# Patient Record
Sex: MALE | Race: WHITE | NOT HISPANIC OR LATINO | Employment: OTHER | URBAN - METROPOLITAN AREA
[De-identification: names, ages, dates, MRNs, and addresses within clinical notes are randomized per-mention and may not be internally consistent; named-entity substitution may affect disease eponyms.]

---

## 2017-01-09 ENCOUNTER — NURSE TRIAGE (OUTPATIENT)
Dept: ADMINISTRATIVE | Facility: CLINIC | Age: 82
End: 2017-01-09

## 2017-01-09 NOTE — TELEPHONE ENCOUNTER
Reason for Disposition   Age > 50 years    Protocols used: ST [id number]    Son is calling to report Gene passed out this AM.  States eyes rolled back in head.  States is awake now.  Advised ER.  Son refused.  Wants to see Dr. Hutchsion or one of the NP's today.  Please contact son at [phone] to advise

## 2017-01-10 ENCOUNTER — DOCUMENTATION ONLY (OUTPATIENT)
Dept: FAMILY MEDICINE | Facility: CLINIC | Age: 82
End: 2017-01-10

## 2017-01-10 NOTE — PROGRESS NOTES
Pre-Visit Chart Review  For Appointment Scheduled on 1/11/17    Health Maintenance Due   Topic Date Due    TETANUS VACCINE  01/11/1952

## 2017-01-11 ENCOUNTER — TELEPHONE (OUTPATIENT)
Dept: NEUROLOGY | Facility: CLINIC | Age: 82
End: 2017-01-11

## 2017-01-11 ENCOUNTER — TELEPHONE (OUTPATIENT)
Dept: FAMILY MEDICINE | Facility: CLINIC | Age: 82
End: 2017-01-11

## 2017-01-11 ENCOUNTER — OFFICE VISIT (OUTPATIENT)
Dept: FAMILY MEDICINE | Facility: CLINIC | Age: 82
End: 2017-01-11
Payer: MEDICARE

## 2017-01-11 ENCOUNTER — LAB VISIT (OUTPATIENT)
Dept: LAB | Facility: HOSPITAL | Age: 82
End: 2017-01-11
Attending: NURSE PRACTITIONER
Payer: MEDICARE

## 2017-01-11 VITALS
DIASTOLIC BLOOD PRESSURE: 71 MMHG | WEIGHT: 161.81 LBS | HEART RATE: 73 BPM | BODY MASS INDEX: 31.77 KG/M2 | SYSTOLIC BLOOD PRESSURE: 152 MMHG | HEIGHT: 60 IN | TEMPERATURE: 98 F

## 2017-01-11 DIAGNOSIS — R55 SYNCOPE, UNSPECIFIED SYNCOPE TYPE: Primary | ICD-10-CM

## 2017-01-11 DIAGNOSIS — R55 SYNCOPE, UNSPECIFIED SYNCOPE TYPE: ICD-10-CM

## 2017-01-11 LAB
ALBUMIN SERPL BCP-MCNC: 3.7 G/DL
ALP SERPL-CCNC: 107 U/L
ALT SERPL W/O P-5'-P-CCNC: 21 U/L
ANION GAP SERPL CALC-SCNC: 9 MMOL/L
AST SERPL-CCNC: 19 U/L
BASOPHILS # BLD AUTO: 0.02 K/UL
BASOPHILS NFR BLD: 0.3 %
BILIRUB SERPL-MCNC: 0.4 MG/DL
BUN SERPL-MCNC: 22 MG/DL
CALCIUM SERPL-MCNC: 8.9 MG/DL
CHLORIDE SERPL-SCNC: 110 MMOL/L
CO2 SERPL-SCNC: 23 MMOL/L
CREAT SERPL-MCNC: 1.2 MG/DL
DIFFERENTIAL METHOD: ABNORMAL
EOSINOPHIL # BLD AUTO: 0.1 K/UL
EOSINOPHIL NFR BLD: 1.1 %
ERYTHROCYTE [DISTWIDTH] IN BLOOD BY AUTOMATED COUNT: 14.6 %
EST. GFR  (AFRICAN AMERICAN): >60 ML/MIN/1.73 M^2
EST. GFR  (NON AFRICAN AMERICAN): 55.6 ML/MIN/1.73 M^2
GLUCOSE SERPL-MCNC: 79 MG/DL
HCT VFR BLD AUTO: 40.6 %
HGB BLD-MCNC: 13.8 G/DL
LYMPHOCYTES # BLD AUTO: 2.4 K/UL
LYMPHOCYTES NFR BLD: 39.6 %
MCH RBC QN AUTO: 31.7 PG
MCHC RBC AUTO-ENTMCNC: 34 %
MCV RBC AUTO: 93 FL
MONOCYTES # BLD AUTO: 0.6 K/UL
MONOCYTES NFR BLD: 10.1 %
NEUTROPHILS # BLD AUTO: 3 K/UL
NEUTROPHILS NFR BLD: 48.7 %
PLATELET # BLD AUTO: 181 K/UL
PMV BLD AUTO: 11.5 FL
POTASSIUM SERPL-SCNC: 4.7 MMOL/L
PROT SERPL-MCNC: 7.1 G/DL
RBC # BLD AUTO: 4.35 M/UL
SODIUM SERPL-SCNC: 142 MMOL/L
WBC # BLD AUTO: 6.11 K/UL

## 2017-01-11 PROCEDURE — 3077F SYST BP >= 140 MM HG: CPT | Mod: S$GLB,,, | Performed by: NURSE PRACTITIONER

## 2017-01-11 PROCEDURE — 36415 COLL VENOUS BLD VENIPUNCTURE: CPT | Mod: PO

## 2017-01-11 PROCEDURE — 99999 PR PBB SHADOW E&M-EST. PATIENT-LVL V: CPT | Mod: PBBFAC,,, | Performed by: NURSE PRACTITIONER

## 2017-01-11 PROCEDURE — 1126F AMNT PAIN NOTED NONE PRSNT: CPT | Mod: S$GLB,,, | Performed by: NURSE PRACTITIONER

## 2017-01-11 PROCEDURE — 99213 OFFICE O/P EST LOW 20 MIN: CPT | Mod: S$GLB,,, | Performed by: NURSE PRACTITIONER

## 2017-01-11 PROCEDURE — 1159F MED LIST DOCD IN RCRD: CPT | Mod: S$GLB,,, | Performed by: NURSE PRACTITIONER

## 2017-01-11 PROCEDURE — 85025 COMPLETE CBC W/AUTO DIFF WBC: CPT

## 2017-01-11 PROCEDURE — 80053 COMPREHEN METABOLIC PANEL: CPT

## 2017-01-11 PROCEDURE — 1160F RVW MEDS BY RX/DR IN RCRD: CPT | Mod: S$GLB,,, | Performed by: NURSE PRACTITIONER

## 2017-01-11 PROCEDURE — 93010 ELECTROCARDIOGRAM REPORT: CPT | Mod: S$GLB,,, | Performed by: INTERNAL MEDICINE

## 2017-01-11 PROCEDURE — 93005 ELECTROCARDIOGRAM TRACING: CPT | Mod: S$GLB,,, | Performed by: NURSE PRACTITIONER

## 2017-01-11 PROCEDURE — 1157F ADVNC CARE PLAN IN RCRD: CPT | Mod: S$GLB,,, | Performed by: NURSE PRACTITIONER

## 2017-01-11 PROCEDURE — 3078F DIAST BP <80 MM HG: CPT | Mod: S$GLB,,, | Performed by: NURSE PRACTITIONER

## 2017-01-11 RX ORDER — MONTELUKAST SODIUM 10 MG/1
10 TABLET ORAL NIGHTLY
Qty: 90 TABLET | Refills: 3 | Status: SHIPPED | OUTPATIENT
Start: 2017-01-11 | End: 2017-09-06

## 2017-01-11 RX ORDER — MONTELUKAST SODIUM 10 MG/1
10 TABLET ORAL NIGHTLY
COMMUNITY
End: 2017-01-11 | Stop reason: SDUPTHER

## 2017-01-11 RX ORDER — PREDNISONE 50 MG/1
TABLET ORAL
Qty: 3 TABLET | Refills: 0 | Status: SHIPPED | OUTPATIENT
Start: 2017-01-11 | End: 2017-09-06

## 2017-01-11 NOTE — TELEPHONE ENCOUNTER
----- Message from Sabar Soto sent at 1/11/2017  2:26 PM CST -----  Contact: Patient  Gene, patient [phone], Returning nurse's call. Call to POD. Please advise. Thanks.

## 2017-01-11 NOTE — PATIENT INSTRUCTIONS
Controlling High Blood Pressure  High blood pressure (hypertension) is often called the silent killer. This is because many people who have it dont know it. High blood pressure is defined as 140/90 mm Hg or higher. Know your blood pressure and remember to check it regularly. Doing so can save your life. Here are some things you can do to help control your blood pressure.    Choose heart-healthy foods  · Select low-salt, low-fat foods. Limit sodium intake to 2,400 mg per day or the amount suggested by your healthcare provider.  · Limit canned, dried, cured, packaged, and fast foods. These can contain a lot of salt.  · Eat 8 to 10 servings of fruits and vegetables every day.  · Choose lean meats, fish, or chicken.  · Eat whole-grain pasta, brown rice, and beans.  · Eat 2 to 3 servings of low-fat or fat-free dairy products.  · Ask your doctor about the DASH eating plan. This plan helps reduce blood pressure.  · When you go to a restaurant, ask that your meal be prepared with no added salt.  Maintain a healthy weight  · Ask your healthcare provider how many calories to eat a day. Then stick to that number.  · Ask your healthcare provider what weight range is healthiest for you. If you are overweight, a weight loss of only 3% to 5% of your body weight can help lower blood pressure. Generally, a good weight loss goal is to lose 10% of your body weight in a year.  · Limit snacks and sweets.  · Get regular exercise.  Get up and get active  · Choose activities you enjoy. Find ones you can do with friends or family. This includes bicycling, dancing, walking, and jogging.  · Park farther away from building entrances.  · Use stairs instead of the elevator.  · When you can, walk or bike instead of driving.  · [place] leaves, garden, or do household repairs.  · Be active at a moderate to vigorous level of physical activity for at least 40 minutes for a minimum of 3 to 4 days a week.   Manage stress  · Make time to relax and enjoy  life. Find time to laugh.  · Communicate your concerns with your loved ones and your healthcare provider.  · Visit with family and friends, and keep up with hobbies.  Limit alcohol and quit smoking  · Men should have no more than 2 drinks per day.  · Women should have no more than 1 drink per day.  · Talk with your healthcare provider about quitting smoking. Smoking significantly increases your risk for heart disease and stroke. Ask your healthcare provider about community smoking cessation programs and other options.  Medicines  If lifestyle changes arent enough, your healthcare provider may prescribe high blood pressure medicine. Take all medicines as prescribed. If you have any questions about your medicines, ask your healthcare provider before stopping or changing them.   © 3154-8804 [employer]. [address] [place] [address], [place] [address]. All rights reserved. This information is not intended as a substitute for professional medical care. Always follow your healthcare professional's instructions.        Weight Management: Getting Started  Healthy bodies come in all shapes and sizes. Not all bodies are made to be thin. For some people, a healthy weight is higher than the average weight listed on weight charts. Your healthcare provider can help you decide on a healthy weight for you.    Reasons to lose weight  Losing weight can help with some health problems, such as high blood pressure, heart disease, diabetes, sleep apnea, and arthritis. You may also feel more energy.  Set your long-term goal  Your goal doesn't even have to be a specific weight. You may decide on a fitness goal (such as being able to walk 10 miles a week), or a health goal (such as lowering your blood pressure). Choose a goal that is measurable and reasonable, so you know when you've reached it. A goal of reaching a BMI of less than 25 is not always reasonable (or possible).   Make an action plan  Habits dont change overnight.  Setting your goals too high can leave you feeling discouraged if you cant reach them. Be realistic. Choose one or two small changes you can make now. Set an action plan for how you are going to make these changes. When you can stick to this plan, keep making a few more small changes. Taking small steps will help you stay on the path to success.  Track your progress  Write down your goals. Then, keep a daily record of your progress. Write down what you eat and how active you are. This record lets you look back on how much youve done. It may also help when youre feeling frustrated. Reward yourself for success. Even if you dont reach every goal, give yourself credit for what you do get done.  Get support  Encouragement from others can help make losing weight easier. Ask your family members and friends for support. They may even want to join you. Also look to your healthcare provider, registered dietitian, and  for help. Your local hospital can give you more information about nutrition, exercise, and weight loss.  © 6957-7020 [employer]. [address] [place] [address], [place] [address]. All rights reserved. This information is not intended as a substitute for professional medical care. Always follow your healthcare professional's instructions.        Walking for Fitness  Fitness walking has something for everyone, even people who are already fit. Walking is one of the safest ways to condition your body aerobically. It can boost energy, help you lose weight, and reduce stress.    Physical benefits  · Walking strengthens your heart and lungs, and tones your muscles.  · When walking, your feet land with less impact than in other sports. This reduces chances of muscle, bone, and joint injury.  · Regular walking improves your cholesterol levels and lowers your risk of heart disease. And it helps you control your blood sugar if you have diabetes.  · Walking is a weight-bearing activity, which  helps maintain bone density. This can help prevent osteoporosis.  Personal rewards  · Taking walks can help you relax and manage stress. And fitness walking may make you feel better about yourself.  · Walking can help you sleep better at night and make you less likely to be depressed.  · Regular walking may help maintain your memory as you get older.  · Walking is a great way to spend extra time with friends and family members. Be sure to invite your dog along!  Q&A about fitness walking  Q: Will walking keep me fit?  A: Yes. Regular walking at the right pace gives you all the benefits of other aerobic activities, such as jogging and swimming.  Q: Will walking help me lose weight and keep it off?  A: Yes. Per mile, walking can burn as many calories as jogging. Your health care provider can help work walking into your weight-loss plan.  Q: Is walking safe for my health?  A: Yes. Walking is safe if you have high blood pressure, diabetes, heart disease, or other conditions. Talk to your health care provider before you start.  © 7160-0455 [employer]. [address] [place] [address], [place] [address]. All rights reserved. This information is not intended as a substitute for professional medical care. Always follow your healthcare professional's instructions.

## 2017-01-11 NOTE — TELEPHONE ENCOUNTER
----- Message from Mariel Freeman sent at 1/11/2017 10:58 AM CST -----  Pt was seen today and needs an appt for Syncope, unspecified syncope type  Please call pt @ [phone].  Thanks !

## 2017-01-11 NOTE — TELEPHONE ENCOUNTER
----- Message from Sabra Soto sent at 1/11/2017  2:25 PM CST -----  Contact: Patient  Gene, patient [phone], Returning nurse's call. Call to POD. Please advise. Thanks.

## 2017-01-11 NOTE — TELEPHONE ENCOUNTER
Called patient, explained neurology was trying to reach him to schedule an appointment due to his syncope. He is calling their office to schedule.

## 2017-01-11 NOTE — MR AVS SNAPSHOT
[facility]  [address]  [place] [address]  Phone: [phone]  Fax: [phone]                  Gene Hartman   2017 9:40 AM   Office Visit    Description:  Male : 1934   Provider:  CARLOS Sams   Department:  [facility]           Reason for Visit     weakness           Diagnoses this Visit        Comments    Syncope, unspecified syncope type    -  Primary            To Do List           Future Appointments        Provider Department Dept Phone    2017 1:45 PM LAB, [facility] - Lab [phone]    2017 2:00 PM SPECIMEN, [facility] - Lab [phone]    2017 1:30 PM [facility] CT1 LIMIT 400 LBS [facility] [phone]    2017 11:00 AM [facility] [phone]    2017 9:00 AM Rey Hutchison MD [facility] [phone]      Goals (5 Years of Data)     None      Follow-Up and Disposition     Return for labs now.       These Medications        Disp Refills Start End    montelukast (SINGULAIR) 10 mg tablet 90 tablet 3 2017     Take 1 tablet (10 mg total) by mouth every evening. - Oral    Pharmacy: [facility] - [address] Ph #: [phone]       predniSONE (DELTASONE) 50 MG Tab 3 tablet 0 2017     Take one tablet 13 hours, 7 hours, and 1 hour prior to procedure.    Pharmacy: [id number] - [place] - [address]. Ph #: [phone]         PURCHASE these Medications (No prescription required)        Start End    diphenhydramine HCl 50 mg/30 mL Liqd 2017    Sig - Route: Take 50 mg by mouth once. Take one hour prior to CTA. - Oral    Class: OTC      [facility] On Call     [facility] On Call Nurse Care Line -  Assistance  Registered nurses in the [facility] On Call Center provide clinical advisement, health education, appointment booking, and other advisory  services.  Call for this free service at [phone].             Medications           Message regarding Medications     Verify the changes and/or additions to your medication regime listed below are the same as discussed with your clinician today.  If any of these changes or additions are incorrect, please notify your healthcare provider.        START taking these NEW medications        Refills    montelukast (SINGULAIR) 10 mg tablet 3    Sig: Take 1 tablet (10 mg total) by mouth every evening.    Class: Normal    Route: Oral    predniSONE (DELTASONE) 50 MG Tab 0    Sig: Take one tablet 13 hours, 7 hours, and 1 hour prior to procedure.    Class: Normal    diphenhydramine HCl 50 mg/30 mL Liqd     Sig: Take 50 mg by mouth once. Take one hour prior to CTA.    Class: OTC    Route: Oral           Verify that the below list of medications is an accurate representation of the medications you are currently taking.  If none reported, the list may be blank. If incorrect, please contact your healthcare provider. Carry this list with you in case of emergency.           Current Medications     acetaminophen (TYLENOL EX STR RAPID RELEASE) 500 MG tablet Take 500 mg by mouth every 6 (six) hours as needed for Pain.    aspirin (ECOTRIN) 81 MG EC tablet Take 81 mg by mouth once daily.    atorvastatin (LIPITOR) 40 MG tablet Take 1 tablet (40 mg total) by mouth once daily.    azelastine (ASTELIN) 137 mcg (0.1 %) nasal spray 2 sprays (274 mcg total) by Nasal route 2 (two) times daily.    clopidogrel (PLAVIX) 75 mg tablet Take 1 tablet (75 mg total) by mouth once daily.    desoximetasone (TOPICORT) 0.25 % cream Apply topically 2 (two) times daily.    doxazosin (CARDURA) 2 MG tablet Take 1 mg by mouth every evening.    fluticasone (FLONASE) 50 mcg/actuation nasal spray 1 spray by Each Nare route once daily.    hyoscyamine (ANASPAZ,LEVSIN) 0.125 mg Tab Take 1 tablet (125 mcg total) by mouth every 4 (four) hours as needed.     levocetirizine (XYZAL) 5 MG tablet Take 1 tablet (5 mg total) by mouth every evening.    levothyroxine (SYNTHROID) 25 MCG tablet Take 1 tablet (25 mcg total) by mouth before breakfast.    lorazepam (ATIVAN) 0.5 MG tablet Take 1 tablet (0.5 mg total) by mouth every 12 (twelve) hours as needed for Anxiety.    losartan (COZAAR) 50 MG tablet Take 1 tablet (50 mg total) by mouth 2 (two) times daily.    metoprolol succinate (TOPROL-XL) 25 MG 24 hr tablet Take 1 tablet (25 mg total) by mouth once daily.    montelukast (SINGULAIR) 10 mg tablet Take 1 tablet (10 mg total) by mouth every evening.    pantoprazole (PROTONIX) 40 MG tablet Take 1 tablet (40 mg total) by mouth once daily.    RESTASIS 0.05 % ophthalmic emulsion     diphenhydramine HCl 50 mg/30 mL Liqd Take 50 mg by mouth once. Take one hour prior to CTA.    predniSONE (DELTASONE) 50 MG Tab Take one tablet 13 hours, 7 hours, and 1 hour prior to procedure.           Clinical Reference Information           Vital Signs - Last Recorded  Most recent update: 1/11/2017 10:16 AM by Carmita Blandon MA    BP Pulse Temp Ht Wt BMI    (!) 152/71 (BP Location: Right arm, Patient Position: Standing, BP Method: Automatic) 73 97.5 °F (36.4 °C) (Oral) 5' (1.524 m) 73.4 kg (161 lb 13.1 oz) 31.6 kg/m2      Blood Pressure          Most Recent Value    BP  (!)  152/71      Allergies as of 1/11/2017     Iodinated Contrast Media - Iv Dye    Pontocaine [Tetracaine Hcl]      Immunizations Administered on Date of Encounter - 1/11/2017     None      Orders Placed During Today's Visit      Normal Orders This Visit    Ambulatory referral to Neurology     IN OFFICE EKG 12-LEAD (to [place])     Future Labs/Procedures Expected by Expires    CBC auto differential  1/11/2017 3/12/2018    Comprehensive metabolic panel  1/11/2017 3/12/2018    CTA Head and Neck (xpd)  1/11/2017 1/11/2018    Urinalysis  1/11/2017 3/12/2018      Instructions      Controlling High Blood Pressure  High blood pressure  (hypertension) is often called the silent killer. This is because many people who have it dont know it. High blood pressure is defined as 140/90 mm Hg or higher. Know your blood pressure and remember to check it regularly. Doing so can save your life. Here are some things you can do to help control your blood pressure.    Choose heart-healthy foods  · Select low-salt, low-fat foods. Limit sodium intake to 2,400 mg per day or the amount suggested by your healthcare provider.  · Limit canned, dried, cured, packaged, and fast foods. These can contain a lot of salt.  · Eat 8 to 10 servings of fruits and vegetables every day.  · Choose lean meats, fish, or chicken.  · Eat whole-grain pasta, brown rice, and beans.  · Eat 2 to 3 servings of low-fat or fat-free dairy products.  · Ask your doctor about the DASH eating plan. This plan helps reduce blood pressure.  · When you go to a restaurant, ask that your meal be prepared with no added salt.  Maintain a healthy weight  · Ask your healthcare provider how many calories to eat a day. Then stick to that number.  · Ask your healthcare provider what weight range is healthiest for you. If you are overweight, a weight loss of only 3% to 5% of your body weight can help lower blood pressure. Generally, a good weight loss goal is to lose 10% of your body weight in a year.  · Limit snacks and sweets.  · Get regular exercise.  Get up and get active  · Choose activities you enjoy. Find ones you can do with friends or family. This includes bicycling, dancing, walking, and jogging.  · Park farther away from building entrances.  · Use stairs instead of the elevator.  · When you can, walk or bike instead of driving.  · [place] leaves, garden, or do household repairs.  · Be active at a moderate to vigorous level of physical activity for at least 40 minutes for a minimum of 3 to 4 days a week.   Manage stress  · Make time to relax and enjoy life. Find time to laugh.  · Communicate your concerns  with your loved ones and your healthcare provider.  · Visit with family and friends, and keep up with hobbies.  Limit alcohol and quit smoking  · Men should have no more than 2 drinks per day.  · Women should have no more than 1 drink per day.  · Talk with your healthcare provider about quitting smoking. Smoking significantly increases your risk for heart disease and stroke. Ask your healthcare provider about community smoking cessation programs and other options.  Medicines  If lifestyle changes arent enough, your healthcare provider may prescribe high blood pressure medicine. Take all medicines as prescribed. If you have any questions about your medicines, ask your healthcare provider before stopping or changing them.   © 1454-7450 [employer]. [address] [place] [address], [place] [address]. All rights reserved. This information is not intended as a substitute for professional medical care. Always follow your healthcare professional's instructions.        Weight Management: Getting Started  Healthy bodies come in all shapes and sizes. Not all bodies are made to be thin. For some people, a healthy weight is higher than the average weight listed on weight charts. Your healthcare provider can help you decide on a healthy weight for you.    Reasons to lose weight  Losing weight can help with some health problems, such as high blood pressure, heart disease, diabetes, sleep apnea, and arthritis. You may also feel more energy.  Set your long-term goal  Your goal doesn't even have to be a specific weight. You may decide on a fitness goal (such as being able to walk 10 miles a week), or a health goal (such as lowering your blood pressure). Choose a goal that is measurable and reasonable, so you know when you've reached it. A goal of reaching a BMI of less than 25 is not always reasonable (or possible).   Make an action plan  Habits dont change overnight. Setting your goals too high can leave you feeling  discouraged if you cant reach them. Be realistic. Choose one or two small changes you can make now. Set an action plan for how you are going to make these changes. When you can stick to this plan, keep making a few more small changes. Taking small steps will help you stay on the path to success.  Track your progress  Write down your goals. Then, keep a daily record of your progress. Write down what you eat and how active you are. This record lets you look back on how much youve done. It may also help when youre feeling frustrated. Reward yourself for success. Even if you dont reach every goal, give yourself credit for what you do get done.  Get support  Encouragement from others can help make losing weight easier. Ask your family members and friends for support. They may even want to join you. Also look to your healthcare provider, registered dietitian, and  for help. Your local hospital can give you more information about nutrition, exercise, and weight loss.  © 4885-6646 [employer]. [address] [place] [address]. All rights reserved. This information is not intended as a substitute for professional medical care. Always follow your healthcare professional's instructions.        Walking for Fitness  Fitness walking has something for everyone, even people who are already fit. Walking is one of the safest ways to condition your body aerobically. It can boost energy, help you lose weight, and reduce stress.    Physical benefits  · Walking strengthens your heart and lungs, and tones your muscles.  · When walking, your feet land with less impact than in other sports. This reduces chances of muscle, bone, and joint injury.  · Regular walking improves your cholesterol levels and lowers your risk of heart disease. And it helps you control your blood sugar if you have diabetes.  · Walking is a weight-bearing activity, which helps maintain bone density. This can help prevent  osteoporosis.  Personal rewards  · Taking walks can help you relax and manage stress. And fitness walking may make you feel better about yourself.  · Walking can help you sleep better at night and make you less likely to be depressed.  · Regular walking may help maintain your memory as you get older.  · Walking is a great way to spend extra time with friends and family members. Be sure to invite your dog along!  Q&A about fitness walking  Q: Will walking keep me fit?  A: Yes. Regular walking at the right pace gives you all the benefits of other aerobic activities, such as jogging and swimming.  Q: Will walking help me lose weight and keep it off?  A: Yes. Per mile, walking can burn as many calories as jogging. Your health care provider can help work walking into your weight-loss plan.  Q: Is walking safe for my health?  A: Yes. Walking is safe if you have high blood pressure, diabetes, heart disease, or other conditions. Talk to your health care provider before you start.  © 5889-1353 [employer]. [address] [place] [address], [place] [address]. All rights reserved. This information is not intended as a substitute for professional medical care. Always follow your healthcare professional's instructions.

## 2017-01-12 NOTE — PROGRESS NOTES
Subjective:       Patient ID: Gene Hartman is a 83 y.o. male.    Chief Complaint: weakness    HPI Comments: Mr. Hartman presents to the clinic today for episode of syncope which occurred four days ago.  He states he got up to go to the bathroom to urinate when he felt lightheaded and lost consciousness.  He was caught by his son who lowered him to the floor.  He woke up about one minute later.  Blood pressure immediately after incident, according to patient's wife, was 140/?.  Patient is not a known diabetic.  There were no convulsions and no post ictal state.  No chest pain or shortness of breath.  This occurred around 9 at night, and he had not eaten since about 3:30 pm.  He has history of syncope and had pacemaker placed about two years ago.  He had had one syncopal episode since the pacemaker was placed.  He also has history of vertebral artery stenosis.  Last CTA 7/2015, he has seen Dr. Rivera in the past for this problem and declined to have corrective procedure done.  Orthostatics today are negative.    Review of Systems   Constitutional: Negative for chills, fatigue and fever.   HENT: Negative for congestion, ear pain and sinus pressure.    Eyes: Negative for visual disturbance.   Respiratory: Negative for cough, shortness of breath and wheezing.    Cardiovascular: Negative for chest pain, palpitations and leg swelling.   Gastrointestinal: Negative for abdominal pain, constipation, diarrhea, nausea and vomiting.   Neurological: Positive for syncope and light-headedness. Negative for dizziness, tremors, seizures, facial asymmetry, speech difficulty, weakness, numbness and headaches.   Hematological: Does not bruise/bleed easily.       Objective:      Physical Exam   Constitutional: He is oriented to person, place, and time. He appears well-developed and well-nourished. No distress.   HENT:   Head: Normocephalic and atraumatic.   Right Ear: External ear normal.   Left Ear: External ear normal.   Mouth/Throat:  Oropharynx is clear and moist. No oropharyngeal exudate.   Eyes: Pupils are equal, round, and reactive to light. Right eye exhibits no discharge. Left eye exhibits no discharge.   Neck: Neck supple. No thyromegaly present.   Cardiovascular: Normal rate and regular rhythm.  Exam reveals no gallop and no friction rub.    No murmur heard.  No lower extremity edema.  No carotid bruit heard.   Pulmonary/Chest: Effort normal and breath sounds normal. No respiratory distress. He has no wheezes. He has no rales.   Abdominal: Soft. He exhibits no distension. There is no tenderness.   Lymphadenopathy:     He has no cervical adenopathy.   Neurological: He is alert and oriented to person, place, and time. Coordination normal.   Skin: Skin is warm and dry.   Psychiatric: He has a normal mood and affect. His behavior is normal. Thought content normal.   Vitals reviewed.          Current Outpatient Prescriptions:     acetaminophen (TYLENOL EX STR RAPID RELEASE) 500 MG tablet, Take 500 mg by mouth every 6 (six) hours as needed for Pain., Disp: , Rfl:     aspirin (ECOTRIN) 81 MG EC tablet, Take 81 mg by mouth once daily., Disp: , Rfl:     atorvastatin (LIPITOR) 40 MG tablet, Take 1 tablet (40 mg total) by mouth once daily., Disp: 90 tablet, Rfl: 3    azelastine (ASTELIN) 137 mcg (0.1 %) nasal spray, 2 sprays (274 mcg total) by Nasal route 2 (two) times daily., Disp: 90 mL, Rfl: 11    clopidogrel (PLAVIX) 75 mg tablet, Take 1 tablet (75 mg total) by mouth once daily., Disp: 90 tablet, Rfl: 3    desoximetasone (TOPICORT) 0.25 % cream, Apply topically 2 (two) times daily., Disp: , Rfl:     doxazosin (CARDURA) 2 MG tablet, Take 1 mg by mouth every evening., Disp: , Rfl:     fluticasone (FLONASE) 50 mcg/actuation nasal spray, 1 spray by Each Nare route once daily., Disp: 1 Bottle, Rfl: 3    hyoscyamine (ANASPAZ,LEVSIN) 0.125 mg Tab, Take 1 tablet (125 mcg total) by mouth every 4 (four) hours as needed., Disp: 90 tablet, Rfl:  4    levocetirizine (XYZAL) 5 MG tablet, Take 1 tablet (5 mg total) by mouth every evening., Disp: 90 tablet, Rfl: 3    levothyroxine (SYNTHROID) 25 MCG tablet, Take 1 tablet (25 mcg total) by mouth before breakfast., Disp: 90 tablet, Rfl: 3    lorazepam (ATIVAN) 0.5 MG tablet, Take 1 tablet (0.5 mg total) by mouth every 12 (twelve) hours as needed for Anxiety., Disp: 180 tablet, Rfl: 1    losartan (COZAAR) 50 MG tablet, Take 1 tablet (50 mg total) by mouth 2 (two) times daily., Disp: 180 tablet, Rfl: 3    metoprolol succinate (TOPROL-XL) 25 MG 24 hr tablet, Take 1 tablet (25 mg total) by mouth once daily., Disp: 90 tablet, Rfl: 3    montelukast (SINGULAIR) 10 mg tablet, Take 1 tablet (10 mg total) by mouth every evening., Disp: 90 tablet, Rfl: 3    pantoprazole (PROTONIX) 40 MG tablet, Take 1 tablet (40 mg total) by mouth once daily., Disp: 90 tablet, Rfl: 3    RESTASIS 0.05 % ophthalmic emulsion, , Disp: , Rfl:     predniSONE (DELTASONE) 50 MG Tab, Take one tablet 13 hours, 7 hours, and 1 hour prior to procedure., Disp: 3 tablet, Rfl: 0  Assessment:       1. Syncope, unspecified syncope type        Plan:      Syncope, unspecified syncope type  Allergic to iodine.  Pre-medication with prednisone, benadryl explained thoroughly to patient and his family.  Patient's wife repeated back instructions for premedication.  -     IN OFFICE EKG 12-LEAD (to Muse)--NSR interpreted by me   -     Comprehensive metabolic panel; Future; Expected date: 1/11/17  -     CBC auto differential; Future; Expected date: 1/11/17  -     CTA Head and Neck (xpd); Future; Expected date: 1/11/17  -     Urinalysis; Future; Expected date: 1/11/17  -     diphenhydramine HCl 50 mg/30 mL Liqd; Take 50 mg by mouth once. Take one hour prior to CTA.  -     Ambulatory referral to Neurology    Other orders  -     montelukast (SINGULAIR) 10 mg tablet; Take 1 tablet (10 mg total) by mouth every evening.  Dispense: 90 tablet; Refill: 3  -      predniSONE (DELTASONE) 50 MG Tab; Take one tablet 13 hours, 7 hours, and 1 hour prior to procedure.  Dispense: 3 tablet; Refill: 0

## 2017-01-16 ENCOUNTER — HOSPITAL ENCOUNTER (OUTPATIENT)
Dept: RADIOLOGY | Facility: HOSPITAL | Age: 82
Discharge: HOME | End: 2017-01-16
Attending: NURSE PRACTITIONER
Payer: MEDICARE

## 2017-01-16 DIAGNOSIS — R55 SYNCOPE, UNSPECIFIED SYNCOPE TYPE: ICD-10-CM

## 2017-01-16 PROCEDURE — 70496 CT ANGIOGRAPHY HEAD: CPT | Mod: TC

## 2017-01-16 PROCEDURE — 25500020 PHARM REV CODE 255

## 2017-01-16 PROCEDURE — 70498 CT ANGIOGRAPHY NECK: CPT | Mod: 26,,, | Performed by: RADIOLOGY

## 2017-01-16 PROCEDURE — 70496 CT ANGIOGRAPHY HEAD: CPT | Mod: 26,,, | Performed by: RADIOLOGY

## 2017-01-16 RX ADMIN — IOHEXOL: 350 INJECTION, SOLUTION INTRAVENOUS at 02:01

## 2017-01-18 DIAGNOSIS — I65.23 CAROTID STENOSIS, BILATERAL: Primary | ICD-10-CM

## 2017-01-23 ENCOUNTER — TELEPHONE (OUTPATIENT)
Dept: FAMILY MEDICINE | Facility: CLINIC | Age: 82
End: 2017-01-23

## 2017-01-23 NOTE — TELEPHONE ENCOUNTER
Can you please schedule this patient according to referral for Carotid stenosis, bilateral? Let me know if there is any problem.

## 2017-01-23 NOTE — TELEPHONE ENCOUNTER
----- Message from Lyn Simons sent at 1/23/2017  1:49 PM CST -----  Amber (daughter)requesting copy of patient's CT Scan results/would like to  before Wednesday/please call back at [phone] when ready.

## 2017-01-23 NOTE — TELEPHONE ENCOUNTER
Informed patient's daughter in order to get records of CT scan, she will have to go to the hospital, she expressed understanding.

## 2017-01-27 ENCOUNTER — TELEPHONE (OUTPATIENT)
Dept: VASCULAR SURGERY | Facility: CLINIC | Age: 82
End: 2017-01-27

## 2017-01-27 NOTE — TELEPHONE ENCOUNTER
Called pt to schedule appt. Wife informed me that he is being followed by Dr. Betancourt (cardio) and will have carotid stenosis monitored by him.

## 2017-06-26 RX ORDER — LOSARTAN POTASSIUM 50 MG/1
50 TABLET ORAL 2 TIMES DAILY
Qty: 180 TABLET | Refills: 3 | Status: CANCELLED | OUTPATIENT
Start: 2017-06-26

## 2017-06-27 RX ORDER — DOXAZOSIN 2 MG/1
2 TABLET ORAL NIGHTLY
Qty: 30 TABLET | Refills: 2 | Status: SHIPPED | OUTPATIENT
Start: 2017-06-27 | End: 2017-09-06

## 2017-06-28 RX ORDER — LOSARTAN POTASSIUM 50 MG/1
50 TABLET ORAL 2 TIMES DAILY
Qty: 180 TABLET | Refills: 3 | Status: SHIPPED | OUTPATIENT
Start: 2017-06-28 | End: 2019-02-11 | Stop reason: SDUPTHER

## 2017-07-06 RX ORDER — FLUTICASONE PROPIONATE 50 MCG
SPRAY, SUSPENSION (ML) NASAL
Qty: 48 G | Refills: 0 | Status: SHIPPED | OUTPATIENT
Start: 2017-07-06 | End: 2017-09-06 | Stop reason: SDUPTHER

## 2017-08-25 LAB
ALBUMIN SERPL-MCNC: 4 G/DL (ref 3.1–4.7)
ALP SERPL-CCNC: 69 IU/L (ref 40–104)
ALT (SGPT): 18 IU/L (ref 3–33)
AST SERPL-CCNC: 18 IU/L (ref 10–40)
BASOPHILS NFR BLD: 0 K/UL (ref 0–0.2)
BASOPHILS NFR BLD: 0.4 %
BILIRUB SERPL-MCNC: 0.8 MG/DL (ref 0.3–1)
BUN SERPL-MCNC: 23 MG/DL (ref 8–20)
CALCIUM SERPL-MCNC: 8.7 MG/DL (ref 7.7–10.4)
CHLORIDE: 110 MMOL/L (ref 98–110)
CO2 SERPL-SCNC: 24.7 MMOL/L (ref 22.8–31.6)
CREATININE RANDOM URINE: 98 MG/DL
CREATININE: 1.24 MG/DL (ref 0.6–1.4)
EOSINOPHIL NFR BLD: 0.1 K/UL (ref 0–0.7)
EOSINOPHIL NFR BLD: 1.1 %
ERYTHROCYTE [DISTWIDTH] IN BLOOD BY AUTOMATED COUNT: 13.7 % (ref 11.7–14.9)
GLUCOSE: 101 MG/DL (ref 70–99)
GRAN #: 2.8 K/UL (ref 1.4–6.5)
GRAN%: 50.9 %
HCT VFR BLD AUTO: 40.7 % (ref 39–55)
HGB BLD-MCNC: 13.9 G/DL (ref 14–16)
IMMATURE GRANS (ABS): 0 K/UL (ref 0–1)
IMMATURE GRANULOCYTES: 0.2 %
LYMPH #: 2.1 K/UL (ref 1.2–3.4)
LYMPH%: 39 %
MCH RBC QN AUTO: 32 PG (ref 25–35)
MCHC RBC AUTO-ENTMCNC: 34.2 G/DL (ref 31–36)
MCV RBC AUTO: 93.8 FL (ref 80–100)
MICROALBUM.,U,RANDOM: 3.2 MCG/ML (ref 0–19.9)
MICROALBUMIN/CREATININE RATIO: 3 (ref 0–30)
MONO #: 0.5 K/UL (ref 0.1–0.6)
MONO%: 8.4 %
NUCLEATED RBCS: 0 %
PLATELET # BLD AUTO: 144 K/UL (ref 140–440)
PMV BLD AUTO: 11.7 FL (ref 8.8–12.7)
POTASSIUM SERPL-SCNC: 4.6 MMOL/L (ref 3.5–5)
PROT SERPL-MCNC: 6.7 G/DL (ref 6–8.2)
RBC # BLD AUTO: 4.34 M/UL (ref 4.3–5.9)
SODIUM: 141 MMOL/L (ref 134–144)
TSH SERPL DL<=0.005 MIU/L-ACNC: 3.89 ULU/ML (ref 0.3–5.6)
WBC # BLD AUTO: 5.5 K/UL (ref 5–10)

## 2017-09-06 ENCOUNTER — OFFICE VISIT (OUTPATIENT)
Dept: FAMILY MEDICINE | Facility: CLINIC | Age: 82
End: 2017-09-06
Payer: MEDICARE

## 2017-09-06 VITALS
RESPIRATION RATE: 18 BRPM | DIASTOLIC BLOOD PRESSURE: 68 MMHG | BODY MASS INDEX: 31.44 KG/M2 | SYSTOLIC BLOOD PRESSURE: 120 MMHG | WEIGHT: 160.13 LBS | HEART RATE: 63 BPM | HEIGHT: 60 IN | OXYGEN SATURATION: 97 %

## 2017-09-06 DIAGNOSIS — F41.9 ANXIETY: ICD-10-CM

## 2017-09-06 DIAGNOSIS — Z95.0 PACEMAKER: ICD-10-CM

## 2017-09-06 DIAGNOSIS — G44.209 TENSION HEADACHE: Primary | ICD-10-CM

## 2017-09-06 DIAGNOSIS — M48.9 CERVICAL SPINE DISEASE: ICD-10-CM

## 2017-09-06 DIAGNOSIS — Z85.46 HISTORY OF PROSTATE CANCER: ICD-10-CM

## 2017-09-06 DIAGNOSIS — R35.1 NOCTURIA: ICD-10-CM

## 2017-09-06 PROBLEM — N40.1 BPH ASSOCIATED WITH NOCTURIA: Status: ACTIVE | Noted: 2017-09-06

## 2017-09-06 PROCEDURE — 1159F MED LIST DOCD IN RCRD: CPT | Mod: ,,, | Performed by: INTERNAL MEDICINE

## 2017-09-06 PROCEDURE — 99215 OFFICE O/P EST HI 40 MIN: CPT | Mod: ,,, | Performed by: INTERNAL MEDICINE

## 2017-09-06 PROCEDURE — 3008F BODY MASS INDEX DOCD: CPT | Mod: ,,, | Performed by: INTERNAL MEDICINE

## 2017-09-06 PROCEDURE — 3074F SYST BP LT 130 MM HG: CPT | Mod: ,,, | Performed by: INTERNAL MEDICINE

## 2017-09-06 PROCEDURE — 3078F DIAST BP <80 MM HG: CPT | Mod: ,,, | Performed by: INTERNAL MEDICINE

## 2017-09-06 RX ORDER — OXYBUTYNIN CHLORIDE 5 MG/1
5 TABLET, EXTENDED RELEASE ORAL DAILY
Qty: 30 TABLET | Refills: 11 | Status: SHIPPED | OUTPATIENT
Start: 2017-09-06 | End: 2018-05-08 | Stop reason: SDUPTHER

## 2017-09-06 RX ORDER — LORAZEPAM 0.5 MG/1
0.5 TABLET ORAL EVERY 12 HOURS PRN
Qty: 180 TABLET | Refills: 1 | Status: SHIPPED | OUTPATIENT
Start: 2017-09-06 | End: 2018-05-20 | Stop reason: SDUPTHER

## 2017-09-06 RX ORDER — CYCLOSPORINE 0.5 MG/ML
1 EMULSION OPHTHALMIC 2 TIMES DAILY
COMMUNITY

## 2017-09-06 RX ORDER — FLUTICASONE PROPIONATE 50 MCG
SPRAY, SUSPENSION (ML) NASAL
Qty: 48 G | Refills: 2 | Status: SHIPPED | OUTPATIENT
Start: 2017-09-06 | End: 2018-05-08 | Stop reason: SDUPTHER

## 2017-09-06 RX ORDER — PANTOPRAZOLE SODIUM 40 MG/1
40 TABLET, DELAYED RELEASE ORAL DAILY
Qty: 90 TABLET | Refills: 1 | Status: SHIPPED | OUTPATIENT
Start: 2017-09-06 | End: 2017-12-05 | Stop reason: SDUPTHER

## 2017-09-06 RX ORDER — TAMSULOSIN HYDROCHLORIDE 0.4 MG/1
0.4 CAPSULE ORAL DAILY
Qty: 30 CAPSULE | Refills: 11 | Status: SHIPPED | OUTPATIENT
Start: 2017-09-06 | End: 2017-09-06

## 2017-09-06 RX ORDER — METOPROLOL SUCCINATE 25 MG/1
25 TABLET, EXTENDED RELEASE ORAL DAILY
Qty: 90 TABLET | Refills: 3 | Status: SHIPPED | OUTPATIENT
Start: 2017-09-06 | End: 2018-07-23 | Stop reason: SDUPTHER

## 2017-09-06 RX ORDER — PANTOPRAZOLE SODIUM 40 MG/1
40 TABLET, DELAYED RELEASE ORAL DAILY
COMMUNITY
End: 2017-09-06 | Stop reason: SDUPTHER

## 2017-09-06 RX ORDER — MONTELUKAST SODIUM 10 MG/1
10 TABLET ORAL NIGHTLY
COMMUNITY
End: 2017-10-25 | Stop reason: SDUPTHER

## 2017-09-06 RX ORDER — TIZANIDINE 2 MG/1
4 TABLET ORAL EVERY 6 HOURS PRN
Qty: 30 TABLET | Refills: 3 | Status: SHIPPED | OUTPATIENT
Start: 2017-09-06 | End: 2017-09-16

## 2017-09-06 RX ORDER — NAPROXEN SODIUM 220 MG/1
81 TABLET, FILM COATED ORAL DAILY
Status: ON HOLD | COMMUNITY
End: 2022-09-07 | Stop reason: HOSPADM

## 2017-09-06 NOTE — PATIENT INSTRUCTIONS
Neck Sprain or Strain  A sudden force that causes turning or bending of the neck can cause sprain or strain. An example would be the force from a car accident. This can stretch or tear muscles called a strain. It can also stretch or tear ligaments called a sprain. Either of these can cause neck pain. Sometimes neck pain occurs after a simple awkward movement. In either case, muscle spasm is commonly present and contributes to the pain.    Unless you had a forceful physical injury (for example, a car accident or fall), X-rays are usually not ordered for the initial evaluation of neck pain. If pain continues and dose not respond to medical treatment, X-rays and other tests may be performed at a later time.  Home care  · You may feel more soreness and spasm the first few days after the injury. Rest until symptoms begin to improve.  · When lying down, use a comfortable pillow or a rolled towel that supports the head and keeps the spine in a neutral position. The position of the head should not be tilted forward or backward.  · Apply an ice pack over the injured area for 15 to 20 minutes every 3 to 6 hours. You should do this for the first 24 to 48 hours. You can make an ice pack by filling a plastic bag that seals at the top with ice cubes and then wrapping it with a thin towel. After 48 hours, apply heat (warm shower or warm bath) for 15 to 20 minutes several times a day, or alternate ice and heat.  · You may use over-the-counter pain medicine to control pain, unless another pain medicine was prescribed. If you have chronic liver or kidney disease or ever had a stomach ulcer or GI bleeding, talk with your healthcare provider before using these medicines.  · If a soft cervical collar was prescribed, it should be worn only for periods of increased pain. It should not be worn for more than 3 hours a day, or for a period longer than 1 to 2 weeks.  Follow-up care  Follow up with your healthcare provider as directed.  Physical therapy may be needed.  Sometimes fractures dont show up on the first X-ray. Bruises and sprains can sometimes hurt as much as a fracture. These injuries can take time to heal completely. If your symptoms dont improve or they get worse, talk with your healthcare provider. You may need a repeat X-ray or other tests. If X-rays were taken, you will be told of any new findings that may affect your care.  Call 911  Call 911 if you have:  · Neck swelling, difficulty or painful swallowing  · Difficulty breathing  · Chest pain  When to seek medical advice  Call your healthcare provider right away if any of these occur:  · Pain becomes worse or spreads into your arms  · Weakness or numbness in one or both arms  Date Last Reviewed: 11/19/2015  © 7145-5913 [employer]. [address] [place] [address], [place] [address]. All rights reserved. This information is not intended as a substitute for professional medical care. Always follow your healthcare professional's instructions.

## 2017-09-06 NOTE — PROGRESS NOTES
Subjective:       Patient ID: Gene Hartman is a 83 y.o. male.    Chief Complaint: lab review    Patient is here for follow-up for laboratory review. His lab work came back and almost everything was within normal limits except his glucose was 2. above normal. His BUNs was slightly elevated at 23. And hemoglobin was 13.8 which is very minimally low.  The patient is complaining of morning headaches. He states that they start in his neck and radiates up into his forehead and temples. The headache does not awaken him from sleep the day goes on the headache gets worse. He has not tried anything for the headache. It can be exacerbated when he turns his neck suddenly to the left. His wife explained that he saw a neurologist many years ago and he said he had a tightening on the left side. They deny that it was a carotid artery.  The patient also urinates 6 times a night. He has not had a prostate as he's had a radical prostatectomy many years ago. His cardiologist Dr. Betancourt put him on Cardura 2 mg both for his blood pressure and for his nocturia. The patient has no signs or symptoms of a prostatitis or urinary tract infection.  His wife is also concerned about his memory. We did review his medications and he is on a long-acting anti-histamine called Xyzal. He also takes Singulair at night and uses Flonase in the morning. Despite this he wakes up with nasal congestion. It was in agreement that we would discontinue the Xyzal and give him an anti-histamine break. She would like to put him on something over-the-counter for energy and memory. I encouraged her to do so in 3 months we will follow-up and discuss further workup for short-term memory loss discontinuation of the Xyzal.      Review of Systems   Constitutional: Negative for activity change, diaphoresis, fatigue and fever.   HENT: Negative for congestion, ear pain, postnasal drip, sinus pressure, sore throat and trouble swallowing.    Eyes: Negative for pain.    Respiratory: Negative for cough, chest tightness, shortness of breath and wheezing.    Cardiovascular: Negative for chest pain, palpitations and leg swelling.   Gastrointestinal: Negative for abdominal distention, abdominal pain, blood in stool, constipation and diarrhea.   Endocrine: Negative for cold intolerance and heat intolerance.   Genitourinary: Negative for decreased urine volume, difficulty urinating, dysuria, flank pain, frequency and hematuria.   Musculoskeletal: Negative for arthralgias, back pain, joint swelling, myalgias and neck pain.   Skin: Negative for pallor, rash and wound.   Neurological: Negative for tremors, syncope, weakness, light-headedness, numbness and headaches.   Hematological: Negative for adenopathy.   Psychiatric/Behavioral: Negative for confusion, decreased concentration, hallucinations, self-injury, sleep disturbance and suicidal ideas. The patient is not nervous/anxious.        Past Medical History:   Diagnosis Date    Anxiety     Arthritis     CAD (coronary artery disease) age 68    Carotid artery occlusion     Cerebrovascular small vessel disease     Colon polyps age 78    GERD (gastroesophageal reflux disease)     HTN (hypertension), benign age 65    Hyperlipidemia LDL goal <70 age 65    Hypothyroidism     Multiple fractures of ribs of right side 11/27/2012    Myocardial infarction     Prostate cancer age 67    Syncopal episodes 3/2013       Past Surgical History:   Procedure Laterality Date    CARDIAC PACEMAKER PLACEMENT  10/2015    CARDIAC SURGERY      CATARACT EXTRACTION W/  INTRAOCULAR LENS IMPLANT Bilateral     COLONOSCOPY  ~2013    Dr. Edge    COLONOSCOPY N/A 6/8/2016    Procedure: COLONOSCOPY;  Surgeon: Shamar Barahona MD;  Location: [facility];  Service: Endoscopy;  Laterality: N/A;    CORONARY ANGIOPLASTY WITH STENT PLACEMENT  2003    x 2 RCA    PROSTATECTOMY  2002       Family History   Problem Relation Age of Onset    Migraines Mother      Heart failure Father     Heart disease Father 56     MI    Heart failure Brother     Heart disease Brother     Diabetes Son     Hypertension Son     Heart disease Brother     Mental illness Brother     No Known Problems Son     Colon cancer Neg Hx     Colon polyps Neg Hx     Crohn's disease Neg Hx     Ulcerative colitis Neg Hx        Social History     Social History    Marital status:      Spouse name: N/A    Number of children: 2    Years of education: N/A     Social History Main Topics    Smoking status: Never Smoker    Smokeless tobacco: Never Used    Alcohol use No    Drug use: No    Sexual activity: Not Currently     Other Topics Concern    None     Social History Narrative    The patient does exercise regularly (walking).    Rates diet as good.    He is satisfied with weight.                   Current Outpatient Prescriptions   Medication Sig Dispense Refill    acetaminophen (TYLENOL EX STR RAPID RELEASE) 500 MG tablet Take 500 mg by mouth every 6 (six) hours as needed for Pain.      aspirin (ECOTRIN) 81 MG EC tablet Take 81 mg by mouth Daily.      atorvastatin (LIPITOR) 40 MG tablet Take 1 tablet (40 mg total) by mouth once daily. 90 tablet 3    clopidogrel (PLAVIX) 75 mg tablet Take 1 tablet (75 mg total) by mouth once daily. 90 tablet 3    cycloSPORINE (RESTASIS) 0.05 % ophthalmic emulsion Apply 1 drop to eye 2 (two) times daily.      fluticasone (FLONASE) 50 mcg/actuation nasal spray USE 1 SPRAY IN EACH NOSTRIL TWICE DAILY 48 g 2    levothyroxine (SYNTHROID) 25 MCG tablet Take 1 tablet (25 mcg total) by mouth before breakfast. 90 tablet 3    lorazepam (ATIVAN) 0.5 MG tablet Take 1 tablet (0.5 mg total) by mouth every 12 (twelve) hours as needed for Anxiety. 180 tablet 1    losartan (COZAAR) 50 MG tablet Take 1 tablet (50 mg total) by mouth 2 (two) times daily. 180 tablet 3    metoprolol succinate (TOPROL-XL) 25 MG 24 hr tablet Take 1 tablet (25 mg total) by mouth once  daily. 90 tablet 3    montelukast (SINGULAIR) 10 mg tablet Take 10 mg by mouth every evening.      pantoprazole (PROTONIX) 40 MG tablet Take 1 tablet (40 mg total) by mouth once daily. 90 tablet 1    oxybutynin (DITROPAN-XL) 5 MG TR24 Take 1 tablet (5 mg total) by mouth once daily. 30 tablet 11    tizanidine (ZANAFLEX) 2 MG tablet Take 2 tablets (4 mg total) by mouth every 6 (six) hours as needed. 30 tablet 3     No current facility-administered medications for this visit.        Review of patient's allergies indicates:   Allergen Reactions    Iodinated contrast- oral and iv dye Rash    Pontocaine [tetracaine hcl] Anaphylaxis     hypotension       Objective:      Physical Exam   Constitutional: He is oriented to person, place, and time. He appears well-developed and well-nourished. No distress.   HENT:   Head: Normocephalic and atraumatic.   Right Ear: External ear normal.   Left Ear: External ear normal.   Nose: Nose normal.   Mouth/Throat: Oropharynx is clear and moist.   Eyes: Conjunctivae and EOM are normal. Right eye exhibits no discharge. Left eye exhibits no discharge. No scleral icterus.   Neck: Normal range of motion. Neck supple. No JVD present. No tracheal deviation present. No thyromegaly present.   Cardiovascular: Normal rate, regular rhythm, normal heart sounds and intact distal pulses.  Exam reveals no gallop.    No murmur heard.  Pulmonary/Chest: Effort normal and breath sounds normal. No respiratory distress. He has no wheezes. He has no rales.   Abdominal: Soft. Bowel sounds are normal. He exhibits no distension and no mass. There is no tenderness.   Musculoskeletal: Normal range of motion. He exhibits no edema or tenderness.   Lymphadenopathy:     He has no cervical adenopathy.   Neurological: He is alert and oriented to person, place, and time.   Skin: Skin is warm and dry. No rash noted. He is not diaphoretic. No erythema.   Psychiatric: He has a normal mood and affect. His behavior is  normal. Judgment and thought content normal.     Lab Results   Component Value Date    WBC 5.5 08/25/2017    HGB 13.9 (L) 08/25/2017    HCT 40.7 08/25/2017     08/25/2017    CHOL 147 02/04/2016    TRIG 107 02/04/2016    HDL 43 02/04/2016    ALT 21 01/11/2017    AST 18 08/25/2017     08/25/2017    K 4.6 08/25/2017     08/25/2017    CREATININE 1.24 08/25/2017    BUN 23 (H) 08/25/2017    CO2 24.7 08/25/2017    TSH 3.89 08/25/2017    PSA <0.01 08/03/2015    INR 0.9 07/13/2015    HGBA1C 5.9 01/15/2013       Assessment:       1. Tension headache    2. Nocturia    3. Cervical spine disease    4. Pacemaker    5. Anxiety    6. History of prostate cancer        Plan:       Tension headache likely related to Cervical spine disease-will defer cervical spine xrays at this time period - diclofenac gel is not covered  -     tizanidine (ZANAFLEX) 2 MG tablet; Take 2 tablets (4 mg total) by mouth every 6 (six) hours as needed.  Dispense: 30 tablet; Refill: 3    Nocturia  -     oxybutynin (DITROPAN-XL) 5 MG TR24; Take 1 tablet (5 mg total) by mouth once daily.  Dispense: 30 tablet; Refill: 11    Pacemaker  -     metoprolol succinate (TOPROL-XL) 25 MG 24 hr tablet; Take 1 tablet (25 mg total) by mouth once daily.  Dispense: 90 tablet; Refill: 3    Anxiety  -     lorazepam (ATIVAN) 0.5 MG tablet; Take 1 tablet (0.5 mg total) by mouth every 12 (twelve) hours as needed for Anxiety.  Dispense: 180 tablet; Refill: 1    History of prostate cancer- s/p total prostatectomy     GERD  -     pantoprazole (PROTONIX) 40 MG tablet; Take 1 tablet (40 mg total) by mouth once daily.  Dispense: 90 tablet; Refill: 1    Time spent with the patient was 40 min and 50 percent of that was in face to face contact

## 2017-09-22 ENCOUNTER — TELEPHONE (OUTPATIENT)
Dept: FAMILY MEDICINE | Facility: CLINIC | Age: 82
End: 2017-09-22

## 2017-09-22 NOTE — TELEPHONE ENCOUNTER
Pt's wife said flomax picked up at [email or web address] but pt does not have prostate (removed in 2004).    Flomax not on pt med list nor in your last visit notes.  Spoke with Pharmacy, states electronically sent by you on 9/6/17 but not filled till 9/17/17.    Wife holding med till she hears from our office.    Did you order for pt and do you want him to take in conjunction with oxybutinin for his nocturia?  Pls advise

## 2017-09-25 ENCOUNTER — TELEPHONE (OUTPATIENT)
Dept: FAMILY MEDICINE | Facility: CLINIC | Age: 82
End: 2017-09-25

## 2017-09-25 NOTE — TELEPHONE ENCOUNTER
Spoke with pt's wife. Informed flomax not ordered. Put aside for now.  Start oxybutinin and let Dr know how it is working.  May go up on dosage if not effective. Wife verbalized understanding.

## 2017-10-23 ENCOUNTER — TELEPHONE (OUTPATIENT)
Dept: GASTROENTEROLOGY | Facility: CLINIC | Age: 82
End: 2017-10-23

## 2017-10-23 NOTE — TELEPHONE ENCOUNTER
----- Message from Lyn Simons sent at 10/23/2017 10:28 AM CDT -----  Wife (Evie)stated patient received reminder letter to schedule appointment/patient is experiencing acid reflux and would like to be seen before November 7th (first available)please call back at [phone].

## 2017-10-25 RX ORDER — MONTELUKAST SODIUM 10 MG/1
TABLET ORAL
Qty: 90 TABLET | Refills: 0 | Status: SHIPPED | OUTPATIENT
Start: 2017-10-25 | End: 2018-04-05 | Stop reason: SDUPTHER

## 2017-10-26 RX ORDER — LEVOCETIRIZINE DIHYDROCHLORIDE 5 MG/1
TABLET, FILM COATED ORAL
Qty: 90 TABLET | Refills: 3 | Status: SHIPPED | OUTPATIENT
Start: 2017-10-26 | End: 2017-12-05

## 2017-10-31 ENCOUNTER — OFFICE VISIT (OUTPATIENT)
Dept: GASTROENTEROLOGY | Facility: CLINIC | Age: 82
End: 2017-10-31
Payer: MEDICARE

## 2017-10-31 VITALS
BODY MASS INDEX: 30.61 KG/M2 | SYSTOLIC BLOOD PRESSURE: 124 MMHG | HEART RATE: 69 BPM | HEIGHT: 60 IN | DIASTOLIC BLOOD PRESSURE: 70 MMHG | WEIGHT: 155.88 LBS

## 2017-10-31 DIAGNOSIS — K44.9 HIATAL HERNIA: ICD-10-CM

## 2017-10-31 DIAGNOSIS — Z51.81 ENCOUNTER FOR MONITORING LONG-TERM PROTON PUMP INHIBITOR THERAPY: ICD-10-CM

## 2017-10-31 DIAGNOSIS — R12 HEARTBURN: ICD-10-CM

## 2017-10-31 DIAGNOSIS — R13.10 PILL DYSPHAGIA: Primary | ICD-10-CM

## 2017-10-31 DIAGNOSIS — Z79.899 ENCOUNTER FOR MONITORING LONG-TERM PROTON PUMP INHIBITOR THERAPY: ICD-10-CM

## 2017-10-31 DIAGNOSIS — R10.13 EPIGASTRIC PAIN: ICD-10-CM

## 2017-10-31 DIAGNOSIS — R14.2 ERUCTATION: ICD-10-CM

## 2017-10-31 DIAGNOSIS — K22.2 SCHATZKI'S RING: ICD-10-CM

## 2017-10-31 DIAGNOSIS — Z79.01 ANTICOAGULANT LONG-TERM USE: ICD-10-CM

## 2017-10-31 DIAGNOSIS — Z86.010 HISTORY OF COLON POLYPS: ICD-10-CM

## 2017-10-31 DIAGNOSIS — Z86.19 HISTORY OF HELICOBACTER PYLORI INFECTION: ICD-10-CM

## 2017-10-31 PROCEDURE — 99499 UNLISTED E&M SERVICE: CPT | Mod: S$GLB,,, | Performed by: NURSE PRACTITIONER

## 2017-10-31 PROCEDURE — 99214 OFFICE O/P EST MOD 30 MIN: CPT | Mod: S$GLB,,, | Performed by: NURSE PRACTITIONER

## 2017-10-31 PROCEDURE — 99999 PR PBB SHADOW E&M-EST. PATIENT-LVL V: CPT | Mod: PBBFAC,,, | Performed by: NURSE PRACTITIONER

## 2017-10-31 RX ORDER — TAMSULOSIN HYDROCHLORIDE 0.4 MG/1
CAPSULE ORAL
COMMUNITY
Start: 2017-09-17 | End: 2018-05-08

## 2017-10-31 RX ORDER — TIZANIDINE 2 MG/1
TABLET ORAL
COMMUNITY
Start: 2017-10-26 | End: 2017-12-05

## 2017-10-31 NOTE — PROGRESS NOTES
Subjective:       Patient ID: Gene Hartman is a 83 y.o. male Body mass index is 30.44 kg/m².    Chief Complaint: Dysphagia ( acid reflux)  This patient is established with Dr. Dowell & myself    Patient is here with his wife and another family member, whom assisted with history.   Patient seen for colorectal cancer screening, high risk due to personal history of colon polyp, age appropriate, last colonoscopy was in 2016: see surgical history. Denies family history of colon cancer/polyps.    Previous visit info: previously reviewed medical records sent for scannin16 cbc with diff- h & h 13.0 &39.2, wbc 6.2, normal kidney and liver function, lipase 42 (H 0-38), amylase 134 (h ), magnesium 2.0      Gastroesophageal Reflux   He complains of abdominal pain (epigastric spasms after eating; relieved with belching; denies currently), belching, dysphagia (CHIEF COMPLAINT: recurred 3-4 months ago; pill dysphagia; denies problems with food or liquids; in the past had improved after EGD with dilation) and globus sensation (lump in throat). He reports no chest pain, no choking, no coughing, no early satiety, no heartburn, no hoarse voice, no nausea, no sore throat or no water brash. This is a chronic problem. Pertinent negatives include no melena or weight loss. Risk factors include obesity. He has tried a PPI (PROTONIX 40 MG ONCE DAILY) for the symptoms. The treatment provided significant relief. Past procedures include an EGD.     Review of Systems   Constitutional: Negative for appetite change, unexpected weight change and weight loss.   HENT: Positive for trouble swallowing (see HPI). Negative for hoarse voice, mouth sores and sore throat.    Respiratory: Negative for cough, choking, chest tightness and shortness of breath.    Cardiovascular: Negative for chest pain and palpitations.   Gastrointestinal: Positive for abdominal pain (epigastric spasms after eating; relieved with belching; denies currently)  and dysphagia (CHIEF COMPLAINT: recurred 3-4 months ago; pill dysphagia; denies problems with food or liquids; in the past had improved after EGD with dilation). Negative for anal bleeding, blood in stool, constipation, diarrhea, heartburn, melena, nausea, rectal pain and vomiting.   Genitourinary: Negative for difficulty urinating, dysuria, flank pain, frequency, hematuria and urgency.   Musculoskeletal: Negative for back pain.       Past Medical History:   Diagnosis Date    Anxiety     Arthritis     CAD (coronary artery disease) age 68    Carotid artery occlusion     Cerebrovascular small vessel disease     Colon polyps age 78    GERD (gastroesophageal reflux disease)     H. pylori infection     HTN (hypertension), benign age 65    Hyperlipidemia LDL goal <70 age 65    Hypothyroidism     Multiple fractures of ribs of right side 11/27/2012    Myocardial infarction     Prostate cancer age 67    Syncopal episodes 3/2013     Past Surgical History:   Procedure Laterality Date    CARDIAC PACEMAKER PLACEMENT  10/2015    CARDIAC SURGERY      CATARACT EXTRACTION W/  INTRAOCULAR LENS IMPLANT Bilateral     COLONOSCOPY  ~2013    Dr. Edge    COLONOSCOPY N/A 6/8/2016    Procedure: COLONOSCOPY;  Surgeon: Shamar Barahona MD;  Location: [facility];  Service: Endoscopy;  Laterality: N/A; REPEAT IN 1 YEAR    CORONARY ANGIOPLASTY WITH STENT PLACEMENT  2003    x 2 RCA    PROSTATECTOMY  2002    UPPER GASTROINTESTINAL ENDOSCOPY  11/15/2016    Dr. Barahona     Family History   Problem Relation Age of Onset    Migraines Mother     Heart failure Father     Heart disease Father 56     MI    Heart failure Brother     Heart disease Brother     Diabetes Son     Hypertension Son     Heart disease Brother     Mental illness Brother     No Known Problems Son     Colon cancer Neg Hx     Colon polyps Neg Hx     Crohn's disease Neg Hx     Ulcerative colitis Neg Hx     Stomach cancer Neg Hx     Esophageal  cancer Neg Hx      Wt Readings from Last 10 Encounters:   10/31/17 70.7 kg (155 lb 13.8 oz)   09/06/17 72.6 kg (160 lb 1.6 oz)   01/11/17 73.4 kg (161 lb 13.1 oz)   12/12/16 73.2 kg (161 lb 6 oz)   12/02/16 73.2 kg (161 lb 6 oz)   11/15/16 69.9 kg (154 lb)   10/07/16 71.8 kg (158 lb 4.6 oz)   08/10/16 70.3 kg (155 lb)   06/07/16 71.2 kg (156 lb 15.5 oz)   05/05/16 71.6 kg (157 lb 13.6 oz)     Lab Results   Component Value Date    WBC 5.5 08/25/2017    HGB 13.9 (L) 08/25/2017    HCT 40.7 08/25/2017    MCV 93.8 08/25/2017     08/25/2017     CMP  Sodium   Date Value Ref Range Status   08/25/2017 141 134 - 144 mmol/L      Potassium   Date Value Ref Range Status   08/25/2017 4.6 3.5 - 5.0 mmol/L      Chloride   Date Value Ref Range Status   08/25/2017 110 98 - 110 mmol/L      CO2   Date Value Ref Range Status   08/25/2017 24.7 22.8 - 31.6 mmol/L      Glucose   Date Value Ref Range Status   08/25/2017 101 (H) 70 - 99 mg/dL      BUN, Bld   Date Value Ref Range Status   08/25/2017 23 (H) 8 - 20 mg/dL      Creatinine   Date Value Ref Range Status   08/25/2017 1.24 0.60 - 1.40 mg/dL    11/28/2012 1.0 0.5 - 1.4 mg/dL Final     Calcium   Date Value Ref Range Status   08/25/2017 8.7 7.7 - 10.4 mg/dL    11/28/2012 9.2 8.7 - 10.5 mg/dL Final     Total Protein   Date Value Ref Range Status   08/25/2017 6.7 6.0 - 8.2 g/dL      Albumin   Date Value Ref Range Status   08/25/2017 4.0 3.1 - 4.7 g/dL      Total Bilirubin   Date Value Ref Range Status   08/25/2017 0.8 0.3 - 1.0 mg/dL      Alkaline Phosphatase   Date Value Ref Range Status   08/25/2017 69 40 - 104 IU/L      AST   Date Value Ref Range Status   08/25/2017 18 10 - 40 IU/L      ALT   Date Value Ref Range Status   01/11/2017 21 10 - 44 U/L Final     Anion Gap   Date Value Ref Range Status   01/11/2017 9 8 - 16 mmol/L Final   11/28/2012 12 5 - 15 meq/L Final     eGFR if    Date Value Ref Range Status   01/11/2017 >60.0 >60 mL/min/1.73 m^2 Final     eGFR if  "non    Date Value Ref Range Status   01/11/2017 55.6 (A) >60 mL/min/1.73 m^2 Final     Comment:     Calculation used to obtain the estimated glomerular filtration  rate (eGFR) is the CKD-EPI equation. Since race is unknown   in our information system, the eGFR values for   -American and Non--American patients are given   for each creatinine result.       11/15/16 EGD was reviewed and procedure report states:   " Impression:          - Normal upper third of esophagus and middle third                        of esophagus.                       - Mild Schatzki ring. Dilated.                       - Medium-sized hiatus hernia.                       - Gastritis. Biopsied.                       - Erythematous duodenopathy. Biopsied.                       - Normal 2nd part of the duodenum.  Recommendation:      - Patient has a contact number available for                        emergencies. The signs and symptoms of potential                        delayed complications were discussed with the                        patient. Return to normal activities tomorrow.                        Written discharge instructions were provided to the                        patient.                       - Resume previous diet.                       - Continue present medications.                       - No aspirin, ibuprofen, naproxen, or other                        non-steroidal anti-inflammatory drugs.                       - Await pathology results.                       - Discharge patient to home (ambulatory).                       - Return to my office after studies are complete. ".  Biopsy results:   "Supplemental Diagnosis  Specimen #2:POSITIVE for H. pylori species by immunostain with appropriately reactive controls.  [facility].  (Electronically Signed: 2016-11-18 14:39:56 )  Diagnosed by: Rama Mistry M.D.  FINAL PATHOLOGIC DIAGNOSIS  1. Duodenal bulb, biopsy:  Unremarkable small bowel mucosa with " "adequate villi.  Negative for active inflammation.  Negative for dysplasia.  2. Stomach, antrum and body, biopsy:  Severe chronic active gastritis.  Immunostain for H. pylori species is pending; supplemental report to follow.  Negative for intestinal metaplasia and dysplasia."    6/8/16 Colonoscopy was reviewed and procedure report states:   " Impression:          - Non-bleeding internal hemorrhoids.                       - Diverticulosis in the sigmoid colon.                       - Two 2 to 3 mm polyps in the sigmoid colon and in                        the ascending colon. Resected and retrieved.                       - Seven 4 to 7 mm polyps in the descending colon, in                        the ascending colon and in the cecum. Resected and                        retrieved.                       - One 8 mm polyp in the sigmoid colon. Resected and                        retrieved.                       - One 30 mm polyp in the sigmoid colon. Resected and                        retrieved. Tattooed.                       - The appendiceal orifice and ileocecal valve are                        normal.                       - The examined portion of the ileum was normal.  Recommendation:      - Patient has a contact number available for                        emergencies. The signs and symptoms of potential                        delayed complications were discussed with the                        patient. Return to normal activities tomorrow.                        Written discharge instructions were provided to the                        patient.                       - High fiber diet.                       - Continue present medications.                       - Resume aspirin in 2 days and Plavix (clopidogrel)                        in 2 days at prior doses.                       - Await pathology results.                       - Repeat colonoscopy (date not yet determined) for                        " "surveillance.                       - Discharge patient to home (ambulatory).                       - Return to my office in 6 weeks. ".  Biopsy results:   "FINAL PATHOLOGIC DIAGNOSIS  1. Polyps, ascending colon and cecum, polypectomy:  Tubular adenoma, multiple fragments. [place] large bowel mucosa without significant histopathologic change,  multiple fragments.  2. Polyps, descending colon, polypectomy:  Tubular adenoma, 3 fragments.  3. Sigmoid lesion, biopsy:  Tubular adenoma, up to 1.4 cm in maximal dimension."  Objective:      Physical Exam   Constitutional: He is oriented to person, place, and time. He appears well-developed and well-nourished. No distress.   HENT:   Head: Atraumatic.   Mouth/Throat: Oropharynx is clear and moist and mucous membranes are normal. No oral lesions. No oropharyngeal exudate.   Eyes: Conjunctivae are normal. Pupils are equal, round, and reactive to light. No scleral icterus.   Neck: Normal range of motion. Neck supple. No tracheal deviation present. No thyromegaly present.   Cardiovascular: Normal rate and regular rhythm.    Pulmonary/Chest: Effort normal and breath sounds normal. No respiratory distress. He has no wheezes. He has no rhonchi. He has no rales. He exhibits no tenderness.   Abdominal: Soft. Normal appearance and bowel sounds are normal. He exhibits no distension, no abdominal bruit, no ascites and no mass. There is no hepatosplenomegaly. There is no tenderness. There is no rigidity, no rebound, no guarding, no tenderness at McBurney's point and negative Wiseman's sign. No hernia.   Genitourinary: Rectal exam shows no external hemorrhoid, no internal hemorrhoid, no fissure, no mass, no tenderness, anal tone normal and guaiac negative stool.   Lymphadenopathy:     He has no cervical adenopathy.   Neurological: He is alert and oriented to person, place, and time.   Skin: Skin is warm and dry. No rash noted. He is not diaphoretic. No erythema. No pallor.   Non-jaundiced "   Psychiatric: He has a normal mood and affect. His behavior is normal.   Nursing note and vitals reviewed.      Assessment:       1. Pill dysphagia    2. Heartburn    3. Schatzki's ring    4. Hiatal hernia    5. History of Helicobacter pylori infection    6. History of colon polyps    7. Encounter for monitoring long-term proton pump inhibitor therapy    8. Anticoagulant long-term use    9. Epigastric pain    10. Eructation        Plan:       Pill dysphagia & Schatzki's ring  -     Case request GI: ESOPHAGOGASTRODUODENOSCOPY (EGD), - schedule EGD, discussed procedure with patient and possible esophageal dilation may be performed during procedure if indicated, patient verbalized understanding  - educated patient to eat smaller more frequent meals and to eat slowly and advised to eat a soft diet.  - possible UGI/esophagram/esophageal manometry if symptoms persist    Heartburn & epigastric pain  -     Case request GI: ESOPHAGOGASTRODUODENOSCOPY (EGD), - schedule EGD, discussed procedure with patient, patient verbalized understanding  -  CONTINUE   pantoprazole (PROTONIX) 40 MG tablet; Take 1 tablet (40 mg total) by mouth once daily. Before breakfast  Dispense: 30 tablet; Refill: 6,- take in the morning before breakfast, discussed about possible long term use of medication (prefer to use lowest effective dose or discontinuing if possible) and discussed the risks & benefits with taking a reflux medication long term, and to take OTC calcium and vitamin d supplements as directed (such as Citracal +D), pt verbalized understanding  -discussed about the different types of medications used to treat reflux and how to use them, antacids can be used PRN for breakthrough heartburn symptoms by reducing stomach acid that is already produced, H2 blockers (zantac) work by limiting the amount acid production, & PPI's work to block acid production and are taken daily, patient verbalized understanding.  -Educated patient on lifestyle  modifications to help control/reduce reflux/abdominal pain including: avoid large meals, avoid eating within 2-3 hours of bedtime (avoid late night eating & lying down soon after eating), elevate head of bed if nocturnal symptoms are present, smoking cessation (if current smoker), & weight loss (if overweight).   -Educated to avoid known foods which trigger reflux symptoms & to minimize/avoid high-fat foods, chocolate, caffeine, citrus, alcohol, & tomato products.  -Advised to avoid/limit use of NSAID's, since they can cause GI upset, bleeding, and/or ulcers. If needed, take with food    Eructation  - recommended OTC simethicone as directed, such as Phazyme or Gas-x  -discussed with patient about low gas diet: Reduce or eliminate these foods from your diet: Broccoli, Cauliflower, [place] sprouts, Cabbage, Cooked dried beans, Carbonated beverages (sparkling water, soda, beer, champagne)  Other Causes Of Excess Gas Include:   1) EATING TOO FAST or TALKING WHILE YOU CHEW may cause you to swallow air. This increases the amount of gas in the stomach and may worsen your symptoms.  --> Chew each mouthful completely before swallowing. Take your time.  2) OVEREATING may increase the feeling of being bloated and cause more gas.  --> When you are full, stop eating.  3) CONSTIPATION can increase the amount of normal intestinal gas.  --> Avoid constipation by increasing the amount of fiber in your diet by including whole cereal grains, fresh vegetables (except those in the above list) and fresh fruits. High-fiber foods absorb water and carry it out of the body. When increasing the amount of fiber in your diet, you also need to increase the amount of water that you drink. You should drink at least eight 8-ounce glasses of water (two quarts) per day.    Hiatal hernia  -     Case request GI: ESOPHAGOGASTRODUODENOSCOPY (EGD), - schedule EGD, discussed procedure with patient, patient verbalized understanding  -discussed diagnosis  with patient & that it is usually managed by controlling reflux symptoms, surgery is an option, but usually performed if reflux is uncontrolled by medication management, if symptoms persist despite medication management refer to general surgery to discuss about surgical options, patient verbalized understanding    History of Helicobacter pylori infection  -     Case request GI: ESOPHAGOGASTRODUODENOSCOPY (EGD), - schedule EGD, discussed procedure with patient, testing for H. Pylori typically performed during EGD (if not can do lab testing), patient verbalized understanding and agreed with management plan    History of colon polyps  -     Case request GI: COLONOSCOPY, - schedule Colonoscopy, discussed procedure with the patient, patient verbalized understanding    Encounter for monitoring long-term proton pump inhibitor therapy  -     Vitamin B12; Future; Expected date: 10/31/2017  -     Magnesium; Future; Expected date: 10/31/2017  -     Case request GI: ESOPHAGOGASTRODUODENOSCOPY (EGD), - schedule EGD, discussed procedure with patient, patient verbalized understanding  - discussed with patient about long term use of reflux medications and the risk of using these medications long term. Some research studies (not all) have shown decrease absorption of calcium when taking PPI's and H2 blockers, but those same research studies showed that adequate dietary (milk and cheese) and/or supplement of calcium and vitamin d supplement induces enough acid secretion for calcium absorption. Also, discussed about the risk vs benefit of untreated GERD such as ahumada's esophagus.  - recommend annual monitoring with blood work to include CMP, CBC, vitamin B12, and magnesium.    Anticoagulant long-term use   - patient is on blood-thinner(s)=aspirin and plavix, informed patient that they will likely need to be held for endoscopy, nurse will confirm with cardiologist/PCP.    Return in about 1 month (around 11/30/2017), or if symptoms  worsen or fail to improve.    If no improvement in symptoms or symptoms worsen, call/follow-up at clinic or go to ER.

## 2017-10-31 NOTE — PATIENT INSTRUCTIONS
Discharge Instructions: Eating a Soft Diet  You have been prescribed a soft diet (also called gastrointestinal soft diet or bland diet). This reduces the amount of work your digestive tract has to do. It also reduces the chance that your digestive tract will be irritated by the food you eat. A soft diet is prescribed for people with digestive problems. The diet consists of foods that are tender, mildly seasoned, and easy to digest. While on this diet, you should not eat fried or spicy foods, or raw fruits and vegetables. Also avoid alcoholic beverages.  General guidelines  · Eat in a calm, relaxed atmosphere. How you eat may be as important as what you eat. Dont rush while eating. Chew your food slowly and thoroughly, and swallow slowly.  · Eat small frequent meals throughout the day, but dont eat within 2 hours of bedtime.  · Avoid any foods that cause discomfort.  · Dont use NSAIDs (nonsteroidal anti-inflammatory drugs), such as aspirin, and ibuprofen. Also avoid medicine that contain aspirin. NSAIDs can cause ulcers and delay or prevent ulcer healing.  · Use antacids as needed, but keep in mind that magnesium-containing antacids may cause diarrhea.  Foods to eat  · Cream of wheat and cream of rice  · Cooked white rice  · Mashed potatoes, and boiled potatoes without skin  · Plain pasta and noodles  · Plain white crackers (such as no-salt soda crackers)  · White bread  · Applesauce  · Cooked fruits without skins or seeds  · Mild juices, such as apple and grape  · Bananas  · Cooked or mashed vegetables without stems and seeds  ¨ Carrots  ¨ Summer squash (zucchini, yellow squash)  ¨ Winter squash (acorn, butternut, spaghetti squash)  · Cottage cheese  · Mild hard or soft cheeses  · Custard  · Yogurt without seeds or nuts  · Milk (you may need lactose-free milk)  · Ice cream without seeds or nuts  · Smooth peanut butter  · Eggs  · Fish, turkey, chicken, or other meat that is not tough or stringy  · Tofu  Foods to  avoid  · Nuts and seeds  · Snack foods, such as the following:  ¨ Chocolate-containing snacks, candy, pastries, or cakes.  ¨ Potato chips (plain, barbecued, or other flavors)  ¨ Taco chips or nachos  ¨ Corn chips  ¨ Popcorn, popcorn cakes, or rice cakes  ¨ Crackers with nuts, seeds, or spicy seasonings  ¨ French fries  · Fried or greasy foods  · Whole-grain breads, rolls, and crackers  · Breads and rolls with nuts, seeds, or bran  · Bran and granola cereals  · Berries with seeds, such as strawberries, raspberries, and blackberries  · Acidic fruits, such as oranges, grapefruits, ghanshyam, limes, and pineapples  · Raw vegetables  · Mild or hot peppers  · Sauerkraut and pickled vegetables  · Tomatoes or tomato products, such as tomato paste, tomato sauce, and tomato juice  · Barbecue sauce  · Spicy or flavored cheeses, such as jalapeño and black pepper cheese  · Crunchy peanut butter  · Dried cooked beans, such as rasmussen, kidney, or navy beans  · The following meats:  ¨ Fried or greasy meats  ¨ Processed, spicy meats, such as sausage, pool, ham, and lunch meats  ¨ Ribs and other meats with barbecue sauce  ¨ Tough or stringy meats, such as corned beef or beef jerky  Fluids to avoid  · Alcoholic beverages  · Coffee and regular teas  · Sarahi and other drinks with caffeine  · Cranberry, orange, pineapple, and grapefruit juice  · Lemonade  · Vegetable juice  · Whole milk, if you are lactose intolerant  Follow-up  Make a follow-up appointment with a dietitian as directed by our staff.  Date Last Reviewed: 6/21/2015  © 4328-4269 [employer]. [address] [place] [address], [place] [address]. All rights reserved. This information is not intended as a substitute for professional medical care. Always follow your healthcare professional's instructions.        GERD (Adult)    The esophagus is a tube that carries food from the mouth to the stomach. A valve at the lower end of the esophagus prevents stomach acid from flowing  "upward. When this valve doesn't work properly, stomach contents may repeatedly flow back up (reflux) into the esophagus. This is called gastroesophageal reflux disease (GERD). GERD can irritate the esophagus. It can cause problems with swallowing or breathing. In severe cases, GERD can cause recurrent pneumonia or other serious problems.  Symptoms of reflux include burning, pressure or sharp pain in the upper abdomen or mid to lower chest. The pain can spread to the neck, back, or shoulder. There may be belching, an acid taste in the back of the throat, chronic cough, or sore throat or hoarseness. GERD symptoms often occur during the day after a big meal. They can also occur at night when lying down.   Home care  Lifestyle changes can help reduce symptoms. If needed, medicines may be prescribed. Symptoms often improve with treatment, but if treatment is stopped, the symptoms often return after a few months. So most persons with GERD will need to continue treatment.  Lifestyle changes  · Limit or avoid fatty, fried, and spicy foods, as well as coffee, chocolate, mint, and foods with high acid content such as tomatoes and citrus fruit and juices (orange, grapefruit, lemon).  · Dont eat large meals, especially at night. Frequent, smaller meals are best. Do not lie down right after eating. And dont eat anything 3 hours before going to bed.  · Avoid drinking alcohol and smoking. As much as possible, stay away from second hand smoke.  · If you are overweight, losing weight will reduce symptoms.   · Avoid wearing tight clothing around your stomach area.  · If your symptoms occur during sleep, use a foam wedge to elevate your upper body (not just your head.) Or, place 4" blocks under the head of your bed.  Medicines  If needed, medicines can help relieve the symptoms of GERD and prevent damage to the esophagus. Discuss a medicine plan with your healthcare provider. This may include one or more of the following " medicines:  · Antacids to help neutralize the normal acids in your stomach.  · Acid blockers (H2 blockers) to decrease acid production.  · Acid inhibitors (PPIs) to decrease acid production in a different way than the blockers. They may work better, but can take a little longer to take effect.  Take an antacid 30-60 minutes after eating and at bedtime, but not at the same time as an acid blocker.  Try not to take medicines such as ibuprofen and aspirin. If you are taking aspirin for your heart or other medical reasons, talk to your healthcare provider about stopping it.  Follow-up care  Follow up with your healthcare provider or as advised by our staff.  When to seek medical advice  Call your healthcare provider if any of the following occur:  · Stomach pain gets worse or moves to the lower right abdomen (appendix area)  · Chest pain appears or gets worse, or spreads to the back, neck, shoulder, or arm  · Frequent vomiting (cant keep down liquids)  · Blood in the stool or vomit (red or black in color)  · Feeling weak or dizzy  · Fever of 100.4ºF (38ºC) or higher, or as directed by your healthcare provider  Date Last Reviewed: 6/23/2015  © 6249-5633 [employer]. [address] [place] [address], [place] [address]. All rights reserved. This information is not intended as a substitute for professional medical care. Always follow your healthcare professional's instructions.

## 2017-11-02 RX ORDER — LEVOTHYROXINE SODIUM 25 UG/1
25 TABLET ORAL
Qty: 90 TABLET | Refills: 3 | Status: SHIPPED | OUTPATIENT
Start: 2017-11-02 | End: 2018-10-09

## 2017-11-03 ENCOUNTER — TELEPHONE (OUTPATIENT)
Dept: FAMILY MEDICINE | Facility: CLINIC | Age: 82
End: 2017-11-03

## 2017-11-06 ENCOUNTER — LAB VISIT (OUTPATIENT)
Dept: LAB | Facility: HOSPITAL | Age: 82
End: 2017-11-06
Attending: NURSE PRACTITIONER
Payer: MEDICARE

## 2017-11-06 DIAGNOSIS — Z79.899 ENCOUNTER FOR MONITORING LONG-TERM PROTON PUMP INHIBITOR THERAPY: ICD-10-CM

## 2017-11-06 DIAGNOSIS — Z51.81 ENCOUNTER FOR MONITORING LONG-TERM PROTON PUMP INHIBITOR THERAPY: ICD-10-CM

## 2017-11-06 LAB
MAGNESIUM SERPL-MCNC: 2 MG/DL
VIT B12 SERPL-MCNC: 381 PG/ML

## 2017-11-06 PROCEDURE — 82607 VITAMIN B-12: CPT

## 2017-11-06 PROCEDURE — 83735 ASSAY OF MAGNESIUM: CPT

## 2017-11-06 PROCEDURE — 36415 COLL VENOUS BLD VENIPUNCTURE: CPT

## 2017-11-08 ENCOUNTER — TELEPHONE (OUTPATIENT)
Dept: GASTROENTEROLOGY | Facility: CLINIC | Age: 82
End: 2017-11-08

## 2017-11-08 NOTE — TELEPHONE ENCOUNTER
Patient notified and understands ok per Dr. Betancourt to hold asa and plavix for 5 days prior to egd and colonosocpy. Scanned to media

## 2017-11-09 ENCOUNTER — OFFICE VISIT (OUTPATIENT)
Dept: FAMILY MEDICINE | Facility: CLINIC | Age: 82
End: 2017-11-09
Payer: MEDICARE

## 2017-11-09 VITALS
SYSTOLIC BLOOD PRESSURE: 132 MMHG | HEIGHT: 60 IN | WEIGHT: 156 LBS | DIASTOLIC BLOOD PRESSURE: 80 MMHG | BODY MASS INDEX: 30.63 KG/M2 | TEMPERATURE: 98 F | HEART RATE: 94 BPM | OXYGEN SATURATION: 96 %

## 2017-11-09 DIAGNOSIS — R05.9 COUGH: Primary | ICD-10-CM

## 2017-11-09 DIAGNOSIS — H65.03 BILATERAL ACUTE SEROUS OTITIS MEDIA, RECURRENCE NOT SPECIFIED: ICD-10-CM

## 2017-11-09 DIAGNOSIS — J01.10 ACUTE NON-RECURRENT FRONTAL SINUSITIS: ICD-10-CM

## 2017-11-09 DIAGNOSIS — J30.2 CHRONIC SEASONAL ALLERGIC RHINITIS DUE TO OTHER ALLERGEN: ICD-10-CM

## 2017-11-09 PROCEDURE — 99213 OFFICE O/P EST LOW 20 MIN: CPT | Mod: ,,, | Performed by: NURSE PRACTITIONER

## 2017-11-09 RX ORDER — AZELASTINE 1 MG/ML
1 SPRAY, METERED NASAL 2 TIMES DAILY
Qty: 30 ML | Refills: 0 | Status: SHIPPED | OUTPATIENT
Start: 2017-11-09 | End: 2018-05-08

## 2017-11-09 RX ORDER — AMOXICILLIN AND CLAVULANATE POTASSIUM 875; 125 MG/1; MG/1
1 TABLET, FILM COATED ORAL 2 TIMES DAILY
Qty: 20 TABLET | Refills: 0 | Status: SHIPPED | OUTPATIENT
Start: 2017-11-09 | End: 2017-12-05

## 2017-11-09 NOTE — PROGRESS NOTES
Subjective:       Patient ID: Gene Hartman is a 83 y.o. male.    Chief Complaint: Cough    Sinus Problem   This is a new problem. The current episode started 1 to 4 weeks ago (2 weeks). The problem has been gradually worsening since onset. There has been no fever. His pain is at a severity of 5/10. The pain is moderate. Associated symptoms include congestion, coughing, headaches, neck pain, sinus pressure, sneezing and swollen glands. Pertinent negatives include no chills, diaphoresis, ear pain, hoarse voice, shortness of breath or sore throat. Past treatments include sitting up and lying down. The treatment provided mild relief.     Review of Systems   Constitutional: Positive for activity change and fatigue. Negative for appetite change, chills, diaphoresis, fever and unexpected weight change.   HENT: Positive for congestion, postnasal drip, rhinorrhea, sinus pain, sinus pressure and sneezing. Negative for ear discharge, ear pain, hearing loss, hoarse voice, sore throat, trouble swallowing and voice change.    Eyes: Negative for photophobia, pain and visual disturbance.   Respiratory: Positive for cough. Negative for apnea, choking, chest tightness, shortness of breath and wheezing.    Cardiovascular: Negative for chest pain, palpitations and leg swelling.   Gastrointestinal: Negative for abdominal pain, constipation, diarrhea, nausea and vomiting.   Endocrine: Negative for cold intolerance and heat intolerance.   Genitourinary: Negative for difficulty urinating, dysuria and flank pain.   Musculoskeletal: Positive for neck pain. Negative for arthralgias, gait problem and myalgias.   Skin: Negative for rash.   Allergic/Immunologic: Negative for immunocompromised state.   Neurological: Positive for headaches. Negative for dizziness, syncope, weakness and light-headedness.   Hematological: Negative for adenopathy.   Psychiatric/Behavioral: Negative for agitation, confusion, self-injury and suicidal ideas.        Past Medical History:   Diagnosis Date    Anxiety     Arthritis     CAD (coronary artery disease) age 68    Carotid artery occlusion     Cerebrovascular small vessel disease     Colon polyps age 78    GERD (gastroesophageal reflux disease)     H. pylori infection     HTN (hypertension), benign age 65    Hyperlipidemia LDL goal <70 age 65    Hypothyroidism     Multiple fractures of ribs of right side 11/27/2012    Myocardial infarction     Prostate cancer age 67    Syncopal episodes 3/2013      Past Surgical History:   Procedure Laterality Date    CARDIAC PACEMAKER PLACEMENT  10/2015    CARDIAC SURGERY      CATARACT EXTRACTION W/  INTRAOCULAR LENS IMPLANT Bilateral     COLONOSCOPY  ~2013    Dr. Edge    COLONOSCOPY N/A 6/8/2016    Procedure: COLONOSCOPY;  Surgeon: Shamar Barahona MD;  Location: [facility] ENDO;  Service: Endoscopy;  Laterality: N/A; REPEAT IN 1 YEAR    CORONARY ANGIOPLASTY WITH STENT PLACEMENT  2003    x 2 RCA    PROSTATECTOMY  2002    UPPER GASTROINTESTINAL ENDOSCOPY  11/15/2016    Dr. Barahona       Family History   Problem Relation Age of Onset    Migraines Mother     Heart failure Father     Heart disease Father 56     MI    Heart failure Brother     Heart disease Brother     Diabetes Son     Hypertension Son     Heart disease Brother     Mental illness Brother     No Known Problems Son     Colon cancer Neg Hx     Colon polyps Neg Hx     Crohn's disease Neg Hx     Ulcerative colitis Neg Hx     Stomach cancer Neg Hx     Esophageal cancer Neg Hx        Social History     Social History    Marital status:      Spouse name: N/A    Number of children: 2    Years of education: N/A     Social History Main Topics    Smoking status: Never Smoker    Smokeless tobacco: Never Used    Alcohol use No    Drug use: No    Sexual activity: Not Currently     Other Topics Concern    None     Social History Narrative    The patient does exercise regularly  (walking).    Rates diet as good.    He is satisfied with weight.                   Current Outpatient Prescriptions   Medication Sig Dispense Refill    acetaminophen (TYLENOL EX STR RAPID RELEASE) 500 MG tablet Take 500 mg by mouth every 6 (six) hours as needed for Pain.      aspirin (ECOTRIN) 81 MG EC tablet Take 81 mg by mouth Daily.      atorvastatin (LIPITOR) 40 MG tablet Take 1 tablet (40 mg total) by mouth once daily. 90 tablet 3    clopidogrel (PLAVIX) 75 mg tablet Take 1 tablet (75 mg total) by mouth once daily. 90 tablet 3    cycloSPORINE (RESTASIS) 0.05 % ophthalmic emulsion Apply 1 drop to eye 2 (two) times daily.      fluticasone (FLONASE) 50 mcg/actuation nasal spray USE 1 SPRAY IN EACH NOSTRIL TWICE DAILY 48 g 2    levocetirizine (XYZAL) 5 MG tablet TAKE 1 TABLET EVERY EVENING 90 tablet 3    levothyroxine (SYNTHROID) 25 MCG tablet Take 1 tablet (25 mcg total) by mouth before breakfast. 90 tablet 3    lorazepam (ATIVAN) 0.5 MG tablet Take 1 tablet (0.5 mg total) by mouth every 12 (twelve) hours as needed for Anxiety. 180 tablet 1    losartan (COZAAR) 50 MG tablet Take 1 tablet (50 mg total) by mouth 2 (two) times daily. 180 tablet 3    metoprolol succinate (TOPROL-XL) 25 MG 24 hr tablet Take 1 tablet (25 mg total) by mouth once daily. 90 tablet 3    montelukast (SINGULAIR) 10 mg tablet TAKE 1 TABLET EVERY EVENING 90 tablet 0    oxybutynin (DITROPAN-XL) 5 MG TR24 Take 1 tablet (5 mg total) by mouth once daily. 30 tablet 11    pantoprazole (PROTONIX) 40 MG tablet Take 1 tablet (40 mg total) by mouth once daily. 90 tablet 1    tamsulosin (FLOMAX) 0.4 mg Cp24       tiZANidine (ZANAFLEX) 2 MG tablet       amoxicillin-clavulanate 875-125mg (AUGMENTIN) 875-125 mg per tablet Take 1 tablet by mouth 2 (two) times daily. 20 tablet 0    azelastine (ASTELIN) 137 mcg (0.1 %) nasal spray 1 spray (137 mcg total) by Nasal route 2 (two) times daily. 30 mL 0     No current facility-administered  medications for this visit.        Review of patient's allergies indicates:   Allergen Reactions    Iodinated contrast- oral and iv dye Rash    Pontocaine [tetracaine hcl] Anaphylaxis     hypotension     Objective:      Blood pressure 132/80, pulse 94, temperature 97.7 °F (36.5 °C), height 5' (1.524 m), weight 70.8 kg (156 lb), SpO2 96 %. Body mass index is 30.47 kg/m².   Physical Exam   Constitutional: He is oriented to person, place, and time. He appears well-developed and well-nourished.   HENT:   Head: Normocephalic and atraumatic.   Right Ear: Ear canal normal. Tympanic membrane is bulging. A middle ear effusion is present.   Left Ear: Ear canal normal. Tympanic membrane is bulging. A middle ear effusion is present.   Nose: Mucosal edema and rhinorrhea present. Right sinus exhibits maxillary sinus tenderness and frontal sinus tenderness. Left sinus exhibits maxillary sinus tenderness and frontal sinus tenderness.   Mouth/Throat: Uvula is midline and mucous membranes are normal. Oropharyngeal exudate present. No posterior oropharyngeal erythema.   Eyes: Conjunctivae, EOM and lids are normal. Pupils are equal, round, and reactive to light.   Neck: Normal range of motion. Neck supple.   Cardiovascular: Normal rate, regular rhythm, S1 normal, S2 normal, normal heart sounds, intact distal pulses and normal pulses.    No murmur heard.  Pulmonary/Chest: Effort normal and breath sounds normal. No respiratory distress. He has no decreased breath sounds. He has no wheezes.   Abdominal: Soft. Bowel sounds are normal. There is no tenderness.   Musculoskeletal: Normal range of motion.   Lymphadenopathy:     He has cervical adenopathy.        Right cervical: Superficial cervical adenopathy present.        Left cervical: Superficial cervical adenopathy present.   Neurological: He is alert and oriented to person, place, and time.   Skin: Skin is warm and dry. No rash noted.   Psychiatric: He has a normal mood and affect. His  speech is normal and behavior is normal. Judgment and thought content normal.   Nursing note and vitals reviewed.          Assessment:       1. Cough    2. Acute non-recurrent frontal sinusitis    3. Chronic seasonal allergic rhinitis due to other allergen    4. Bilateral acute serous otitis media, recurrence not specified        Plan:       Gene was seen today for cough.    Diagnoses and all orders for this visit:    Cough    Acute non-recurrent frontal sinusitis  -     amoxicillin-clavulanate 875-125mg (AUGMENTIN) 875-125 mg per tablet; Take 1 tablet by mouth 2 (two) times daily.  -     azelastine (ASTELIN) 137 mcg (0.1 %) nasal spray; 1 spray (137 mcg total) by Nasal route 2 (two) times daily.  >add this spray in addition to the flonase nose spray already taking.    Chronic seasonal allergic rhinitis due to other allergen  -Continue flonase and add azelastine spray.    Bilateral acute serous otitis media, recurrence not specified       -Follow up with Dr. Mayorga on memory evaluation.  Patient's wife asked about starting memoxcel medication for memory. Patient instructed not to start any medications or new supplements without evaluation from Dr. Mayorga.  Patient verbalized understanding.

## 2017-11-09 NOTE — PATIENT INSTRUCTIONS
When to Use Antibiotics   Antibiotics are medicines used to treat infections caused by bacteria. They dont work for illnesses caused by viruses or an allergic reaction. In fact, taking antibiotics for reasons other than a bacterial infection can cause problems. For example, you may have side effects from the medicine. And if you really need an antibiotic, it may not work well.                                                                                                                                              When antibiotics wont help  Your healthcare provider wont usually prescribe antibiotics for the following conditions. You can help by not asking for them if you have:   · A cold. This type of illness is caused by a virus. It can cause a runny nose, stuffed-up nose, sneezing, coughing, headache, mild body aches, and low fever. A cold gets better on its own in a few days to a week.  · The flu (influenza). This is a respiratory illness caused by a virus. The flu usually goes away on its own in a week or so. It can cause fever, body aches, sore throat, and fatigue.  · Bronchitis. This is an infection in the lungs most often caused by a virus. You may have coughing, phlegm, body aches, and a low fever. A common type of bronchitis is known as a chest cold (acute bronchitis). This often happens after you have a respiratory infection like a common cold. Bronchitis can take weeks to go away, but antibiotics usually dont help.  · Most sore throats. Sore throats are most often caused by viruses. Your throat may feel scratchy or achy, and it may hurt to swallow. You may also have a low fever and body aches. A sore throat usually gets better in a few days.  · Most ear infections. An ear infection may be caused by a virus or bacteria. It causes pain in the ear. Antibiotics usually dont help, and the infection goes away on its own.  · Most sinus infections (sinusitis). This kind of infection causes sinus pain and  swelling, and a runny nose. In most cases, sinusitis goes away on its own, and antibiotics dont make recovery quicker.  · Allergic rhinitis. This is a set of symptoms caused by an allergic reaction. You may have sneezing, a runny nose, itchy or watery eyes, or a sore throat. Allergies are not treated with antibiotics.  · Low fever. A mild fever thats less than 100.4°F (38°C) most likely doesnt need treatment with antibiotics.   When antibiotics can help   Antibiotics can be used to treat:                                                     · Strep throat. This is a throat infectioncaused by a certain type of bacteria. Symptoms of strep throat include a sore throat, white patches on the tonsils, red spots on the roof of the mouth, fever, body aches, and nausea and vomiting.  · Urinary tract infection (UTI). This is a bacterial infection of the bladder and the tube that takes urine out of the body. It can cause burning pain and urine thats cloudy or tinted with blood. UTIs are very common. Antibiotics usually help treat these infections.  · Some ear infections. In some cases, a healthcare provider may prescribe antibiotics for an ear infection. You may need a test to show whats causing the ear infection.  · Some sinus infections. In some cases, yourhealthcare provider may give you antibiotics. He or she may first need to make sure your symptoms arent caused by a virus, fungus, allergies, or air pollutants such as smoke.   Your doctor may also recommend antibiotics if you have a condition that can affect your immune system, such as diabetes or cancer.   Self-care at home   If your infection cant be treated with antibiotics, you can take other steps to feel better. Try the remedies below. In general:   · Rest and sleep as much as needed.  · Drink water and other clear fluids.  · Dont smoke, and avoid smoke from other people.  · Use over-the-counter medicine such as acetaminophen to ease pain or fever, as  directed by your healthcare provider.   To treat sinus pain or nasal congestion:   · Put a warm, moist washcloth on your face where you feel sinus pain or pressure.  · Use a nasal spray with medicine or saline, as directed by your healthcare provider.  · Breathe in steam from a hot shower.  · Use a humidifier or cool mist vaporizer.   To quiet a cough:   · Use a humidifier or cool mist vaporizer.  · Breathe in steam from a hot shower.  · Use cough lozenges.   To sooth a sore throat:   · Suck on ice chips, popsicles, or lozenges.  · Use a sore throat spray.  · Use a humidifier or cool mist vaporizer.  · Gargle with saltwater.  · Drink warm liquids.   To ease ear pain:   · Hold a warm, moist washcloth on the ear for 10 minutes at a time.  Date Last Reviewed: 9/1/2016  © 9888-9033 [employer]. [address] [place] [address]. All rights reserved. This information is not intended as a substitute for professional medical care. Always follow your healthcare professional's instructions.        Understanding Your Sinuses  Your sinuses are air-filled spaces between the bones in your head. They have small openings that connect to the nasal cavity. The sinuses make mucus that drains into the nose. This helps keep the nose moist and free of dust and germs.      Parts of the nasal cavity  · The septum is the wall of cartilage and bone in the center of the nasal cavity.  · The middle meatus is the intersection between the sinuses.  · Turbinates are ridges on the sides of the nasal cavity.  Cilia keep sinuses clear    Air circulates freely though healthy sinuses. Tiny, hairlike structures called cilia line the sinuses. Cilia move the thin, watery mucus through the sinuses and into the nose. Sinuses are healthy when they drain freely. Sinus drainage can be blocked if the sinus lining is swollen or if mucus is too thick. Cilia that are damaged or dont work correctly can also lead to problems with  drainage.  Date Last Reviewed: 10/1/2016  © 7496-7564 [employer]. [address] [place] [address], [place] [address]. All rights reserved. This information is not intended as a substitute for professional medical care. Always follow your healthcare professional's instructions.

## 2017-11-15 ENCOUNTER — SURGERY (OUTPATIENT)
Age: 82
End: 2017-11-15

## 2017-11-15 ENCOUNTER — ANESTHESIA EVENT (OUTPATIENT)
Dept: ENDOSCOPY | Facility: HOSPITAL | Age: 82
End: 2017-11-15
Payer: MEDICARE

## 2017-11-15 ENCOUNTER — ANESTHESIA (OUTPATIENT)
Dept: ENDOSCOPY | Facility: HOSPITAL | Age: 82
End: 2017-11-15
Payer: MEDICARE

## 2017-11-15 ENCOUNTER — HOSPITAL ENCOUNTER (OUTPATIENT)
Facility: HOSPITAL | Age: 82
Discharge: HOME | End: 2017-11-15
Attending: INTERNAL MEDICINE | Admitting: INTERNAL MEDICINE
Payer: MEDICARE

## 2017-11-15 VITALS — RESPIRATION RATE: 14 BRPM

## 2017-11-15 DIAGNOSIS — R12 HEARTBURN: ICD-10-CM

## 2017-11-15 DIAGNOSIS — K44.9 HIATAL HERNIA: ICD-10-CM

## 2017-11-15 DIAGNOSIS — K29.70 GASTRITIS, PRESENCE OF BLEEDING UNSPECIFIED, UNSPECIFIED CHRONICITY, UNSPECIFIED GASTRITIS TYPE: Primary | ICD-10-CM

## 2017-11-15 DIAGNOSIS — K22.2 SCHATZKI'S RING: ICD-10-CM

## 2017-11-15 PROCEDURE — 88305 TISSUE EXAM BY PATHOLOGIST: CPT | Performed by: PATHOLOGY

## 2017-11-15 PROCEDURE — 43239 EGD BIOPSY SINGLE/MULTIPLE: CPT | Mod: 59,,, | Performed by: INTERNAL MEDICINE

## 2017-11-15 PROCEDURE — 27201012 HC FORCEPS, HOT/COLD, DISP: Performed by: INTERNAL MEDICINE

## 2017-11-15 PROCEDURE — 43248 EGD GUIDE WIRE INSERTION: CPT | Mod: ,,, | Performed by: INTERNAL MEDICINE

## 2017-11-15 PROCEDURE — 37000009 HC ANESTHESIA EA ADD 15 MINS: Performed by: INTERNAL MEDICINE

## 2017-11-15 PROCEDURE — D9220A PRA ANESTHESIA: Mod: ANES,,, | Performed by: ANESTHESIOLOGY

## 2017-11-15 PROCEDURE — 43239 EGD BIOPSY SINGLE/MULTIPLE: CPT | Performed by: INTERNAL MEDICINE

## 2017-11-15 PROCEDURE — 63600175 PHARM REV CODE 636 W HCPCS: Performed by: NURSE ANESTHETIST, CERTIFIED REGISTERED

## 2017-11-15 PROCEDURE — 37000008 HC ANESTHESIA 1ST 15 MINUTES: Performed by: INTERNAL MEDICINE

## 2017-11-15 PROCEDURE — 88342 IMHCHEM/IMCYTCHM 1ST ANTB: CPT | Mod: 26,,, | Performed by: PATHOLOGY

## 2017-11-15 PROCEDURE — 25000003 PHARM REV CODE 250: Performed by: INTERNAL MEDICINE

## 2017-11-15 PROCEDURE — 43248 EGD GUIDE WIRE INSERTION: CPT | Performed by: INTERNAL MEDICINE

## 2017-11-15 PROCEDURE — D9220A PRA ANESTHESIA: Mod: CRNA,,, | Performed by: NURSE ANESTHETIST, CERTIFIED REGISTERED

## 2017-11-15 RX ORDER — LIDOCAINE HCL/PF 100 MG/5ML
SYRINGE (ML) INTRAVENOUS
Status: DISCONTINUED | OUTPATIENT
Start: 2017-11-15 | End: 2017-11-15

## 2017-11-15 RX ORDER — LIDOCAINE HYDROCHLORIDE 10 MG/ML
INJECTION, SOLUTION EPIDURAL; INFILTRATION; INTRACAUDAL; PERINEURAL
Status: DISCONTINUED
Start: 2017-11-15 | End: 2017-11-15 | Stop reason: HOSPADM

## 2017-11-15 RX ORDER — SODIUM CHLORIDE 9 MG/ML
INJECTION, SOLUTION INTRAVENOUS CONTINUOUS
Status: DISCONTINUED | OUTPATIENT
Start: 2017-11-15 | End: 2017-11-15 | Stop reason: HOSPADM

## 2017-11-15 RX ORDER — PROPOFOL 10 MG/ML
VIAL (ML) INTRAVENOUS
Status: DISCONTINUED | OUTPATIENT
Start: 2017-11-15 | End: 2017-11-15

## 2017-11-15 RX ADMIN — SODIUM CHLORIDE 1000 ML: 0.9 INJECTION, SOLUTION INTRAVENOUS at 10:11

## 2017-11-15 RX ADMIN — PROPOFOL 50 MG: 10 INJECTION, EMULSION INTRAVENOUS at 12:11

## 2017-11-15 RX ADMIN — LIDOCAINE HYDROCHLORIDE 75 MG: 20 INJECTION, SOLUTION INTRAVENOUS at 12:11

## 2017-11-15 NOTE — H&P (VIEW-ONLY)
Subjective:       Patient ID: Gene Hartman is a 83 y.o. male Body mass index is 30.44 kg/m².    Chief Complaint: Dysphagia ( acid reflux)  This patient is established with Dr. Dowell & myself    Patient is here with his wife and another family member, whom assisted with history.   Patient seen for colorectal cancer screening, high risk due to personal history of colon polyp, age appropriate, last colonoscopy was in 2016: see surgical history. Denies family history of colon cancer/polyps.    Previous visit info: previously reviewed medical records sent for scannin16 cbc with diff- h & h 13.0 &39.2, wbc 6.2, normal kidney and liver function, lipase 42 (H 0-38), amylase 134 (h ), magnesium 2.0      Gastroesophageal Reflux   He complains of abdominal pain (epigastric spasms after eating; relieved with belching; denies currently), belching, dysphagia (CHIEF COMPLAINT: recurred 3-4 months ago; pill dysphagia; denies problems with food or liquids; in the past had improved after EGD with dilation) and globus sensation (lump in throat). He reports no chest pain, no choking, no coughing, no early satiety, no heartburn, no hoarse voice, no nausea, no sore throat or no water brash. This is a chronic problem. Pertinent negatives include no melena or weight loss. Risk factors include obesity. He has tried a PPI (PROTONIX 40 MG ONCE DAILY) for the symptoms. The treatment provided significant relief. Past procedures include an EGD.     Review of Systems   Constitutional: Negative for appetite change, unexpected weight change and weight loss.   HENT: Positive for trouble swallowing (see HPI). Negative for hoarse voice, mouth sores and sore throat.    Respiratory: Negative for cough, choking, chest tightness and shortness of breath.    Cardiovascular: Negative for chest pain and palpitations.   Gastrointestinal: Positive for abdominal pain (epigastric spasms after eating; relieved with belching; denies currently)  and dysphagia (CHIEF COMPLAINT: recurred 3-4 months ago; pill dysphagia; denies problems with food or liquids; in the past had improved after EGD with dilation). Negative for anal bleeding, blood in stool, constipation, diarrhea, heartburn, melena, nausea, rectal pain and vomiting.   Genitourinary: Negative for difficulty urinating, dysuria, flank pain, frequency, hematuria and urgency.   Musculoskeletal: Negative for back pain.       Past Medical History:   Diagnosis Date    Anxiety     Arthritis     CAD (coronary artery disease) age 68    Carotid artery occlusion     Cerebrovascular small vessel disease     Colon polyps age 78    GERD (gastroesophageal reflux disease)     H. pylori infection     HTN (hypertension), benign age 65    Hyperlipidemia LDL goal <70 age 65    Hypothyroidism     Multiple fractures of ribs of right side 11/27/2012    Myocardial infarction     Prostate cancer age 67    Syncopal episodes 3/2013     Past Surgical History:   Procedure Laterality Date    CARDIAC PACEMAKER PLACEMENT  10/2015    CARDIAC SURGERY      CATARACT EXTRACTION W/  INTRAOCULAR LENS IMPLANT Bilateral     COLONOSCOPY  ~2013    Dr. Edge    COLONOSCOPY N/A 6/8/2016    Procedure: COLONOSCOPY;  Surgeon: Shamar Barahona MD;  Location: [facility];  Service: Endoscopy;  Laterality: N/A; REPEAT IN 1 YEAR    CORONARY ANGIOPLASTY WITH STENT PLACEMENT  2003    x 2 RCA    PROSTATECTOMY  2002    UPPER GASTROINTESTINAL ENDOSCOPY  11/15/2016    Dr. Barahona     Family History   Problem Relation Age of Onset    Migraines Mother     Heart failure Father     Heart disease Father 56     MI    Heart failure Brother     Heart disease Brother     Diabetes Son     Hypertension Son     Heart disease Brother     Mental illness Brother     No Known Problems Son     Colon cancer Neg Hx     Colon polyps Neg Hx     Crohn's disease Neg Hx     Ulcerative colitis Neg Hx     Stomach cancer Neg Hx     Esophageal  cancer Neg Hx      Wt Readings from Last 10 Encounters:   10/31/17 70.7 kg (155 lb 13.8 oz)   09/06/17 72.6 kg (160 lb 1.6 oz)   01/11/17 73.4 kg (161 lb 13.1 oz)   12/12/16 73.2 kg (161 lb 6 oz)   12/02/16 73.2 kg (161 lb 6 oz)   11/15/16 69.9 kg (154 lb)   10/07/16 71.8 kg (158 lb 4.6 oz)   08/10/16 70.3 kg (155 lb)   06/07/16 71.2 kg (156 lb 15.5 oz)   05/05/16 71.6 kg (157 lb 13.6 oz)     Lab Results   Component Value Date    WBC 5.5 08/25/2017    HGB 13.9 (L) 08/25/2017    HCT 40.7 08/25/2017    MCV 93.8 08/25/2017     08/25/2017     CMP  Sodium   Date Value Ref Range Status   08/25/2017 141 134 - 144 mmol/L      Potassium   Date Value Ref Range Status   08/25/2017 4.6 3.5 - 5.0 mmol/L      Chloride   Date Value Ref Range Status   08/25/2017 110 98 - 110 mmol/L      CO2   Date Value Ref Range Status   08/25/2017 24.7 22.8 - 31.6 mmol/L      Glucose   Date Value Ref Range Status   08/25/2017 101 (H) 70 - 99 mg/dL      BUN, Bld   Date Value Ref Range Status   08/25/2017 23 (H) 8 - 20 mg/dL      Creatinine   Date Value Ref Range Status   08/25/2017 1.24 0.60 - 1.40 mg/dL    11/28/2012 1.0 0.5 - 1.4 mg/dL Final     Calcium   Date Value Ref Range Status   08/25/2017 8.7 7.7 - 10.4 mg/dL    11/28/2012 9.2 8.7 - 10.5 mg/dL Final     Total Protein   Date Value Ref Range Status   08/25/2017 6.7 6.0 - 8.2 g/dL      Albumin   Date Value Ref Range Status   08/25/2017 4.0 3.1 - 4.7 g/dL      Total Bilirubin   Date Value Ref Range Status   08/25/2017 0.8 0.3 - 1.0 mg/dL      Alkaline Phosphatase   Date Value Ref Range Status   08/25/2017 69 40 - 104 IU/L      AST   Date Value Ref Range Status   08/25/2017 18 10 - 40 IU/L      ALT   Date Value Ref Range Status   01/11/2017 21 10 - 44 U/L Final     Anion Gap   Date Value Ref Range Status   01/11/2017 9 8 - 16 mmol/L Final   11/28/2012 12 5 - 15 meq/L Final     eGFR if    Date Value Ref Range Status   01/11/2017 >60.0 >60 mL/min/1.73 m^2 Final     eGFR if  "non    Date Value Ref Range Status   01/11/2017 55.6 (A) >60 mL/min/1.73 m^2 Final     Comment:     Calculation used to obtain the estimated glomerular filtration  rate (eGFR) is the CKD-EPI equation. Since race is unknown   in our information system, the eGFR values for   -American and Non--American patients are given   for each creatinine result.       11/15/16 EGD was reviewed and procedure report states:   " Impression:          - Normal upper third of esophagus and middle third                        of esophagus.                       - Mild Schatzki ring. Dilated.                       - Medium-sized hiatus hernia.                       - Gastritis. Biopsied.                       - Erythematous duodenopathy. Biopsied.                       - Normal 2nd part of the duodenum.  Recommendation:      - Patient has a contact number available for                        emergencies. The signs and symptoms of potential                        delayed complications were discussed with the                        patient. Return to normal activities tomorrow.                        Written discharge instructions were provided to the                        patient.                       - Resume previous diet.                       - Continue present medications.                       - No aspirin, ibuprofen, naproxen, or other                        non-steroidal anti-inflammatory drugs.                       - Await pathology results.                       - Discharge patient to home (ambulatory).                       - Return to my office after studies are complete. ".  Biopsy results:   "Supplemental Diagnosis  Specimen #2:POSITIVE for H. pylori species by immunostain with appropriately reactive controls.  [facility].  (Electronically Signed: 2016-11-18 14:39:56 )  Diagnosed by: Rama Mistry M.D.  FINAL PATHOLOGIC DIAGNOSIS  1. Duodenal bulb, biopsy:  Unremarkable small bowel mucosa with " "adequate villi.  Negative for active inflammation.  Negative for dysplasia.  2. Stomach, antrum and body, biopsy:  Severe chronic active gastritis.  Immunostain for H. pylori species is pending; supplemental report to follow.  Negative for intestinal metaplasia and dysplasia."    6/8/16 Colonoscopy was reviewed and procedure report states:   " Impression:          - Non-bleeding internal hemorrhoids.                       - Diverticulosis in the sigmoid colon.                       - Two 2 to 3 mm polyps in the sigmoid colon and in                        the ascending colon. Resected and retrieved.                       - Seven 4 to 7 mm polyps in the descending colon, in                        the ascending colon and in the cecum. Resected and                        retrieved.                       - One 8 mm polyp in the sigmoid colon. Resected and                        retrieved.                       - One 30 mm polyp in the sigmoid colon. Resected and                        retrieved. Tattooed.                       - The appendiceal orifice and ileocecal valve are                        normal.                       - The examined portion of the ileum was normal.  Recommendation:      - Patient has a contact number available for                        emergencies. The signs and symptoms of potential                        delayed complications were discussed with the                        patient. Return to normal activities tomorrow.                        Written discharge instructions were provided to the                        patient.                       - High fiber diet.                       - Continue present medications.                       - Resume aspirin in 2 days and Plavix (clopidogrel)                        in 2 days at prior doses.                       - Await pathology results.                       - Repeat colonoscopy (date not yet determined) for                        " "surveillance.                       - Discharge patient to home (ambulatory).                       - Return to my office in 6 weeks. ".  Biopsy results:   "FINAL PATHOLOGIC DIAGNOSIS  1. Polyps, ascending colon and cecum, polypectomy:  Tubular adenoma, multiple fragments. [employer] large bowel mucosa without significant histopathologic change,  multiple fragments.  2. Polyps, descending colon, polypectomy:  Tubular adenoma, 3 fragments.  3. Sigmoid lesion, biopsy:  Tubular adenoma, up to 1.4 cm in maximal dimension."  Objective:      Physical Exam   Constitutional: He is oriented to person, place, and time. He appears well-developed and well-nourished. No distress.   HENT:   Head: Atraumatic.   Mouth/Throat: Oropharynx is clear and moist and mucous membranes are normal. No oral lesions. No oropharyngeal exudate.   Eyes: Conjunctivae are normal. Pupils are equal, round, and reactive to light. No scleral icterus.   Neck: Normal range of motion. Neck supple. No tracheal deviation present. No thyromegaly present.   Cardiovascular: Normal rate and regular rhythm.    Pulmonary/Chest: Effort normal and breath sounds normal. No respiratory distress. He has no wheezes. He has no rhonchi. He has no rales. He exhibits no tenderness.   Abdominal: Soft. Normal appearance and bowel sounds are normal. He exhibits no distension, no abdominal bruit, no ascites and no mass. There is no hepatosplenomegaly. There is no tenderness. There is no rigidity, no rebound, no guarding, no tenderness at McBurney's point and negative Wiseman's sign. No hernia.   Genitourinary: Rectal exam shows no external hemorrhoid, no internal hemorrhoid, no fissure, no mass, no tenderness, anal tone normal and guaiac negative stool.   Lymphadenopathy:     He has no cervical adenopathy.   Neurological: He is alert and oriented to person, place, and time.   Skin: Skin is warm and dry. No rash noted. He is not diaphoretic. No erythema. No pallor.   Non-jaundiced "   Psychiatric: He has a normal mood and affect. His behavior is normal.   Nursing note and vitals reviewed.      Assessment:       1. Pill dysphagia    2. Heartburn    3. Schatzki's ring    4. Hiatal hernia    5. History of Helicobacter pylori infection    6. History of colon polyps    7. Encounter for monitoring long-term proton pump inhibitor therapy    8. Anticoagulant long-term use    9. Epigastric pain    10. Eructation        Plan:       Pill dysphagia & Schatzki's ring  -     Case request GI: ESOPHAGOGASTRODUODENOSCOPY (EGD), - schedule EGD, discussed procedure with patient and possible esophageal dilation may be performed during procedure if indicated, patient verbalized understanding  - educated patient to eat smaller more frequent meals and to eat slowly and advised to eat a soft diet.  - possible UGI/esophagram/esophageal manometry if symptoms persist    Heartburn & epigastric pain  -     Case request GI: ESOPHAGOGASTRODUODENOSCOPY (EGD), - schedule EGD, discussed procedure with patient, patient verbalized understanding  -  CONTINUE   pantoprazole (PROTONIX) 40 MG tablet; Take 1 tablet (40 mg total) by mouth once daily. Before breakfast  Dispense: 30 tablet; Refill: 6,- take in the morning before breakfast, discussed about possible long term use of medication (prefer to use lowest effective dose or discontinuing if possible) and discussed the risks & benefits with taking a reflux medication long term, and to take OTC calcium and vitamin d supplements as directed (such as Citracal +D), pt verbalized understanding  -discussed about the different types of medications used to treat reflux and how to use them, antacids can be used PRN for breakthrough heartburn symptoms by reducing stomach acid that is already produced, H2 blockers (zantac) work by limiting the amount acid production, & PPI's work to block acid production and are taken daily, patient verbalized understanding.  -Educated patient on lifestyle  modifications to help control/reduce reflux/abdominal pain including: avoid large meals, avoid eating within 2-3 hours of bedtime (avoid late night eating & lying down soon after eating), elevate head of bed if nocturnal symptoms are present, smoking cessation (if current smoker), & weight loss (if overweight).   -Educated to avoid known foods which trigger reflux symptoms & to minimize/avoid high-fat foods, chocolate, caffeine, citrus, alcohol, & tomato products.  -Advised to avoid/limit use of NSAID's, since they can cause GI upset, bleeding, and/or ulcers. If needed, take with food    Eructation  - recommended OTC simethicone as directed, such as Phazyme or Gas-x  -discussed with patient about low gas diet: Reduce or eliminate these foods from your diet: Broccoli, Cauliflower, [place] sprouts, Cabbage, Cooked dried beans, Carbonated beverages (sparkling water, soda, beer, champagne)  Other Causes Of Excess Gas Include:   1) EATING TOO FAST or TALKING WHILE YOU CHEW may cause you to swallow air. This increases the amount of gas in the stomach and may worsen your symptoms.  --> Chew each mouthful completely before swallowing. Take your time.  2) OVEREATING may increase the feeling of being bloated and cause more gas.  --> When you are full, stop eating.  3) CONSTIPATION can increase the amount of normal intestinal gas.  --> Avoid constipation by increasing the amount of fiber in your diet by including whole cereal grains, fresh vegetables (except those in the above list) and fresh fruits. High-fiber foods absorb water and carry it out of the body. When increasing the amount of fiber in your diet, you also need to increase the amount of water that you drink. You should drink at least eight 8-ounce glasses of water (two quarts) per day.    Hiatal hernia  -     Case request GI: ESOPHAGOGASTRODUODENOSCOPY (EGD), - schedule EGD, discussed procedure with patient, patient verbalized understanding  -discussed diagnosis  with patient & that it is usually managed by controlling reflux symptoms, surgery is an option, but usually performed if reflux is uncontrolled by medication management, if symptoms persist despite medication management refer to general surgery to discuss about surgical options, patient verbalized understanding    History of Helicobacter pylori infection  -     Case request GI: ESOPHAGOGASTRODUODENOSCOPY (EGD), - schedule EGD, discussed procedure with patient, testing for H. Pylori typically performed during EGD (if not can do lab testing), patient verbalized understanding and agreed with management plan    History of colon polyps  -     Case request GI: COLONOSCOPY, - schedule Colonoscopy, discussed procedure with the patient, patient verbalized understanding    Encounter for monitoring long-term proton pump inhibitor therapy  -     Vitamin B12; Future; Expected date: 10/31/2017  -     Magnesium; Future; Expected date: 10/31/2017  -     Case request GI: ESOPHAGOGASTRODUODENOSCOPY (EGD), - schedule EGD, discussed procedure with patient, patient verbalized understanding  - discussed with patient about long term use of reflux medications and the risk of using these medications long term. Some research studies (not all) have shown decrease absorption of calcium when taking PPI's and H2 blockers, but those same research studies showed that adequate dietary (milk and cheese) and/or supplement of calcium and vitamin d supplement induces enough acid secretion for calcium absorption. Also, discussed about the risk vs benefit of untreated GERD such as ahumada's esophagus.  - recommend annual monitoring with blood work to include CMP, CBC, vitamin B12, and magnesium.    Anticoagulant long-term use   - patient is on blood-thinner(s)=aspirin and plavix, informed patient that they will likely need to be held for endoscopy, nurse will confirm with cardiologist/PCP.    Return in about 1 month (around 11/30/2017), or if symptoms  worsen or fail to improve.    If no improvement in symptoms or symptoms worsen, call/follow-up at clinic or go to ER.

## 2017-11-15 NOTE — H&P
CC: Dysphagia    83 year old male with above. States that symptoms are stable, no alleviating/exacerbating factors. No family history of CA. No bleeding or weight loss.     ROS:  No headache, no fever/chills, no chest pain/SOB, no nausea/vomiting/diarrhea/constipation/GI bleeding/abdominal pain, no dysuria/hematuria.    VSSAF   Exam:   Alert and oriented x 3; no apparent distress   PERRLA, sclera anicteric  CV: Regular rate/rhythm, normal PMI   Lungs: Clear bilaterally with no wheeze/rales   Abdomen: Soft, NT/ND, normal bowel sounds   Ext: No cyanosis, clubbing     Impression:   As above    Plan:   Proceed with endoscopy. Further recs to follow.

## 2017-11-15 NOTE — DISCHARGE INSTRUCTIONS
Gastritis (Adult)    Gastritis is inflammation and irritation of the stomach lining. It can be present for a short time (acute) or be long lasting (chronic). Gastritis is often caused by infection with bacteria called H pylori. More than a third of people in the [place] have this bacteria in their bodies. In many cases, H pylori causes no problems or symptoms. In some people, though, the infection irritates the stomach lining and causes gastritis. Other causes of stomach irritation include drinking alcohol or taking pain-relieving medicines called NSAIDs (such as aspirin or ibuprofen).   Symptoms of gastritis can include:  · Abdominal pain or bloating  · Loss of appetite  · Nausea or vomiting  · Vomiting blood or having black stools  · Feeling more tired than usual  An inflamed and irritated stomach lining is more likely to develop a sore called an ulcer. To help prevent this, gastritis should be treated.  Home care  If needed, medicines may be prescribed. If you have H pylori infection, treating it will likely relieve your symptoms. Other changes can help reduce stomach irritation and help it heal.  · If you have been prescribed medicines for H pylori infection, take them as directed. Take all of the medicine until it is finished or your healthcare provider tells you to stop, even if you feel better.  · Your healthcare provider may recommend avoiding NSAIDs. If you take daily aspirin for your heart or other medical reasons, do not stop without talking to your healthcare provider first.  · Avoid drinking alcohol.  · Stop smoking. Smoking can irritate the stomach and delay healing. As much as possible, stay away from second hand smoke.  Follow-up care  Follow up with your healthcare provider, or as advised by our staff. Testing may be needed to check for inflammation or an ulcer.  When to seek medical advice  Call your healthcare provider for any of the following:  · Stomach pain that gets worse or moves to the lower  right abdomen (appendix area)  · Chest pain that appears or gets worse, or spreads to the back, neck, shoulder, or arm  · Frequent vomiting (cant keep down liquids)  · Blood in the stool or vomit (red or black in color)  · Feeling weak or dizzy  · Fever of 100.4ºF (38ºC) or higher, or as directed by your healthcare provider  Date Last Reviewed: 6/22/2015 © 2000-2017 [employer]. [address] [place] [address]. All rights reserved. This information is not intended as a substitute for professional medical care. Always follow your healthcare professional's instructions.        Esophageal Dilation     A balloon dilator may be used to widen a stricture in the esophagus.   An esophageal dilation is a procedure used to widen a narrowed section of your esophagus. This is the tube that leads from your throat to your stomach. Narrowing (stricture) of the esophagus can cause problems. These include trouble swallowing. This sheet explains what to expect with esophageal dilation.  Why esophageal dilation is needed  Several problems can be treated with esophageal dilation. They include:  · Peptic stricture. This is caused by reflux esophagitis. With this problem, the esophagus is irritated by acid reflux (heartburn). This occurs when acid from your stomach flows back up into the esophagus. Stomach acid damages the lining of the esophagus. This leads to a buildup of scar tissue. As a result, the esophagus is narrowed.  · Schatzkis ring. This is an abnormal ring of tissue. It forms where the esophagus meets the stomach. It can cause trouble swallowing. It can also cause food to get stuck in the esophagus. The cause of this condition is not known.  · Achalasia. This condition stops food and liquids from moving into your stomach from the esophagus. It affects the lower esophageal sphincter (LES). The LES is a muscular ring that opens (relaxes) when you swallow. With achalasia, the LES does not relax. This  condition can also cause problems with peristalsis. This is the normal muscular action of the esophagus that moves food into the stomach.  · Eosinophilic esophagitis. This is a redness and swelling (inflammation) in the esophagus. It is caused by an environmental trigger such as a food allergy. It can lead to pain, trouble swallowing, and strictures.  · Less common causes of stricture. Other causes of stricture include radiation treatment and cancer.  Before you have esophageal dilation  · Tell your provider about any medicines you take. This includes prescription medicines, over-the-counter medicines, herbs, vitamins, and other supplements. Be sure to mention aspirin or any blood thinners youre taking.  · Let your provider know if you need to take antibiotics before dental procedures. You may need to take them before esophageal dilation as well.  · Tell your provider about any health conditions you have, such as heart or lung disease. Also mention any allergies to medicines.  · Youll need to have an empty stomach for the procedure. Follow your providers instructions for not eating and drinking before the procedure.  · Arrange to have a family member or friend drive you home after the procedure.  During the procedure  · You may be given local anesthesia to numb your throat. Youll also likely be given medicine to relax you. The procedure takes about 15 minutes. It does not cause trouble breathing.  · A tube called an endoscope (scope) is used. This is a narrow tube with a tiny light and camera at the end. The scope is inserted through your mouth and into your esophagus. It lets your provider see inside your esophagus. To help guide your provider, an imaging method called fluoroscopy may also be used. This creates a moving X-ray image on a computer screen.   · Next, special tiny tools are carefully guided through your mouth and down into the esophagus. They widen the stricture and are then removed. Different types  of instruments are used. The type used depends on the size and cause of the stricture. Types include:  ¨ Balloon dilator. A tiny empty balloon is put into the stricture using an endoscope. The balloon is slowly filled with air. The air is removed from the balloon when the stricture is widened to the right size. Balloon dilators are used to treat many types of strictures.  ¨ Guided wire dilator. A thin wire is eased down the esophagus. A small tube thats wider on one end is guided down the wire. It is put into the stricture to stretch it. These dilators are used to treat all kinds of strictures.  ¨ Bougies. These are weighted, cone-shaped tubes. Starting with smaller cones, your provider uses increasingly larger cones until the stricture is stretched the right amount. Bougies are often used to treat strictures that are simple (short, straight, and not very narrow).  After the procedure  · Youll be watched closely until your provider says youre ready to go home. Youll need to have a friend or family member drive you home.  · You may have a sore throat for the rest of the day.  · You may have pain behind your breastbone for a short time afterwards.  · You can start drinking fluids again after the numbness in your throat goes away. You can resume eating the same day or the next day.  Risks and possible complications  Risks and possible complications for esophageal dilation include:  · Infection  · A tear or hole in the esophagus lining, causing bleeding and possibly needing surgery to fix  · Risks of anesthesia  Follow-up  You may need to have the procedure repeated one or more times. This depends on the cause and extent of the narrowing. Repeat procedures can allow the dilation to take place more slowly. This reduces the risks of the procedure.  If your stricture was caused by reflux esophagitis, youll likely need to take medicine to treat that condition. Your provider will tell you more.  When to call your  provider  Call your healthcare provider right away if you have any of the following after the procedure:  · Fever of 100.4°F (38.0°C)  · Chest pain  · Trouble swallowing  · Vomiting blood or material that looks like coffee grounds  · Bleeding  · Black, tarry, or bloody stools   Date Last Reviewed: 7/1/2016  © 4121-3534 [employer] [address] [place] [address]. All rights reserved. This information is not intended as a substitute for professional medical care. Always follow your healthcare professional's instructions.        Upper GI Endoscopy     During endoscopy, a long, flexible tube is used to view the inside of your upper GI tract.      Upper GI endoscopy allows your healthcare provider to look directly into the beginning of your gastrointestinal (GI) tract. The esophagus, stomach, and duodenum (the first part of the small intestine) make up the upper GI tract.   Before the exam  Follow these and any other instructions you are given before your endoscopy. If you dont follow the healthcare providers instructions carefully, the test may need to be canceled or done over:  · Don't eat or drink anything after midnight the night before your exam. If your exam is in the afternoon, drink only clear liquids in the morning. Don't eat or drink anything for 8 hours before the exam. In some cases, you may be able to take medicines with sips of water until 2 hours before the procedure. Speak with your healthcare provider about this.   · Bring your X-rays and any other test results you have.  · Because you will be sedated, arrange for an adult to drive you home after the exam.  · Tell your healthcare provider before the exam if you are taking any medicines or have any medical problems.  The procedure  Here is what to expect:  · You will lie on the endoscopy table. Usually patients lie on the left side.  · You will be monitored and given oxygen.  · Your throat may be numbed with a spray or gargle.  You are given medicine through an intravenous (IV) line that will help you relax and remain comfortable. You may be awake or asleep during the procedure.  · The healthcare provider will put the endoscope in your mouth and down your esophagus. It is thinner than most pieces of food that you swallow. It will not affect your breathing. The medicine helps keep you from gagging.  · Air is put into your GI tract to expand it. It can make you burp.  · During the procedure, the healthcare provider can take biopsies (tissue samples), remove abnormalities, such as polyps, or treat abnormalities through a variety of devices placed through the endoscope. You will not feel this.   · The endoscope carries images of your upper GI tract to a video screen. If you are awake, you may be able to look at the images.  · After the procedure is done, you will rest for a time. An adult must drive you home.  When to call your healthcare provider  Contact your healthcare provider if you have:  · Black or tarry stools, or blood in your stool  · Fever  · Pain in your belly that does not go away  · Nausea and vomiting, or vomiting blood   Date Last Reviewed: 7/1/2016  © 2292-7054 [employer]. [address] [place] [address]. All rights reserved. This information is not intended as a substitute for professional medical care. Always follow your healthcare professional's instructions.      Discharge Instructions: After Your Surgery/Procedure  Youve just had surgery. During surgery you were given medicine called anesthesia to keep you relaxed and free of pain. After surgery you may have some pain or nausea. This is common. Here are some tips for feeling better and getting well after surgery.     Stay on schedule with your medication.   Going home  Your doctor or nurse will show you how to take care of yourself when you go home. He or she will also answer your questions. Have an adult family member or friend drive you home.  "     For your safety we recommend these precaution for the first 24 hours after your procedure:  · Do not drive or use heavy equipment.  · Do not make important decisions or sign legal papers.  · Do not drink alcohol.  · Have someone stay with you, if needed. He or she can watch for problems and help keep you safe.  · Your concentration, balance, coordination, and judgement may be impaired for many hours after anesthesia.  Use caution when ambulating or standing up.     · You may feel weak and "washed out" after anesthesia and surgery.      Subtle residual effects of general anesthesia or sedation with regional / local anesthesia can last more than 24 hours.  Rest for the remainder of the day or longer if your Doctor/Surgeon has advised you to do so.  Although you may feel normal within the first 24 hours, your reflexes and mental ability may be impaired without you realizing it.  You may feel dizzy, lightheaded or sleepy for 24 hours or longer.      Be sure to go to all follow-up visits with your doctor. And rest after your surgery for as long as your doctor tells you to.  Coping with pain  If you have pain after surgery, pain medicine will help you feel better. Take it as told, before pain becomes severe. Also, ask your doctor or pharmacist about other ways to control pain. This might be with heat, ice, or relaxation. And follow any other instructions your surgeon or nurse gives you.  Tips for taking pain medicine  To get the best relief possible, remember these points:  · Pain medicines can upset your stomach. Taking them with a little food may help.  · Most pain relievers taken by mouth need at least 20 to 30 minutes to start to work.  · Taking medicine on a schedule can help you remember to take it. Try to time your medicine so that you can take it before starting an activity. This might be before you get dressed, go for a walk, or sit down for dinner.  · Constipation is a common side effect of pain medicines. " Call your doctor before taking any medicines such as laxatives or stool softeners to help ease constipation. Also ask if you should skip any foods. Drinking lots of fluids and eating foods such as fruits and vegetables that are high in fiber can also help. Remember, do not take laxatives unless your surgeon has prescribed them.  · Drinking alcohol and taking pain medicine can cause dizziness and slow your breathing. It can even be deadly. Do not drink alcohol while taking pain medicine.  · Pain medicine can make you react more slowly to things. Do not drive or run machinery while taking pain medicine.  Your health care provider may tell you to take acetaminophen to help ease your pain. Ask him or her how much you are supposed to take each day. Acetaminophen or other pain relievers may interact with your prescription medicines or other over-the-counter (OTC) drugs. Some prescription medicines have acetaminophen and other ingredients. Using both prescription and OTC acetaminophen for pain can cause you to overdose. Read the labels on your OTC medicines with care. This will help you to clearly know the list of ingredients, how much to take, and any warnings. It may also help you not take too much acetaminophen. If you have questions or do not understand the information, ask your pharmacist or health care provider to explain it to you before you take the OTC medicine.  Managing nausea  Some people have an upset stomach after surgery. This is often because of anesthesia, pain, or pain medicine, or the stress of surgery. These tips will help you handle nausea and eat healthy foods as you get better. If you were on a special food plan before surgery, ask your doctor if you should follow it while you get better. These tips may help:  · Do not push yourself to eat. Your body will tell you when to eat and how much.  · Start off with clear liquids and soup. They are easier to digest.  · Next try semi-solid foods, such as  mashed potatoes, applesauce, and gelatin, as you feel ready.  · Slowly move to solid foods. Dont eat fatty, rich, or spicy foods at first.  · Do not force yourself to have 3 large meals a day. Instead eat smaller amounts more often.  · Take pain medicines with a small amount of solid food, such as crackers or toast, to avoid nausea.     Call your surgeon if  · You still have pain an hour after taking medicine. The medicine may not be strong enough.  · You feel too sleepy, dizzy, or groggy. The medicine may be too strong.  · You have side effects like nausea, vomiting, or skin changes, such as rash, itching, or hives.       If you have obstructive sleep apnea  You were given anesthesia medicine during surgery to keep you comfortable and free of pain. After surgery, you may have more apnea spells because of this medicine and other medicines you were given. The spells may last longer than usual.   At home:  · Keep using the continuous positive airway pressure (CPAP) device when you sleep. Unless your health care provider tells you not to, use it when you sleep, day or night. CPAP is a common device used to treat obstructive sleep apnea.  · Talk with your provider before taking any pain medicine, muscle relaxants, or sedatives. Your provider will tell you about the possible dangers of taking these medicines.  © 9614-6033 [employer]. [address] [place] [address], [place] [address]. All rights reserved. This information is not intended as a substitute for professional medical care. Always follow your healthcare professional's instructions.

## 2017-11-15 NOTE — ANESTHESIA PREPROCEDURE EVALUATION
11/15/2017  Gene Hartman is a 83 y.o., male.    Pre-op Assessment    I have reviewed the Patient Summary Reports.     I have reviewed the Nursing Notes.   I have reviewed the Medications.     Review of Systems  Anesthesia Hx:  No problems with previous Anesthesia    Social:  Non-Smoker    Hematology/Oncology:  Hematology Normal       -- Cancer in past history:  Other (see Oncology comments)   EENT/Dental:  EENT/Dental Normal Edentulous    Cardiovascular:   Pacemaker Hypertension Past MI CAD  CABG/stent     Pulmonary:  Pulmonary Normal    Renal/:   History of prostate cancer    Hepatic/GI:   GERD, well controlled    Neurological:   Headaches    Endocrine:   Hypothyroidism    Psych:   anxiety          Physical Exam  General:  Well nourished    Airway/Jaw/Neck:  Airway Findings: Mouth Opening: Normal Tongue: Normal  General Airway Assessment: Adult  Mallampati: II        Eyes/Ears/Nose:  EYES/EARS/NOSE FINDINGS: Normal   Dental:  Dental Findings: Edentulous   Chest/Lungs:  Chest/Lungs Findings: Clear to auscultation, Normal Respiratory Rate     Heart/Vascular:  Heart Findings: Rate: Normal  Rhythm: Regular Rhythm  Sounds: Normal        Mental Status:  Mental Status Findings: Normal        Anesthesia Plan  Type of Anesthesia, risks & benefits discussed:  Anesthesia Type:  general  Patient's Preference:   Intra-op Monitoring Plan: standard ASA monitors  Intra-op Monitoring Plan Comments:   Post Op Pain Control Plan:   Post Op Pain Control Plan Comments:   Induction:    Beta Blocker:  Patient is on a Beta-Blocker and has received one dose within the past 24 hours (No further documentation required).       Informed Consent: Patient understands risks and agrees with Anesthesia plan.  Questions answered. Anesthesia consent signed with patient.  ASA Score: 3     Day of Surgery Review of History & Physical: I have  interviewed and examined the patient. I have reviewed the patient's H&P dated:  There are no significant changes.  H&P update referred to the provider.         Ready For Surgery From Anesthesia Perspective.

## 2017-11-15 NOTE — TRANSFER OF CARE
"Anesthesia Transfer of Care Note    Patient: Gene Hartman    Procedure(s) Performed: Procedure(s) (LRB):  ESOPHAGOGASTRODUODENOSCOPY (EGD) (N/A)    Patient location: PACU    Anesthesia Type: general    Transport from OR: Transported from OR on room air with adequate spontaneous ventilation    Post pain: adequate analgesia    Post assessment: no apparent anesthetic complications and tolerated procedure well    Post vital signs: stable    Level of consciousness: sedated    Nausea/Vomiting: no nausea/vomiting    Complications: none    Transfer of care protocol was followed      Last vitals:   Visit Vitals  BP (!) 173/77 (BP Location: Left arm, Patient Position: Lying)   Pulse 68   Temp 36.7 °C (98.1 °F) (Oral)   Resp 14   Ht 5' 1" (1.549 m)   Wt 69.9 kg (154 lb)   SpO2 95%   BMI 29.10 kg/m²     "

## 2017-11-15 NOTE — ANESTHESIA POSTPROCEDURE EVALUATION
"Anesthesia Post Evaluation    Patient: Gene Hartman    Procedure(s) Performed: Procedure(s) (LRB):  ESOPHAGOGASTRODUODENOSCOPY (EGD) (N/A)    Final Anesthesia Type: general  Patient location during evaluation: PACU  Patient participation: Yes- Able to Participate  Level of consciousness: awake and alert  Post-procedure vital signs: reviewed and stable  Pain management: adequate  Airway patency: patent  PONV status at discharge: No PONV  Anesthetic complications: no      Cardiovascular status: hemodynamically stable  Respiratory status: unassisted and room air  Hydration status: euvolemic  Follow-up not needed.        Visit Vitals  BP (!) 174/79 (BP Location: Left arm, Patient Position: Lying)   Pulse 84   Temp 36.3 °C (97.3 °F) (Oral)   Resp 16   Ht 5' 1" (1.549 m)   Wt 69.9 kg (154 lb)   SpO2 100%   BMI 29.10 kg/m²       Pain/Cathie Score: Pain Assessment Performed: Yes (11/15/2017 12:45 PM)  Presence of Pain: denies (11/15/2017 12:45 PM)  Pain Rating Prior to Med Admin: 1 (11/15/2017 12:45 PM)  Pain Rating Post Med Admin: 1 (11/15/2017 12:45 PM)  Cathie Score: 10 (11/15/2017 12:45 PM)      "

## 2017-11-16 VITALS
WEIGHT: 154 LBS | TEMPERATURE: 97 F | RESPIRATION RATE: 16 BRPM | OXYGEN SATURATION: 100 % | HEIGHT: 61 IN | BODY MASS INDEX: 29.07 KG/M2 | DIASTOLIC BLOOD PRESSURE: 79 MMHG | SYSTOLIC BLOOD PRESSURE: 174 MMHG | HEART RATE: 84 BPM

## 2017-11-28 ENCOUNTER — TELEPHONE (OUTPATIENT)
Dept: GASTROENTEROLOGY | Facility: CLINIC | Age: 82
End: 2017-11-28

## 2017-11-28 NOTE — TELEPHONE ENCOUNTER
----- Message from Cherelle Zimmerman sent at 11/28/2017  3:10 PM CST -----  Contact: Wife, Marielle Palomares want to speak with a nurse regarding patient upcoming procedure please call back at [phone]

## 2017-11-30 ENCOUNTER — TELEPHONE (OUTPATIENT)
Dept: FAMILY MEDICINE | Facility: CLINIC | Age: 82
End: 2017-11-30

## 2017-11-30 RX ORDER — GUAIFENESIN 1200 MG/1
1 TABLET, EXTENDED RELEASE ORAL 2 TIMES DAILY
COMMUNITY
Start: 2017-11-30 | End: 2017-12-10

## 2017-11-30 NOTE — TELEPHONE ENCOUNTER
Pt.'s wife called & asked if pt. could take Mucinex. He's got phlegm that he can't get up & she wants to make sure it won't interact w/his other meds.     Yes patient can take mucinex (not mucinex d or mucinex dm).  Mucinex 600-1200mg twice daily may be taken for mucus.

## 2017-12-05 ENCOUNTER — OFFICE VISIT (OUTPATIENT)
Dept: FAMILY MEDICINE | Facility: CLINIC | Age: 82
End: 2017-12-05
Payer: MEDICARE

## 2017-12-05 VITALS
HEIGHT: 61 IN | OXYGEN SATURATION: 96 % | SYSTOLIC BLOOD PRESSURE: 150 MMHG | BODY MASS INDEX: 29.23 KG/M2 | HEART RATE: 76 BPM | WEIGHT: 154.81 LBS | RESPIRATION RATE: 16 BRPM | DIASTOLIC BLOOD PRESSURE: 50 MMHG

## 2017-12-05 DIAGNOSIS — J30.89 CHRONIC NONSEASONAL ALLERGIC RHINITIS DUE TO OTHER ALLERGEN: ICD-10-CM

## 2017-12-05 DIAGNOSIS — B96.81 HELICOBACTER PYLORI GASTRITIS (CHRONIC GASTRITIS): ICD-10-CM

## 2017-12-05 DIAGNOSIS — K22.5 ZENKER'S DIVERTICULUM: ICD-10-CM

## 2017-12-05 DIAGNOSIS — J32.1 CHRONIC FRONTAL SINUSITIS: Primary | ICD-10-CM

## 2017-12-05 DIAGNOSIS — K21.9 GASTROESOPHAGEAL REFLUX DISEASE, ESOPHAGITIS PRESENCE NOT SPECIFIED: ICD-10-CM

## 2017-12-05 DIAGNOSIS — M54.2 NECK PAIN, BILATERAL: ICD-10-CM

## 2017-12-05 DIAGNOSIS — K29.50 HELICOBACTER PYLORI GASTRITIS (CHRONIC GASTRITIS): ICD-10-CM

## 2017-12-05 DIAGNOSIS — C61 PROSTATE CANCER: ICD-10-CM

## 2017-12-05 PROCEDURE — 96372 THER/PROPH/DIAG INJ SC/IM: CPT | Mod: ,,, | Performed by: INTERNAL MEDICINE

## 2017-12-05 PROCEDURE — 99215 OFFICE O/P EST HI 40 MIN: CPT | Mod: 25,,, | Performed by: INTERNAL MEDICINE

## 2017-12-05 RX ORDER — CETIRIZINE HYDROCHLORIDE 10 MG/1
10 TABLET ORAL DAILY
Qty: 90 TABLET | Refills: 3 | COMMUNITY
Start: 2017-12-05 | End: 2018-05-08

## 2017-12-05 RX ORDER — AMOXICILLIN 875 MG/1
875 TABLET, FILM COATED ORAL 2 TIMES DAILY
Qty: 28 TABLET | Refills: 0 | Status: SHIPPED | OUTPATIENT
Start: 2017-12-05 | End: 2018-01-04

## 2017-12-05 RX ORDER — HYDROCODONE POLISTIREX AND CHLORPHENIRAMINE POLISTIREX 10; 8 MG/5ML; MG/5ML
5 SUSPENSION, EXTENDED RELEASE ORAL NIGHTLY PRN
Qty: 115 ML | Refills: 0 | Status: SHIPPED | OUTPATIENT
Start: 2017-12-05 | End: 2017-12-19

## 2017-12-05 RX ORDER — FLUCONAZOLE 100 MG/1
100 TABLET ORAL DAILY
Qty: 7 TABLET | Refills: 0 | Status: SHIPPED | OUTPATIENT
Start: 2017-12-05 | End: 2018-01-04

## 2017-12-05 RX ORDER — DEXAMETHASONE SODIUM PHOSPHATE 4 MG/ML
8 INJECTION, SOLUTION INTRA-ARTICULAR; INTRALESIONAL; INTRAMUSCULAR; INTRAVENOUS; SOFT TISSUE ONCE
Status: COMPLETED | OUTPATIENT
Start: 2017-12-05 | End: 2017-12-05

## 2017-12-05 RX ORDER — PANTOPRAZOLE SODIUM 40 MG/1
40 TABLET, DELAYED RELEASE ORAL 2 TIMES DAILY
Qty: 180 TABLET | Refills: 1 | Status: SHIPPED | OUTPATIENT
Start: 2017-12-05 | End: 2018-02-12 | Stop reason: SDUPTHER

## 2017-12-05 RX ORDER — PREDNISONE 20 MG/1
20 TABLET ORAL DAILY
Qty: 7 TABLET | Refills: 0 | Status: SHIPPED | OUTPATIENT
Start: 2017-12-05 | End: 2017-12-12

## 2017-12-05 RX ORDER — CLARITHROMYCIN 500 MG/1
500 TABLET, FILM COATED ORAL 2 TIMES DAILY
Qty: 28 TABLET | Refills: 0 | Status: SHIPPED | OUTPATIENT
Start: 2017-12-05 | End: 2018-01-04

## 2017-12-05 RX ORDER — TIZANIDINE 4 MG/1
4 TABLET ORAL NIGHTLY PRN
Qty: 30 TABLET | Refills: 4 | Status: SHIPPED | OUTPATIENT
Start: 2017-12-05 | End: 2017-12-15

## 2017-12-05 RX ADMIN — DEXAMETHASONE SODIUM PHOSPHATE 8 MG: 4 INJECTION, SOLUTION INTRA-ARTICULAR; INTRALESIONAL; INTRAMUSCULAR; INTRAVENOUS; SOFT TISSUE at 09:12

## 2017-12-05 NOTE — PATIENT INSTRUCTIONS
Chronic Sinusitis  Chronic sinusitis is a long-term swelling or infection of the sinuses. If sinusitis lasts more than 12 weeks, it is called chronic.    What is chronic sinusitis?  Sinuses are air-filled spaces in the skull behind the face. They are kept moist and clean by a lining of mucosa. Things such as pollen, smoke, and chemical fumes can irritate the mucosa. Constant exposure to irritants can cause ongoing inflammation of this lining. It can also damage tiny hairlike cilia that cover the mucosa. Cilia help carry mucus toward the opening of the sinus. Damage to cilia keeps mucus from draining from the sinuses.  Causes of chronic sinusitis  Problems that irritate the mucosa or block drainage can lead to chronic sinusitis. These may include:  · Infections  · Chronic allergies  · Nasal polyps, deviated septum, or other blockages  · Constant exposure to irritants, such as cigarette smoke or fumes  · Asthma  · Acute sinusitis that keeps coming back  Common symptoms of chronic sinusitis  Symptoms may include:  · Facial pain and pressure  · Headache and sinus pain  · Nasal congestion  · Thick, colored drainage from the nose  · Thick mucus draining down the back of the throat (postnasal drainage)  · Loss of smell  · Fever  · Cough  · Sore throat  Diagnosing chronic sinusitis  Your doctor will ask about your symptoms and health history. The doctor will look at your nose and face. You may have imaging studies such as an X-ray or CT scan of the sinuses. Your doctor may also take a sample of the drainage to check for bacteria. You may also have an endoscopy. During this test, the doctor puts a lighted tube through your nose up into your sinuses to look at the sinuses.  Treating chronic sinusitis  Treatment involves reducing irritation and inflammation. Your plan may include:  · Taking medicines. Your doctor may prescribe medicines to reduce the amount of mucus and swelling. These help unblock the sinuses and allow them  to drain. You will need to take antibiotics if you have a bacterial infection.  · Flushing your sinuses. Your doctor may suggest sinus irrigation. This is flushing your sinuses with saltwater or saline solution. This helps to clear out mucus.  · Controlling allergies. If you have allergies, you should have a plan to help control them. This plan may include medicines or allergy shots.  · Having surgery. In some cases, you may need surgery on the nose, sinuses, or both. This can improve sinus drainage or remove nasal blockages.  Date Last Reviewed: 10/1/2016  © 4276-6004 [employer]. [address] [place] [address], [place] [address]. All rights reserved. This information is not intended as a substitute for professional medical care. Always follow your healthcare professional's instructions.

## 2017-12-05 NOTE — PROGRESS NOTES
Subjective:       Patient ID: Gene Hartman is a 83 y.o. male.    Chief Complaint: URI (chest congestion, post nasal drip, sinus pressure); Cough; and Nasal Congestion    Patient is a very pleasant 83-year-old gentleman with a past medical history significant for coronary artery disease, retention, dyslipidemia, remote history of prostate cancer,, chronic hypothyroidism, night terrors as well as a history of GERD for which he recently had an upper endoscopy and dilatation of a mild Zenker's diverticulum which has improved his dysphagia. He has been on Protonix 40 mg once a day and the gastroenterologist told him he can take it twice a day if needed. On reviewing the medical records he did have chronic gastritis as well but no esophageal inflammation. Bile acid did show positive for Helicobacter pylori.    Today he comes in complaining of sinus congestion and postnasal drip is been going on for over 6 weeks. He did see a nurse practitioner here at the clinic a month ago and was given an antibiotic and some Flonase. Patient is also on Astelin as needed as well as Singulair in the evenings. When I met the patient a few months prior he was here for regular checkup and refills on his medications as well as complaining of right-sided neck pain with inability to turn his neck to the right without significant discomfort. He had not had a cervical spine x-ray in a while and it was not radiating down to his arms. We did give him a mild muscle relaxer to take in the evenings and he has been doing this as needed but he and his wife are unsure whether is helping. In fact the headaches have gotten worse with radiation up the back of his head almost to his forehead. He describes them as tension-like but they can exacerbate a little bit into a pulsating headache but this is infrequent.  As far as the sinus congestion again is very irritating postnasal drip or he produces scant amount of mucus which she can bring up that clear to  white. He feels sinus pressure in his forehead and the back of the head is worse now than when he saw the nurse practitioner a month prior. The wife says the coughing is pretty harsh. He has not had any progression into his lungs and denies any shortness of breath or wheezing. He has been known to have allergic rhinitis his whole life but at present is not sneezing or having the need to itch his eyes, nose or ears.      Review of Systems   Constitutional: Negative for activity change, diaphoresis, fatigue and fever.   HENT: Positive for congestion, ear pain, hearing loss, postnasal drip, sinus pain and sore throat. Negative for facial swelling, sinus pressure and trouble swallowing.    Eyes: Negative for pain.   Respiratory: Positive for cough. Negative for chest tightness, shortness of breath and wheezing.    Cardiovascular: Negative for chest pain, palpitations and leg swelling.   Gastrointestinal: Negative for abdominal distention, abdominal pain, blood in stool, constipation and diarrhea.   Endocrine: Negative for cold intolerance and heat intolerance.   Genitourinary: Negative for decreased urine volume, difficulty urinating, dysuria, flank pain, frequency and hematuria.   Musculoskeletal: Negative for arthralgias, back pain, joint swelling, myalgias and neck pain.   Skin: Negative for pallor, rash and wound.   Neurological: Negative for tremors, syncope, weakness, light-headedness, numbness and headaches.   Hematological: Negative for adenopathy.   Psychiatric/Behavioral: Negative for confusion, decreased concentration, hallucinations, self-injury, sleep disturbance and suicidal ideas. The patient is not nervous/anxious.        Past Medical History:   Diagnosis Date    Anxiety     Arthritis     CAD (coronary artery disease) age 68    Carotid artery occlusion     Cerebrovascular small vessel disease     Colon polyps age 78    GERD (gastroesophageal reflux disease)     H. pylori infection     HTN  (hypertension), benign age 65    Hyperlipidemia LDL goal <70 age 65    Hypothyroidism     Multiple fractures of ribs of right side 11/27/2012    Myocardial infarction     Prostate cancer age 67    Syncopal episodes 3/2013       Past Surgical History:   Procedure Laterality Date    CARDIAC PACEMAKER PLACEMENT  10/2015    CARDIAC SURGERY      CATARACT EXTRACTION W/  INTRAOCULAR LENS IMPLANT Bilateral     COLONOSCOPY  ~2013    Dr. Edge    COLONOSCOPY N/A 6/8/2016    Procedure: COLONOSCOPY;  Surgeon: Shamar Barahona MD;  Location[facility];  Service: Endoscopy;  Laterality: N/A; REPEAT IN 1 YEAR    CORONARY ANGIOPLASTY WITH STENT PLACEMENT  2003    x 2 RCA    PROSTATECTOMY  2002    UPPER GASTROINTESTINAL ENDOSCOPY  11/15/2016    Dr. Barahona       Family History   Problem Relation Age of Onset    Migraines Mother     Heart failure Father     Heart disease Father 56     MI    Heart failure Brother     Heart disease Brother     Diabetes Son     Hypertension Son     Heart disease Brother     Mental illness Brother     No Known Problems Son     Colon cancer Neg Hx     Colon polyps Neg Hx     Crohn's disease Neg Hx     Ulcerative colitis Neg Hx     Stomach cancer Neg Hx     Esophageal cancer Neg Hx        Social History     Social History    Marital status:      Spouse name: N/A    Number of children: 2    Years of education: N/A     Social History Main Topics    Smoking status: Never Smoker    Smokeless tobacco: Never Used    Alcohol use No    Drug use: No    Sexual activity: Not Currently     Other Topics Concern    None     Social History Narrative    The patient does exercise regularly (walking).    Rates diet as good.    He is satisfied with weight.                   Current Outpatient Prescriptions   Medication Sig Dispense Refill    acetaminophen (TYLENOL EX STR RAPID RELEASE) 500 MG tablet Take 500 mg by mouth every 6 (six) hours as needed for Pain.      aspirin  (ECOTRIN) 81 MG EC tablet Take 81 mg by mouth Daily.      atorvastatin (LIPITOR) 40 MG tablet Take 1 tablet (40 mg total) by mouth once daily. 90 tablet 3    azelastine (ASTELIN) 137 mcg (0.1 %) nasal spray 1 spray (137 mcg total) by Nasal route 2 (two) times daily. 30 mL 0    clopidogrel (PLAVIX) 75 mg tablet Take 1 tablet (75 mg total) by mouth once daily. 90 tablet 3    cycloSPORINE (RESTASIS) 0.05 % ophthalmic emulsion Apply 1 drop to eye 2 (two) times daily.      fluticasone (FLONASE) 50 mcg/actuation nasal spray USE 1 SPRAY IN EACH NOSTRIL TWICE DAILY 48 g 2    guaiFENesin (MUCINEX) 1,200 mg Ta12 Take 1 tablet by mouth 2 (two) times daily.      levothyroxine (SYNTHROID) 25 MCG tablet Take 1 tablet (25 mcg total) by mouth before breakfast. 90 tablet 3    lorazepam (ATIVAN) 0.5 MG tablet Take 1 tablet (0.5 mg total) by mouth every 12 (twelve) hours as needed for Anxiety. 180 tablet 1    losartan (COZAAR) 50 MG tablet Take 1 tablet (50 mg total) by mouth 2 (two) times daily. 180 tablet 3    metoprolol succinate (TOPROL-XL) 25 MG 24 hr tablet Take 1 tablet (25 mg total) by mouth once daily. 90 tablet 3    montelukast (SINGULAIR) 10 mg tablet TAKE 1 TABLET EVERY EVENING 90 tablet 0    pantoprazole (PROTONIX) 40 MG tablet Take 1 tablet (40 mg total) by mouth 2 (two) times daily. 180 tablet 1    tamsulosin (FLOMAX) 0.4 mg Cp24       amoxicillin (AMOXIL) 875 MG tablet Take 1 tablet (875 mg total) by mouth 2 (two) times daily. 28 tablet 0    cetirizine (ZYRTEC) 10 MG tablet Take 1 tablet (10 mg total) by mouth once daily. 90 tablet 3    clarithromycin (BIAXIN) 500 MG tablet Take 1 tablet (500 mg total) by mouth 2 (two) times daily. 28 tablet 0    fluconazole (DIFLUCAN) 100 MG tablet Take 1 tablet (100 mg total) by mouth once daily. 7 tablet 0    hydrocodone-chlorpheniramine (TUSSIONEX) 10-8 mg/5 mL suspension Take 5 mLs by mouth nightly as needed for Cough. 115 mL 0    oxybutynin (DITROPAN-XL) 5 MG  TR24 Take 1 tablet (5 mg total) by mouth once daily. 30 tablet 11    predniSONE (DELTASONE) 20 MG tablet Take 1 tablet (20 mg total) by mouth once daily. 7 tablet 0    tiZANidine (ZANAFLEX) 4 MG tablet Take 1 tablet (4 mg total) by mouth nightly as needed. 30 tablet 4     No current facility-administered medications for this visit.        Review of patient's allergies indicates:   Allergen Reactions    Iodinated contrast- oral and iv dye Rash    Pontocaine [tetracaine hcl] Anaphylaxis     hypotension       Objective:      Physical Exam   Constitutional: He is oriented to person, place, and time. He appears well-developed and well-nourished. No distress.   HENT:   Head: Normocephalic and atraumatic.   Right Ear: External ear normal.   Left Ear: External ear normal.   Nose: Nose normal.   Mouth/Throat: Oropharynx is clear and moist.   Positive postnasal drip visualized - grayish  Submandibular gland engorged  TM's mildly retracted   Eyes: Conjunctivae and EOM are normal. Right eye exhibits no discharge. Left eye exhibits no discharge. No scleral icterus.   Neck: Normal range of motion. Neck supple. No JVD present. No tracheal deviation present. No thyromegaly present.   Cardiovascular: Normal rate, regular rhythm, normal heart sounds and intact distal pulses.  Exam reveals no gallop.    No murmur heard.  Pulmonary/Chest: Effort normal and breath sounds normal. No respiratory distress. He has no wheezes. He has no rales.   Abdominal: Soft. Bowel sounds are normal. He exhibits no distension and no mass. There is no tenderness.   Musculoskeletal: Normal range of motion. He exhibits no edema or tenderness.   Lymphadenopathy:     He has no cervical adenopathy.   Neurological: He is alert and oriented to person, place, and time.   Skin: Skin is warm and dry. No rash noted. He is not diaphoretic. No erythema.   Psychiatric: He has a normal mood and affect. His behavior is normal. Judgment and thought content normal.        Lab Results   Component Value Date    WBC 5.5 08/25/2017    HGB 13.9 (L) 08/25/2017    HCT 40.7 08/25/2017     08/25/2017    CHOL 147 02/04/2016    TRIG 107 02/04/2016    HDL 43 02/04/2016    ALT 21 01/11/2017    AST 18 08/25/2017     08/25/2017    K 4.6 08/25/2017     08/25/2017    CREATININE 1.24 08/25/2017    BUN 23 (H) 08/25/2017    CO2 24.7 08/25/2017    TSH 3.89 08/25/2017    PSA <0.01 08/03/2015    INR 0.9 07/13/2015    HGBA1C 5.9 01/15/2013     Assessment:       1. Chronic frontal sinusitis    2. Neck pain, bilateral    3. Helicobacter pylori gastritis (chronic gastritis)    4. Gastroesophageal reflux disease, esophagitis presence not specified    5. Zenker's diverticulum    6. Chronic nonseasonal allergic rhinitis due to other allergen    7. Prostate cancer        Plan:       Chronic frontal sinusitis  -     dexamethasone injection 8 mg; Inject 2 mLs (8 mg total) into the muscle once.  -     predniSONE (DELTASONE) 20 MG tablet; Take 1 tablet (20 mg total) by mouth once daily.  Dispense: 7 tablet; Refill: 0  -     fluconazole (DIFLUCAN) 100 MG tablet; Take 1 tablet (100 mg total) by mouth once daily.  Dispense: 7 tablet; Refill: 0  -     CT Sinuses without Contrast; Future; Expected date: 12/05/2017  -     hydrocodone-chlorpheniramine (TUSSIONEX) 10-8 mg/5 mL suspension; Take 5 mLs by mouth nightly as needed for Cough.  Dispense: 115 mL; Refill: 0    Neck pain, bilateral  -     X-Ray Cervical Spine AP And Lateral; Future; Expected date: 12/05/2017  -     tiZANidine (ZANAFLEX) 4 MG tablet; Take 1 tablet (4 mg total) by mouth nightly as needed.  Dispense: 30 tablet; Refill: 4    Helicobacter pylori gastritis (chronic gastritis)  -     amoxicillin (AMOXIL) 875 MG tablet; Take 1 tablet (875 mg total) by mouth 2 (two) times daily.  Dispense: 28 tablet; Refill: 0  -     clarithromycin (BIAXIN) 500 MG tablet; Take 1 tablet (500 mg total) by mouth 2 (two) times daily.  Dispense: 28 tablet;  Refill: 0  -     pantoprazole (PROTONIX) 40 MG tablet; Take 1 tablet (40 mg total) by mouth 2 (two) times daily.  Dispense: 180 tablet; Refill: 1    Gastroesophageal reflux disease, esophagitis presence not specified  With   Zenker's diverticulum -as per above    Chronic nonseasonal allergic rhinitis due to other allergen  -     cetirizine (ZYRTEC) 10 MG tablet; Take 1 tablet (10 mg total) by mouth once daily.  Dispense: 90 tablet; Refill: 3    Prostate cancer-noted    CAD - noted   Time spent with the patient was 40 min and 50 percent of that was in face to face contact

## 2017-12-13 ENCOUNTER — PATIENT MESSAGE (OUTPATIENT)
Dept: GASTROENTEROLOGY | Facility: CLINIC | Age: 82
End: 2017-12-13

## 2017-12-14 RX ORDER — ATORVASTATIN CALCIUM 40 MG/1
TABLET, FILM COATED ORAL
Qty: 90 TABLET | Refills: 3 | Status: SHIPPED | OUTPATIENT
Start: 2017-12-14 | End: 2017-12-28 | Stop reason: SDUPTHER

## 2017-12-18 ENCOUNTER — TELEPHONE (OUTPATIENT)
Dept: FAMILY MEDICINE | Facility: CLINIC | Age: 82
End: 2017-12-18

## 2017-12-18 NOTE — TELEPHONE ENCOUNTER
----- Message from Gabbie Mayorga MD sent at 12/15/2017  9:43 AM CST -----  Neck xray shows significant degeneration and osteophytes as expected but no malalignment of the spine  Some atherosclerosis in the carotids as well - will discuss in full on follow up

## 2017-12-18 NOTE — TELEPHONE ENCOUNTER
----- Message from Gabbie Mayorga MD sent at 12/15/2017  9:46 AM CST -----  Sinus ct shows multiple small retention cysts in throughout the sinuses with a deviated septum and bone spur  Treatment for the retention cysts is religous use of the flonase - I would use one spray each nostril twice daily

## 2017-12-28 RX ORDER — ATORVASTATIN CALCIUM 40 MG/1
TABLET, FILM COATED ORAL
Qty: 90 TABLET | Refills: 3 | Status: SHIPPED | OUTPATIENT
Start: 2017-12-28 | End: 2018-10-24 | Stop reason: SDUPTHER

## 2018-01-04 ENCOUNTER — OFFICE VISIT (OUTPATIENT)
Dept: FAMILY MEDICINE | Facility: CLINIC | Age: 83
End: 2018-01-04
Payer: MEDICARE

## 2018-01-04 VITALS
HEART RATE: 66 BPM | RESPIRATION RATE: 16 BRPM | WEIGHT: 154 LBS | SYSTOLIC BLOOD PRESSURE: 138 MMHG | HEIGHT: 61 IN | DIASTOLIC BLOOD PRESSURE: 80 MMHG | OXYGEN SATURATION: 98 % | BODY MASS INDEX: 29.07 KG/M2

## 2018-01-04 DIAGNOSIS — I25.10 CORONARY ARTERY DISEASE, ANGINA PRESENCE UNSPECIFIED, UNSPECIFIED VESSEL OR LESION TYPE, UNSPECIFIED WHETHER NATIVE OR TRANSPLANTED HEART: ICD-10-CM

## 2018-01-04 DIAGNOSIS — K29.50 OTHER CHRONIC GASTRITIS WITHOUT HEMORRHAGE: ICD-10-CM

## 2018-01-04 DIAGNOSIS — M48.9 CERVICAL SPINE DISEASE: ICD-10-CM

## 2018-01-04 DIAGNOSIS — N40.1 BPH ASSOCIATED WITH NOCTURIA: ICD-10-CM

## 2018-01-04 DIAGNOSIS — E55.9 VITAMIN D DEFICIENCY: ICD-10-CM

## 2018-01-04 DIAGNOSIS — E03.9 ACQUIRED HYPOTHYROIDISM: ICD-10-CM

## 2018-01-04 DIAGNOSIS — I10 HTN (HYPERTENSION), BENIGN: ICD-10-CM

## 2018-01-04 DIAGNOSIS — E78.5 HYPERLIPIDEMIA LDL GOAL <70: ICD-10-CM

## 2018-01-04 DIAGNOSIS — J32.0 CHRONIC MAXILLARY SINUSITIS: Primary | ICD-10-CM

## 2018-01-04 DIAGNOSIS — K63.5 POLYP OF COLON, UNSPECIFIED PART OF COLON, UNSPECIFIED TYPE: ICD-10-CM

## 2018-01-04 DIAGNOSIS — Z85.46 HISTORY OF PROSTATE CANCER: ICD-10-CM

## 2018-01-04 DIAGNOSIS — R35.1 BPH ASSOCIATED WITH NOCTURIA: ICD-10-CM

## 2018-01-04 PROCEDURE — 99215 OFFICE O/P EST HI 40 MIN: CPT | Mod: ,,, | Performed by: INTERNAL MEDICINE

## 2018-01-04 RX ORDER — CLONIDINE HYDROCHLORIDE 0.1 MG/1
0.1 TABLET ORAL 2 TIMES DAILY PRN
Qty: 60 TABLET | Refills: 3 | Status: ON HOLD | OUTPATIENT
Start: 2018-01-04 | End: 2022-10-07 | Stop reason: HOSPADM

## 2018-01-04 NOTE — PATIENT INSTRUCTIONS
What Is a Hiatal Hernia?    Hiatal hernia is when the area where the stomach and esophagus meet bulges up through the diaphragm into the chest cavity. In some cases, part of the stomach may bulge above the diaphragm. Stomach acid may move up into the esophagus and cause symptoms. The symptoms are often blamed on gastroesophageal reflux disease (GERD). You may only know about the hernia when it shows up on an X-ray taken for other reasons.   What you may feel  The hiatus is a normal hole in the diaphragm. The esophagus passes through this hole and leads to the stomach. In some cases, part of the stomach may bulge above the diaphragm. This bulge is called a hernia. Stomach acid may move up into the esophagus and cause symptoms.  When you eat, the muscle at the hiatus relaxes to allow food to pass into the stomach. It tightens again to keep food and digestive acids in the stomach.  Many people with hiatal hernias have mild symptoms. You may notice the following GERD symptoms:  · Heartburn or other chest discomfort  · A feeling of chest fullness after a meal  · Frequent burping  · Acid taste in the mouth  · Trouble swallowing  Treating symptoms  If you have been diagnosed with hiatal hernia, these suggestions may help improve symptoms:  · Lose excess weight. Extra weight puts pressure on the stomach and esophagus.  · Dont lie down after eating. Sit up for at least an hour after eating. Lying down after eating can increase symptoms.  · Avoid certain foods and drinks. These include fatty foods, chocolate, coffee, mint, and other foods that cause symptoms for you.  · Dont smoke or drink alcohol. These can worsen symptoms.  · Look at your medicines. Discuss your medicines with your healthcare provider. Many medicines can cause symptoms.  · Consider an antacid medicine. Ask your healthcare provider about over-the-counter and prescription medicines that may help.  · Ask about surgery, if needed. Surgery is a treatment  choice for some people. Your healthcare provider can determine if surgery is an option for you.    Date Last Reviewed: 10/1/2016  © 0025-9412 [employer] [address] [place] [address], [place] [address]. All rights reserved. This information is not intended as a substitute for professional medical care. Always follow your healthcare professional's instructions.

## 2018-01-05 NOTE — PROGRESS NOTES
Subjective:       Patient ID: Gene Hartman is a 83 y.o. male.    Chief Complaint: Follow-up (4 month f/u)    Patient is an 83-year-old gentleman who is here for follow-up for H. pylori as well as an emergency room visit 2 days prior. He went to the emergency room because his blood pressure was elevated. At home it was 200/95 and in the emergency room when he got there it was under an 185/90. At that time he was complaining of a bitemporal headache and his face seems be flushed. Of importance is he started Biaxin, amoxicillin as well as twice a day proton pump inhibitor on November 26. Year prior he had a stricture dilated with esophagitis and culture for H. pylori positive. He also had pill dysphagia at the time and a hiatal hernia. A few days after he started the 2 antibiotics he began to have a lot of gas, distention and more importantly continual belching. Although he denied lots of heartburn he admitted to abdominal pain and epigastric area. No change in his stools and he has bowel movements 2-3 times a day. He has had a follow-up EGD in mid November that did not show the H. pylori but did show chronic gastritis. Patient was given a Catapres the emergency room with great relief blood pressure headache. His wife is asking for prescription for this.  Her visit we also reviewed his cervical spine x-rays as well as CT of the sinuses. He is still using the topical Flonase but occasionally does have the right side of his nostril due to large deviated septum. His neck hasn't been bothering him too much lately and he has not had the need to take muscle relaxers. He still complains of discomfort when moving his head to the right of the left. He is also complaining of easy bruising on both dorsums of the hand which of course he takes both Plavix and aspirin for cardiac reasons.      Review of Systems   Constitutional: Negative for activity change, diaphoresis, fatigue and fever.   HENT: Negative for congestion, ear  pain, postnasal drip, sinus pressure, sore throat and trouble swallowing.    Eyes: Negative for pain.   Respiratory: Negative for cough, chest tightness, shortness of breath and wheezing.    Cardiovascular: Negative for chest pain, palpitations and leg swelling.   Gastrointestinal: Positive for abdominal distention, abdominal pain and nausea. Negative for blood in stool, constipation and diarrhea.        Frequent belching and gas   Endocrine: Negative for cold intolerance and heat intolerance.   Genitourinary: Negative for decreased urine volume, difficulty urinating, dysuria, flank pain, frequency and hematuria.   Musculoskeletal: Positive for neck pain. Negative for arthralgias, back pain, joint swelling and myalgias.   Skin: Negative for pallor, rash and wound.        Per history of present illness.   Neurological: Negative for tremors, syncope, weakness, light-headedness, numbness and headaches.   Hematological: Negative for adenopathy.   Psychiatric/Behavioral: Negative for confusion, decreased concentration, hallucinations, self-injury, sleep disturbance and suicidal ideas. The patient is not nervous/anxious.        Past Medical History:   Diagnosis Date    Anxiety     Arthritis     CAD (coronary artery disease) age 68    Carotid artery occlusion     Cerebrovascular small vessel disease     Colon polyps age 78    GERD (gastroesophageal reflux disease)     H. pylori infection     HTN (hypertension), benign age 65    Hyperlipidemia LDL goal <70 age 65    Hypothyroidism     Multiple fractures of ribs of right side 11/27/2012    Myocardial infarction     Prostate cancer age 67    Syncopal episodes 3/2013       Past Surgical History:   Procedure Laterality Date    CARDIAC PACEMAKER PLACEMENT  10/2015    CARDIAC SURGERY      CATARACT EXTRACTION W/  INTRAOCULAR LENS IMPLANT Bilateral     COLONOSCOPY  ~2013    Dr. Edge    COLONOSCOPY N/A 6/8/2016    Procedure: COLONOSCOPY;  Surgeon: Shamar REED  MD Jun;  Location: [facility];  Service: Endoscopy;  Laterality: N/A; REPEAT IN 1 YEAR    CORONARY ANGIOPLASTY WITH STENT PLACEMENT  2003    x 2 RCA    PROSTATECTOMY  2002    UPPER GASTROINTESTINAL ENDOSCOPY  11/15/2016    Dr. Dowell       Family History   Problem Relation Age of Onset    Migraines Mother     Heart failure Father     Heart disease Father 56     MI    Heart failure Brother     Heart disease Brother     Diabetes Son     Hypertension Son     Heart disease Brother     Mental illness Brother     No Known Problems Son     Colon cancer Neg Hx     Colon polyps Neg Hx     Crohn's disease Neg Hx     Ulcerative colitis Neg Hx     Stomach cancer Neg Hx     Esophageal cancer Neg Hx        Social History     Social History    Marital status:      Spouse name: N/A    Number of children: 2    Years of education: N/A     Social History Main Topics    Smoking status: Never Smoker    Smokeless tobacco: Never Used    Alcohol use No    Drug use: No    Sexual activity: Not Currently     Other Topics Concern    None     Social History Narrative    The patient does exercise regularly (walking).    Rates diet as good.    He is satisfied with weight.                   Current Outpatient Prescriptions   Medication Sig Dispense Refill    acetaminophen (TYLENOL EX STR RAPID RELEASE) 500 MG tablet Take 500 mg by mouth every 6 (six) hours as needed for Pain.      aspirin (ECOTRIN) 81 MG EC tablet Take 81 mg by mouth Daily.      atorvastatin (LIPITOR) 40 MG tablet TAKE 1 TABLET ONE TIME DAILY 90 tablet 3    azelastine (ASTELIN) 137 mcg (0.1 %) nasal spray 1 spray (137 mcg total) by Nasal route 2 (two) times daily. 30 mL 0    cetirizine (ZYRTEC) 10 MG tablet Take 1 tablet (10 mg total) by mouth once daily. 90 tablet 3    clopidogrel (PLAVIX) 75 mg tablet Take 1 tablet (75 mg total) by mouth once daily. 90 tablet 3    cycloSPORINE (RESTASIS) 0.05 % ophthalmic emulsion Apply 1 drop to  eye 2 (two) times daily.      fluticasone (FLONASE) 50 mcg/actuation nasal spray USE 1 SPRAY IN EACH NOSTRIL TWICE DAILY 48 g 2    levothyroxine (SYNTHROID) 25 MCG tablet Take 1 tablet (25 mcg total) by mouth before breakfast. 90 tablet 3    lorazepam (ATIVAN) 0.5 MG tablet Take 1 tablet (0.5 mg total) by mouth every 12 (twelve) hours as needed for Anxiety. 180 tablet 1    losartan (COZAAR) 50 MG tablet Take 1 tablet (50 mg total) by mouth 2 (two) times daily. 180 tablet 3    metoprolol succinate (TOPROL-XL) 25 MG 24 hr tablet Take 1 tablet (25 mg total) by mouth once daily. 90 tablet 3    montelukast (SINGULAIR) 10 mg tablet TAKE 1 TABLET EVERY EVENING 90 tablet 0    oxybutynin (DITROPAN-XL) 5 MG TR24 Take 1 tablet (5 mg total) by mouth once daily. 30 tablet 11    pantoprazole (PROTONIX) 40 MG tablet Take 1 tablet (40 mg total) by mouth 2 (two) times daily. 180 tablet 1    tamsulosin (FLOMAX) 0.4 mg Cp24       cloNIDine (CATAPRES) 0.1 MG tablet Take 1 tablet (0.1 mg total) by mouth 2 (two) times daily as needed (SBP greater than 160). 60 tablet 3     No current facility-administered medications for this visit.        Review of patient's allergies indicates:   Allergen Reactions    Iodinated contrast- oral and iv dye Rash    Pontocaine [tetracaine hcl] Anaphylaxis     hypotension       Objective:      Physical Exam   Constitutional: He is oriented to person, place, and time. He appears well-developed and well-nourished. No distress.   HENT:   Head: Normocephalic and atraumatic.   Right Ear: External ear normal.   Left Ear: External ear normal.   Nose: Nose normal.   Mouth/Throat: Oropharynx is clear and moist.   Eyes: Conjunctivae and EOM are normal. Right eye exhibits no discharge. Left eye exhibits no discharge. No scleral icterus.   Neck: Normal range of motion. Neck supple. No JVD present. No tracheal deviation present. No thyromegaly present.   Cardiovascular: Normal rate, regular rhythm, normal  heart sounds and intact distal pulses.  Exam reveals no gallop.    No murmur heard.  Pulmonary/Chest: Effort normal and breath sounds normal. No respiratory distress. He has no wheezes. He has no rales.   Abdominal: Soft. He exhibits distension. He exhibits no ascites. Bowel sounds are increased. There is tenderness in the epigastric area.   Musculoskeletal: Normal range of motion. He exhibits no edema or tenderness.   Lymphadenopathy:     He has no cervical adenopathy.   Neurological: He is alert and oriented to person, place, and time.   Skin: Skin is warm and dry. Ecchymosis noted. No rash noted. He is not diaphoretic. No erythema.        Psychiatric: He has a normal mood and affect. His behavior is normal. Judgment and thought content normal.       Lab Results   Component Value Date    WBC 5.5 08/25/2017    HGB 13.9 (L) 08/25/2017    HCT 40.7 08/25/2017     08/25/2017    CHOL 147 02/04/2016    TRIG 107 02/04/2016    HDL 43 02/04/2016    ALT 21 01/11/2017    AST 18 08/25/2017     08/25/2017    K 4.6 08/25/2017     08/25/2017    CREATININE 1.24 08/25/2017    BUN 23 (H) 08/25/2017    CO2 24.7 08/25/2017    TSH 3.89 08/25/2017    PSA <0.01 08/03/2015    INR 0.9 07/13/2015    HGBA1C 5.9 01/15/2013     Assessment:       1. Chronic maxillary sinusitis    2. Other chronic gastritis without hemorrhage    3. Polyp of colon, unspecified part of colon, unspecified type    4. Hyperlipidemia LDL goal <70    5. BPH associated with nocturia    6. History of prostate cancer; total prostatectomy 2004    7. Coronary artery disease, angina presence unspecified, unspecified vessel or lesion type, unspecified whether native or transplanted heart    8. Cervical spine disease    9. Acquired hypothyroidism    10. Vitamin D deficiency    11. HTN (hypertension), benign        Plan:       Chronic maxillary sinusitis-continue flonase and will add atrovent nasal spray To use at an alternative time of the day.    Other  chronic gastritis without hemorrhage-with history of H. pylori and the patient is having a lot of symptoms from double antibiotics most likely Biaxin and therefore he has been on them for 10 days we will stop at this time but continue twice-daily proton pump inhibitor because of the abdominal discomfort  -     Methylmalonic acid, serum; Future; Expected date: 01/04/2018  -     Homocysteine, serum; Future; Expected date: 01/04/2018  -     CBC auto differential; Future; Expected date: 01/04/2018    Polyp of colon, unspecified part of colon, unspecified type-is to reschedule his colonoscopy    Hyperlipidemia LDL goal <70  -     Lipid panel; Future; Expected date: 01/04/2018    BPH associated with nocturia    History of prostate cancer; total prostatectomy 2004    Coronary artery disease, angina presence unspecified, unspecified vessel or lesion type, unspecified whether native or transplanted heart  -     Comprehensive metabolic panel; Future; Expected date: 01/04/2018    Cervical spine disease-patient will continue to the muscle relaxer but deferred a opiate analgesic and would like to try some over-the-counter ointments.    Acquired hypothyroidism  -     TSH; Future; Expected date: 01/04/2018  -     T4, free; Future; Expected date: 01/04/2018    Vitamin D deficiency  -     Vitamin D; Future; Expected date: 01/05/2018    HTN (hypertension), benign-with acceleration over the week on double antibiotics.  -     Urinalysis Microscopic; Future; Expected date: 01/04/2018  -     cloNIDine (CATAPRES) 0.1 MG tablet; Take 1 tablet (0.1 mg total) by mouth 2 (two) times daily as needed (SBP greater than 160).  Dispense: 60 tablet; Refill: 3    Time spent with the patient was 40 min and 50 percent of that was in face to face contact

## 2018-01-10 LAB
ALBUMIN SERPL-MCNC: 4 G/DL (ref 3.1–4.7)
ALP SERPL-CCNC: 72 IU/L (ref 40–104)
ALT (SGPT): 21 IU/L (ref 3–33)
AST SERPL-CCNC: 23 IU/L (ref 10–40)
BASOPHILS NFR BLD: 0 K/UL (ref 0–0.2)
BASOPHILS NFR BLD: 0.6 %
BILIRUB SERPL-MCNC: 0.6 MG/DL (ref 0.3–1)
BUN SERPL-MCNC: 25 MG/DL (ref 8–20)
CALCIUM SERPL-MCNC: 9 MG/DL (ref 7.7–10.4)
CHLORIDE: 107 MMOL/L (ref 98–110)
CO2 SERPL-SCNC: 26.2 MMOL/L (ref 22.8–31.6)
CREATININE: 1.28 MG/DL (ref 0.6–1.4)
EOSINOPHIL NFR BLD: 0.1 K/UL (ref 0–0.7)
EOSINOPHIL NFR BLD: 1.1 %
ERYTHROCYTE [DISTWIDTH] IN BLOOD BY AUTOMATED COUNT: 13.7 % (ref 11.7–14.9)
GLUCOSE: 94 MG/DL (ref 70–99)
GRAN #: 2.5 K/UL (ref 1.4–6.5)
GRAN%: 47.4 %
HCT VFR BLD AUTO: 41 % (ref 39–55)
HGB BLD-MCNC: 14.1 G/DL (ref 14–16)
IMMATURE GRANS (ABS): 0 K/UL (ref 0–1)
IMMATURE GRANULOCYTES: 0.4 %
LYMPH #: 2.1 K/UL (ref 1.2–3.4)
LYMPH%: 39.2 %
MCH RBC QN AUTO: 32.5 PG (ref 25–35)
MCHC RBC AUTO-ENTMCNC: 34.4 G/DL (ref 31–36)
MCV RBC AUTO: 94.5 FL (ref 80–100)
MONO #: 0.6 K/UL (ref 0.1–0.6)
MONO%: 11.3 %
NUCLEATED RBCS: 0 %
PLATELET # BLD AUTO: 182 K/UL (ref 140–440)
PMV BLD AUTO: 11.6 FL (ref 8.8–12.7)
POTASSIUM SERPL-SCNC: 4.2 MMOL/L (ref 3.5–5)
PROT SERPL-MCNC: 7 G/DL (ref 6–8.2)
RBC # BLD AUTO: 4.34 M/UL (ref 4.3–5.9)
SODIUM: 140 MMOL/L (ref 134–144)
T4 FREE SP9 P CHAL SERPL-SCNC: 0.96 NG/DL (ref 0.45–1.27)
TSH SERPL DL<=0.005 MIU/L-ACNC: 4.74 ULU/ML (ref 0.3–5.6)
VITAMIN D, 1,25 (OH)2: 34.4 NG/ML (ref 30–100)
WBC # BLD AUTO: 5.3 K/UL (ref 5–10)

## 2018-01-12 LAB — HOMOCYSTEINE: 17.9 UMOL/L (ref 0–15)

## 2018-01-14 RX ORDER — FOLIC ACID 1 MG/1
1 TABLET ORAL DAILY
Qty: 30 TABLET | Refills: 4 | Status: SHIPPED | OUTPATIENT
Start: 2018-01-14 | End: 2018-05-08 | Stop reason: SDUPTHER

## 2018-01-18 ENCOUNTER — TELEPHONE (OUTPATIENT)
Dept: FAMILY MEDICINE | Facility: CLINIC | Age: 83
End: 2018-01-18

## 2018-01-18 NOTE — TELEPHONE ENCOUNTER
----- Message from Gabbie Mayorga MD sent at 1/14/2018  8:35 AM CST -----  Very folic acid deficient -will escribe prescription dose

## 2018-01-18 NOTE — TELEPHONE ENCOUNTER
----- Message from Gabbie Mayorga MD sent at 1/16/2018  8:50 PM CST -----  Very vitamin b12 deficient - make ov to discuss treatment

## 2018-01-18 NOTE — TELEPHONE ENCOUNTER
Attempted to notify pt and his wife of results, no answer, LVM with info and instructions to make ov appt to discuss tx for B12 and to  folic acid prescription at the [facility].

## 2018-01-19 ENCOUNTER — TELEPHONE (OUTPATIENT)
Dept: FAMILY MEDICINE | Facility: CLINIC | Age: 83
End: 2018-01-19

## 2018-01-26 ENCOUNTER — OFFICE VISIT (OUTPATIENT)
Dept: FAMILY MEDICINE | Facility: CLINIC | Age: 83
End: 2018-01-26
Payer: MEDICARE

## 2018-01-26 VITALS
DIASTOLIC BLOOD PRESSURE: 70 MMHG | BODY MASS INDEX: 28.85 KG/M2 | HEART RATE: 66 BPM | OXYGEN SATURATION: 97 % | SYSTOLIC BLOOD PRESSURE: 120 MMHG | WEIGHT: 152.81 LBS | HEIGHT: 61 IN | RESPIRATION RATE: 16 BRPM

## 2018-01-26 DIAGNOSIS — Z95.0 PACEMAKER: ICD-10-CM

## 2018-01-26 DIAGNOSIS — I77.9 CAROTID DISEASE, BILATERAL: ICD-10-CM

## 2018-01-26 DIAGNOSIS — K44.9 HIATAL HERNIA WITH GERD AND ESOPHAGITIS: ICD-10-CM

## 2018-01-26 DIAGNOSIS — R79.89 ELEVATED HOMOCYSTEINE: ICD-10-CM

## 2018-01-26 DIAGNOSIS — D51.0 PERNICIOUS ANEMIA: Primary | ICD-10-CM

## 2018-01-26 DIAGNOSIS — I10 ESSENTIAL HYPERTENSION: ICD-10-CM

## 2018-01-26 DIAGNOSIS — K21.00 HIATAL HERNIA WITH GERD AND ESOPHAGITIS: ICD-10-CM

## 2018-01-26 PROCEDURE — 99214 OFFICE O/P EST MOD 30 MIN: CPT | Mod: ,,, | Performed by: INTERNAL MEDICINE

## 2018-01-26 RX ORDER — ACETAMINOPHEN, DIPHENHYDRAMINE HCL, PHENYLEPHRINE HCL 325; 25; 5 MG/1; MG/1; MG/1
1 TABLET ORAL DAILY
Qty: 30 TABLET | Refills: 11 | Status: SHIPPED | OUTPATIENT
Start: 2018-01-26 | End: 2023-03-05 | Stop reason: SDUPTHER

## 2018-01-26 RX ORDER — SUCRALFATE 1 G/1
1 TABLET ORAL
Qty: 60 TABLET | Refills: 3 | Status: SHIPPED | OUTPATIENT
Start: 2018-01-26 | End: 2019-02-11

## 2018-01-26 RX ORDER — TIZANIDINE 4 MG/1
4 TABLET ORAL NIGHTLY
COMMUNITY
End: 2018-05-08 | Stop reason: SDUPTHER

## 2018-01-26 NOTE — PATIENT INSTRUCTIONS
Vitamin B-12  Does this test have other names?  VB12, serum cobalamin  What is this test?  This test measures the level of vitamin B-12 in your blood. You need this vitamin to make red blood cells and for your nervous system to function as it should.  You get vitamin B-12 from eating foods that come from animals, such as meat, eggs, and dairy products. Vitamin B-12 is also added to some cereals. You can also take this vitamin as a supplement in pill form.  Why do I need this test?  You may need this test if your healthcare provider suspects that your vitamin B-12 level is low. A low level of vitamin B-12 is called vitamin B-12 deficiency. You are more likely to have vitamin deficiency if you are an older adult, have a digestive disorder called malabsorption, have had gastrointestinal surgery, or eat a vegan diet. Women who are pregnant or breastfeeding and eat a vegetarian-type diet are at high risk for this deficiency.  You may also need this test if you've been diagnosed with or your healthcare provider suspects a disease called pernicious anemia. Pernicious anemia affects the lining of your stomach and makes it hard to absorb vitamin B-12.  These are common symptoms of vitamin B-12 deficiency:  · Fatigue  · Weakness  · Weight loss  · Tingling or numbness of the hands and feet  · Constipation  · Poor balance  · Confusion  · Depression  · Memory loss  · Soreness of the mouth or tongue  What other tests might I have along with this test?  Your healthcare provider may order other tests to help find out the cause of your vitamin B-12 deficiency. These tests may include:  · Complete blood count  · Peripheral blood smear, which involves looking at your blood cells under a microscope  · Folic acid level. This vitamin is also important for red blood cell production.  What do my test results mean?  Many things may affect your lab test results. These include the method each lab uses to do the test. Even if your test  results are different from the normal value, you may not have a problem. To learn what the results mean for you, talk with your healthcare provider.  Vitamin B-12 is measured in picograms per milliliter (pg/mL). Normal results are:  · 200 to 835 pg/mL for adults  · 160 to 1,300 pg/mL for newborns  If your results are low, you may have:  · Pernicious anemia  · Malabsorption from inflammatory bowel disease or other causes  · Poor absorption because of surgery  · Tapeworm infection  · Too little intake of animal protein  · Folic acid deficiency  · Iron deficiency  If your levels are high, you may have:  · Liver or kidney disease  · Diabetes  · Obesity  · White blood cell cancer  High levels may also mean that you have chronic obstructive pulmonary disease , congestive heart failure, or a thickening of the blood called polycythemia vera.  How is this test done?  The test requires a blood sample, which is drawn through a needle from a vein in your arm.  Does this test pose any risks?  Taking a blood sample with a needle carries risks that include bleeding, infection, bruising, or feeling dizzy. When the needle pricks your arm, you may feel a slight stinging sensation or pain. Afterward, the site may be slightly sore.  What might affect my test results?  Certain conditions may affect your test results. These include:  · Pregnancy  · Recent blood transfusions  · Smoking  Medicines in general may also affect your results. Specific medicines include supplements of vitamin A or C and birth control pills.  How do I get ready for this test?  You must fast before this test. You may be able to drink water, but you should not eat or drink anything else after midnight on the night before the test. If you are not having the test in the morning, ask your healthcare provider how long you need to fast before the test. You should not have a vitamin B-12 injection before the test. Also be sure your provider knows about all medicines,  herbs, vitamins, and supplements you are taking. This includes medicines that don't need a prescription and any illicit drugs you may use.  © 2609-6920 [employer]. [address] [place] [address], [place] [address]. All rights reserved. This information is not intended as a substitute for professional medical care. Always follow your healthcare professional's instructions.

## 2018-01-26 NOTE — PROGRESS NOTES
Subjective:       Patient ID: Gene Hartman is a 84 y.o. male.    Chief Complaint: Labs Only    Patient here for follow-up on lab work which showed minimal anemia plus an elevated homocystine and methylmalonic acid and we did start folic acid 1 g upon receiving the blood work. And I called them back in today to discuss intramuscular vitamin B12 injections versus the less optimal choice of sublingual. Patient does have some memory issues as per wife. She did get the over-the-counter medications recommended for his easy bruising.  Patient still had a lot of gas upper back abdominal belching, somewhat heartburn even after the increasing Protonix to 40 mg twice a day which was done for worsening heartburn and so it has made some improvement as well.      Review of Systems   Constitutional: Negative for activity change, diaphoresis, fatigue and fever.   HENT: Negative for congestion, ear pain, postnasal drip, sinus pressure, sore throat and trouble swallowing.    Eyes: Negative for pain.   Respiratory: Negative for cough, chest tightness, shortness of breath and wheezing.    Cardiovascular: Negative for chest pain, palpitations and leg swelling.   Gastrointestinal: Negative for abdominal distention, abdominal pain, blood in stool, constipation and diarrhea.        Per HPI   Endocrine: Negative for cold intolerance and heat intolerance.   Genitourinary: Negative for decreased urine volume, difficulty urinating, dysuria, flank pain, frequency and hematuria.   Musculoskeletal: Negative for arthralgias, back pain, joint swelling, myalgias and neck pain.   Skin: Negative for pallor, rash and wound.   Neurological: Negative for tremors, syncope, weakness, light-headedness, numbness and headaches.   Hematological: Negative for adenopathy.   Psychiatric/Behavioral: Negative for confusion, decreased concentration, hallucinations, self-injury, sleep disturbance and suicidal ideas. The patient is not nervous/anxious.         Short  term memory issues       Past Medical History:   Diagnosis Date    Anxiety     Arthritis     CAD (coronary artery disease) age 68    Carotid artery occlusion     Cerebrovascular small vessel disease     Colon polyps age 78    GERD (gastroesophageal reflux disease)     H. pylori infection     HTN (hypertension), benign age 65    Hyperlipidemia LDL goal <70 age 65    Hypothyroidism     Multiple fractures of ribs of right side 11/27/2012    Myocardial infarction     Pernicious anemia 1/26/2018    Prostate cancer age 67    Syncopal episodes 3/2013       Past Surgical History:   Procedure Laterality Date    CARDIAC PACEMAKER PLACEMENT  10/2015    CARDIAC SURGERY      CATARACT EXTRACTION W/  INTRAOCULAR LENS IMPLANT Bilateral     COLONOSCOPY  ~2013    Dr. Edge    COLONOSCOPY N/A 6/8/2016    Procedure: COLONOSCOPY;  Surgeon: Shamar Barahona MD;  Location[facility];  Service: Endoscopy;  Laterality: N/A; REPEAT IN 1 YEAR    CORONARY ANGIOPLASTY WITH STENT PLACEMENT  2003    x 2 RCA    PROSTATECTOMY  2002    UPPER GASTROINTESTINAL ENDOSCOPY  11/15/2016    Dr. Barahona       Family History   Problem Relation Age of Onset    Migraines Mother     Heart failure Father     Heart disease Father 56     MI    Heart failure Brother     Heart disease Brother     Diabetes Son     Hypertension Son     Heart disease Brother     Mental illness Brother     No Known Problems Son     Colon cancer Neg Hx     Colon polyps Neg Hx     Crohn's disease Neg Hx     Ulcerative colitis Neg Hx     Stomach cancer Neg Hx     Esophageal cancer Neg Hx        Social History     Social History    Marital status:      Spouse name: N/A    Number of children: 2    Years of education: N/A     Social History Main Topics    Smoking status: Never Smoker    Smokeless tobacco: Never Used    Alcohol use No    Drug use: No    Sexual activity: Not Currently     Other Topics Concern    None     Social History  Narrative    The patient does exercise regularly (walking).    Rates diet as good.    He is satisfied with weight.                   Current Outpatient Prescriptions   Medication Sig Dispense Refill    tiZANidine (ZANAFLEX) 4 MG tablet Take 4 mg by mouth every evening.      acetaminophen (TYLENOL EX STR RAPID RELEASE) 500 MG tablet Take 500 mg by mouth every 6 (six) hours as needed for Pain.      aspirin (ECOTRIN) 81 MG EC tablet Take 81 mg by mouth Daily.      atorvastatin (LIPITOR) 40 MG tablet TAKE 1 TABLET ONE TIME DAILY 90 tablet 3    azelastine (ASTELIN) 137 mcg (0.1 %) nasal spray 1 spray (137 mcg total) by Nasal route 2 (two) times daily. 30 mL 0    cetirizine (ZYRTEC) 10 MG tablet Take 1 tablet (10 mg total) by mouth once daily. 90 tablet 3    cloNIDine (CATAPRES) 0.1 MG tablet Take 1 tablet (0.1 mg total) by mouth 2 (two) times daily as needed (SBP greater than 160). 60 tablet 3    clopidogrel (PLAVIX) 75 mg tablet Take 1 tablet (75 mg total) by mouth once daily. 90 tablet 3    cyanocobalamin, vitamin B-12, (VITAMIN B-12) 5,000 mcg Subl Place 1 tablet under the tongue once daily. 30 tablet 11    cycloSPORINE (RESTASIS) 0.05 % ophthalmic emulsion Apply 1 drop to eye 2 (two) times daily.      fluticasone (FLONASE) 50 mcg/actuation nasal spray USE 1 SPRAY IN EACH NOSTRIL TWICE DAILY 48 g 2    folic acid (FOLVITE) 1 MG tablet Take 1 tablet (1 mg total) by mouth once daily. 30 tablet 4    levothyroxine (SYNTHROID) 25 MCG tablet Take 1 tablet (25 mcg total) by mouth before breakfast. 90 tablet 3    lorazepam (ATIVAN) 0.5 MG tablet Take 1 tablet (0.5 mg total) by mouth every 12 (twelve) hours as needed for Anxiety. 180 tablet 1    losartan (COZAAR) 50 MG tablet Take 1 tablet (50 mg total) by mouth 2 (two) times daily. 180 tablet 3    metoprolol succinate (TOPROL-XL) 25 MG 24 hr tablet Take 1 tablet (25 mg total) by mouth once daily. 90 tablet 3    montelukast (SINGULAIR) 10 mg tablet TAKE 1  TABLET EVERY EVENING 90 tablet 0    oxybutynin (DITROPAN-XL) 5 MG TR24 Take 1 tablet (5 mg total) by mouth once daily. 30 tablet 11    pantoprazole (PROTONIX) 40 MG tablet Take 1 tablet (40 mg total) by mouth 2 (two) times daily. 180 tablet 1    sucralfate (CARAFATE) 1 gram tablet Take 1 tablet (1 g total) by mouth 2 (two) times daily before meals. 60 tablet 3    tamsulosin (FLOMAX) 0.4 mg Cp24        No current facility-administered medications for this visit.        Review of patient's allergies indicates:   Allergen Reactions    Iodinated contrast- oral and iv dye Rash    Pontocaine [tetracaine hcl] Anaphylaxis     hypotension       Objective:      Physical Exam   Constitutional: He is oriented to person, place, and time. He appears well-developed and well-nourished. No distress.   HENT:   Head: Normocephalic and atraumatic.   Right Ear: External ear normal.   Left Ear: External ear normal.   Nose: Nose normal.   Mouth/Throat: Oropharynx is clear and moist.   Eyes: Conjunctivae and EOM are normal. Right eye exhibits no discharge. Left eye exhibits no discharge. No scleral icterus.   Neck: Normal range of motion. Neck supple. No JVD present. No tracheal deviation present. No thyromegaly present.   Cardiovascular: Normal rate, regular rhythm, normal heart sounds and intact distal pulses.  Exam reveals no gallop.    No murmur heard.  Pulmonary/Chest: Effort normal and breath sounds normal. No respiratory distress. He has no wheezes. He has no rales.   Abdominal: Soft. Bowel sounds are normal. He exhibits no distension and no mass. There is no tenderness.   Musculoskeletal: Normal range of motion. He exhibits no edema or tenderness.   Lymphadenopathy:     He has no cervical adenopathy.   Neurological: He is alert and oriented to person, place, and time.   Skin: Skin is warm and dry. No rash noted. He is not diaphoretic. No erythema.   Psychiatric: He has a normal mood and affect. His behavior is normal.  Judgment and thought content normal.       Lab Results   Component Value Date    WBC 5.3 01/10/2018    HGB 14.1 01/10/2018    HCT 41.0 01/10/2018     01/10/2018    CHOL 147 02/04/2016    TRIG 107 02/04/2016    HDL 43 02/04/2016    ALT 21 01/11/2017    AST 23 01/10/2018     01/10/2018    K 4.2 01/10/2018     01/10/2018    CREATININE 1.28 01/10/2018    BUN 25 (H) 01/10/2018    CO2 26.2 01/10/2018    TSH 4.74 01/10/2018    PSA <0.01 08/03/2015    INR 0.9 07/13/2015    HGBA1C 5.9 01/15/2013     Assessment:       1. Pernicious anemia    2. Elevated homocysteine    3. Carotid disease, bilateral    4. Essential hypertension    5. Pacemaker; originally placed two years     6. Gastroesophageal reflux disease without esophagitis        Plan:       Pernicious anemia  -     cyanocobalamin, vitamin B-12, (VITAMIN B-12) 5,000 mcg Subl; Place 1 tablet under the tongue once daily.  Dispense: 30 tablet; Refill: 11    Elevated homocysteine- on folic acid supplement daily    Hiatal hernia with GERD and esophagitis  -     sucralfate (CARAFATE) 1 gram tablet; Take 1 tablet (1 g total) by mouth 2 (two) times daily before meals.  Dispense: 60 tablet; Refill: 3    Essential hypertension-stable on arb

## 2018-02-12 DIAGNOSIS — K29.50 HELICOBACTER PYLORI GASTRITIS (CHRONIC GASTRITIS): ICD-10-CM

## 2018-02-12 DIAGNOSIS — B96.81 HELICOBACTER PYLORI GASTRITIS (CHRONIC GASTRITIS): ICD-10-CM

## 2018-02-14 RX ORDER — PANTOPRAZOLE SODIUM 40 MG/1
40 TABLET, DELAYED RELEASE ORAL 2 TIMES DAILY
Qty: 180 TABLET | Refills: 3 | Status: SHIPPED | OUTPATIENT
Start: 2018-02-14 | End: 2018-11-20 | Stop reason: SDUPTHER

## 2018-04-08 RX ORDER — MONTELUKAST SODIUM 10 MG/1
10 TABLET ORAL NIGHTLY
Qty: 90 TABLET | Refills: 3 | Status: SHIPPED | OUTPATIENT
Start: 2018-04-08 | End: 2019-06-16 | Stop reason: SDUPTHER

## 2018-05-08 ENCOUNTER — OFFICE VISIT (OUTPATIENT)
Dept: FAMILY MEDICINE | Facility: CLINIC | Age: 83
End: 2018-05-08
Payer: MEDICARE

## 2018-05-08 VITALS
BODY MASS INDEX: 28.1 KG/M2 | HEART RATE: 72 BPM | SYSTOLIC BLOOD PRESSURE: 135 MMHG | RESPIRATION RATE: 16 BRPM | DIASTOLIC BLOOD PRESSURE: 60 MMHG | HEIGHT: 61 IN | OXYGEN SATURATION: 99 % | WEIGHT: 148.81 LBS

## 2018-05-08 DIAGNOSIS — N39.41 URGE INCONTINENCE: ICD-10-CM

## 2018-05-08 DIAGNOSIS — M48.9 CERVICAL SPINE DISEASE: ICD-10-CM

## 2018-05-08 DIAGNOSIS — Z85.46 HISTORY OF PROSTATE CANCER: ICD-10-CM

## 2018-05-08 DIAGNOSIS — E53.8 FOLIC ACID DEFICIENCY: ICD-10-CM

## 2018-05-08 DIAGNOSIS — I10 ESSENTIAL HYPERTENSION: Primary | ICD-10-CM

## 2018-05-08 DIAGNOSIS — J30.89 NON-SEASONAL ALLERGIC RHINITIS DUE TO FUNGAL SPORES: ICD-10-CM

## 2018-05-08 PROCEDURE — 3078F DIAST BP <80 MM HG: CPT | Mod: ,,, | Performed by: INTERNAL MEDICINE

## 2018-05-08 PROCEDURE — 99214 OFFICE O/P EST MOD 30 MIN: CPT | Mod: ,,, | Performed by: INTERNAL MEDICINE

## 2018-05-08 PROCEDURE — 3075F SYST BP GE 130 - 139MM HG: CPT | Mod: ,,, | Performed by: INTERNAL MEDICINE

## 2018-05-08 RX ORDER — FLUTICASONE PROPIONATE 50 MCG
1 SPRAY, SUSPENSION (ML) NASAL 2 TIMES DAILY
Qty: 48 G | Refills: 3 | Status: SHIPPED | OUTPATIENT
Start: 2018-05-08 | End: 2018-05-08 | Stop reason: SDUPTHER

## 2018-05-08 RX ORDER — OXYBUTYNIN CHLORIDE 5 MG/1
5 TABLET, EXTENDED RELEASE ORAL DAILY
Qty: 90 TABLET | Refills: 3 | Status: SHIPPED | OUTPATIENT
Start: 2018-05-08 | End: 2018-10-09

## 2018-05-08 RX ORDER — TIZANIDINE 4 MG/1
4 TABLET ORAL NIGHTLY
Qty: 90 TABLET | Refills: 3 | Status: SHIPPED | OUTPATIENT
Start: 2018-05-08 | End: 2018-10-09

## 2018-05-08 RX ORDER — FOLIC ACID 1 MG/1
1 TABLET ORAL DAILY
Qty: 90 TABLET | Refills: 3 | Status: CANCELLED | OUTPATIENT
Start: 2018-05-08 | End: 2019-05-08

## 2018-05-08 RX ORDER — OXYBUTYNIN CHLORIDE 5 MG/1
5 TABLET, EXTENDED RELEASE ORAL DAILY
Qty: 90 TABLET | Refills: 3 | Status: CANCELLED | OUTPATIENT
Start: 2018-05-08 | End: 2019-05-08

## 2018-05-08 RX ORDER — FOLIC ACID 1 MG/1
1 TABLET ORAL DAILY
Qty: 90 TABLET | Refills: 3 | Status: SHIPPED | OUTPATIENT
Start: 2018-05-08 | End: 2019-04-02 | Stop reason: SDUPTHER

## 2018-05-08 RX ORDER — FLUTICASONE PROPIONATE 50 MCG
1 SPRAY, SUSPENSION (ML) NASAL 2 TIMES DAILY
Qty: 48 G | Refills: 3 | Status: SHIPPED | OUTPATIENT
Start: 2018-05-08 | End: 2021-03-04 | Stop reason: SDUPTHER

## 2018-05-08 RX ORDER — OXYBUTYNIN CHLORIDE 5 MG/1
5 TABLET, EXTENDED RELEASE ORAL DAILY
Qty: 30 TABLET | Refills: 11 | Status: SHIPPED | OUTPATIENT
Start: 2018-05-08 | End: 2018-05-08 | Stop reason: SDUPTHER

## 2018-05-08 RX ORDER — TIZANIDINE 4 MG/1
4 TABLET ORAL NIGHTLY
Qty: 90 TABLET | Refills: 3 | Status: SHIPPED | OUTPATIENT
Start: 2018-05-08 | End: 2018-05-08 | Stop reason: SDUPTHER

## 2018-05-08 RX ORDER — TIZANIDINE 4 MG/1
4 TABLET ORAL NIGHTLY
Qty: 90 TABLET | Refills: 3 | Status: CANCELLED | OUTPATIENT
Start: 2018-05-08

## 2018-05-08 RX ORDER — FOLIC ACID 1 MG/1
1 TABLET ORAL DAILY
Qty: 30 TABLET | Refills: 4 | Status: SHIPPED | OUTPATIENT
Start: 2018-05-08 | End: 2018-05-08 | Stop reason: SDUPTHER

## 2018-05-08 NOTE — PATIENT INSTRUCTIONS
Having Minimally Invasive Spine Surgery (MISS)  Minimally invasive spine surgery (MISS) is a type of surgery on the bones of your backbone (spine). This type of surgery uses smaller cuts (incisions) than standard surgery. This often causes less harm to nearby muscles and other tissues. It can lead to less pain and faster recovery after surgery. Surgeons can use MISS for some types of spine surgery. These include lumbar discectomy, laminectomy, and spinal fusion.  What to tell your healthcare provider  Tell your healthcare provider about all the medicines you take. This includes over-the-counter medicines such as ibuprofen. It also includes vitamins, herbs, and other supplements. And tell your healthcare provider if you:  · Have had any recent changes in your health, such as an infection or fever  · Are sensitive or allergic to any medicines, latex, tape, or anesthesia (local and general)  · Are pregnant or think you may be pregnant  Tests before your surgery  Before your surgery, you may need imaging tests. These may include ultrasound, X-rays, or MRI.  Getting ready for your surgery  Talk with your healthcare provider about  how to get ready for your surgery. You may need to stop taking some medicines before the procedure, such as blood thinners and aspirin. If you smoke, you may need to stop before your surgery. Smoking can delay healing. Talk with your healthcare provider if you need help to stop smoking.  Also, make sure to:  · Ask a family member or friend to take you home from the hospital  · Follow any directions you are given for not eating or drinking before surgery  · Follow all other instructions from your healthcare provider  You will be asked to sign a consent form that gives your permission to do the procedure. Read the form carefully. Ask questions if something is not clear.  On the day of your surgery  MISS is done by an orthopedic surgeon and a trained medical team. The details of MISS vary  depending on what part of the spine is being treated, and other factors. Your doctor can help explain what to expect for your surgery. The following is an example of how MISS is done:  · You may have a type of anesthesia that numbs part of your body. Youll also be given sedation. This will make you relaxed but awake during surgery. Or you may be given general anesthesia. This prevents pain and causes you to sleep through the surgery.  · A healthcare provider will carefully watch your vital signs, like your heart rate and blood pressure, during the surgery.  · You may be given antibiotics before and after the surgery. This is to help prevent infection.  · During the procedure, the surgeon will use a special type of X-ray to view the surgery.  · The surgeon will make a small incision on your back in the area that needs to be treated. He or she will put a tubular retractor into this incision. This will let the surgeon reach the part of the spine to be treated.  · The surgeon will then pass small tools through this retractor. This includes a tiny camera and a light.  · The surgeon will then make the needed repairs to the spine.  · When the repairs are done, the surgeon will remove the tools and retractor. He or she will close the incision or incisions with sutures, glue, or staples. A small bandage is put on the wound.  After your surgery  Some types of MISS can be done as an outpatient procedure. This means you can go home the same day. You will need to stay for a couple of hours after the procedure so your healthcare provider can watch for problems. Or you may need to stay one or more nights in the hospital. When youre ready to go home, youll need to have someone drive you.  Recovering at home  You will have some pain after the surgery. This can be relieved with pain medicines. Ask your doctor if there are any over-the-counter pain medicines you should not take. Often the pain will go away fairly quickly.  Your  doctor will give you instructions about how you can use your back after surgery. You may need to limit lifting or bending. You may need to wear a back brace for a time after the procedure. And you may need physical therapy after the surgery. This is to help strengthen muscles around the spine and help you recover. Your recovery time will vary depending on the type of surgery you had and your general health. You may be able to go back to normal activities in a few weeks.  Follow-up care  Make sure to follow all of your doctors instructions about treatment and follow-up appointments. This will help make sure the surgery works well for you.  When to call your healthcare provider  Call your healthcare provider right away if you have any of these:  · Fever of 100.4°F (38.0°C) or higher  · A lot of fluid leaking from the incision site  · Symptoms that dont get better  · Pain that is getting worse   Date Last Reviewed: 8/10/2015  © 5337-1630 [employer] [address] [place] [address], [place] [address]. All rights reserved. This information is not intended as a substitute for professional medical care. Always follow your healthcare professional's instructions.

## 2018-05-09 NOTE — PROGRESS NOTES
Subjective:       Patient ID: Gene Hartman is a 84 y.o. male.    Chief Complaint: Follow-up (4 MONTH F/U); Hypertension; Gastroesophageal Reflux; and Anemia    84-year-old gentleman is here for follow-up on his chronic hypertension, chronic coronary artery disease, hypothyroidism as well as dyslipidemia. He does see his cardiologist regularly at least twice a year. We did do some blood work on him in January everything looked pretty good. He still complaining of some allergy and sinus congestion but he is religiously taking his Singulair and Flonase. He is also still having the left-sided neck pain where he cannot turn his head to one side which the last until he gets some discomfort. He has no other acute complaints.      Review of Systems   Constitutional: Negative for activity change, diaphoresis, fatigue and fever.   HENT: Negative for congestion, ear pain, postnasal drip, sinus pressure, sore throat and trouble swallowing.    Eyes: Negative for pain.   Respiratory: Negative for cough, chest tightness, shortness of breath and wheezing.    Cardiovascular: Negative for chest pain, palpitations and leg swelling.   Gastrointestinal: Negative for abdominal distention, abdominal pain, blood in stool, constipation and diarrhea.   Endocrine: Negative for cold intolerance and heat intolerance.   Genitourinary: Negative for decreased urine volume, difficulty urinating, dysuria, flank pain, frequency and hematuria.        See history of present illness   Musculoskeletal: Positive for back pain and neck pain. Negative for arthralgias, joint swelling and myalgias.   Skin: Negative for pallor, rash and wound.   Neurological: Negative for tremors, syncope, weakness, light-headedness, numbness and headaches.   Hematological: Negative for adenopathy.   Psychiatric/Behavioral: Negative for confusion, decreased concentration, hallucinations, self-injury, sleep disturbance and suicidal ideas. The patient is not nervous/anxious.         Past Medical History:   Diagnosis Date    Anxiety     Arthritis     CAD (coronary artery disease) age 68    Carotid artery occlusion     Cerebrovascular small vessel disease     Colon polyps age 78    GERD (gastroesophageal reflux disease)     H. pylori infection     HTN (hypertension), benign age 65    Hyperlipidemia LDL goal <70 age 65    Hypothyroidism     Multiple fractures of ribs of right side 11/27/2012    Myocardial infarction     Pernicious anemia 1/26/2018    Prostate cancer age 67    Syncopal episodes 3/2013    Urge incontinence 5/8/2018       Past Surgical History:   Procedure Laterality Date    CARDIAC PACEMAKER PLACEMENT  10/2015    CARDIAC SURGERY      CATARACT EXTRACTION W/  INTRAOCULAR LENS IMPLANT Bilateral     COLONOSCOPY  ~2013    Dr. Edge    COLONOSCOPY N/A 6/8/2016    Procedure: COLONOSCOPY;  Surgeon: Shamar Barahona MD;  Location: [facility] ENDO;  Service: Endoscopy;  Laterality: N/A; REPEAT IN 1 YEAR    CORONARY ANGIOPLASTY WITH STENT PLACEMENT  2003    x 2 RCA    PROSTATECTOMY  2002    UPPER GASTROINTESTINAL ENDOSCOPY  11/15/2016    Dr. Barahona       Family History   Problem Relation Age of Onset    Migraines Mother     Heart failure Father     Heart disease Father 56        MI    Heart failure Brother     Heart disease Brother     Diabetes Son     Hypertension Son     Heart disease Brother     Mental illness Brother     No Known Problems Son     Colon cancer Neg Hx     Colon polyps Neg Hx     Crohn's disease Neg Hx     Ulcerative colitis Neg Hx     Stomach cancer Neg Hx     Esophageal cancer Neg Hx        Social History     Social History    Marital status:      Spouse name: N/A    Number of children: 2    Years of education: N/A     Social History Main Topics    Smoking status: Never Smoker    Smokeless tobacco: Never Used    Alcohol use No    Drug use: No    Sexual activity: Not Currently     Other Topics Concern    None      Social History Narrative    The patient does exercise regularly (walking).    Rates diet as good.    He is satisfied with weight.                   Current Outpatient Prescriptions   Medication Sig Dispense Refill    acetaminophen (TYLENOL EX STR RAPID RELEASE) 500 MG tablet Take 500 mg by mouth every 6 (six) hours as needed for Pain.      aspirin (ECOTRIN) 81 MG EC tablet Take 81 mg by mouth Daily.      atorvastatin (LIPITOR) 40 MG tablet TAKE 1 TABLET ONE TIME DAILY 90 tablet 3    cloNIDine (CATAPRES) 0.1 MG tablet Take 1 tablet (0.1 mg total) by mouth 2 (two) times daily as needed (SBP greater than 160). 60 tablet 3    clopidogrel (PLAVIX) 75 mg tablet Take 1 tablet (75 mg total) by mouth once daily. 90 tablet 3    cyanocobalamin, vitamin B-12, (VITAMIN B-12) 5,000 mcg Subl Place 1 tablet under the tongue once daily. 30 tablet 11    cycloSPORINE (RESTASIS) 0.05 % ophthalmic emulsion Apply 1 drop to eye 2 (two) times daily.      fluticasone (FLONASE) 50 mcg/actuation nasal spray 1 spray (50 mcg total) by Each Nare route 2 (two) times daily. 48 g 3    folic acid (FOLVITE) 1 MG tablet Take 1 tablet (1 mg total) by mouth once daily. 90 tablet 3    levothyroxine (SYNTHROID) 25 MCG tablet Take 1 tablet (25 mcg total) by mouth before breakfast. 90 tablet 3    lorazepam (ATIVAN) 0.5 MG tablet Take 1 tablet (0.5 mg total) by mouth every 12 (twelve) hours as needed for Anxiety. 180 tablet 1    losartan (COZAAR) 50 MG tablet Take 1 tablet (50 mg total) by mouth 2 (two) times daily. 180 tablet 3    metoprolol succinate (TOPROL-XL) 25 MG 24 hr tablet Take 1 tablet (25 mg total) by mouth once daily. 90 tablet 3    montelukast (SINGULAIR) 10 mg tablet Take 1 tablet (10 mg total) by mouth every evening. 90 tablet 3    oxybutynin (DITROPAN-XL) 5 MG TR24 Take 1 tablet (5 mg total) by mouth once daily. 90 tablet 3    pantoprazole (PROTONIX) 40 MG tablet Take 1 tablet (40 mg total) by mouth 2 (two) times daily.  180 tablet 3    sucralfate (CARAFATE) 1 gram tablet Take 1 tablet (1 g total) by mouth 2 (two) times daily before meals. 60 tablet 3    tiZANidine (ZANAFLEX) 4 MG tablet Take 1 tablet (4 mg total) by mouth every evening. 90 tablet 3     No current facility-administered medications for this visit.        Review of patient's allergies indicates:   Allergen Reactions    Iodinated contrast- oral and iv dye Rash    Pontocaine [tetracaine hcl] Anaphylaxis     hypotension       Objective:      Physical Exam   Constitutional: He is oriented to person, place, and time. He appears well-developed and well-nourished. No distress.   HENT:   Head: Normocephalic and atraumatic.   Right Ear: External ear normal.   Left Ear: External ear normal.   Nose: Nose normal.   Mouth/Throat: Oropharynx is clear and moist.   Eyes: Conjunctivae and EOM are normal. Right eye exhibits no discharge. Left eye exhibits no discharge. No scleral icterus.   Neck: Normal range of motion. Neck supple. No JVD present. No tracheal deviation present. No thyromegaly present.   Cardiovascular: Normal rate, regular rhythm, normal heart sounds and intact distal pulses.  Exam reveals no gallop.    No murmur heard.  Pulmonary/Chest: Effort normal and breath sounds normal. No respiratory distress. He has no wheezes. He has no rales.   Abdominal: Soft. Bowel sounds are normal. He exhibits no distension and no mass. There is no tenderness.   Musculoskeletal: He exhibits no edema or tenderness.        Cervical back: He exhibits decreased range of motion and spasm.   Left-sided neck pan with obvious paraspinal muscle spasms. The patient cannot turn his head to the left but can turn his head to the right without any pain   Lymphadenopathy:     He has no cervical adenopathy.   Neurological: He is alert and oriented to person, place, and time.   Skin: Skin is warm and dry. No rash noted. He is not diaphoretic. No erythema.   Psychiatric: He has a normal mood and  affect. His behavior is normal. Judgment and thought content normal.       Lab Results   Component Value Date    WBC 5.3 01/10/2018    HGB 14.1 01/10/2018    HCT 41.0 01/10/2018     01/10/2018    CHOL 147 02/04/2016    TRIG 107 02/04/2016    HDL 43 02/04/2016    ALT 21 01/11/2017    AST 23 01/10/2018     01/10/2018    K 4.2 01/10/2018     01/10/2018    CREATININE 1.28 01/10/2018    BUN 25 (H) 01/10/2018    CO2 26.2 01/10/2018    TSH 4.74 01/10/2018    PSA <0.01 08/03/2015    INR 0.9 07/13/2015    HGBA1C 5.9 01/15/2013     Assessment:       1. Essential hypertension    2. Cervical spine disease    3. Folic acid deficiency    4. History of prostate cancer; total prostatectomy 2004    5. Non-seasonal allergic rhinitis due to fungal spores    6. Urge incontinence        Plan:     Essential hypertension    Cervical spine disease  -     Discontinue: tiZANidine (ZANAFLEX) 4 MG tablet; Take 1 tablet (4 mg total) by mouth every evening.  Dispense: 90 tablet; Refill: 3  -     tiZANidine (ZANAFLEX) 4 MG tablet; Take 1 tablet (4 mg total) by mouth every evening.  Dispense: 90 tablet; Refill: 3  -     Ambulatory referral to Pain Clinic    Folic acid deficiency  -     Discontinue: folic acid (FOLVITE) 1 MG tablet; Take 1 tablet (1 mg total) by mouth once daily.  Dispense: 30 tablet; Refill: 4  -     folic acid (FOLVITE) 1 MG tablet; Take 1 tablet (1 mg total) by mouth once daily.  Dispense: 90 tablet; Refill: 3    History of prostate cancer; total prostatectomy 2004    Non-seasonal allergic rhinitis due to fungal spores  -     Discontinue: fluticasone (FLONASE) 50 mcg/actuation nasal spray; 1 spray (50 mcg total) by Each Nare route 2 (two) times daily.  Dispense: 48 g; Refill: 3  -     fluticasone (FLONASE) 50 mcg/actuation nasal spray; 1 spray (50 mcg total) by Each Nare route 2 (two) times daily.  Dispense: 48 g; Refill: 3    Urge incontinence  -     Discontinue: oxybutynin (DITROPAN-XL) 5 MG TR24; Take 1  tablet (5 mg total) by mouth once daily.  Dispense: 30 tablet; Refill: 11  -     oxybutynin (DITROPAN-XL) 5 MG TR24; Take 1 tablet (5

## 2018-05-20 DIAGNOSIS — F41.9 ANXIETY: ICD-10-CM

## 2018-05-21 RX ORDER — LORAZEPAM 0.5 MG/1
TABLET ORAL
Qty: 60 TABLET | Refills: 5 | Status: SHIPPED | OUTPATIENT
Start: 2018-05-21 | End: 2019-01-07 | Stop reason: SDUPTHER

## 2018-07-23 DIAGNOSIS — Z95.0 PACEMAKER: ICD-10-CM

## 2018-07-24 RX ORDER — METOPROLOL SUCCINATE 25 MG/1
TABLET, EXTENDED RELEASE ORAL
Qty: 90 TABLET | Refills: 3 | Status: ON HOLD | OUTPATIENT
Start: 2018-07-24 | End: 2019-11-05 | Stop reason: HOSPADM

## 2018-10-09 ENCOUNTER — OFFICE VISIT (OUTPATIENT)
Dept: FAMILY MEDICINE | Facility: CLINIC | Age: 83
End: 2018-10-09
Payer: MEDICARE

## 2018-10-09 VITALS
RESPIRATION RATE: 16 BRPM | BODY MASS INDEX: 28.7 KG/M2 | WEIGHT: 152 LBS | HEIGHT: 61 IN | HEART RATE: 97 BPM | SYSTOLIC BLOOD PRESSURE: 108 MMHG | DIASTOLIC BLOOD PRESSURE: 60 MMHG | OXYGEN SATURATION: 99 % | TEMPERATURE: 97 F

## 2018-10-09 DIAGNOSIS — I25.10 CORONARY ARTERY DISEASE, ANGINA PRESENCE UNSPECIFIED, UNSPECIFIED VESSEL OR LESION TYPE, UNSPECIFIED WHETHER NATIVE OR TRANSPLANTED HEART: ICD-10-CM

## 2018-10-09 DIAGNOSIS — I10 HTN (HYPERTENSION), BENIGN: ICD-10-CM

## 2018-10-09 DIAGNOSIS — Z95.0 PACEMAKER: ICD-10-CM

## 2018-10-09 DIAGNOSIS — E03.9 ACQUIRED HYPOTHYROIDISM: Primary | ICD-10-CM

## 2018-10-09 DIAGNOSIS — F51.01 PRIMARY INSOMNIA: ICD-10-CM

## 2018-10-09 DIAGNOSIS — D51.0 PERNICIOUS ANEMIA: ICD-10-CM

## 2018-10-09 PROCEDURE — 99214 OFFICE O/P EST MOD 30 MIN: CPT | Mod: 25,,, | Performed by: INTERNAL MEDICINE

## 2018-10-09 PROCEDURE — 3074F SYST BP LT 130 MM HG: CPT | Mod: ,,, | Performed by: INTERNAL MEDICINE

## 2018-10-09 PROCEDURE — 90662 IIV NO PRSV INCREASED AG IM: CPT | Mod: ,,, | Performed by: INTERNAL MEDICINE

## 2018-10-09 PROCEDURE — G0008 ADMIN INFLUENZA VIRUS VAC: HCPCS | Mod: ,,, | Performed by: INTERNAL MEDICINE

## 2018-10-09 PROCEDURE — 3078F DIAST BP <80 MM HG: CPT | Mod: ,,, | Performed by: INTERNAL MEDICINE

## 2018-10-09 PROCEDURE — 1101F PT FALLS ASSESS-DOCD LE1/YR: CPT | Mod: ,,, | Performed by: INTERNAL MEDICINE

## 2018-10-09 RX ORDER — TIZANIDINE 2 MG/1
4 TABLET ORAL NIGHTLY
COMMUNITY
End: 2019-02-11

## 2018-10-09 RX ORDER — LEVOTHYROXINE SODIUM 50 UG/1
50 TABLET ORAL DAILY
Qty: 90 TABLET | Refills: 2 | Status: SHIPPED | OUTPATIENT
Start: 2018-10-09 | End: 2019-05-01 | Stop reason: SDUPTHER

## 2018-10-09 RX ORDER — TRAZODONE HYDROCHLORIDE 50 MG/1
TABLET ORAL
Qty: 15 TABLET | Refills: 11 | Status: SHIPPED | OUTPATIENT
Start: 2018-10-09 | End: 2019-05-28

## 2018-10-09 NOTE — PATIENT INSTRUCTIONS
Insomnia  Insomnia is repeated difficulty going to sleep or staying asleep, or both. Whether you have insomnia is not defined by a specific amount of sleep. Different people need different amounts of sleep, and you may need more or less sleep at different times of your life.  There are 3 major types of insomnia:  short-term, chronic, and other.  Short-term, or acute insomnia lasts less than 3 months.  The symptoms are temporary and can be linked directly to a stressor, such as the death of a loved one, financial problems, or a new physical problem.  Short-term insomnia stops when the stressor resolves or the person adapts to its presence.  Chronic insomnia occurs at least 3 times a week and lasts longer than 3 months.  Chronic insomnia can occur when either the cause of the sleeping problem is not clear, or the insomnia does not get better when the stressor is resolved. A number of other criteria are also used to make the diagnosis of chronic insomnia.   Other insomnia is the third type of insomnia-related sleep disorders.  This description applies to people who have problems getting to sleep or staying asleep, but do not meet all of the factors that describe either short-term or chronic insomnia.    Many things cause insomnia. Different people may have different causes. It can be from an underlying medical or psychological condition, or lifestyle. It can also be primary insomnia, which means no cause can be found.  Causes of insomnia include:  · Chronic medical problems- heart disease, gastrointestinal problems, hormonal changes, breathing problems  · Anxiety  · Stress  · Depression  · Pain  · Work schedule  · Sleep apnea  · Illegal drugs  · Certain medicines  Many different medidcines can affect your sleep, such as stimulants, caffeine, alcohol, some decongestants, and diet pills. Other medicines may include some types of blood pressure pills, steroids, asthma medicines, antihistamines, antidepressants,  seizure medicines and statins. Not all of these will affect your sleep, and they shouldnt be stopped without talking to your doctor.  Symptoms of insomnia can include:  · Lying awake for long periods at night before falling asleep  · Waking up several times during the night  · Waking up early in the morning and not being able to get back to sleep  · Feeling tired and not refreshed by sleep  · Not being able to function properly during the day and finding it hard to concentrate  · Irritability  · Tiredness and fatigue during the day  Home care  1. Review your medicines with your doctor or pharmacist to find out if they can cause insomnia. Not all medicines will affect your sleep, but they shouldn't be stopped without reviewing them with your doctor. There may be serious side effects and consequences from suddenly stopping your medicines. Not taking them may cause strokes, heart attacks, and many other problems.  2. Caffeine, smoking and alcohol also affect sleep. Limit your daily use and do not use these before bedtime. Alcohol may make you sleepy at first, but as its effects wear off, you may awaken a few hours later and have trouble returning to sleep.  3. Do not exercise, eat or drink large amounts of liquid within 2 hours of your bedtime.  4. Improve your sleep habits. Have a fixed bed and wake-up time. Try to keep noise, light and heat in your bedroom at a comfortable level. Try using earplugs or eyeshades if needed.   5. Avoid watching TV in bed.  6. If you do not fall asleep within 30 minutes, try to relax by reading or listening to soft music.  7. Limit daytime napping to one 30 minute period, early in the day.  8. Get regular exercise. Find other ways to lessen your stress level.  9. If a medicine was prescribed to help reset your sleep patterns, take it as directed. Sleeping pills are intended for short-term use, only. If taken for too long, the effect wears off while the risk of physical addiction and  psychological dependence increases.  Sleep diary  If the cause isnt obvious and it is not improving, try keeping a sleep diary for a couple of weeks. Include in it:  · The time you go to bed  · How long it takes to fall asleep  · How many times you wake up  · What time you wake up  · Your meal times and what you eat  · What time you drink alcohol  · Your exercise habits and times  Follow-up care  Follow up with your healthcare provider, or as advised. If X-rays or CT scans were done, you will be notified if there is a change in the reading, especially if it affects treatment.  Call 911  Call 911 if any of these occur:  · Trouble breathing  · Confusion or trouble waking  · Fainting or loss of consciousness  · Rapid heart rate  · New chest, arm, shoulder, neck or upper back pain  · Trouble with speech or vision, weakness of an arm or leg  · Trouble walking or talking, loss of balance, numbness or weakness in one side of your body, facial droop  When to seek medical advice  Call your healthcare provider right away if any of these occur:  · Extreme restlessness or irritability  · Confusion or hallucinations (seeing or hearing things that are not there)  · Anxiety, depression  · Several days without sleeping  Date Last Reviewed: 11/19/2015  © 3418-2521 [employer]. [address] [place] [address], [place] [address]. All rights reserved. This information is not intended as a substitute for professional medical care. Always follow your healthcare professional's instructions.
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
## 2018-10-24 RX ORDER — ATORVASTATIN CALCIUM 40 MG/1
TABLET, FILM COATED ORAL
Qty: 90 TABLET | Refills: 3 | Status: SHIPPED | OUTPATIENT
Start: 2018-10-24 | End: 2019-12-16 | Stop reason: SDUPTHER

## 2018-11-20 DIAGNOSIS — B96.81 HELICOBACTER PYLORI GASTRITIS (CHRONIC GASTRITIS): ICD-10-CM

## 2018-11-20 DIAGNOSIS — K29.50 HELICOBACTER PYLORI GASTRITIS (CHRONIC GASTRITIS): ICD-10-CM

## 2018-11-20 RX ORDER — PANTOPRAZOLE SODIUM 40 MG/1
TABLET, DELAYED RELEASE ORAL
Qty: 180 TABLET | Refills: 3 | Status: SHIPPED | OUTPATIENT
Start: 2018-11-20 | End: 2019-12-04 | Stop reason: SDUPTHER

## 2019-01-07 DIAGNOSIS — F41.9 ANXIETY: ICD-10-CM

## 2019-01-08 RX ORDER — LORAZEPAM 0.5 MG/1
TABLET ORAL
Qty: 60 TABLET | Refills: 5 | Status: SHIPPED | OUTPATIENT
Start: 2019-01-08 | End: 2019-07-25

## 2019-02-04 LAB
BUN SERPL-MCNC: 18 MG/DL (ref 8–20)
CALCIUM SERPL-MCNC: 8.5 MG/DL (ref 7.7–10.4)
CHLORIDE: 106 MMOL/L (ref 98–110)
CO2 SERPL-SCNC: 25.6 MMOL/L (ref 22.8–31.6)
CREATININE: 1.15 MG/DL (ref 0.6–1.4)
GLUCOSE: 110 MG/DL (ref 70–99)
POTASSIUM SERPL-SCNC: 4.5 MMOL/L (ref 3.5–5)
SODIUM: 141 MMOL/L (ref 134–144)
T4 FREE SP9 P CHAL SERPL-SCNC: 1.03 NG/DL (ref 0.45–1.27)
TSH SERPL DL<=0.005 MIU/L-ACNC: 1.4 ULU/ML (ref 0.3–5.6)

## 2019-02-11 ENCOUNTER — OFFICE VISIT (OUTPATIENT)
Dept: FAMILY MEDICINE | Facility: CLINIC | Age: 84
End: 2019-02-11
Payer: MEDICARE

## 2019-02-11 VITALS
SYSTOLIC BLOOD PRESSURE: 120 MMHG | WEIGHT: 149 LBS | RESPIRATION RATE: 16 BRPM | HEIGHT: 61 IN | OXYGEN SATURATION: 96 % | TEMPERATURE: 98 F | DIASTOLIC BLOOD PRESSURE: 62 MMHG | BODY MASS INDEX: 28.13 KG/M2 | HEART RATE: 66 BPM

## 2019-02-11 DIAGNOSIS — I10 ESSENTIAL HYPERTENSION: ICD-10-CM

## 2019-02-11 DIAGNOSIS — D64.9 ANEMIA, UNSPECIFIED TYPE: ICD-10-CM

## 2019-02-11 DIAGNOSIS — I25.10 CORONARY ARTERY DISEASE, ANGINA PRESENCE UNSPECIFIED, UNSPECIFIED VESSEL OR LESION TYPE, UNSPECIFIED WHETHER NATIVE OR TRANSPLANTED HEART: ICD-10-CM

## 2019-02-11 DIAGNOSIS — E03.9 ACQUIRED HYPOTHYROIDISM: ICD-10-CM

## 2019-02-11 DIAGNOSIS — B96.89 ACUTE BACTERIAL SINUSITIS: Primary | ICD-10-CM

## 2019-02-11 DIAGNOSIS — J01.90 ACUTE BACTERIAL SINUSITIS: Primary | ICD-10-CM

## 2019-02-11 DIAGNOSIS — E78.5 HYPERLIPIDEMIA LDL GOAL <70: ICD-10-CM

## 2019-02-11 PROCEDURE — 1101F PT FALLS ASSESS-DOCD LE1/YR: CPT | Mod: ,,, | Performed by: INTERNAL MEDICINE

## 2019-02-11 PROCEDURE — 99214 OFFICE O/P EST MOD 30 MIN: CPT | Mod: 25,,, | Performed by: INTERNAL MEDICINE

## 2019-02-11 PROCEDURE — 3078F PR MOST RECENT DIASTOLIC BLOOD PRESSURE < 80 MM HG: ICD-10-PCS | Mod: ,,, | Performed by: INTERNAL MEDICINE

## 2019-02-11 PROCEDURE — 96372 THER/PROPH/DIAG INJ SC/IM: CPT | Mod: ,,, | Performed by: INTERNAL MEDICINE

## 2019-02-11 PROCEDURE — 99214 PR OFFICE/OUTPT VISIT, EST, LEVL IV, 30-39 MIN: ICD-10-PCS | Mod: 25,,, | Performed by: INTERNAL MEDICINE

## 2019-02-11 PROCEDURE — 3078F DIAST BP <80 MM HG: CPT | Mod: ,,, | Performed by: INTERNAL MEDICINE

## 2019-02-11 PROCEDURE — 3074F SYST BP LT 130 MM HG: CPT | Mod: ,,, | Performed by: INTERNAL MEDICINE

## 2019-02-11 PROCEDURE — 3074F PR MOST RECENT SYSTOLIC BLOOD PRESSURE < 130 MM HG: ICD-10-PCS | Mod: ,,, | Performed by: INTERNAL MEDICINE

## 2019-02-11 PROCEDURE — 1101F PR PT FALLS ASSESS DOC 0-1 FALLS W/OUT INJ PAST YR: ICD-10-PCS | Mod: ,,, | Performed by: INTERNAL MEDICINE

## 2019-02-11 PROCEDURE — 96372 PR INJECTION,THERAP/PROPH/DIAG2ST, IM OR SUBCUT: ICD-10-PCS | Mod: ,,, | Performed by: INTERNAL MEDICINE

## 2019-02-11 RX ORDER — DEXAMETHASONE SODIUM PHOSPHATE 4 MG/ML
4 INJECTION, SOLUTION INTRA-ARTICULAR; INTRALESIONAL; INTRAMUSCULAR; INTRAVENOUS; SOFT TISSUE ONCE
Status: COMPLETED | OUTPATIENT
Start: 2019-02-11 | End: 2019-02-11

## 2019-02-11 RX ORDER — AMOXICILLIN 875 MG/1
875 TABLET, FILM COATED ORAL 2 TIMES DAILY
Qty: 14 TABLET | Refills: 0 | Status: SHIPPED | OUTPATIENT
Start: 2019-02-11 | End: 2019-05-28

## 2019-02-11 RX ORDER — LOSARTAN POTASSIUM 50 MG/1
50 TABLET ORAL 2 TIMES DAILY
Qty: 180 TABLET | Refills: 3 | Status: ON HOLD | OUTPATIENT
Start: 2019-02-11 | End: 2023-02-24 | Stop reason: HOSPADM

## 2019-02-11 RX ORDER — HYDROCODONE POLISTIREX AND CHLORPHENIRAMINE POLISTIREX 10; 8 MG/5ML; MG/5ML
5 SUSPENSION, EXTENDED RELEASE ORAL NIGHTLY PRN
Qty: 115 ML | Refills: 0 | Status: SHIPPED | OUTPATIENT
Start: 2019-02-11 | End: 2019-02-25

## 2019-02-11 RX ADMIN — DEXAMETHASONE SODIUM PHOSPHATE 4 MG: 4 INJECTION, SOLUTION INTRA-ARTICULAR; INTRALESIONAL; INTRAMUSCULAR; INTRAVENOUS; SOFT TISSUE at 11:02

## 2019-02-11 NOTE — PATIENT INSTRUCTIONS

## 2019-02-11 NOTE — PROGRESS NOTES
Subjective:       Patient ID: Gene Hartman is a 85 y.o. male.    Chief Complaint: Follow-up and Hypertension    85-year-old gentleman who is here for a six-month follow-up for hypertension and other chronic medical issues. He did have blood work done by cardiology which included a CBC and a CMP. He has folic acid and B12 deficiency anemia but he is no longer macrocytic with his MCV. He still has mild anemia with a hemoglobin of 12.2. We also did some thyroid function tests which are listed below. This was after increasing his thyroid from 25 µg to 50 µg. The patient is complaining today of sinus drip. He uses Flonase and Singulair are regular basis and is added Mucinex DM in the past week or so but can't seem to shake the congestion. He denies a sore throat but does admit to itchy ears. He denies any gland tenderness in his neck or any shortness of breath. He does admit to a cough which he feels is due to the postnasal drip especially at night. No fevers or chills.      Review of Systems   Constitutional: Negative for activity change, diaphoresis, fatigue and fever.   HENT: Positive for congestion, postnasal drip, sinus pressure and sore throat. Negative for ear pain and trouble swallowing.    Eyes: Negative for pain.   Respiratory: Positive for cough. Negative for chest tightness, shortness of breath and wheezing.    Cardiovascular: Negative for chest pain, palpitations and leg swelling.   Gastrointestinal: Negative for abdominal distention, abdominal pain, blood in stool, constipation and diarrhea.   Endocrine: Negative for cold intolerance and heat intolerance.   Genitourinary: Negative for decreased urine volume, difficulty urinating, dysuria, flank pain, frequency and hematuria.   Musculoskeletal: Negative for arthralgias, back pain, joint swelling, myalgias and neck pain.   Skin: Negative for pallor, rash and wound.   Neurological: Negative for tremors, syncope, weakness, light-headedness, numbness and  headaches.   Hematological: Negative for adenopathy.   Psychiatric/Behavioral: Negative for confusion, decreased concentration, hallucinations, self-injury, sleep disturbance and suicidal ideas. The patient is not nervous/anxious.        Past Medical History:   Diagnosis Date    Anxiety     Arthritis     CAD (coronary artery disease) age 68    Carotid artery occlusion     Cerebrovascular small vessel disease     Colon polyps age 78    GERD (gastroesophageal reflux disease)     H. pylori infection     HTN (hypertension), benign age 65    Hyperlipidemia LDL goal <70 age 65    Hypothyroidism     Multiple fractures of ribs of right side 11/27/2012    Myocardial infarction     Pernicious anemia 1/26/2018    Primary insomnia 10/9/2018    Prostate cancer age 67    Syncopal episodes 3/2013    Urge incontinence 5/8/2018       Past Surgical History:   Procedure Laterality Date    CARDIAC PACEMAKER PLACEMENT  10/2015    CARDIAC SURGERY      CATARACT EXTRACTION W/  INTRAOCULAR LENS IMPLANT Bilateral     COLONOSCOPY  ~2013    Dr. Edge    COLONOSCOPY N/A 6/8/2016    [facility] END[facility]    CORONARY ANGIOPLASTY WITH STENT PLACEMENT  2003    x 2 RCA    ESOPHAGOGASTRODUODENOSCOPY (EGD) N/A 11/15/2017    [facility] END[facility]    ESOPHAGOGASTRODUODENOSCOPY (EGD) N/A 11/15/2016    [facility] ENDO    PROSTATECTOMY  2002    UPPER GASTROINTESTINAL ENDOSCOPY  11/15/2016    Dr. Barahona       Family History   Problem Relation Age of Onset    Migraines Mother     Heart failure Father     Heart disease Father 56        MI    Heart failure Brother     Heart disease Brother     Diabetes Son     Hypertension Son     Heart disease Brother     Mental illness Brother     No Known Problems Son     Colon cancer Neg Hx     Colon polyps Neg Hx     Crohn's disease Neg Hx     Ulcerative colitis Neg Hx     Stomach cancer Neg Hx      Esophageal cancer Neg Hx        Social History     Socioeconomic History    Marital status:      Spouse name: None    Number of children: 2    Years of education: None    Highest education level: None   Social Needs    Financial resource strain: None    Food insecurity - worry: None    Food insecurity - inability: None    Transportation needs - medical: None    Transportation needs - non-medical: None   Occupational History    None   Tobacco Use    Smoking status: Never Smoker    Smokeless tobacco: Never Used   Substance and Sexual Activity    Alcohol use: No    Drug use: No    Sexual activity: Not Currently   Other Topics Concern    None   Social History Narrative    The patient does exercise regularly (walking).    Rates diet as good.    He is satisfied with weight.               Current Outpatient Medications   Medication Sig Dispense Refill    clopidogrel (PLAVIX) 75 mg tablet Take 1 tablet (75 mg total) by mouth once daily. 90 tablet 3    cyanocobalamin, vitamin B-12, (VITAMIN B-12) 5,000 mcg Subl Place 1 tablet under the tongue once daily. 30 tablet 11    cycloSPORINE (RESTASIS) 0.05 % ophthalmic emulsion Apply 1 drop to eye 2 (two) times daily.      fluticasone (FLONASE) 50 mcg/actuation nasal spray 1 spray (50 mcg total) by Each Nare route 2 (two) times daily. 48 g 3    folic acid (FOLVITE) 1 MG tablet Take 1 tablet (1 mg total) by mouth once daily. 90 tablet 3    levothyroxine (SYNTHROID) 50 MCG tablet Take 1 tablet (50 mcg total) by mouth once daily. 90 tablet 2    LORazepam (ATIVAN) 0.5 MG tablet TAKE 1 TABLET EVERY 12 HOURS AS NEEDED FOR ANXIETY 60 tablet 5    losartan (COZAAR) 50 MG tablet Take 1 tablet (50 mg total) by mouth 2 (two) times daily. 180 tablet 3    metoprolol succinate (TOPROL-XL) 25 MG 24 hr tablet TAKE 1 TABLET ONE TIME DAILY 90 tablet 3    montelukast (SINGULAIR) 10 mg tablet Take 1 tablet (10 mg total) by mouth every evening. 90 tablet 3     pantoprazole (PROTONIX) 40 MG tablet TAKE 1 TABLET TWICE DAILY 180 tablet 3    traZODone (DESYREL) 50 MG tablet 1/2 nightly 15 tablet 11    acetaminophen (TYLENOL EX STR RAPID RELEASE) 500 MG tablet Take 500 mg by mouth every 6 (six) hours as needed for Pain.      amoxicillin (AMOXIL) 875 MG tablet Take 1 tablet (875 mg total) by mouth 2 (two) times daily. 14 tablet 0    aspirin (ECOTRIN) 81 MG EC tablet Take 81 mg by mouth Daily.      atorvastatin (LIPITOR) 40 MG tablet TAKE 1 TABLET ONE TIME DAILY 90 tablet 3    cloNIDine (CATAPRES) 0.1 MG tablet Take 1 tablet (0.1 mg total) by mouth 2 (two) times daily as needed (SBP greater than 160). 60 tablet 3    dextromethorphan-guaifenesin  mg (MUCINEX DM)  mg per 12 hr tablet Take 1 tablet by mouth 2 (two) times daily. for 10 days 20 tablet 0    hydrocodone-chlorpheniramine (TUSSIONEX) 10-8 mg/5 mL suspension Take 5 mLs by mouth nightly as needed for Cough. 115 mL 0     No current facility-administered medications for this visit.        Review of patient's allergies indicates:   Allergen Reactions    Iodinated contrast- oral and iv dye Rash    Pontocaine [tetracaine hcl] Anaphylaxis     hypotension       Objective:      Physical Exam   Constitutional: He is oriented to person, place, and time. He appears well-developed and well-nourished. No distress.   HENT:   Head: Normocephalic and atraumatic.   Right Ear: External ear normal. No drainage or tenderness. Tympanic membrane is retracted.   Left Ear: External ear normal. No drainage or tenderness. Tympanic membrane is retracted.   Nose: Nose normal. Right sinus exhibits no maxillary sinus tenderness and no frontal sinus tenderness. Left sinus exhibits no maxillary sinus tenderness and no frontal sinus tenderness.   Mouth/Throat: Oropharynx is clear and moist and mucous membranes are normal. No uvula swelling. No oropharyngeal exudate.   Positive postnasal drip visualized - grayish-white  Submandibular  gland mildly engorged  Ear canals dry with irritation     Eyes: Conjunctivae and EOM are normal. Right eye exhibits no discharge. Left eye exhibits no discharge. No scleral icterus.   Neck: Normal range of motion and full passive range of motion without pain. Neck supple. No JVD present. No tracheal deviation present. No thyromegaly present.   Cardiovascular: Normal rate, regular rhythm, normal heart sounds and intact distal pulses. Exam reveals no gallop.   No murmur heard.  Pulmonary/Chest: Effort normal and breath sounds normal. No stridor. No respiratory distress. He has no wheezes. He has no rales.   Abdominal: Soft. Bowel sounds are normal. He exhibits no distension and no mass. There is no tenderness.   Musculoskeletal: Normal range of motion. He exhibits no edema or tenderness.   Lymphadenopathy:     He has no cervical adenopathy.   Neurological: He is alert and oriented to person, place, and time.   Skin: Skin is warm and dry. No rash noted. He is not diaphoretic. No erythema.   Psychiatric: He has a normal mood and affect. His behavior is normal. Judgment and thought content normal.       Assessment:       1. Acute bacterial sinusitis    2. Essential hypertension    3. Coronary artery disease, angina presence unspecified, unspecified vessel or lesion type, unspecified whether native or transplanted heart    4. Acquired hypothyroidism    5. Anemia, unspecified type    6. Hyperlipidemia LDL goal <70        Plan:       Acute bacterial sinusitis  -     hydrocodone-chlorpheniramine (TUSSIONEX) 10-8 mg/5 mL suspension; Take 5 mLs by mouth nightly as needed for Cough.  Dispense: 115 mL; Refill: 0  -     dexamethasone injection 4 mg  -     dextromethorphan-guaifenesin  mg (MUCINEX DM)  mg per 12 hr tablet; Take 1 tablet by mouth 2 (two) times daily. for 10 days  Dispense: 20 tablet; Refill: 0  -     amoxicillin (AMOXIL) 875 MG tablet; Take 1 tablet (875 mg total) by mouth 2 (two) times daily.   Dispense: 14 tablet; Refill: 0    Essential hypertension  -     losartan (COZAAR) 50 MG tablet; Take 1 tablet (50 mg total) by mouth 2 (two) times daily.  Dispense: 180 tablet; Refill: 3    Coronary artery disease, angina presence unspecified, unspecified vessel or lesion type, unspecified whether native or transplanted heart- follows with cardiology    Acquired hypothyroidism- continue same supplement    Anemia, unspecified type- noted as per hpi, continue folic acid and vitamin b12    Hyperlipidemia LDL goal <70  -     Lipid panel; Future; Expected date: 02/11/2019

## 2019-04-02 DIAGNOSIS — E53.8 FOLIC ACID DEFICIENCY: ICD-10-CM

## 2019-04-03 RX ORDER — FOLIC ACID 1 MG/1
TABLET ORAL
Qty: 90 TABLET | Refills: 3 | Status: SHIPPED | OUTPATIENT
Start: 2019-04-03 | End: 2021-03-04

## 2019-05-01 DIAGNOSIS — E03.9 ACQUIRED HYPOTHYROIDISM: ICD-10-CM

## 2019-05-02 RX ORDER — LEVOTHYROXINE SODIUM 50 UG/1
50 TABLET ORAL DAILY
Qty: 90 TABLET | Refills: 2 | Status: SHIPPED | OUTPATIENT
Start: 2019-05-02 | End: 2021-03-04

## 2019-05-13 PROBLEM — J01.90 ACUTE BACTERIAL SINUSITIS: Status: RESOLVED | Noted: 2019-02-11 | Resolved: 2019-05-13

## 2019-05-13 PROBLEM — B96.89 ACUTE BACTERIAL SINUSITIS: Status: RESOLVED | Noted: 2019-02-11 | Resolved: 2019-05-13

## 2019-05-28 ENCOUNTER — OFFICE VISIT (OUTPATIENT)
Dept: FAMILY MEDICINE | Facility: CLINIC | Age: 84
End: 2019-05-28
Payer: MEDICARE

## 2019-05-28 VITALS
HEART RATE: 62 BPM | WEIGHT: 145 LBS | RESPIRATION RATE: 16 BRPM | DIASTOLIC BLOOD PRESSURE: 52 MMHG | HEIGHT: 61 IN | SYSTOLIC BLOOD PRESSURE: 130 MMHG | OXYGEN SATURATION: 98 % | TEMPERATURE: 98 F | BODY MASS INDEX: 27.38 KG/M2

## 2019-05-28 DIAGNOSIS — K44.9 HIATAL HERNIA WITH GERD AND ESOPHAGITIS: ICD-10-CM

## 2019-05-28 DIAGNOSIS — R51.9 HEADACHE, TEMPORAL: ICD-10-CM

## 2019-05-28 DIAGNOSIS — Z09 HOSPITAL DISCHARGE FOLLOW-UP: Primary | ICD-10-CM

## 2019-05-28 DIAGNOSIS — R55 VASOVAGAL SYNCOPE: ICD-10-CM

## 2019-05-28 DIAGNOSIS — Z85.46 HISTORY OF PROSTATE CANCER: ICD-10-CM

## 2019-05-28 DIAGNOSIS — K21.00 HIATAL HERNIA WITH GERD AND ESOPHAGITIS: ICD-10-CM

## 2019-05-28 DIAGNOSIS — F51.01 PRIMARY INSOMNIA: ICD-10-CM

## 2019-05-28 PROCEDURE — 3078F DIAST BP <80 MM HG: CPT | Mod: ,,, | Performed by: INTERNAL MEDICINE

## 2019-05-28 PROCEDURE — 1101F PR PT FALLS ASSESS DOC 0-1 FALLS W/OUT INJ PAST YR: ICD-10-PCS | Mod: ,,, | Performed by: INTERNAL MEDICINE

## 2019-05-28 PROCEDURE — 1111F PR DISCHARGE MEDS RECONCILED W/ CURRENT OUTPATIENT MED LIST: ICD-10-PCS | Mod: ,,, | Performed by: INTERNAL MEDICINE

## 2019-05-28 PROCEDURE — 3075F SYST BP GE 130 - 139MM HG: CPT | Mod: ,,, | Performed by: INTERNAL MEDICINE

## 2019-05-28 PROCEDURE — 1101F PT FALLS ASSESS-DOCD LE1/YR: CPT | Mod: ,,, | Performed by: INTERNAL MEDICINE

## 2019-05-28 PROCEDURE — 3075F PR MOST RECENT SYSTOLIC BLOOD PRESS GE 130-139MM HG: ICD-10-PCS | Mod: ,,, | Performed by: INTERNAL MEDICINE

## 2019-05-28 PROCEDURE — 1111F DSCHRG MED/CURRENT MED MERGE: CPT | Mod: ,,, | Performed by: INTERNAL MEDICINE

## 2019-05-28 PROCEDURE — 3078F PR MOST RECENT DIASTOLIC BLOOD PRESSURE < 80 MM HG: ICD-10-PCS | Mod: ,,, | Performed by: INTERNAL MEDICINE

## 2019-05-28 PROCEDURE — 99215 OFFICE O/P EST HI 40 MIN: CPT | Mod: ,,, | Performed by: INTERNAL MEDICINE

## 2019-05-28 PROCEDURE — 99215 PR OFFICE/OUTPT VISIT, EST, LEVL V, 40-54 MIN: ICD-10-PCS | Mod: ,,, | Performed by: INTERNAL MEDICINE

## 2019-05-28 RX ORDER — MELATONIN 5 MG
TABLET, SUBLINGUAL SUBLINGUAL
COMMUNITY
End: 2019-12-04

## 2019-05-28 RX ORDER — MIRTAZAPINE 7.5 MG/1
7.5 TABLET, FILM COATED ORAL NIGHTLY
Qty: 30 TABLET | Refills: 3 | Status: SHIPPED | OUTPATIENT
Start: 2019-05-28 | End: 2021-12-28 | Stop reason: SDUPTHER

## 2019-05-28 RX ORDER — OXYBUTYNIN CHLORIDE 5 MG/1
TABLET, EXTENDED RELEASE ORAL
COMMUNITY
Start: 2019-05-16 | End: 2019-07-25

## 2019-05-28 RX ORDER — MIRTAZAPINE 7.5 MG/1
7.5 TABLET, FILM COATED ORAL NIGHTLY
Qty: 30 TABLET | Refills: 3 | Status: SHIPPED | OUTPATIENT
Start: 2019-05-28 | End: 2019-05-28 | Stop reason: SDUPTHER

## 2019-05-28 RX ORDER — FLUTICASONE PROPIONATE 50 MCG
SPRAY, SUSPENSION (ML) NASAL
COMMUNITY
End: 2019-05-28

## 2019-05-29 LAB — PSA, DIAGNOSTIC: <0.01 NG/ML (ref 0–3)

## 2019-05-29 NOTE — PROGRESS NOTES
Subjective:       Patient ID: Gene Hartman is a 85 y.o. male.    Chief Complaint: Hospital Follow Up and Sinusitis    85-year-old gentleman who is here for hospital follow-up for an overnight stay for presyncope. He has a past medical history significant for tachybradycardia syndrome, coronary artery disease on Plavix, mild memory loss as well as GERD with hiatal hernia, dyslipidemia, osteoarthritis and mild anxiety mostly secondary to his mild memory loss that he is pursuing neurological consultation. He saw the neurologist little under a month ago and a lab workup was performed with nothing unusual noted. 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 He is not a diabetic. He is not a smoker. When the ambulance got there pale in his blood pressure at shot back up to over normal and I do not have a copy of EKG they did by EMS. His EKG on arrival to the emergency room showed a sinus bradycardia heart rate of 58 with no pacemaker beats. In fact he was an essentially normal EKG. A later EKG done 8 hours after the first showed multiple PVCs and a pipe Remedios fashion. There are few pacemaker captures but no real interpretation can be made by this EKG is the narrow complex QRSs had no real significant abnormality to them. The rate on that EKG was 109. Echocardiogram was essentially unremarkable although he does have mild aortic regurgitation and moderately elevated pulmonary artery pressures. His ejection fraction was 50-55% and his atrium are mildly dilated. Lab work was entirely unremarkable including a troponin and a BNP of 138. CTA of the neck with contrast showed a  "stenosis at the right coronary artery but only a 50%. This was consistent with carotid artery ultrasound. CT of the head without contrast corona lacunar occipital infarcts bilaterally. His medications upon discharge were reconciled and there were no new medications added he was simply changed back to his old form of beta blocker from [employer]. He still uses as needed Catapres at night for his anxiety which she has an elevation of blood pressure in response to bitemporal headaches. Patient's story does continually change on the nature of his headaches eyes over year ago it was cervical neck headaches and these would radiate up above the occipital and will be brought on by a particular rotation of the neck. He was on a light dose of the muscle relaxer without any real relief in these headaches. They're not occurring in any one particular time but now he has these headaches bitemporal at night which correspond with increase in blood pressure. The wife leaving given some thoracic and Catapres depending on the time of the evening. If he takes lorazepam he goes to bed and and occasionally can wake up at 3 AM in the morning with much difficulty going back to sleep. The headaches he can't in point when they started but he is obese blames it on "sinuses". He has no postnasal drip or any significant nasal congestion. No ESR was done in the hospital. The syncopal episode apparently is unrelated to these headaches and high blood pressure this was accompanied with a low blood pressure. His orthostatics were checked many time and there was some variability in systolic pressure of about 15 mmHg but nothing significant and certainly on a beta blocker and a pacemaker no change or significant alteration heart rate although we do have that one EKG after admission that showed a heart rate of 110 and irregularity. The patient is set up to see his neurologist on his cardiologist within the next couple of weeks which is excellent. " Medications were reviewed upon discharge and reconciled.    Review of Systems   Constitutional: Negative for activity change, diaphoresis, fatigue and fever.   HENT: Negative for congestion, ear pain, postnasal drip, sinus pressure, sore throat and trouble swallowing.    Eyes: Negative for pain.   Respiratory: Negative for cough, chest tightness, shortness of breath and wheezing.    Cardiovascular: Negative for chest pain, palpitations and leg swelling.   Gastrointestinal: Negative for abdominal distention, abdominal pain, blood in stool, constipation and diarrhea.   Endocrine: Negative for cold intolerance and heat intolerance.   Genitourinary: Negative for decreased urine volume, difficulty urinating, dysuria, flank pain, frequency and hematuria.   Musculoskeletal: Negative for arthralgias, back pain, joint swelling, myalgias and neck pain.   Skin: Negative for pallor, rash and wound.   Neurological: Negative for tremors, syncope, weakness, light-headedness, numbness and headaches.   Hematological: Negative for adenopathy.   Psychiatric/Behavioral: Negative for confusion, decreased concentration, hallucinations, self-injury, sleep disturbance and suicidal ideas. The patient is not nervous/anxious.        Past Medical History:   Diagnosis Date    Anxiety     Arthritis     CAD (coronary artery disease) age 68    Carotid artery occlusion     Cerebrovascular small vessel disease     Colon polyps age 78    GERD (gastroesophageal reflux disease)     H. pylori infection     HTN (hypertension), benign age 65    Hyperlipidemia LDL goal <70 age 65    Hypothyroidism     Multiple fractures of ribs of right side 11/27/2012    Myocardial infarction     Pernicious anemia 1/26/2018    Primary insomnia 10/9/2018    Prostate cancer age 67    Syncopal episodes 3/2013    Urge incontinence 5/8/2018       Past Surgical History:   Procedure Laterality Date    CARDIAC PACEMAKER PLACEMENT  10/2015    CARDIAC SURGERY       CATARACT EXTRACTION W/  INTRAOCULAR LENS IMPLANT Bilateral     COLONOSCOPY  ~2013    Dr. Edge    COLONOSCOPY N/A 6/8/2016    [facility]    CORONARY ANGIOPLASTY WITH STENT PLACEMENT  2003    x 2 RCA    ESOPHAGOGASTRODUODENOSCOPY (EGD) N/A 11/15/2017    [facility]    ESOPHAGOGASTRODUODENOSCOPY (EGD) N/A 11/15/2016    [facility]    PROSTATECTOMY  2002    UPPER GASTROINTESTINAL ENDOSCOPY  11/15/2016    Dr. Barahona       Family History   Problem Relation Age of Onset    Migraines Mother     Heart failure Father     Heart disease Father 56        MI    Heart failure Brother     Heart disease Brother     Diabetes Son     Hypertension Son     Heart disease Brother     Mental illness Brother     No Known Problems Son     Colon cancer Neg Hx     Colon polyps Neg Hx     Crohn's disease Neg Hx     Ulcerative colitis Neg Hx     Stomach cancer Neg Hx     Esophageal cancer Neg Hx        Social History     Socioeconomic History    Marital status:      Spouse name: Not on file    Number of children: 2    Years of education: Not on file    Highest education level: Not on file   Occupational History    Not on file   Social Needs    Financial resource strain: Not on file    Food insecurity:     Worry: Not on file     Inability: Not on file    Transportation needs:     Medical: Not on file     Non-medical: Not on file   Tobacco Use    Smoking status: Never Smoker    Smokeless tobacco: Never Used   Substance and Sexual Activity    Alcohol use: No    Drug use: No    Sexual activity: Not Currently   Lifestyle    Physical activity:     Days per week: Not on file     Minutes per session: Not on file    Stress: Not at all   Relationships    Social connections:     Talks on phone: Not on file     Gets together: Not on file     Attends Hinduism service: Not on file     Active member of  club or organization: Not on file     Attends meetings of clubs or organizations: Not on file     Relationship status: Not on file   Other Topics Concern    Not on file   Social History Narrative    The patient does exercise regularly (walking).    Rates diet as good.    He is satisfied with weight.               Current Outpatient Medications   Medication Sig Dispense Refill    aspirin (ECOTRIN) 81 MG EC tablet Take 81 mg by mouth Daily.      atorvastatin (LIPITOR) 40 MG tablet TAKE 1 TABLET ONE TIME DAILY 90 tablet 3    clopidogrel (PLAVIX) 75 mg tablet Take 1 tablet (75 mg total) by mouth once daily. 90 tablet 3    cyanocobalamin, vitamin B-12, (VITAMIN B-12) 5,000 mcg Subl Place 1 tablet under the tongue once daily. 30 tablet 11    cycloSPORINE (RESTASIS) 0.05 % ophthalmic emulsion Apply 1 drop to eye 2 (two) times daily.      fluticasone (FLONASE) 50 mcg/actuation nasal spray 1 spray (50 mcg total) by Each Nare route 2 (two) times daily. 48 g 3    folic acid (FOLVITE) 1 MG tablet TAKE 1 TABLET ONE TIME DAILY 90 tablet 3    levothyroxine (SYNTHROID) 50 MCG tablet TAKE 1 TABLET (50 MCG TOTAL) BY MOUTH ONCE DAILY. 90 tablet 2    LORazepam (ATIVAN) 0.5 MG tablet TAKE 1 TABLET EVERY 12 HOURS AS NEEDED FOR ANXIETY 60 tablet 5    losartan (COZAAR) 50 MG tablet Take 1 tablet (50 mg total) by mouth 2 (two) times daily. 180 tablet 3    melatonin 5 mg Subl melatonin 5 mg sublingual tablet   Place by sublingual route.      metoprolol succinate (TOPROL-XL) 25 MG 24 hr tablet TAKE 1 TABLET ONE TIME DAILY 90 tablet 3    montelukast (SINGULAIR) 10 mg tablet Take 1 tablet (10 mg total) by mouth every evening. 90 tablet 3    oxybutynin (DITROPAN-XL) 5 MG TR24       pantoprazole (PROTONIX) 40 MG tablet TAKE 1 TABLET TWICE DAILY 180 tablet 3    cloNIDine (CATAPRES) 0.1 MG tablet Take 1 tablet (0.1 mg total) by mouth 2 (two) times daily as needed (SBP greater than 160). 60 tablet 3    mirtazapine (REMERON) 7.5 MG  Tab Take 1 tablet (7.5 mg total) by mouth every evening. 30 tablet 3     No current facility-administered medications for this visit.        Review of patient's allergies indicates:   Allergen Reactions    Iodinated contrast- oral and iv dye Rash    Pontocaine [tetracaine hcl] Anaphylaxis     hypotension     Objective:      Physical Exam   Constitutional: He is oriented to person, place, and time. He appears well-developed and well-nourished. No distress.   HENT:   Head: Normocephalic and atraumatic.   Right Ear: External ear normal.   Left Ear: External ear normal.   Nose: Nose normal.   Mouth/Throat: Oropharynx is clear and moist.   Eyes: Conjunctivae and EOM are normal. Right eye exhibits no discharge. Left eye exhibits no discharge. No scleral icterus.   Neck: Normal range of motion. Neck supple. No JVD present. No tracheal deviation present. No thyromegaly present.   Cardiovascular: Normal rate, regular rhythm, normal heart sounds and intact distal pulses. Exam reveals no gallop.   No murmur heard.  Pulmonary/Chest: Effort normal and breath sounds normal. No respiratory distress. He has no wheezes. He has no rales.   Abdominal: Soft. Bowel sounds are normal. He exhibits no distension and no mass. There is no tenderness.   Musculoskeletal: Normal range of motion. He exhibits no edema or tenderness.   Lymphadenopathy:     He has no cervical adenopathy.   Neurological: He is alert and oriented to person, place, and time.   Skin: Skin is warm and dry. No rash noted. He is not diaphoretic. No erythema.   Psychiatric: He has a normal mood and affect. His behavior is normal. Judgment and thought content normal.       Lab Results   Component Value Date    WBC 5.3 01/10/2018    HGB 14.1 01/10/2018    HCT 41.0 01/10/2018     01/10/2018    CHOL 147 02/04/2016    TRIG 107 02/04/2016    HDL 43 02/04/2016    ALT 21 01/11/2017    AST 23 01/10/2018     02/04/2019    K 4.5 02/04/2019     02/04/2019     CREATININE 1.15 02/04/2019    BUN 18 02/04/2019    CO2 25.6 02/04/2019    TSH 1.40 02/04/2019    PSA <0.01 08/03/2015    INR 0.9 07/13/2015    HGBA1C 5.9 01/15/2013       Assessment:       1. Hospital discharge follow-up    2. Vasovagal syncope    3. Headache, temporal    4. Hiatal hernia with GERD and esophagitis    5. History of prostate cancer; total prostatectomy 2004    6. Primary insomnia        Plan:     Hospital discharge follow-up  Vasovagal syncope- most likely the etiology for the admission. However the patient has been having problems with elevation in blood pressure and complement headaches. Of course whether the headache comes before the elevated blood pressure is unknown at this time. The patient has mild dementia and keeps adding additional complaints to his headache. Initially the headache was bitemporal and then it started back in the base of the neck and is now accompanied by belching and heartburn. The one thing that is definite that the patient's blood pressure is elevated when he has a headache and most headaches are at the end of the day. He does have reproducible discomfort in his neck when he turns his head quickly from one side to the next and this was a complaint that he had to me many months ago as well.    Headache, temporal  -     Sedimentation rate; Future; Expected date: 05/28/2019    Hiatal hernia with GERD and esophagitis   Patient may need to revisit with gastroenterology for another EGD as he did require dilatation 2 years ago.  Continue PPI for now for now bid    History of prostate cancer; total prostatectomy 2004  -     Prostate Specific Antigen, Diagnostic; Future; Expected date: 05/28/2019    Primary insomnia  -     mirtazapine (REMERON) 7.5 MG Tab; Take 1 tablet (7.5 mg total) by mouth every evening.  Dispense: 30 tablet; Refill: 3    Time spent with the patient was 40 min and 50 percent of that was in face to face contact  I personally reviewed patient's actual radiographic  studies and discussed them with the patient  And echo and CTA , from the hospitalization.   Patient signed a consent to obtain outside records to review after clinic visit so as to improve clinical care for this patient

## 2019-05-30 ENCOUNTER — TELEPHONE (OUTPATIENT)
Dept: FAMILY MEDICINE | Facility: CLINIC | Age: 84
End: 2019-05-30

## 2019-05-30 NOTE — TELEPHONE ENCOUNTER
Inflammatory marker not elevated at all so has headaches are not temporal arteritis. Also his PSA is undetectable and we will fax this result to Dr. Cotto. (can you do this tiffanie please)  keep office visits with cardiology and neurology and then with me to help determine the etiology of the headaches.

## 2019-05-30 NOTE — TELEPHONE ENCOUNTER
Attempted to reach pt re: results, no answer left message but I did fax his psa result to Dr. gomez.

## 2019-06-11 ENCOUNTER — TELEPHONE (OUTPATIENT)
Dept: FAMILY MEDICINE | Facility: CLINIC | Age: 84
End: 2019-06-11

## 2019-06-17 RX ORDER — MONTELUKAST SODIUM 10 MG/1
TABLET ORAL
Qty: 90 TABLET | Refills: 3 | Status: SHIPPED | OUTPATIENT
Start: 2019-06-17 | End: 2021-03-04 | Stop reason: SDUPTHER

## 2019-06-20 ENCOUNTER — OFFICE VISIT (OUTPATIENT)
Dept: FAMILY MEDICINE | Facility: CLINIC | Age: 84
End: 2019-06-20
Payer: MEDICARE

## 2019-06-20 VITALS — HEIGHT: 61 IN | BODY MASS INDEX: 27.4 KG/M2

## 2019-06-20 DIAGNOSIS — K59.01 SLOW TRANSIT CONSTIPATION: ICD-10-CM

## 2019-06-20 DIAGNOSIS — B96.89 ACUTE BACTERIAL SINUSITIS: Primary | ICD-10-CM

## 2019-06-20 DIAGNOSIS — J01.90 ACUTE BACTERIAL SINUSITIS: Primary | ICD-10-CM

## 2019-06-20 DIAGNOSIS — R41.3 SHORT-TERM MEMORY LOSS: ICD-10-CM

## 2019-06-20 DIAGNOSIS — I67.9 CEREBRAL VASCULAR DISEASE: ICD-10-CM

## 2019-06-20 DIAGNOSIS — N39.41 URGE INCONTINENCE: ICD-10-CM

## 2019-06-20 PROCEDURE — 1101F PT FALLS ASSESS-DOCD LE1/YR: CPT | Mod: ,,, | Performed by: INTERNAL MEDICINE

## 2019-06-20 PROCEDURE — 96372 THER/PROPH/DIAG INJ SC/IM: CPT | Mod: ,,, | Performed by: INTERNAL MEDICINE

## 2019-06-20 PROCEDURE — 96372 PR INJECTION,THERAP/PROPH/DIAG2ST, IM OR SUBCUT: ICD-10-PCS | Mod: ,,, | Performed by: INTERNAL MEDICINE

## 2019-06-20 PROCEDURE — 99214 PR OFFICE/OUTPT VISIT, EST, LEVL IV, 30-39 MIN: ICD-10-PCS | Mod: 25,,, | Performed by: INTERNAL MEDICINE

## 2019-06-20 PROCEDURE — 1101F PR PT FALLS ASSESS DOC 0-1 FALLS W/OUT INJ PAST YR: ICD-10-PCS | Mod: ,,, | Performed by: INTERNAL MEDICINE

## 2019-06-20 PROCEDURE — 99214 OFFICE O/P EST MOD 30 MIN: CPT | Mod: 25,,, | Performed by: INTERNAL MEDICINE

## 2019-06-20 RX ORDER — DEXAMETHASONE SODIUM PHOSPHATE 4 MG/ML
4 INJECTION, SOLUTION INTRA-ARTICULAR; INTRALESIONAL; INTRAMUSCULAR; INTRAVENOUS; SOFT TISSUE
Status: DISCONTINUED | OUTPATIENT
Start: 2019-06-20 | End: 2019-06-20

## 2019-06-20 RX ORDER — AMOXICILLIN 500 MG/1
500 TABLET, FILM COATED ORAL EVERY 12 HOURS
Qty: 20 TABLET | Refills: 0 | Status: SHIPPED | OUTPATIENT
Start: 2019-06-20 | End: 2019-06-30

## 2019-06-20 RX ORDER — DEXAMETHASONE SODIUM PHOSPHATE 4 MG/ML
4 INJECTION, SOLUTION INTRA-ARTICULAR; INTRALESIONAL; INTRAMUSCULAR; INTRAVENOUS; SOFT TISSUE
Status: COMPLETED | OUTPATIENT
Start: 2019-06-20 | End: 2019-06-20

## 2019-06-20 RX ORDER — DEXAMETHASONE SODIUM PHOSPHATE 4 MG/ML
4 INJECTION, SOLUTION INTRA-ARTICULAR; INTRALESIONAL; INTRAMUSCULAR; INTRAVENOUS; SOFT TISSUE EVERY 6 HOURS
Status: DISCONTINUED | OUTPATIENT
Start: 2019-06-20 | End: 2019-06-20

## 2019-06-20 RX ORDER — DOCUSATE SODIUM 100 MG/1
100 CAPSULE, LIQUID FILLED ORAL 2 TIMES DAILY
Refills: 0
Start: 2019-06-20 | End: 2021-12-28

## 2019-06-20 RX ORDER — MEMANTINE HYDROCHLORIDE 5 MG/1
10 TABLET ORAL 2 TIMES DAILY
COMMUNITY
End: 2019-12-04

## 2019-06-20 RX ADMIN — DEXAMETHASONE SODIUM PHOSPHATE 4 MG: 4 INJECTION, SOLUTION INTRA-ARTICULAR; INTRALESIONAL; INTRAMUSCULAR; INTRAVENOUS; SOFT TISSUE at 05:06

## 2019-06-21 NOTE — PROGRESS NOTES
Subjective:       Patient ID: Gene Hartman is a 85 y.o. male.    Chief Complaint: Follow-up (lab review) and medication f/u    85-year-old gentleman is here for follow-up on multiple reasons. He had a hospitalization a few weeks ago and since then is seen the urologist as well as the neurologist and cardiologist. He had a presyncopal episode with unknown etiology. On CT of his head he had 2 small lacunar strokes as talked about them prior office visit. The neurologist said he had a few silent strokes despite being maintained on Plavix and 81 mg aspirin. However his blood pressure has been labile over the past couple of months. More recently it has been under better control but the wife does report him having a headache in the early evening accompanied by an elevation in blood pressure and she would give him a catapres and/or Ativan with improvement in blood pressure and headache. We did discuss the medications that can make his mental status altered. 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 The neurologist placed him on Namenda 5 mg twice a day. The urologist put him on to ditropan 5mg extended release daily and unfortunately he has had an episode of constipation and fecal impaction since. The wife thinks he was given samples of Myrbetriq that might of raised his blood pressure but I discussed that this was probably a red herring as Myrbetriq does not raise blood pressure. He urinates at least 2 times a night sometimes 3. It has been somewhat improved on the Ditropan however. He has had a total prostatectomy years ago for prostate cancer. Today's also complaining about sinus congestion with the postnasal drip. He does cough up the mucus but does not look at it unfortunately. He has no fevers or chills. He has a pressure-like headache but no wheezing or lower respiratory tract symptoms.    Review of Systems   Constitutional:  Negative for activity change, diaphoresis, fatigue and fever.   HENT: Positive for postnasal drip. Negative for congestion, ear pain, sinus pressure, sore throat and trouble swallowing.    Eyes: Negative for pain.   Respiratory: Positive for cough. Negative for chest tightness, shortness of breath and wheezing.    Cardiovascular: Negative for chest pain, palpitations and leg swelling.   Gastrointestinal: Negative for abdominal distention, abdominal pain, blood in stool, constipation and diarrhea.   Endocrine: Negative for cold intolerance and heat intolerance.   Genitourinary: Negative for decreased urine volume, difficulty urinating, dysuria, flank pain, frequency and hematuria.   Musculoskeletal: Negative for arthralgias, back pain, joint swelling, myalgias and neck pain.   Skin: Negative for pallor, rash and wound.   Neurological: Negative for tremors, syncope, weakness, light-headedness, numbness and headaches.   Hematological: Negative for adenopathy.   Psychiatric/Behavioral: Negative for confusion, decreased concentration, hallucinations, self-injury, sleep disturbance and suicidal ideas. The patient is not nervous/anxious.        Past Medical History:   Diagnosis Date    Anxiety     Arthritis     CAD (coronary artery disease) age 68    Carotid artery occlusion     Cerebrovascular small vessel disease     Colon polyps age 78    GERD (gastroesophageal reflux disease)     H. pylori infection     HTN (hypertension), benign age 65    Hyperlipidemia LDL goal <70 age 65    Hypothyroidism     Multiple fractures of ribs of right side 11/27/2012    Myocardial infarction     Pernicious anemia 1/26/2018    Primary insomnia 10/9/2018    Prostate cancer age 67    Syncopal episodes 3/2013    Urge incontinence 5/8/2018       Past Surgical History:   Procedure Laterality Date    CARDIAC PACEMAKER PLACEMENT  10/2015    CARDIAC SURGERY      CATARACT EXTRACTION W/  INTRAOCULAR LENS IMPLANT Bilateral      COLONOSCOPY  ~2013    Dr. Edge    COLONOSCOPY N/A 6/8/2016    [facility]    CORONARY ANGIOPLASTY WITH STENT PLACEMENT  2003    x 2 RCA    ESOPHAGOGASTRODUODENOSCOPY (EGD) N/A 11/15/2017    [facility]    ESOPHAGOGASTRODUODENOSCOPY (EGD) N/A 11/15/2016    [facility]    PROSTATECTOMY  2002    UPPER GASTROINTESTINAL ENDOSCOPY  11/15/2016    Dr. Barahona       Family History   Problem Relation Age of Onset    Migraines Mother     Heart failure Father     Heart disease Father 56        MI    Heart failure Brother     Heart disease Brother     Diabetes Son     Hypertension Son     Heart disease Brother     Mental illness Brother     No Known Problems Son     Colon cancer Neg Hx     Colon polyps Neg Hx     Crohn's disease Neg Hx     Ulcerative colitis Neg Hx     Stomach cancer Neg Hx     Esophageal cancer Neg Hx        Social History     Socioeconomic History    Marital status:      Spouse name: Not on file    Number of children: 2    Years of education: Not on file    Highest education level: Not on file   Occupational History    Not on file   Social Needs    Financial resource strain: Not on file    Food insecurity:     Worry: Not on file     Inability: Not on file    Transportation needs:     Medical: Not on file     Non-medical: Not on file   Tobacco Use    Smoking status: Never Smoker    Smokeless tobacco: Never Used   Substance and Sexual Activity    Alcohol use: No    Drug use: No    Sexual activity: Not Currently   Lifestyle    Physical activity:     Days per week: Not on file     Minutes per session: Not on file    Stress: Not at all   Relationships    Social connections:     Talks on phone: Not on file     Gets together: Not on file     Attends Gnosticism service: Not on file     Active member of club or organization: Not on file     Attends meetings of clubs or  organizations: Not on file     Relationship status: Not on file   Other Topics Concern    Not on file   Social History Narrative    The patient does exercise regularly (walking).    Rates diet as good.    He is satisfied with weight.               Current Outpatient Medications   Medication Sig Dispense Refill    aspirin (ECOTRIN) 81 MG EC tablet Take 81 mg by mouth Daily.      atorvastatin (LIPITOR) 40 MG tablet TAKE 1 TABLET ONE TIME DAILY 90 tablet 3    clopidogrel (PLAVIX) 75 mg tablet Take 1 tablet (75 mg total) by mouth once daily. 90 tablet 3    cyanocobalamin, vitamin B-12, (VITAMIN B-12) 5,000 mcg Subl Place 1 tablet under the tongue once daily. 30 tablet 11    cycloSPORINE (RESTASIS) 0.05 % ophthalmic emulsion Apply 1 drop to eye 2 (two) times daily.      fluticasone (FLONASE) 50 mcg/actuation nasal spray 1 spray (50 mcg total) by Each Nare route 2 (two) times daily. 48 g 3    folic acid (FOLVITE) 1 MG tablet TAKE 1 TABLET ONE TIME DAILY 90 tablet 3    levothyroxine (SYNTHROID) 50 MCG tablet TAKE 1 TABLET (50 MCG TOTAL) BY MOUTH ONCE DAILY. 90 tablet 2    LORazepam (ATIVAN) 0.5 MG tablet TAKE 1 TABLET EVERY 12 HOURS AS NEEDED FOR ANXIETY 60 tablet 5    losartan (COZAAR) 50 MG tablet Take 1 tablet (50 mg total) by mouth 2 (two) times daily. 180 tablet 3    melatonin 5 mg Subl melatonin 5 mg sublingual tablet   Place by sublingual route.      metoprolol succinate (TOPROL-XL) 25 MG 24 hr tablet TAKE 1 TABLET ONE TIME DAILY 90 tablet 3    mirtazapine (REMERON) 7.5 MG Tab Take 1 tablet (7.5 mg total) by mouth every evening. 30 tablet 3    montelukast (SINGULAIR) 10 mg tablet TAKE 1 TABLET EVERY EVENING 90 tablet 3    oxybutynin (DITROPAN-XL) 5 MG TR24       pantoprazole (PROTONIX) 40 MG tablet TAKE 1 TABLET TWICE DAILY 180 tablet 3    amoxicillin (AMOXIL) 500 MG Tab Take 1 tablet (500 mg total) by mouth every 12 (twelve) hours. for 10 days 20 tablet 0    cloNIDine (CATAPRES) 0.1 MG tablet  Take 1 tablet (0.1 mg total) by mouth 2 (two) times daily as needed (SBP greater than 160). 60 tablet 3    dextromethorphan-guaifenesin  mg (MUCINEX DM)  mg per 12 hr tablet Take 1 tablet by mouth 2 (two) times daily. for 10 days 20 tablet 0    docusate sodium (COLACE) 100 MG capsule Take 1 capsule (100 mg total) by mouth 2 (two) times daily.  0    memantine (NAMENDA) 5 MG Tab Namenda 5 mg tablet   Take 1 tablet twice a day by oral route.      mirabegron (MYRBETRIQ) 25 mg Tb24 ER tablet Take 1 tablet (25 mg total) by mouth once daily. 30 tablet 11     No current facility-administered medications for this visit.        Review of patient's allergies indicates:   Allergen Reactions    Iodinated contrast- oral and iv dye Rash    Pontocaine [tetracaine hcl] Anaphylaxis     hypotension     Objective:      Physical Exam   Constitutional: He is oriented to person, place, and time. He appears well-developed and well-nourished. No distress.   HENT:   Head: Normocephalic and atraumatic.   Right Ear: External ear normal.   Left Ear: External ear normal.   Nose: Nose normal.   Mouth/Throat: Oropharynx is clear and moist.   Positive postnasal drip visualized - grayish-white  Submandibular gland mildly engorged   Eyes: Conjunctivae and EOM are normal. Right eye exhibits no discharge. Left eye exhibits no discharge. No scleral icterus.   Neck: Normal range of motion. Neck supple. No JVD present. No tracheal deviation present. No thyromegaly present.   Cardiovascular: Normal rate, regular rhythm, normal heart sounds and intact distal pulses. Exam reveals no gallop.   No murmur heard.  Pulmonary/Chest: Effort normal and breath sounds normal. No respiratory distress. He has no wheezes. He has no rales.   Abdominal: Soft. Bowel sounds are normal. He exhibits no distension and no mass. There is no tenderness.   Musculoskeletal: Normal range of motion. He exhibits no edema or tenderness.   Lymphadenopathy:     He has no  cervical adenopathy.   Neurological: He is alert and oriented to person, place, and time.   Skin: Skin is warm and dry. No rash noted. He is not diaphoretic. No erythema.   Psychiatric: He has a normal mood and affect. His behavior is normal. Judgment and thought content normal.       Lab Results   Component Value Date    WBC 5.3 01/10/2018    HGB 14.1 01/10/2018    HCT 41.0 01/10/2018     01/10/2018    CHOL 147 02/04/2016    TRIG 107 02/04/2016    HDL 43 02/04/2016    ALT 21 01/11/2017    AST 23 01/10/2018     02/04/2019    K 4.5 02/04/2019     02/04/2019    CREATININE 1.15 02/04/2019    BUN 18 02/04/2019    CO2 25.6 02/04/2019    TSH 1.40 02/04/2019    PSA <0.01 08/03/2015    INR 0.9 07/13/2015    HGBA1C 5.9 01/15/2013       Assessment:       1. Acute bacterial sinusitis    2. Short-term memory loss    3. Urge incontinence    4. Cerebral vascular disease    5. Slow transit constipation        Plan:     Acute bacterial sinusitis  -     dextromethorphan-guaifenesin  mg (MUCINEX DM)  mg per 12 hr tablet; Take 1 tablet by mouth 2 (two) times daily. for 10 days  Dispense: 20 tablet; Refill: 0  -     amoxicillin (AMOXIL) 500 MG Tab; Take 1 tablet (500 mg total) by mouth every 12 (twelve) hours. for 10 days  Dispense: 20 tablet; Refill: 0  -     dexamethasone injection 4 mg    Short-term memory loss   On namenda now    Limit ditropan, ativan and catapres as per HPI    Urge incontinence   Switch to Myrbetriq   -     mirabegron (MYRBETRIQ) 25 mg Tb24 ER tablet; Take 1 tablet (25 mg total) by mouth once daily.  Dispense: 30 tablet; Refill: 11    Cerebral vascular disease   Continue plavix and aspirin , blood pressure control and statin    Slow transit constipation- likely due to ditropan  -     docusate sodium (COLACE) 100 MG capsule; Take 1 capsule (100 mg total) by mouth 2 (two) times daily.; Refill: 0

## 2019-07-25 ENCOUNTER — OFFICE VISIT (OUTPATIENT)
Dept: FAMILY MEDICINE | Facility: CLINIC | Age: 84
End: 2019-07-25
Payer: MEDICARE

## 2019-07-25 VITALS — BODY MASS INDEX: 27.4 KG/M2 | HEIGHT: 61 IN

## 2019-07-25 DIAGNOSIS — E78.5 HYPERLIPIDEMIA LDL GOAL <70: ICD-10-CM

## 2019-07-25 DIAGNOSIS — I10 ESSENTIAL HYPERTENSION: Primary | ICD-10-CM

## 2019-07-25 DIAGNOSIS — E53.8 FOLIC ACID DEFICIENCY: ICD-10-CM

## 2019-07-25 DIAGNOSIS — R41.3 SHORT-TERM MEMORY LOSS: ICD-10-CM

## 2019-07-25 DIAGNOSIS — F51.01 PRIMARY INSOMNIA: ICD-10-CM

## 2019-07-25 DIAGNOSIS — E03.9 ACQUIRED HYPOTHYROIDISM: ICD-10-CM

## 2019-07-25 PROCEDURE — 1101F PR PT FALLS ASSESS DOC 0-1 FALLS W/OUT INJ PAST YR: ICD-10-PCS | Mod: ,,, | Performed by: INTERNAL MEDICINE

## 2019-07-25 PROCEDURE — 1101F PT FALLS ASSESS-DOCD LE1/YR: CPT | Mod: ,,, | Performed by: INTERNAL MEDICINE

## 2019-07-25 PROCEDURE — 99213 PR OFFICE/OUTPT VISIT, EST, LEVL III, 20-29 MIN: ICD-10-PCS | Mod: 25,,, | Performed by: INTERNAL MEDICINE

## 2019-07-25 PROCEDURE — G0009 PNEUMOCOCCAL POLYSACCHARIDE VACCINE 23-VALENT =>2YO SQ IM: ICD-10-PCS | Mod: ,,, | Performed by: INTERNAL MEDICINE

## 2019-07-25 PROCEDURE — 90732 PPSV23 VACC 2 YRS+ SUBQ/IM: CPT | Mod: ,,, | Performed by: INTERNAL MEDICINE

## 2019-07-25 PROCEDURE — 99213 OFFICE O/P EST LOW 20 MIN: CPT | Mod: 25,,, | Performed by: INTERNAL MEDICINE

## 2019-07-25 PROCEDURE — 90732 PNEUMOCOCCAL POLYSACCHARIDE VACCINE 23-VALENT =>2YO SQ IM: ICD-10-PCS | Mod: ,,, | Performed by: INTERNAL MEDICINE

## 2019-07-25 PROCEDURE — G0009 ADMIN PNEUMOCOCCAL VACCINE: HCPCS | Mod: ,,, | Performed by: INTERNAL MEDICINE

## 2019-07-25 NOTE — PROGRESS NOTES
Subjective:       Patient ID: Gene Hartman is a 85 y.o. male.    Chief Complaint: Follow-up and Hyperlipidemia    85-year-old gentleman who is here for follow-up for intermittent headaches likely due to spiking of high blood pressure which required an emergency room visit and subsequent overnight hospital stay a few weeks prior.  He has seen Neurology as well and Cardiology and has had multiple imaging studies and lab tests as referred to in my previous note.  The conclusion was mild dementia along with again blood pressure spiking up causing a headache and insufficient sleep syndrome.  On last office visit a few weeks ago we discontinued any central nervous system sedating medications including to try to avoid Catapres.  We stopped the ditropan which was recently started and put him on Myrbetriq with possibility of nocturia causing insomnia and insufficient sleep.  He is doing great with half to a whole Remeron 7.5 mg at night which the wife is giving him and she is very pleased with the results.  He has not had any blood pressure spikes or headaches per se and is not even getting up to urinate at night off of urge incontinence medications.  The patient had lab work done to include a lipid profile by his cardiologist.    Review of Systems   Constitutional: Negative for activity change, diaphoresis, fatigue and fever.   HENT: Negative for congestion, ear pain, postnasal drip, sinus pressure, sore throat and trouble swallowing.    Eyes: Negative for pain.   Respiratory: Negative for cough, chest tightness and wheezing.    Cardiovascular: Negative for palpitations and leg swelling.   Gastrointestinal: Negative for abdominal distention, abdominal pain, blood in stool, constipation and diarrhea.   Endocrine: Negative for cold intolerance and heat intolerance.   Genitourinary: Negative for decreased urine volume, difficulty urinating, dysuria, flank pain, frequency and hematuria.   Musculoskeletal: Negative for  arthralgias, back pain, joint swelling and neck pain.   Skin: Negative for pallor, rash and wound.   Neurological: Negative for tremors, syncope, weakness, light-headedness, numbness and headaches.   Hematological: Negative for adenopathy.   Psychiatric/Behavioral: Negative for confusion, decreased concentration, hallucinations, self-injury, sleep disturbance and suicidal ideas. The patient is not nervous/anxious.        Past Medical History:   Diagnosis Date    Anxiety     Arthritis     CAD (coronary artery disease) age 68    Carotid artery occlusion     Cerebrovascular small vessel disease     Colon polyps age 78    GERD (gastroesophageal reflux disease)     H. pylori infection     HTN (hypertension), benign age 65    Hyperlipidemia LDL goal <70 age 65    Hypothyroidism     Multiple fractures of ribs of right side 11/27/2012    Myocardial infarction     Pernicious anemia 1/26/2018    Primary insomnia 10/9/2018    Prostate cancer age 67    Syncopal episodes 3/2013    Urge incontinence 5/8/2018       Past Surgical History:   Procedure Laterality Date    CARDIAC PACEMAKER PLACEMENT  10/2015    CARDIAC SURGERY      CATARACT EXTRACTION W/  INTRAOCULAR LENS IMPLANT Bilateral     COLONOSCOPY  ~2013    Dr. Edge    COLONOSCOPY N/A 6/8/2016    [facility]    CORONARY ANGIOPLASTY WITH STENT PLACEMENT  2003    x 2 RCA    ESOPHAGOGASTRODUODENOSCOPY (EGD) N/A 11/15/2017    [facility]    ESOPHAGOGASTRODUODENOSCOPY (EGD) N/A 11/15/2016    [facility]    PROSTATECTOMY  2002    UPPER GASTROINTESTINAL ENDOSCOPY  11/15/2016    Dr. Barahona       Family History   Problem Relation Age of Onset    Migraines Mother     Heart failure Father     Heart disease Father 56        MI    Heart failure Brother     Heart disease Brother     Diabetes Son     Hypertension Son     Heart disease Brother      Mental illness Brother     No Known Problems Son     Colon cancer Neg Hx     Colon polyps Neg Hx     Crohn's disease Neg Hx     Ulcerative colitis Neg Hx     Stomach cancer Neg Hx     Esophageal cancer Neg Hx        Social History     Socioeconomic History    Marital status:      Spouse name: Not on file    Number of children: 2    Years of education: Not on file    Highest education level: Not on file   Occupational History    Not on file   Social Needs    Financial resource strain: Not on file    Food insecurity:     Worry: Not on file     Inability: Not on file    Transportation needs:     Medical: Not on file     Non-medical: Not on file   Tobacco Use    Smoking status: Never Smoker    Smokeless tobacco: Never Used   Substance and Sexual Activity    Alcohol use: No    Drug use: No    Sexual activity: Not Currently   Lifestyle    Physical activity:     Days per week: Not on file     Minutes per session: Not on file    Stress: Not at all   Relationships    Social connections:     Talks on phone: Not on file     Gets together: Not on file     Attends Church service: Not on file     Active member of club or organization: Not on file     Attends meetings of clubs or organizations: Not on file     Relationship status: Not on file   Other Topics Concern    Not on file   Social History Narrative    The patient does exercise regularly (walking).    Rates diet as good.    He is satisfied with weight.               Current Outpatient Medications   Medication Sig Dispense Refill    aspirin (ECOTRIN) 81 MG EC tablet Take 81 mg by mouth Daily.      atorvastatin (LIPITOR) 40 MG tablet TAKE 1 TABLET ONE TIME DAILY 90 tablet 3    clopidogrel (PLAVIX) 75 mg tablet Take 1 tablet (75 mg total) by mouth once daily. 90 tablet 3    cyanocobalamin, vitamin B-12, (VITAMIN B-12) 5,000 mcg Subl Place 1 tablet under the tongue once daily. 30 tablet 11    docusate sodium (COLACE) 100 MG capsule Take 1  capsule (100 mg total) by mouth 2 (two) times daily.  0    fluticasone (FLONASE) 50 mcg/actuation nasal spray 1 spray (50 mcg total) by Each Nare route 2 (two) times daily. 48 g 3    folic acid (FOLVITE) 1 MG tablet TAKE 1 TABLET ONE TIME DAILY 90 tablet 3    levothyroxine (SYNTHROID) 50 MCG tablet TAKE 1 TABLET (50 MCG TOTAL) BY MOUTH ONCE DAILY. 90 tablet 2    losartan (COZAAR) 50 MG tablet Take 1 tablet (50 mg total) by mouth 2 (two) times daily. 180 tablet 3    melatonin 5 mg Subl melatonin 5 mg sublingual tablet   Place by sublingual route.      memantine (NAMENDA) 5 MG Tab Namenda 5 mg tablet   Take 1 tablet twice a day by oral route.      metoprolol succinate (TOPROL-XL) 25 MG 24 hr tablet TAKE 1 TABLET ONE TIME DAILY 90 tablet 3    mirtazapine (REMERON) 7.5 MG Tab Take 1 tablet (7.5 mg total) by mouth every evening. 30 tablet 3    montelukast (SINGULAIR) 10 mg tablet TAKE 1 TABLET EVERY EVENING 90 tablet 3    pantoprazole (PROTONIX) 40 MG tablet TAKE 1 TABLET TWICE DAILY 180 tablet 3    cloNIDine (CATAPRES) 0.1 MG tablet Take 1 tablet (0.1 mg total) by mouth 2 (two) times daily as needed (SBP greater than 160). 60 tablet 3    cycloSPORINE (RESTASIS) 0.05 % ophthalmic emulsion Apply 1 drop to eye 2 (two) times daily.       No current facility-administered medications for this visit.        Review of patient's allergies indicates:   Allergen Reactions    Iodinated contrast- oral and iv dye Rash    Pontocaine [tetracaine hcl] Anaphylaxis     hypotension     Objective:      Physical Exam   Constitutional: He is oriented to person, place, and time. He appears well-developed and well-nourished. No distress.   HENT:   Head: Normocephalic and atraumatic.   Right Ear: External ear normal.   Left Ear: External ear normal.   Nose: Nose normal.   Mouth/Throat: Oropharynx is clear and moist.   Eyes: Conjunctivae and EOM are normal. Right eye exhibits no discharge. Left eye exhibits no discharge. No scleral  icterus.   Neck: Normal range of motion. Neck supple. No JVD present. No tracheal deviation present. No thyromegaly present.   Cardiovascular: Normal rate, regular rhythm, normal heart sounds and intact distal pulses. Exam reveals no gallop.   No murmur heard.  Pulmonary/Chest: Effort normal and breath sounds normal. No respiratory distress. He has no wheezes. He has no rales.   Abdominal: Soft. Bowel sounds are normal. He exhibits no distension and no mass. There is no tenderness.   Musculoskeletal: Normal range of motion. He exhibits no edema or tenderness.   Lymphadenopathy:     He has no cervical adenopathy.   Neurological: He is alert and oriented to person, place, and time.   Skin: Skin is warm and dry. No rash noted. He is not diaphoretic. No erythema.   Psychiatric: He has a normal mood and affect. His behavior is normal. Judgment and thought content normal.       Lab Results   Component Value Date    WBC 5.3 01/10/2018    HGB 14.1 01/10/2018    HCT 41.0 01/10/2018     01/10/2018    CHOL 147 02/04/2016    TRIG 107 02/04/2016    HDL 43 02/04/2016    ALT 21 01/11/2017    AST 23 01/10/2018     02/04/2019    K 4.5 02/04/2019     02/04/2019    CREATININE 1.15 02/04/2019    BUN 18 02/04/2019    CO2 25.6 02/04/2019    TSH 1.40 02/04/2019    PSA <0.01 08/03/2015    INR 0.9 07/13/2015    HGBA1C 5.9 01/15/2013       Assessment:       1. Essential hypertension    2. Hyperlipidemia LDL goal <70    3. Acquired hypothyroidism    4. Short-term memory loss    5. Primary insomnia    6. Folic acid deficiency        Plan:       Essential hypertension- better controlled now with getting adequate sleep on remeron   Controlled on above med regimen to include an ARB/ACE    Hyperlipidemia LDL goal <70   Continue statin    Acquired hypothyroidism   Continue present thyroid dose     Short-term memory loss   Now on nemenda and following with dr hall     Primary insomnia   Continue remeron 7.5 mg as it is  working    Folic acid deficiency   Continue folic acid 1 mg daily  Other orders  -     Pneumococcal Polysaccharide Vaccine (23 Valent) (SQ/IM)

## 2019-09-02 PROBLEM — Z09 HOSPITAL DISCHARGE FOLLOW-UP: Status: RESOLVED | Noted: 2019-05-28 | Resolved: 2019-09-02

## 2019-09-23 PROBLEM — J01.90 ACUTE BACTERIAL SINUSITIS: Status: RESOLVED | Noted: 2019-02-11 | Resolved: 2019-09-23

## 2019-09-23 PROBLEM — B96.89 ACUTE BACTERIAL SINUSITIS: Status: RESOLVED | Noted: 2019-02-11 | Resolved: 2019-09-23

## 2019-11-02 ENCOUNTER — HOSPITAL ENCOUNTER (OUTPATIENT)
Facility: HOSPITAL | Age: 84
Discharge: HOME | End: 2019-11-05
Attending: EMERGENCY MEDICINE | Admitting: INTERNAL MEDICINE
Payer: MEDICARE

## 2019-11-02 DIAGNOSIS — R07.9 CHEST PAIN: ICD-10-CM

## 2019-11-02 DIAGNOSIS — I47.20 VENTRICULAR TACHYCARDIA: ICD-10-CM

## 2019-11-02 DIAGNOSIS — R55 SYNCOPE, UNSPECIFIED SYNCOPE TYPE: ICD-10-CM

## 2019-11-02 DIAGNOSIS — R55 SYNCOPE: Primary | ICD-10-CM

## 2019-11-02 PROBLEM — I49.9 VENTRICULAR ARRHYTHMIA: Status: ACTIVE | Noted: 2019-11-02

## 2019-11-02 LAB
ALBUMIN SERPL BCP-MCNC: 3.7 G/DL (ref 3.5–5.2)
ALP SERPL-CCNC: 82 U/L (ref 55–135)
ALT SERPL W/O P-5'-P-CCNC: 27 U/L (ref 10–44)
ANION GAP SERPL CALC-SCNC: 8 MMOL/L (ref 8–16)
AST SERPL-CCNC: 29 U/L (ref 10–40)
BACTERIA #/AREA URNS HPF: NEGATIVE /HPF
BASOPHILS # BLD AUTO: 0.03 K/UL (ref 0–0.2)
BASOPHILS NFR BLD: 0.5 % (ref 0–1.9)
BILIRUB SERPL-MCNC: 1 MG/DL (ref 0.1–1)
BILIRUB UR QL STRIP: NEGATIVE
BNP SERPL-MCNC: 348 PG/ML (ref 0–99)
BUN SERPL-MCNC: 24 MG/DL (ref 8–23)
CALCIUM SERPL-MCNC: 8.4 MG/DL (ref 8.7–10.5)
CHLORIDE SERPL-SCNC: 109 MMOL/L (ref 95–110)
CK MB SERPL-MCNC: 1.4 NG/ML (ref 0.1–6.5)
CK MB SERPL-MCNC: 1.6 NG/ML (ref 0.1–6.5)
CLARITY UR: CLEAR
CO2 SERPL-SCNC: 25 MMOL/L (ref 23–29)
COLOR UR: YELLOW
CREAT SERPL-MCNC: 1.2 MG/DL (ref 0.5–1.4)
DIFFERENTIAL METHOD: ABNORMAL
EOSINOPHIL # BLD AUTO: 0 K/UL (ref 0–0.5)
EOSINOPHIL NFR BLD: 0.5 % (ref 0–8)
ERYTHROCYTE [DISTWIDTH] IN BLOOD BY AUTOMATED COUNT: 13.8 % (ref 11.5–14.5)
EST. GFR  (AFRICAN AMERICAN): >60 ML/MIN/1.73 M^2
EST. GFR  (NON AFRICAN AMERICAN): 54.8 ML/MIN/1.73 M^2
GLUCOSE SERPL-MCNC: 132 MG/DL (ref 70–110)
GLUCOSE UR QL STRIP: NEGATIVE
HCT VFR BLD AUTO: 39 % (ref 40–54)
HGB BLD-MCNC: 13.1 G/DL (ref 14–18)
HGB UR QL STRIP: NEGATIVE
HYALINE CASTS #/AREA URNS LPF: 4 /LPF
IMM GRANULOCYTES # BLD AUTO: 0.02 K/UL (ref 0–0.04)
IMM GRANULOCYTES NFR BLD AUTO: 0.4 % (ref 0–0.5)
INR PPP: 1
KETONES UR QL STRIP: NEGATIVE
LEUKOCYTE ESTERASE UR QL STRIP: NEGATIVE
LYMPHOCYTES # BLD AUTO: 2.4 K/UL (ref 1–4.8)
LYMPHOCYTES NFR BLD: 43.3 % (ref 18–48)
MAGNESIUM SERPL-MCNC: 1.9 MG/DL (ref 1.6–2.6)
MCH RBC QN AUTO: 32.5 PG (ref 27–31)
MCHC RBC AUTO-ENTMCNC: 33.6 G/DL (ref 32–36)
MCV RBC AUTO: 97 FL (ref 82–98)
MICROSCOPIC COMMENT: ABNORMAL
MONOCYTES # BLD AUTO: 0.3 K/UL (ref 0.3–1)
MONOCYTES NFR BLD: 4.6 % (ref 4–15)
NEUTROPHILS # BLD AUTO: 2.9 K/UL (ref 1.8–7.7)
NEUTROPHILS NFR BLD: 50.7 % (ref 38–73)
NITRITE UR QL STRIP: NEGATIVE
NRBC BLD-RTO: 0 /100 WBC
PH UR STRIP: 7 [PH] (ref 5–8)
PLATELET # BLD AUTO: 150 K/UL (ref 150–350)
PMV BLD AUTO: 11.9 FL (ref 9.2–12.9)
POTASSIUM SERPL-SCNC: 4.4 MMOL/L (ref 3.5–5.1)
PROT SERPL-MCNC: 6.5 G/DL (ref 6–8.4)
PROT UR QL STRIP: ABNORMAL
PROTHROMBIN TIME: 13.2 SEC (ref 10.6–14.8)
RBC # BLD AUTO: 4.03 M/UL (ref 4.6–6.2)
RBC #/AREA URNS HPF: 1 /HPF (ref 0–4)
SODIUM SERPL-SCNC: 142 MMOL/L (ref 136–145)
SP GR UR STRIP: 1.01 (ref 1–1.03)
SQUAMOUS #/AREA URNS HPF: 1 /HPF
TROPONIN I SERPL DL<=0.01 NG/ML-MCNC: <0.03 NG/ML (ref 0.02–0.04)
TSH SERPL DL<=0.005 MIU/L-ACNC: 1.96 UIU/ML (ref 0.34–5.6)
URN SPEC COLLECT METH UR: ABNORMAL
UROBILINOGEN UR STRIP-ACNC: ABNORMAL EU/DL
WBC # BLD AUTO: 5.64 K/UL (ref 3.9–12.7)
WBC #/AREA URNS HPF: 0 /HPF (ref 0–5)

## 2019-11-02 PROCEDURE — 25000003 PHARM REV CODE 250: Performed by: NURSE PRACTITIONER

## 2019-11-02 PROCEDURE — 80053 COMPREHEN METABOLIC PANEL: CPT

## 2019-11-02 PROCEDURE — 81001 URINALYSIS AUTO W/SCOPE: CPT

## 2019-11-02 PROCEDURE — 83880 ASSAY OF NATRIURETIC PEPTIDE: CPT

## 2019-11-02 PROCEDURE — 99285 EMERGENCY DEPT VISIT HI MDM: CPT | Mod: 25

## 2019-11-02 PROCEDURE — 85610 PROTHROMBIN TIME: CPT

## 2019-11-02 PROCEDURE — 93005 ELECTROCARDIOGRAM TRACING: CPT

## 2019-11-02 PROCEDURE — G0378 HOSPITAL OBSERVATION PER HR: HCPCS

## 2019-11-02 PROCEDURE — 83036 HEMOGLOBIN GLYCOSYLATED A1C: CPT

## 2019-11-02 PROCEDURE — 82553 CREATINE MB FRACTION: CPT

## 2019-11-02 PROCEDURE — 84484 ASSAY OF TROPONIN QUANT: CPT

## 2019-11-02 PROCEDURE — 85025 COMPLETE CBC W/AUTO DIFF WBC: CPT

## 2019-11-02 PROCEDURE — 84484 ASSAY OF TROPONIN QUANT: CPT | Mod: 91

## 2019-11-02 PROCEDURE — 83735 ASSAY OF MAGNESIUM: CPT

## 2019-11-02 PROCEDURE — 36415 COLL VENOUS BLD VENIPUNCTURE: CPT

## 2019-11-02 PROCEDURE — 51798 US URINE CAPACITY MEASURE: CPT

## 2019-11-02 PROCEDURE — 84443 ASSAY THYROID STIM HORMONE: CPT

## 2019-11-02 RX ORDER — DONEPEZIL HYDROCHLORIDE 5 MG/1
5 TABLET, FILM COATED ORAL NIGHTLY
Status: DISCONTINUED | OUTPATIENT
Start: 2019-11-02 | End: 2019-11-05 | Stop reason: HOSPADM

## 2019-11-02 RX ORDER — SODIUM CHLORIDE 0.9 % (FLUSH) 0.9 %
10 SYRINGE (ML) INJECTION
Status: DISCONTINUED | OUTPATIENT
Start: 2019-11-02 | End: 2019-11-05 | Stop reason: HOSPADM

## 2019-11-02 RX ORDER — MEMANTINE HYDROCHLORIDE 5 MG/1
10 TABLET ORAL 2 TIMES DAILY
Status: DISCONTINUED | OUTPATIENT
Start: 2019-11-02 | End: 2019-11-05 | Stop reason: HOSPADM

## 2019-11-02 RX ORDER — CLOPIDOGREL BISULFATE 75 MG/1
75 TABLET ORAL DAILY
Status: DISCONTINUED | OUTPATIENT
Start: 2019-11-03 | End: 2019-11-05 | Stop reason: HOSPADM

## 2019-11-02 RX ORDER — NAPROXEN SODIUM 220 MG/1
81 TABLET, FILM COATED ORAL DAILY
Status: DISCONTINUED | OUTPATIENT
Start: 2019-11-03 | End: 2019-11-05 | Stop reason: HOSPADM

## 2019-11-02 RX ORDER — FLUTICASONE PROPIONATE 50 MCG
1 SPRAY, SUSPENSION (ML) NASAL 2 TIMES DAILY
Status: DISCONTINUED | OUTPATIENT
Start: 2019-11-02 | End: 2019-11-05 | Stop reason: HOSPADM

## 2019-11-02 RX ORDER — AMLODIPINE BESYLATE 5 MG/1
5 TABLET ORAL DAILY
COMMUNITY
End: 2021-12-28

## 2019-11-02 RX ORDER — METOPROLOL SUCCINATE 50 MG/1
50 TABLET, EXTENDED RELEASE ORAL DAILY
Status: DISCONTINUED | OUTPATIENT
Start: 2019-11-02 | End: 2019-11-03

## 2019-11-02 RX ORDER — PANTOPRAZOLE SODIUM 40 MG/1
40 TABLET, DELAYED RELEASE ORAL DAILY
Status: DISCONTINUED | OUTPATIENT
Start: 2019-11-03 | End: 2019-11-05 | Stop reason: HOSPADM

## 2019-11-02 RX ORDER — CLONIDINE HYDROCHLORIDE 0.1 MG/1
0.1 TABLET ORAL 2 TIMES DAILY PRN
Status: DISCONTINUED | OUTPATIENT
Start: 2019-11-02 | End: 2019-11-05 | Stop reason: HOSPADM

## 2019-11-02 RX ORDER — LOSARTAN POTASSIUM 50 MG/1
50 TABLET ORAL 2 TIMES DAILY
Status: DISCONTINUED | OUTPATIENT
Start: 2019-11-02 | End: 2019-11-05 | Stop reason: HOSPADM

## 2019-11-02 RX ORDER — DONEPEZIL HYDROCHLORIDE 5 MG/1
5 TABLET, FILM COATED ORAL NIGHTLY
COMMUNITY
End: 2019-12-04

## 2019-11-02 RX ORDER — LANOLIN ALCOHOL/MO/W.PET/CERES
2000 CREAM (GRAM) TOPICAL DAILY
Status: DISCONTINUED | OUTPATIENT
Start: 2019-11-03 | End: 2019-11-05 | Stop reason: HOSPADM

## 2019-11-02 RX ORDER — MIRTAZAPINE 7.5 MG/1
7.5 TABLET, FILM COATED ORAL NIGHTLY
Status: DISCONTINUED | OUTPATIENT
Start: 2019-11-02 | End: 2019-11-05 | Stop reason: HOSPADM

## 2019-11-02 RX ORDER — ONDANSETRON 2 MG/ML
4 INJECTION INTRAMUSCULAR; INTRAVENOUS EVERY 8 HOURS PRN
Status: DISCONTINUED | OUTPATIENT
Start: 2019-11-02 | End: 2019-11-05 | Stop reason: HOSPADM

## 2019-11-02 RX ORDER — AMLODIPINE BESYLATE 5 MG/1
5 TABLET ORAL DAILY
Status: DISCONTINUED | OUTPATIENT
Start: 2019-11-03 | End: 2019-11-05 | Stop reason: HOSPADM

## 2019-11-02 RX ORDER — MONTELUKAST SODIUM 10 MG/1
10 TABLET ORAL DAILY
Status: DISCONTINUED | OUTPATIENT
Start: 2019-11-03 | End: 2019-11-05 | Stop reason: HOSPADM

## 2019-11-02 RX ORDER — LEVOTHYROXINE SODIUM 25 UG/1
50 TABLET ORAL DAILY
Status: DISCONTINUED | OUTPATIENT
Start: 2019-11-03 | End: 2019-11-05 | Stop reason: HOSPADM

## 2019-11-02 RX ORDER — ACETAMINOPHEN 325 MG/1
650 TABLET ORAL EVERY 4 HOURS PRN
Status: DISCONTINUED | OUTPATIENT
Start: 2019-11-02 | End: 2019-11-05 | Stop reason: HOSPADM

## 2019-11-02 RX ADMIN — MEMANTINE HYDROCHLORIDE 10 MG: 5 TABLET ORAL at 08:11

## 2019-11-02 RX ADMIN — MIRTAZAPINE 7.5 MG: 7.5 TABLET, FILM COATED ORAL at 10:11

## 2019-11-02 RX ADMIN — DONEPEZIL HYDROCHLORIDE 5 MG: 5 TABLET, FILM COATED ORAL at 08:11

## 2019-11-02 RX ADMIN — METOPROLOL SUCCINATE 50 MG: 50 TABLET, FILM COATED, EXTENDED RELEASE ORAL at 05:11

## 2019-11-02 RX ADMIN — LOSARTAN POTASSIUM 50 MG: 50 TABLET, FILM COATED ORAL at 08:11

## 2019-11-02 NOTE — HPI
Mr. Hartman is an 85 year old male with a history of HTN, CAD s/p stents, Hypothyroidism, prostate cancer, and dementia who is brought to the ED by his family for syncope. The patient was sitting at the table when he began to have a sensation to bilateral temporal areas and then had a brief syncopal episode. The patient reportedly stayed seated in his chair. He did not slump or fall over. No jerking movements noted per the family. When the patient finally came around, he did not talk for several seconds. The wife reports the entire episode last approximately 5 minutes before the patient returned to his normal state. He has a history of headaches secondary to BP spiking.  The patient denies fever, chills, cough, chest pain, sob, numbness, tingling, focal neuro deficits, weakness, dizziness, dysuria, nausea, vomiting, abdominal pain, or diarrhea. He has a pacemaker. In the ED, BP is elevated 180/81.  Other vitals stable. He is noted to have bigeminy intermittently on telemetry. The pacemaker is interrogated in the ED and the representative reports significant ventricular arrhythmias including slower ventricular tachycardia lasting up to 9 minutes. Timing corresponds to patient's syncopal episode. The patient will be admitted to the hospital for further workup and for cardiology consult.

## 2019-11-02 NOTE — SUBJECTIVE & OBJECTIVE
Past Medical History:   Diagnosis Date    Anxiety     Arthritis     CAD (coronary artery disease) age 68    Carotid artery occlusion     Cerebrovascular small vessel disease     Colon polyps age 78    GERD (gastroesophageal reflux disease)     H. pylori infection     HTN (hypertension), benign age 65    Hyperlipidemia LDL goal <70 age 65    Hypothyroidism     Multiple fractures of ribs of right side 11/27/2012    Myocardial infarction     Pernicious anemia 1/26/2018    Primary insomnia 10/9/2018    Prostate cancer age 67    Syncopal episodes 3/2013    Urge incontinence 5/8/2018       Past Surgical History:   Procedure Laterality Date    CARDIAC PACEMAKER PLACEMENT  10/2015    CARDIAC SURGERY      CATARACT EXTRACTION W/  INTRAOCULAR LENS IMPLANT Bilateral     COLONOSCOPY  ~2013    Dr. Edge    COLONOSCOPY N/A 6/8/2016    Procedure: COLONOSCOPY;  Surgeon: Shamar Barahona MD;  Location: [facility];  Service: Endoscopy;  Laterality: N/A; REPEAT IN 1 YEAR    CORONARY ANGIOPLASTY WITH STENT PLACEMENT  2003    x 2 RCA    PROSTATECTOMY  2002    UPPER GASTROINTESTINAL ENDOSCOPY  11/15/2016    Dr. Barahona       Review of patient's allergies indicates:   Allergen Reactions    Iodinated contrast media Rash    Pontocaine [tetracaine hcl] Anaphylaxis     hypotension       No current facility-administered medications on file prior to encounter.      Current Outpatient Medications on File Prior to Encounter   Medication Sig    aspirin (ECOTRIN) 81 MG EC tablet Take 81 mg by mouth Daily.    atorvastatin (LIPITOR) 40 MG tablet TAKE 1 TABLET ONE TIME DAILY    cloNIDine (CATAPRES) 0.1 MG tablet Take 1 tablet (0.1 mg total) by mouth 2 (two) times daily as needed (SBP greater than 160).    clopidogrel (PLAVIX) 75 mg tablet Take 1 tablet (75 mg total) by mouth once daily.    cyanocobalamin, vitamin B-12, (VITAMIN B-12) 5,000 mcg Subl Place 1 tablet under the tongue once daily.    cycloSPORINE (RESTASIS)  0.05 % ophthalmic emulsion Apply 1 drop to eye 2 (two) times daily.    docusate sodium (COLACE) 100 MG capsule Take 1 capsule (100 mg total) by mouth 2 (two) times daily.    fluticasone (FLONASE) 50 mcg/actuation nasal spray 1 spray (50 mcg total) by Each Nare route 2 (two) times daily.    folic acid (FOLVITE) 1 MG tablet TAKE 1 TABLET ONE TIME DAILY    levothyroxine (SYNTHROID) 50 MCG tablet TAKE 1 TABLET (50 MCG TOTAL) BY MOUTH ONCE DAILY.    losartan (COZAAR) 50 MG tablet Take 1 tablet (50 mg total) by mouth 2 (two) times daily.    melatonin 5 mg Subl melatonin 5 mg sublingual tablet   Place by sublingual route.    memantine (NAMENDA) 5 MG Tab Namenda 5 mg tablet   Take 1 tablet twice a day by oral route.    metoprolol succinate (TOPROL-XL) 25 MG 24 hr tablet TAKE 1 TABLET ONE TIME DAILY    mirtazapine (REMERON) 7.5 MG Tab Take 1 tablet (7.5 mg total) by mouth every evening.    montelukast (SINGULAIR) 10 mg tablet TAKE 1 TABLET EVERY EVENING    pantoprazole (PROTONIX) 40 MG tablet TAKE 1 TABLET TWICE DAILY     Family History     Problem Relation (Age of Onset)    Diabetes Son    Heart disease Father (56), Brother, Brother    Heart failure Father, Brother    Hypertension Son    Mental illness Brother    Migraines Mother    No Known Problems Son        Tobacco Use    Smoking status: Never Smoker    Smokeless tobacco: Never Used   Substance and Sexual Activity    Alcohol use: No    Drug use: No    Sexual activity: Not Currently     Review of Systems   Constitutional: Negative for appetite change, chills, diaphoresis, fatigue and fever.   HENT: Negative for congestion, ear pain, hearing loss, nosebleeds, rhinorrhea, sore throat, trouble swallowing and voice change.    Eyes: Negative for visual disturbance.   Respiratory: Negative for cough, choking, chest tightness, shortness of breath and wheezing.    Cardiovascular: Negative for chest pain, palpitations and leg swelling.   Gastrointestinal: Negative  for abdominal distention, abdominal pain, blood in stool, constipation, diarrhea, nausea and vomiting.   Genitourinary: Negative for difficulty urinating, discharge, dysuria, flank pain, frequency, hematuria and urgency.   Musculoskeletal: Negative for gait problem, joint swelling, myalgias, neck pain and neck stiffness.   Skin: Negative for rash and wound.   Neurological: Positive for syncope and headaches. Negative for dizziness, tremors, seizures, facial asymmetry, speech difficulty, weakness, light-headedness and numbness.   Hematological: Does not bruise/bleed easily.   Psychiatric/Behavioral: Negative for confusion and hallucinations. The patient is not nervous/anxious.      Objective:     Vital Signs (Most Recent):  Temp: 97.9 °F (36.6 °C) (11/02/19 1500)  Pulse: 78 (11/02/19 1500)  Resp: 18 (11/02/19 1500)  BP: (!) 178/78 (11/02/19 1500)  SpO2: 99 % (11/02/19 1500) Vital Signs (24h Range):  Temp:  [97.6 °F (36.4 °C)-98 °F (36.7 °C)] 97.9 °F (36.6 °C)  Pulse:  [57-98] 78  Resp:  [16-20] 18  SpO2:  [97 %-100 %] 99 %  BP: (165-181)/(62-96) 178/78     Weight: 65.8 kg (145 lb)  Body mass index is 28.32 kg/m².    Physical Exam   Constitutional: He appears well-developed and well-nourished.   HENT:   Head: Normocephalic and atraumatic.   Eyes: Pupils are equal, round, and reactive to light. Conjunctivae, EOM and lids are normal.   Neck: Trachea normal and normal range of motion. Neck supple.   Cardiovascular: Normal rate, regular rhythm, S1 normal, S2 normal, normal heart sounds, intact distal pulses and normal pulses.   Pulmonary/Chest: Effort normal and breath sounds normal.   Abdominal: Soft. Bowel sounds are normal.   Musculoskeletal: Normal range of motion.        Right ankle: He exhibits swelling.        Left ankle: He exhibits swelling.   Neurological: He is alert. He has normal strength. GCS eye subscore is 4. GCS verbal subscore is 5. GCS motor subscore is 6.   Skin: Skin is warm, dry and intact. Capillary  refill takes less than 2 seconds.   Psychiatric: He has a normal mood and affect. His speech is normal.         CRANIAL NERVES     CN III, IV, VI   Pupils are equal, round, and reactive to light.  Extraocular motions are normal.        Significant Labs:   CBC:   Recent Labs   Lab 11/02/19  1130   WBC 5.64   HGB 13.1*   HCT 39.0*        CMP:   Recent Labs   Lab 11/02/19  1130      K 4.4      CO2 25   *   BUN 24*   CREATININE 1.2   CALCIUM 8.4*   PROT 6.5   ALBUMIN 3.7   BILITOT 1.0   ALKPHOS 82   AST 29   ALT 27   ANIONGAP 8   EGFRNONAA 54.8*     Cardiac Markers:   Recent Labs   Lab 11/02/19  1130   *     Coagulation:   Recent Labs   Lab 11/02/19  1130   INR 1.0     Magnesium:   Recent Labs   Lab 11/02/19  1130   MG 1.9     Troponin:   Recent Labs   Lab 11/02/19  1130   TROPONINI <0.030       Significant Imaging: I have reviewed all pertinent imaging results/findings within the past 24 hours.     Ct Head Without Contrast    Result Date: 11/2/2019  CMS MANDATED QUALITY DATA - CT RADIATION 436. CT scans at this facility utilize dose modulation, iterative reconstruction, and/or weight based dosing when appropriate to reduce radiation dose to as low as reasonably achievable. PROCEDURE:    CT HEAD WITHOUT CONTRAST  dated  11/2/2019 12:37 PM CLINICAL HISTORY:   Male 85 years of age.   Headache, acute, norm neuro exam; syncope with LOC today, no trauma TECHNIQUE: CT images were acquired from the foramen magnum to the vertex. Intravenous contrast was not administered. COMPARISON:  May 20, 2019 FINDINGS: There is no intracranial hemorrhage, extra-axial fluid collection or edema. There is global atrophy. Hypodensities are unchanged within the right and left corona radiata compatible with remote small infarctions. Ventricles, cisterns and sulci diffusely prominent from global atrophy.  Mastoid air cells are clear.  Visualized paranasal sinuses are clear. Extracranial soft tissue is normal. The  calvarium is intact. There is old fracture deformity of the nasal bones, unchanged compared with the prior study. Short segment of distal left vertebral artery is densely calcified and stenotic. (I have report of CTA neck from 2013 which describes this as a 90% stenosis.) IMPRESSION: 1. No acute findings. 2. Global atrophy. 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 3. Stenotic distal left vertebral artery (described in report of CTA from 2013). Electronically Signed by Danette Encarnacion on 11/2/2019 12:58 PM    X-ray Chest Ap Portable    Result Date: 11/2/2019  PROCEDURE:   XR CHEST AP PORTABLE  dated  11/2/2019 11:30 AM CLINICAL HISTORY:   Male 85 years of age.   syncope TECHNIQUE: AP view of the chest obtained portably at 11:30 AM. PREVIOUS STUDIES:  May 20, 2019 FINDINGS: Left chest pacemaker leads project over the right atrium and right ventricle. Cardiac mediastinal contours are normal. The heart is not enlarged. Lungs are clear. There is no pleural effusion or pneumothorax. Bones are unremarkable. IMPRESSION: No acute findings. Pacemaker. Electronically Signed by Danette Encarnacion on 11/2/2019 12:05 PM

## 2019-11-02 NOTE — NURSING
Patient arrived on telemetry monitor, was able to walk to the bed from the stretcher. Notified Dr. Paulino of patients arrival to the floor and spoke with kevin at Dr. León answering service regarding a consult.

## 2019-11-02 NOTE — ASSESSMENT & PLAN NOTE
Continuous cardiac monitor  Patient is currently in SR at this time  Recommendations per cardiologist

## 2019-11-02 NOTE — ASSESSMENT & PLAN NOTE
Admit to Cardiology  Consult Cardiologist  Trend trop  2-D echo  U/S bilateral carotids  Check tsh/lipid panel/a1c  PPI  Continue home ASA and Plavix

## 2019-11-02 NOTE — ED PROVIDER NOTES
Encounter Date: 11/2/2019       History     Chief Complaint   Patient presents with    Loss of Consciousness     Patient was in his usual state health.  Just prior to arrival patient was at a table.  He started having a sensation to his bilateral temporal area and had apparent brief syncopal episode.  Family member reports patient remained seated but did not talk for several seconds he did not slump over a fall out of chair.  There was no focal weakness. Patient with headaches in the past associated with hypertension.  Currently patient reports he has no pain.  Family members in his normal mental status.  She has no focal weakness. No neck stiffness. Patient arrives with EMS.  No complications with paramedics        Review of patient's allergies indicates:   Allergen Reactions    Iodinated contrast media Rash    Pontocaine [tetracaine hcl] Anaphylaxis     hypotension     Past Medical History:   Diagnosis Date    Anxiety     Arthritis     CAD (coronary artery disease) age 68    Carotid artery occlusion     Cerebrovascular small vessel disease     Colon polyps age 78    GERD (gastroesophageal reflux disease)     H. pylori infection     HTN (hypertension), benign age 65    Hyperlipidemia LDL goal <70 age 65    Hypothyroidism     Multiple fractures of ribs of right side 11/27/2012    Myocardial infarction     Pernicious anemia 1/26/2018    Primary insomnia 10/9/2018    Prostate cancer age 67    Syncopal episodes 3/2013    Urge incontinence 5/8/2018     Past Surgical History:   Procedure Laterality Date    CARDIAC PACEMAKER PLACEMENT  10/2015    CARDIAC SURGERY      CATARACT EXTRACTION W/  INTRAOCULAR LENS IMPLANT Bilateral     COLONOSCOPY  ~2013    Dr. Edge    COLONOSCOPY N/A 6/8/2016    Procedure: COLONOSCOPY;  Surgeon: Shamar Barahona MD;  Location: [facility];  Service: Endoscopy;  Laterality: N/A; REPEAT IN 1 YEAR    CORONARY ANGIOPLASTY WITH STENT PLACEMENT  2003    x 2 RCA     PROSTATECTOMY  2002    UPPER GASTROINTESTINAL ENDOSCOPY  11/15/2016    Dr. Dowell     Family History   Problem Relation Age of Onset    Migraines Mother     Heart failure Father     Heart disease Father 56        MI    Heart failure Brother     Heart disease Brother     Diabetes Son     Hypertension Son     Heart disease Brother     Mental illness Brother     No Known Problems Son     Colon cancer Neg Hx     Colon polyps Neg Hx     Crohn's disease Neg Hx     Ulcerative colitis Neg Hx     Stomach cancer Neg Hx     Esophageal cancer Neg Hx      Social History     Tobacco Use    Smoking status: Never Smoker    Smokeless tobacco: Never Used   Substance Use Topics    Alcohol use: No    Drug use: No     Review of Systems   Constitutional: Negative for chills and fever.   HENT: Negative for sore throat.    Eyes: Negative for photophobia and visual disturbance.   Respiratory: Negative for shortness of breath.    Cardiovascular: Negative for chest pain.   Gastrointestinal: Negative for abdominal pain and vomiting.   Genitourinary: Negative for dysuria.   Musculoskeletal: Negative for joint swelling.   Skin: Negative for rash.   Neurological: Positive for syncope and headaches. Negative for seizures.   Psychiatric/Behavioral: Negative for confusion.       Physical Exam     Initial Vitals [11/02/19 1124]   BP Pulse Resp Temp SpO2   (!) 180/81 71 16 97.6 °F (36.4 °C) 100 %      MAP       --         Physical Exam    Nursing note and vitals reviewed.  Constitutional: He is not diaphoretic. No distress.   HENT:   Head: Normocephalic and atraumatic.   Eyes: Conjunctivae and EOM are normal.   Neck: Normal range of motion.   Cardiovascular: Regular rhythm.   Pulmonary/Chest: Breath sounds normal.   Abdominal: Soft. There is no tenderness.   Musculoskeletal: Normal range of motion.   Neurological: He is alert. He has normal strength. No cranial nerve deficit or sensory deficit.   Patient or in his a person and  place he does not know date   Skin: No rash noted.   Psychiatric:   Patient very pleasant.  He is sitting up in bed smiling.  Multiple family members at bedside         ED Course   Procedures  Labs Reviewed   CBC W/ AUTO DIFFERENTIAL - Abnormal; Notable for the following components:       Result Value    RBC 4.03 (*)     Hemoglobin 13.1 (*)     Hematocrit 39.0 (*)     Mean Corpuscular Hemoglobin 32.5 (*)     All other components within normal limits   COMPREHENSIVE METABOLIC PANEL - Abnormal; Notable for the following components:    Glucose 132 (*)     BUN, Bld 24 (*)     Calcium 8.4 (*)     eGFR if non  54.8 (*)     All other components within normal limits   B-TYPE NATRIURETIC PEPTIDE - Abnormal; Notable for the following components:     (*)     All other components within normal limits   TROPONIN I   PROTIME-INR        ECG Results          EKG 12-lead (In process)  Result time 11/02/19 11:37:43    In process by Interface, Lab In [facility] (11/02/19 11:37:43)                 Narrative:    Test Reason : R55,    Vent. Rate : 064 BPM     Atrial Rate : 064 BPM     P-R Int : 144 ms          QRS Dur : 090 ms      QT Int : 442 ms       P-R-T Axes : 059 025 -05 degrees     QTc Int : 455 ms    Normal sinus rhythm  Nonspecific T wave abnormality  Abnormal ECG  When compared with ECG of 11-JAN-2017 10:36,  No significant change was found    Referred By: [email or web address]   SELF           Confirmed By:                             Imaging Results          CT Head Without Contrast (Final result)  Result time 11/02/19 12:44:11    Final result by Danette Encarnacion MD (11/02/19 12:44:11)                 Narrative:    CMS MANDATED QUALITY DATA - CT RADIATION 436. CT scans at this  facility utilize dose modulation, iterative reconstruction, and/or  weight based dosing when appropriate to reduce radiation dose to as  low as reasonably achievable.    PROCEDURE:    CT HEAD WITHOUT CONTRAST  dated  11/2/2019 12:37  PM    CLINICAL HISTORY:   Male 85 years of age.   Headache, acute, norm  neuro exam; syncope with LOC today, no trauma    TECHNIQUE: CT images were acquired from the foramen magnum to the  vertex. Intravenous contrast was not administered.    COMPARISON:  May 20, 2019    FINDINGS:    There is no intracranial hemorrhage, extra-axial fluid collection or  edema. There is global atrophy. Hypodensities are unchanged within the  right and left corona radiata compatible with remote small  infarctions. Ventricles, cisterns and sulci diffusely prominent from  global atrophy.  Mastoid air cells are clear.  Visualized paranasal  sinuses are clear.    Extracranial soft tissue is normal. The calvarium is intact. There is  old fracture deformity of the nasal bones, unchanged compared with the  prior study.    Short segment of distal left vertebral artery is densely calcified and  stenotic. (I have report of CTA neck from 2013 which describes this as  a 90% stenosis.)    IMPRESSION:      1. No acute findings.  2. Global atrophy. 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  3. Stenotic distal left vertebral artery (described in report of CTA  from 2013).    Electronically Signed by Danette Encarnacion on 11/2/2019 12:58 PM                             X-Ray Chest AP Portable (Final result)  Result time 11/02/19 11:47:17    Final result by Danette Encarnacion MD (11/02/19 11:47:17)                 Narrative:    PROCEDURE:   XR CHEST AP PORTABLE  dated  11/2/2019 11:30 AM    CLINICAL HISTORY:   Male 85 years of age.   syncope    TECHNIQUE: AP view of the chest obtained portably at 11:30 AM.    PREVIOUS STUDIES:  May 20, 2019    FINDINGS:    Left chest pacemaker leads project over the right atrium and right  ventricle. Cardiac mediastinal contours are normal. The heart is not  enlarged. Lungs are clear. There is no pleural effusion or  pneumothorax. Bones are unremarkable.    IMPRESSION:    No acute findings. Pacemaker.    Electronically Signed by Danette  CORI Encarnacion on 11/2/2019 12:05 PM                               Medical Decision Making:   History:   Old Medical Records: I decided to obtain old medical records.  Clinical Tests:   Lab Tests: Reviewed  Radiological Study: Reviewed  Medical Tests: Reviewed  ED Management:  Patient presents with syncopal episode.  Laboratory and radiologic workup is unremarkable.  Patient noted to have bigeminy intermittently on telemetry.  There has been some ventricular ectopy.  Patient does have pacemaker.  Pacemaker interrogated.  Pacemaker representative reports patient has been having significant ventricular arrhythmia including some slow ventricular tachycardia.  These episodes last up to 9 min.  Timing corresponds to syncopal episode.  Patient is having true cardiac syncope.  Cardiology consultation is pending.  Hospitalist consulted for admission                      Clinical Impression:       ICD-10-CM ICD-9-CM   1. Syncope, unspecified syncope type R55 780.2   2. Syncope R55 780.2   3. Ventricular tachycardia I47.2 427.1                                Gilles Rangel MD  11/02/19 9725

## 2019-11-02 NOTE — H&P
[facility]  History & Physical    Patient Name: Gene Hartman  MRN: [id number]  Admission Date: 11/2/2019  Attending Physician: Thalia Paulino MD   Primary Care Provider: Andreas Ellsworth MD         Patient information was obtained from patient, spouse/SO and ER records.     Subjective:     Principal Problem:Syncope    Chief Complaint:   Chief Complaint   Patient presents with    Loss of Consciousness        HPI: Mr. Hartman is an 85 year old male with a history of HTN, CAD s/p stents, Hypothyroidism, prostate cancer, and dementia who is brought to the ED by his family for syncope. The patient was sitting at the table when he began to have a sensation to bilateral temporal areas and then had a brief syncopal episode. The patient reportedly stayed seated in his chair. He did not slump or fall over. No jerking movements noted per the family. When the patient finally came around, he did not talk for several seconds. The wife reports the entire episode last approximately 5 minutes before the patient returned to his normal state. He has a history of headaches secondary to BP spiking.  The patient denies fever, chills, cough, chest pain, sob, numbness, tingling, focal neuro deficits, weakness, dizziness, dysuria, nausea, vomiting, abdominal pain, or diarrhea. He has a pacemaker. In the ED, BP is elevated 180/81.  Other vitals stable. He is noted to have bigeminy intermittently on telemetry. The pacemaker is interrogated in the ED and the representative reports significant ventricular arrhythmias including slower ventricular tachycardia lasting up to 9 minutes. Timing corresponds to patient's syncopal episode. The patient will be admitted to the hospital for further workup and for cardiology consult.     Past Medical History:   Diagnosis Date    Anxiety     Arthritis     CAD (coronary artery disease) age 68    Carotid artery occlusion     Cerebrovascular small vessel disease      Colon polyps age 78    GERD (gastroesophageal reflux disease)     H. pylori infection     HTN (hypertension), benign age 65    Hyperlipidemia LDL goal <70 age 65    Hypothyroidism     Multiple fractures of ribs of right side 11/27/2012    Myocardial infarction     Pernicious anemia 1/26/2018    Primary insomnia 10/9/2018    Prostate cancer age 67    Syncopal episodes 3/2013    Urge incontinence 5/8/2018       Past Surgical History:   Procedure Laterality Date    CARDIAC PACEMAKER PLACEMENT  10/2015    CARDIAC SURGERY      CATARACT EXTRACTION W/  INTRAOCULAR LENS IMPLANT Bilateral     COLONOSCOPY  ~2013    Dr. Edge    COLONOSCOPY N/A 6/8/2016    Procedure: COLONOSCOPY;  Surgeon: Shamar Barahona MD;  Location: [facility];  Service: Endoscopy;  Laterality: N/A; REPEAT IN 1 YEAR    CORONARY ANGIOPLASTY WITH STENT PLACEMENT  2003    x 2 RCA    PROSTATECTOMY  2002    UPPER GASTROINTESTINAL ENDOSCOPY  11/15/2016    Dr. Barahona       Review of patient's allergies indicates:   Allergen Reactions    Iodinated contrast media Rash    Pontocaine [tetracaine hcl] Anaphylaxis     hypotension       No current facility-administered medications on file prior to encounter.      Current Outpatient Medications on File Prior to Encounter   Medication Sig    aspirin (ECOTRIN) 81 MG EC tablet Take 81 mg by mouth Daily.    atorvastatin (LIPITOR) 40 MG tablet TAKE 1 TABLET ONE TIME DAILY    cloNIDine (CATAPRES) 0.1 MG tablet Take 1 tablet (0.1 mg total) by mouth 2 (two) times daily as needed (SBP greater than 160).    clopidogrel (PLAVIX) 75 mg tablet Take 1 tablet (75 mg total) by mouth once daily.    cyanocobalamin, vitamin B-12, (VITAMIN B-12) 5,000 mcg Subl Place 1 tablet under the tongue once daily.    cycloSPORINE (RESTASIS) 0.05 % ophthalmic emulsion Apply 1 drop to eye 2 (two) times daily.    docusate sodium (COLACE) 100 MG capsule Take 1 capsule (100 mg total) by mouth 2 (two) times daily.     fluticasone (FLONASE) 50 mcg/actuation nasal spray 1 spray (50 mcg total) by Each Nare route 2 (two) times daily.    folic acid (FOLVITE) 1 MG tablet TAKE 1 TABLET ONE TIME DAILY    levothyroxine (SYNTHROID) 50 MCG tablet TAKE 1 TABLET (50 MCG TOTAL) BY MOUTH ONCE DAILY.    losartan (COZAAR) 50 MG tablet Take 1 tablet (50 mg total) by mouth 2 (two) times daily.    melatonin 5 mg Subl melatonin 5 mg sublingual tablet   Place by sublingual route.    memantine (NAMENDA) 5 MG Tab Namenda 5 mg tablet   Take 1 tablet twice a day by oral route.    metoprolol succinate (TOPROL-XL) 25 MG 24 hr tablet TAKE 1 TABLET ONE TIME DAILY    mirtazapine (REMERON) 7.5 MG Tab Take 1 tablet (7.5 mg total) by mouth every evening.    montelukast (SINGULAIR) 10 mg tablet TAKE 1 TABLET EVERY EVENING    pantoprazole (PROTONIX) 40 MG tablet TAKE 1 TABLET TWICE DAILY     Family History     Problem Relation (Age of Onset)    Diabetes Son    Heart disease Father (56), Brother, Brother    Heart failure Father, Brother    Hypertension Son    Mental illness Brother    Migraines Mother    No Known Problems Son        Tobacco Use    Smoking status: Never Smoker    Smokeless tobacco: Never Used   Substance and Sexual Activity    Alcohol use: No    Drug use: No    Sexual activity: Not Currently     Review of Systems   Constitutional: Negative for appetite change, chills, diaphoresis, fatigue and fever.   HENT: Negative for congestion, ear pain, hearing loss, nosebleeds, rhinorrhea, sore throat, trouble swallowing and voice change.    Eyes: Negative for visual disturbance.   Respiratory: Negative for cough, choking, chest tightness, shortness of breath and wheezing.    Cardiovascular: Negative for chest pain, palpitations and leg swelling.   Gastrointestinal: Negative for abdominal distention, abdominal pain, blood in stool, constipation, diarrhea, nausea and vomiting.   Genitourinary: Negative for difficulty urinating, discharge, dysuria,  flank pain, frequency, hematuria and urgency.   Musculoskeletal: Negative for gait problem, joint swelling, myalgias, neck pain and neck stiffness.   Skin: Negative for rash and wound.   Neurological: Positive for syncope and headaches. Negative for dizziness, tremors, seizures, facial asymmetry, speech difficulty, weakness, light-headedness and numbness.   Hematological: Does not bruise/bleed easily.   Psychiatric/Behavioral: Negative for confusion and hallucinations. The patient is not nervous/anxious.      Objective:     Vital Signs (Most Recent):  Temp: 97.9 °F (36.6 °C) (11/02/19 1500)  Pulse: 78 (11/02/19 1500)  Resp: 18 (11/02/19 1500)  BP: (!) 178/78 (11/02/19 1500)  SpO2: 99 % (11/02/19 1500) Vital Signs (24h Range):  Temp:  [97.6 °F (36.4 °C)-98 °F (36.7 °C)] 97.9 °F (36.6 °C)  Pulse:  [57-98] 78  Resp:  [16-20] 18  SpO2:  [97 %-100 %] 99 %  BP: (165-181)/(62-96) 178/78     Weight: 65.8 kg (145 lb)  Body mass index is 28.32 kg/m².    Physical Exam   Constitutional: He appears well-developed and well-nourished.   HENT:   Head: Normocephalic and atraumatic.   Eyes: Pupils are equal, round, and reactive to light. Conjunctivae, EOM and lids are normal.   Neck: Trachea normal and normal range of motion. Neck supple.   Cardiovascular: Normal rate, regular rhythm, S1 normal, S2 normal, normal heart sounds, intact distal pulses and normal pulses.   Pulmonary/Chest: Effort normal and breath sounds normal.   Abdominal: Soft. Bowel sounds are normal.   Musculoskeletal: Normal range of motion.        Right ankle: He exhibits swelling.        Left ankle: He exhibits swelling.   Neurological: He is alert. He has normal strength. GCS eye subscore is 4. GCS verbal subscore is 5. GCS motor subscore is 6.   Skin: Skin is warm, dry and intact. Capillary refill takes less than 2 seconds.   Psychiatric: He has a normal mood and affect. His speech is normal.         CRANIAL NERVES     CN III, IV, VI   Pupils are equal, round,  and reactive to light.  Extraocular motions are normal.        Significant Labs:   CBC:   Recent Labs   Lab 11/02/19  1130   WBC 5.64   HGB 13.1*   HCT 39.0*        CMP:   Recent Labs   Lab 11/02/19  1130      K 4.4      CO2 25   *   BUN 24*   CREATININE 1.2   CALCIUM 8.4*   PROT 6.5   ALBUMIN 3.7   BILITOT 1.0   ALKPHOS 82   AST 29   ALT 27   ANIONGAP 8   EGFRNONAA 54.8*     Cardiac Markers:   Recent Labs   Lab 11/02/19  1130   *     Coagulation:   Recent Labs   Lab 11/02/19  1130   INR 1.0     Magnesium:   Recent Labs   Lab 11/02/19  1130   MG 1.9     Troponin:   Recent Labs   Lab 11/02/19  1130   TROPONINI <0.030       Significant Imaging: I have reviewed all pertinent imaging results/findings within the past 24 hours.     Ct Head Without Contrast    Result Date: 11/2/2019  CMS MANDATED QUALITY DATA - CT RADIATION 436. CT scans at this facility utilize dose modulation, iterative reconstruction, and/or weight based dosing when appropriate to reduce radiation dose to as low as reasonably achievable. PROCEDURE:    CT HEAD WITHOUT CONTRAST  dated  11/2/2019 12:37 PM CLINICAL HISTORY:   Male 85 years of age.   Headache, acute, norm neuro exam; syncope with LOC today, no trauma TECHNIQUE: CT images were acquired from the foramen magnum to the vertex. Intravenous contrast was not administered. COMPARISON:  May 20, 2019 FINDINGS: There is no intracranial hemorrhage, extra-axial fluid collection or edema. There is global atrophy. Hypodensities are unchanged within the right and left corona radiata compatible with remote small infarctions. Ventricles, cisterns and sulci diffusely prominent from global atrophy.  Mastoid air cells are clear.  Visualized paranasal sinuses are clear. Extracranial soft tissue is normal. The calvarium is intact. There is old fracture deformity of the nasal bones, unchanged compared with the prior study. Short segment of distal left vertebral artery is densely  calcified and stenotic. (I have report of CTA neck from 2013 which describes this as a 90% stenosis.) IMPRESSION: 1. No acute findings. 2. Global atrophy. 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 3. Stenotic distal left vertebral artery (described in report of CTA from 2013). Electronically Signed by Danette Encarnacion on 11/2/2019 12:58 PM    X-ray Chest Ap Portable    Result Date: 11/2/2019  PROCEDURE:   XR CHEST AP PORTABLE  dated  11/2/2019 11:30 AM CLINICAL HISTORY:   Male 85 years of age.   syncope TECHNIQUE: AP view of the chest obtained portably at 11:30 AM. PREVIOUS STUDIES:  May 20, 2019 FINDINGS: Left chest pacemaker leads project over the right atrium and right ventricle. Cardiac mediastinal contours are normal. The heart is not enlarged. Lungs are clear. There is no pleural effusion or pneumothorax. Bones are unremarkable. IMPRESSION: No acute findings. Pacemaker. Electronically Signed by Danette Encarnacion on 11/2/2019 12:05 PM    Assessment/Plan:     * Syncope  Admit to Cardiology  Consult Cardiologist  Trend trop  2-D echo  U/S bilateral carotids  Check tsh/lipid panel/a1c  PPI  Continue home ASA and Plavix        Ventricular arrhythmia  Continuous cardiac monitor  Patient is currently in SR at this time  Recommendations per cardiologist      HTN (hypertension), benign  Continue home antihypertensives once verified  PRN antihypertensive for uncontrolled BP          VTE Risk Mitigation (From admission, onward)    None             Rosemary Bautista NP  Department of Hospital Medicine   Pending sale to [facility]

## 2019-11-02 NOTE — ED NOTES
Notified company called [employer] that we needed his pacemaker interrogated. Spoke with Matias, states will be here in about an hour.

## 2019-11-03 ENCOUNTER — CLINICAL SUPPORT (OUTPATIENT)
Dept: CARDIOLOGY | Facility: HOSPITAL | Age: 84
End: 2019-11-03
Attending: EMERGENCY MEDICINE
Payer: MEDICARE

## 2019-11-03 VITALS — HEIGHT: 60 IN | WEIGHT: 145.94 LBS | BODY MASS INDEX: 28.65 KG/M2

## 2019-11-03 LAB
ANION GAP SERPL CALC-SCNC: 6 MMOL/L (ref 8–16)
BUN SERPL-MCNC: 25 MG/DL (ref 8–23)
CALCIUM SERPL-MCNC: 8.4 MG/DL (ref 8.7–10.5)
CHLORIDE SERPL-SCNC: 112 MMOL/L (ref 95–110)
CHOLEST SERPL-MCNC: 142 MG/DL (ref 120–199)
CHOLEST/HDLC SERPL: 3.4 {RATIO} (ref 2–5)
CO2 SERPL-SCNC: 25 MMOL/L (ref 23–29)
CREAT SERPL-MCNC: 1.3 MG/DL (ref 0.5–1.4)
ERYTHROCYTE [DISTWIDTH] IN BLOOD BY AUTOMATED COUNT: 13.7 % (ref 11.5–14.5)
EST. GFR  (AFRICAN AMERICAN): 57.5 ML/MIN/1.73 M^2
EST. GFR  (NON AFRICAN AMERICAN): 49.8 ML/MIN/1.73 M^2
ESTIMATED AVG GLUCOSE: 111 MG/DL (ref 68–131)
GLUCOSE SERPL-MCNC: 87 MG/DL (ref 70–110)
HBA1C MFR BLD HPLC: 5.5 % (ref 4.5–6.2)
HCT VFR BLD AUTO: 36.9 % (ref 40–54)
HDLC SERPL-MCNC: 42 MG/DL (ref 40–75)
HDLC SERPL: 29.6 % (ref 20–50)
HGB BLD-MCNC: 12.6 G/DL (ref 14–18)
LDLC SERPL CALC-MCNC: 84.2 MG/DL (ref 63–159)
MAGNESIUM SERPL-MCNC: 1.9 MG/DL (ref 1.6–2.6)
MCH RBC QN AUTO: 32.6 PG (ref 27–31)
MCHC RBC AUTO-ENTMCNC: 34.1 G/DL (ref 32–36)
MCV RBC AUTO: 96 FL (ref 82–98)
NONHDLC SERPL-MCNC: 100 MG/DL
PLATELET # BLD AUTO: 149 K/UL (ref 150–350)
PMV BLD AUTO: 11.6 FL (ref 9.2–12.9)
POTASSIUM SERPL-SCNC: 3.9 MMOL/L (ref 3.5–5.1)
RBC # BLD AUTO: 3.86 M/UL (ref 4.6–6.2)
SODIUM SERPL-SCNC: 143 MMOL/L (ref 136–145)
TRIGL SERPL-MCNC: 79 MG/DL (ref 30–150)
WBC # BLD AUTO: 7.1 K/UL (ref 3.9–12.7)

## 2019-11-03 PROCEDURE — 80061 LIPID PANEL: CPT

## 2019-11-03 PROCEDURE — 80048 BASIC METABOLIC PNL TOTAL CA: CPT

## 2019-11-03 PROCEDURE — 83735 ASSAY OF MAGNESIUM: CPT

## 2019-11-03 PROCEDURE — G0378 HOSPITAL OBSERVATION PER HR: HCPCS

## 2019-11-03 PROCEDURE — 25000242 PHARM REV CODE 250 ALT 637 W/ HCPCS: Performed by: NURSE PRACTITIONER

## 2019-11-03 PROCEDURE — 25000003 PHARM REV CODE 250: Performed by: NURSE PRACTITIONER

## 2019-11-03 PROCEDURE — 25000003 PHARM REV CODE 250: Performed by: INTERNAL MEDICINE

## 2019-11-03 PROCEDURE — 85027 COMPLETE CBC AUTOMATED: CPT

## 2019-11-03 PROCEDURE — 93306 TTE W/DOPPLER COMPLETE: CPT

## 2019-11-03 PROCEDURE — 36415 COLL VENOUS BLD VENIPUNCTURE: CPT

## 2019-11-03 RX ORDER — METOPROLOL SUCCINATE 50 MG/1
100 TABLET, EXTENDED RELEASE ORAL DAILY
Status: DISCONTINUED | OUTPATIENT
Start: 2019-11-03 | End: 2019-11-05 | Stop reason: HOSPADM

## 2019-11-03 RX ADMIN — MEMANTINE HYDROCHLORIDE 10 MG: 5 TABLET ORAL at 09:11

## 2019-11-03 RX ADMIN — CLOPIDOGREL BISULFATE 75 MG: 75 TABLET, FILM COATED ORAL at 09:11

## 2019-11-03 RX ADMIN — METOPROLOL SUCCINATE 100 MG: 50 TABLET, EXTENDED RELEASE ORAL at 05:11

## 2019-11-03 RX ADMIN — LOSARTAN POTASSIUM 50 MG: 50 TABLET, FILM COATED ORAL at 09:11

## 2019-11-03 RX ADMIN — DONEPEZIL HYDROCHLORIDE 5 MG: 5 TABLET, FILM COATED ORAL at 09:11

## 2019-11-03 RX ADMIN — ASPIRIN 81 MG 81 MG: 81 TABLET ORAL at 05:11

## 2019-11-03 RX ADMIN — LEVOTHYROXINE SODIUM 50 MCG: 25 TABLET ORAL at 09:11

## 2019-11-03 RX ADMIN — MIRTAZAPINE 7.5 MG: 7.5 TABLET, FILM COATED ORAL at 09:11

## 2019-11-03 RX ADMIN — FLUTICASONE PROPIONATE 50 MCG: 50 SPRAY, METERED NASAL at 09:11

## 2019-11-03 RX ADMIN — CLONIDINE HYDROCHLORIDE 0.1 MG: 0.1 TABLET ORAL at 10:11

## 2019-11-03 RX ADMIN — PANTOPRAZOLE SODIUM 40 MG: 40 TABLET, DELAYED RELEASE ORAL at 09:11

## 2019-11-03 RX ADMIN — MONTELUKAST SODIUM 10 MG: 10 TABLET, COATED ORAL at 09:11

## 2019-11-03 RX ADMIN — AMLODIPINE BESYLATE 5 MG: 5 TABLET ORAL at 09:11

## 2019-11-03 RX ADMIN — CYANOCOBALAMIN TAB 1000 MCG 2000 MCG: 1000 TAB at 09:11

## 2019-11-03 NOTE — PROGRESS NOTES
[facility]  Progress Note    Patient Name: Gene Hartman  MRN: [id number]  Patient Class: OP- Observation   Admission Date: 11/2/2019  Length of Stay: 0 days  Attending Physician: Zander Camp MD  Primary Care Provider: Andreas Ellsworth MD        Subjective:     Principal Problem:Syncope        HPI:  Mr. Hartman is an 85 year old male with a history of HTN, CAD s/p stents, Hypothyroidism, prostate cancer, and dementia who is brought to the ED by his family for syncope. The patient was sitting at the table when he began to have a sensation to bilateral temporal areas and then had a brief syncopal episode. The patient reportedly stayed seated in his chair. He did not slump or fall over. No jerking movements noted per the family. When the patient finally came around, he did not talk for several seconds. The wife reports the entire episode last approximately 5 minutes before the patient returned to his normal state. He has a history of headaches secondary to BP spiking.  The patient denies fever, chills, cough, chest pain, sob, numbness, tingling, focal neuro deficits, weakness, dizziness, dysuria, nausea, vomiting, abdominal pain, or diarrhea. He has a pacemaker. In the ED, BP is elevated 180/81.  Other vitals stable. He is noted to have bigeminy intermittently on telemetry. The pacemaker is interrogated in the ED and the representative reports significant ventricular arrhythmias including slower ventricular tachycardia lasting up to 9 minutes. Timing corresponds to patient's syncopal episode. The patient will be admitted to the hospital for further workup and for cardiology consult.     Overview/Hospital Course:  11/03 Chart reviewed. Labs personally reviewed (noted below): stable CBC, Chemistries show prerenal azotemia. Lipid panel, TSH, Cardiac bio-markers, and A1c  are normal. CXR personally reviewed: [employer]D, pacemaker. EKG personally reviewed: sinus without acute ischemic changes.  Patient personally discussed with Dr. Bailey:  syncope may be secondary to ventricular arrhythmias, but the patient has 50-69% left ICA stenosis. Patient is pleasantly demented Denies CP/SOB. No LOP/LOM    Interval History: intermittent bigeminy     Review of Systems   Reason unable to perform ROS: ROS is suspect because of underlying dementia.   Constitutional: Negative.    HENT: Negative.    Eyes: Negative.    Respiratory: Negative.    Cardiovascular: Negative.    Gastrointestinal: Negative.    Endocrine: Negative.    Genitourinary: Negative.    Musculoskeletal: Negative.    Skin: Negative.    Allergic/Immunologic: Negative.    Neurological: Negative.    Hematological: Negative.    All other systems reviewed and are negative.    Objective:     Vital Signs (Most Recent):  Temp: 98.4 °F (36.9 °C) (11/03/19 1248)  Pulse: 66 (11/03/19 1248)  Resp: 19 (11/03/19 1248)  BP: (!) 163/73 (11/03/19 1248)  SpO2: 97 % (11/03/19 1248) Vital Signs (24h Range):  Temp:  [97.9 °F (36.6 °C)-98.4 °F (36.9 °C)] 98.4 °F (36.9 °C)  Pulse:  [40-74] 66  Resp:  [16-19] 19  SpO2:  [96 %-99 %] 97 %  BP: (145-171)/(69-74) 163/73     Weight: 66.2 kg (145 lb 15.1 oz)  Body mass index is 28.5 kg/m².    Intake/Output Summary (Last 24 hours) at 11/3/2019 1635  Last data filed at 11/3/2019 0400  Gross per 24 hour   Intake 240 ml   Output --   Net 240 ml      Physical Exam   Constitutional: He appears well-developed and well-nourished.   HENT:   Head: Normocephalic and atraumatic.   Eyes: Pupils are equal, round, and reactive to light. Conjunctivae and EOM are normal.   Neck: Normal range of motion. Neck supple.   Cardiovascular: Normal rate, regular rhythm, normal heart sounds and intact distal pulses.   Pulmonary/Chest: Effort normal and breath sounds normal.   Decreased entry bases without adventitious sounds   Abdominal: Soft. Bowel sounds are normal.   Musculoskeletal: Normal range of motion.   Neurological: He is alert.   Oriented to person  only, currently   Skin: Skin is warm and dry. Capillary refill takes less than 2 seconds.   Psychiatric: He has a normal mood and affect. His behavior is normal. Judgment and thought content normal.   Nursing note and vitals reviewed.      Significant Labs:   CBC:   Recent Labs   Lab 11/02/19  1130 11/03/19  0522   WBC 5.64 7.10   HGB 13.1* 12.6*   HCT 39.0* 36.9*    149*     CMP:   Recent Labs   Lab 11/02/19  1130 11/03/19  0522    143   K 4.4 3.9    112*   CO2 25 25   * 87   BUN 24* 25*   CREATININE 1.2 1.3   CALCIUM 8.4* 8.4*   PROT 6.5  --    ALBUMIN 3.7  --    BILITOT 1.0  --    ALKPHOS 82  --    AST 29  --    ALT 27  --    ANIONGAP 8 6*   EGFRNONAA 54.8* 49.8*     Lipid Panel:   Recent Labs   Lab 11/03/19 0522   CHOL 142   HDL 42   LDLCALC 84.2   TRIG 79   CHOLHDL 29.6     Troponin:   Recent Labs   Lab 11/02/19  1130 11/02/19  1701 11/02/19  2256   TROPONINI <0.030 <0.030 <0.030       Significant Imaging: I have reviewed all pertinent imaging results/findings within the past 24 hours.  I have reviewed and interpreted all pertinent imaging results/findings within the past 24 hours.      Assessment/Plan:      * Syncope  Continue telemetry monitoring and current regimen        Ventricular arrhythmia  Continuous cardiac monitor  Patient is currently in SR at this time  Recommendations per cardiologist      HTN (hypertension), benign  Continue home antihypertensives once verified  PRN antihypertensive for uncontrolled BP          VTE Risk Mitigation (From admission, onward)         Ordered     IP VTE HIGH RISK PATIENT  Once      11/02/19 1637     Place MELINDA hose  Until discontinued      11/02/19 1637     Reason for No Pharmacological VTE Prophylaxis  Once      11/02/19 1637                      Zander Camp MD  Department of Hospital Medicine   [facility]

## 2019-11-03 NOTE — SUBJECTIVE & OBJECTIVE
Interval History: intermittent bigeminy     Review of Systems   Reason unable to perform ROS: ROS is suspect because of underlying dementia.   Constitutional: Negative.    HENT: Negative.    Eyes: Negative.    Respiratory: Negative.    Cardiovascular: Negative.    Gastrointestinal: Negative.    Endocrine: Negative.    Genitourinary: Negative.    Musculoskeletal: Negative.    Skin: Negative.    Allergic/Immunologic: Negative.    Neurological: Negative.    Hematological: Negative.    All other systems reviewed and are negative.    Objective:     Vital Signs (Most Recent):  Temp: 98.4 °F (36.9 °C) (11/03/19 1248)  Pulse: 66 (11/03/19 1248)  Resp: 19 (11/03/19 1248)  BP: (!) 163/73 (11/03/19 1248)  SpO2: 97 % (11/03/19 1248) Vital Signs (24h Range):  Temp:  [97.9 °F (36.6 °C)-98.4 °F (36.9 °C)] 98.4 °F (36.9 °C)  Pulse:  [40-74] 66  Resp:  [16-19] 19  SpO2:  [96 %-99 %] 97 %  BP: (145-171)/(69-74) 163/73     Weight: 66.2 kg (145 lb 15.1 oz)  Body mass index is 28.5 kg/m².    Intake/Output Summary (Last 24 hours) at 11/3/2019 1635  Last data filed at 11/3/2019 0400  Gross per 24 hour   Intake 240 ml   Output --   Net 240 ml      Physical Exam   Constitutional: He appears well-developed and well-nourished.   HENT:   Head: Normocephalic and atraumatic.   Eyes: Pupils are equal, round, and reactive to light. Conjunctivae and EOM are normal.   Neck: Normal range of motion. Neck supple.   Cardiovascular: Normal rate, regular rhythm, normal heart sounds and intact distal pulses.   Pulmonary/Chest: Effort normal and breath sounds normal.   Decreased entry bases without adventitious sounds   Abdominal: Soft. Bowel sounds are normal.   Musculoskeletal: Normal range of motion.   Neurological: He is alert.   Oriented to person only, currently   Skin: Skin is warm and dry. Capillary refill takes less than 2 seconds.   Psychiatric: He has a normal mood and affect. His behavior is normal. Judgment and thought content normal.   Nursing  note and vitals reviewed.      Significant Labs:   CBC:   Recent Labs   Lab 11/02/19  1130 11/03/19  0522   WBC 5.64 7.10   HGB 13.1* 12.6*   HCT 39.0* 36.9*    149*     CMP:   Recent Labs   Lab 11/02/19  1130 11/03/19  0522    143   K 4.4 3.9    112*   CO2 25 25   * 87   BUN 24* 25*   CREATININE 1.2 1.3   CALCIUM 8.4* 8.4*   PROT 6.5  --    ALBUMIN 3.7  --    BILITOT 1.0  --    ALKPHOS 82  --    AST 29  --    ALT 27  --    ANIONGAP 8 6*   EGFRNONAA 54.8* 49.8*     Lipid Panel:   Recent Labs   Lab 11/03/19  0522   CHOL 142   HDL 42   LDLCALC 84.2   TRIG 79   CHOLHDL 29.6     Troponin:   Recent Labs   Lab 11/02/19  1130 11/02/19  1701 11/02/19  2256   TROPONINI <0.030 <0.030 <0.030       Significant Imaging: I have reviewed all pertinent imaging results/findings within the past 24 hours.  I have reviewed and interpreted all pertinent imaging results/findings within the past 24 hours.

## 2019-11-03 NOTE — CONSULTS
[facility]   Cardiology Note    Consult Requested By: [facility]  Reason for Consult: SYNCOPE    SUBJECTIVE:     History of Present Illness:   Patient was in his usual state health.  Just prior to arrival patient was at a table.  He started having a sensation to his bilateral temporal area and had apparent brief syncopal episode.  Family member reports patient remained seated but did not talk for several seconds he did not slump over a fall out of chair.  There was no focal weakness. Patient with headaches in the past associated with hypertension.  Currently patient reports he has no pain.  Family members in his normal mental status.  She has no focal weakness. No neck stiffness. Patient arrives with EMS.  No complications with paramedics    PRIOR SYNCOPE--PPM PLACED--THIS ALLEVIATED IT FOR A WHILE--LOOKS LIKE ON DEVICE CHECK--HE IS HAVING V BIGEMINY--AND THE PVC IS NOT PERFUSING RELIABLY--AND DEVICE NOT ABLE TO DISCERN PERFUSED BEAT FROM NON-PERFUSED BEAT--SO IT IS NOT EFFECTIVE IN THIS CASE--BC LOWER RATE SET AT 50-CASE    Review of patient's allergies indicates:   Allergen Reactions    Iodinated contrast media Rash    Pontocaine [tetracaine hcl] Anaphylaxis     hypotension       Past Medical History:   Diagnosis Date    Anxiety     Arthritis     CAD (coronary artery disease) age 68    Carotid artery occlusion     Cerebrovascular small vessel disease     Colon polyps age 78    GERD (gastroesophageal reflux disease)     H. pylori infection     HTN (hypertension), benign age 65    Hyperlipidemia LDL goal <70 age 65    Hypothyroidism     Multiple fractures of ribs of right side 11/27/2012    Myocardial infarction     Pernicious anemia 1/26/2018    Primary insomnia 10/9/2018    Prostate cancer age 67    Syncopal episodes 3/2013    Urge incontinence 5/8/2018     Past Surgical History:   Procedure Laterality Date    CARDIAC PACEMAKER PLACEMENT  10/2015    CARDIAC SURGERY      CATARACT  EXTRACTION W/  INTRAOCULAR LENS IMPLANT Bilateral     COLONOSCOPY  ~2013    Dr. Edge    COLONOSCOPY N/A 6/8/2016    Procedure: COLONOSCOPY;  Surgeon: Shamar Barahona MD;  Location: [facility];  Service: Endoscopy;  Laterality: N/A; REPEAT IN 1 YEAR    CORONARY ANGIOPLASTY WITH STENT PLACEMENT  2003    x 2 RCA    PROSTATECTOMY  2002    UPPER GASTROINTESTINAL ENDOSCOPY  11/15/2016    Dr. Barahona     Family History   Problem Relation Age of Onset    Migraines Mother     Heart failure Father     Heart disease Father 56        MI    Heart failure Brother     Heart disease Brother     Diabetes Son     Hypertension Son     Heart disease Brother     Mental illness Brother     No Known Problems Son     Colon cancer Neg Hx     Colon polyps Neg Hx     Crohn's disease Neg Hx     Ulcerative colitis Neg Hx     Stomach cancer Neg Hx     Esophageal cancer Neg Hx      Social History     Tobacco Use    Smoking status: Never Smoker    Smokeless tobacco: Never Used   Substance Use Topics    Alcohol use: No    Drug use: No       Review of Systems:  ROS    OBJECTIVE:     Vital Signs (Most Recent)  Temp: 97.9 °F (36.6 °C) (11/03/19 0300)  Pulse: (!) 40(nurse notified of low heart rate) (11/03/19 0921)  Resp: 18 (11/03/19 0921)  BP: (!) 145/69 (11/03/19 0812)  SpO2: 96 % (11/03/19 0921)    Vital Signs Range (Last 24H):  Temp:  [97.6 °F (36.4 °C)-98.3 °F (36.8 °C)]   Pulse:  [40-98]   Resp:  [16-20]   BP: (145-181)/(62-96)   SpO2:  [96 %-100 %]     I & O (Last 24H):    Intake/Output Summary (Last 24 hours) at 11/3/2019 1034  Last data filed at 11/3/2019 0400  Gross per 24 hour   Intake 240 ml   Output --   Net 240 ml       Current Diet:     Current Diet Order   Procedures    Diet Cardiac        Allergies:  Review of patient's allergies indicates:   Allergen Reactions    Iodinated contrast media Rash    Pontocaine [tetracaine hcl] Anaphylaxis     hypotension       Meds:  Scheduled Meds:   amLODIPine  5 mg Oral  Daily    aspirin  81 mg Oral Daily    clopidogrel  75 mg Oral Daily    cyanocobalamin  2,000 mcg Oral Daily    donepezil  5 mg Oral QHS    fluticasone propionate  1 spray Each Nostril BID    levothyroxine  50 mcg Oral Daily    losartan  50 mg Oral BID    memantine  10 mg Oral BID    metoprolol succinate  50 mg Oral Daily    mirtazapine  7.5 mg Oral QHS    montelukast  10 mg Oral Daily    pantoprazole  40 mg Oral Daily     Continuous Infusions:  PRN Meds:acetaminophen, cloNIDine, ondansetron, sodium chloride 0.9%    Oxygen/Ventilator Data (Last 24H):  (if applicable)        Hemodynamic Parameters (Last 24H):   (if applicable)        Laboratory and Radiology Data:  Recent Results (from the past 24 hour(s))   CBC auto differential    Collection Time: 11/02/19 11:30 AM   Result Value Ref Range    WBC 5.64 3.90 - 12.70 K/uL    RBC 4.03 (L) 4.60 - 6.20 M/uL    Hemoglobin 13.1 (L) 14.0 - 18.0 g/dL    Hematocrit 39.0 (L) 40.0 - 54.0 %    Mean Corpuscular Volume 97 82 - 98 fL    Mean Corpuscular Hemoglobin 32.5 (H) 27.0 - 31.0 pg    Mean Corpuscular Hemoglobin Conc 33.6 32.0 - 36.0 g/dL    RDW 13.8 11.5 - 14.5 %    Platelets 150 150 - 350 K/uL    MPV 11.9 9.2 - 12.9 fL    Immature Granulocytes 0.4 0.0 - 0.5 %    Gran # (ANC) 2.9 1.8 - 7.7 K/uL    Immature Grans (Abs) 0.02 0.00 - 0.04 K/uL    Lymph # 2.4 1.0 - 4.8 K/uL    Mono # 0.3 0.3 - 1.0 K/uL    Eos # 0.0 0.0 - 0.5 K/uL    Baso # 0.03 0.00 - 0.20 K/uL    nRBC 0 0 /100 WBC    Gran% 50.7 38.0 - 73.0 %    Lymph% 43.3 18.0 - 48.0 %    Mono% 4.6 4.0 - 15.0 %    Eosinophil% 0.5 0.0 - 8.0 %    Basophil% 0.5 0.0 - 1.9 %    Differential Method Automated    Comprehensive metabolic panel    Collection Time: 11/02/19 11:30 AM   Result Value Ref Range    Sodium 142 136 - 145 mmol/L    Potassium 4.4 3.5 - 5.1 mmol/L    Chloride 109 95 - 110 mmol/L    CO2 25 23 - 29 mmol/L    Glucose 132 (H) 70 - 110 mg/dL    BUN, Bld 24 (H) 8 - 23 mg/dL    Creatinine 1.2 0.5 - 1.4 mg/dL     Calcium 8.4 (L) 8.7 - 10.5 mg/dL    Total Protein 6.5 6.0 - 8.4 g/dL    Albumin 3.7 3.5 - 5.2 g/dL    Total Bilirubin 1.0 0.1 - 1.0 mg/dL    Alkaline Phosphatase 82 55 - 135 U/L    AST 29 10 - 40 U/L    ALT 27 10 - 44 U/L    Anion Gap 8 8 - 16 mmol/L    eGFR if African American >60.0 >60 mL/min/1.73 m^2    eGFR if non  54.8 (A) >60 mL/min/1.73 m^2   Brain natriuretic peptide    Collection Time: 11/02/19 11:30 AM   Result Value Ref Range     (H) 0 - 99 pg/mL   Troponin I    Collection Time: 11/02/19 11:30 AM   Result Value Ref Range    Troponin I <0.030 0.020 - 0.040 ng/mL   Protime-INR    Collection Time: 11/02/19 11:30 AM   Result Value Ref Range    PT 13.2 10.6 - 14.8 sec    INR 1.0    Magnesium    Collection Time: 11/02/19 11:30 AM   Result Value Ref Range    Magnesium 1.9 1.6 - 2.6 mg/dL   Urinalysis, Reflex to Urine Culture Urine, Clean Catch    Collection Time: 11/02/19  3:10 PM   Result Value Ref Range    Specimen UA Urine, Clean Catch     Color, UA Yellow Yellow, Straw, Kaley    Appearance, UA Clear Clear    pH, UA 7.0 5.0 - 8.0    Specific Gravity, UA 1.015 1.005 - 1.030    Protein, UA 1+ (A) Negative    Glucose, UA Negative Negative    Ketones, UA Negative Negative    Bilirubin (UA) Negative Negative    Occult Blood UA Negative Negative    Nitrite, UA Negative Negative    Urobilinogen, UA 2.0-3.0 (A) Negative EU/dL    Leukocytes, UA Negative Negative   Urinalysis Microscopic    Collection Time: 11/02/19  3:10 PM   Result Value Ref Range    RBC, UA 1 0 - 4 /hpf    WBC, UA 0 0 - 5 /hpf    Bacteria Negative None-Occ /hpf    Squam Epithel, UA 1 /hpf    Hyaline Casts, UA 4 (A) 0-1/lpf /lpf    Microscopic Comment SEE COMMENT    Hemoglobin A1c    Collection Time: 11/02/19  5:01 PM   Result Value Ref Range    Hemoglobin A1C 5.5 4.5 - 6.2 %    Estimated Avg Glucose 111 68 - 131 mg/dL   TSH    Collection Time: 11/02/19  5:01 PM   Result Value Ref Range    TSH 1.960 0.340 - 5.600 uIU/mL    Troponin I    Collection Time: 11/02/19  5:01 PM   Result Value Ref Range    Troponin I <0.030 0.020 - 0.040 ng/mL   CK-MB    Collection Time: 11/02/19  5:01 PM   Result Value Ref Range    CPK MB 1.4 0.1 - 6.5 ng/mL   Troponin I    Collection Time: 11/02/19 10:56 PM   Result Value Ref Range    Troponin I <0.030 0.020 - 0.040 ng/mL   CK-MB    Collection Time: 11/02/19 10:56 PM   Result Value Ref Range    CPK MB 1.6 0.1 - 6.5 ng/mL   Basic Metabolic Panel (BMP)    Collection Time: 11/03/19  5:22 AM   Result Value Ref Range    Sodium 143 136 - 145 mmol/L    Potassium 3.9 3.5 - 5.1 mmol/L    Chloride 112 (H) 95 - 110 mmol/L    CO2 25 23 - 29 mmol/L    Glucose 87 70 - 110 mg/dL    BUN, Bld 25 (H) 8 - 23 mg/dL    Creatinine 1.3 0.5 - 1.4 mg/dL    Calcium 8.4 (L) 8.7 - 10.5 mg/dL    Anion Gap 6 (L) 8 - 16 mmol/L    eGFR if African American 57.5 (A) >60 mL/min/1.73 m^2    eGFR if non  49.8 (A) >60 mL/min/1.73 m^2   Magnesium    Collection Time: 11/03/19  5:22 AM   Result Value Ref Range    Magnesium 1.9 1.6 - 2.6 mg/dL   CBC Without Differential    Collection Time: 11/03/19  5:22 AM   Result Value Ref Range    WBC 7.10 3.90 - 12.70 K/uL    RBC 3.86 (L) 4.60 - 6.20 M/uL    Hemoglobin 12.6 (L) 14.0 - 18.0 g/dL    Hematocrit 36.9 (L) 40.0 - 54.0 %    Mean Corpuscular Volume 96 82 - 98 fL    Mean Corpuscular Hemoglobin 32.6 (H) 27.0 - 31.0 pg    Mean Corpuscular Hemoglobin Conc 34.1 32.0 - 36.0 g/dL    RDW 13.7 11.5 - 14.5 %    Platelets 149 (L) 150 - 350 K/uL    MPV 11.6 9.2 - 12.9 fL   Lipid panel    Collection Time: 11/03/19  5:22 AM   Result Value Ref Range    Cholesterol 142 120 - 199 mg/dL    Triglycerides 79 30 - 150 mg/dL    HDL 42 40 - 75 mg/dL    LDL Cholesterol 84.2 63.0 - 159.0 mg/dL    Hdl/Cholesterol Ratio 29.6 20.0 - 50.0 %    Total Cholesterol/HDL Ratio 3.4 2.0 - 5.0    Non-HDL Cholesterol 100 mg/dL     Imaging Results          CT Head Without Contrast (Final result)  Result time 11/02/19  12:44:11    Final result by Danette Encarnacion MD (11/02/19 12:44:11)                 Narrative:    CMS MANDATED QUALITY DATA - CT RADIATION 436. CT scans at this  facility utilize dose modulation, iterative reconstruction, and/or  weight based dosing when appropriate to reduce radiation dose to as  low as reasonably achievable.    PROCEDURE:    CT HEAD WITHOUT CONTRAST  dated  11/2/2019 12:37 PM    CLINICAL HISTORY:   Male 85 years of age.   Headache, acute, norm  neuro exam; syncope with LOC today, no trauma    TECHNIQUE: CT images were acquired from the foramen magnum to the  vertex. Intravenous contrast was not administered.    COMPARISON:  May 20, 2019    FINDINGS:    There is no intracranial hemorrhage, extra-axial fluid collection or  edema. There is global atrophy. Hypodensities are unchanged within the  right and left corona radiata compatible with remote small  infarctions. Ventricles, cisterns and sulci diffusely prominent from  global atrophy.  Mastoid air cells are clear.  Visualized paranasal  sinuses are clear.    Extracranial soft tissue is normal. The calvarium is intact. There is  old fracture deformity of the nasal bones, unchanged compared with the  prior study.    Short segment of distal left vertebral artery is densely calcified and  stenotic. (I have report of CTA neck from 2013 which describes this as  a 90% stenosis.)    IMPRESSION:      1. No acute findings.  2. Global atrophy. 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  3. Stenotic distal left vertebral artery (described in report of CTA  from 2013).    Electronically Signed by Danette Encarnacion on 11/2/2019 12:58 PM                             X-Ray Chest AP Portable (Final result)  Result time 11/02/19 11:47:17    Final result by Danette Encarnacion MD (11/02/19 11:47:17)                 Narrative:    PROCEDURE:   XR CHEST AP PORTABLE  dated  11/2/2019 11:30 AM    CLINICAL HISTORY:   Male 85 years of age.   syncope    TECHNIQUE: AP view of the chest  obtained portably at 11:30 AM.    PREVIOUS STUDIES:  May 20, 2019    FINDINGS:    Left chest pacemaker leads project over the right atrium and right  ventricle. Cardiac mediastinal contours are normal. The heart is not  enlarged. Lungs are clear. There is no pleural effusion or  pneumothorax. Bones are unremarkable.    IMPRESSION:    No acute findings. Pacemaker.    Electronically Signed by Danette Encarnacion on 11/2/2019 12:05 PM                              12-lead EKG interpretation:  (if applicable)      Current Cardiac Rhythm:   (if applicable)    Physical Exam:   Physical Exam  Nursing note and vitals reviewed.  Constitutional: He is not diaphoretic. No distress.   HENT:   Head: Normocephalic and atraumatic.   Eyes: Conjunctivae and EOM are normal.   Neck: Normal range of motion.   Cardiovascular: Regular rhythm.   Pulmonary/Chest: Breath sounds normal.   Abdominal: Soft. There is no tenderness.   Musculoskeletal: Normal range of motion.   Neurological: He is alert. He has normal strength. No cranial nerve deficit or sensory deficit.   Patient or in his a person and place he does not know date   Skin: No rash noted.   Psychiatric:   Patient very pleasant.  He is sitting up in bed smiling.  Multiple family members at bedside   ASSESSMENT/PLAN:   Assessment:     Plan:     1.  WILL INCREASE B-BLOCKER TO TRY TO SUPPRESS PVCS  2,  WILL DECIDE ABOUT INCREASEING PACER RATE TO 80--AT LOWER RATE-TO SEE IF THIS SUPPRESSES PVCS--MIGHT BE DONE TOMORROW  3.  ECHO BEING DONE  4.  CAROTID ON RIGHT SHOWS SOME ISSUES ON CT FROM 2017:  Narrowing at the carotid bifurcations approximately 50-60 percent right and 30-40 percent diameter stenosis left.  Moderate, approximately 50-60 percent narrowing of the parasellar portion of the left ICA.  Mild narrowing parasellar portion of right ICA    BUT US CAROTID YESTERDAY:  1. 50-69% left ICA stenosis.  2. No hemodynamically significant right ICA stenosis      WILL LET DR BOLIVAR DECIDE  TOMORROW IF MORE IMAGING NEEDED..

## 2019-11-03 NOTE — PLAN OF CARE
11/03/19 1212   MARTINEZ Message   Medicare Outpatient and Observation Notification regarding financial responsibility Given to patient/caregiver;Explained to patient/caregiver;Signed/date by patient/caregiver   Date MARTINEZ was signed 11/03/19   Time MARTINEZ was signed 1212

## 2019-11-03 NOTE — HOSPITAL COURSE
"11/03 Chart reviewed. Labs personally reviewed (noted below): stable CBC, Chemistries show prerenal azotemia. Lipid panel, TSH, Cardiac bio-markers, and A1c  are normal. CXR personally reviewed: NAPD, pacemaker. EKG personally reviewed: sinus without acute ischemic changes. Patient personally discussed with Dr. Bailey:  syncope may be secondary to ventricular arrhythmias, but the patient has 50-69% left ICA stenosis. Patient is pleasantly demented Denies CP/SOB. No LOP/LOM    11/04  Patient personally discussed with SUGAR Hernandez Cardiology PA. Labs (listed below) personally reviewed: mild, persisting anemia, Chemistries normal with mild prerenal azotemia. Telemetry personally reviewed: bigeminy.  Patient feel "Good". He has been OOB to chair, and is walking to restroom. No CP/SOB/orthopnea/PND    11/5  Rate adjusted on pacemaker.  Metoprolol dose increased. He feels well with no lightheadedness.  He has been cleared for discharge home with close folllow up with cardiogy.  Syncope felt to be due to arrhthymias. Return precautions given.  "

## 2019-11-04 LAB
ANION GAP SERPL CALC-SCNC: 6 MMOL/L (ref 8–16)
ANION GAP SERPL CALC-SCNC: 6 MMOL/L (ref 8–16)
BUN SERPL-MCNC: 26 MG/DL (ref 8–23)
BUN SERPL-MCNC: 26 MG/DL (ref 8–23)
CALCIUM SERPL-MCNC: 8.2 MG/DL (ref 8.7–10.5)
CALCIUM SERPL-MCNC: 8.2 MG/DL (ref 8.7–10.5)
CHLORIDE SERPL-SCNC: 112 MMOL/L (ref 95–110)
CHLORIDE SERPL-SCNC: 112 MMOL/L (ref 95–110)
CO2 SERPL-SCNC: 27 MMOL/L (ref 23–29)
CO2 SERPL-SCNC: 27 MMOL/L (ref 23–29)
CREAT SERPL-MCNC: 1.3 MG/DL (ref 0.5–1.4)
CREAT SERPL-MCNC: 1.3 MG/DL (ref 0.5–1.4)
ERYTHROCYTE [DISTWIDTH] IN BLOOD BY AUTOMATED COUNT: 13.9 % (ref 11.5–14.5)
EST. GFR  (AFRICAN AMERICAN): 57.5 ML/MIN/1.73 M^2
EST. GFR  (AFRICAN AMERICAN): 57.5 ML/MIN/1.73 M^2
EST. GFR  (NON AFRICAN AMERICAN): 49.8 ML/MIN/1.73 M^2
EST. GFR  (NON AFRICAN AMERICAN): 49.8 ML/MIN/1.73 M^2
GLUCOSE SERPL-MCNC: 94 MG/DL (ref 70–110)
GLUCOSE SERPL-MCNC: 94 MG/DL (ref 70–110)
HCT VFR BLD AUTO: 35.8 % (ref 40–54)
HGB BLD-MCNC: 12.1 G/DL (ref 14–18)
MAGNESIUM SERPL-MCNC: 1.9 MG/DL (ref 1.6–2.6)
MCH RBC QN AUTO: 32.5 PG (ref 27–31)
MCHC RBC AUTO-ENTMCNC: 33.8 G/DL (ref 32–36)
MCV RBC AUTO: 96 FL (ref 82–98)
PLATELET # BLD AUTO: 149 K/UL (ref 150–350)
PMV BLD AUTO: 11.6 FL (ref 9.2–12.9)
POTASSIUM SERPL-SCNC: 3.9 MMOL/L (ref 3.5–5.1)
POTASSIUM SERPL-SCNC: 3.9 MMOL/L (ref 3.5–5.1)
RBC # BLD AUTO: 3.72 M/UL (ref 4.6–6.2)
SODIUM SERPL-SCNC: 145 MMOL/L (ref 136–145)
SODIUM SERPL-SCNC: 145 MMOL/L (ref 136–145)
WBC # BLD AUTO: 6.01 K/UL (ref 3.9–12.7)

## 2019-11-04 PROCEDURE — 36415 COLL VENOUS BLD VENIPUNCTURE: CPT

## 2019-11-04 PROCEDURE — G0378 HOSPITAL OBSERVATION PER HR: HCPCS

## 2019-11-04 PROCEDURE — 25000003 PHARM REV CODE 250: Performed by: INTERNAL MEDICINE

## 2019-11-04 PROCEDURE — A4216 STERILE WATER/SALINE, 10 ML: HCPCS | Performed by: NURSE PRACTITIONER

## 2019-11-04 PROCEDURE — 25000003 PHARM REV CODE 250: Performed by: NURSE PRACTITIONER

## 2019-11-04 PROCEDURE — 85027 COMPLETE CBC AUTOMATED: CPT

## 2019-11-04 PROCEDURE — 83735 ASSAY OF MAGNESIUM: CPT

## 2019-11-04 PROCEDURE — 80048 BASIC METABOLIC PNL TOTAL CA: CPT

## 2019-11-04 RX ADMIN — DONEPEZIL HYDROCHLORIDE 5 MG: 5 TABLET, FILM COATED ORAL at 08:11

## 2019-11-04 RX ADMIN — MEMANTINE HYDROCHLORIDE 10 MG: 5 TABLET ORAL at 08:11

## 2019-11-04 RX ADMIN — ASPIRIN 81 MG 81 MG: 81 TABLET ORAL at 05:11

## 2019-11-04 RX ADMIN — MONTELUKAST SODIUM 10 MG: 10 TABLET, COATED ORAL at 08:11

## 2019-11-04 RX ADMIN — SODIUM CHLORIDE, PRESERVATIVE FREE 10 ML: 5 INJECTION INTRAVENOUS at 05:11

## 2019-11-04 RX ADMIN — LOSARTAN POTASSIUM 50 MG: 50 TABLET, FILM COATED ORAL at 08:11

## 2019-11-04 RX ADMIN — CLOPIDOGREL BISULFATE 75 MG: 75 TABLET, FILM COATED ORAL at 08:11

## 2019-11-04 RX ADMIN — PANTOPRAZOLE SODIUM 40 MG: 40 TABLET, DELAYED RELEASE ORAL at 08:11

## 2019-11-04 RX ADMIN — METOPROLOL SUCCINATE 100 MG: 50 TABLET, EXTENDED RELEASE ORAL at 05:11

## 2019-11-04 RX ADMIN — CYANOCOBALAMIN TAB 1000 MCG 2000 MCG: 1000 TAB at 08:11

## 2019-11-04 RX ADMIN — FLUTICASONE PROPIONATE 50 MCG: 50 SPRAY, METERED NASAL at 08:11

## 2019-11-04 RX ADMIN — LEVOTHYROXINE SODIUM 50 MCG: 25 TABLET ORAL at 08:11

## 2019-11-04 RX ADMIN — MIRTAZAPINE 7.5 MG: 7.5 TABLET, FILM COATED ORAL at 08:11

## 2019-11-04 RX ADMIN — AMLODIPINE BESYLATE 5 MG: 5 TABLET ORAL at 08:11

## 2019-11-04 NOTE — PROGRESS NOTES
[facility] Medicine  Progress Note    Patient Name: Gene Hartman  MRN: [id number]  Patient Class: OP- Observation   Admission Date: 11/2/2019  Length of Stay: 0 days  Attending Physician: Zander Camp MD  Primary Care Provider: Andreas Ellsworth MD        Subjective:     Principal Problem:Syncope        HPI:  Mr. Hartman is an 85 year old male with a history of HTN, CAD s/p stents, Hypothyroidism, prostate cancer, and dementia who is brought to the ED by his family for syncope. The patient was sitting at the table when he began to have a sensation to bilateral temporal areas and then had a brief syncopal episode. The patient reportedly stayed seated in his chair. He did not slump or fall over. No jerking movements noted per the family. When the patient finally came around, he did not talk for several seconds. The wife reports the entire episode last approximately 5 minutes before the patient returned to his normal state. He has a history of headaches secondary to BP spiking.  The patient denies fever, chills, cough, chest pain, sob, numbness, tingling, focal neuro deficits, weakness, dizziness, dysuria, nausea, vomiting, abdominal pain, or diarrhea. He has a pacemaker. In the ED, BP is elevated 180/81.  Other vitals stable. He is noted to have bigeminy intermittently on telemetry. The pacemaker is interrogated in the ED and the representative reports significant ventricular arrhythmias including slower ventricular tachycardia lasting up to 9 minutes. Timing corresponds to patient's syncopal episode. The patient will be admitted to the hospital for further workup and for cardiology consult.     Overview/Hospital Course:  11/03 Chart reviewed. Labs personally reviewed (noted below): stable CBC, Chemistries show prerenal azotemia. Lipid panel, TSH, Cardiac bio-markers, and A1c  are normal. CXR personally reviewed: NAPD, pacemaker. EKG personally reviewed: sinus without acute ischemic changes.  "Patient personally discussed with Dr. Bailey:  syncope may be secondary to ventricular arrhythmias, but the patient has 50-69% left ICA stenosis. Patient is pleasantly demented Denies CP/SOB. No LOP/LOM    11/04  Patient personally discussed with SUGAR Hernandez Cardiology PA. Labs (listed below) personally reviewed: mild, persisting anemia, Chemistries normal with mild prerenal azotemia. Telemetry personally reviewed: bigeminy.  Patient feel "Good". He has been OOB to chair, and is walking to restroom. No CP/SOB/orthopnea/PND    Interval History: stable    Review of Systems   Constitutional: Negative.    HENT: Negative.    Eyes: Negative.    Respiratory: Negative.    Cardiovascular: Negative.    Gastrointestinal: Negative.    Endocrine: Negative.    Genitourinary: Negative.    Musculoskeletal: Negative.    Skin: Negative.    Allergic/Immunologic: Negative.    Neurological: Negative.    Hematological: Negative.    All other systems reviewed and are negative.    Objective:     Vital Signs (Most Recent):  Temp: 97.5 °F (36.4 °C) (11/04/19 1500)  Pulse: 63 (11/04/19 1500)  Resp: 18 (11/04/19 1500)  BP: (!) 154/72 (11/04/19 1500)  SpO2: 97 % (11/04/19 1500) Vital Signs (24h Range):  Temp:  [97.5 °F (36.4 °C)-98.6 °F (37 °C)] 97.5 °F (36.4 °C)  Pulse:  [51-71] 63  Resp:  [17-19] 18  SpO2:  [96 %-98 %] 97 %  BP: ()/(54-90) 154/72     Weight: 66.2 kg (145 lb 15.1 oz)  Body mass index is 28.5 kg/m².    Intake/Output Summary (Last 24 hours) at 11/4/2019 1547  Last data filed at 11/4/2019 0356  Gross per 24 hour   Intake --   Output 650 ml   Net -650 ml      Physical Exam   Constitutional: He is oriented to person, place, and time. He appears well-developed and well-nourished.   HENT:   Head: Normocephalic and atraumatic.   Eyes: Pupils are equal, round, and reactive to light. Conjunctivae and EOM are normal.   Neck: Normal range of motion. Neck supple.   Cardiovascular: Normal rate, normal heart sounds and intact distal " pulses.   Pulmonary/Chest:   Decreased entry bases without adventitious sounds   Abdominal: Soft. Bowel sounds are normal.   Musculoskeletal: Normal range of motion.   Neurological: He is alert and oriented to person, place, and time.   Skin: Skin is warm and dry. Capillary refill takes less than 2 seconds.   Psychiatric: He has a normal mood and affect. His behavior is normal. Judgment and thought content normal.   Nursing note and vitals reviewed.      Significant Labs:   BMP:   Recent Labs   Lab 11/04/19  0450   GLU 94  94     145   K 3.9  3.9   *  112*   CO2 27  27   BUN 26*  26*   CREATININE 1.3  1.3   CALCIUM 8.2*  8.2*   MG 1.9     CBC:   Recent Labs   Lab 11/03/19  0522 11/04/19  0450   WBC 7.10 6.01   HGB 12.6* 12.1*   HCT 36.9* 35.8*   * 149*       Significant Imaging: I have reviewed and interpreted all pertinent imaging results/findings within the past 24 hours.      Assessment/Plan:      * Syncope  Continue telemetry monitoring and current regimen        Ventricular arrhythmia  Continuous cardiac monitor  Patient is currently in bigeminy at this time  Recommendations per cardiologist      HTN (hypertension), benign  Continue home antihypertensives once verified  PRN antihypertensive for uncontrolled BP          VTE Risk Mitigation (From admission, onward)         Ordered     IP VTE HIGH RISK PATIENT  Once      11/02/19 1637     Place MELINDA hose  Until discontinued      11/02/19 1637     Reason for No Pharmacological VTE Prophylaxis  Once      11/02/19 1637                      Zander Camp MD  Department of Hospital Medicine   [facility]

## 2019-11-04 NOTE — PLAN OF CARE
Cardiac, vital signs, lab monitoring. Increase activity as tolerated. Daily weights.Possible discharge in am.  Bed alarm on. Watch for falls.

## 2019-11-04 NOTE — PROGRESS NOTES
[facility]   Cardiology Note      SUBJECTIVE:     HPI: Seen and examined this AM. PPM settings re-programmed by Dalton burden. No notification on what was changed although it appears base rate may have been elevated to 70 bpm. Continues to experience trigeminy. Patient feels well sitting in bed. He is oriented to self, but somnolent. Falls asleep during our conversation. HR controlled, BP remains elevated. US carotids on current admission demonstrated 50% LICA stenosis while CTA carotids 6 months ago demonstrated 50% MAYE stenosis.     Review of patient's allergies indicates:   Allergen Reactions    Iodinated contrast media Rash    Pontocaine [tetracaine hcl] Anaphylaxis     hypotension       Past Medical History:   Diagnosis Date    Anxiety     Arthritis     CAD (coronary artery disease) age 68    Carotid artery occlusion     Cerebrovascular small vessel disease     Colon polyps age 78    GERD (gastroesophageal reflux disease)     H. pylori infection     HTN (hypertension), benign age 65    Hyperlipidemia LDL goal <70 age 65    Hypothyroidism     Multiple fractures of ribs of right side 11/27/2012    Myocardial infarction     Pernicious anemia 1/26/2018    Primary insomnia 10/9/2018    Prostate cancer age 67    Syncopal episodes 3/2013    Urge incontinence 5/8/2018     Past Surgical History:   Procedure Laterality Date    CARDIAC PACEMAKER PLACEMENT  10/2015    CARDIAC SURGERY      CATARACT EXTRACTION W/  INTRAOCULAR LENS IMPLANT Bilateral     COLONOSCOPY  ~2013    Dr. Edge    COLONOSCOPY N/A 6/8/2016    Procedure: COLONOSCOPY;  Surgeon: Shamar Dowell MD;  Location: [facility];  Service: Endoscopy;  Laterality: N/A; REPEAT IN 1 YEAR    CORONARY ANGIOPLASTY WITH STENT PLACEMENT  2003    x 2 RCA    PROSTATECTOMY  2002    UPPER GASTROINTESTINAL ENDOSCOPY  11/15/2016    Dr. Dowell     Family History   Problem Relation Age of Onset    Migraines Mother     Heart failure Father      Heart disease Father 56        MI    Heart failure Brother     Heart disease Brother     Diabetes Son     Hypertension Son     Heart disease Brother     Mental illness Brother     No Known Problems Son     Colon cancer Neg Hx     Colon polyps Neg Hx     Crohn's disease Neg Hx     Ulcerative colitis Neg Hx     Stomach cancer Neg Hx     Esophageal cancer Neg Hx      Social History     Tobacco Use    Smoking status: Never Smoker    Smokeless tobacco: Never Used   Substance Use Topics    Alcohol use: No    Drug use: No         OBJECTIVE:     Vital Signs (Most Recent)  Temp: 98.3 °F (36.8 °C) (11/04/19 0814)  Pulse: (!) 51 (11/04/19 0814)  Resp: 18 (11/04/19 0814)  BP: (!) 156/90 (11/04/19 0814)  SpO2: 97 % (11/04/19 0814)    Vital Signs Range (Last 24H):  Temp:  [97.5 °F (36.4 °C)-98.6 °F (37 °C)]   Pulse:  [51-71]   Resp:  [17-19]   BP: (122-184)/(54-90)   SpO2:  [96 %-98 %]     I & O (Last 24H):    Intake/Output Summary (Last 24 hours) at 11/4/2019 0943  Last data filed at 11/4/2019 0356  Gross per 24 hour   Intake --   Output 650 ml   Net -650 ml       Current Diet:     Current Diet Order   Procedures    Diet Cardiac        Allergies:  Review of patient's allergies indicates:   Allergen Reactions    Iodinated contrast media Rash    Pontocaine [tetracaine hcl] Anaphylaxis     hypotension       Meds:  Scheduled Meds:   amLODIPine  5 mg Oral Daily    aspirin  81 mg Oral Daily    clopidogrel  75 mg Oral Daily    cyanocobalamin  2,000 mcg Oral Daily    donepezil  5 mg Oral QHS    fluticasone propionate  1 spray Each Nostril BID    levothyroxine  50 mcg Oral Daily    losartan  50 mg Oral BID    memantine  10 mg Oral BID    metoprolol succinate  100 mg Oral Daily    mirtazapine  7.5 mg Oral QHS    montelukast  10 mg Oral Daily    pantoprazole  40 mg Oral Daily     Continuous Infusions:  PRN Meds:acetaminophen, cloNIDine, ondansetron, sodium chloride 0.9%       Laboratory and Radiology  Data:  Recent Results (from the past 24 hour(s))   Echo Color Flow Doppler? Yes    Collection Time: 11/03/19 11:09 AM   Result Value Ref Range    BSA 1.67 m2    TDI SEPTAL 0.04 m/s    LV LATERAL E/E' RATIO 9.00 m/s    LV SEPTAL E/E' RATIO 11.25 m/s    AORTIC VALVE CUSP SEPERATION 1.40 cm    TDI LATERAL 0.05 m/s    PV PEAK VELOCITY 83.11 cm/s    LVIDD 5.21 3.5 - 6.0 cm    IVS 0.91 0.6 - 1.1 cm    PW 0.91 0.6 - 1.1 cm    Ao root annulus 3.24 cm    LVIDS 3.94 2.1 - 4.0 cm    FS 24 28 - 44 %    LV mass 172.00 g    LA size 4.08 cm    RVDD 193.00 cm    Left Ventricle Relative Wall Thickness 0.35 cm    AV mean gradient 5 mmHg    AV valve area 2.00 cm2    AV Velocity Ratio 50.88     AV index (prosthetic) 0.61     E/A ratio 0.42     Mean e' 0.05 m/s    E wave decelartion time 339.80 msec    IVRT 103.60 msec    LVOT diameter 2.04 cm    LVOT area 3.3 cm2    LVOT peak talha 74.80 m/s    LVOT peak VTI 19.43 cm    Ao peak talha 1.47 m/s    Ao VTI 31.71 cm    LVOT stroke volume 63.48 cm3    AV peak gradient 9 mmHg    E/E' ratio 10.00 m/s    MV Peak E Talha 0.45 m/s    TR Max Talha 2.76 m/s    MV Peak A Talha 1.06 m/s    LV Mass Index 105 g/m2    Triscuspid Valve Regurgitation Peak Gradient 30 mmHg   Basic Metabolic Panel (BMP)    Collection Time: 11/04/19  4:50 AM   Result Value Ref Range    Sodium 145 136 - 145 mmol/L    Potassium 3.9 3.5 - 5.1 mmol/L    Chloride 112 (H) 95 - 110 mmol/L    CO2 27 23 - 29 mmol/L    Glucose 94 70 - 110 mg/dL    BUN, Bld 26 (H) 8 - 23 mg/dL    Creatinine 1.3 0.5 - 1.4 mg/dL    Calcium 8.2 (L) 8.7 - 10.5 mg/dL    Anion Gap 6 (L) 8 - 16 mmol/L    eGFR if African American 57.5 (A) >60 mL/min/1.73 m^2    eGFR if non  49.8 (A) >60 mL/min/1.73 m^2   Magnesium    Collection Time: 11/04/19  4:50 AM   Result Value Ref Range    Magnesium 1.9 1.6 - 2.6 mg/dL   Basic metabolic panel    Collection Time: 11/04/19  4:50 AM   Result Value Ref Range    Sodium 145 136 - 145 mmol/L    Potassium 3.9 3.5 - 5.1  mmol/L    Chloride 112 (H) 95 - 110 mmol/L    CO2 27 23 - 29 mmol/L    Glucose 94 70 - 110 mg/dL    BUN, Bld 26 (H) 8 - 23 mg/dL    Creatinine 1.3 0.5 - 1.4 mg/dL    Calcium 8.2 (L) 8.7 - 10.5 mg/dL    Anion Gap 6 (L) 8 - 16 mmol/L    eGFR if African American 57.5 (A) >60 mL/min/1.73 m^2    eGFR if non  49.8 (A) >60 mL/min/1.73 m^2   CBC Without Differential    Collection Time: 11/04/19  4:50 AM   Result Value Ref Range    WBC 6.01 3.90 - 12.70 K/uL    RBC 3.72 (L) 4.60 - 6.20 M/uL    Hemoglobin 12.1 (L) 14.0 - 18.0 g/dL    Hematocrit 35.8 (L) 40.0 - 54.0 %    Mean Corpuscular Volume 96 82 - 98 fL    Mean Corpuscular Hemoglobin 32.5 (H) 27.0 - 31.0 pg    Mean Corpuscular Hemoglobin Conc 33.8 32.0 - 36.0 g/dL    RDW 13.9 11.5 - 14.5 %    Platelets 149 (L) 150 - 350 K/uL    MPV 11.6 9.2 - 12.9 fL     Imaging Results          CT Head Without Contrast (Final result)  Result time 11/02/19 12:44:11    Final result by Danette Encarnacion MD (11/02/19 12:44:11)                 Narrative:    CMS MANDATED QUALITY DATA - CT RADIATION 436. CT scans at this  facility utilize dose modulation, iterative reconstruction, and/or  weight based dosing when appropriate to reduce radiation dose to as  low as reasonably achievable.    PROCEDURE:    CT HEAD WITHOUT CONTRAST  dated  11/2/2019 12:37 PM    CLINICAL HISTORY:   Male 85 years of age.   Headache, acute, norm  neuro exam; syncope with LOC today, no trauma    TECHNIQUE: CT images were acquired from the foramen magnum to the  vertex. Intravenous contrast was not administered.    COMPARISON:  May 20, 2019    FINDINGS:    There is no intracranial hemorrhage, extra-axial fluid collection or  edema. There is global atrophy. Hypodensities are unchanged within the  right and left corona radiata compatible with remote small  infarctions. Ventricles, cisterns and sulci diffusely prominent from  global atrophy.  Mastoid air cells are clear.  Visualized paranasal  sinuses are  clear.    Extracranial soft tissue is normal. The calvarium is intact. There is  old fracture deformity of the nasal bones, unchanged compared with the  prior study.    Short segment of distal left vertebral artery is densely calcified and  stenotic. (I have report of CTA neck from 2013 which describes this as  a 90% stenosis.)    IMPRESSION:      1. No acute findings.  2. Global atrophy. 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  3. Stenotic distal left vertebral artery (described in report of CTA  from 2013).    Electronically Signed by Danette Encarnacion on 11/2/2019 12:58 PM                             X-Ray Chest AP Portable (Final result)  Result time 11/02/19 11:47:17    Final result by Danette Encarnacion MD (11/02/19 11:47:17)                 Narrative:    PROCEDURE:   XR CHEST AP PORTABLE  dated  11/2/2019 11:30 AM    CLINICAL HISTORY:   Male 85 years of age.   syncope    TECHNIQUE: AP view of the chest obtained portably at 11:30 AM.    PREVIOUS STUDIES:  May 20, 2019    FINDINGS:    Left chest pacemaker leads project over the right atrium and right  ventricle. Cardiac mediastinal contours are normal. The heart is not  enlarged. Lungs are clear. There is no pleural effusion or  pneumothorax. Bones are unremarkable.    IMPRESSION:    No acute findings. Pacemaker.    Electronically Signed by Danette Encarnacino on 11/2/2019 12:05 PM                            Physical Exam:   Physical Exam  Nursing note and vitals reviewed.  Constitutional: He is not diaphoretic. No distress.   HENT:   Head: Normocephalic and atraumatic.   Eyes: Conjunctivae and EOM are normal.   Neck: Normal range of motion.   Cardiovascular: Regular rhythm.   Pulmonary/Chest: Breath sounds normal.   Abdominal: Soft. There is no tenderness.   Musculoskeletal: Normal range of motion.   Neurological: He is alert. He has normal strength. No cranial nerve deficit or sensory deficit.   Skin: No rash noted.   Psychiatric:   Patient very pleasant.  He is sitting up in  bed smiling.  Multiple family members at bedside       ASSESSMENT/PLAN:   Assessment:   Syncope likely secondary to NSVT/bigeminy-trigeminy  Dalton PPM implant   Cerebrovascular disease   CAD s/p PCI  HTN  HLD  Hypothyroidism   GERD    Plan:   Called [employer] rep, awaiting call back on changes to PPM function.  Dependent on said conversation and telemetry readings this AM/afternoon, consider down-titrating Toprol dose and adding Sotalol for PVC control.  Add Hydralazine 50 mg BID for BP control.  Discussed results of CTA/carotid US with US department, will make addendum if one of the reports is incorrect for accurate information moving forward.     Yogi Hernandez PA-C  11/04/19

## 2019-11-04 NOTE — ASSESSMENT & PLAN NOTE
Continuous cardiac monitor  Patient is currently in bigeminy at this time  Recommendations per cardiologist

## 2019-11-05 VITALS
DIASTOLIC BLOOD PRESSURE: 64 MMHG | HEART RATE: 56 BPM | TEMPERATURE: 98 F | HEIGHT: 60 IN | RESPIRATION RATE: 16 BRPM | SYSTOLIC BLOOD PRESSURE: 168 MMHG | BODY MASS INDEX: 30.69 KG/M2 | OXYGEN SATURATION: 95 % | WEIGHT: 156.31 LBS

## 2019-11-05 PROBLEM — R55 SYNCOPE: Status: RESOLVED | Noted: 2019-11-02 | Resolved: 2019-11-05

## 2019-11-05 PROBLEM — I49.9 VENTRICULAR ARRHYTHMIA: Status: RESOLVED | Noted: 2019-11-02 | Resolved: 2019-11-05

## 2019-11-05 LAB
ANION GAP SERPL CALC-SCNC: 8 MMOL/L (ref 8–16)
ANION GAP SERPL CALC-SCNC: 8 MMOL/L (ref 8–16)
BUN SERPL-MCNC: 33 MG/DL (ref 8–23)
BUN SERPL-MCNC: 33 MG/DL (ref 8–23)
CALCIUM SERPL-MCNC: 8.6 MG/DL (ref 8.7–10.5)
CALCIUM SERPL-MCNC: 8.6 MG/DL (ref 8.7–10.5)
CHLORIDE SERPL-SCNC: 108 MMOL/L (ref 95–110)
CHLORIDE SERPL-SCNC: 108 MMOL/L (ref 95–110)
CO2 SERPL-SCNC: 27 MMOL/L (ref 23–29)
CO2 SERPL-SCNC: 27 MMOL/L (ref 23–29)
CREAT SERPL-MCNC: 1.6 MG/DL (ref 0.5–1.4)
CREAT SERPL-MCNC: 1.6 MG/DL (ref 0.5–1.4)
ERYTHROCYTE [DISTWIDTH] IN BLOOD BY AUTOMATED COUNT: 13.9 % (ref 11.5–14.5)
EST. GFR  (AFRICAN AMERICAN): 44.7 ML/MIN/1.73 M^2
EST. GFR  (AFRICAN AMERICAN): 44.7 ML/MIN/1.73 M^2
EST. GFR  (NON AFRICAN AMERICAN): 38.7 ML/MIN/1.73 M^2
EST. GFR  (NON AFRICAN AMERICAN): 38.7 ML/MIN/1.73 M^2
GLUCOSE SERPL-MCNC: 100 MG/DL (ref 70–110)
GLUCOSE SERPL-MCNC: 100 MG/DL (ref 70–110)
HCT VFR BLD AUTO: 40 % (ref 40–54)
HGB BLD-MCNC: 13.2 G/DL (ref 14–18)
MAGNESIUM SERPL-MCNC: 2.1 MG/DL (ref 1.6–2.6)
MCH RBC QN AUTO: 32 PG (ref 27–31)
MCHC RBC AUTO-ENTMCNC: 33 G/DL (ref 32–36)
MCV RBC AUTO: 97 FL (ref 82–98)
PLATELET # BLD AUTO: 179 K/UL (ref 150–350)
PMV BLD AUTO: 12 FL (ref 9.2–12.9)
POTASSIUM SERPL-SCNC: 3.8 MMOL/L (ref 3.5–5.1)
POTASSIUM SERPL-SCNC: 3.8 MMOL/L (ref 3.5–5.1)
RBC # BLD AUTO: 4.13 M/UL (ref 4.6–6.2)
SODIUM SERPL-SCNC: 143 MMOL/L (ref 136–145)
SODIUM SERPL-SCNC: 143 MMOL/L (ref 136–145)
WBC # BLD AUTO: 9 K/UL (ref 3.9–12.7)

## 2019-11-05 PROCEDURE — 80048 BASIC METABOLIC PNL TOTAL CA: CPT

## 2019-11-05 PROCEDURE — 85027 COMPLETE CBC AUTOMATED: CPT

## 2019-11-05 PROCEDURE — G0378 HOSPITAL OBSERVATION PER HR: HCPCS

## 2019-11-05 PROCEDURE — 25000003 PHARM REV CODE 250: Performed by: NURSE PRACTITIONER

## 2019-11-05 PROCEDURE — 36415 COLL VENOUS BLD VENIPUNCTURE: CPT

## 2019-11-05 PROCEDURE — 83735 ASSAY OF MAGNESIUM: CPT

## 2019-11-05 RX ORDER — METOPROLOL SUCCINATE 100 MG/1
100 TABLET, EXTENDED RELEASE ORAL DAILY
Qty: 30 TABLET | Refills: 1 | Status: SHIPPED | OUTPATIENT
Start: 2019-11-05 | End: 2019-12-04

## 2019-11-05 RX ADMIN — AMLODIPINE BESYLATE 5 MG: 5 TABLET ORAL at 09:11

## 2019-11-05 RX ADMIN — CYANOCOBALAMIN TAB 1000 MCG 2000 MCG: 1000 TAB at 09:11

## 2019-11-05 RX ADMIN — CLOPIDOGREL BISULFATE 75 MG: 75 TABLET, FILM COATED ORAL at 09:11

## 2019-11-05 RX ADMIN — FLUTICASONE PROPIONATE 50 MCG: 50 SPRAY, METERED NASAL at 09:11

## 2019-11-05 RX ADMIN — MONTELUKAST SODIUM 10 MG: 10 TABLET, COATED ORAL at 09:11

## 2019-11-05 RX ADMIN — LEVOTHYROXINE SODIUM 50 MCG: 25 TABLET ORAL at 09:11

## 2019-11-05 RX ADMIN — PANTOPRAZOLE SODIUM 40 MG: 40 TABLET, DELAYED RELEASE ORAL at 09:11

## 2019-11-05 RX ADMIN — LOSARTAN POTASSIUM 50 MG: 50 TABLET, FILM COATED ORAL at 09:11

## 2019-11-05 RX ADMIN — MEMANTINE HYDROCHLORIDE 10 MG: 5 TABLET ORAL at 09:11

## 2019-11-05 NOTE — PROGRESS NOTES
[facility]   Cardiology Note      SUBJECTIVE:     HPI: Seen and examined this AM. Had discussion with Dalton burden this AM who adjusted base rate setting to 80 before settling on 70. Since, patient has had less frequent bigeminy/trigeminy on tele. PVCs still noted but now occurring every 5-6th beat which will improve perfusion. BP more stable, not as elevated this AM. Patient denies any cardiac complaints. Cr mild elevation to 1.6. Hemoglobin 13.2. Discussed discrepancy with LICA vs. MAYE on US/CTA respectively with US department, confirmed US notes 50% stenosis LICA, will look into CTA findings from earlier this year. I will follow along with this.     Review of patient's allergies indicates:   Allergen Reactions    Iodinated contrast media Rash    Pontocaine [tetracaine hcl] Anaphylaxis     hypotension       Past Medical History:   Diagnosis Date    Anxiety     Arthritis     CAD (coronary artery disease) age 68    Carotid artery occlusion     Cerebrovascular small vessel disease     Colon polyps age 78    GERD (gastroesophageal reflux disease)     H. pylori infection     HTN (hypertension), benign age 65    Hyperlipidemia LDL goal <70 age 65    Hypothyroidism     Multiple fractures of ribs of right side 11/27/2012    Myocardial infarction     Pernicious anemia 1/26/2018    Primary insomnia 10/9/2018    Prostate cancer age 67    Syncopal episodes 3/2013    Urge incontinence 5/8/2018     Past Surgical History:   Procedure Laterality Date    CARDIAC PACEMAKER PLACEMENT  10/2015    CARDIAC SURGERY      CATARACT EXTRACTION W/  INTRAOCULAR LENS IMPLANT Bilateral     COLONOSCOPY  ~2013    Dr. Edge    COLONOSCOPY N/A 6/8/2016    Procedure: COLONOSCOPY;  Surgeon: Shamar Barahona MD;  Location: [facility];  Service: Endoscopy;  Laterality: N/A; REPEAT IN 1 YEAR    CORONARY ANGIOPLASTY WITH STENT PLACEMENT  2003    x 2 RCA    PROSTATECTOMY  2002    UPPER GASTROINTESTINAL ENDOSCOPY   11/15/2016    Dr. Dowell     Family History   Problem Relation Age of Onset    Migraines Mother     Heart failure Father     Heart disease Father 56        MI    Heart failure Brother     Heart disease Brother     Diabetes Son     Hypertension Son     Heart disease Brother     Mental illness Brother     No Known Problems Son     Colon cancer Neg Hx     Colon polyps Neg Hx     Crohn's disease Neg Hx     Ulcerative colitis Neg Hx     Stomach cancer Neg Hx     Esophageal cancer Neg Hx      Social History     Tobacco Use    Smoking status: Never Smoker    Smokeless tobacco: Never Used   Substance Use Topics    Alcohol use: No    Drug use: No         OBJECTIVE:     Vital Signs (Most Recent)  Temp: 98 °F (36.7 °C) (11/05/19 0800)  Pulse: 79 (11/05/19 0800)  Resp: 18 (11/05/19 0800)  BP: (!) 153/69 (11/05/19 0800)  SpO2: 100 % (11/05/19 0800)    Vital Signs Range (Last 24H):  Temp:  [97.3 °F (36.3 °C)-98.2 °F (36.8 °C)]   Pulse:  [54-79]   Resp:  [16-18]   BP: ()/(60-83)   SpO2:  [94 %-100 %]     I & O (Last 24H):  No intake or output data in the 24 hours ending 11/05/19 1042    Current Diet:     Current Diet Order   Procedures    Diet Cardiac        Allergies:  Review of patient's allergies indicates:   Allergen Reactions    Iodinated contrast media Rash    Pontocaine [tetracaine hcl] Anaphylaxis     hypotension       Meds:  Scheduled Meds:   amLODIPine  5 mg Oral Daily    aspirin  81 mg Oral Daily    clopidogrel  75 mg Oral Daily    cyanocobalamin  2,000 mcg Oral Daily    donepezil  5 mg Oral QHS    fluticasone propionate  1 spray Each Nostril BID    levothyroxine  50 mcg Oral Daily    losartan  50 mg Oral BID    memantine  10 mg Oral BID    metoprolol succinate  100 mg Oral Daily    mirtazapine  7.5 mg Oral QHS    montelukast  10 mg Oral Daily    pantoprazole  40 mg Oral Daily     Continuous Infusions:  PRN Meds:acetaminophen, cloNIDine, ondansetron, sodium chloride 0.9%        Laboratory and Radiology Data:  Recent Results (from the past 24 hour(s))   Basic Metabolic Panel (BMP)    Collection Time: 11/05/19  4:01 AM   Result Value Ref Range    Sodium 143 136 - 145 mmol/L    Potassium 3.8 3.5 - 5.1 mmol/L    Chloride 108 95 - 110 mmol/L    CO2 27 23 - 29 mmol/L    Glucose 100 70 - 110 mg/dL    BUN, Bld 33 (H) 8 - 23 mg/dL    Creatinine 1.6 (H) 0.5 - 1.4 mg/dL    Calcium 8.6 (L) 8.7 - 10.5 mg/dL    Anion Gap 8 8 - 16 mmol/L    eGFR if African American 44.7 (A) >60 mL/min/1.73 m^2    eGFR if non  38.7 (A) >60 mL/min/1.73 m^2   Magnesium    Collection Time: 11/05/19  4:01 AM   Result Value Ref Range    Magnesium 2.1 1.6 - 2.6 mg/dL   Basic metabolic panel    Collection Time: 11/05/19  4:01 AM   Result Value Ref Range    Sodium 143 136 - 145 mmol/L    Potassium 3.8 3.5 - 5.1 mmol/L    Chloride 108 95 - 110 mmol/L    CO2 27 23 - 29 mmol/L    Glucose 100 70 - 110 mg/dL    BUN, Bld 33 (H) 8 - 23 mg/dL    Creatinine 1.6 (H) 0.5 - 1.4 mg/dL    Calcium 8.6 (L) 8.7 - 10.5 mg/dL    Anion Gap 8 8 - 16 mmol/L    eGFR if African American 44.7 (A) >60 mL/min/1.73 m^2    eGFR if non  38.7 (A) >60 mL/min/1.73 m^2   CBC Without Differential    Collection Time: 11/05/19  4:01 AM   Result Value Ref Range    WBC 9.00 3.90 - 12.70 K/uL    RBC 4.13 (L) 4.60 - 6.20 M/uL    Hemoglobin 13.2 (L) 14.0 - 18.0 g/dL    Hematocrit 40.0 40.0 - 54.0 %    Mean Corpuscular Volume 97 82 - 98 fL    Mean Corpuscular Hemoglobin 32.0 (H) 27.0 - 31.0 pg    Mean Corpuscular Hemoglobin Conc 33.0 32.0 - 36.0 g/dL    RDW 13.9 11.5 - 14.5 %    Platelets 179 150 - 350 K/uL    MPV 12.0 9.2 - 12.9 fL     Imaging Results          CT Head Without Contrast (Final result)  Result time 11/02/19 12:44:11    Final result by Danette Encarnacion MD (11/02/19 12:44:11)                 Narrative:    CMS MANDATED QUALITY DATA - CT RADIATION 436. CT scans at this  facility utilize dose modulation, iterative  reconstruction, and/or  weight based dosing when appropriate to reduce radiation dose to as  low as reasonably achievable.    PROCEDURE:    CT HEAD WITHOUT CONTRAST  dated  11/2/2019 12:37 PM    CLINICAL HISTORY:   Male 85 years of age.   Headache, acute, norm  neuro exam; syncope with LOC today, no trauma    TECHNIQUE: CT images were acquired from the foramen magnum to the  vertex. Intravenous contrast was not administered.    COMPARISON:  May 20, 2019    FINDINGS:    There is no intracranial hemorrhage, extra-axial fluid collection or  edema. There is global atrophy. Hypodensities are unchanged within the  right and left corona radiata compatible with remote small  infarctions. Ventricles, cisterns and sulci diffusely prominent from  global atrophy.  Mastoid air cells are clear.  Visualized paranasal  sinuses are clear.    Extracranial soft tissue is normal. The calvarium is intact. There is  old fracture deformity of the nasal bones, unchanged compared with the  prior study.    Short segment of distal left vertebral artery is densely calcified and  stenotic. (I have report of CTA neck from 2013 which describes this as  a 90% stenosis.)    IMPRESSION:      1. No acute findings.  2. Global atrophy. 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  3. Stenotic distal left vertebral artery (described in report of CTA  from 2013).    Electronically Signed by Danette Encarnacion on 11/2/2019 12:58 PM                             X-Ray Chest AP Portable (Final result)  Result time 11/02/19 11:47:17    Final result by Danette Encarnacion MD (11/02/19 11:47:17)                 Narrative:    PROCEDURE:   XR CHEST AP PORTABLE  dated  11/2/2019 11:30 AM    CLINICAL HISTORY:   Male 85 years of age.   syncope    TECHNIQUE: AP view of the chest obtained portably at 11:30 AM.    PREVIOUS STUDIES:  May 20, 2019    FINDINGS:    Left chest pacemaker leads project over the right atrium and right  ventricle. Cardiac mediastinal contours are normal. The heart  is not  enlarged. Lungs are clear. There is no pleural effusion or  pneumothorax. Bones are unremarkable.    IMPRESSION:    No acute findings. Pacemaker.    Electronically Signed by Danette Encarnacion on 11/2/2019 12:05 PM                            Physical Exam:   Physical Exam  Nursing note and vitals reviewed.  Constitutional: He is not diaphoretic. No distress.   HENT:   Head: Normocephalic and atraumatic.   Eyes: Conjunctivae and EOM are normal.   Neck: Normal range of motion.   Cardiovascular: Regular rhythm.   Pulmonary/Chest: Breath sounds normal.   Abdominal: Soft. There is no tenderness.   Musculoskeletal: Normal range of motion.   Neurological: He is alert. He has normal strength. No cranial nerve deficit or sensory deficit.   Skin: No rash noted.   Psychiatric:   Patient very pleasant.  He is sitting up in bed smiling.  Multiple family members at bedside       ASSESSMENT/PLAN:   Assessment:   Syncope likely secondary to NSVT/bigeminy-trigeminy  Dalton PPM implant   Cerebrovascular disease   CAD s/p PCI  HTN  HLD  Hypothyroidism   GERD    Plan:   Patient is clear for discharge from cardiac standpoint.   Continue current cardiac medications, can re-visit option of changing Toprol to Sotalol at outpatient f/u with Dr. Betancourt which is already scheduled.    Yogi Hernandez PA-C  11/05/19

## 2019-11-06 LAB
AORTIC ROOT ANNULUS: 3.24 CM
AORTIC VALVE CUSP SEPERATION: 1.4 CM
AV INDEX (PROSTH): 0.61
AV MEAN GRADIENT: 5 MMHG
AV PEAK GRADIENT: 9 MMHG
AV VALVE AREA: 2 CM2
AV VELOCITY RATIO: 50.88
BSA FOR ECHO PROCEDURE: 1.67 M2
CV ECHO LV RWT: 0.35 CM
DOP CALC AO PEAK VEL: 1.47 M/S
DOP CALC AO VTI: 31.71 CM
DOP CALC LVOT AREA: 3.3 CM2
DOP CALC LVOT DIAMETER: 2.04 CM
DOP CALC LVOT PEAK VEL: 74.8 M/S
DOP CALC LVOT STROKE VOLUME: 63.48 CM3
DOP CALCLVOT PEAK VEL VTI: 19.43 CM
E WAVE DECELERATION TIME: 339.8 MSEC
E/A RATIO: 0.42
E/E' RATIO: 10 M/S
ECHO LV POSTERIOR WALL: 0.91 CM (ref 0.6–1.1)
FRACTIONAL SHORTENING: 24 % (ref 28–44)
INTERVENTRICULAR SEPTUM: 0.91 CM (ref 0.6–1.1)
IVRT: 103.6 MSEC
LEFT ATRIUM SIZE: 4.08 CM
LEFT INTERNAL DIMENSION IN SYSTOLE: 3.94 CM (ref 2.1–4)
LEFT VENTRICLE MASS INDEX: 105 G/M2
LEFT VENTRICULAR INTERNAL DIMENSION IN DIASTOLE: 5.21 CM (ref 3.5–6)
LEFT VENTRICULAR MASS: 172 G
LV LATERAL E/E' RATIO: 9 M/S
LV SEPTAL E/E' RATIO: 11.25 M/S
MV PEAK A VEL: 1.06 M/S
MV PEAK E VEL: 0.45 M/S
PISA TR MAX VEL: 2.76 M/S
PV PEAK VELOCITY: 83.11 CM/S
RA PRESSURE: 8 MMHG
RIGHT VENTRICULAR END-DIASTOLIC DIMENSION: 193 CM
TDI LATERAL: 0.05 M/S
TDI SEPTAL: 0.04 M/S
TDI: 0.05 M/S
TR MAX PG: 30 MMHG
TV REST PULMONARY ARTERY PRESSURE: 38 MMHG

## 2019-11-06 NOTE — DISCHARGE SUMMARY
[facility]  Discharge Summary      Patient Name: Gene Hartman  MRN: [id number]  Admission Date: 11/2/2019  Hospital Length of Stay: 0 days  Discharge Date and Time: 11/5/2019  6:14 PM  Attending Physician: No att. providers found   Discharging Provider: Yue Mccall MD  Primary Care Provider: Andreas Ellsworth MD      HPI:   Mr. Hartman is an 85 year old male with a history of HTN, CAD s/p stents, Hypothyroidism, prostate cancer, and dementia who is brought to the ED by his family for syncope. The patient was sitting at the table when he began to have a sensation to bilateral temporal areas and then had a brief syncopal episode. The patient reportedly stayed seated in his chair. He did not slump or fall over. No jerking movements noted per the family. When the patient finally came around, he did not talk for several seconds. The wife reports the entire episode last approximately 5 minutes before the patient returned to his normal state. He has a history of headaches secondary to BP spiking.  The patient denies fever, chills, cough, chest pain, sob, numbness, tingling, focal neuro deficits, weakness, dizziness, dysuria, nausea, vomiting, abdominal pain, or diarrhea. He has a pacemaker. In the ED, BP is elevated 180/81.  Other vitals stable. He is noted to have bigeminy intermittently on telemetry. The pacemaker is interrogated in the ED and the representative reports significant ventricular arrhythmias including slower ventricular tachycardia lasting up to 9 minutes. Timing corresponds to patient's syncopal episode. The patient will be admitted to the hospital for further workup and for cardiology consult.     * No surgery found *      Hospital Course:   11/03 Chart reviewed. Labs personally reviewed (noted below): stable CBC, Chemistries show prerenal azotemia. Lipid panel, TSH, Cardiac bio-markers, and A1c  are normal. CXR personally reviewed: NAPD, pacemaker. EKG personally reviewed:  "sinus without acute ischemic changes. Patient personally discussed with Dr. Bailey:  syncope may be secondary to ventricular arrhythmias, but the patient has 50-69% left ICA stenosis. Patient is pleasantly demented Denies CP/SOB. No LOP/LOM    11/04  Patient personally discussed with [facility]. Labs (listed below) personally reviewed: mild, persisting anemia, Chemistries normal with mild prerenal azotemia. Telemetry personally reviewed: bigeminy.  Patient feel "Good". He has been OOB to chair, and is walking to restroom. No CP/SOB/orthopnea/PND    11/5  Rate adjusted on pacemaker.  Metoprolol dose increased. He feels well with no lightheadedness.  He has been cleared for discharge home with close folllow up with cardiogy.  Syncope felt to be due to arrhthymias. Return precautions given.     Patient examined on day of discharge and NAD, alert.  No tachypnea.     Consults:   Consults (From admission, onward)        Status Ordering Provider     Inpatient consult to Cardiology  Once     Provider:  Hiram Betancourt MD    Acknowledged STONEY OSORIO     Inpatient consult to Cardiology  Once     Provider:  Hiram Betancourt MD    Completed SUSIE WORTHY     Inpatient consult to Hospitalist  Once     Provider:  Thalia Paulino MD    Acknowledged STONEY OSORIO          No new Assessment & Plan notes have been filed under this hospital service since the last note was generated.  Service: Hospital Medicine    Final Active Diagnoses:    Diagnosis Date Noted POA    HTN (hypertension), benign [I10]  Yes      Problems Resolved During this Admission:    Diagnosis Date Noted Date Resolved POA    PRINCIPAL PROBLEM:  Syncope [R55] 11/02/2019 11/05/2019 Yes    Ventricular arrhythmia [I49.9] 11/02/2019 11/05/2019 Yes       Discharged Condition: good    Disposition: Home or Self Care    Follow Up:  Follow-up Information     Andreas Ellsworth MD In 2 weeks.    Specialty:  Internal Medicine  Contact " information:  [address] [facility]  [address]  [place] [address]  [phone]             Jonnathan Fisher MD In 1 week.    Specialty:  Cardiology  Why:  please keep scheduled cardiology appointment   Contact information:  [facility]  [place] [id number] [phone]                 Patient Instructions:      Diet Cardiac     Notify your health care provider if you experience any of the following:  temperature >100.4     Notify your health care provider if you experience any of the following:  persistent nausea and vomiting or diarrhea     Notify your health care provider if you experience any of the following:  severe uncontrolled pain     Notify your health care provider if you experience any of the following:  increased confusion or weakness     Notify your health care provider if you experience any of the following:  persistent dizziness, light-headedness, or visual disturbances     Activity as tolerated       Significant Diagnostic Studies: Labs:   CMP   Recent Labs   Lab 11/04/19 0450 11/05/19  0401     145 143  143   K 3.9  3.9 3.8  3.8   *  112* 108  108   CO2 27  27 27  27   GLU 94  94 100  100   BUN 26*  26* 33*  33*   CREATININE 1.3  1.3 1.6*  1.6*   CALCIUM 8.2*  8.2* 8.6*  8.6*   ANIONGAP 6*  6* 8  8   ESTGFRAFRICA 57.5*  57.5* 44.7*  44.7*   EGFRNONAA 49.8*  49.8* 38.7*  38.7*    and CBC   Recent Labs   Lab 11/04/19 0450 11/05/19  0401   WBC 6.01 9.00   HGB 12.1* 13.2*   HCT 35.8* 40.0   * 179       Pending Diagnostic Studies:     Procedure Component Value Units Date/Time    Echo Color Flow Doppler? Yes [[id number]] Resulted:  11/03/19 1323    Order Status:  Sent Lab Status:  In process Updated:  11/03/19 1324     BSA 1.67 m2      TDI SEPTAL 0.04 m/s      LV LATERAL E/E' RATIO 9.00 m/s      LV SEPTAL E/E' RATIO 11.25 m/s      AORTIC VALVE CUSP SEPERATION 1.40 cm      TDI LATERAL 0.05 m/s      PV PEAK VELOCITY 83.11 cm/s      LVIDD 5.21 cm       IVS 0.91 cm      PW 0.91 cm      Ao root annulus 3.24 cm      LVIDS 3.94 cm      FS 24 %      LV mass 172.00 g      LA size 4.08 cm      RVDD 193.00 cm      Left Ventricle Relative Wall Thickness 0.35 cm      AV mean gradient 5 mmHg      AV valve area 2.00 cm2      AV Velocity Ratio 50.88     AV index (prosthetic) 0.61     E/A ratio 0.42     Mean e' 0.05 m/s      E wave decelartion time 339.80 msec      IVRT 103.60 msec      LVOT diameter 2.04 cm      LVOT area 3.3 cm2      LVOT peak talha 74.80 m/s      LVOT peak VTI 19.43 cm      Ao peak talha 1.47 m/s      Ao VTI 31.71 cm      LVOT stroke volume 63.48 cm3      AV peak gradient 9 mmHg      E/E' ratio 10.00 m/s      MV Peak E Talha 0.45 m/s      TR Max Talha 2.76 m/s      MV Peak A Talha 1.06 m/s      LV Mass Index 105 g/m2      Triscuspid Valve Regurgitation Peak Gradient 30 mmHg          Medications:  Reconciled Home Medications:      Medication List      CHANGE how you take these medications    metoprolol succinate 100 MG 24 hr tablet  Commonly known as:  TOPROL-XL  Take 1 tablet (100 mg total) by mouth once daily.  What changed:    · medication strength  · See the new instructions.     montelukast 10 mg tablet  Commonly known as:  SINGULAIR  TAKE 1 TABLET EVERY EVENING  What changed:  when to take this     pantoprazole 40 MG tablet  Commonly known as:  PROTONIX  TAKE 1 TABLET TWICE DAILY  What changed:  when to take this        CONTINUE taking these medications    amLODIPine 5 MG tablet  Commonly known as:  NORVASC  Take 5 mg by mouth once daily.     aspirin 81 MG Chew  Take 81 mg by mouth Daily.     atorvastatin 40 MG tablet  Commonly known as:  LIPITOR  TAKE 1 TABLET ONE TIME DAILY     cloNIDine 0.1 MG tablet  Commonly known as:  CATAPRES  Take 1 tablet (0.1 mg total) by mouth 2 (two) times daily as needed (SBP greater than 160).     clopidogrel 75 mg tablet  Commonly known as:  PLAVIX  Take 1 tablet (75 mg total) by mouth once daily.     cyanocobalamin (vitamin  B-12) 5,000 mcg Subl  Commonly known as:  VITAMIN B-12  Place 1 tablet under the tongue once daily.     docusate sodium 100 MG capsule  Commonly known as:  COLACE  Take 1 capsule (100 mg total) by mouth 2 (two) times daily.     donepezil 5 MG tablet  Commonly known as:  ARICEPT  Take 5 mg by mouth every evening.     fluticasone propionate 50 mcg/actuation nasal spray  Commonly known as:  FLONASE  1 spray (50 mcg total) by Each Nare route 2 (two) times daily.     folic acid 1 MG tablet  Commonly known as:  FOLVITE  TAKE 1 TABLET ONE TIME DAILY     levothyroxine 50 MCG tablet  Commonly known as:  SYNTHROID  TAKE 1 TABLET (50 MCG TOTAL) BY MOUTH ONCE DAILY.     losartan 50 MG tablet  Commonly known as:  COZAAR  Take 1 tablet (50 mg total) by mouth 2 (two) times daily.     melatonin 5 mg Subl  melatonin 5 mg sublingual tablet   Place by sublingual route.     mirtazapine 7.5 MG Tab  Commonly known as:  REMERON  Take 1 tablet (7.5 mg total) by mouth every evening.     NAMENDA 5 MG Tab  Generic drug:  memantine  Take 10 mg by mouth 2 (two) times daily.     RESTASIS 0.05 % ophthalmic emulsion  Generic drug:  cycloSPORINE  Apply 1 drop to eye 2 (two) times daily.            Indwelling Lines/Drains at time of discharge:   Lines/Drains/Airways     None                 Time spent on the discharge of patient: 31 minutes  Patient was seen and examined on the date of discharge and determined to be suitable for discharge.         Yue Mccall MD  Department of Hospital Medicine  [facility]

## 2019-12-04 ENCOUNTER — OFFICE VISIT (OUTPATIENT)
Dept: FAMILY MEDICINE | Facility: CLINIC | Age: 84
End: 2019-12-04
Payer: MEDICARE

## 2019-12-04 VITALS
BODY MASS INDEX: 30.15 KG/M2 | OXYGEN SATURATION: 93 % | HEIGHT: 60 IN | SYSTOLIC BLOOD PRESSURE: 102 MMHG | RESPIRATION RATE: 18 BRPM | HEART RATE: 57 BPM | DIASTOLIC BLOOD PRESSURE: 58 MMHG | WEIGHT: 153.56 LBS

## 2019-12-04 DIAGNOSIS — F03.90 DEMENTIA WITHOUT BEHAVIORAL DISTURBANCE, UNSPECIFIED DEMENTIA TYPE: ICD-10-CM

## 2019-12-04 DIAGNOSIS — I65.23 BILATERAL CAROTID ARTERY STENOSIS: ICD-10-CM

## 2019-12-04 DIAGNOSIS — E03.9 ACQUIRED HYPOTHYROIDISM: Primary | ICD-10-CM

## 2019-12-04 DIAGNOSIS — I65.29 STENOSIS OF CAROTID ARTERY, UNSPECIFIED LATERALITY: ICD-10-CM

## 2019-12-04 DIAGNOSIS — K29.31 CHRONIC SUPERFICIAL GASTRITIS WITH BLEEDING: ICD-10-CM

## 2019-12-04 DIAGNOSIS — K21.00 HIATAL HERNIA WITH GERD AND ESOPHAGITIS: ICD-10-CM

## 2019-12-04 DIAGNOSIS — R79.89 ELEVATED SERUM CREATININE: ICD-10-CM

## 2019-12-04 DIAGNOSIS — K44.9 HIATAL HERNIA WITH GERD AND ESOPHAGITIS: ICD-10-CM

## 2019-12-04 DIAGNOSIS — R51.9 HEADACHE, TEMPORAL: ICD-10-CM

## 2019-12-04 DIAGNOSIS — F51.01 PRIMARY INSOMNIA: ICD-10-CM

## 2019-12-04 DIAGNOSIS — Z95.0 PACEMAKER: ICD-10-CM

## 2019-12-04 DIAGNOSIS — I10 HTN (HYPERTENSION), BENIGN: ICD-10-CM

## 2019-12-04 PROBLEM — R35.1 BPH ASSOCIATED WITH NOCTURIA: Status: RESOLVED | Noted: 2017-09-06 | Resolved: 2019-12-04

## 2019-12-04 PROBLEM — R41.3 SHORT-TERM MEMORY LOSS: Status: RESOLVED | Noted: 2019-06-20 | Resolved: 2019-12-04

## 2019-12-04 PROBLEM — N40.1 BPH ASSOCIATED WITH NOCTURIA: Status: ACTIVE | Noted: 2017-09-06

## 2019-12-04 PROBLEM — N40.1 BPH ASSOCIATED WITH NOCTURIA: Status: RESOLVED | Noted: 2017-09-06 | Resolved: 2019-12-04

## 2019-12-04 PROCEDURE — 1101F PT FALLS ASSESS-DOCD LE1/YR: CPT | Mod: S$GLB,,, | Performed by: INTERNAL MEDICINE

## 2019-12-04 PROCEDURE — 1101F PR PT FALLS ASSESS DOC 0-1 FALLS W/OUT INJ PAST YR: ICD-10-PCS | Mod: S$GLB,,, | Performed by: INTERNAL MEDICINE

## 2019-12-04 PROCEDURE — 3074F SYST BP LT 130 MM HG: CPT | Mod: S$GLB,,, | Performed by: INTERNAL MEDICINE

## 2019-12-04 PROCEDURE — 99214 OFFICE O/P EST MOD 30 MIN: CPT | Mod: S$GLB,,, | Performed by: INTERNAL MEDICINE

## 2019-12-04 PROCEDURE — 99214 PR OFFICE/OUTPT VISIT, EST, LEVL IV, 30-39 MIN: ICD-10-PCS | Mod: S$GLB,,, | Performed by: INTERNAL MEDICINE

## 2019-12-04 PROCEDURE — 3074F PR MOST RECENT SYSTOLIC BLOOD PRESSURE < 130 MM HG: ICD-10-PCS | Mod: S$GLB,,, | Performed by: INTERNAL MEDICINE

## 2019-12-04 PROCEDURE — 1159F MED LIST DOCD IN RCRD: CPT | Mod: S$GLB,,, | Performed by: INTERNAL MEDICINE

## 2019-12-04 PROCEDURE — 3078F PR MOST RECENT DIASTOLIC BLOOD PRESSURE < 80 MM HG: ICD-10-PCS | Mod: S$GLB,,, | Performed by: INTERNAL MEDICINE

## 2019-12-04 PROCEDURE — 3078F DIAST BP <80 MM HG: CPT | Mod: S$GLB,,, | Performed by: INTERNAL MEDICINE

## 2019-12-04 PROCEDURE — 1159F PR MEDICATION LIST DOCUMENTED IN MEDICAL RECORD: ICD-10-PCS | Mod: S$GLB,,, | Performed by: INTERNAL MEDICINE

## 2019-12-04 RX ORDER — SOTALOL HYDROCHLORIDE 80 MG/1
120 TABLET ORAL 2 TIMES DAILY
COMMUNITY
Start: 2019-11-14 | End: 2021-03-04

## 2019-12-04 RX ORDER — MEMANTINE HYDROCHLORIDE 10 MG/1
5 TABLET ORAL NIGHTLY
Status: ON HOLD | COMMUNITY
Start: 2019-12-02 | End: 2022-09-07 | Stop reason: HOSPADM

## 2019-12-04 RX ORDER — PANTOPRAZOLE SODIUM 40 MG/1
40 TABLET, DELAYED RELEASE ORAL DAILY
Qty: 90 TABLET | Refills: 3 | Status: SHIPPED | OUTPATIENT
Start: 2019-12-04 | End: 2020-09-04

## 2019-12-04 RX ORDER — TRAZODONE HYDROCHLORIDE 50 MG/1
TABLET ORAL
COMMUNITY
End: 2019-12-04

## 2019-12-04 RX ORDER — METOPROLOL SUCCINATE 50 MG/1
TABLET, EXTENDED RELEASE ORAL
COMMUNITY
Start: 2019-11-14 | End: 2020-08-31 | Stop reason: SDUPTHER

## 2019-12-04 NOTE — ASSESSMENT & PLAN NOTE
S/p recent hospitalization for syncope, found to have PVCs, NSVT.  Pacemaker adjusted to 70 BPM and sotolol added to medications.  Has been stable. F/u with Dr. Betancourt next week.

## 2019-12-04 NOTE — PROGRESS NOTES
ADULT FOLLOW-UP VISIT    Subjective:     Chief Complaint:  Establish Care      86 yo man here to establish care with me. Previous pt of Dr. Mayorga.  Pt has no complaints today. His wife and daughter in law inform me he was hospitalized a little over a month ago after a syncopal episode at home. His pacemaker was interrogated inpt and found PVCs and a run of NSVT which was thought to cause his syncope. His pacemaker was adjusted and Dr. Betancourt started him on sotalol during that hospitalization.  Reviewed labs done by Dr. Betancourt- HbAc well controlled, Cr 1.4, .          Mr. Hartman  has a past medical history of Anxiety, Arthritis, CAD (coronary artery disease) (age 68), Carotid artery occlusion, Cerebrovascular small vessel disease, Colon polyps (age 78), GERD (gastroesophageal reflux disease), H. pylori infection, HTN (hypertension), benign (age 65), Hyperlipidemia LDL goal <70 (age 65), Hypothyroidism, Multiple fractures of ribs of right side (11/27/2012), Myocardial infarction, Pernicious anemia (1/26/2018), Primary insomnia (10/9/2018), Prostate cancer (age 67), Syncopal episodes (3/2013), and Urge incontinence (5/8/2018).    Family history and social history are reviewed and there are no significant changes.     ROS:  Review of Systems   Constitutional: Negative for activity change, appetite change, fatigue, fever and unexpected weight change.   HENT: Positive for sinus pressure. Negative for congestion, ear pain, rhinorrhea, sinus pain, sneezing, sore throat and trouble swallowing.    Eyes: Negative for visual disturbance.   Respiratory: Negative for cough, chest tightness, shortness of breath and wheezing.    Cardiovascular: Negative for chest pain and palpitations.   Gastrointestinal: Negative for abdominal pain, blood in stool, constipation, diarrhea, nausea and vomiting.   Endocrine: Negative for cold intolerance and polydipsia.   Genitourinary: Positive for  frequency. Negative for difficulty urinating, dysuria, flank pain and hematuria.   Neurological: Positive for headaches. Negative for dizziness, seizures and syncope.   Psychiatric/Behavioral: Positive for confusion. Negative for agitation and sleep disturbance.          Current Outpatient Medications:     amLODIPine (NORVASC) 5 MG tablet, Take 5 mg by mouth once daily., Disp: , Rfl:     aspirin 81 MG Chew, Take 81 mg by mouth Daily. , Disp: , Rfl:     atorvastatin (LIPITOR) 40 MG tablet, TAKE 1 TABLET ONE TIME DAILY, Disp: 90 tablet, Rfl: 3    clopidogrel (PLAVIX) 75 mg tablet, Take 1 tablet (75 mg total) by mouth once daily., Disp: 90 tablet, Rfl: 3    cyanocobalamin, vitamin B-12, (VITAMIN B-12) 5,000 mcg Subl, Place 1 tablet under the tongue once daily., Disp: 30 tablet, Rfl: 11    cycloSPORINE (RESTASIS) 0.05 % ophthalmic emulsion, Apply 1 drop to eye 2 (two) times daily., Disp: , Rfl:     fluticasone (FLONASE) 50 mcg/actuation nasal spray, 1 spray (50 mcg total) by Each Nare route 2 (two) times daily., Disp: 48 g, Rfl: 3    folic acid (FOLVITE) 1 MG tablet, TAKE 1 TABLET ONE TIME DAILY, Disp: 90 tablet, Rfl: 3    levothyroxine (SYNTHROID) 50 MCG tablet, TAKE 1 TABLET (50 MCG TOTAL) BY MOUTH ONCE DAILY., Disp: 90 tablet, Rfl: 2    losartan (COZAAR) 50 MG tablet, Take 1 tablet (50 mg total) by mouth 2 (two) times daily., Disp: 180 tablet, Rfl: 3    memantine (NAMENDA) 10 MG Tab, , Disp: , Rfl:     metoprolol succinate (TOPROL-XL) 50 MG 24 hr tablet, , Disp: , Rfl:     mirtazapine (REMERON) 7.5 MG Tab, Take 1 tablet (7.5 mg total) by mouth every evening., Disp: 30 tablet, Rfl: 3    montelukast (SINGULAIR) 10 mg tablet, TAKE 1 TABLET EVERY EVENING (Patient taking differently: Take 10 mg by mouth once daily. ), Disp: 90 tablet, Rfl: 3    pantoprazole (PROTONIX) 40 MG tablet, Take 1 tablet (40 mg total) by mouth once daily., Disp: 90 tablet, Rfl: 3    sotalol (BETAPACE) 80 MG tablet, , Disp: , Rfl:      cloNIDine (CATAPRES) 0.1 MG tablet, Take 1 tablet (0.1 mg total) by mouth 2 (two) times daily as needed (SBP greater than 160)., Disp: 60 tablet, Rfl: 3    docusate sodium (COLACE) 100 MG capsule, Take 1 capsule (100 mg total) by mouth 2 (two) times daily., Disp: , Rfl: 0      Objective:     Physical Examination:     BP (!) 102/58   Pulse (!) 57   Resp 18   Ht 5' (1.524 m)   Wt 69.7 kg (153 lb 9 oz)   SpO2 (!) 93%   BMI 29.99 kg/m²     Physical Exam   Constitutional: He is oriented to person, place, and time. He appears well-developed and well-nourished.   Elderly man in wheelchair in [facility]   HENT:   Right Ear: External ear normal.   Left Ear: External ear normal.   Nose: Nose normal.   Mouth/Throat: Oropharynx is clear and moist.   Eyes: Pupils are equal, round, and reactive to light. Conjunctivae are normal. Right eye exhibits no discharge. Left eye exhibits no discharge.   Cardiovascular: Normal rate, regular rhythm, normal heart sounds and intact distal pulses.   No murmur heard.  Cool extremities with normal peripheral pulses   Pulmonary/Chest: Effort normal and breath sounds normal. No respiratory distress. He has no wheezes.   Pacemaker in place L upper chest   Musculoskeletal: He exhibits no edema.   Neurological: He is alert and oriented to person, place, and time.   Skin: He is not diaphoretic.       Assessment/Plan:   Gene is a 85 y.o. male here for follow-up.    Problem List Items Addressed This Visit        Neuro    Headache, temporal    Current Assessment & Plan     Chronic, somewhat improved with improved BP control and treatment of chronic sinus issues.          Dementia    Current Assessment & Plan     Alzheimer's per family, though vascular dementia seems likely in this pt with significant vascular disease.  Will request records from Dr. Kaur. ON [facility].             Cardiac/Vascular    HTN (hypertension), benign    Current Assessment & Plan     Well controlled on norvasc 5mg,  losartan 50mg, metoprolol xr 50mg.          Carotid stenosis    Current Assessment & Plan     Carotid u/s 11/2019 showing 50% stenosis LICA, nl MAYE, though previous CTA showed 50% MAYE dz.          Pacemaker; originally placed two years     Current Assessment & Plan     S/p recent hospitalization for syncope, found to have PVCs, NSVT.  Pacemaker adjusted to 70 BPM and sotolol added to medications.  Has been stable. F/u with Dr. Betancourt next week.          Carotid disease, bilateral       Endocrine    Hypothyroidism - Primary    Current Assessment & Plan     TSH 4.8 10/2019. Dr. Betancourt increased levothyroxine to 75mcg. Recheck TSH.          Relevant Orders    TSH       GI    Hiatal hernia with GERD and esophagitis    Current Assessment & Plan     Continue pantoprazole 40mg daily.             Other    Primary insomnia    Current Assessment & Plan     Well controlled on mirtazapine.          Elevated serum creatinine    Current Assessment & Plan     Noted during recent hospitalization. Repeat BMP.           Relevant Orders    Basic metabolic panel      Other Visit Diagnoses     Chronic superficial gastritis with bleeding        Relevant Medications    pantoprazole (PROTONIX) 40 MG tablet          Health Maintenance   Topic Date Due    Eye Exam  09/25/2020    Lipid Panel  11/03/2020    Aspirin/Antiplatelet Therapy  12/04/2020    Colonoscopy  06/08/2026    TETANUS VACCINE  02/08/2027    Pneumococcal Vaccine (65+ High/Highest Risk)  Completed           Discussion:     Follow up in about 3 months (around 3/4/2020) for HTN, dementia.    Goals    None         Electronically signed by Regina Marley

## 2019-12-04 NOTE — ASSESSMENT & PLAN NOTE
Alzheimer's per family, though vascular dementia seems likely in this pt with significant vascular disease.  Will request records from Dr. Kaur. ON [facility].

## 2019-12-16 RX ORDER — ATORVASTATIN CALCIUM 40 MG/1
TABLET, FILM COATED ORAL
Qty: 90 TABLET | Refills: 3 | Status: SHIPPED | OUTPATIENT
Start: 2019-12-16 | End: 2020-10-28

## 2020-01-01 NOTE — NURSING
PT GIVEN ALL DISCHARGE INSTRUCTIONS AND FOLLOW UP INFORMATION. IV REMOVED AND TELEMETRY DISCONTINUED. PT AND FAMILY VERBALIZE UNDERSTANDING.    10

## 2020-01-13 ENCOUNTER — LAB VISIT (OUTPATIENT)
Dept: LAB | Facility: HOSPITAL | Age: 85
End: 2020-01-13
Attending: INTERNAL MEDICINE
Payer: MEDICARE

## 2020-01-13 DIAGNOSIS — E03.9 ACQUIRED HYPOTHYROIDISM: ICD-10-CM

## 2020-01-13 DIAGNOSIS — R79.89 ELEVATED SERUM CREATININE: ICD-10-CM

## 2020-01-13 LAB
ANION GAP SERPL CALC-SCNC: 6 MMOL/L (ref 8–16)
BUN SERPL-MCNC: 27 MG/DL (ref 8–23)
CALCIUM SERPL-MCNC: 9 MG/DL (ref 8.7–10.5)
CHLORIDE SERPL-SCNC: 111 MMOL/L (ref 95–110)
CO2 SERPL-SCNC: 27 MMOL/L (ref 23–29)
CREAT SERPL-MCNC: 1.2 MG/DL (ref 0.5–1.4)
EST. GFR  (AFRICAN AMERICAN): >60 ML/MIN/1.73 M^2
EST. GFR  (NON AFRICAN AMERICAN): 54.4 ML/MIN/1.73 M^2
GLUCOSE SERPL-MCNC: 74 MG/DL (ref 70–110)
POTASSIUM SERPL-SCNC: 4.4 MMOL/L (ref 3.5–5.1)
SODIUM SERPL-SCNC: 144 MMOL/L (ref 136–145)
TSH SERPL DL<=0.005 MIU/L-ACNC: 2.5 UIU/ML (ref 0.34–5.6)

## 2020-01-13 PROCEDURE — 80048 BASIC METABOLIC PNL TOTAL CA: CPT

## 2020-01-13 PROCEDURE — 84443 ASSAY THYROID STIM HORMONE: CPT

## 2020-01-13 PROCEDURE — 36415 COLL VENOUS BLD VENIPUNCTURE: CPT

## 2020-01-14 NOTE — PROGRESS NOTES
Please call patient with results. Creatinine normalized since hospitalization. TSH normal on current dose of levothyroxine.

## 2020-01-15 ENCOUNTER — PATIENT MESSAGE (OUTPATIENT)
Dept: FAMILY MEDICINE | Facility: CLINIC | Age: 85
End: 2020-01-15

## 2020-05-06 ENCOUNTER — OFFICE VISIT (OUTPATIENT)
Dept: FAMILY MEDICINE | Facility: CLINIC | Age: 85
End: 2020-05-06
Payer: MEDICARE

## 2020-05-06 VITALS
HEIGHT: 60 IN | HEART RATE: 58 BPM | WEIGHT: 153 LBS | SYSTOLIC BLOOD PRESSURE: 127 MMHG | BODY MASS INDEX: 30.04 KG/M2 | DIASTOLIC BLOOD PRESSURE: 55 MMHG | TEMPERATURE: 97 F

## 2020-05-06 DIAGNOSIS — E78.5 HYPERLIPIDEMIA LDL GOAL <70: ICD-10-CM

## 2020-05-06 DIAGNOSIS — Z95.0 PACEMAKER: ICD-10-CM

## 2020-05-06 DIAGNOSIS — E03.9 ACQUIRED HYPOTHYROIDISM: ICD-10-CM

## 2020-05-06 DIAGNOSIS — I25.118 CORONARY ARTERY DISEASE OF NATIVE ARTERY OF NATIVE HEART WITH STABLE ANGINA PECTORIS: ICD-10-CM

## 2020-05-06 DIAGNOSIS — I10 HTN (HYPERTENSION), BENIGN: Primary | ICD-10-CM

## 2020-05-06 DIAGNOSIS — F03.90 DEMENTIA WITHOUT BEHAVIORAL DISTURBANCE, UNSPECIFIED DEMENTIA TYPE: ICD-10-CM

## 2020-05-06 DIAGNOSIS — I65.29 STENOSIS OF CAROTID ARTERY, UNSPECIFIED LATERALITY: ICD-10-CM

## 2020-05-06 PROCEDURE — 99214 PR OFFICE/OUTPT VISIT, EST, LEVL IV, 30-39 MIN: ICD-10-PCS | Mod: S$GLB,,, | Performed by: INTERNAL MEDICINE

## 2020-05-06 PROCEDURE — 1159F MED LIST DOCD IN RCRD: CPT | Mod: S$GLB,,, | Performed by: INTERNAL MEDICINE

## 2020-05-06 PROCEDURE — 1126F AMNT PAIN NOTED NONE PRSNT: CPT | Mod: S$GLB,,, | Performed by: INTERNAL MEDICINE

## 2020-05-06 PROCEDURE — 99214 OFFICE O/P EST MOD 30 MIN: CPT | Mod: S$GLB,,, | Performed by: INTERNAL MEDICINE

## 2020-05-06 PROCEDURE — 1126F PR PAIN SEVERITY QUANTIFIED, NO PAIN PRESENT: ICD-10-PCS | Mod: S$GLB,,, | Performed by: INTERNAL MEDICINE

## 2020-05-06 PROCEDURE — 1101F PT FALLS ASSESS-DOCD LE1/YR: CPT | Mod: S$GLB,,, | Performed by: INTERNAL MEDICINE

## 2020-05-06 PROCEDURE — 1159F PR MEDICATION LIST DOCUMENTED IN MEDICAL RECORD: ICD-10-PCS | Mod: S$GLB,,, | Performed by: INTERNAL MEDICINE

## 2020-05-06 PROCEDURE — 1101F PR PT FALLS ASSESS DOC 0-1 FALLS W/OUT INJ PAST YR: ICD-10-PCS | Mod: S$GLB,,, | Performed by: INTERNAL MEDICINE

## 2020-05-06 NOTE — PROGRESS NOTES
Subjective:       Patient ID: Gene Hartman is a 86 y.o. male.    Chief Complaint: Hypertension; Dementia; Hyperlipidemia; Thyroid Problem; and Coronary Artery Disease    This is the 1st visit for this gentleman with me.  He is already established in this clinic with prior providers who have since relocated.    Underlying medical issues for this 86-year-old gentleman include hyperlipidemia, hypertension, moderately advanced dementia without any aggressive behavior.  Patient also has sinus symptoms and allergies, some sleep issues and gastroesophageal reflux.    As per patient's wife constipation seems to be a big issue and she has tried over-the-counter Colace without much of a relief.  She asked me if he can be prescribed MiraLax.    As far as dementia is concerned, he is currently on Namenda 10 mg.  There is no significant improvement in his cognition and function though there is no rapid decline either.  Some a he has been driving car at this point and he has not been told that he should get his keys away.    Hypertension   This is a chronic problem. The current episode started more than 1 year ago. The problem is controlled. Pertinent negatives include no chest pain, palpitations or shortness of breath. Risk factors for coronary artery disease include sedentary lifestyle and male gender. Past treatments include calcium channel blockers, angiotensin blockers and beta blockers (Combination of metoprolol and sotalol.). The current treatment provides moderate improvement. Compliance problems include psychosocial issues.  Identifiable causes of hypertension include a thyroid problem.   Hyperlipidemia   Pertinent negatives include no chest pain or shortness of breath.   Thyroid Problem   Patient reports no anxiety, cold intolerance, constipation, diarrhea, fatigue, heat intolerance or palpitations. His past medical history is significant for hyperlipidemia.   Coronary Artery Disease   Pertinent negatives include no  chest pain, chest tightness, dizziness, leg swelling, palpitations or shortness of breath. Risk factors include hyperlipidemia.       Past Medical History:   Diagnosis Date    Anxiety     Arthritis     CAD (coronary artery disease) age 68    Carotid artery occlusion     Cerebrovascular small vessel disease     Colon polyps age 78    Coronary artery disease of native artery of native heart with stable angina pectoris 5/6/2020    GERD (gastroesophageal reflux disease)     H. pylori infection     HTN (hypertension), benign age 65    Hyperlipidemia LDL goal <70 age 65    Hypothyroidism     Multiple fractures of ribs of right side 11/27/2012    Myocardial infarction     Pernicious anemia 1/26/2018    Primary insomnia 10/9/2018    Prostate cancer age 67    Syncopal episodes 3/2013    Urge incontinence 5/8/2018     Social History     Socioeconomic History    Marital status:      Spouse name: Evie Hartman    Number of children: 2    Years of education: Not on file    Highest education level: Not on file   Occupational History    Occupation: [employer]   Social Needs    Financial resource strain: Not on file    Food insecurity:     Worry: Not on file     Inability: Not on file    Transportation needs:     Medical: Not on file     Non-medical: Not on file   Tobacco Use    Smoking status: Never Smoker    Smokeless tobacco: Never Used   Substance and Sexual Activity    Alcohol use: No    Drug use: No    Sexual activity: Not Currently   Lifestyle    Physical activity:     Days per week: Not on file     Minutes per session: Not on file    Stress: Not at all   Relationships    Social connections:     Talks on phone: Not on file     Gets together: Not on file     Attends Anglican service: Not on file     Active member of club or organization: Not on file     Attends meetings of clubs or organizations: Not on file     Relationship status: Not on file   Other Topics Concern    Not on file    Social History Narrative    The patient does exercise regularly (walking).    Rates diet as good.    He is satisfied with weight.             Past Surgical History:   Procedure Laterality Date    CARDIAC PACEMAKER PLACEMENT  10/2015    CARDIAC SURGERY      CATARACT EXTRACTION W/  INTRAOCULAR LENS IMPLANT Bilateral     COLONOSCOPY  ~2013    Dr. Edge    COLONOSCOPY N/A 6/8/2016    Procedure: COLONOSCOPY;  Surgeon: Shamar Barahona MD;  Location: [facility];  Service: Endoscopy;  Laterality: N/A; REPEAT IN 1 YEAR    CORONARY ANGIOPLASTY WITH STENT PLACEMENT  2003    x 2 RCA    PROSTATECTOMY  2002    UPPER GASTROINTESTINAL ENDOSCOPY  11/15/2016    Dr. Barahona     Family History   Problem Relation Age of Onset    Migraines Mother     Heart failure Father     Heart disease Father 56        MI    Heart failure Brother     Heart disease Brother     Diabetes Son     Hypertension Son     Heart disease Brother     Mental illness Brother     No Known Problems Son     Colon cancer Neg Hx     Colon polyps Neg Hx     Crohn's disease Neg Hx     Ulcerative colitis Neg Hx     Stomach cancer Neg Hx     Esophageal cancer Neg Hx        Review of Systems   Constitutional: Negative for activity change, appetite change, fatigue and unexpected weight change.   HENT: Negative for congestion, sneezing and trouble swallowing.    Eyes: Negative for pain and visual disturbance.   Respiratory: Negative for cough, chest tightness and shortness of breath.    Cardiovascular: Negative for chest pain, palpitations and leg swelling.   Gastrointestinal: Negative for abdominal distention, abdominal pain, blood in stool, constipation and diarrhea.   Endocrine: Negative for cold intolerance, heat intolerance, polydipsia, polyphagia and polyuria.   Genitourinary: Negative for dysuria, hematuria and scrotal swelling.        Nocturia X 3- No incontinence   Musculoskeletal: Negative for arthralgias, back pain and gait problem.    Skin: Negative for pallor, rash and wound.   Allergic/Immunologic: Negative for environmental allergies, food allergies and immunocompromised state.   Neurological: Negative for dizziness, seizures, speech difficulty, light-headedness and numbness.        Dementia currently on memantine and Remeron.  No behavioral disturbances.  No improvement on memantine but   Hematological: Negative for adenopathy. Does not bruise/bleed easily.   Psychiatric/Behavioral: Negative for agitation, behavioral problems and confusion. The patient is not nervous/anxious.          Objective:      Blood pressure (!) 127/55, pulse (!) 58, temperature 97.1 °F (36.2 °C), height 5' (1.524 m), weight 69.4 kg (153 lb). Body mass index is 29.88 kg/m².  Physical Exam   Constitutional: He is oriented to person, place, and time. He appears well-developed.   Short height 5'   HENT:   Head: Normocephalic and atraumatic.   Nose: Nose normal.   Mouth/Throat: Oropharynx is clear and moist. He has dentures (upper and lower). No oropharyngeal exudate.   Eyes: Conjunctivae and EOM are normal.   Neck: Normal range of motion. Neck supple. No JVD present. No tracheal deviation present. No thyromegaly present.   Cardiovascular: Normal rate, regular rhythm and normal heart sounds. Exam reveals no gallop and no friction rub.   No murmur heard.  Pulmonary/Chest: Effort normal and breath sounds normal. No respiratory distress. He has no wheezes. He has no rales.   Abdominal: Soft. Bowel sounds are normal. He exhibits no distension. There is no tenderness.   Musculoskeletal: Normal range of motion.   Neurological: He is alert and oriented to person, place, and time. He has normal reflexes.   Skin: Skin is warm and dry.   Psychiatric: He has a normal mood and affect.   Nursing note and vitals reviewed.        Assessment:       1. HTN (hypertension), benign    2. Acquired hypothyroidism    3. Hyperlipidemia LDL goal <70    4. Dementia without behavioral disturbance,  unspecified dementia type    5. Stenosis of carotid artery, unspecified laterality    6. Pacemaker; originally placed two years     7. Coronary artery disease of native artery of native heart with stable angina pectoris           No visits with results within 3 Month(s) from this visit.   Latest known visit with results is:   Lab Visit on 01/13/2020   Component Date Value Ref Range Status    TSH 01/13/2020 2.500  0.340 - 5.600 uIU/mL Final    Sodium 01/13/2020 144  136 - 145 mmol/L Final    Potassium 01/13/2020 4.4  3.5 - 5.1 mmol/L Final    Chloride 01/13/2020 111* 95 - 110 mmol/L Final    CO2 01/13/2020 27  23 - 29 mmol/L Final    Glucose 01/13/2020 74  70 - 110 mg/dL Final    BUN, Bld 01/13/2020 27* 8 - 23 mg/dL Final    Creatinine 01/13/2020 1.2  0.5 - 1.4 mg/dL Final    Calcium 01/13/2020 9.0  8.7 - 10.5 mg/dL Final    Anion Gap 01/13/2020 6* 8 - 16 mmol/L Final    eGFR if African American 01/13/2020 >60.0  >60 mL/min/1.73 m^2 Final    eGFR if non African American 01/13/2020 54.4* >60 mL/min/1.73 m^2 Final         Plan:           HTN (hypertension), benign    Acquired hypothyroidism    Hyperlipidemia LDL goal <70    Dementia without behavioral disturbance, unspecified dementia type    Stenosis of carotid artery, unspecified laterality    Pacemaker; originally placed two years     Coronary artery disease of native artery of native heart with stable angina pectoris      Multiple medical conditions have been noted.  Patient seems to be stable.  He does have dementia and he should not be driving.  He should give the keys away.    Repeat labs.    Living will and POA discussed    [place] also has visited him.  Usually labs are done by Dr. Betancourt office and I will request a copy.    Fall precautions and COVID-19 precautions to be observed.    MiraLax is okay for constipation.    Discussed issues of driving with patient's daughter-in-law miss Clark also.    I will see him in 3-4 months time.    Patient  is both on sotalol and metoprolol.  Need to confirm if both the beta-blockers are okay.    Spent cory 25 minutes with patient which involved review of pts medical conditions, labs, medications and with 50% of time face-to-face discussion about medical problems, management and any applicable changes.      Current Outpatient Medications:     amLODIPine (NORVASC) 5 MG tablet, Take 5 mg by mouth once daily., Disp: , Rfl:     aspirin 81 MG Chew, Take 81 mg by mouth Daily. , Disp: , Rfl:     atorvastatin (LIPITOR) 40 MG tablet, TAKE 1 TABLET ONE TIME DAILY, Disp: 90 tablet, Rfl: 3    clopidogrel (PLAVIX) 75 mg tablet, Take 1 tablet (75 mg total) by mouth once daily., Disp: 90 tablet, Rfl: 3    cyanocobalamin, vitamin B-12, (VITAMIN B-12) 5,000 mcg Subl, Place 1 tablet under the tongue once daily., Disp: 30 tablet, Rfl: 11    cycloSPORINE (RESTASIS) 0.05 % ophthalmic emulsion, Apply 1 drop to eye 2 (two) times daily., Disp: , Rfl:     docusate sodium (COLACE) 100 MG capsule, Take 1 capsule (100 mg total) by mouth 2 (two) times daily., Disp: , Rfl: 0    fluticasone (FLONASE) 50 mcg/actuation nasal spray, 1 spray (50 mcg total) by Each Nare route 2 (two) times daily., Disp: 48 g, Rfl: 3    folic acid (FOLVITE) 1 MG tablet, TAKE 1 TABLET ONE TIME DAILY, Disp: 90 tablet, Rfl: 3    levothyroxine (SYNTHROID) 50 MCG tablet, TAKE 1 TABLET (50 MCG TOTAL) BY MOUTH ONCE DAILY., Disp: 90 tablet, Rfl: 2    losartan (COZAAR) 50 MG tablet, Take 1 tablet (50 mg total) by mouth 2 (two) times daily., Disp: 180 tablet, Rfl: 3    memantine (NAMENDA) 10 MG Tab, , Disp: , Rfl:     metoprolol succinate (TOPROL-XL) 50 MG 24 hr tablet, , Disp: , Rfl:     mirtazapine (REMERON) 7.5 MG Tab, Take 1 tablet (7.5 mg total) by mouth every evening., Disp: 30 tablet, Rfl: 3    montelukast (SINGULAIR) 10 mg tablet, TAKE 1 TABLET EVERY EVENING (Patient taking differently: Take 10 mg by mouth once daily. ), Disp: 90 tablet, Rfl: 3     pantoprazole (PROTONIX) 40 MG tablet, Take 1 tablet (40 mg total) by mouth once daily., Disp: 90 tablet, Rfl: 3    sotalol (BETAPACE) 80 MG tablet, , Disp: , Rfl:     cloNIDine (CATAPRES) 0.1 MG tablet, Take 1 tablet (0.1 mg total) by mouth 2 (two) times daily as needed (SBP greater than 160)., Disp: 60 tablet, Rfl: 3

## 2020-05-06 NOTE — PATIENT INSTRUCTIONS

## 2020-05-07 PROBLEM — R93.1 ABNORMAL ECHOCARDIOGRAM: Status: ACTIVE | Noted: 2019-11-21

## 2020-05-27 DIAGNOSIS — E53.8 FOLIC ACID DEFICIENCY: ICD-10-CM

## 2020-05-27 RX ORDER — MONTELUKAST SODIUM 10 MG/1
TABLET ORAL
Qty: 90 TABLET | Refills: 3 | OUTPATIENT
Start: 2020-05-27

## 2020-06-01 RX ORDER — FOLIC ACID 1 MG/1
TABLET ORAL
Qty: 90 TABLET | Refills: 3 | OUTPATIENT
Start: 2020-06-01

## 2020-08-31 ENCOUNTER — OFFICE VISIT (OUTPATIENT)
Dept: FAMILY MEDICINE | Facility: CLINIC | Age: 85
End: 2020-08-31
Payer: MEDICARE

## 2020-08-31 VITALS
SYSTOLIC BLOOD PRESSURE: 128 MMHG | WEIGHT: 154 LBS | TEMPERATURE: 98 F | DIASTOLIC BLOOD PRESSURE: 58 MMHG | BODY MASS INDEX: 30.08 KG/M2 | OXYGEN SATURATION: 98 % | HEART RATE: 54 BPM

## 2020-08-31 DIAGNOSIS — I70.0 ATHEROSCLEROSIS OF AORTA: ICD-10-CM

## 2020-08-31 DIAGNOSIS — C61 PROSTATE CANCER: ICD-10-CM

## 2020-08-31 DIAGNOSIS — I65.23 BILATERAL CAROTID ARTERY STENOSIS: ICD-10-CM

## 2020-08-31 DIAGNOSIS — K59.01 SLOW TRANSIT CONSTIPATION: ICD-10-CM

## 2020-08-31 DIAGNOSIS — I10 HTN (HYPERTENSION), BENIGN: ICD-10-CM

## 2020-08-31 DIAGNOSIS — F03.90 DEMENTIA WITHOUT BEHAVIORAL DISTURBANCE, UNSPECIFIED DEMENTIA TYPE: ICD-10-CM

## 2020-08-31 DIAGNOSIS — I65.03 STENOSIS OF BOTH VERTEBRAL ARTERIES: ICD-10-CM

## 2020-08-31 DIAGNOSIS — I25.10 CORONARY ARTERY DISEASE, ANGINA PRESENCE UNSPECIFIED, UNSPECIFIED VESSEL OR LESION TYPE, UNSPECIFIED WHETHER NATIVE OR TRANSPLANTED HEART: Primary | ICD-10-CM

## 2020-08-31 DIAGNOSIS — Z95.0 PACEMAKER: ICD-10-CM

## 2020-08-31 DIAGNOSIS — E03.9 ACQUIRED HYPOTHYROIDISM: ICD-10-CM

## 2020-08-31 DIAGNOSIS — Z85.46 HISTORY OF PROSTATE CANCER: ICD-10-CM

## 2020-08-31 PROCEDURE — 1159F PR MEDICATION LIST DOCUMENTED IN MEDICAL RECORD: ICD-10-PCS | Mod: S$GLB,,, | Performed by: INTERNAL MEDICINE

## 2020-08-31 PROCEDURE — 99214 OFFICE O/P EST MOD 30 MIN: CPT | Mod: S$GLB,,, | Performed by: INTERNAL MEDICINE

## 2020-08-31 PROCEDURE — 3288F FALL RISK ASSESSMENT DOCD: CPT | Mod: S$GLB,,, | Performed by: INTERNAL MEDICINE

## 2020-08-31 PROCEDURE — 1100F PTFALLS ASSESS-DOCD GE2>/YR: CPT | Mod: S$GLB,,, | Performed by: INTERNAL MEDICINE

## 2020-08-31 PROCEDURE — 1170F FXNL STATUS ASSESSED: CPT | Mod: S$GLB,,, | Performed by: INTERNAL MEDICINE

## 2020-08-31 PROCEDURE — 3288F PR FALLS RISK ASSESSMENT DOCUMENTED: ICD-10-PCS | Mod: S$GLB,,, | Performed by: INTERNAL MEDICINE

## 2020-08-31 PROCEDURE — 99214 PR OFFICE/OUTPT VISIT, EST, LEVL IV, 30-39 MIN: ICD-10-PCS | Mod: S$GLB,,, | Performed by: INTERNAL MEDICINE

## 2020-08-31 PROCEDURE — 1159F MED LIST DOCD IN RCRD: CPT | Mod: S$GLB,,, | Performed by: INTERNAL MEDICINE

## 2020-08-31 PROCEDURE — 1100F PR PT FALLS ASSESS DOC 2+ FALLS/FALL W/INJURY/YR: ICD-10-PCS | Mod: S$GLB,,, | Performed by: INTERNAL MEDICINE

## 2020-08-31 PROCEDURE — 1170F PR FUNCTIONAL STATUS ASSESSED: ICD-10-PCS | Mod: S$GLB,,, | Performed by: INTERNAL MEDICINE

## 2020-08-31 RX ORDER — METOPROLOL SUCCINATE 50 MG/1
50 TABLET, EXTENDED RELEASE ORAL DAILY
Qty: 90 TABLET | Refills: 3 | Status: SHIPPED | OUTPATIENT
Start: 2020-08-31 | End: 2021-07-01

## 2020-08-31 NOTE — PROGRESS NOTES
ADULT FOLLOW-UP VISIT    Subjective:     Chief Complaint:  Hypertension (bp f/u)      86-year-old man with a complex past medical history including coronary artery disease, hypertension, hyperlipidemia, GERD, dementia, prostate cancer status post TURP here for routine follow-up.  At last visit, patient's wife noted that he was having significant issues with constipation.  Previous MD suggested a trial of MiraLax.  Patient's wife states that this worked well.  He only has to use it every now and then.  Patient's only other complaint today is a presyncopal episode.  He got up earlier than usual and got in the shower before having taken any of his medications or eat breakfast.  He he was looking up at the shower head, adjusting it, when he started to feel lightheaded and had to sit down.  He has a previous history of similar episodes.  Been told by both his cardiologist and his neurologist that is likely due to the vertebral artery stenosis that has been noted on previous imaging.  He has been advised to not hold his neck in hyperextension for for a long period of time because this seems to provoke the episodes.  He denies any chest pain, diaphoresis, shortness of breath, palpitations with this current episode.    Hypertension  Pertinent negatives include no anxiety, blurred vision, chest pain, headaches, malaise/fatigue, neck pain, orthopnea, palpitations, peripheral edema, PND or shortness of breath.         Mr. Hartman  has a past medical history of Abnormal echocardiogram (11/21/2019), Anxiety, Arthritis, CAD (coronary artery disease) (age 68), Carotid artery occlusion, Cerebrovascular small vessel disease, Colon polyps (age 78), Coronary artery disease of native artery of native heart with stable angina pectoris (5/6/2020), GERD (gastroesophageal reflux disease), H. pylori infection, HTN (hypertension), benign (age 65), Hyperlipidemia LDL goal <70 (age 65), Hypothyroidism,  Multiple fractures of ribs of right side (11/27/2012), Myocardial infarction, Pernicious anemia (1/26/2018), Primary insomnia (10/9/2018), Prostate cancer (age 67), Syncopal episodes (3/2013), and Urge incontinence (5/8/2018).    Family history and social history are reviewed and there are no significant changes.     ROS:  Review of Systems   Constitutional: Negative for malaise/fatigue.   Eyes: Negative for blurred vision.   Respiratory: Negative for shortness of breath.    Cardiovascular: Negative for chest pain, palpitations, orthopnea and PND.   Gastrointestinal: Negative for abdominal distention.   Musculoskeletal: Negative for neck pain.   Neurological: Positive for light-headedness. Negative for syncope and headaches.          Current Outpatient Medications:     amLODIPine (NORVASC) 5 MG tablet, Take 5 mg by mouth once daily., Disp: , Rfl:     aspirin 81 MG Chew, Take 81 mg by mouth Daily. , Disp: , Rfl:     atorvastatin (LIPITOR) 40 MG tablet, TAKE 1 TABLET ONE TIME DAILY, Disp: 90 tablet, Rfl: 3    clopidogrel (PLAVIX) 75 mg tablet, Take 1 tablet (75 mg total) by mouth once daily., Disp: 90 tablet, Rfl: 3    cyanocobalamin, vitamin B-12, (VITAMIN B-12) 5,000 mcg Subl, Place 1 tablet under the tongue once daily., Disp: 30 tablet, Rfl: 11    cycloSPORINE (RESTASIS) 0.05 % ophthalmic emulsion, Apply 1 drop to eye 2 (two) times daily., Disp: , Rfl:     docusate sodium (COLACE) 100 MG capsule, Take 1 capsule (100 mg total) by mouth 2 (two) times daily., Disp: , Rfl: 0    fluticasone (FLONASE) 50 mcg/actuation nasal spray, 1 spray (50 mcg total) by Each Nare route 2 (two) times daily., Disp: 48 g, Rfl: 3    folic acid (FOLVITE) 1 MG tablet, TAKE 1 TABLET ONE TIME DAILY, Disp: 90 tablet, Rfl: 3    losartan (COZAAR) 50 MG tablet, Take 1 tablet (50 mg total) by mouth 2 (two) times daily., Disp: 180 tablet, Rfl: 3    metoprolol succinate (TOPROL-XL) 50 MG 24 hr tablet, Take 1 tablet (50 mg total) by mouth  once daily., Disp: 90 tablet, Rfl: 3    montelukast (SINGULAIR) 10 mg tablet, TAKE 1 TABLET EVERY EVENING (Patient taking differently: Take 10 mg by mouth once daily. ), Disp: 90 tablet, Rfl: 3    sotalol (BETAPACE) 80 MG tablet, 120 mg 2 (two) times daily. , Disp: , Rfl:     cloNIDine (CATAPRES) 0.1 MG tablet, Take 1 tablet (0.1 mg total) by mouth 2 (two) times daily as needed (SBP greater than 160)., Disp: 60 tablet, Rfl: 3    levothyroxine (SYNTHROID) 50 MCG tablet, TAKE 1 TABLET (50 MCG TOTAL) BY MOUTH ONCE DAILY. (Patient taking differently: Take 75 mcg by mouth once daily. ), Disp: 90 tablet, Rfl: 2    memantine (NAMENDA) 10 MG Tab, , Disp: , Rfl:     mirtazapine (REMERON) 7.5 MG Tab, Take 1 tablet (7.5 mg total) by mouth every evening., Disp: 30 tablet, Rfl: 3    pantoprazole (PROTONIX) 40 MG tablet, Take 1 tablet (40 mg total) by mouth once daily., Disp: 90 tablet, Rfl: 3      Objective:     Physical Examination:     BP (!) 128/58 (BP Location: Right arm, Patient Position: Sitting, BP Method: Medium (Manual))   Pulse (!) 54   Temp 97.5 °F (36.4 °C) (Temporal)   Wt 69.9 kg (154 lb)   SpO2 98%   BMI 30.08 kg/m²     Physical Exam  Constitutional:       Appearance: He is well-developed. He is not diaphoretic.      [facility]      Right Ear: External ear normal.      Left Ear: External ear normal.      Nose: Nose normal.   Eyes:      General:         Right eye: No discharge.         Left eye: No discharge.      Conjunctiva/sclera: Conjunctivae normal.      Pupils: Pupils are equal, round, and reactive to light.   Cardiovascular:      Rate and Rhythm: Normal rate and regular rhythm.      Heart sounds: Normal heart sounds. No murmur.   Pulmonary:      Effort: Pulmonary effort is normal. No respiratory distress.      Breath sounds: Normal breath sounds. No wheezing.   Musculoskeletal:      Right lower le+ Pitting Edema (trace to ankles B with varicose vv) present.      Left  lower le+ Pitting Edema present.   Neurological:      Mental Status: He is alert and oriented to person, place, and time.         Assessment/Plan:   Gene is a 86 y.o. male here for follow-up.    Problem List Items Addressed This Visit        Neuro    Dementia without behavioral disturbance    Current Assessment & Plan     Continue namenda. F/u with Dr. Kaur.             Cardiac/Vascular    HTN (hypertension), benign    Current Assessment & Plan     Well controlled on norvasc 5mg, losartan 50mg, metoprolol xr 50mg.          CAD (coronary artery disease) - Primary    Current Assessment & Plan     On aspirin, atorvastatin, Plavix, losartan, metoprolol.  Follow-up with Dr. Betancourt.         Relevant Medications    metoprolol succinate (TOPROL-XL) 50 MG 24 hr tablet    Atherosclerosis of aorta    Vertebral artery stenosis    Pacemaker; originally placed two years     Current Assessment & Plan     Admitted last year for syncope, found to have PVCs, and SVT.  No further issues on social.  Fell with Dr. Betancourt.         Carotid disease, bilateral       Oncology    Prostate cancer    Overview     S/p prostatectomy          History of prostate cancer; total prostatectomy        Endocrine    Hypothyroidism    Current Assessment & Plan     TSH within normal limits 2020 on levothyroxine 75 mcg daily.            GI    Slow transit constipation          Health Maintenance   Topic Date Due    Eye Exam  2020    Lipid Panel  2020    Aspirin/Antiplatelet Therapy  2021    Colonoscopy  2026    TETANUS VACCINE  2027    Pneumococcal Vaccine (65+ High/Highest Risk)  Completed           Discussion:     No follow-ups on file.    Goals    None         Electronically signed by Regina Marley

## 2020-08-31 NOTE — ASSESSMENT & PLAN NOTE
Admitted last year for syncope, found to have PVCs, and SVT.  No further issues on social.  Fell with Dr. Betancourt.

## 2021-03-04 ENCOUNTER — OFFICE VISIT (OUTPATIENT)
Dept: FAMILY MEDICINE | Facility: CLINIC | Age: 86
End: 2021-03-04
Payer: MEDICARE

## 2021-03-04 VITALS
DIASTOLIC BLOOD PRESSURE: 70 MMHG | HEART RATE: 81 BPM | BODY MASS INDEX: 32.39 KG/M2 | OXYGEN SATURATION: 99 % | HEIGHT: 60 IN | SYSTOLIC BLOOD PRESSURE: 110 MMHG | WEIGHT: 165 LBS | RESPIRATION RATE: 18 BRPM

## 2021-03-04 DIAGNOSIS — E03.9 ACQUIRED HYPOTHYROIDISM: Primary | ICD-10-CM

## 2021-03-04 DIAGNOSIS — F03.90 DEMENTIA WITHOUT BEHAVIORAL DISTURBANCE, UNSPECIFIED DEMENTIA TYPE: ICD-10-CM

## 2021-03-04 DIAGNOSIS — E78.5 HYPERLIPIDEMIA LDL GOAL <70: ICD-10-CM

## 2021-03-04 DIAGNOSIS — I10 HTN (HYPERTENSION), BENIGN: ICD-10-CM

## 2021-03-04 DIAGNOSIS — J30.89 NON-SEASONAL ALLERGIC RHINITIS DUE TO FUNGAL SPORES: ICD-10-CM

## 2021-03-04 DIAGNOSIS — I65.03 STENOSIS OF BOTH VERTEBRAL ARTERIES: ICD-10-CM

## 2021-03-04 DIAGNOSIS — G44.209 TENSION HEADACHE: ICD-10-CM

## 2021-03-04 PROCEDURE — 3288F PR FALLS RISK ASSESSMENT DOCUMENTED: ICD-10-PCS | Mod: S$GLB,,, | Performed by: INTERNAL MEDICINE

## 2021-03-04 PROCEDURE — 99214 OFFICE O/P EST MOD 30 MIN: CPT | Mod: S$GLB,,, | Performed by: INTERNAL MEDICINE

## 2021-03-04 PROCEDURE — 99214 PR OFFICE/OUTPT VISIT, EST, LEVL IV, 30-39 MIN: ICD-10-PCS | Mod: S$GLB,,, | Performed by: INTERNAL MEDICINE

## 2021-03-04 PROCEDURE — 1159F PR MEDICATION LIST DOCUMENTED IN MEDICAL RECORD: ICD-10-PCS | Mod: S$GLB,,, | Performed by: INTERNAL MEDICINE

## 2021-03-04 PROCEDURE — 1159F MED LIST DOCD IN RCRD: CPT | Mod: S$GLB,,, | Performed by: INTERNAL MEDICINE

## 2021-03-04 PROCEDURE — 3288F FALL RISK ASSESSMENT DOCD: CPT | Mod: S$GLB,,, | Performed by: INTERNAL MEDICINE

## 2021-03-04 PROCEDURE — 1101F PR PT FALLS ASSESS DOC 0-1 FALLS W/OUT INJ PAST YR: ICD-10-PCS | Mod: S$GLB,,, | Performed by: INTERNAL MEDICINE

## 2021-03-04 PROCEDURE — 1101F PT FALLS ASSESS-DOCD LE1/YR: CPT | Mod: S$GLB,,, | Performed by: INTERNAL MEDICINE

## 2021-03-04 RX ORDER — FAMOTIDINE 20 MG/1
20 TABLET, FILM COATED ORAL 2 TIMES DAILY
COMMUNITY
End: 2021-12-28

## 2021-03-04 RX ORDER — FLUTICASONE PROPIONATE 50 MCG
1 SPRAY, SUSPENSION (ML) NASAL 2 TIMES DAILY
Qty: 48 G | Refills: 3 | Status: SHIPPED | OUTPATIENT
Start: 2021-03-04 | End: 2022-02-24

## 2021-03-04 RX ORDER — MONTELUKAST SODIUM 10 MG/1
10 TABLET ORAL NIGHTLY
Qty: 90 TABLET | Refills: 3 | Status: SHIPPED | OUTPATIENT
Start: 2021-03-04 | End: 2021-12-28

## 2021-03-04 RX ORDER — LEVOTHYROXINE SODIUM 75 UG/1
75 TABLET ORAL
COMMUNITY
Start: 2021-02-09

## 2021-03-04 RX ORDER — SOTALOL HYDROCHLORIDE 120 MG/1
TABLET ORAL
COMMUNITY
Start: 2021-01-27 | End: 2022-07-24 | Stop reason: DRUGHIGH

## 2021-03-05 PROBLEM — N40.1 BPH ASSOCIATED WITH NOCTURIA: Status: RESOLVED | Noted: 2017-09-06 | Resolved: 2021-03-05

## 2021-03-05 PROBLEM — R35.1 BPH ASSOCIATED WITH NOCTURIA: Status: RESOLVED | Noted: 2017-09-06 | Resolved: 2021-03-05

## 2021-03-05 PROBLEM — J32.0 CHRONIC MAXILLARY SINUSITIS: Status: RESOLVED | Noted: 2018-01-04 | Resolved: 2021-03-05

## 2021-03-05 PROBLEM — J32.1 CHRONIC FRONTAL SINUSITIS: Status: RESOLVED | Noted: 2017-12-05 | Resolved: 2021-03-05

## 2021-09-09 ENCOUNTER — OFFICE VISIT (OUTPATIENT)
Dept: FAMILY MEDICINE | Facility: CLINIC | Age: 86
End: 2021-09-09
Payer: MEDICARE

## 2021-09-09 VITALS
WEIGHT: 160 LBS | HEIGHT: 60 IN | OXYGEN SATURATION: 97 % | RESPIRATION RATE: 18 BRPM | SYSTOLIC BLOOD PRESSURE: 126 MMHG | BODY MASS INDEX: 31.41 KG/M2 | DIASTOLIC BLOOD PRESSURE: 74 MMHG | HEART RATE: 76 BPM

## 2021-09-09 DIAGNOSIS — E53.8 FOLIC ACID DEFICIENCY: ICD-10-CM

## 2021-09-09 DIAGNOSIS — I10 HTN (HYPERTENSION), BENIGN: ICD-10-CM

## 2021-09-09 DIAGNOSIS — F03.90 DEMENTIA WITHOUT BEHAVIORAL DISTURBANCE, UNSPECIFIED DEMENTIA TYPE: ICD-10-CM

## 2021-09-09 DIAGNOSIS — J30.89 NON-SEASONAL ALLERGIC RHINITIS DUE TO FUNGAL SPORES: ICD-10-CM

## 2021-09-09 DIAGNOSIS — M48.9 CERVICAL SPINE DISEASE: Primary | ICD-10-CM

## 2021-09-09 DIAGNOSIS — E03.9 ACQUIRED HYPOTHYROIDISM: ICD-10-CM

## 2021-09-09 DIAGNOSIS — I25.10 CORONARY ARTERY DISEASE, ANGINA PRESENCE UNSPECIFIED, UNSPECIFIED VESSEL OR LESION TYPE, UNSPECIFIED WHETHER NATIVE OR TRANSPLANTED HEART: ICD-10-CM

## 2021-09-09 DIAGNOSIS — Z95.0 PACEMAKER: ICD-10-CM

## 2021-09-09 DIAGNOSIS — E78.5 HYPERLIPIDEMIA LDL GOAL <70: ICD-10-CM

## 2021-09-09 PROCEDURE — 1159F PR MEDICATION LIST DOCUMENTED IN MEDICAL RECORD: ICD-10-PCS | Mod: S$GLB,,, | Performed by: INTERNAL MEDICINE

## 2021-09-09 PROCEDURE — 1101F PT FALLS ASSESS-DOCD LE1/YR: CPT | Mod: S$GLB,,, | Performed by: INTERNAL MEDICINE

## 2021-09-09 PROCEDURE — 3288F FALL RISK ASSESSMENT DOCD: CPT | Mod: S$GLB,,, | Performed by: INTERNAL MEDICINE

## 2021-09-09 PROCEDURE — 3288F PR FALLS RISK ASSESSMENT DOCUMENTED: ICD-10-PCS | Mod: S$GLB,,, | Performed by: INTERNAL MEDICINE

## 2021-09-09 PROCEDURE — 1159F MED LIST DOCD IN RCRD: CPT | Mod: S$GLB,,, | Performed by: INTERNAL MEDICINE

## 2021-09-09 PROCEDURE — 1101F PR PT FALLS ASSESS DOC 0-1 FALLS W/OUT INJ PAST YR: ICD-10-PCS | Mod: S$GLB,,, | Performed by: INTERNAL MEDICINE

## 2021-09-09 PROCEDURE — 99214 PR OFFICE/OUTPT VISIT, EST, LEVL IV, 30-39 MIN: ICD-10-PCS | Mod: S$GLB,,, | Performed by: INTERNAL MEDICINE

## 2021-09-09 PROCEDURE — 99214 OFFICE O/P EST MOD 30 MIN: CPT | Mod: S$GLB,,, | Performed by: INTERNAL MEDICINE

## 2021-09-09 RX ORDER — TIZANIDINE 4 MG/1
TABLET ORAL
Qty: 90 TABLET | Refills: 3 | Status: SHIPPED | OUTPATIENT
Start: 2021-09-09 | End: 2022-07-24

## 2021-09-09 RX ORDER — TIZANIDINE 4 MG/1
TABLET ORAL
COMMUNITY
Start: 2021-07-17 | End: 2021-09-09 | Stop reason: SDUPTHER

## 2021-09-13 ENCOUNTER — TELEPHONE (OUTPATIENT)
Dept: FAMILY MEDICINE | Facility: CLINIC | Age: 86
End: 2021-09-13

## 2021-09-13 DIAGNOSIS — I67.9 CEREBRAL VASCULAR DISEASE: Primary | ICD-10-CM

## 2021-09-13 DIAGNOSIS — C61 PROSTATE CANCER: ICD-10-CM

## 2021-11-05 ENCOUNTER — CLINICAL SUPPORT (OUTPATIENT)
Dept: FAMILY MEDICINE | Facility: CLINIC | Age: 86
End: 2021-11-05
Payer: MEDICARE

## 2021-11-05 DIAGNOSIS — Z23 FLU VACCINE NEED: Primary | ICD-10-CM

## 2021-11-05 PROCEDURE — G0008 ADMIN INFLUENZA VIRUS VAC: HCPCS | Mod: S$GLB,,, | Performed by: FAMILY MEDICINE

## 2021-11-05 PROCEDURE — 90662 IIV NO PRSV INCREASED AG IM: CPT | Mod: S$GLB,,, | Performed by: FAMILY MEDICINE

## 2021-11-05 PROCEDURE — 90662 FLU VACCINE - QUADRIVALENT - HIGH DOSE (65+) PRESERVATIVE FREE IM: ICD-10-PCS | Mod: S$GLB,,, | Performed by: FAMILY MEDICINE

## 2021-11-05 PROCEDURE — G0008 FLU VACCINE - QUADRIVALENT - HIGH DOSE (65+) PRESERVATIVE FREE IM: ICD-10-PCS | Mod: S$GLB,,, | Performed by: FAMILY MEDICINE

## 2021-11-10 ENCOUNTER — OFFICE VISIT (OUTPATIENT)
Dept: FAMILY MEDICINE | Facility: CLINIC | Age: 86
End: 2021-11-10
Payer: MEDICARE

## 2021-11-10 VITALS
HEART RATE: 49 BPM | OXYGEN SATURATION: 99 % | SYSTOLIC BLOOD PRESSURE: 133 MMHG | BODY MASS INDEX: 30.11 KG/M2 | DIASTOLIC BLOOD PRESSURE: 87 MMHG | RESPIRATION RATE: 20 BRPM | WEIGHT: 154.19 LBS

## 2021-11-10 DIAGNOSIS — I25.10 CORONARY ARTERY DISEASE, UNSPECIFIED VESSEL OR LESION TYPE, UNSPECIFIED WHETHER ANGINA PRESENT, UNSPECIFIED WHETHER NATIVE OR TRANSPLANTED HEART: ICD-10-CM

## 2021-11-10 DIAGNOSIS — Z95.0 PACEMAKER: ICD-10-CM

## 2021-11-10 DIAGNOSIS — I10 HTN (HYPERTENSION), BENIGN: ICD-10-CM

## 2021-11-10 DIAGNOSIS — R55 VASOVAGAL SYNCOPE: ICD-10-CM

## 2021-11-10 DIAGNOSIS — R00.1 BRADYCARDIA: Primary | ICD-10-CM

## 2021-11-10 PROCEDURE — 99214 PR OFFICE/OUTPT VISIT, EST, LEVL IV, 30-39 MIN: ICD-10-PCS | Mod: S$GLB,,, | Performed by: INTERNAL MEDICINE

## 2021-11-10 PROCEDURE — 1159F MED LIST DOCD IN RCRD: CPT | Mod: S$GLB,,, | Performed by: INTERNAL MEDICINE

## 2021-11-10 PROCEDURE — 1101F PR PT FALLS ASSESS DOC 0-1 FALLS W/OUT INJ PAST YR: ICD-10-PCS | Mod: S$GLB,,, | Performed by: INTERNAL MEDICINE

## 2021-11-10 PROCEDURE — 1159F PR MEDICATION LIST DOCUMENTED IN MEDICAL RECORD: ICD-10-PCS | Mod: S$GLB,,, | Performed by: INTERNAL MEDICINE

## 2021-11-10 PROCEDURE — 1101F PT FALLS ASSESS-DOCD LE1/YR: CPT | Mod: S$GLB,,, | Performed by: INTERNAL MEDICINE

## 2021-11-10 PROCEDURE — 99214 OFFICE O/P EST MOD 30 MIN: CPT | Mod: S$GLB,,, | Performed by: INTERNAL MEDICINE

## 2021-11-10 PROCEDURE — 3288F PR FALLS RISK ASSESSMENT DOCUMENTED: ICD-10-PCS | Mod: S$GLB,,, | Performed by: INTERNAL MEDICINE

## 2021-11-10 PROCEDURE — 3288F FALL RISK ASSESSMENT DOCD: CPT | Mod: S$GLB,,, | Performed by: INTERNAL MEDICINE

## 2021-12-16 ENCOUNTER — OFFICE VISIT (OUTPATIENT)
Dept: FAMILY MEDICINE | Facility: CLINIC | Age: 86
End: 2021-12-16
Payer: MEDICARE

## 2021-12-16 VITALS
SYSTOLIC BLOOD PRESSURE: 128 MMHG | HEART RATE: 74 BPM | OXYGEN SATURATION: 100 % | TEMPERATURE: 98 F | RESPIRATION RATE: 18 BRPM | DIASTOLIC BLOOD PRESSURE: 68 MMHG | WEIGHT: 151 LBS | BODY MASS INDEX: 29.64 KG/M2 | HEIGHT: 60 IN

## 2021-12-16 DIAGNOSIS — J06.9 UPPER RESPIRATORY TRACT INFECTION, UNSPECIFIED TYPE: Primary | ICD-10-CM

## 2021-12-16 DIAGNOSIS — R29.898 LEG WEAKNESS, BILATERAL: ICD-10-CM

## 2021-12-16 DIAGNOSIS — J30.9 ALLERGIC RHINITIS, UNSPECIFIED SEASONALITY, UNSPECIFIED TRIGGER: ICD-10-CM

## 2021-12-16 DIAGNOSIS — J02.9 SORE THROAT: ICD-10-CM
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
## 2021-12-16 DIAGNOSIS — R05.9 COUGH: ICD-10-CM

## 2021-12-16 LAB
CTP QC/QA: YES
FLUAV AG NPH QL: NEGATIVE
FLUBV AG NPH QL: NEGATIVE
S PYO RRNA THROAT QL PROBE: NEGATIVE
SARS-COV-2 RDRP RESP QL NAA+PROBE: NEGATIVE

## 2021-12-16 PROCEDURE — 87804 INFLUENZA ASSAY W/OPTIC: CPT | Mod: QW,S$GLB,, | Performed by: FAMILY MEDICINE

## 2021-12-16 PROCEDURE — 87880 POCT RAPID STREP A: ICD-10-PCS | Mod: QW,S$GLB,, | Performed by: FAMILY MEDICINE

## 2021-12-16 PROCEDURE — 99213 OFFICE O/P EST LOW 20 MIN: CPT | Mod: 25,S$GLB,, | Performed by: FAMILY MEDICINE

## 2021-12-16 PROCEDURE — 87880 STREP A ASSAY W/OPTIC: CPT | Mod: QW,S$GLB,, | Performed by: FAMILY MEDICINE

## 2021-12-16 PROCEDURE — 87804 POCT INFLUENZA A/B: ICD-10-PCS | Mod: 59,QW,S$GLB, | Performed by: FAMILY MEDICINE

## 2021-12-16 PROCEDURE — U0002: ICD-10-PCS | Mod: QW,S$GLB,, | Performed by: FAMILY MEDICINE

## 2021-12-16 PROCEDURE — 99213 PR OFFICE/OUTPT VISIT, EST, LEVL III, 20-29 MIN: ICD-10-PCS | Mod: 25,S$GLB,, | Performed by: FAMILY MEDICINE

## 2021-12-16 PROCEDURE — U0002 COVID-19 LAB TEST NON-CDC: HCPCS | Mod: QW,S$GLB,, | Performed by: FAMILY MEDICINE

## 2021-12-16 RX ORDER — CETIRIZINE HYDROCHLORIDE 10 MG/1
10 TABLET ORAL DAILY
Qty: 90 TABLET | Refills: 3 | Status: SHIPPED | OUTPATIENT
Start: 2021-12-16 | End: 2022-02-14 | Stop reason: SDUPTHER

## 2021-12-17 DIAGNOSIS — F51.01 PRIMARY INSOMNIA: ICD-10-CM

## 2021-12-17 PROCEDURE — G0180 MD CERTIFICATION HHA PATIENT: HCPCS | Mod: ,,, | Performed by: FAMILY MEDICINE

## 2021-12-17 PROCEDURE — G0180 PR HOME HEALTH MD CERTIFICATION: ICD-10-PCS | Mod: ,,, | Performed by: FAMILY MEDICINE

## 2021-12-17 RX ORDER — MIRTAZAPINE 7.5 MG/1
7.5 TABLET, FILM COATED ORAL NIGHTLY
Qty: 30 TABLET | Refills: 3 | OUTPATIENT
Start: 2021-12-17 | End: 2022-12-17

## 2021-12-23 ENCOUNTER — EXTERNAL HOME HEALTH (OUTPATIENT)
Dept: HOME HEALTH SERVICES | Facility: HOSPITAL | Age: 86
End: 2021-12-23
Payer: MEDICARE

## 2021-12-28 ENCOUNTER — OFFICE VISIT (OUTPATIENT)
Dept: FAMILY MEDICINE | Facility: CLINIC | Age: 86
End: 2021-12-28
Payer: MEDICARE

## 2021-12-28 VITALS
BODY MASS INDEX: 30.04 KG/M2 | HEART RATE: 80 BPM | HEIGHT: 60 IN | OXYGEN SATURATION: 100 % | WEIGHT: 153 LBS | SYSTOLIC BLOOD PRESSURE: 122 MMHG | DIASTOLIC BLOOD PRESSURE: 70 MMHG | RESPIRATION RATE: 18 BRPM

## 2021-12-28 DIAGNOSIS — Z85.46 HISTORY OF PROSTATE CANCER: ICD-10-CM

## 2021-12-28 DIAGNOSIS — I10 HTN (HYPERTENSION), BENIGN: Primary | ICD-10-CM

## 2021-12-28 DIAGNOSIS — F01.50 VASCULAR DEMENTIA WITHOUT BEHAVIORAL DISTURBANCE: ICD-10-CM

## 2021-12-28 DIAGNOSIS — I25.118 CORONARY ARTERY DISEASE OF NATIVE ARTERY OF NATIVE HEART WITH STABLE ANGINA PECTORIS: ICD-10-CM

## 2021-12-28 DIAGNOSIS — E78.5 HYPERLIPIDEMIA LDL GOAL <70: ICD-10-CM

## 2021-12-28 DIAGNOSIS — F51.01 PRIMARY INSOMNIA: ICD-10-CM

## 2021-12-28 DIAGNOSIS — R29.898 LEG WEAKNESS, BILATERAL: ICD-10-CM

## 2021-12-28 DIAGNOSIS — K21.00 HIATAL HERNIA WITH GERD AND ESOPHAGITIS: ICD-10-CM

## 2021-12-28 DIAGNOSIS — E03.9 ACQUIRED HYPOTHYROIDISM: ICD-10-CM

## 2021-12-28 DIAGNOSIS — Z76.89 ENCOUNTER TO ESTABLISH CARE WITH NEW DOCTOR: ICD-10-CM

## 2021-12-28 DIAGNOSIS — K44.9 HIATAL HERNIA WITH GERD AND ESOPHAGITIS: ICD-10-CM

## 2021-12-28 PROCEDURE — 3288F FALL RISK ASSESSMENT DOCD: CPT | Mod: S$GLB,,, | Performed by: FAMILY MEDICINE

## 2021-12-28 PROCEDURE — 1101F PR PT FALLS ASSESS DOC 0-1 FALLS W/OUT INJ PAST YR: ICD-10-PCS | Mod: S$GLB,,, | Performed by: FAMILY MEDICINE

## 2021-12-28 PROCEDURE — 99214 PR OFFICE/OUTPT VISIT, EST, LEVL IV, 30-39 MIN: ICD-10-PCS | Mod: S$GLB,,, | Performed by: FAMILY MEDICINE

## 2021-12-28 PROCEDURE — 1126F PR PAIN SEVERITY QUANTIFIED, NO PAIN PRESENT: ICD-10-PCS | Mod: S$GLB,,, | Performed by: FAMILY MEDICINE

## 2021-12-28 PROCEDURE — 99214 OFFICE O/P EST MOD 30 MIN: CPT | Mod: S$GLB,,, | Performed by: FAMILY MEDICINE

## 2021-12-28 PROCEDURE — 1126F AMNT PAIN NOTED NONE PRSNT: CPT | Mod: S$GLB,,, | Performed by: FAMILY MEDICINE

## 2021-12-28 PROCEDURE — 1159F MED LIST DOCD IN RCRD: CPT | Mod: S$GLB,,, | Performed by: FAMILY MEDICINE

## 2021-12-28 PROCEDURE — 1159F PR MEDICATION LIST DOCUMENTED IN MEDICAL RECORD: ICD-10-PCS | Mod: S$GLB,,, | Performed by: FAMILY MEDICINE

## 2021-12-28 PROCEDURE — 1101F PT FALLS ASSESS-DOCD LE1/YR: CPT | Mod: S$GLB,,, | Performed by: FAMILY MEDICINE

## 2021-12-28 PROCEDURE — 3288F PR FALLS RISK ASSESSMENT DOCUMENTED: ICD-10-PCS | Mod: S$GLB,,, | Performed by: FAMILY MEDICINE

## 2021-12-28 RX ORDER — NITROGLYCERIN 0.4 MG/1
0.4 TABLET SUBLINGUAL EVERY 5 MIN PRN
COMMUNITY
Start: 2021-12-22

## 2021-12-28 RX ORDER — MIRTAZAPINE 7.5 MG/1
7.5 TABLET, FILM COATED ORAL NIGHTLY
Qty: 30 TABLET | Refills: 3 | Status: SHIPPED | OUTPATIENT
Start: 2021-12-28 | End: 2022-02-14 | Stop reason: SDUPTHER

## 2021-12-28 RX ORDER — AZITHROMYCIN 250 MG/1
TABLET, FILM COATED ORAL
COMMUNITY
Start: 2021-12-20 | End: 2021-12-28

## 2021-12-28 RX ORDER — PANTOPRAZOLE SODIUM 20 MG/1
20 TABLET, DELAYED RELEASE ORAL DAILY
Qty: 30 TABLET | Refills: 11 | Status: SHIPPED | OUTPATIENT
Start: 2021-12-28 | End: 2022-02-14 | Stop reason: SDUPTHER

## 2022-02-09 ENCOUNTER — HOSPITAL ENCOUNTER (OUTPATIENT)
Dept: RADIOLOGY | Facility: HOSPITAL | Age: 87
Discharge: HOME | End: 2022-02-09
Attending: PSYCHIATRY & NEUROLOGY
Payer: MEDICARE

## 2022-02-09 DIAGNOSIS — G93.9 DISORDER OF BRAIN, UNSPECIFIED: ICD-10-CM

## 2022-02-09 PROCEDURE — 70450 CT HEAD/BRAIN W/O DYE: CPT | Mod: 26,,, | Performed by: RADIOLOGY

## 2022-02-09 PROCEDURE — 70450 CT HEAD/BRAIN W/O DYE: CPT | Mod: TC

## 2022-02-09 PROCEDURE — 70450 CT HEAD WITHOUT CONTRAST: ICD-10-PCS | Mod: 26,,, | Performed by: RADIOLOGY

## 2022-02-14 DIAGNOSIS — K44.9 HIATAL HERNIA WITH GERD AND ESOPHAGITIS: ICD-10-CM

## 2022-02-14 DIAGNOSIS — K21.00 HIATAL HERNIA WITH GERD AND ESOPHAGITIS: ICD-10-CM

## 2022-02-14 DIAGNOSIS — J30.9 ALLERGIC RHINITIS, UNSPECIFIED SEASONALITY, UNSPECIFIED TRIGGER: ICD-10-CM

## 2022-02-14 DIAGNOSIS — F51.01 PRIMARY INSOMNIA: ICD-10-CM

## 2022-02-16 RX ORDER — MIRTAZAPINE 7.5 MG/1
7.5 TABLET, FILM COATED ORAL NIGHTLY
Qty: 90 TABLET | Refills: 0 | Status: SHIPPED | OUTPATIENT
Start: 2022-02-16 | End: 2022-03-17

## 2022-02-16 RX ORDER — PANTOPRAZOLE SODIUM 20 MG/1
20 TABLET, DELAYED RELEASE ORAL DAILY
Qty: 30 TABLET | Refills: 11 | Status: SHIPPED | OUTPATIENT
Start: 2022-02-16 | End: 2022-07-24 | Stop reason: DRUGHIGH

## 2022-02-16 RX ORDER — CETIRIZINE HYDROCHLORIDE 10 MG/1
10 TABLET ORAL DAILY
Qty: 90 TABLET | Refills: 3 | Status: SHIPPED | OUTPATIENT
Start: 2022-02-16

## 2022-03-23 ENCOUNTER — TELEPHONE (OUTPATIENT)
Dept: FAMILY MEDICINE | Facility: CLINIC | Age: 87
End: 2022-03-23
Payer: MEDICARE

## 2022-03-23 NOTE — TELEPHONE ENCOUNTER
Called Dr. Betancourt's office. Spoke with Awa, about patient rx's for Metoprolol ER 50mg and Sotalol 80mg to see if patient should be on both medication since Dr. Marley sent in rx for the Metoprolol and Dr. Betancourt sent rx for Sotalol. Triston needed clarification in order to refill medication. Per Awa patient is supposed to be on both beta blockers. She stated that she will send in the refills and Dr. Betancourt will be prescribing the medications.

## 2022-07-17 ENCOUNTER — EXTERNAL HOME HEALTH (OUTPATIENT)
Dept: HOME HEALTH SERVICES | Facility: HOSPITAL | Age: 87
End: 2022-07-17
Payer: MEDICARE

## 2022-07-24 ENCOUNTER — HOSPITAL ENCOUNTER (OUTPATIENT)
Facility: HOSPITAL | Age: 87
Discharge: HOME | End: 2022-07-26
Attending: EMERGENCY MEDICINE | Admitting: STUDENT IN AN ORGANIZED HEALTH CARE EDUCATION/TRAINING PROGRAM
Payer: MEDICARE

## 2022-07-24 DIAGNOSIS — R55 SYNCOPE AND COLLAPSE: ICD-10-CM

## 2022-07-24 DIAGNOSIS — R55 SYNCOPE, UNSPECIFIED SYNCOPE TYPE: ICD-10-CM

## 2022-07-24 DIAGNOSIS — R07.9 CHEST PAIN: ICD-10-CM

## 2022-07-24 DIAGNOSIS — R06.02 SOB (SHORTNESS OF BREATH): Primary | ICD-10-CM

## 2022-07-24 DIAGNOSIS — R55 SYNCOPE: ICD-10-CM

## 2022-07-24 LAB
ALBUMIN SERPL BCP-MCNC: 3.2 G/DL (ref 3.5–5.2)
ALP SERPL-CCNC: 65 U/L (ref 55–135)
ALT SERPL W/O P-5'-P-CCNC: 18 U/L (ref 10–44)
ANION GAP SERPL CALC-SCNC: 8 MMOL/L (ref 8–16)
AST SERPL-CCNC: 21 U/L (ref 10–40)
BASOPHILS # BLD AUTO: 0.03 K/UL (ref 0–0.2)
BASOPHILS NFR BLD: 0.8 % (ref 0–1.9)
BILIRUB SERPL-MCNC: 0.7 MG/DL (ref 0.1–1)
BNP SERPL-MCNC: 353 PG/ML (ref 0–99)
BUN SERPL-MCNC: 18 MG/DL (ref 8–23)
CALCIUM SERPL-MCNC: 8.2 MG/DL (ref 8.7–10.5)
CHLORIDE SERPL-SCNC: 109 MMOL/L (ref 95–110)
CO2 SERPL-SCNC: 21 MMOL/L (ref 23–29)
CREAT SERPL-MCNC: 1.1 MG/DL (ref 0.5–1.4)
DIFFERENTIAL METHOD: ABNORMAL
EOSINOPHIL # BLD AUTO: 0.1 K/UL (ref 0–0.5)
EOSINOPHIL NFR BLD: 1.3 % (ref 0–8)
ERYTHROCYTE [DISTWIDTH] IN BLOOD BY AUTOMATED COUNT: 14.1 % (ref 11.5–14.5)
EST. GFR  (AFRICAN AMERICAN): >60 ML/MIN/1.73 M^2
EST. GFR  (NON AFRICAN AMERICAN): 59.6 ML/MIN/1.73 M^2
GLUCOSE SERPL-MCNC: 143 MG/DL (ref 70–110)
HCT VFR BLD AUTO: 35.3 % (ref 40–54)
HGB BLD-MCNC: 11.8 G/DL (ref 14–18)
IMM GRANULOCYTES # BLD AUTO: 0.01 K/UL (ref 0–0.04)
IMM GRANULOCYTES NFR BLD AUTO: 0.3 % (ref 0–0.5)
LYMPHOCYTES # BLD AUTO: 1.3 K/UL (ref 1–4.8)
LYMPHOCYTES NFR BLD: 31.4 % (ref 18–48)
MAGNESIUM SERPL-MCNC: 1.9 MG/DL (ref 1.6–2.6)
MCH RBC QN AUTO: 32.5 PG (ref 27–31)
MCHC RBC AUTO-ENTMCNC: 33.4 G/DL (ref 32–36)
MCV RBC AUTO: 97 FL (ref 82–98)
MONOCYTES # BLD AUTO: 0.3 K/UL (ref 0.3–1)
MONOCYTES NFR BLD: 6.8 % (ref 4–15)
NEUTROPHILS # BLD AUTO: 2.4 K/UL (ref 1.8–7.7)
NEUTROPHILS NFR BLD: 59.4 % (ref 38–73)
NRBC BLD-RTO: 0 /100 WBC
PLATELET # BLD AUTO: 137 K/UL (ref 150–450)
PMV BLD AUTO: 10.8 FL (ref 9.2–12.9)
POTASSIUM SERPL-SCNC: 4.1 MMOL/L (ref 3.5–5.1)
PROT SERPL-MCNC: 5.9 G/DL (ref 6–8.4)
RBC # BLD AUTO: 3.63 M/UL (ref 4.6–6.2)
SARS-COV-2 RDRP RESP QL NAA+PROBE: NEGATIVE
SODIUM SERPL-SCNC: 138 MMOL/L (ref 136–145)
TROPONIN I SERPL DL<=0.01 NG/ML-MCNC: <0.03 NG/ML
TSH SERPL DL<=0.005 MIU/L-ACNC: 2.73 UIU/ML (ref 0.34–5.6)
WBC # BLD AUTO: 3.98 K/UL (ref 3.9–12.7)

## 2022-07-24 PROCEDURE — 96374 THER/PROPH/DIAG INJ IV PUSH: CPT

## 2022-07-24 PROCEDURE — 99285 EMERGENCY DEPT VISIT HI MDM: CPT | Mod: 25,CS

## 2022-07-24 PROCEDURE — G0378 HOSPITAL OBSERVATION PER HR: HCPCS

## 2022-07-24 PROCEDURE — 25000242 PHARM REV CODE 250 ALT 637 W/ HCPCS: Performed by: NURSE PRACTITIONER

## 2022-07-24 PROCEDURE — 93010 EKG 12-LEAD: ICD-10-PCS | Mod: ,,, | Performed by: INTERNAL MEDICINE

## 2022-07-24 PROCEDURE — G0378 HOSPITAL OBSERVATION PER HR: HCPCS | Mod: CS

## 2022-07-24 PROCEDURE — 93005 ELECTROCARDIOGRAM TRACING: CPT | Performed by: INTERNAL MEDICINE

## 2022-07-24 PROCEDURE — 80053 COMPREHEN METABOLIC PANEL: CPT | Performed by: EMERGENCY MEDICINE

## 2022-07-24 PROCEDURE — 25000003 PHARM REV CODE 250: Performed by: NURSE PRACTITIONER

## 2022-07-24 PROCEDURE — 93010 ELECTROCARDIOGRAM REPORT: CPT | Mod: ,,, | Performed by: INTERNAL MEDICINE

## 2022-07-24 PROCEDURE — 36415 COLL VENOUS BLD VENIPUNCTURE: CPT | Performed by: EMERGENCY MEDICINE

## 2022-07-24 PROCEDURE — 84443 ASSAY THYROID STIM HORMONE: CPT | Performed by: EMERGENCY MEDICINE

## 2022-07-24 PROCEDURE — 85025 COMPLETE CBC W/AUTO DIFF WBC: CPT | Performed by: EMERGENCY MEDICINE

## 2022-07-24 PROCEDURE — 36415 COLL VENOUS BLD VENIPUNCTURE: CPT | Performed by: NURSE PRACTITIONER

## 2022-07-24 PROCEDURE — 83735 ASSAY OF MAGNESIUM: CPT | Performed by: EMERGENCY MEDICINE

## 2022-07-24 PROCEDURE — 84484 ASSAY OF TROPONIN QUANT: CPT | Performed by: EMERGENCY MEDICINE

## 2022-07-24 PROCEDURE — 84484 ASSAY OF TROPONIN QUANT: CPT | Mod: 91 | Performed by: NURSE PRACTITIONER

## 2022-07-24 PROCEDURE — 63600175 PHARM REV CODE 636 W HCPCS: Performed by: NURSE PRACTITIONER

## 2022-07-24 PROCEDURE — 83880 ASSAY OF NATRIURETIC PEPTIDE: CPT | Performed by: EMERGENCY MEDICINE

## 2022-07-24 PROCEDURE — U0002 COVID-19 LAB TEST NON-CDC: HCPCS | Performed by: EMERGENCY MEDICINE

## 2022-07-24 RX ORDER — ASPIRIN 325 MG
325 TABLET ORAL
Status: ACTIVE | OUTPATIENT
Start: 2022-07-24 | End: 2022-07-25

## 2022-07-24 RX ORDER — PANTOPRAZOLE SODIUM 40 MG/1
40 TABLET, DELAYED RELEASE ORAL 2 TIMES DAILY
COMMUNITY
Start: 2022-07-23

## 2022-07-24 RX ORDER — HYDRALAZINE HYDROCHLORIDE 20 MG/ML
10 INJECTION INTRAMUSCULAR; INTRAVENOUS EVERY 4 HOURS PRN
Status: DISCONTINUED | OUTPATIENT
Start: 2022-07-24 | End: 2022-07-26 | Stop reason: HOSPADM

## 2022-07-24 RX ORDER — CETIRIZINE HYDROCHLORIDE 10 MG/1
10 TABLET ORAL DAILY
Status: DISCONTINUED | OUTPATIENT
Start: 2022-07-25 | End: 2022-07-26 | Stop reason: HOSPADM

## 2022-07-24 RX ORDER — NITROGLYCERIN 0.4 MG/1
0.4 TABLET SUBLINGUAL EVERY 5 MIN PRN
Status: DISCONTINUED | OUTPATIENT
Start: 2022-07-24 | End: 2022-07-26 | Stop reason: HOSPADM

## 2022-07-24 RX ORDER — LANOLIN ALCOHOL/MO/W.PET/CERES
800 CREAM (GRAM) TOPICAL
Status: DISCONTINUED | OUTPATIENT
Start: 2022-07-24 | End: 2022-07-26 | Stop reason: HOSPADM

## 2022-07-24 RX ORDER — NAPROXEN SODIUM 220 MG/1
81 TABLET, FILM COATED ORAL DAILY
Status: DISCONTINUED | OUTPATIENT
Start: 2022-07-25 | End: 2022-07-26 | Stop reason: HOSPADM

## 2022-07-24 RX ORDER — ONDANSETRON 2 MG/ML
4 INJECTION INTRAMUSCULAR; INTRAVENOUS EVERY 6 HOURS PRN
Status: DISCONTINUED | OUTPATIENT
Start: 2022-07-24 | End: 2022-07-26 | Stop reason: HOSPADM

## 2022-07-24 RX ORDER — SODIUM,POTASSIUM PHOSPHATES 280-250MG
2 POWDER IN PACKET (EA) ORAL
Status: DISCONTINUED | OUTPATIENT
Start: 2022-07-24 | End: 2022-07-26 | Stop reason: HOSPADM

## 2022-07-24 RX ORDER — CARBOXYMETHYLCELLULOSE SODIUM 5 MG/ML
1 SOLUTION/ DROPS OPHTHALMIC 3 TIMES DAILY PRN
Status: DISCONTINUED | OUTPATIENT
Start: 2022-07-24 | End: 2022-07-26 | Stop reason: HOSPADM

## 2022-07-24 RX ORDER — LEVOTHYROXINE SODIUM 25 UG/1
75 TABLET ORAL
Status: DISCONTINUED | OUTPATIENT
Start: 2022-07-25 | End: 2022-07-26 | Stop reason: HOSPADM

## 2022-07-24 RX ORDER — METOPROLOL SUCCINATE 50 MG/1
50 TABLET, EXTENDED RELEASE ORAL DAILY
Status: DISCONTINUED | OUTPATIENT
Start: 2022-07-25 | End: 2022-07-25

## 2022-07-24 RX ORDER — CLONIDINE HYDROCHLORIDE 0.1 MG/1
0.1 TABLET ORAL 2 TIMES DAILY PRN
Status: DISCONTINUED | OUTPATIENT
Start: 2022-07-24 | End: 2022-07-24

## 2022-07-24 RX ORDER — LANOLIN ALCOHOL/MO/W.PET/CERES
1000 CREAM (GRAM) TOPICAL DAILY
Status: DISCONTINUED | OUTPATIENT
Start: 2022-07-25 | End: 2022-07-26 | Stop reason: HOSPADM

## 2022-07-24 RX ORDER — PANTOPRAZOLE SODIUM 40 MG/1
40 TABLET, DELAYED RELEASE ORAL
Status: DISCONTINUED | OUTPATIENT
Start: 2022-07-25 | End: 2022-07-26 | Stop reason: HOSPADM

## 2022-07-24 RX ORDER — MEMANTINE HYDROCHLORIDE 5 MG/1
5 TABLET ORAL NIGHTLY
Status: DISCONTINUED | OUTPATIENT
Start: 2022-07-24 | End: 2022-07-26 | Stop reason: HOSPADM

## 2022-07-24 RX ORDER — LOSARTAN POTASSIUM 50 MG/1
50 TABLET ORAL 2 TIMES DAILY
Status: DISCONTINUED | OUTPATIENT
Start: 2022-07-24 | End: 2022-07-26 | Stop reason: HOSPADM

## 2022-07-24 RX ORDER — SODIUM CHLORIDE 0.9 % (FLUSH) 0.9 %
10 SYRINGE (ML) INJECTION
Status: DISCONTINUED | OUTPATIENT
Start: 2022-07-24 | End: 2022-07-26 | Stop reason: HOSPADM

## 2022-07-24 RX ORDER — CLOPIDOGREL BISULFATE 75 MG/1
75 TABLET ORAL DAILY
Status: DISCONTINUED | OUTPATIENT
Start: 2022-07-25 | End: 2022-07-26 | Stop reason: HOSPADM

## 2022-07-24 RX ORDER — FLUTICASONE PROPIONATE 50 MCG
1 SPRAY, SUSPENSION (ML) NASAL 2 TIMES DAILY
Status: DISCONTINUED | OUTPATIENT
Start: 2022-07-24 | End: 2022-07-26 | Stop reason: HOSPADM

## 2022-07-24 RX ORDER — AMOXICILLIN 250 MG
1 CAPSULE ORAL 2 TIMES DAILY PRN
Status: DISCONTINUED | OUTPATIENT
Start: 2022-07-24 | End: 2022-07-26 | Stop reason: HOSPADM

## 2022-07-24 RX ORDER — MIRTAZAPINE 7.5 MG/1
7.5 TABLET, FILM COATED ORAL NIGHTLY
Status: DISCONTINUED | OUTPATIENT
Start: 2022-07-24 | End: 2022-07-26 | Stop reason: HOSPADM

## 2022-07-24 RX ORDER — SOTALOL HYDROCHLORIDE 80 MG/1
80 TABLET ORAL 2 TIMES DAILY
Status: ON HOLD | COMMUNITY
Start: 2022-07-13 | End: 2022-09-07 | Stop reason: HOSPADM

## 2022-07-24 RX ORDER — ATORVASTATIN CALCIUM 40 MG/1
40 TABLET, FILM COATED ORAL DAILY
Status: DISCONTINUED | OUTPATIENT
Start: 2022-07-25 | End: 2022-07-26 | Stop reason: HOSPADM

## 2022-07-24 RX ORDER — LOPERAMIDE HYDROCHLORIDE 2 MG/1
2 CAPSULE ORAL
Status: DISCONTINUED | OUTPATIENT
Start: 2022-07-24 | End: 2022-07-26 | Stop reason: HOSPADM

## 2022-07-24 RX ORDER — MEMANTINE HYDROCHLORIDE 10 MG/1
5 TABLET ORAL 2 TIMES DAILY
COMMUNITY
End: 2022-07-24

## 2022-07-24 RX ORDER — ACETAMINOPHEN 325 MG/1
650 TABLET ORAL EVERY 6 HOURS PRN
Status: DISCONTINUED | OUTPATIENT
Start: 2022-07-24 | End: 2022-07-26 | Stop reason: HOSPADM

## 2022-07-24 RX ADMIN — ACETAMINOPHEN 650 MG: 325 TABLET ORAL at 09:07

## 2022-07-24 RX ADMIN — NITROGLYCERIN 0.4 MG: 0.4 TABLET SUBLINGUAL at 07:07

## 2022-07-24 RX ADMIN — HYDRALAZINE HYDROCHLORIDE 10 MG: 20 INJECTION INTRAMUSCULAR; INTRAVENOUS at 05:07

## 2022-07-24 RX ADMIN — LOSARTAN POTASSIUM 50 MG: 50 TABLET, FILM COATED ORAL at 09:07

## 2022-07-24 RX ADMIN — CLONIDINE HYDROCHLORIDE 0.1 MG: 0.1 TABLET ORAL at 04:07

## 2022-07-24 RX ADMIN — FLUTICASONE PROPIONATE 50 MCG: 50 SPRAY, METERED NASAL at 09:07

## 2022-07-24 RX ADMIN — MIRTAZAPINE 7.5 MG: 7.5 TABLET, FILM COATED ORAL at 09:07

## 2022-07-24 RX ADMIN — MEMANTINE 5 MG: 5 TABLET ORAL at 09:07

## 2022-07-24 NOTE — ED NOTES
"Family reports pt had chest pain and "passed out for a minute". Pt denies chest pain and SOB at this time, no distress noted, chest rising and falling, resp E/U.  "

## 2022-07-24 NOTE — ED PROVIDER NOTES
Encounter Date: 7/24/2022       History     Chief Complaint   Patient presents with    Chest Pain     88-year-old male with history of coronary artery disease and dementia presented emergency department after having an episode of chest pain and tightness.  Patient was eating and at the table and felt short of breath and felt tightness in the chest.  Patient's daughter gave him and nitroglycerin.  Patient was appearing gray at that time and after retro glycerin patient passed out.  When EMS arrived patient awake and having no complaints however noted to be slightly hypotensive which they believe was secondary to nitroglycerin.  Denies fever or chills or nausea vomiting or abdominal pain.  Denies any chest pain or shortness of breath at this time.  Denies any focal weakness or numbness        Review of patient's allergies indicates:   Allergen Reactions    Iodinated contrast media Rash    Pontocaine [tetracaine hcl] Anaphylaxis     hypotension     Past Medical History:   Diagnosis Date    Abnormal echocardiogram 11/21/2019    Normal left ventricular systolic function with estimated ejection fraction of 60%.  Grade 2 moderate left ventricular diastolic dysfunction consistent with pseudonormalization.  Mild left atrial enlargement.  Mild aortic regurgitation.  Mild to moderate mitral regurgitation.  Mild tricuspid regurgitation.  Estimated PA systolic pressure is 38 mm of mercury.  Diagnosis is syncope.    Anxiety     Arthritis     CAD (coronary artery disease) age 68    Carotid artery occlusion     Cerebrovascular small vessel disease     Colon polyps age 78    Coronary artery disease of native artery of native heart with stable angina pectoris 5/6/2020    GERD (gastroesophageal reflux disease)     H. pylori infection     HTN (hypertension), benign age 65    Hyperlipidemia LDL goal <70 age 65    Hypothyroidism     Multiple fractures of ribs of right side 11/27/2012    Myocardial infarction      Pernicious anemia 1/26/2018    Primary insomnia 10/9/2018    Prostate cancer age 67    Syncopal episodes 3/2013    Urge incontinence 5/8/2018     Past Surgical History:   Procedure Laterality Date    CARDIAC PACEMAKER PLACEMENT  10/2015    CARDIAC SURGERY      CATARACT EXTRACTION W/  INTRAOCULAR LENS IMPLANT Bilateral     COLONOSCOPY  ~2013    Dr. Edge    COLONOSCOPY N/A 6/8/2016    Procedure: COLONOSCOPY;  Surgeon: Shamar Barahona MD;  Location[facility];  Service: Endoscopy;  Laterality: N/A; REPEAT IN 1 YEAR    CORONARY ANGIOPLASTY WITH STENT PLACEMENT  2003    x 2 RCA    PROSTATECTOMY  2002    UPPER GASTROINTESTINAL ENDOSCOPY  11/15/2016    Dr. Barahona     Family History   Problem Relation Age of Onset    Migraines Mother     Heart failure Father     Heart disease Father 56        MI    Heart failure Brother     Heart disease Brother     Diabetes Son     Hypertension Son     Heart disease Brother     Mental illness Brother     No Known Problems Son     Colon cancer Neg Hx     Colon polyps Neg Hx     Crohn's disease Neg Hx     Ulcerative colitis Neg Hx     Stomach cancer Neg Hx     Esophageal cancer Neg Hx      Social History     Tobacco Use    Smoking status: Never Smoker    Smokeless tobacco: Never Used   Substance Use Topics    Alcohol use: No    Drug use: No     Review of Systems   Constitutional: Negative.    HENT: Negative.    Eyes: Negative.    Respiratory: Positive for shortness of breath.    Cardiovascular: Positive for chest pain.   Gastrointestinal: Negative.    Endocrine: Negative.    Genitourinary: Negative.    Musculoskeletal: Negative.    Skin: Negative.    Allergic/Immunologic: Negative.    Neurological: Positive for syncope.   Hematological: Negative.    Psychiatric/Behavioral: Negative.    All other systems reviewed and are negative.      Physical Exam     Initial Vitals   BP Pulse Resp Temp SpO2   07/24/22 1227 07/24/22 1247 07/24/22 1247 07/24/22 1227  07/24/22 1247   (!) 153/64 70 20 97.8 °F (36.6 °C) 96 %      MAP       --                Physical Exam    Nursing note and vitals reviewed.  Constitutional: He appears well-developed and well-nourished.   HENT:   Head: Normocephalic and atraumatic.   Nose: Nose normal.   Eyes: Conjunctivae and EOM are normal.   Neck: Neck supple. No tracheal deviation present.   Normal range of motion.  Cardiovascular: Normal rate, regular rhythm, normal heart sounds and intact distal pulses. Exam reveals no friction rub.    No murmur heard.  Pulmonary/Chest: Breath sounds normal. No respiratory distress. He has no wheezes. He has no rales.   Abdominal: Abdomen is soft. He exhibits no distension. There is no abdominal tenderness.   Musculoskeletal:         General: Normal range of motion.      Cervical back: Normal range of motion and neck supple.     Neurological: He is alert and oriented to person, place, and time. He has normal strength. No sensory deficit. GCS score is 15. GCS eye subscore is 4. GCS verbal subscore is 5. GCS motor subscore is 6.   Skin: Skin is warm and dry. Capillary refill takes less than 2 seconds.   Psychiatric: He has a normal mood and affect. Thought content normal.         ED Course   Procedures  Labs Reviewed   CBC W/ AUTO DIFFERENTIAL - Abnormal; Notable for the following components:       Result Value    RBC 3.63 (*)     Hemoglobin 11.8 (*)     Hematocrit 35.3 (*)     MCH 32.5 (*)     Platelets 137 (*)     All other components within normal limits   COMPREHENSIVE METABOLIC PANEL - Abnormal; Notable for the following components:    CO2 21 (*)     Glucose 143 (*)     Calcium 8.2 (*)     Total Protein 5.9 (*)     Albumin 3.2 (*)     eGFR if non  59.6 (*)     All other components within normal limits   B-TYPE NATRIURETIC PEPTIDE - Abnormal; Notable for the following components:     (*)     All other components within normal limits   TROPONIN I   SARS-COV-2 RNA AMPLIFICATION, QUAL    TROPONIN I     EKG Readings: (Independently Interpreted)   Initial Reading: No STEMI. Rhythm: Sinus Arrhythmia. Heart Rate: 82. Ectopy: Trigeminy. Conduction: Normal.   Sinus rhythm with trigeminy with white complex on every 3rd beat     ECG Results          EKG 12-lead (In process)  Result time 07/24/22 12:57:48    In process by Interface, Lab In [facility] (07/24/22 12:57:48)                 Narrative:    Test Reason : R07.9,    Vent. Rate : 082 BPM     Atrial Rate : 082 BPM     P-R Int : 000 ms          QRS Dur : 094 ms      QT Int : 454 ms       P-R-T Axes : 071 021 075 degrees     QTc Int : 530 ms    Ventricular-paced rhythm with Fusion complexes  Abnormal ECG  When compared with ECG of 02-NOV-2019 11:33,  Electronic ventricular pacemaker has replaced Sinus rhythm    Referred By: [email or web address]   SELF           Confirmed By:                   In process by Interface, Lab In [facility] (07/24/22 12:56:45)                 Narrative:    Test Reason : R07.9,    Vent. Rate : 082 BPM     Atrial Rate : 082 BPM     P-R Int : 000 ms          QRS Dur : 094 ms      QT Int : 454 ms       P-R-T Axes : 071 021 075 degrees     QTc Int : 530 ms    Ventricular-paced rhythm with Fusion complexes  Abnormal ECG  When compared with ECG of 02-NOV-2019 11:33,  Electronic ventricular pacemaker has replaced Sinus rhythm    Referred By: [email or web address]   SELF           Confirmed By:                             Imaging Results          X-Ray Chest AP Portable (Final result)  Result time 07/24/22 12:46:21    Final result by Carlos Patel MD (07/24/22 12:46:21)                 Narrative:    Chest single view    CLINICAL DATA: Chest pain    FINDINGS: Comparison to November 2019. The heart is borderline enlarged. Pacemaker device and loop recorder are noted. There are no infiltrates or effusions. No acute osseous abnormality is identified.    IMPRESSION:  1. No acute radiographic abnormalities.  2. Borderline cardiomegaly with support devices in place  as above.    Electronically signed by:  Carlos Patel MD  7/24/2022 12:46 PM CDT Workstation: [id number]                               Medications   aspirin tablet 325 mg (325 mg Oral Not Given 7/24/22 1215)     Medical Decision Making:   Differential Diagnosis:   88-year-old male presented emergency department with chest tightness and shortness of breath which resolved after nitroglycerin.  Patient also had a syncopal episode after nitroglycerin.  Patient is feeling better at this time and pain free.  Screening cardiac workup reviewed.  Hospital Medicine consulted for evaluation for admission and further management.  Clinical Tests:   Lab Tests: Reviewed  Radiological Study: Reviewed  Medical Tests: Reviewed                      Clinical Impression:   Final diagnoses:  [R07.9] Chest pain  [R06.02] SOB (shortness of breath) (Primary)  [R55] Syncope, unspecified syncope type  [R55] Syncope          ED Disposition Condition    Admit               Babs Ortiz MD  07/24/22 1406       Babs Ortiz MD  07/24/22 7136

## 2022-07-24 NOTE — H&P
[facility] Medicine History & Physical Examination   Patient Name: Gene Hartman  MRN: [id number]  Patient Class: OP- Observation   Admission Date: 7/24/2022 11:58 AM  Length of Stay: 0  Attending Physician: Dr. Bean  Primary Care Provider: Mariajose Thorne MD  Face-to-Face encounter date: 07/24/2022  Code Status: full code  Chief Complaint: Chest Pain          Patient information was obtained from patient, past medical records and ER records.   HISTORY OF PRESENT ILLNESS:   History was obtained from the patient and collateral obtained from the family present at the bedside and ER physician Sign-out. Patient is an 88 year old male with history as listed below including dementia and CAD who presents to the ED with the complaint of chest pain and syncope. The patient was at the table eating when he suddenly became sob and felt tightness in his chest. His daughter gave him a SL nitro and then he appeared to have grey color. The patient then passed out briefly and EMS was called. Upon arrival of EMS the patient was noted to be hypotensive. He was brought to the ED for further evaluation.     Patient denies fever, chills, cough, nausea, vomiting, abdominal pain, dysuria, hematuria, diarrhea, melena, visual changes, focal weakness, numbness or tingling.     In the ED patient is afebrile. VSS. He is currently chest pain free and denies sob.     REVIEW OF SYSTEMS:   10 Point Review of System was performed and was found to be negative except for that mentioned already in the HPI above.     PAST MEDICAL HISTORY:     Past Medical History:   Diagnosis Date    Abnormal echocardiogram 11/21/2019    Normal left ventricular systolic function with estimated ejection fraction of 60%.  Grade 2 moderate left ventricular diastolic dysfunction consistent with pseudonormalization.  Mild left atrial enlargement.  Mild aortic regurgitation.  Mild to moderate mitral regurgitation.  Mild tricuspid  regurgitation.  Estimated PA systolic pressure is 38 mm of mercury.  Diagnosis is syncope.    Anxiety     Arthritis     CAD (coronary artery disease) age 68    Carotid artery occlusion     Cerebrovascular small vessel disease     Colon polyps age 78    Coronary artery disease of native artery of native heart with stable angina pectoris 5/6/2020    GERD (gastroesophageal reflux disease)     H. pylori infection     HTN (hypertension), benign age 65    Hyperlipidemia LDL goal <70 age 65    Hypothyroidism     Multiple fractures of ribs of right side 11/27/2012    Myocardial infarction     Pernicious anemia 1/26/2018    Primary insomnia 10/9/2018    Prostate cancer age 67    Syncopal episodes 3/2013    Urge incontinence 5/8/2018       PAST SURGICAL HISTORY:     Past Surgical History:   Procedure Laterality Date    CARDIAC PACEMAKER PLACEMENT  10/2015    CARDIAC SURGERY      CATARACT EXTRACTION W/  INTRAOCULAR LENS IMPLANT Bilateral     COLONOSCOPY  ~2013    Dr. Edge    COLONOSCOPY N/A 6/8/2016    Procedure: COLONOSCOPY;  Surgeon: Shamar Barahona MD;  Location: [facility];  Service: Endoscopy;  Laterality: N/A; REPEAT IN 1 YEAR    CORONARY ANGIOPLASTY WITH STENT PLACEMENT  2003    x 2 RCA    PROSTATECTOMY  2002    UPPER GASTROINTESTINAL ENDOSCOPY  11/15/2016    Dr. Barahona       ALLERGIES:   Iodinated contrast media and Pontocaine [tetracaine hcl]    FAMILY HISTORY:     Family History   Problem Relation Age of Onset    Migraines Mother     Heart failure Father     Heart disease Father 56        MI    Heart failure Brother     Heart disease Brother     Diabetes Son     Hypertension Son     Heart disease Brother     Mental illness Brother     No Known Problems Son     Colon cancer Neg Hx     Colon polyps Neg Hx     Crohn's disease Neg Hx     Ulcerative colitis Neg Hx     Stomach cancer Neg Hx     Esophageal cancer Neg Hx        SOCIAL HISTORY:     Social History     Tobacco Use     Smoking status: Never Smoker    Smokeless tobacco: Never Used   Substance Use Topics    Alcohol use: No        Social History     Substance and Sexual Activity   Sexual Activity Not Currently        HOME MEDICATIONS:     Prior to Admission medications    Medication Sig Start Date End Date Taking? Authorizing Provider   aspirin 81 MG Chew Take 81 mg by mouth Daily.    Yes Historical Provider   atorvastatin (LIPITOR) 40 MG tablet Take 1 tablet (40 mg total) by mouth once daily. 2/16/22  Yes Mehreen Chahal MD   cetirizine (ZYRTEC) 10 MG tablet Take 1 tablet (10 mg total) by mouth once daily. 2/16/22  Yes Mehreen Chahal MD   clopidogrel (PLAVIX) 75 mg tablet Take 1 tablet (75 mg total) by mouth once daily. 12/2/16  Yes Matthew Martinez MD   cyanocobalamin, vitamin B-12, (VITAMIN B-12) 5,000 mcg Subl Place 1 tablet under the tongue once daily. 1/26/18  Yes Gabbie Mayorga MD   cycloSPORINE (RESTASIS) 0.05 % ophthalmic emulsion Apply 1 drop to eye 2 (two) times daily.   Yes Historical Provider   fluticasone propionate (FLONASE) 50 mcg/actuation nasal spray USE 1 SPRAY IN EACH NOSTRIL TWICE DAILY  Patient taking differently: 1 spray by Each Nostril route 2 (two) times a day. 2/24/22  Yes Mehreen Chahal MD   levothyroxine (SYNTHROID) 75 MCG tablet Take 75 mcg by mouth before breakfast. 2/9/21  Yes Historical Provider   losartan (COZAAR) 50 MG tablet Take 1 tablet (50 mg total) by mouth 2 (two) times daily. 2/11/19  Yes Gabbie Mayorga MD   memantine (NAMENDA) 10 MG Tab Take 5 mg by mouth every evening. Dr Betancourt decreased dose from 10>5 12/2/19  Yes Historical Provider   metoprolol succinate (TOPROL-XL) 50 MG 24 hr tablet TAKE 1 TABLET EVERY DAY  Patient taking differently: Take 50 mg by mouth once daily. 4/26/22  Yes Mehreen Chahal MD   mirtazapine (REMERON) 7.5 MG Tab TAKE 1 TABLET (7.5 MG TOTAL) BY MOUTH EVERY EVENING. 3/17/22  Yes Mehreen Chahal MD    nitroGLYCERIN (NITROSTAT) 0.4 MG SL tablet Place 0.4 mg under the tongue every 5 (five) minutes as needed. 12/22/21  Yes Historical Provider   pantoprazole (PROTONIX) 40 MG tablet Take 40 mg by mouth once daily. 7/23/22  Yes Historical Provider   sotaloL (BETAPACE) 80 MG tablet Take 80 mg by mouth 2 (two) times daily. 7/13/22  Yes Historical Provider   acetaminophen (TYLENOL ARTHRITIS PAIN ORAL) Take by mouth.    Historical Provider   cloNIDine (CATAPRES) 0.1 MG tablet Take 1 tablet (0.1 mg total) by mouth 2 (two) times daily as needed (SBP greater than 160). 1/4/18 3/4/21  Gabbie Mayorga MD   memantine (NAMENDA) 10 MG Tab Take 5 mg by mouth 2 (two) times daily.  7/24/22  Historical Provider   pantoprazole (PROTONIX) 20 MG tablet Take 1 tablet (20 mg total) by mouth once daily. 2/16/22 7/24/22  Mehreen Chahal MD   sotaloL (BETAPACE) 120 MG Tab  1/27/21 7/24/22  Historical Provider   tiZANidine (ZANAFLEX) 4 MG tablet TAKE 1/2 (ONE HALF) TO 1 TABLET BY MOUTH EVERY AT BEDTIME 9/9/21 7/24/22  Regina Marley MD         PHYSICAL EXAM:   BP (!) 167/97   Pulse 70   Temp 97.8 °F (36.6 °C) (Oral)   Resp 17   SpO2 100%   Vitals Reviewed  General appearance: Well-developed, well-nourished, elderly White male   Skin: No Rash. Warm, dry   Neuro: AAOx2. Follows commands. Motor and sensory exams grossly intact. Good tone. Power in all 4 extremities 5/5.   HENT: Atraumatic head. Moist mucous membranes of oral cavity.  Eyes: Normal extraocular movements.   Neck: Supple. No evidence of lymphadenopathy. No thyroidomegaly.  Lungs: Clear to auscultation bilaterally. No wheezing present.   Heart: Irregularly regular rhythm. Frequent PVCs. S1 and S2 present with no murmurs/gallop/rub. No pedal edema. No JVD present.   Abdomen: Soft, non-distended, non-tender. No rebound tenderness/guarding. No masses or organomegaly. Bowel sounds are normal. Bladder is not palpable.   Extremities: No cyanosis, clubbing.  Psych/mental  status: Pleasant. Cooperative. Responds appropriately to questions.   EMERGENCY DEPARTMENT LABS AND IMAGING:     Labs Reviewed   CBC W/ AUTO DIFFERENTIAL - Abnormal; Notable for the following components:       Result Value    RBC 3.63 (*)     Hemoglobin 11.8 (*)     Hematocrit 35.3 (*)     MCH 32.5 (*)     Platelets 137 (*)     All other components within normal limits   COMPREHENSIVE METABOLIC PANEL - Abnormal; Notable for the following components:    CO2 21 (*)     Glucose 143 (*)     Calcium 8.2 (*)     Total Protein 5.9 (*)     Albumin 3.2 (*)     eGFR if non  59.6 (*)     All other components within normal limits   B-TYPE NATRIURETIC PEPTIDE - Abnormal; Notable for the following components:     (*)     All other components within normal limits   TROPONIN I   SARS-COV-2 RNA AMPLIFICATION, QUAL   TROPONIN I       X-Ray Chest AP Portable   Final Result      CT Head Without Contrast    (Results Pending)       ASSESSMENT & PLAN:   Gene Hartman is a 88 y.o. male admitted for    Active Hospital Problems    Diagnosis  POA    *Syncope [R55]  Unknown     Priority: 1 - High    Chest pain [R07.9]  Unknown    Dementia without behavioral disturbance [F03.90]  Yes    Pacemaker; originally placed two years  [Z95.0]  Yes    Hiatal hernia with GERD and esophagitis [K44.9, K21.00]  Yes    CAD (coronary artery disease) [I25.10]  Yes    HTN (hypertension), benign [I10]  Yes          Plan  Admit to Cardiology  Trend troponin  2-d echo  Continue ASA/Plavix/ARB/BB/Statin/Nitro prn  Patient was on metoprolol and sotalol. Will hold sotalol as syncope could be related to bradycardia  Check orthostatic vitals  CT head  Consult Cardiology; patient known to Dr. Betancourt; will need to clarify if patient should be on two BBs as he has had bradycardia and syncope in the past.   Check tsh/lipid panel  Continue other home medications as ordered for chronic conditions  Interrogate pacemaker      Diet:  Cardiac    DVT Prophylaxis: Mechanical DVT prophylaxis. Encourage ambulation and OOB as tolerated.     Discharge Planning and Disposition:  Patient will be discharged in 1-2 days  ________________________________________________________________________________  Face-to-Face encounter date: 07/24/2022  Encounter included review of the medical records, interviewing and examining the patient face-to-face, discussion with family and other health care providers including emergency medicine physician, admission orders, interpreting lab/test results and formulating a plan of care.   Medical Decision Making during this encounter was  [_] Low Complexity  [_] Moderate Complexity  [x] High Complexity  _________________________________________________________________________________    INPATIENT LIST OF MEDICATIONS     Current Facility-Administered Medications:     aspirin tablet 325 mg, 325 mg, Oral, ED 1 Time, Babs Ortiz MD    Current Outpatient Medications:     aspirin 81 MG Chew, Take 81 mg by mouth Daily. , Disp: , Rfl:     atorvastatin (LIPITOR) 40 MG tablet, Take 1 tablet (40 mg total) by mouth once daily., Disp: 90 tablet, Rfl: 1    cetirizine (ZYRTEC) 10 MG tablet, Take 1 tablet (10 mg total) by mouth once daily., Disp: 90 tablet, Rfl: 3    clopidogrel (PLAVIX) 75 mg tablet, Take 1 tablet (75 mg total) by mouth once daily., Disp: 90 tablet, Rfl: 3    cyanocobalamin, vitamin B-12, (VITAMIN B-12) 5,000 mcg Subl, Place 1 tablet under the tongue once daily., Disp: 30 tablet, Rfl: 11    cycloSPORINE (RESTASIS) 0.05 % ophthalmic emulsion, Apply 1 drop to eye 2 (two) times daily., Disp: , Rfl:     fluticasone propionate (FLONASE) 50 mcg/actuation nasal spray, USE 1 SPRAY IN EACH NOSTRIL TWICE DAILY (Patient taking differently: 1 spray by Each Nostril route 2 (two) times a day.), Disp: 48 g, Rfl: 3    levothyroxine (SYNTHROID) 75 MCG tablet, Take 75 mcg by mouth before breakfast., Disp: , Rfl:     losartan (COZAAR)  50 MG tablet, Take 1 tablet (50 mg total) by mouth 2 (two) times daily., Disp: 180 tablet, Rfl: 3    memantine (NAMENDA) 10 MG Tab, Take 5 mg by mouth every evening. Dr Betancourt decreased dose from 10>5, Disp: , Rfl:     metoprolol succinate (TOPROL-XL) 50 MG 24 hr tablet, TAKE 1 TABLET EVERY DAY (Patient taking differently: Take 50 mg by mouth once daily.), Disp: 90 tablet, Rfl: 0    mirtazapine (REMERON) 7.5 MG Tab, TAKE 1 TABLET (7.5 MG TOTAL) BY MOUTH EVERY EVENING., Disp: 90 tablet, Rfl: 3    nitroGLYCERIN (NITROSTAT) 0.4 MG SL tablet, Place 0.4 mg under the tongue every 5 (five) minutes as needed., Disp: , Rfl:     pantoprazole (PROTONIX) 40 MG tablet, Take 40 mg by mouth once daily., Disp: , Rfl:     sotaloL (BETAPACE) 80 MG tablet, Take 80 mg by mouth 2 (two) times daily., Disp: , Rfl:     acetaminophen (TYLENOL ARTHRITIS PAIN ORAL), Take by mouth., Disp: , Rfl:     cloNIDine (CATAPRES) 0.1 MG tablet, Take 1 tablet (0.1 mg total) by mouth 2 (two) times daily as needed (SBP greater than 160)., Disp: 60 tablet, Rfl: 3      Scheduled Meds:   aspirin  325 mg Oral ED 1 Time     Continuous Infusions:  PRN Meds:.      Rosemary Bautista  [facility] Hospitalist  07/24/2022

## 2022-07-25 ENCOUNTER — CLINICAL SUPPORT (OUTPATIENT)
Dept: CARDIOLOGY | Facility: HOSPITAL | Age: 87
End: 2022-07-25
Payer: MEDICARE

## 2022-07-25 VITALS — BODY MASS INDEX: 29.64 KG/M2 | WEIGHT: 151 LBS | HEIGHT: 60 IN

## 2022-07-25 LAB
ANION GAP SERPL CALC-SCNC: 2 MMOL/L (ref 8–16)
BASOPHILS # BLD AUTO: 0.03 K/UL (ref 0–0.2)
BASOPHILS NFR BLD: 0.4 % (ref 0–1.9)
BUN SERPL-MCNC: 18 MG/DL (ref 8–23)
CALCIUM SERPL-MCNC: 8.4 MG/DL (ref 8.7–10.5)
CHLORIDE SERPL-SCNC: 110 MMOL/L (ref 95–110)
CHOLEST SERPL-MCNC: 160 MG/DL (ref 120–199)
CHOLEST/HDLC SERPL: 4 {RATIO} (ref 2–5)
CO2 SERPL-SCNC: 24 MMOL/L (ref 23–29)
CREAT SERPL-MCNC: 1.1 MG/DL (ref 0.5–1.4)
DIFFERENTIAL METHOD: ABNORMAL
EOSINOPHIL # BLD AUTO: 0.1 K/UL (ref 0–0.5)
EOSINOPHIL NFR BLD: 0.8 % (ref 0–8)
ERYTHROCYTE [DISTWIDTH] IN BLOOD BY AUTOMATED COUNT: 14 % (ref 11.5–14.5)
EST. GFR  (AFRICAN AMERICAN): >60 ML/MIN/1.73 M^2
EST. GFR  (NON AFRICAN AMERICAN): 59.6 ML/MIN/1.73 M^2
GLUCOSE SERPL-MCNC: 108 MG/DL (ref 70–110)
HCT VFR BLD AUTO: 39.5 % (ref 40–54)
HDLC SERPL-MCNC: 40 MG/DL (ref 40–75)
HDLC SERPL: 25 % (ref 20–50)
HGB BLD-MCNC: 13.3 G/DL (ref 14–18)
IMM GRANULOCYTES # BLD AUTO: 0.03 K/UL (ref 0–0.04)
IMM GRANULOCYTES NFR BLD AUTO: 0.4 % (ref 0–0.5)
LDLC SERPL CALC-MCNC: 93.6 MG/DL (ref 63–159)
LYMPHOCYTES # BLD AUTO: 2.5 K/UL (ref 1–4.8)
LYMPHOCYTES NFR BLD: 34.7 % (ref 18–48)
MAGNESIUM SERPL-MCNC: 1.8 MG/DL (ref 1.6–2.6)
MCH RBC QN AUTO: 32.8 PG (ref 27–31)
MCHC RBC AUTO-ENTMCNC: 33.7 G/DL (ref 32–36)
MCV RBC AUTO: 98 FL (ref 82–98)
MONOCYTES # BLD AUTO: 0.7 K/UL (ref 0.3–1)
MONOCYTES NFR BLD: 9.5 % (ref 4–15)
NEUTROPHILS # BLD AUTO: 4 K/UL (ref 1.8–7.7)
NEUTROPHILS NFR BLD: 54.2 % (ref 38–73)
NONHDLC SERPL-MCNC: 120 MG/DL
NRBC BLD-RTO: 0 /100 WBC
PLATELET # BLD AUTO: 188 K/UL (ref 150–450)
PMV BLD AUTO: 11.1 FL (ref 9.2–12.9)
POTASSIUM SERPL-SCNC: 3.7 MMOL/L (ref 3.5–5.1)
RBC # BLD AUTO: 4.05 M/UL (ref 4.6–6.2)
SODIUM SERPL-SCNC: 136 MMOL/L (ref 136–145)
TRIGL SERPL-MCNC: 132 MG/DL (ref 30–150)
TROPONIN I SERPL DL<=0.01 NG/ML-MCNC: <0.03 NG/ML
WBC # BLD AUTO: 7.29 K/UL (ref 3.9–12.7)

## 2022-07-25 PROCEDURE — 85025 COMPLETE CBC W/AUTO DIFF WBC: CPT | Performed by: NURSE PRACTITIONER

## 2022-07-25 PROCEDURE — 84484 ASSAY OF TROPONIN QUANT: CPT | Performed by: NURSE PRACTITIONER

## 2022-07-25 PROCEDURE — 80061 LIPID PANEL: CPT | Performed by: NURSE PRACTITIONER

## 2022-07-25 PROCEDURE — 80048 BASIC METABOLIC PNL TOTAL CA: CPT | Performed by: NURSE PRACTITIONER

## 2022-07-25 PROCEDURE — 25000003 PHARM REV CODE 250: Performed by: NURSE PRACTITIONER

## 2022-07-25 PROCEDURE — 83735 ASSAY OF MAGNESIUM: CPT | Performed by: NURSE PRACTITIONER

## 2022-07-25 PROCEDURE — 25000003 PHARM REV CODE 250: Performed by: STUDENT IN AN ORGANIZED HEALTH CARE EDUCATION/TRAINING PROGRAM

## 2022-07-25 PROCEDURE — G0378 HOSPITAL OBSERVATION PER HR: HCPCS | Mod: CS

## 2022-07-25 PROCEDURE — 96376 TX/PRO/DX INJ SAME DRUG ADON: CPT | Mod: 59

## 2022-07-25 PROCEDURE — 25000003 PHARM REV CODE 250

## 2022-07-25 PROCEDURE — 63600175 PHARM REV CODE 636 W HCPCS: Performed by: NURSE PRACTITIONER

## 2022-07-25 PROCEDURE — 36415 COLL VENOUS BLD VENIPUNCTURE: CPT | Performed by: NURSE PRACTITIONER

## 2022-07-25 PROCEDURE — 93306 TTE W/DOPPLER COMPLETE: CPT

## 2022-07-25 RX ORDER — LIDOCAINE HYDROCHLORIDE 20 MG/ML
10 SOLUTION OROPHARYNGEAL ONCE
Status: COMPLETED | OUTPATIENT
Start: 2022-07-25 | End: 2022-07-25

## 2022-07-25 RX ORDER — MAG HYDROX/ALUMINUM HYD/SIMETH 200-200-20
30 SUSPENSION, ORAL (FINAL DOSE FORM) ORAL ONCE
Status: COMPLETED | OUTPATIENT
Start: 2022-07-25 | End: 2022-07-25

## 2022-07-25 RX ORDER — METOPROLOL SUCCINATE 25 MG/1
25 TABLET, EXTENDED RELEASE ORAL DAILY
Status: DISCONTINUED | OUTPATIENT
Start: 2022-07-25 | End: 2022-07-26 | Stop reason: HOSPADM

## 2022-07-25 RX ORDER — AMLODIPINE BESYLATE 5 MG/1
5 TABLET ORAL DAILY
Status: DISCONTINUED | OUTPATIENT
Start: 2022-07-25 | End: 2022-07-26

## 2022-07-25 RX ADMIN — ALUMINA, MAGNESIA, AND SIMETHICONE ORAL SUSPENSION REGULAR STRENGTH 30 ML: 1200; 1200; 120 SUSPENSION ORAL at 10:07

## 2022-07-25 RX ADMIN — LOSARTAN POTASSIUM 50 MG: 50 TABLET, FILM COATED ORAL at 08:07

## 2022-07-25 RX ADMIN — CYANOCOBALAMIN TAB 1000 MCG 1000 MCG: 1000 TAB at 08:07

## 2022-07-25 RX ADMIN — PANTOPRAZOLE SODIUM 40 MG: 40 TABLET, DELAYED RELEASE ORAL at 06:07

## 2022-07-25 RX ADMIN — METOPROLOL SUCCINATE 25 MG: 25 TABLET, EXTENDED RELEASE ORAL at 08:07

## 2022-07-25 RX ADMIN — ATORVASTATIN CALCIUM 40 MG: 40 TABLET, FILM COATED ORAL at 08:07

## 2022-07-25 RX ADMIN — LEVOTHYROXINE SODIUM 75 MCG: 0.03 TABLET ORAL at 06:07

## 2022-07-25 RX ADMIN — AMLODIPINE BESYLATE 5 MG: 5 TABLET ORAL at 10:07

## 2022-07-25 RX ADMIN — POTASSIUM BICARBONATE 50 MEQ: 977.5 TABLET, EFFERVESCENT ORAL at 08:07

## 2022-07-25 RX ADMIN — ASPIRIN 81 MG CHEWABLE TABLET 81 MG: 81 TABLET CHEWABLE at 04:07

## 2022-07-25 RX ADMIN — FLUTICASONE PROPIONATE 50 MCG: 50 SPRAY, METERED NASAL at 08:07

## 2022-07-25 RX ADMIN — MIRTAZAPINE 7.5 MG: 7.5 TABLET, FILM COATED ORAL at 08:07

## 2022-07-25 RX ADMIN — FLUTICASONE PROPIONATE 50 MCG: 50 SPRAY, METERED NASAL at 09:07

## 2022-07-25 RX ADMIN — CETIRIZINE HYDROCHLORIDE 10 MG: 10 TABLET, FILM COATED ORAL at 08:07

## 2022-07-25 RX ADMIN — MEMANTINE 5 MG: 5 TABLET ORAL at 08:07

## 2022-07-25 RX ADMIN — HYDRALAZINE HYDROCHLORIDE 10 MG: 20 INJECTION INTRAMUSCULAR; INTRAVENOUS at 11:07

## 2022-07-25 RX ADMIN — CLOPIDOGREL BISULFATE 75 MG: 75 TABLET, FILM COATED ORAL at 08:07

## 2022-07-25 RX ADMIN — LIDOCAINE HYDROCHLORIDE 10 ML: 20 SOLUTION ORAL; TOPICAL at 10:07

## 2022-07-25 NOTE — ED NOTES
Pt reports anxiety and increased chest discomfort, onset 30 min ago. BP elevated, monitor showing trigeminy. Primary nurse notified.

## 2022-07-25 NOTE — PROGRESS NOTES
[facility]  Progress Note    Patient Name: Gene Hartman  MRN: [id number]  Patient Class: OP- Observation   Admission Date: 7/24/2022  Length of Stay: 0 days  Attending Physician: Leesa Bean MD  Primary Care Provider: Mariajose Thorne MD        Subjective:     Principal Problem:Syncope        HPI:  History was obtained from the patient and collateral obtained from the family present at the bedside and ER physician Sign-out. Patient is an 88 year old male with history as listed below including dementia and CAD who presents to the ED with the complaint of chest pain and syncope. The patient was at the table eating when he suddenly became sob and felt tightness in his chest. His daughter gave him a SL nitro and then he appeared to have grey color. The patient then passed out briefly and EMS was called. Upon arrival of EMS the patient was noted to be hypotensive. He was brought to the ED for further evaluation.   Patient denies fever, chills, cough, nausea, vomiting, abdominal pain, dysuria, hematuria, diarrhea, melena, visual changes, focal weakness, numbness or tingling.   In the ED patient is afebrile. VSS. He is currently chest pain free and denies sob.          Overview/Hospital Course:  7/25:  Patient doing well, no syncopial episodes noted while in the hospital, heart rate noted to be 57 this morning.  Would decrease Toprol and awaiting Cardiology recommendations concerning his medications        Review of Systems   Constitutional:  Negative for fever.   Respiratory:  Negative for chest tightness and shortness of breath.    Cardiovascular:  Negative for chest pain and palpitations.   Neurological:  Positive for syncope. Negative for weakness and numbness.   All other systems reviewed and are negative.  Objective:     Vital Signs (Most Recent):  Temp: 97.5 °F (36.4 °C) (07/25/22 1142)  Pulse: 78 (07/25/22 1142)  Resp: 18 (07/25/22 1142)  BP: (!) 174/79 (07/25/22  1142)  SpO2: 100 % (07/25/22 1142)   Vital Signs (24h Range):  Temp:  [97.4 °F (36.3 °C)-97.6 °F (36.4 °C)] 97.5 °F (36.4 °C)  Pulse:  [42-98] 78  Resp:  [17-22] 18  SpO2:  [95 %-100 %] 100 %  BP: (159-209)/() 174/79     Weight: 68.7 kg (151 lb 7.3 oz)  Body mass index is 29.58 kg/m².    Intake/Output Summary (Last 24 hours) at 7/25/2022 1236  Last data filed at 7/25/2022 0354  Gross per 24 hour   Intake 500 ml   Output 600 ml   Net -100 ml      Physical Exam  Vitals and nursing note reviewed.   HENT:      Head: Normocephalic.      Nose: Nose normal.      Mouth/Throat:      Mouth: Mucous membranes are moist.   Eyes:      Pupils: Pupils are equal, round, and reactive to light.   Cardiovascular:      Rate and Rhythm: Bradycardia present.   Pulmonary:      Effort: Pulmonary effort is normal.   Abdominal:      Palpations: Abdomen is soft.   Musculoskeletal:         General: Normal range of motion.      Cervical back: Normal range of motion.   Neurological:      General: No focal deficit present.      Mental Status: He is alert. Mental status is at baseline.   Psychiatric:         Mood and Affect: Mood normal.       Significant Labs: All pertinent labs within the past 24 hours have been reviewed.  BMP:   Recent Labs   Lab 07/25/22  0353         K 3.7      CO2 24   BUN 18   CREATININE 1.1   CALCIUM 8.4*   MG 1.8     CBC:   Recent Labs   Lab 07/24/22  1222 07/25/22  0353   WBC 3.98 7.29   HGB 11.8* 13.3*   HCT 35.3* 39.5*   * 188     Cardiac Markers:   Recent Labs   Lab 07/24/22  1222   *     Troponin:   Recent Labs   Lab 07/24/22  1638 07/24/22  2115 07/25/22  0353   TROPONINI <0.030 <0.030 <0.030       Significant Imaging: I have reviewed all pertinent imaging results/findings within the past 24 hours.  Imaging Results              CT Head Without Contrast (Final result)  Result time 07/24/22 16:44:14      Final result by Carlos Patel MD (07/24/22 16:44:14)                    Narrative:    CT HEAD WITHOUT CONTRAST    CMS MANDATED QUALITY DATA - CT RADIATION  436    All CT scans at this facility utilize dose modulation, iterative reconstruction, and/or weight based dosing when appropriate to reduce radiation dose to as low as reasonably achievable    Clinical data: Syncope. Comparison to November 2019.    FINDINGS: Noninfusion images were obtained from the skull base to the vertex. There is no intracranial mass, hemorrhage, or midline shift. Ventricles and sulci are moderate to markedly prominent. There are no pathologic extra-axial fluid collections.    There is no evidence of cortical ischemic change. Changes of moderate chronic microvascular white matter disease are noted. Small chronic lacunar infarcts are noted in the bilateral basal ganglia and corona radiata. Cerebellum and brainstem are normal. The calvarium is intact.    IMPRESSION:    1. No acute intracranial abnormalities. Chronic findings as discussed above.        Electronically signed by:  Carlos Patel MD  7/24/2022 4:44 PM CDT Workstation: [id number]                                     X-Ray Chest AP Portable (Final result)  Result time 07/24/22 12:46:21      Final result by Carlos Patel MD (07/24/22 12:46:21)                   Narrative:    Chest single view    CLINICAL DATA: Chest pain    FINDINGS: Comparison to November 2019. The heart is borderline enlarged. Pacemaker device and loop recorder are noted. There are no infiltrates or effusions. No acute osseous abnormality is identified.    IMPRESSION:  1. No acute radiographic abnormalities.  2. Borderline cardiomegaly with support devices in place as above.    Electronically signed by:  Carlos Patel MD  7/24/2022 12:46 PM CDT Workstation: [id number]                                        Assessment/Plan:      * Syncope  Secondary to bradycardia?  Interrogate pacemaker  Cardiology consulted      HTN (hypertension), benign  Uncontrolled  Continue home  medication of Toprol, amlodipine 5 mg daily and losartan 50 mg b.i.d.  Titrate upwards as needed  P.r.n. IV hydralazine      CAD (coronary artery disease)  Continue aspirin and Plavix      Hiatal hernia with GERD and esophagitis  A GI cocktail given  Continue PPI      Pacemaker; originally placed two years   Pending interrogation      Dementia without behavioral disturbance  Stable      VTE Risk Mitigation (From admission, onward)         Ordered     IP VTE HIGH RISK PATIENT  Once         07/24/22 2054     Place sequential compression device  Until discontinued         07/24/22 2054                Discharge Planning   PARDEEP: 7/26/2022     Code Status: Full Code   Is the patient medically ready for discharge?:     Reason for patient still in hospital (select all that apply): Consult recommendations  Discharge Plan A: Home, Home with family                  Leesa Bean MD  Department of Hospital Medicine   [facility]

## 2022-07-25 NOTE — PLAN OF CARE
07/25/22 1003   MARTINEZ Message   Medicare Outpatient and Observation Notification regarding financial responsibility Given to patient/caregiver;Explained to patient/caregiver;Signed/date by patient/caregiver   Date MARTINEZ was signed 07/25/22   Time MARTINEZ was signed 0920

## 2022-07-25 NOTE — ASSESSMENT & PLAN NOTE
Uncontrolled  Continue home medication of Toprol, amlodipine 5 mg daily and losartan 50 mg b.i.d.  Titrate upwards as needed  P.r.n. IV hydralazine

## 2022-07-25 NOTE — HOSPITAL COURSE
The patient is an 88-year-old gentleman with a history of CAD, hypertension, cardiac pacemaker.  He presented with chest discomfort and then received nitroglycerin and then had a syncopal event at home.  He has been totally asymptomatic since his arrival at the hospital.  We have been unable to interrogate his pacemaker device due to the device rep being out of town until August 1st.  Cardiology has evaluated the patient and recommend continuing his blood pressure medications.  He will continue his p.r.n. clonidine for elevated systolic blood pressures at home.  He is known to Dr. Betancourt and will follow-up with him as an outpatient and have stress test as an outpatient as well.

## 2022-07-25 NOTE — SUBJECTIVE & OBJECTIVE
Review of Systems   Constitutional:  Negative for fever.   Respiratory:  Negative for chest tightness and shortness of breath.    Cardiovascular:  Negative for chest pain and palpitations.   Neurological:  Positive for syncope. Negative for weakness and numbness.   All other systems reviewed and are negative.  Objective:     Vital Signs (Most Recent):  Temp: 97.5 °F (36.4 °C) (07/25/22 1142)  Pulse: 78 (07/25/22 1142)  Resp: 18 (07/25/22 1142)  BP: (!) 174/79 (07/25/22 1142)  SpO2: 100 % (07/25/22 1142)   Vital Signs (24h Range):  Temp:  [97.4 °F (36.3 °C)-97.6 °F (36.4 °C)] 97.5 °F (36.4 °C)  Pulse:  [42-98] 78  Resp:  [17-22] 18  SpO2:  [95 %-100 %] 100 %  BP: (159-209)/() 174/79     Weight: 68.7 kg (151 lb 7.3 oz)  Body mass index is 29.58 kg/m².    Intake/Output Summary (Last 24 hours) at 7/25/2022 1236  Last data filed at 7/25/2022 0354  Gross per 24 hour   Intake 500 ml   Output 600 ml   Net -100 ml      Physical Exam  Vitals and nursing note reviewed.   HENT:      Head: Normocephalic.      Nose: Nose normal.      Mouth/Throat:      Mouth: Mucous membranes are moist.   Eyes:      Pupils: Pupils are equal, round, and reactive to light.   Cardiovascular:      Rate and Rhythm: Bradycardia present.   Pulmonary:      Effort: Pulmonary effort is normal.   Abdominal:      Palpations: Abdomen is soft.   Musculoskeletal:         General: Normal range of motion.      Cervical back: Normal range of motion.   Neurological:      General: No focal deficit present.      Mental Status: He is alert. Mental status is at baseline.   Psychiatric:         Mood and Affect: Mood normal.       Significant Labs: All pertinent labs within the past 24 hours have been reviewed.  BMP:   Recent Labs   Lab 07/25/22  0353         K 3.7      CO2 24   BUN 18   CREATININE 1.1   CALCIUM 8.4*   MG 1.8     CBC:   Recent Labs   Lab 07/24/22  1222 07/25/22  0353   WBC 3.98 7.29   HGB 11.8* 13.3*   HCT 35.3* 39.5*   PLT  137* 188     Cardiac Markers:   Recent Labs   Lab 07/24/22  1222   *     Troponin:   Recent Labs   Lab 07/24/22  1638 07/24/22  2115 07/25/22  0353   TROPONINI <0.030 <0.030 <0.030       Significant Imaging: I have reviewed all pertinent imaging results/findings within the past 24 hours.  Imaging Results              CT Head Without Contrast (Final result)  Result time 07/24/22 16:44:14      Final result by Carlos Patel MD (07/24/22 16:44:14)                   Narrative:    CT HEAD WITHOUT CONTRAST    CMS MANDATED QUALITY DATA - CT RADIATION  436    All CT scans at this facility utilize dose modulation, iterative reconstruction, and/or weight based dosing when appropriate to reduce radiation dose to as low as reasonably achievable    Clinical data: Syncope. Comparison to November 2019.    FINDINGS: Noninfusion images were obtained from the skull base to the vertex. There is no intracranial mass, hemorrhage, or midline shift. Ventricles and sulci are moderate to markedly prominent. There are no pathologic extra-axial fluid collections.    There is no evidence of cortical ischemic change. Changes of moderate chronic microvascular white matter disease are noted. Small chronic lacunar infarcts are noted in the bilateral basal ganglia and corona radiata. Cerebellum and brainstem are normal. The calvarium is intact.    IMPRESSION:    1. No acute intracranial abnormalities. Chronic findings as discussed above.        Electronically signed by:  Carlos Patel MD  7/24/2022 4:44 PM CDT Workstation: [id number]                                     X-Ray Chest AP Portable (Final result)  Result time 07/24/22 12:46:21      Final result by Carlos Patel MD (07/24/22 12:46:21)                   Narrative:    Chest single view    CLINICAL DATA: Chest pain    FINDINGS: Comparison to November 2019. The heart is borderline enlarged. Pacemaker device and loop recorder are noted. There are no infiltrates or  effusions. No acute osseous abnormality is identified.    IMPRESSION:  1. No acute radiographic abnormalities.  2. Borderline cardiomegaly with support devices in place as above.    Electronically signed by:  Carlos Patel MD  7/24/2022 12:46 PM CDT Workstation: [id number]

## 2022-07-25 NOTE — CONSULTS
[facility]   Cardiology Note    Consult Requested By:hospital medicine  Reason for Consult: chest pain and syncope    SUBJECTIVE:     History of Present Illness: Patient is a 89 yo male with PMHx of hypertension, hyperlipidemia, dementia, s/p PM and CAD s/p Cx and RCA stents in 2015 who presented to the ED yesterday with complaints of chest pain and syncope. I spoke with patient's daughter and she states he was eating at the table and started complaining of a head ache/neck pain and shortness of breath. Shortly after he reported chest pain. She gave him 1 NTG and the patient passed out briefly at that time. She states he was out for about 1-2 minutes and then woke up. They called EMS, on arrival he was found to be hypotensive. He was brought tho the ED for further evaluation. In the ED his BP was 153/64, EKG showed Vpacing with fusion complexes and HR 82 bpm. Serial troponins negative. BNP 300s. CXR showed no acute cardiopulmonary process, mild cardiomegaly present. CT head showed no acute findings. Last echo was in 2019 with EF 60%. His last stress test was in 2/2022 which was negative for ischemia. He states he had a PM interrogation over 1 month ago. This morning he states he is feeling better. Denies SOB, CP, LE edema, dizziness, palpitations.     Review of patient's allergies indicates:   Allergen Reactions    Iodinated contrast media Rash    Pontocaine [tetracaine hcl] Anaphylaxis     hypotension       Past Medical History:   Diagnosis Date    Abnormal echocardiogram 11/21/2019    Normal left ventricular systolic function with estimated ejection fraction of 60%.  Grade 2 moderate left ventricular diastolic dysfunction consistent with pseudonormalization.  Mild left atrial enlargement.  Mild aortic regurgitation.  Mild to moderate mitral regurgitation.  Mild tricuspid regurgitation.  Estimated PA systolic pressure is 38 mm of mercury.  Diagnosis is syncope.    Anxiety     Arthritis     CAD  (coronary artery disease) age 68    Carotid artery occlusion     Cerebrovascular small vessel disease     Colon polyps age 78    Coronary artery disease of native artery of native heart with stable angina pectoris 5/6/2020    GERD (gastroesophageal reflux disease)     H. pylori infection     HTN (hypertension), benign age 65    Hyperlipidemia LDL goal <70 age 65    Hypothyroidism     Multiple fractures of ribs of right side 11/27/2012    Myocardial infarction     Pernicious anemia 1/26/2018    Primary insomnia 10/9/2018    Prostate cancer age 67    Syncopal episodes 3/2013    Urge incontinence 5/8/2018     Past Surgical History:   Procedure Laterality Date    CARDIAC PACEMAKER PLACEMENT  10/2015    CARDIAC SURGERY      CATARACT EXTRACTION W/  INTRAOCULAR LENS IMPLANT Bilateral     COLONOSCOPY  ~2013    Dr. Edge    COLONOSCOPY N/A 6/8/2016    Procedure: COLONOSCOPY;  Surgeon: Shamar Barahona MD;  Location: [facility];  Service: Endoscopy;  Laterality: N/A; REPEAT IN 1 YEAR    CORONARY ANGIOPLASTY WITH STENT PLACEMENT  2003    x 2 RCA    PROSTATECTOMY  2002    UPPER GASTROINTESTINAL ENDOSCOPY  11/15/2016    Dr. Barahona     Family History   Problem Relation Age of Onset    Migraines Mother     Heart failure Father     Heart disease Father 56        MI    Heart failure Brother     Heart disease Brother     Diabetes Son     Hypertension Son     Heart disease Brother     Mental illness Brother     No Known Problems Son     Colon cancer Neg Hx     Colon polyps Neg Hx     Crohn's disease Neg Hx     Ulcerative colitis Neg Hx     Stomach cancer Neg Hx     Esophageal cancer Neg Hx      Social History     Tobacco Use    Smoking status: Never Smoker    Smokeless tobacco: Never Used   Substance Use Topics    Alcohol use: No    Drug use: No       Review of Systems:  Review of Systems   Constitutional: Negative for diaphoresis and malaise/fatigue.   Respiratory: Positive for shortness  of breath.    Cardiovascular: Positive for chest pain and palpitations. Negative for leg swelling.   Gastrointestinal: Negative for nausea and vomiting.   Neurological: Positive for loss of consciousness and headaches. Negative for dizziness.   All other systems reviewed and are negative.      OBJECTIVE:     Vital Signs (Most Recent)  Temp: 97.4 °F (36.3 °C) (07/25/22 0743)  Pulse: (!) 57 (07/25/22 0743)  Resp: 18 (07/25/22 0743)  BP: (!) 181/89 (07/25/22 0743)  SpO2: 98 % (07/25/22 0743)    Vital Signs Range (Last 24H):  Temp:  [97.4 °F (36.3 °C)-97.8 °F (36.6 °C)]   Pulse:  [42-98]   Resp:  [17-22]   BP: (153-209)/()   SpO2:  [95 %-100 %]     I & O (Last 24H):    Intake/Output Summary (Last 24 hours) at 7/25/2022 0911  Last data filed at 7/25/2022 0354  Gross per 24 hour   Intake 500 ml   Output 600 ml   Net -100 ml       Current Diet:     Current Diet Order   Procedures    Diet Cardiac        Allergies:  Review of patient's allergies indicates:   Allergen Reactions    Iodinated contrast media Rash    Pontocaine [tetracaine hcl] Anaphylaxis     hypotension       Meds:  Scheduled Meds:   aluminum-magnesium hydroxide-simethicone  30 mL Oral Once    And    LIDOcaine HCl 2%  10 mL Oral Once    aspirin  81 mg Oral Daily    atorvastatin  40 mg Oral Daily    cetirizine  10 mg Oral Daily    clopidogreL  75 mg Oral Daily    cyanocobalamin  1,000 mcg Oral Daily    fluticasone propionate  1 spray Each Nostril BID    levothyroxine  75 mcg Oral Before breakfast    losartan  50 mg Oral BID    memantine  5 mg Oral QHS    metoprolol succinate  25 mg Oral Daily    mirtazapine  7.5 mg Oral QHS    pantoprazole  40 mg Oral Before breakfast     Continuous Infusions:  PRN Meds:acetaminophen, carboxymethylcellulose, hydrALAZINE, loperamide, magnesium oxide, magnesium oxide, nitroGLYCERIN, ondansetron, potassium bicarbonate, potassium bicarbonate, potassium bicarbonate, potassium, sodium phosphates, potassium,  sodium phosphates, potassium, sodium phosphates, senna-docusate 8.6-50 mg, sodium chloride 0.9%    Oxygen/Ventilator Data (Last 24H):  (if applicable)        Hemodynamic Parameters (Last 24H):   (if applicable)        Laboratory and Radiology Data:  Recent Results (from the past 24 hour(s))   CBC auto differential    Collection Time: 07/24/22 12:22 PM   Result Value Ref Range    WBC 3.98 3.90 - 12.70 K/uL    RBC 3.63 (L) 4.60 - 6.20 M/uL    Hemoglobin 11.8 (L) 14.0 - 18.0 g/dL    Hematocrit 35.3 (L) 40.0 - 54.0 %    MCV 97 82 - 98 fL    MCH 32.5 (H) 27.0 - 31.0 pg    MCHC 33.4 32.0 - 36.0 g/dL    RDW 14.1 11.5 - 14.5 %    Platelets 137 (L) 150 - 450 K/uL    MPV 10.8 9.2 - 12.9 fL    Immature Granulocytes 0.3 0.0 - 0.5 %    Gran # (ANC) 2.4 1.8 - 7.7 K/uL    Immature Grans (Abs) 0.01 0.00 - 0.04 K/uL    Lymph # 1.3 1.0 - 4.8 K/uL    Mono # 0.3 0.3 - 1.0 K/uL    Eos # 0.1 0.0 - 0.5 K/uL    Baso # 0.03 0.00 - 0.20 K/uL    nRBC 0 0 /100 WBC    Gran % 59.4 38.0 - 73.0 %    Lymph % 31.4 18.0 - 48.0 %    Mono % 6.8 4.0 - 15.0 %    Eosinophil % 1.3 0.0 - 8.0 %    Basophil % 0.8 0.0 - 1.9 %    Differential Method Automated    Comprehensive metabolic panel    Collection Time: 07/24/22 12:22 PM   Result Value Ref Range    Sodium 138 136 - 145 mmol/L    Potassium 4.1 3.5 - 5.1 mmol/L    Chloride 109 95 - 110 mmol/L    CO2 21 (L) 23 - 29 mmol/L    Glucose 143 (H) 70 - 110 mg/dL    BUN 18 8 - 23 mg/dL    Creatinine 1.1 0.5 - 1.4 mg/dL    Calcium 8.2 (L) 8.7 - 10.5 mg/dL    Total Protein 5.9 (L) 6.0 - 8.4 g/dL    Albumin 3.2 (L) 3.5 - 5.2 g/dL    Total Bilirubin 0.7 0.1 - 1.0 mg/dL    Alkaline Phosphatase 65 55 - 135 U/L    AST 21 10 - 40 U/L    ALT 18 10 - 44 U/L    Anion Gap 8 8 - 16 mmol/L    eGFR if African American >60.0 >60 mL/min/1.73 m^2    eGFR if non  59.6 (A) >60 mL/min/1.73 m^2   Troponin I #1    Collection Time: 07/24/22 12:22 PM   Result Value Ref Range    Troponin I <0.030 <=0.040 ng/mL   B-Type  natriuretic peptide (BNP)    Collection Time: 07/24/22 12:22 PM   Result Value Ref Range     (H) 0 - 99 pg/mL   COVID-19 Rapid Screening    Collection Time: 07/24/22 12:22 PM   Result Value Ref Range    SARS-CoV-2 RNA, Amplification, Qual Negative Negative   Troponin I #2    Collection Time: 07/24/22  4:38 PM   Result Value Ref Range    Troponin I <0.030 <=0.040 ng/mL   TSH    Collection Time: 07/24/22  4:38 PM   Result Value Ref Range    TSH 2.730 0.340 - 5.600 uIU/mL   Magnesium    Collection Time: 07/24/22  4:38 PM   Result Value Ref Range    Magnesium 1.9 1.6 - 2.6 mg/dL   Troponin I    Collection Time: 07/24/22  9:15 PM   Result Value Ref Range    Troponin I <0.030 <=0.040 ng/mL   Troponin I    Collection Time: 07/25/22  3:53 AM   Result Value Ref Range    Troponin I <0.030 <=0.040 ng/mL   Basic metabolic panel    Collection Time: 07/25/22  3:53 AM   Result Value Ref Range    Sodium 136 136 - 145 mmol/L    Potassium 3.7 3.5 - 5.1 mmol/L    Chloride 110 95 - 110 mmol/L    CO2 24 23 - 29 mmol/L    Glucose 108 70 - 110 mg/dL    BUN 18 8 - 23 mg/dL    Creatinine 1.1 0.5 - 1.4 mg/dL    Calcium 8.4 (L) 8.7 - 10.5 mg/dL    Anion Gap 2 (L) 8 - 16 mmol/L    eGFR if African American >60.0 >60 mL/min/1.73 m^2    eGFR if non  59.6 (A) >60 mL/min/1.73 m^2   Lipid panel    Collection Time: 07/25/22  3:53 AM   Result Value Ref Range    Cholesterol 160 120 - 199 mg/dL    Triglycerides 132 30 - 150 mg/dL    HDL 40 40 - 75 mg/dL    LDL Cholesterol 93.6 63.0 - 159.0 mg/dL    HDL/Cholesterol Ratio 25.0 20.0 - 50.0 %    Total Cholesterol/HDL Ratio 4.0 2.0 - 5.0    Non-HDL Cholesterol 120 mg/dL   Magnesium    Collection Time: 07/25/22  3:53 AM   Result Value Ref Range    Magnesium 1.8 1.6 - 2.6 mg/dL   CBC auto differential    Collection Time: 07/25/22  3:53 AM   Result Value Ref Range    WBC 7.29 3.90 - 12.70 K/uL    RBC 4.05 (L) 4.60 - 6.20 M/uL    Hemoglobin 13.3 (L) 14.0 - 18.0 g/dL    Hematocrit 39.5 (L)  40.0 - 54.0 %    MCV 98 82 - 98 fL    MCH 32.8 (H) 27.0 - 31.0 pg    MCHC 33.7 32.0 - 36.0 g/dL    RDW 14.0 11.5 - 14.5 %    Platelets 188 150 - 450 K/uL    MPV 11.1 9.2 - 12.9 fL    Immature Granulocytes 0.4 0.0 - 0.5 %    Gran # (ANC) 4.0 1.8 - 7.7 K/uL    Immature Grans (Abs) 0.03 0.00 - 0.04 K/uL    Lymph # 2.5 1.0 - 4.8 K/uL    Mono # 0.7 0.3 - 1.0 K/uL    Eos # 0.1 0.0 - 0.5 K/uL    Baso # 0.03 0.00 - 0.20 K/uL    nRBC 0 0 /100 WBC    Gran % 54.2 38.0 - 73.0 %    Lymph % 34.7 18.0 - 48.0 %    Mono % 9.5 4.0 - 15.0 %    Eosinophil % 0.8 0.0 - 8.0 %    Basophil % 0.4 0.0 - 1.9 %    Differential Method Automated      Imaging Results          CT Head Without Contrast (Final result)  Result time 07/24/22 16:44:14    Final result by Carlos Patel MD (07/24/22 16:44:14)                 Narrative:    CT HEAD WITHOUT CONTRAST    CMS MANDATED QUALITY DATA - CT RADIATION  436    All CT scans at this facility utilize dose modulation, iterative reconstruction, and/or weight based dosing when appropriate to reduce radiation dose to as low as reasonably achievable    Clinical data: Syncope. Comparison to November 2019.    FINDINGS: Noninfusion images were obtained from the skull base to the vertex. There is no intracranial mass, hemorrhage, or midline shift. Ventricles and sulci are moderate to markedly prominent. There are no pathologic extra-axial fluid collections.    There is no evidence of cortical ischemic change. Changes of moderate chronic microvascular white matter disease are noted. Small chronic lacunar infarcts are noted in the bilateral basal ganglia and corona radiata. Cerebellum and brainstem are normal. The calvarium is intact.    IMPRESSION:    1. No acute intracranial abnormalities. Chronic findings as discussed above.        Electronically signed by:  Carlos Patel MD  7/24/2022 4:44 PM CDT Workstation: [id number]                             X-Ray Chest AP Portable (Final result)  Result time  07/24/22 12:46:21    Final result by Carlos Patel MD (07/24/22 12:46:21)                 Narrative:    Chest single view    CLINICAL DATA: Chest pain    FINDINGS: Comparison to November 2019. The heart is borderline enlarged. Pacemaker device and loop recorder are noted. There are no infiltrates or effusions. No acute osseous abnormality is identified.    IMPRESSION:  1. No acute radiographic abnormalities.  2. Borderline cardiomegaly with support devices in place as above.    Electronically signed by:  Carlos Patel MD  7/24/2022 12:46 PM CDT Workstation: [id number]                              12-lead EKG interpretation:  (if applicable)      Current Cardiac Rhythm:   (if applicable)    Physical Exam:   Physical Exam  Vitals and nursing note reviewed.   Constitutional:       General: He is not in acute distress.     Appearance: Normal appearance. He is not ill-appearing.   Cardiovascular:      Rate and Rhythm: Normal rate.      Heart sounds: Normal heart sounds. No murmur heard.  Pulmonary:      Effort: Pulmonary effort is normal. No respiratory distress.      Breath sounds: Normal breath sounds.   Musculoskeletal:      Right lower leg: No edema.      Left lower leg: No edema.   Skin:     General: Skin is warm and dry.      Findings: No rash.   Neurological:      Mental Status: He is alert and oriented to person, place, and time.         ASSESSMENT/PLAN:   Assessment:   Syncope - patient was taking metoprolol and sotalol, sotalol on hold due to concern for bradycardia causing syncope. It is possible syncope was a result of hypotension after taking nitroglycerin.   Atypical Chest Pain - troponins negative, EKG showed Vpacing with PVCs.   CAD s/p Cx and RCA stent 2015 - Last stress test was in February of 2022 which was negative for ischemia.   Hypertension - BP elevated, patient states he is very anxious  Hyperlipidemia  S/p PM     Plan:   Order echo.  Pacemker Interrogation. If ventricular arrhythmias  are present on interrogation will move forward with an angiogram.   [facility].   Monitor Tele.   Sotalol on hold due to concern for bradycardia. Due to presence of pacemaker he can take both Toprol and sotalol. Tele reviewed, no episodes of significant bradycardia recorded. Can restart sotalol after PM interrogation if all is stable.   Continue ASA/statin/plavix/Toprol/ARB.  NTG PRN for chest pain.   BP is elevated, ranging from 150-180s systolic. Add amlodipine 5 mg daily.   Discussed patient with Dr. Kenny.     Thank you for your consult.

## 2022-07-25 NOTE — PLAN OF CARE
[facility]  Initial Discharge Assessment       Primary Care Provider: Mariajose Thorne MD    Admission Diagnosis: Syncope [R55]    Admission Date: 7/24/2022  Expected Discharge Date:     Discharge Barriers Identified: (P) None    Payor: [employer] MANAGED MEDICARE / Plan: [employer] MEDICARE HMO / Product Type: Capitation /     Extended Emergency Contact Information  Primary Emergency Contact: Gene Hartman Jr  Address: [address] [place] [id number] [place]  Mobile Phone: [phone]  Relation: Son  Preferred language: English   needed? No  Secondary Emergency Contact: Jeffery Hartman  Address: [place]   [place]  Mobile Phone: [phone]  Relation: Son  Preferred language: English   needed? No    Discharge Plan A: (P) Home, Home with family  Discharge Plan B: (P) Home, Home with family      [facility] (Now [facility]) - [place], [place] - [address]  [address] [facility] [address]  Phone: [phone] Fax: [phone]    [id number] [place], [place] - [address] AT [facility] [address]  [address]  [facility] [address]  Phone: [phone] Fax: [phone]      Initial Assessment (most recent)       Adult Discharge Assessment - 07/25/22 1001          Discharge Assessment    Assessment Type Discharge Planning Assessment (P)      Confirmed/corrected address, phone number and insurance Yes (P)      Confirmed Demographics Correct on Facesheet (P)      Source of Information family (P)      When was your last doctors appointment? 07/21/22 (P)      Does patient/caregiver understand observation status Yes (P)      Communicated PARDEEP with patient/caregiver No (P)      Reason For Admission syncope (P)      Lives With spouse;child(ant), adult (P)      Facility Arrived From: home (P)      Do you expect to return to your current living situation? Yes (P)       Do you have help at home or someone to help you manage your care at home? Yes (P)      Who are your caregiver(s) and their phone number(s)? Gene Hartman Jr (Son)   [phone] (Mobile) (P)      Prior to hospitilization cognitive status: Not Oriented to Place;Not Oriented to Time (P)      Current cognitive status: Not Oriented to Place;Not Oriented to Time (P)      Walking or Climbing Stairs Difficulty ambulation difficulty, requires equipment (P)      Mobility Management rollator (P)      Dressing/Bathing Difficulty none (P)      Equipment Currently Used at Home rollator (P)      Readmission within 30 days? No (P)      Patient currently being followed by outpatient case management? No (P)      Do you currently have service(s) that help you manage your care at home? No (P)      Do you take prescription medications? Yes (P)      Do you have prescription coverage? Yes (P)      Coverage [employer] (P)      Do you have any problems affording any of your prescribed medications? No (P)      Is the patient taking medications as prescribed? yes (P)      Who is going to help you get home at discharge? Gene Hartman Jr (Son)   [phone] (Mobile) (P)      How do you get to doctors appointments? family or friend will provide (P)      Are you on dialysis? No (P)      Do you take coumadin? No (P)      Discharge Plan A Home;Home with family (P)      Discharge Plan B Home;Home with family (P)      DME Needed Upon Discharge  none (P)      Discharge Plan discussed with: Adult children (P)      Discharge Barriers Identified None (P)         Relationship/Environment    Name(s) of Who Lives With Patient Gene Hartman Jr (Son)   [phone] (Mobile) (P)

## 2022-07-25 NOTE — HPI
History was obtained from the patient and collateral obtained from the family present at the bedside and ER physician Sign-out. Patient is an 88 year old male with history as listed below including dementia and CAD who presents to the ED with the complaint of chest pain and syncope. The patient was at the table eating when he suddenly became sob and felt tightness in his chest. His daughter gave him a SL nitro and then he appeared to have grey color. The patient then passed out briefly and EMS was called. Upon arrival of EMS the patient was noted to be hypotensive. He was brought to the ED for further evaluation.   Patient denies fever, chills, cough, nausea, vomiting, abdominal pain, dysuria, hematuria, diarrhea, melena, visual changes, focal weakness, numbness or tingling.   In the ED patient is afebrile. VSS. He is currently chest pain free and denies sob.

## 2022-07-25 NOTE — PROGRESS NOTES
Will avoid angiography based on age. Neg stress test few mo ago. Likely vasovagal. But need pacer intorrigation to r/o ventric arrhythmias as etiology. Cath on if VT/VF. Will follow

## 2022-07-26 VITALS
SYSTOLIC BLOOD PRESSURE: 165 MMHG | RESPIRATION RATE: 18 BRPM | HEART RATE: 75 BPM | DIASTOLIC BLOOD PRESSURE: 81 MMHG | OXYGEN SATURATION: 99 % | WEIGHT: 151 LBS | TEMPERATURE: 97 F | BODY MASS INDEX: 29.64 KG/M2 | HEIGHT: 60 IN

## 2022-07-26 DIAGNOSIS — I10 HTN (HYPERTENSION), BENIGN: ICD-10-CM

## 2022-07-26 LAB
ANION GAP SERPL CALC-SCNC: 4 MMOL/L (ref 8–16)
AV INDEX (PROSTH): 0.52
AV MEAN GRADIENT: 10 MMHG
AV VALVE AREA: 1.68 CM2
BSA FOR ECHO PROCEDURE: 1.7 M2
BUN SERPL-MCNC: 18 MG/DL (ref 8–23)
CALCIUM SERPL-MCNC: 8.5 MG/DL (ref 8.7–10.5)
CHLORIDE SERPL-SCNC: 112 MMOL/L (ref 95–110)
CO2 SERPL-SCNC: 24 MMOL/L (ref 23–29)
CREAT SERPL-MCNC: 1 MG/DL (ref 0.5–1.4)
CV ECHO LV RWT: 0.27 CM
DOP CALC AO VTI: 46.56 CM
DOP CALC LVOT AREA: 3.2 CM2
DOP CALC LVOT DIAMETER: 2.02 CM
DOP CALC LVOT PEAK VEL: 92.89 M/S
DOP CALC LVOT STROKE VOLUME: 78.19 CM3
DOP CALCLVOT PEAK VEL VTI: 24.41 CM
E WAVE DECELERATION TIME: 250.73 MSEC
E/A RATIO: 0.48
E/E' RATIO: 9.2 M/S
ECHO LV POSTERIOR WALL: 0.8 CM (ref 0.6–1.1)
EJECTION FRACTION: 50 %
ERYTHROCYTE [DISTWIDTH] IN BLOOD BY AUTOMATED COUNT: 14.3 % (ref 11.5–14.5)
EST. GFR  (AFRICAN AMERICAN): >60 ML/MIN/1.73 M^2
EST. GFR  (NON AFRICAN AMERICAN): >60 ML/MIN/1.73 M^2
FRACTIONAL SHORTENING: 31 % (ref 28–44)
GLUCOSE SERPL-MCNC: 107 MG/DL (ref 70–110)
HCT VFR BLD AUTO: 36.5 % (ref 40–54)
HGB BLD-MCNC: 12.7 G/DL (ref 14–18)
INTERVENTRICULAR SEPTUM: 0.82 CM (ref 0.6–1.1)
IVRT: 116.03 MSEC
LEFT INTERNAL DIMENSION IN SYSTOLE: 4.05 CM (ref 2.1–4)
LEFT VENTRICLE DIASTOLIC VOLUME INDEX: 119.67 ML/M2
LEFT VENTRICLE DIASTOLIC VOLUME: 198.65 ML
LEFT VENTRICLE MASS INDEX: 108 G/M2
LEFT VENTRICLE SYSTOLIC VOLUME INDEX: 39.9 ML/M2
LEFT VENTRICLE SYSTOLIC VOLUME: 66.22 ML
LEFT VENTRICULAR INTERNAL DIMENSION IN DIASTOLE: 5.83 CM (ref 3.5–6)
LEFT VENTRICULAR MASS: 179.76 G
LV LATERAL E/E' RATIO: 9.2 M/S
LV SEPTAL E/E' RATIO: 9.2 M/S
MAGNESIUM SERPL-MCNC: 1.8 MG/DL (ref 1.6–2.6)
MCH RBC QN AUTO: 32.7 PG (ref 27–31)
MCHC RBC AUTO-ENTMCNC: 34.8 G/DL (ref 32–36)
MCV RBC AUTO: 94 FL (ref 82–98)
MV PEAK A VEL: 0.95 M/S
MV PEAK E VEL: 0.46 M/S
PISA MRMAX VEL: 369.11 M/S
PLATELET # BLD AUTO: 179 K/UL (ref 150–450)
PMV BLD AUTO: 11 FL (ref 9.2–12.9)
POTASSIUM SERPL-SCNC: 3.8 MMOL/L (ref 3.5–5.1)
PULM VEIN S/D RATIO: 0.92
PV PEAK D VEL: 54.76 M/S
PV PEAK S VEL: 50.19 M/S
RBC # BLD AUTO: 3.88 M/UL (ref 4.6–6.2)
RIGHT VENTRICULAR END-DIASTOLIC DIMENSION: 2.25 CM
SODIUM SERPL-SCNC: 140 MMOL/L (ref 136–145)
TDI LATERAL: 0.05 M/S
TDI SEPTAL: 0.05 M/S
TDI: 0.05 M/S
TRICUSPID ANNULAR PLANE SYSTOLIC EXCURSION: 2.35 CM
WBC # BLD AUTO: 7.04 K/UL (ref 3.9–12.7)

## 2022-07-26 PROCEDURE — 96375 TX/PRO/DX INJ NEW DRUG ADDON: CPT

## 2022-07-26 PROCEDURE — 25000003 PHARM REV CODE 250

## 2022-07-26 PROCEDURE — 36415 COLL VENOUS BLD VENIPUNCTURE: CPT | Performed by: STUDENT IN AN ORGANIZED HEALTH CARE EDUCATION/TRAINING PROGRAM

## 2022-07-26 PROCEDURE — 80048 BASIC METABOLIC PNL TOTAL CA: CPT | Performed by: STUDENT IN AN ORGANIZED HEALTH CARE EDUCATION/TRAINING PROGRAM

## 2022-07-26 PROCEDURE — G0378 HOSPITAL OBSERVATION PER HR: HCPCS

## 2022-07-26 PROCEDURE — 83735 ASSAY OF MAGNESIUM: CPT | Performed by: STUDENT IN AN ORGANIZED HEALTH CARE EDUCATION/TRAINING PROGRAM

## 2022-07-26 PROCEDURE — 25000003 PHARM REV CODE 250: Performed by: INTERNAL MEDICINE

## 2022-07-26 PROCEDURE — 25000003 PHARM REV CODE 250: Performed by: NURSE PRACTITIONER

## 2022-07-26 PROCEDURE — 85027 COMPLETE CBC AUTOMATED: CPT | Performed by: STUDENT IN AN ORGANIZED HEALTH CARE EDUCATION/TRAINING PROGRAM

## 2022-07-26 PROCEDURE — 25000003 PHARM REV CODE 250: Performed by: STUDENT IN AN ORGANIZED HEALTH CARE EDUCATION/TRAINING PROGRAM

## 2022-07-26 RX ORDER — AMLODIPINE BESYLATE 5 MG/1
10 TABLET ORAL DAILY
Status: DISCONTINUED | OUTPATIENT
Start: 2022-07-27 | End: 2022-07-26 | Stop reason: HOSPADM

## 2022-07-26 RX ORDER — HYDRALAZINE HYDROCHLORIDE 25 MG/1
25 TABLET, FILM COATED ORAL 2 TIMES DAILY PRN
Status: DISCONTINUED | OUTPATIENT
Start: 2022-07-26 | End: 2022-07-26 | Stop reason: HOSPADM

## 2022-07-26 RX ORDER — AMLODIPINE BESYLATE 10 MG/1
10 TABLET ORAL DAILY
Qty: 90 TABLET | Refills: 0 | Status: ON HOLD | OUTPATIENT
Start: 2022-07-27 | End: 2022-09-07 | Stop reason: HOSPADM

## 2022-07-26 RX ORDER — LABETALOL HYDROCHLORIDE 5 MG/ML
10 INJECTION, SOLUTION INTRAVENOUS EVERY 6 HOURS PRN
Status: DISCONTINUED | OUTPATIENT
Start: 2022-07-26 | End: 2022-07-26 | Stop reason: HOSPADM

## 2022-07-26 RX ADMIN — LABETALOL HYDROCHLORIDE 10 MG: 5 INJECTION, SOLUTION INTRAVENOUS at 01:07

## 2022-07-26 RX ADMIN — ATORVASTATIN CALCIUM 40 MG: 40 TABLET, FILM COATED ORAL at 08:07

## 2022-07-26 RX ADMIN — POTASSIUM BICARBONATE 50 MEQ: 977.5 TABLET, EFFERVESCENT ORAL at 09:07

## 2022-07-26 RX ADMIN — ACETAMINOPHEN 650 MG: 325 TABLET ORAL at 09:07

## 2022-07-26 RX ADMIN — CETIRIZINE HYDROCHLORIDE 10 MG: 10 TABLET, FILM COATED ORAL at 08:07

## 2022-07-26 RX ADMIN — CLOPIDOGREL BISULFATE 75 MG: 75 TABLET, FILM COATED ORAL at 08:07

## 2022-07-26 RX ADMIN — FLUTICASONE PROPIONATE 50 MCG: 50 SPRAY, METERED NASAL at 08:07

## 2022-07-26 RX ADMIN — PANTOPRAZOLE SODIUM 40 MG: 40 TABLET, DELAYED RELEASE ORAL at 06:07

## 2022-07-26 RX ADMIN — LEVOTHYROXINE SODIUM 75 MCG: 0.03 TABLET ORAL at 06:07

## 2022-07-26 RX ADMIN — METOPROLOL SUCCINATE 25 MG: 25 TABLET, EXTENDED RELEASE ORAL at 08:07

## 2022-07-26 RX ADMIN — LOSARTAN POTASSIUM 50 MG: 50 TABLET, FILM COATED ORAL at 08:07

## 2022-07-26 RX ADMIN — AMLODIPINE BESYLATE 5 MG: 5 TABLET ORAL at 08:07

## 2022-07-26 RX ADMIN — CYANOCOBALAMIN TAB 1000 MCG 1000 MCG: 1000 TAB at 08:07

## 2022-07-26 NOTE — PROGRESS NOTES
[facility]    Cardiology Progress Note    Subjective:  Pt seen and examined this morning.   Yesterday patient's BP elevated to 180/86, he was given IV hydralazine and IV labetalol and BP improved to 118/55. This morning his BP is 164/101. I spoke with patient and family and they state he has been very agitated and anxious while being in the hospital.   He denies chest pain or shortness of breath this morning. He was experiencing acid reflux yesterday, states he was given a GI cocktail and the symptoms resolved.   We are unable to have his PM interrogated because the [facility] reps are out of town. Tele reviewed, shows SR with occasional PVCs.   Labs reviewed.   Echo pending.       Objective:  Vital Signs (Most Recent)  Temp: 98.2 °F (36.8 °C) (07/26/22 0735)  Pulse: 89 (07/26/22 0914)  Resp: 18 (07/26/22 0735)  BP: (!) 164/101 (07/26/22 0914)  SpO2: 100 % (07/26/22 0914)    Vital Signs Range (Last 24H):  Temp:  [96.2 °F (35.7 °C)-98.2 °F (36.8 °C)]   Pulse:  [62-90]   Resp:  [17-18]   BP: (118-187)/()   SpO2:  [96 %-100 %]     I & O (Last 24H):    Intake/Output Summary (Last 24 hours) at 7/26/2022 1021  Last data filed at 7/26/2022 0600  Gross per 24 hour   Intake 760 ml   Output 500 ml   Net 260 ml       Current Diet:     Current Diet Order   Procedures    Diet Cardiac        Allergies:  Review of patient's allergies indicates:   Allergen Reactions    Iodinated contrast media Rash    Pontocaine [tetracaine hcl] Anaphylaxis     hypotension       Meds:  Scheduled Meds:   [START ON 7/27/2022] amLODIPine  10 mg Oral Daily    aspirin  81 mg Oral Daily    atorvastatin  40 mg Oral Daily    cetirizine  10 mg Oral Daily    clopidogreL  75 mg Oral Daily    cyanocobalamin  1,000 mcg Oral Daily    fluticasone propionate  1 spray Each Nostril BID    levothyroxine  75 mcg Oral Before breakfast    losartan  50 mg Oral BID    memantine  5 mg Oral QHS    metoprolol succinate  25 mg Oral Daily     mirtazapine  7.5 mg Oral QHS    pantoprazole  40 mg Oral Before breakfast     Continuous Infusions:  PRN Meds:acetaminophen, carboxymethylcellulose, hydrALAZINE, labetalol, loperamide, magnesium oxide, magnesium oxide, nitroGLYCERIN, ondansetron, potassium bicarbonate, potassium bicarbonate, potassium bicarbonate, potassium, sodium phosphates, potassium, sodium phosphates, potassium, sodium phosphates, senna-docusate 8.6-50 mg, sodium chloride 0.9%    Lab Results :  Recent Results (from the past 24 hour(s))   Echo    Collection Time: 07/25/22  3:09 PM   Result Value Ref Range    LVIDd 5.83 3.5 - 6.0 cm    IVS 0.82 0.6 - 1.1 cm    Posterior Wall 0.80 0.6 - 1.1 cm    LVIDs 4.05 (A) 2.1 - 4.0 cm    RVDD 2.25 cm    AV mean gradient 10 mmHg    TDI LATERAL 0.05 m/s    E wave deceleration time 250.73 msec    IVRT 116.03 msec    LVOT diameter 2.02 cm    LVOT peak talha 92.89 m/s    LVOT peak VTI 24.41 cm    Ao VTI 46.56 cm    Mr max talha 369.11 m/s    MV Peak E Talha 0.46 m/s    MV Peak A Talha 0.95 m/s    PV Peak S Talha 50.19 m/s    PV Peak D Talha 54.76 m/s    LV Systolic Volume 66.22 mL    LV Diastolic Volume 198.65 mL    TAPSE 2.35 cm    TDI SEPTAL 0.05 m/s    LV LATERAL E/E' RATIO 9.20 m/s    LV SEPTAL E/E' RATIO 9.20 m/s    FS 31 %    LV mass 179.76 g    Left Ventricle Relative Wall Thickness 0.27 cm    AV valve area 1.68 cm2    AV index (prosthetic) 0.52     E/A ratio 0.48     Mean e' 0.05 m/s    Pulm vein S/D ratio 0.92     LVOT area 3.2 cm2    LVOT stroke volume 78.19 cm3    E/E' ratio 9.20 m/s    LV Systolic Volume Index 39.9 mL/m2    LV Diastolic Volume Index 119.67 mL/m2    LV Mass Index 108 g/m2    BSA 1.7 m2   CBC Without Differential    Collection Time: 07/26/22  4:29 AM   Result Value Ref Range    WBC 7.04 3.90 - 12.70 K/uL    RBC 3.88 (L) 4.60 - 6.20 M/uL    Hemoglobin 12.7 (L) 14.0 - 18.0 g/dL    Hematocrit 36.5 (L) 40.0 - 54.0 %    MCV 94 82 - 98 fL    MCH 32.7 (H) 27.0 - 31.0 pg    MCHC 34.8 32.0 - 36.0 g/dL     RDW 14.3 11.5 - 14.5 %    Platelets 179 150 - 450 K/uL    MPV 11.0 9.2 - 12.9 fL   Basic Metabolic Panel    Collection Time: 07/26/22  4:29 AM   Result Value Ref Range    Sodium 140 136 - 145 mmol/L    Potassium 3.8 3.5 - 5.1 mmol/L    Chloride 112 (H) 95 - 110 mmol/L    CO2 24 23 - 29 mmol/L    Glucose 107 70 - 110 mg/dL    BUN 18 8 - 23 mg/dL    Creatinine 1.0 0.5 - 1.4 mg/dL    Calcium 8.5 (L) 8.7 - 10.5 mg/dL    Anion Gap 4 (L) 8 - 16 mmol/L    eGFR if African American >60.0 >60 mL/min/1.73 m^2    eGFR if non African American >60.0 >60 mL/min/1.73 m^2   Magnesium    Collection Time: 07/26/22  4:29 AM   Result Value Ref Range    Magnesium 1.8 1.6 - 2.6 mg/dL       Diagnostic Results:  Imaging Results          CT Head Without Contrast (Final result)  Result time 07/24/22 16:44:14    Final result by Carlos Patel MD (07/24/22 16:44:14)                 Narrative:    CT HEAD WITHOUT CONTRAST    CMS MANDATED QUALITY DATA - CT RADIATION  436    All CT scans at this facility utilize dose modulation, iterative reconstruction, and/or weight based dosing when appropriate to reduce radiation dose to as low as reasonably achievable    Clinical data: Syncope. Comparison to November 2019.    FINDINGS: Noninfusion images were obtained from the skull base to the vertex. There is no intracranial mass, hemorrhage, or midline shift. Ventricles and sulci are moderate to markedly prominent. There are no pathologic extra-axial fluid collections.    There is no evidence of cortical ischemic change. Changes of moderate chronic microvascular white matter disease are noted. Small chronic lacunar infarcts are noted in the bilateral basal ganglia and corona radiata. Cerebellum and brainstem are normal. The calvarium is intact.    IMPRESSION:    1. No acute intracranial abnormalities. Chronic findings as discussed above.        Electronically signed by:  Carlos Patel MD  7/24/2022 4:44 PM CDT Workstation: [id number]                              X-Ray Chest AP Portable (Final result)  Result time 07/24/22 12:46:21    Final result by Carlos Patel MD (07/24/22 12:46:21)                 Narrative:    Chest single view    CLINICAL DATA: Chest pain    FINDINGS: Comparison to November 2019. The heart is borderline enlarged. Pacemaker device and loop recorder are noted. There are no infiltrates or effusions. No acute osseous abnormality is identified.    IMPRESSION:  1. No acute radiographic abnormalities.  2. Borderline cardiomegaly with support devices in place as above.    Electronically signed by:  Carlos Patel MD  7/24/2022 12:46 PM CDT Workstation: [id number]                              Recent Cardiac Rhythm   (if applicable)      Physical Exam:  Objective:  General Appearance:  Comfortable and well-appearing.    Vital signs: (most recent): Blood pressure (!) 164/101, pulse 89, temperature 98.2 °F (36.8 °C), temperature source Oral, resp. rate 18, height 5' (1.524 m), weight 68.5 kg (151 lb 0.2 oz), SpO2 100 %.  Vital signs are normal.    Output: Producing urine.    Lungs:  Normal effort and normal respiratory rate.  He is not in respiratory distress.    Heart: Normal rate.  No murmur.   Extremities: There is no dependent edema.    Neurological: Patient is oriented to person, place and time.    Skin:  Warm.  No rash.       Current Consults:  IP CONSULT TO HOSPITALIST  IP CONSULT TO CARDIOLOGY    Assessment/Plan:  Assessment:   Syncope - patient was taking metoprolol and sotalol, sotalol on hold due to concern for bradycardia causing syncope. It is possible syncope was a result of hypotension after taking nitroglycerin.   Atypical Chest Pain - troponins negative, EKG showed Vpacing with PVCs.   CAD s/p Cx and RCA stent 2015 - Last stress test was in February of 2022 which was negative for ischemia.   Hypertension - BP elevated, patient states he is very anxious  Hyperlipidemia  S/p PM     Plan:   Echo pending.   Cannot have PM  interrogated, [facility] reps are out of town and they do not have other staff to cover. They are available after 8/1.  BP has been elevated, family reports he is very agitated and that his blood pressure at home is typically controlled.   Continue norvasc, losartan, metoprolol.  Discharge home with hydralazine 25 mg PRN when BP is greater than 160/100.   Discussed patient with josé miguel Quijano to discharge patient. Will have patient follow up in clinic next week for outpatient ischemic workup.

## 2022-07-26 NOTE — DISCHARGE SUMMARY
[facility]  Discharge Summary      Patient Name: Gene Hartman  MRN: [id number]  Patient Class: OP- Observation  Admission Date: 7/24/2022  Hospital Length of Stay: 0 days  Discharge Date and Time:  07/26/2022 3:34 PM  Attending Physician: No att. providers found   Discharging Provider: Ortiz Hammonds MD  Primary Care Provider: Mariajose Thorne MD      HPI:   History was obtained from the patient and collateral obtained from the family present at the bedside and ER physician Sign-out. Patient is an 88 year old male with history as listed below including dementia and CAD who presents to the ED with the complaint of chest pain and syncope. The patient was at the table eating when he suddenly became sob and felt tightness in his chest. His daughter gave him a SL nitro and then he appeared to have grey color. The patient then passed out briefly and EMS was called. Upon arrival of EMS the patient was noted to be hypotensive. He was brought to the ED for further evaluation.   Patient denies fever, chills, cough, nausea, vomiting, abdominal pain, dysuria, hematuria, diarrhea, melena, visual changes, focal weakness, numbness or tingling.   In the ED patient is afebrile. VSS. He is currently chest pain free and denies sob.          * No surgery found *      Hospital Course:   The patient is an 88-year-old gentleman with a history of CAD, hypertension, cardiac pacemaker.  He presented with chest discomfort and then received nitroglycerin and then had a syncopal event at home.  He has been totally asymptomatic since his arrival at the hospital.  We have been unable to interrogate his pacemaker device due to the device rep being out of town until August 1st.  Cardiology has evaluated the patient and recommend continuing his blood pressure medications.  He will continue his p.r.n. clonidine for elevated systolic blood pressures at home.  He is known to Dr. Betancourt and will follow-up  with him as an outpatient and have stress test as an outpatient as well.         Goals of Care Treatment Preferences:  Code Status: Full Code      Consults:   Consults (From admission, onward)        Status Ordering Provider     Inpatient consult to Cardiology  Once        Provider:  Hiram Betancourt MD    Completed SUSIE BAUTISTA     Inpatient consult to Hospitalist  Once        Provider:  Susie Bautista NP    Acknowledged EDER BERG          No new Assessment & Plan notes have been filed under this hospital service since the last note was generated.  Service: Hospital Medicine    Final Active Diagnoses:    Diagnosis Date Noted POA    PRINCIPAL PROBLEM:  Syncope [R55] 11/02/2019 Yes    Dementia without behavioral disturbance [F03.90] 12/04/2019 Yes    Pacemaker; originally placed two years  [Z95.0] 08/03/2015 Yes    Hiatal hernia with GERD and esophagitis [K44.9, K21.00] 12/04/2012 Yes    CAD (coronary artery disease) [I25.10]  Yes    HTN (hypertension), benign [I10]  Yes      Problems Resolved During this Admission:       Discharged Condition: good    Disposition: Home or Self Care    Follow Up:   Follow-up Information     Mariajose Thorne MD. Schedule an appointment as soon as possible for a visit in 1 week(s).    Specialties: Hospitalist, Internal Medicine  Contact information:  [address]  [address]  [place] [address]  [phone]             Hiram Betancourt MD. Go on 8/3/2022.    Specialties: Cardiology, Interventional Cardiology  Why: At 1:00pm will have pacemaker interrogation at that time as well  Contact information:  [address]  [address]  [place] [address]  [phone]                       Patient Instructions:      Diet Adult Regular     No dressing needed     Activity as tolerated       Significant Diagnostic Studies: Labs:   BMP:   Recent Labs   Lab 07/24/22  1638 07/25/22  0353 07/26/22  0429   GLU  --  108 107   NA  --  136 140   K  --  3.7 3.8   CL  --  110 112*    CO2  --  24 24   BUN  --  18 18   CREATININE  --  1.1 1.0   CALCIUM  --  8.4* 8.5*   MG 1.9 1.8 1.8   , CBC   Recent Labs   Lab 07/25/22  0353 07/26/22  0429   WBC 7.29 7.04   HGB 13.3* 12.7*   HCT 39.5* 36.5*    179    and All labs within the past 24 hours have been reviewed    Pending Diagnostic Studies:     Procedure Component Value Units Date/Time    Echo [[id number]]     Order Status: Sent Lab Status: No result          Medications:  Reconciled Home Medications:      Medication List      START taking these medications    amLODIPine 10 MG tablet  Commonly known as: NORVASC  Take 1 tablet (10 mg total) by mouth once daily.  Start taking on: July 27, 2022        CHANGE how you take these medications    fluticasone propionate 50 mcg/actuation nasal spray  Commonly known as: FLONASE  USE 1 SPRAY IN EACH NOSTRIL TWICE DAILY  What changed: when to take this     memantine 10 MG Tab  Commonly known as: NAMENDA  Take 5 mg by mouth every evening. Dr Betancourt decreased dose from 10>5  What changed: Another medication with the same name was removed. Continue taking this medication, and follow the directions you see here.        CONTINUE taking these medications    aspirin 81 MG Chew  Take 81 mg by mouth Daily.     atorvastatin 40 MG tablet  Commonly known as: LIPITOR  Take 1 tablet (40 mg total) by mouth once daily.     cetirizine 10 MG tablet  Commonly known as: ZYRTEC  Take 1 tablet (10 mg total) by mouth once daily.     cloNIDine 0.1 MG tablet  Commonly known as: CATAPRES  Take 1 tablet (0.1 mg total) by mouth 2 (two) times daily as needed (SBP greater than 160).     clopidogreL 75 mg tablet  Commonly known as: PLAVIX  Take 1 tablet (75 mg total) by mouth once daily.     cyanocobalamin (vitamin B-12) 5,000 mcg Subl  Commonly known as: VITAMIN B-12  Place 1 tablet under the tongue once daily.     cycloSPORINE 0.05 % ophthalmic emulsion  Commonly known as: RESTASIS  Apply 1 drop to eye 2 (two) times daily.      levothyroxine 75 MCG tablet  Commonly known as: SYNTHROID  Take 75 mcg by mouth before breakfast.     losartan 50 MG tablet  Commonly known as: COZAAR  Take 1 tablet (50 mg total) by mouth 2 (two) times daily.     metoprolol succinate 50 MG 24 hr tablet  Commonly known as: TOPROL-XL  TAKE 1 TABLET EVERY DAY     mirtazapine 7.5 MG Tab  Commonly known as: REMERON  TAKE 1 TABLET (7.5 MG TOTAL) BY MOUTH EVERY EVENING.     nitroGLYCERIN 0.4 MG SL tablet  Commonly known as: NITROSTAT  Place 0.4 mg under the tongue every 5 (five) minutes as needed.     pantoprazole 40 MG tablet  Commonly known as: PROTONIX  Take 40 mg by mouth once daily.     sotaloL 80 MG tablet  Commonly known as: BETAPACE  Take 80 mg by mouth 2 (two) times daily.     TYLENOL ARTHRITIS PAIN ORAL  Take by mouth.        STOP taking these medications    tiZANidine 4 MG tablet  Commonly known as: ZANAFLEX            Indwelling Lines/Drains at time of discharge:   Lines/Drains/Airways     None                 Time spent on the discharge of patient: 50 minutes         Ortiz Hammonds MD  Department of Hospital Medicine  [facility]

## 2022-07-26 NOTE — NURSING
IV and tele removed. Patient received D/C education verbally as well as written, family at bedside and involved in D/C too. Patient D/C home via personal vehicle. All belongings sent with patient and he was transported via wheelchair to car.

## 2022-07-26 NOTE — PLAN OF CARE
Problem: Adult Inpatient Plan of Care  Goal: Absence of Hospital-Acquired Illness or Injury  Outcome: Ongoing, Progressing  Intervention: Identify and Manage Fall Risk  Flowsheets (Taken 7/26/2022 0440)  Safety Promotion/Fall Prevention:   assistive device/personal item within reach   family to remain at bedside   instructed to call staff for mobility  Intervention: Prevent Skin Injury  Flowsheets (Taken 7/26/2022 0440)  Skin Protection: transparent dressing maintained  Intervention: Prevent and Manage VTE (Venous Thromboembolism) Risk  Flowsheets (Taken 7/26/2022 0440)  VTE Prevention/Management:   bleeding precations maintained   dorsiflexion/plantar flexion performed  Range of Motion: active ROM (range of motion) encouraged  Intervention: Prevent Infection  Flowsheets (Taken 7/26/2022 0440)  Infection Prevention:   hand hygiene promoted   single patient room provided

## 2022-07-26 NOTE — TELEPHONE ENCOUNTER
Refill Routing Note   Medication(s) are not appropriate for processing by [facility] for the following reason(s):      - Outside of protocol    [facility] action(s):  Route          Medication reconciliation completed: No     Appointments  past 12m or future 3m with PCP    Date Provider   Last Visit   7/25/2019 Gabbie Mayorga MD   Next Visit   Visit date not found Gabbie Mayorga MD   ED visits in past 90 days: 0        Note composed:4:26 PM 07/26/2022

## 2022-07-26 NOTE — PROGRESS NOTES
"Pt likely has CAD but high risk for angiography with his age and advanced dementia. Syncope is c/w a vagal reaction. He"s better served with med management. It would be ideal for life vest but he wouldn't tolerate it with his dementia. Will start amiodarone for some preventitive benefit for vent arrhythmias. I had this exact presentation and pt was vagal from meal. Likelihood of V-fib arrest is low at his age-he would have issues long before now. Will see my next week  "

## 2022-07-26 NOTE — NURSING
Pt's /86 @ 0000. 10 mg Hydralazine given IV.   /78 @ 0100.  Notified MD.  10 mg Labetalol IV ordered.  BP now 118/55.

## 2022-07-26 NOTE — PLAN OF CARE
07/26/22 1408   Final Note   Assessment Type Final Discharge Note   Anticipated Discharge Disposition Home   What phone number can be called within the next 1-3 days to see how you are doing after discharge? [phone]   Hospital Resources/Appts/Education Provided Provided patient/caregiver with written discharge plan information   Post-Acute Status   Discharge Delays None known at this time     Pt is discharging home with no identified CM needs. Pt is clear to discharge from a CM standpoint.

## 2022-07-27 RX ORDER — CLONIDINE HYDROCHLORIDE 0.1 MG/1
TABLET ORAL
Qty: 60 TABLET | Refills: 0 | OUTPATIENT
Start: 2022-07-27

## 2022-08-02 NOTE — TELEPHONE ENCOUNTER
Provider Staff:     Action required for this patient.    Please note Refusal of medication.            Requested Prescriptions     Refused Prescriptions Disp Refills    cloNIDine (CATAPRES) 0.1 MG tablet [Pharmacy Med Name: cloNIDine HCl 0.1 MG Oral Tablet] 60 tablet 0     Sig: TAKE ONE TABLET BY MOUTH TWICE DAILY AS NEEDED FOR  SYSTOLIC  BLOOD  PRESSURE  GREATER  THAN  160     Refused By: LEVI FONG     Reason for Refusal: Patient no longer under prescriber care      Thanks!  [facility]   Note composed: 08/02/2022 3:56 PM

## 2022-08-03 ENCOUNTER — TELEPHONE (OUTPATIENT)
Dept: GASTROENTEROLOGY | Facility: CLINIC | Age: 87
End: 2022-08-03
Payer: MEDICARE

## 2022-08-03 NOTE — TELEPHONE ENCOUNTER
Called patient to schedule an appointment from referral, no answer left message to contact our office to schedule. Dx GERD

## 2022-09-04 ENCOUNTER — HOSPITAL ENCOUNTER (INPATIENT)
Facility: HOSPITAL | Age: 87
LOS: 2 days | Discharge: HOME HEALTH | DRG: 084 | End: 2022-09-07
Attending: EMERGENCY MEDICINE | Admitting: INTERNAL MEDICINE
Payer: MEDICARE

## 2022-09-04 DIAGNOSIS — R55 SYNCOPE: ICD-10-CM

## 2022-09-04 DIAGNOSIS — S06.5XAA SUBDURAL HEMATOMA: ICD-10-CM

## 2022-09-04 DIAGNOSIS — I62.00 SUBDURAL HEMORRHAGE: Primary | ICD-10-CM

## 2022-09-04 DIAGNOSIS — I25.10 CORONARY ARTERY DISEASE: ICD-10-CM

## 2022-09-04 LAB
ABO + RH BLD: NORMAL
ALBUMIN SERPL BCP-MCNC: 4 G/DL (ref 3.5–5.2)
ALP SERPL-CCNC: 79 U/L (ref 55–135)
ALT SERPL W/O P-5'-P-CCNC: 23 U/L (ref 10–44)
ANION GAP SERPL CALC-SCNC: 6 MMOL/L (ref 8–16)
APTT PPP: 25.3 SEC (ref 23.3–35.1)
AST SERPL-CCNC: 27 U/L (ref 10–40)
BACTERIA #/AREA URNS HPF: NEGATIVE /HPF
BASOPHILS # BLD AUTO: 0.02 K/UL (ref 0–0.2)
BASOPHILS NFR BLD: 0.3 % (ref 0–1.9)
BILIRUB SERPL-MCNC: 0.6 MG/DL (ref 0.1–1)
BILIRUB UR QL STRIP: NEGATIVE
BLD GP AB SCN CELLS X3 SERPL QL: NORMAL
BNP SERPL-MCNC: 732 PG/ML (ref 0–99)
BUN SERPL-MCNC: 22 MG/DL (ref 8–23)
CALCIUM SERPL-MCNC: 8.5 MG/DL (ref 8.7–10.5)
CHLORIDE SERPL-SCNC: 107 MMOL/L (ref 95–110)
CLARITY UR: CLEAR
CO2 SERPL-SCNC: 25 MMOL/L (ref 23–29)
COLOR UR: YELLOW
CREAT SERPL-MCNC: 1.4 MG/DL (ref 0.5–1.4)
DIFFERENTIAL METHOD: ABNORMAL
EOSINOPHIL # BLD AUTO: 0 K/UL (ref 0–0.5)
EOSINOPHIL NFR BLD: 0.6 % (ref 0–8)
ERYTHROCYTE [DISTWIDTH] IN BLOOD BY AUTOMATED COUNT: 15.8 % (ref 11.5–14.5)
EST. GFR  (NO RACE VARIABLE): 48.3 ML/MIN/1.73 M^2
GLUCOSE SERPL-MCNC: 124 MG/DL (ref 70–110)
GLUCOSE UR QL STRIP: NEGATIVE
HCT VFR BLD AUTO: 39 % (ref 40–54)
HGB BLD-MCNC: 12.9 G/DL (ref 14–18)
HGB UR QL STRIP: ABNORMAL
HYALINE CASTS #/AREA URNS LPF: 1 /LPF
IMM GRANULOCYTES # BLD AUTO: 0.03 K/UL (ref 0–0.04)
IMM GRANULOCYTES NFR BLD AUTO: 0.5 % (ref 0–0.5)
INR PPP: 1
KETONES UR QL STRIP: NEGATIVE
LEUKOCYTE ESTERASE UR QL STRIP: NEGATIVE
LIPASE SERPL-CCNC: 38 U/L (ref 4–60)
LYMPHOCYTES # BLD AUTO: 2.8 K/UL (ref 1–4.8)
LYMPHOCYTES NFR BLD: 42.1 % (ref 18–48)
MAGNESIUM SERPL-MCNC: 2 MG/DL (ref 1.6–2.6)
MCH RBC QN AUTO: 32.9 PG (ref 27–31)
MCHC RBC AUTO-ENTMCNC: 33.1 G/DL (ref 32–36)
MCV RBC AUTO: 100 FL (ref 82–98)
MICROSCOPIC COMMENT: NORMAL
MONOCYTES # BLD AUTO: 0.6 K/UL (ref 0.3–1)
MONOCYTES NFR BLD: 9.2 % (ref 4–15)
NEUTROPHILS # BLD AUTO: 3.1 K/UL (ref 1.8–7.7)
NEUTROPHILS NFR BLD: 47.3 % (ref 38–73)
NITRITE UR QL STRIP: NEGATIVE
NRBC BLD-RTO: 0 /100 WBC
PH UR STRIP: 7 [PH] (ref 5–8)
PLATELET # BLD AUTO: 186 K/UL (ref 150–450)
PMV BLD AUTO: 10.8 FL (ref 9.2–12.9)
POTASSIUM SERPL-SCNC: 4 MMOL/L (ref 3.5–5.1)
PROT SERPL-MCNC: 7.3 G/DL (ref 6–8.4)
PROT UR QL STRIP: ABNORMAL
PROTHROMBIN TIME: 12.7 SEC (ref 11.4–13.7)
RBC # BLD AUTO: 3.92 M/UL (ref 4.6–6.2)
RBC #/AREA URNS HPF: 1 /HPF (ref 0–4)
SARS-COV-2 RDRP RESP QL NAA+PROBE: NEGATIVE
SODIUM SERPL-SCNC: 138 MMOL/L (ref 136–145)
SP GR UR STRIP: 1.01 (ref 1–1.03)
SQUAMOUS #/AREA URNS HPF: 8 /HPF
TROPONIN I SERPL DL<=0.01 NG/ML-MCNC: <0.03 NG/ML
URN SPEC COLLECT METH UR: ABNORMAL
UROBILINOGEN UR STRIP-ACNC: NEGATIVE EU/DL
WBC # BLD AUTO: 6.62 K/UL (ref 3.9–12.7)
WBC #/AREA URNS HPF: 5 /HPF (ref 0–5)

## 2022-09-04 PROCEDURE — U0002 COVID-19 LAB TEST NON-CDC: HCPCS | Performed by: STUDENT IN AN ORGANIZED HEALTH CARE EDUCATION/TRAINING PROGRAM

## 2022-09-04 PROCEDURE — 83690 ASSAY OF LIPASE: CPT | Performed by: STUDENT IN AN ORGANIZED HEALTH CARE EDUCATION/TRAINING PROGRAM

## 2022-09-04 PROCEDURE — 83735 ASSAY OF MAGNESIUM: CPT | Performed by: STUDENT IN AN ORGANIZED HEALTH CARE EDUCATION/TRAINING PROGRAM

## 2022-09-04 PROCEDURE — 87040 BLOOD CULTURE FOR BACTERIA: CPT | Performed by: STUDENT IN AN ORGANIZED HEALTH CARE EDUCATION/TRAINING PROGRAM

## 2022-09-04 PROCEDURE — 84484 ASSAY OF TROPONIN QUANT: CPT | Performed by: STUDENT IN AN ORGANIZED HEALTH CARE EDUCATION/TRAINING PROGRAM

## 2022-09-04 PROCEDURE — 99291 CRITICAL CARE FIRST HOUR: CPT

## 2022-09-04 PROCEDURE — 83880 ASSAY OF NATRIURETIC PEPTIDE: CPT | Performed by: STUDENT IN AN ORGANIZED HEALTH CARE EDUCATION/TRAINING PROGRAM

## 2022-09-04 PROCEDURE — 93010 EKG 12-LEAD: ICD-10-PCS | Mod: ,,, | Performed by: INTERNAL MEDICINE

## 2022-09-04 PROCEDURE — 87147 CULTURE TYPE IMMUNOLOGIC: CPT | Performed by: STUDENT IN AN ORGANIZED HEALTH CARE EDUCATION/TRAINING PROGRAM

## 2022-09-04 PROCEDURE — 81001 URINALYSIS AUTO W/SCOPE: CPT | Performed by: STUDENT IN AN ORGANIZED HEALTH CARE EDUCATION/TRAINING PROGRAM

## 2022-09-04 PROCEDURE — 85730 THROMBOPLASTIN TIME PARTIAL: CPT | Performed by: STUDENT IN AN ORGANIZED HEALTH CARE EDUCATION/TRAINING PROGRAM

## 2022-09-04 PROCEDURE — 93010 ELECTROCARDIOGRAM REPORT: CPT | Mod: ,,, | Performed by: INTERNAL MEDICINE

## 2022-09-04 PROCEDURE — 85610 PROTHROMBIN TIME: CPT | Performed by: STUDENT IN AN ORGANIZED HEALTH CARE EDUCATION/TRAINING PROGRAM

## 2022-09-04 PROCEDURE — 85025 COMPLETE CBC W/AUTO DIFF WBC: CPT | Performed by: STUDENT IN AN ORGANIZED HEALTH CARE EDUCATION/TRAINING PROGRAM

## 2022-09-04 PROCEDURE — 80053 COMPREHEN METABOLIC PANEL: CPT | Performed by: STUDENT IN AN ORGANIZED HEALTH CARE EDUCATION/TRAINING PROGRAM

## 2022-09-04 PROCEDURE — 84443 ASSAY THYROID STIM HORMONE: CPT | Performed by: STUDENT IN AN ORGANIZED HEALTH CARE EDUCATION/TRAINING PROGRAM

## 2022-09-04 PROCEDURE — 84439 ASSAY OF FREE THYROXINE: CPT | Performed by: STUDENT IN AN ORGANIZED HEALTH CARE EDUCATION/TRAINING PROGRAM

## 2022-09-04 PROCEDURE — 86901 BLOOD TYPING SEROLOGIC RH(D): CPT | Performed by: STUDENT IN AN ORGANIZED HEALTH CARE EDUCATION/TRAINING PROGRAM

## 2022-09-04 PROCEDURE — 93005 ELECTROCARDIOGRAM TRACING: CPT | Performed by: INTERNAL MEDICINE

## 2022-09-04 RX ORDER — FOLIC ACID 1 MG/1
TABLET ORAL
Status: ON HOLD | COMMUNITY
End: 2022-09-07 | Stop reason: HOSPADM

## 2022-09-04 RX ORDER — MIRTAZAPINE 15 MG/1
15 TABLET, FILM COATED ORAL NIGHTLY
COMMUNITY
Start: 2022-08-25

## 2022-09-04 RX ORDER — MELATONIN 5 MG
2 TABLET, SUBLINGUAL SUBLINGUAL NIGHTLY PRN
COMMUNITY
End: 2023-03-05 | Stop reason: SDUPTHER

## 2022-09-04 RX ORDER — NICARDIPINE HYDROCHLORIDE 0.2 MG/ML
0-15 INJECTION INTRAVENOUS CONTINUOUS
Status: DISCONTINUED | OUTPATIENT
Start: 2022-09-05 | End: 2022-09-06

## 2022-09-04 RX ORDER — LORAZEPAM 0.5 MG/1
TABLET ORAL
Status: ON HOLD | COMMUNITY
End: 2022-09-07 | Stop reason: HOSPADM

## 2022-09-04 RX ORDER — NICARDIPINE HYDROCHLORIDE 0.2 MG/ML
0-15 INJECTION INTRAVENOUS CONTINUOUS
Status: DISCONTINUED | OUTPATIENT
Start: 2022-09-04 | End: 2022-09-04

## 2022-09-04 RX ORDER — AMIODARONE HYDROCHLORIDE 200 MG/1
200 TABLET ORAL 2 TIMES DAILY
Status: ON HOLD | COMMUNITY
Start: 2022-08-24 | End: 2022-11-11 | Stop reason: SDUPTHER

## 2022-09-04 RX ORDER — SODIUM CHLORIDE 9 MG/ML
INJECTION, SOLUTION INTRAVENOUS CONTINUOUS
Status: DISCONTINUED | OUTPATIENT
Start: 2022-09-05 | End: 2022-09-06

## 2022-09-04 RX ORDER — MONTELUKAST SODIUM 10 MG/1
TABLET ORAL
Status: ON HOLD | COMMUNITY
End: 2022-09-07 | Stop reason: HOSPADM

## 2022-09-04 RX ORDER — AMLODIPINE BESYLATE 5 MG/1
5 TABLET ORAL DAILY
Status: ON HOLD | COMMUNITY
Start: 2022-08-03 | End: 2022-09-07 | Stop reason: HOSPADM

## 2022-09-05 PROBLEM — S06.5XAA SUBDURAL HEMATOMA: Status: ACTIVE | Noted: 2022-09-05

## 2022-09-05 LAB
25(OH)D3+25(OH)D2 SERPL-MCNC: 22 NG/ML (ref 30–96)
ALBUMIN SERPL BCP-MCNC: 3.7 G/DL (ref 3.5–5.2)
ALP SERPL-CCNC: 73 U/L (ref 55–135)
ALT SERPL W/O P-5'-P-CCNC: 21 U/L (ref 10–44)
ANION GAP SERPL CALC-SCNC: 12 MMOL/L (ref 8–16)
AST SERPL-CCNC: 28 U/L (ref 10–40)
BILIRUB SERPL-MCNC: 0.7 MG/DL (ref 0.1–1)
BUN SERPL-MCNC: 22 MG/DL (ref 8–23)
CALCIUM SERPL-MCNC: 8.6 MG/DL (ref 8.7–10.5)
CHLORIDE SERPL-SCNC: 107 MMOL/L (ref 95–110)
CO2 SERPL-SCNC: 22 MMOL/L (ref 23–29)
CREAT SERPL-MCNC: 1.3 MG/DL (ref 0.5–1.4)
EST. GFR  (NO RACE VARIABLE): 52.8 ML/MIN/1.73 M^2
FERRITIN SERPL-MCNC: 115 NG/ML (ref 20–300)
FOLATE SERPL-MCNC: 8.9 NG/ML (ref 4–24)
GLUCOSE SERPL-MCNC: 159 MG/DL (ref 70–110)
IRON SERPL-MCNC: 52 UG/DL (ref 45–160)
LACTATE SERPL-SCNC: 1.5 MMOL/L (ref 0.5–1.9)
MAGNESIUM SERPL-MCNC: 1.6 MG/DL (ref 1.6–2.6)
PHOSPHATE SERPL-MCNC: 2.7 MG/DL (ref 2.7–4.5)
POTASSIUM SERPL-SCNC: 4.6 MMOL/L (ref 3.5–5.1)
PROT SERPL-MCNC: 6.8 G/DL (ref 6–8.4)
SATURATED IRON: 18 % (ref 20–50)
SODIUM SERPL-SCNC: 141 MMOL/L (ref 136–145)
T4 FREE SERPL-MCNC: 1.26 NG/DL (ref 0.71–1.51)
TOTAL IRON BINDING CAPACITY: 297 UG/DL (ref 250–450)
TRANSFERRIN SERPL-MCNC: 212 MG/DL (ref 200–375)
TSH SERPL DL<=0.005 MIU/L-ACNC: 6.99 UIU/ML (ref 0.34–5.6)
VIT B12 SERPL-MCNC: 599 PG/ML (ref 210–950)

## 2022-09-05 PROCEDURE — 36415 COLL VENOUS BLD VENIPUNCTURE: CPT | Performed by: INTERNAL MEDICINE

## 2022-09-05 PROCEDURE — 97116 GAIT TRAINING THERAPY: CPT

## 2022-09-05 PROCEDURE — 94761 N-INVAS EAR/PLS OXIMETRY MLT: CPT

## 2022-09-05 PROCEDURE — 84466 ASSAY OF TRANSFERRIN: CPT | Performed by: INTERNAL MEDICINE

## 2022-09-05 PROCEDURE — 97165 OT EVAL LOW COMPLEX 30 MIN: CPT

## 2022-09-05 PROCEDURE — 97161 PT EVAL LOW COMPLEX 20 MIN: CPT

## 2022-09-05 PROCEDURE — 82306 VITAMIN D 25 HYDROXY: CPT | Performed by: INTERNAL MEDICINE

## 2022-09-05 PROCEDURE — 25000003 PHARM REV CODE 250: Performed by: STUDENT IN AN ORGANIZED HEALTH CARE EDUCATION/TRAINING PROGRAM

## 2022-09-05 PROCEDURE — 63600175 PHARM REV CODE 636 W HCPCS: Performed by: INTERNAL MEDICINE

## 2022-09-05 PROCEDURE — 82746 ASSAY OF FOLIC ACID SERUM: CPT | Performed by: INTERNAL MEDICINE

## 2022-09-05 PROCEDURE — A4216 STERILE WATER/SALINE, 10 ML: HCPCS | Performed by: INTERNAL MEDICINE

## 2022-09-05 PROCEDURE — 25000003 PHARM REV CODE 250: Performed by: INTERNAL MEDICINE

## 2022-09-05 PROCEDURE — 96365 THER/PROPH/DIAG IV INF INIT: CPT

## 2022-09-05 PROCEDURE — 99900031 HC PATIENT EDUCATION (STAT)

## 2022-09-05 PROCEDURE — 82607 VITAMIN B-12: CPT | Performed by: INTERNAL MEDICINE

## 2022-09-05 PROCEDURE — 83605 ASSAY OF LACTIC ACID: CPT | Performed by: STUDENT IN AN ORGANIZED HEALTH CARE EDUCATION/TRAINING PROGRAM

## 2022-09-05 PROCEDURE — 83735 ASSAY OF MAGNESIUM: CPT | Performed by: INTERNAL MEDICINE

## 2022-09-05 PROCEDURE — 87040 BLOOD CULTURE FOR BACTERIA: CPT | Mod: 59 | Performed by: INTERNAL MEDICINE

## 2022-09-05 PROCEDURE — 99223 PR INITIAL HOSPITAL CARE,LEVL III: ICD-10-PCS | Mod: ,,, | Performed by: INTERNAL MEDICINE

## 2022-09-05 PROCEDURE — 27000221 HC OXYGEN, UP TO 24 HOURS

## 2022-09-05 PROCEDURE — 25000003 PHARM REV CODE 250: Performed by: NURSE PRACTITIONER

## 2022-09-05 PROCEDURE — 96375 TX/PRO/DX INJ NEW DRUG ADDON: CPT

## 2022-09-05 PROCEDURE — 20000000 HC ICU ROOM

## 2022-09-05 PROCEDURE — 80053 COMPREHEN METABOLIC PANEL: CPT | Performed by: INTERNAL MEDICINE

## 2022-09-05 PROCEDURE — 63600175 PHARM REV CODE 636 W HCPCS

## 2022-09-05 PROCEDURE — 84100 ASSAY OF PHOSPHORUS: CPT | Performed by: INTERNAL MEDICINE

## 2022-09-05 PROCEDURE — 94799 UNLISTED PULMONARY SVC/PX: CPT

## 2022-09-05 PROCEDURE — 99223 1ST HOSP IP/OBS HIGH 75: CPT | Mod: ,,, | Performed by: INTERNAL MEDICINE

## 2022-09-05 PROCEDURE — 87040 BLOOD CULTURE FOR BACTERIA: CPT | Performed by: STUDENT IN AN ORGANIZED HEALTH CARE EDUCATION/TRAINING PROGRAM

## 2022-09-05 PROCEDURE — 96366 THER/PROPH/DIAG IV INF ADDON: CPT

## 2022-09-05 PROCEDURE — 97535 SELF CARE MNGMENT TRAINING: CPT

## 2022-09-05 PROCEDURE — 99900035 HC TECH TIME PER 15 MIN (STAT)

## 2022-09-05 PROCEDURE — 82728 ASSAY OF FERRITIN: CPT | Performed by: INTERNAL MEDICINE

## 2022-09-05 RX ORDER — AMIODARONE HYDROCHLORIDE 200 MG/1
200 TABLET ORAL 2 TIMES DAILY
Status: DISCONTINUED | OUTPATIENT
Start: 2022-09-05 | End: 2022-09-07 | Stop reason: HOSPADM

## 2022-09-05 RX ORDER — SOTALOL HYDROCHLORIDE 80 MG/1
80 TABLET ORAL 2 TIMES DAILY
Status: DISCONTINUED | OUTPATIENT
Start: 2022-09-05 | End: 2022-09-05

## 2022-09-05 RX ORDER — ACETAMINOPHEN 325 MG/1
650 TABLET ORAL EVERY 8 HOURS PRN
Status: DISCONTINUED | OUTPATIENT
Start: 2022-09-05 | End: 2022-09-07 | Stop reason: HOSPADM

## 2022-09-05 RX ORDER — MORPHINE SULFATE 2 MG/ML
1 INJECTION, SOLUTION INTRAMUSCULAR; INTRAVENOUS
Status: COMPLETED | OUTPATIENT
Start: 2022-09-05 | End: 2022-09-05

## 2022-09-05 RX ORDER — LOSARTAN POTASSIUM 50 MG/1
50 TABLET ORAL 2 TIMES DAILY
Status: DISCONTINUED | OUTPATIENT
Start: 2022-09-05 | End: 2022-09-07 | Stop reason: HOSPADM

## 2022-09-05 RX ORDER — METOPROLOL SUCCINATE 50 MG/1
50 TABLET, EXTENDED RELEASE ORAL DAILY
Status: DISCONTINUED | OUTPATIENT
Start: 2022-09-05 | End: 2022-09-07

## 2022-09-05 RX ORDER — ATORVASTATIN CALCIUM 40 MG/1
40 TABLET, FILM COATED ORAL DAILY
Status: DISCONTINUED | OUTPATIENT
Start: 2022-09-05 | End: 2022-09-07 | Stop reason: HOSPADM

## 2022-09-05 RX ORDER — LANOLIN ALCOHOL/MO/W.PET/CERES
1000 CREAM (GRAM) TOPICAL DAILY
Status: DISCONTINUED | OUTPATIENT
Start: 2022-09-05 | End: 2022-09-07 | Stop reason: HOSPADM

## 2022-09-05 RX ORDER — MORPHINE SULFATE 2 MG/ML
2 INJECTION, SOLUTION INTRAMUSCULAR; INTRAVENOUS ONCE
Status: COMPLETED | OUTPATIENT
Start: 2022-09-05 | End: 2022-09-05

## 2022-09-05 RX ORDER — TALC
6 POWDER (GRAM) TOPICAL NIGHTLY PRN
Status: DISCONTINUED | OUTPATIENT
Start: 2022-09-05 | End: 2022-09-07 | Stop reason: HOSPADM

## 2022-09-05 RX ORDER — ONDANSETRON 2 MG/ML
INJECTION INTRAMUSCULAR; INTRAVENOUS
Status: COMPLETED
Start: 2022-09-05 | End: 2022-09-05

## 2022-09-05 RX ORDER — LANOLIN ALCOHOL/MO/W.PET/CERES
400 CREAM (GRAM) TOPICAL ONCE
Status: COMPLETED | OUTPATIENT
Start: 2022-09-05 | End: 2022-09-05

## 2022-09-05 RX ORDER — AMLODIPINE BESYLATE 5 MG/1
5 TABLET ORAL DAILY
Status: DISCONTINUED | OUTPATIENT
Start: 2022-09-05 | End: 2022-09-07

## 2022-09-05 RX ORDER — SODIUM CHLORIDE 0.9 % (FLUSH) 0.9 %
10 SYRINGE (ML) INJECTION EVERY 12 HOURS
Status: DISCONTINUED | OUTPATIENT
Start: 2022-09-05 | End: 2022-09-07 | Stop reason: HOSPADM

## 2022-09-05 RX ORDER — MEMANTINE HYDROCHLORIDE 5 MG/1
5 TABLET ORAL NIGHTLY
Status: DISCONTINUED | OUTPATIENT
Start: 2022-09-05 | End: 2022-09-07

## 2022-09-05 RX ORDER — LEVOTHYROXINE SODIUM 25 UG/1
75 TABLET ORAL
Status: DISCONTINUED | OUTPATIENT
Start: 2022-09-05 | End: 2022-09-07 | Stop reason: HOSPADM

## 2022-09-05 RX ADMIN — Medication 400 MG: at 10:09

## 2022-09-05 RX ADMIN — MORPHINE SULFATE 1 MG: 2 INJECTION, SOLUTION INTRAMUSCULAR; INTRAVENOUS at 01:09

## 2022-09-05 RX ADMIN — AMIODARONE HYDROCHLORIDE 200 MG: 200 TABLET ORAL at 08:09

## 2022-09-05 RX ADMIN — ACETAMINOPHEN 650 MG: 325 TABLET ORAL at 09:09

## 2022-09-05 RX ADMIN — MORPHINE SULFATE 2 MG: 2 INJECTION, SOLUTION INTRAMUSCULAR; INTRAVENOUS at 02:09

## 2022-09-05 RX ADMIN — ATORVASTATIN CALCIUM 40 MG: 40 TABLET, FILM COATED ORAL at 08:09

## 2022-09-05 RX ADMIN — MEMANTINE HYDROCHLORIDE 5 MG: 5 TABLET ORAL at 08:09

## 2022-09-05 RX ADMIN — SODIUM CHLORIDE, PRESERVATIVE FREE 10 ML: 5 INJECTION INTRAVENOUS at 09:09

## 2022-09-05 RX ADMIN — LOSARTAN POTASSIUM 50 MG: 50 TABLET, FILM COATED ORAL at 08:09

## 2022-09-05 RX ADMIN — ONDANSETRON 4 MG: 2 INJECTION INTRAMUSCULAR; INTRAVENOUS at 12:09

## 2022-09-05 RX ADMIN — SODIUM CHLORIDE: 0.9 INJECTION, SOLUTION INTRAVENOUS at 12:09

## 2022-09-05 RX ADMIN — VANCOMYCIN HYDROCHLORIDE 1500 MG: 1.5 INJECTION, POWDER, LYOPHILIZED, FOR SOLUTION INTRAVENOUS at 07:09

## 2022-09-05 RX ADMIN — METOPROLOL SUCCINATE 50 MG: 50 TABLET, FILM COATED, EXTENDED RELEASE ORAL at 08:09

## 2022-09-05 RX ADMIN — NICARDIPINE HYDROCHLORIDE 6 MG/HR: 0.2 INJECTION INTRAVENOUS at 06:09

## 2022-09-05 RX ADMIN — NICARDIPINE HYDROCHLORIDE 5 MG/HR: 0.2 INJECTION INTRAVENOUS at 12:09

## 2022-09-05 RX ADMIN — CYANOCOBALAMIN TAB 1000 MCG 1000 MCG: 1000 TAB at 08:09

## 2022-09-05 RX ADMIN — AMLODIPINE BESYLATE 5 MG: 5 TABLET ORAL at 08:09

## 2022-09-05 RX ADMIN — NICARDIPINE HYDROCHLORIDE 6 MG/HR: 0.2 INJECTION INTRAVENOUS at 01:09

## 2022-09-05 RX ADMIN — LEVOTHYROXINE SODIUM 75 MCG: 0.03 TABLET ORAL at 08:09

## 2022-09-05 RX ADMIN — LOSARTAN POTASSIUM 50 MG: 50 TABLET, FILM COATED ORAL at 10:09

## 2022-09-05 RX ADMIN — Medication 6 MG: at 09:09

## 2022-09-05 RX ADMIN — ACETAMINOPHEN 650 MG: 325 TABLET ORAL at 08:09

## 2022-09-05 NOTE — PLAN OF CARE
Problem: Occupational Therapy  Goal: Occupational Therapy Goal  Description: Goals to be met by: 10/26/2022    Patient will increase functional independence with ADLs by performing:    UE Dressing with Supervision.  LE Dressing with Supervision.  Grooming while standing at sink with Supervision.  Toileting from toilet with Supervision for hygiene and clothing management.   Toilet transfer to toilet with Supervision.    Outcome: Ongoing, Progressing

## 2022-09-05 NOTE — PLAN OF CARE
[facility]  Initial Discharge Assessment       Primary Care Provider: Mariajose Thonre MD    Admission Diagnosis: Subdural hemorrhage [I62.00]    Admission Date: 9/4/2022  Expected Discharge Date: TBD    Assessment completed with Gene Hartman Jr (Son)  [phone] (Mobile) due to patients cognitive impairment. Adult son and adult daughter in law live with patient and his spouse, patient has a rollator but does not utilize it, no home health required prior to admission, but family agreeable to home health if ordered. Adult children will transport and schedule follow up appointment after discharge. Appointment with Dr Betancourt scheduled 9/20/22 at 930am.    Discharge Barriers Identified: None    Payor: [employer] MANAGED MEDICARE / Plan: [employer] MEDICARE HMO / Product Type: Capitation /     Extended Emergency Contact Information  Primary Emergency Contact: Gene Hartman Jr  Address: [address] [place] [address] [place]  Mobile Phone: [phone]  Relation: Son  Preferred language: English   needed? No  Secondary Emergency Contact: Jeffery Hartman  Address: [place]   [place]  Mobile Phone: [phone]  Relation: Son  Preferred language: English   needed? No    Discharge Plan A: Home with family  Discharge Plan B: Home with family      [facility] (Now [facility]) - [place], [place] - [address]  [address] [address]  Phone: [phone] Fax: [phone]    [id number] - [place] - [address] AT [facility] [address] [address]  [address]  [place] [address]  Phone: [phone] Fax: [phone]    [facility] - [place] - [address]  [address]  [place] [address]  Phone: [phone] Fax: [phone]      Initial Assessment (most recent)       Adult Discharge Assessment - 09/05/22 1032          Discharge  Assessment    Assessment Type Discharge Planning Assessment     Confirmed/corrected address, phone number and insurance Yes     Source of Information family;health care advocate     Communicated PARDEEP with patient/caregiver Date not available/Unable to determine     Reason For Admission Subdural hematoma     Lives With child(ant), adult;spouse     Facility Arrived From: home     Do you expect to return to your current living situation? Yes     Do you have help at home or someone to help you manage your care at home? Yes     Who are your caregiver(s) and their phone number(s)? Gene Hartman Jr (Son)   [phone] (Mobile)     Prior to hospitilization cognitive status: Not Oriented to Place;Not Oriented to Time     Current cognitive status: Not Oriented to Place;Not Oriented to Time     Walking or Climbing Stairs Difficulty ambulation difficulty, requires equipment     Mobility Management patient has rollator, but family reports patient does not utilize it     Dressing/Bathing Difficulty none     Equipment Currently Used at Home rollator     Readmission within 30 days? No     Patient currently being followed by outpatient case management? No     Do you currently have service(s) that help you manage your care at home? No     Do you take prescription medications? Yes     Do you have prescription coverage? Yes     Do you have any problems affording any of your prescribed medications? No     Is the patient taking medications as prescribed? yes     Who is going to help you get home at discharge? Gene Hartman Jr (Son)   [phone] (Mobile)     How do you get to doctors appointments? family or friend will provide     Are you on dialysis? No     Do you take coumadin? No     Discharge Plan A Home with family     Discharge Plan B Home with family     DME Needed Upon Discharge  none     Discharge Plan discussed with: Adult children     Discharge Barriers Identified None

## 2022-09-05 NOTE — PROGRESS NOTES
9/5/2022 Pharmacokinetic Assessment: IV Vancomycin  Vanco DAY 1 - Gene Hartman is a 88 y.o. male being treated for bacteremia. Goal 15 to 20 mcg/mL.     Dialysis Method (if applicable):N/A     Vancomycin serum concentration assessment(s) (last 3 results):  No results for input(s): VANCOMYCINRA, VANCOMYCINPE, VANCOMYCINTR, VANCOTROUGH in the last 72 hours.    Vancomycin Regimen Plan  Day of Thx Date Current Weight (kg) Laboratory  Doses    Vancomycin Level      Time SCr CrCl Scheduled Time Time Dose Dosage (mcg/ml) Peak,  Random,  Trough?         VANCOMYCIN 1500mg x1, then 1250 mg Q24H     1 9/5 69.0  1.3 32.0 19:00  1 1500     2 9/6     18:00  - -  Trough         19:00  2 1250       Rationale for Plan: per protocol (calculations copied from [facility])    Labs:  Estimated Creatinine Clearance: 32 mL/min (based on SCr of 1.3 mg/dL).  Recent Labs   Lab Result Units 09/04/22 2307 09/05/22  0353   WBC K/uL 6.62  --    Creatinine mg/dL 1.4 1.3       Cxs:   Microbiology Results (last 7 days)       Procedure Component Value Units Date/Time    Blood culture [[id number]]     Order Status: Sent Specimen: Blood     Blood culture #2 **CANNOT BE ORDERED STAT** [[id number]] Collected: 09/05/22 0102    Order Status: Completed Specimen: Blood from Peripheral, Wrist, Left Updated: 09/05/22 1631     Blood Culture, Routine Gram stain vasyl bottle: Gram positive cocci      Results called to and read back by:Nolan Busch RN 3I  09/05/2022      16:31 JBM    Blood culture #1 **CANNOT BE ORDERED STAT** [[id number]] Collected: 09/04/22 2307    Order Status: Completed Specimen: Blood from Peripheral, Upper Arm, Right Updated: 09/05/22 0717     Blood Culture, Routine No Growth to date            Pharmacy will continue to follow and monitor vancomycin.   Please contact pharmacy at extension [phone] with any questions regarding this assessment.     Thank you for the consult,   Scotty Peña

## 2022-09-05 NOTE — NURSING
Patient arrived to ICU 3028 by ER RN.  Tx to bed- monitor applied. Patient oriented to room.  Left facial droop noted on assessment. Patient alert, oriented to self only.

## 2022-09-05 NOTE — HPI
"88 years old male past medical history of coronary artery disease status post stent patient on aspirin and Plavix hypertension dyslipidemia hypothyroidism anemia dementia is status post pacemaker placement GERD history of MI history prostate cancer he was recently admitted to our facility due to episode of syncope after taking nitroglycerin he when she was stable and sent home today he comes back because he had a fall and family her when he fell they went to check on him his eyes were open but for at least 2 minutes patient was not very responsive according to them patient recently has not complained of chest pain shortness a breath no fevers no UTI like symptoms or nausea vomit or diarrhea once here after the eye he had a CT head done that reports " Subdural hemorrhage along the anterior and posterior falx measuring up to 1.2 cm in thickness. Small amount of subarachnoid hemorrhage along the bifrontal lobes. No midline shift, downward herniation, or hydrocephalus" Dr. Stone neurosurgeon on-call was consulted by ER staff she recommended to repeat CT head at 6 in the morning nicardipine drip for SBP less than 150 NPO NS.  "

## 2022-09-05 NOTE — H&P
"[facility] - Emergency Dept  Hospital Medicine  History & Physical    Patient Name: Gene Hartman  MRN: [id number]  Patient Class: IP- Inpatient  Admission Date: 9/4/2022  Attending Physician: Satya Rosado MD   Primary Care Provider: Mariajose Thorne MD         Patient information was obtained from patient, relative(s), past medical records and ER records.     Subjective:     Principal Problem:Subdural hematoma    Chief Complaint:   Chief Complaint   Patient presents with    Fall     Pt states mechanical fall, family  reports syncope, positive head strike, no thinners, projectile vomiting on arrival to ED        HPI: 88 years old male past medical history of coronary artery disease status post stent patient on aspirin and Plavix hypertension dyslipidemia hypothyroidism anemia dementia is status post pacemaker placement GERD history of MI history prostate cancer he was recently admitted to our facility due to episode of syncope after taking nitroglycerin he when she was stable and sent home today he comes back because he had a fall and family her when he fell they went to check on him his eyes were open but for at least 2 minutes patient was not very responsive according to them patient recently has not complained of chest pain shortness a breath no fevers no UTI like symptoms or nausea vomit or diarrhea once here after the eye he had a CT head done that reports " Subdural hemorrhage along the anterior and posterior falx measuring up to 1.2 cm in thickness. Small amount of subarachnoid hemorrhage along the bifrontal lobes. No midline shift, downward herniation, or hydrocephalus" Dr. Stone neurosurgeon on-call was consulted by ER staff she recommended to repeat CT head at 6 in the morning nicardipine drip for SBP less than 150 NPO NS.      Past Medical History:   Diagnosis Date    Abnormal echocardiogram 11/21/2019    Normal left ventricular systolic function with estimated ejection " fraction of 60%.  Grade 2 moderate left ventricular diastolic dysfunction consistent with pseudonormalization.  Mild left atrial enlargement.  Mild aortic regurgitation.  Mild to moderate mitral regurgitation.  Mild tricuspid regurgitation.  Estimated PA systolic pressure is 38 mm of mercury.  Diagnosis is syncope.    Anxiety     Arthritis     CAD (coronary artery disease) age 68    Carotid artery occlusion     Cerebrovascular small vessel disease     Colon polyps age 78    Coronary artery disease of native artery of native heart with stable angina pectoris 5/6/2020    GERD (gastroesophageal reflux disease)     H. pylori infection     HTN (hypertension), benign age 65    Hyperlipidemia LDL goal <70 age 65    Hypothyroidism     Multiple fractures of ribs of right side 11/27/2012    Myocardial infarction     Pernicious anemia 1/26/2018    Primary insomnia 10/9/2018    Prostate cancer age 67    Syncopal episodes 3/2013    Urge incontinence 5/8/2018       Past Surgical History:   Procedure Laterality Date    CARDIAC PACEMAKER PLACEMENT  10/2015    [employer] Pacemaker    CARDIAC SURGERY      CATARACT EXTRACTION W/  INTRAOCULAR LENS IMPLANT Bilateral     COLONOSCOPY  ~2013    Dr. Edge    COLONOSCOPY N/A 06/08/2016    Procedure: COLONOSCOPY;  Surgeon: Shamar Barahona MD;  Location[facility];  Service: Endoscopy;  Laterality: N/A; REPEAT IN 1 YEAR    CORONARY ANGIOPLASTY WITH STENT PLACEMENT  2003    x 2 RCA    PROSTATECTOMY  2002    UPPER GASTROINTESTINAL ENDOSCOPY  11/15/2016    Dr. Barahona       Review of patient's allergies indicates:   Allergen Reactions    Iodinated contrast media Rash    Pontocaine [tetracaine hcl] Anaphylaxis     hypotension       No current facility-administered medications on file prior to encounter.     Current Outpatient Medications on File Prior to Encounter   Medication Sig    acetaminophen (TYLENOL ARTHRITIS PAIN ORAL) Take 2 tablets by mouth 2 (two) times  a day.    amiodarone (PACERONE) 200 MG Tab Take 200 mg by mouth 2 (two) times daily.    amLODIPine (NORVASC) 5 MG tablet Take 5 mg by mouth once daily.    aspirin 81 MG Chew Take 81 mg by mouth Daily.     atorvastatin (LIPITOR) 40 MG tablet Take 1 tablet (40 mg total) by mouth once daily.    cetirizine (ZYRTEC) 10 MG tablet Take 1 tablet (10 mg total) by mouth once daily.    clopidogrel (PLAVIX) 75 mg tablet Take 1 tablet (75 mg total) by mouth once daily.    cyanocobalamin, vitamin B-12, (VITAMIN B-12) 5,000 mcg Subl Place 1 tablet under the tongue once daily.    cycloSPORINE (RESTASIS) 0.05 % ophthalmic emulsion Apply 1 drop to eye 2 (two) times daily.    fluticasone propionate (FLONASE) 50 mcg/actuation nasal spray USE 1 SPRAY IN EACH NOSTRIL TWICE DAILY (Patient taking differently: 1 spray by Each Nostril route 2 (two) times a day.)    levothyroxine (SYNTHROID) 75 MCG tablet Take 75 mcg by mouth before breakfast.    losartan (COZAAR) 50 MG tablet Take 1 tablet (50 mg total) by mouth 2 (two) times daily.    memantine (NAMENDA) 10 MG Tab Take 5 mg by mouth every evening. Dr Betancourt decreased dose from 10>5    metoprolol succinate (TOPROL-XL) 50 MG 24 hr tablet TAKE 1 TABLET EVERY DAY (Patient taking differently: Take 50 mg by mouth once daily.)    mirtazapine (REMERON) 15 MG tablet Take 15 mg by mouth every evening.    pantoprazole (PROTONIX) 40 MG tablet Take 40 mg by mouth once daily.    amLODIPine (NORVASC) 10 MG tablet Take 1 tablet (10 mg total) by mouth once daily.    cloNIDine (CATAPRES) 0.1 MG tablet Take 1 tablet (0.1 mg total) by mouth 2 (two) times daily as needed (SBP greater than 160).    folic acid (FOLVITE) 1 MG tablet folic acid 1 mg tablet    LORazepam (ATIVAN) 0.5 MG tablet lorazepam 0.5 mg tablet   prn    melatonin 5 mg Subl Take 1 tablet by mouth nightly as needed.    mirtazapine (REMERON) 7.5 MG Tab TAKE 1 TABLET (7.5 MG TOTAL) BY MOUTH EVERY EVENING.    montelukast  (SINGULAIR) 10 mg tablet montelukast 10 mg tablet    nitroGLYCERIN (NITROSTAT) 0.4 MG SL tablet Place 0.4 mg under the tongue every 5 (five) minutes as needed.    sotaloL (BETAPACE) 80 MG tablet Take 80 mg by mouth 2 (two) times daily.     Family History       Problem Relation (Age of Onset)    Diabetes Son    Heart disease Father (56), Brother, Brother    Heart failure Father, Brother    Hypertension Son    Mental illness Brother    Migraines Mother    No Known Problems Son          Tobacco Use    Smoking status: Never    Smokeless tobacco: Never   Substance and Sexual Activity    Alcohol use: No    Drug use: No    Sexual activity: Not Currently     Review of Systems   Reason unable to perform ROS: Ten point system review pertinent positives are present HPI.  Not totally reliable since patient has dementia family helps with review of systems.   Objective:     Vital Signs (Most Recent):  Temp: 98.6 °F (37 °C) (09/04/22 2228)  Pulse: 86 (09/05/22 0245)  Resp: (!) 26 (09/05/22 0245)  BP: 136/65 (09/05/22 0245)  SpO2: 95 % (09/05/22 0245)   Vital Signs (24h Range):  Temp:  [98.6 °F (37 °C)] 98.6 °F (37 °C)  Pulse:  [80-98] 86  Resp:  [17-32] 26  SpO2:  [91 %-98 %] 95 %  BP: (133-237)/() 136/65        There is no height or weight on file to calculate BMI.    Physical Exam  Constitutional:       Appearance: He is not ill-appearing, toxic-appearing or diaphoretic.   HENT:      Head: Normocephalic.      Nose: Nose normal.   Eyes:      General: No scleral icterus.        Right eye: No discharge.         Left eye: No discharge.   Cardiovascular:      Rate and Rhythm: Normal rate and regular rhythm.      Heart sounds: No murmur heard.    No friction rub. No gallop.   Pulmonary:      Effort: Pulmonary effort is normal. No respiratory distress.      Breath sounds: Normal breath sounds. No stridor. No wheezing, rhonchi or rales.   Chest:      Chest wall: No tenderness.   Abdominal:      General: There is no  distension.      Palpations: There is no mass.      Tenderness: There is no abdominal tenderness. There is no guarding or rebound.      Hernia: No hernia is present.   Musculoskeletal:         General: No swelling, deformity or signs of injury.      Cervical back: Neck supple.      Right lower leg: No edema.      Left lower leg: No edema.   Skin:     Coloration: Skin is not jaundiced.      Findings: No erythema, lesion or rash.   Neurological:      General: No focal deficit present.      Mental Status: He is alert.      Comments: Patient has dementia awake and alert follows commands   Strength +4/5 proximally throughout patient has generalized weakness. Distally 5/5           Significant Labs: All pertinent labs within the past 24 hours have been reviewed.  BMP:   Recent Labs   Lab 09/04/22 2307   *      K 4.0      CO2 25   BUN 22   CREATININE 1.4   CALCIUM 8.5*   MG 2.0     CBC:   Recent Labs   Lab 09/04/22 2307   WBC 6.62   HGB 12.9*   HCT 39.0*        CMP:   Recent Labs   Lab 09/04/22 2307      K 4.0      CO2 25   *   BUN 22   CREATININE 1.4   CALCIUM 8.5*   PROT 7.3   ALBUMIN 4.0   BILITOT 0.6   ALKPHOS 79   AST 27   ALT 23   ANIONGAP 6*     Troponin:   Recent Labs   Lab 09/04/22 2307   TROPONINI <0.030     TSH:   Recent Labs   Lab 09/04/22 2307   TSH 6.990*     Urine Culture: No results for input(s): LABURIN in the last 48 hours.  Urine Studies:   Recent Labs   Lab 09/04/22 2327   COLORU Yellow   APPEARANCEUA Clear   PHUR 7.0   SPECGRAV 1.015   PROTEINUA 3+*   GLUCUA Negative   KETONESU Negative   BILIRUBINUA Negative   OCCULTUA 2+*   NITRITE Negative   UROBILINOGEN Negative   LEUKOCYTESUR Negative   RBCUA 1   WBCUA 5   BACTERIA Negative   SQUAMEPITHEL 8   HYALINECASTS 1       Significant Imaging: I have reviewed all pertinent imaging results/findings within the past 24 hours.    Assessment/Plan:     * Subdural hematoma    After fall.  Will follow up   [facility] neurosurgeon on call recommendations input appreciated NPO NS for maintenance nicardipine drip CT head 6 in the morning patient be admitted to ICU neurochecks q.1 hour   Cardiology consult, according to records patient currently on ARB sotalol metoprolol amiodarone  Continue with home medications as appropriate  PT OT        VTE Risk Mitigation (From admission, onward)         Ordered     IP VTE HIGH RISK PATIENT  Once         09/05/22 0233     Place sequential compression device  Until discontinued         09/05/22 0233                   Satya Rosado MD  Department of Hospital Medicine   formerly [facility] - Emergency Dept

## 2022-09-05 NOTE — CONSULTS
Pulmonary/Critical Care Consult      Patient name: Gene Hartman  MRN: [id number]  Date: 09/05/2022    Admit Date: 9/4/2022  Consult Requested By: Fabio Howlel MD    Reason for Consult: SDH    HPI:    9/5/2022 - 87 yo male h/o ASCVD (s/p stent), s/p pacer, h/o prostate cancer HTN, GERD, HLP, hypothyroid, anemia, dementia,  who fell at home.  The mechanism of the fall is to clear to me.  He was found to have a SDH and small SAH and was admitted to ICU for care.  He has been on cardene for BP control.  He has been seen by neurosurgery and repeat CT this AM showing decrease in SDH and stable SAH.  He is in no distress and has no c/o except for HA.  He is a little confused (not sure what his baseline condition is with his h/o dementia).  No focal neurologic findings.      Review of Systems    Review of Systems   Reason unable to perform ROS: unreliable due to confusion.   Constitutional:  Negative for chills and fever.   Respiratory:  Negative for cough, hemoptysis, sputum production and shortness of breath.    Cardiovascular:  Positive for chest pain (some right rib pain).   Musculoskeletal:  Positive for falls.   Neurological:  Positive for headaches.     Past Medical History    Past Medical History:   Diagnosis Date    Abnormal echocardiogram 11/21/2019    Normal left ventricular systolic function with estimated ejection fraction of 60%.  Grade 2 moderate left ventricular diastolic dysfunction consistent with pseudonormalization.  Mild left atrial enlargement.  Mild aortic regurgitation.  Mild to moderate mitral regurgitation.  Mild tricuspid regurgitation.  Estimated PA systolic pressure is 38 mm of mercury.  Diagnosis is syncope.    Anxiety     Arthritis     CAD (coronary artery disease) age 68    Carotid artery occlusion     Cerebrovascular small vessel disease     Colon polyps age 78    Coronary artery disease of native artery of native heart with stable angina pectoris 5/6/2020    GERD (gastroesophageal  reflux disease)     H. pylori infection     HTN (hypertension), benign age 65    Hyperlipidemia LDL goal <70 age 65    Hypothyroidism     Multiple fractures of ribs of right side 11/27/2012    Myocardial infarction     Pernicious anemia 1/26/2018    Primary insomnia 10/9/2018    Prostate cancer age 67    Subdural hematoma 9/5/2022    Syncopal episodes 3/2013    Urge incontinence 5/8/2018       Past Surgical History    Past Surgical History:   Procedure Laterality Date    CARDIAC PACEMAKER PLACEMENT  10/2015    [employer] Pacemaker    CARDIAC SURGERY      CATARACT EXTRACTION W/  INTRAOCULAR LENS IMPLANT Bilateral     COLONOSCOPY  ~2013    Dr. Edge    COLONOSCOPY N/A 06/08/2016    Procedure: COLONOSCOPY;  Surgeon: Shamar Barahona MD;  Location: [facility];  Service: Endoscopy;  Laterality: N/A; REPEAT IN 1 YEAR    CORONARY ANGIOPLASTY WITH STENT PLACEMENT  2003    x 2 RCA    PROSTATECTOMY  2002    UPPER GASTROINTESTINAL ENDOSCOPY  11/15/2016    Dr. Barahona       Medications (scheduled):      amiodarone  200 mg Oral BID    amLODIPine  5 mg Oral Daily    atorvastatin  40 mg Oral Daily    cyanocobalamin  1,000 mcg Oral Daily    levothyroxine  75 mcg Oral Before breakfast    losartan  50 mg Oral BID    memantine  5 mg Oral QHS    metoprolol succinate  50 mg Oral Daily    sodium chloride 0.9%  10 mL Intravenous Q12H       Medications (infusions):      sodium chloride 0.9% 68 mL/hr at 09/05/22 0400    nicardipine 6.5 mg/hr (09/05/22 0823)       Medications (prn):     acetaminophen    Family History:   Family History   Problem Relation Age of Onset    Migraines Mother     Heart failure Father     Heart disease Father 56        MI    Heart failure Brother     Heart disease Brother     Diabetes Son     Hypertension Son     Heart disease Brother     Mental illness Brother     No Known Problems Son     Colon cancer Neg Hx     Colon polyps Neg Hx     Crohn's disease Neg Hx     Ulcerative colitis Neg Hx     Stomach cancer Neg  Hx     Esophageal cancer Neg Hx        Social History: Tobacco:   Social History     Tobacco Use   Smoking Status Never   Smokeless Tobacco Never                                EtOH:   Social History     Substance and Sexual Activity   Alcohol Use No                                Drugs:   Social History     Substance and Sexual Activity   Drug Use No     Physical Exam    Vital signs:  Temp:  [98 °F (36.7 °C)-98.6 °F (37 °C)]   Pulse:  [74-98]   Resp:  [16-32]   BP: (126-237)/()   SpO2:  [91 %-100 %]     Intake/Output:   Intake/Output Summary (Last 24 hours) at 9/5/2022 1124  Last data filed at 9/5/2022 0600  Gross per 24 hour   Intake 369.08 ml   Output 100 ml   Net 269.08 ml        BMI: Estimated body mass index is 29.71 kg/m² as calculated from the following:    Height as of this encounter: 5' (1.524 m).    Weight as of this encounter: 69 kg (152 lb 1.9 oz).    Physical Exam  Vitals and nursing note reviewed.   Constitutional:       General: He is not in acute distress.     Appearance: Normal appearance. He is not ill-appearing, toxic-appearing or diaphoretic.   HENT:      Head: Normocephalic and atraumatic.      Right Ear: External ear normal.      Left Ear: External ear normal.      Nose: Nose normal.      Mouth/Throat:      Mouth: Mucous membranes are moist.      Pharynx: Oropharynx is clear. No oropharyngeal exudate.   Eyes:      General: No scleral icterus.        Right eye: No discharge.         Left eye: No discharge.      Extraocular Movements: Extraocular movements intact.      Conjunctiva/sclera: Conjunctivae normal.      Pupils: Pupils are equal, round, and reactive to light.   Neck:      Vascular: No carotid bruit.   Cardiovascular:      Rate and Rhythm: Normal rate and regular rhythm.      Pulses: Normal pulses.      Heart sounds: Normal heart sounds. No murmur heard.    No friction rub. No gallop.   Pulmonary:      Effort: Pulmonary effort is normal. No respiratory distress.      Breath  sounds: Normal breath sounds. No stridor. No wheezing, rhonchi or rales.   Chest:      Chest wall: No tenderness.   Abdominal:      General: Bowel sounds are normal. There is no distension.      Tenderness: There is no abdominal tenderness. There is no guarding.   Musculoskeletal:         General: No swelling. Normal range of motion.      Cervical back: Normal range of motion and neck supple. No rigidity or tenderness.      Right lower leg: No edema.      Left lower leg: No edema.   Lymphadenopathy:      Cervical: No cervical adenopathy.   Skin:     General: Skin is warm and dry.      Capillary Refill: Capillary refill takes less than 2 seconds.      Coloration: Skin is not jaundiced.      Findings: No bruising.   Neurological:      General: No focal deficit present.      Mental Status: He is alert. Mental status is at baseline.      Cranial Nerves: No cranial nerve deficit.      Sensory: No sensory deficit.      Motor: No weakness.   Psychiatric:         Mood and Affect: Mood normal.         Behavior: Behavior normal.      Comments: Calm        Laboratory    Recent Labs   Lab 09/04/22 2307   WBC 6.62   RBC 3.92*   HGB 12.9*   HCT 39.0*      *   MCH 32.9*   MCHC 33.1       Recent Labs   Lab 09/05/22  0353   CALCIUM 8.6*   PROT 6.8      K 4.6   CO2 22*      BUN 22   CREATININE 1.3   ALKPHOS 73   ALT 21   AST 28   BILITOT 0.7       Recent Labs   Lab 09/04/22 2307   INR 1.0   APTT 25.3       Recent Labs   Lab 09/04/22 2307   TROPONINI <0.030       Additional labs: reviewed    Microbiology:       Microbiology Results (last 7 days)       Procedure Component Value Units Date/Time    Blood culture #2 **CANNOT BE ORDERED STAT** [[id number]] Collected: 09/05/22 0102    Order Status: Completed Specimen: Blood from Peripheral, Wrist, Left Updated: 09/05/22 0758     Blood Culture, Routine No Growth to date    Blood culture #1 **CANNOT BE ORDERED STAT** [[id number]] Collected: 09/04/22 2307    Order  Status: Completed Specimen: Blood from Peripheral, Upper Arm, Right Updated: 09/05/22 0717     Blood Culture, Routine No Growth to date            Radiology    X-Ray Chest AP Portable  Portable chest x-ray at 10:55 PM is compared to prior study dated 7/24/2010    Clinical history is unconscious    The heart is enlarged. The left subclavian vein pacemaker is in stable position. There is a heart monitor.    Lungs are clear. There are no acute osseous abnormalities.    IMPRESSION: Cardiomegaly with no acute pulmonary process    Electronically signed by:  Magdalena Salazar MD  9/5/2022 6:36 AM CDT Workstation: [id number]  US Carotid Bilateral  Reason: Syncope    Grayscale, color and spectral Doppler analysis was performed. Criteria for stenosis is based upon the Society of Radiologists in Ultrasound Consensus Conference, 2003 (Radiology, November 2003, 229, 340-346).    Comparison: 11/2/2019    Findings:  Grayscale images show minimal plaque bilaterally    Estimated peak systolic velocity in right internal carotid artery is 65 cm/s. Antegrade flow is present in the right vertebral artery.    Estimated peak systolic velocity in left internal carotid artery is 128 cm/s.This corresponds to a 50-69% hemodynamically significant stenosis. This is unchanged when compared to the prior study Antegrade flow is present in the left vertebral artery.    Impression:  minimal plaque bilaterally with a 50-69% hemodynamically significant stenosis on the left closer to 50%    No hemodynamically significant stenosis on the right    Electronically signed by:  Magdalena Salazar MD  9/5/2022 6:35 AM CDT Workstation: [id number]  CT Head Without Contrast  All CT scans at this facility used dose modulation, iterative reconstruction and/or weight-based dosing when appropriate to reduce radiation doses  as low as reasonably achievable.    CLINICAL INFORMATION:  Subdural hemorrhage    COMPARISON: 9/4/2022    FINDINGS: There is decreasing size of  the subdural hemorrhage along the falx measuring seven mm in thickness. Previously this measured 12 mm.  There is small amount of subarachnoid hemorrhage along the bifrontal lobes which are stable.    There is small amount of intraventricular hemorrhage within the occipital horns which is normal. The ventricles and sulci are prominent compatible with generalized cerebral atrophy.    There is no midline shift. There are bilateral lacunar infarcts in the basal ganglia. There is periventricular low attenuation compatible with small vessel disease. Cerebellum and brainstem are normal.    The orbits are unremarkable. There is rightward deviation of the nasal septum. The paranasal sinuses and mastoid air cells are clear. There is no calvarial fracture.    IMPRESSION: Decrease in size of the subarachnoid hemorrhage along the falx with stable subarachnoid hemorrhage in the anterior frontal lobes    New small amount of intraventricular hemorrhage within the occipital lobes    Generalized cerebral atrophy with old lacunar infarcts and periventricular small vessel disease    Electronically signed by:  Magdalena Salazar MD  9/5/2022 6:27 AM CDT Workstation: [id number]        Additional Studies    reviewed    Ventilator Information              No results for input(s): PH, PCO2, PO2, HCO3, POCSATURATED, BE in the last 72 hours.      Impression    Active Hospital Problems    Diagnosis  POA    *Subdural hematoma [S06.5X9A]  Yes      Resolved Hospital Problems   No resolved problems to display.       Plan    Stable at this time  Continue further management and observation  Dr Stone' note reviewed  Respiratory status is stable  Cardiology following    Thank you for this consult.  I will follow with you while the patient is hospitalized.        Nilesh Martinez MD  [facility] Pulmonary/Critical Care  09/05/2022

## 2022-09-05 NOTE — CONSULTS
[facility]  Neurosurgery  Consult Note    Consults  Subjective:s/p fall w Rt sided rib cage pain;      Chief Complaint/Reason for Admission: SDH on CT, s/p fall;     History of Present Illness:HPI: 88 years old male w past medical history of coronary artery disease,  status post stent, ( on aspirin and Plavix ) hypertension, GERD,  dyslipidemia , hypothyroidism,  anemia and dementia presented s/p mechanical fall.  Pt is status post pacemaker placement , history of MI , history prostate cancer . Pt was recently admitted to our facility due to episode of syncope after taking nitroglycerin and was sent home . He presented yesterday PM because he had a fall per his family . After he fell , the fmly went to check on him and his eyes were open, but for at least 2 minutes patient was not very responsive. Patient recently has not complained of chest pain , shortness a breath,  no fevers no UTI like symptoms or nausea /vomiting or diarrhea . Pt denies headache this AM and was able to tolerate eating.  Pt is watching a game show on TV.  Pt c/o Rt sided rib cage pain.      PTA Medications   Medication Sig    acetaminophen (TYLENOL ARTHRITIS PAIN ORAL) Take 2 tablets by mouth 2 (two) times a day.    amiodarone (PACERONE) 200 MG Tab Take 200 mg by mouth 2 (two) times daily.    amLODIPine (NORVASC) 5 MG tablet Take 5 mg by mouth once daily.    aspirin 81 MG Chew Take 81 mg by mouth Daily.     atorvastatin (LIPITOR) 40 MG tablet Take 1 tablet (40 mg total) by mouth once daily.    cetirizine (ZYRTEC) 10 MG tablet Take 1 tablet (10 mg total) by mouth once daily.    clopidogrel (PLAVIX) 75 mg tablet Take 1 tablet (75 mg total) by mouth once daily.    cyanocobalamin, vitamin B-12, (VITAMIN B-12) 5,000 mcg Subl Place 1 tablet under the tongue once daily.    cycloSPORINE (RESTASIS) 0.05 % ophthalmic emulsion Apply 1 drop to eye 2 (two) times daily.    fluticasone propionate (FLONASE) 50 mcg/actuation nasal spray USE 1  SPRAY IN EACH NOSTRIL TWICE DAILY (Patient taking differently: 1 spray by Each Nostril route 2 (two) times a day.)    levothyroxine (SYNTHROID) 75 MCG tablet Take 75 mcg by mouth before breakfast.    losartan (COZAAR) 50 MG tablet Take 1 tablet (50 mg total) by mouth 2 (two) times daily.    memantine (NAMENDA) 10 MG Tab Take 5 mg by mouth every evening. Dr Betancourt decreased dose from 10>5    metoprolol succinate (TOPROL-XL) 50 MG 24 hr tablet TAKE 1 TABLET EVERY DAY (Patient taking differently: Take 50 mg by mouth once daily.)    mirtazapine (REMERON) 15 MG tablet Take 15 mg by mouth every evening.    pantoprazole (PROTONIX) 40 MG tablet Take 40 mg by mouth once daily.    amLODIPine (NORVASC) 10 MG tablet Take 1 tablet (10 mg total) by mouth once daily.    cloNIDine (CATAPRES) 0.1 MG tablet Take 1 tablet (0.1 mg total) by mouth 2 (two) times daily as needed (SBP greater than 160).    folic acid (FOLVITE) 1 MG tablet folic acid 1 mg tablet    LORazepam (ATIVAN) 0.5 MG tablet lorazepam 0.5 mg tablet   prn    melatonin 5 mg Subl Take 1 tablet by mouth nightly as needed.    mirtazapine (REMERON) 7.5 MG Tab TAKE 1 TABLET (7.5 MG TOTAL) BY MOUTH EVERY EVENING.    montelukast (SINGULAIR) 10 mg tablet montelukast 10 mg tablet    nitroGLYCERIN (NITROSTAT) 0.4 MG SL tablet Place 0.4 mg under the tongue every 5 (five) minutes as needed.    sotaloL (BETAPACE) 80 MG tablet Take 80 mg by mouth 2 (two) times daily.       Review of patient's allergies indicates:   Allergen Reactions    Iodinated contrast media Rash    Pontocaine [tetracaine hcl] Anaphylaxis     hypotension       Past Medical History:   Diagnosis Date    Abnormal echocardiogram 11/21/2019    Normal left ventricular systolic function with estimated ejection fraction of 60%.  Grade 2 moderate left ventricular diastolic dysfunction consistent with pseudonormalization.  Mild left atrial enlargement.  Mild aortic regurgitation.  Mild to moderate mitral regurgitation.   Mild tricuspid regurgitation.  Estimated PA systolic pressure is 38 mm of mercury.  Diagnosis is syncope.    Anxiety     Arthritis     CAD (coronary artery disease) age 68    Carotid artery occlusion     Cerebrovascular small vessel disease     Colon polyps age 78    Coronary artery disease of native artery of native heart with stable angina pectoris 5/6/2020    GERD (gastroesophageal reflux disease)     H. pylori infection     HTN (hypertension), benign age 65    Hyperlipidemia LDL goal <70 age 65    Hypothyroidism     Multiple fractures of ribs of right side 11/27/2012    Myocardial infarction     Pernicious anemia 1/26/2018    Primary insomnia 10/9/2018    Prostate cancer age 67    Subdural hematoma 9/5/2022    Syncopal episodes 3/2013    Urge incontinence 5/8/2018     Past Surgical History:   Procedure Laterality Date    CARDIAC PACEMAKER PLACEMENT  10/2015    [employer] Pacemaker    CARDIAC SURGERY      CATARACT EXTRACTION W/  INTRAOCULAR LENS IMPLANT Bilateral     COLONOSCOPY  ~2013    Dr. Edge    COLONOSCOPY N/A 06/08/2016    Procedure: COLONOSCOPY;  Surgeon: Shamar Barahona MD;  Location: [facility];  Service: Endoscopy;  Laterality: N/A; REPEAT IN 1 YEAR    CORONARY ANGIOPLASTY WITH STENT PLACEMENT  2003    x 2 RCA    PROSTATECTOMY  2002    UPPER GASTROINTESTINAL ENDOSCOPY  11/15/2016    Dr. Barahona     Family History       Problem Relation (Age of Onset)    Diabetes Son    Heart disease Father (56), Brother, Brother    Heart failure Father, Brother    Hypertension Son    Mental illness Brother    Migraines Mother    No Known Problems Son          Tobacco Use    Smoking status: Never    Smokeless tobacco: Never   Substance and Sexual Activity    Alcohol use: No    Drug use: No    Sexual activity: Not Currently     Review of Systems  Objective:     Weight: 69 kg (152 lb 1.9 oz)  Body mass index is 29.71 kg/m².  Vital Signs (Most Recent):  Temp: 98.1 °F (36.7 °C) (09/05/22 0730)  Pulse: 74 (09/05/22  1000)  Resp: (!) 22 (09/05/22 1000)  BP: 126/79 (09/05/22 1000)  SpO2: 99 % (09/05/22 1000)   Vital Signs (24h Range):  Temp:  [98 °F (36.7 °C)-98.6 °F (37 °C)] 98.1 °F (36.7 °C)  Pulse:  [74-98] 74  Resp:  [16-32] 22  SpO2:  [91 %-100 %] 99 %  BP: (126-237)/() 126/79                          Neurosurgery Physical Exam  Alert, OX3;  speech intact;  sitting up and watching TV, in NAD;  PERRL; EOMI;    Motor 5/5;  gait not assessed;     Significant Labs:  Recent Labs   Lab 09/04/22 2307 09/05/22 0353   * 159*    141   K 4.0 4.6    107   CO2 25 22*   BUN 22 22   CREATININE 1.4 1.3   CALCIUM 8.5* 8.6*   MG 2.0 1.6     Recent Labs   Lab 09/04/22 2307   WBC 6.62   HGB 12.9*   HCT 39.0*        Recent Labs   Lab 09/04/22 2307   LABPT 12.7   INR 1.0   APTT 25.3     Microbiology Results (last 7 days)       Procedure Component Value Units Date/Time    Blood culture #2 **CANNOT BE ORDERED STAT** [[id number]] Collected: 09/05/22 0102    Order Status: Completed Specimen: Blood from Peripheral, Wrist, Left Updated: 09/05/22 0758     Blood Culture, Routine No Growth to date    Blood culture #1 **CANNOT BE ORDERED STAT** [[id number]] Collected: 09/04/22 2307    Order Status: Completed Specimen: Blood from Peripheral, Upper Arm, Right Updated: 09/05/22 0717     Blood Culture, Routine No Growth to date          Recent Lab Results  (Last 5 results in the past 24 hours)        09/05/22  0353   09/05/22 0105 09/05/22 0102 09/04/22 2329 09/04/22 2327        Albumin 3.7               Alkaline Phosphatase 73               ALT 21               Anion Gap 12               Appearance, UA         Clear       AST 28               Bacteria, UA         Negative       Bilirubin (UA)         Negative       BILIRUBIN TOTAL 0.7  Comment: For infants and newborns, interpretation of results should be based  on gestational age, weight and in agreement with clinical  observations.    Premature Infant recommended  reference ranges:  Up to 24 hours.............<8.0 mg/dL  Up to 48 hours............<12.0 mg/dL  3-5 days..................<15.0 mg/dL  6-29 days.................<15.0 mg/dL                 Blood Culture, Routine     No Growth to date  [P]           BUN 22               Calcium 8.6               Chloride 107               CO2 22               Color, UA         Yellow       Creatinine 1.3               eGFR 52.8               Ferritin 115               Folate 8.9               Glucose 159               Glucose, UA         Negative       Hyaline Casts, UA         1       Iron 52               Ketones, UA         Negative       Lactate, Dave   1.5  Comment: Falsely low lactic acid results can be found in samples   containing >=13.0 mg/dL total bilirubin and/or >=3.5 mg/dL   direct bilirubin.               Leukocytes, UA         Negative       Magnesium 1.6               Microscopic Comment         SEE COMMENT  Comment: Other formed elements not mentioned in the report are not   present in the microscopic examination.          NITRITE UA         Negative       Occult Blood UA         2+       pH, UA         7.0       Phosphorus 2.7               Potassium 4.6               PROTEIN TOTAL 6.8               Protein, UA         3+  Comment: Recommend a 24 hour urine protein or a urine   protein/creatinine ratio if globulin induced proteinuria is  clinically suspected.         RBC, UA         1       SARS-CoV-2 RNA, Amplification, Qual       Negative  Comment: This test utilizes isothermal nucleic acid amplification   technology to detect the SARS-CoV-2 RdRp nucleic acid segment.   The analytical sensitivity (limit of detection) is 125 genome   equivalents/mL.     A POSITIVE result implies infection with the SARS-CoV-2 virus;  the patient is presumed to be contagious.    A NEGATIVE result means that SARS-CoV-2 nucleic acids are not  present above the limit of detection. A NEGATIVE result should be   treated as presumptive. It  does not rule out the possibility of   COVID-19 and should not be the sole basis for treatment decisions.   If COVID-19 is strongly suspected based on clinical and exposure   history, re-testing using an alternate molecular assay should be   considered.       This test is only for use under the Food and Drug   Administration s Emergency Use Authorization (EUA).   Commercial kits are provided by [employer].   Performance characteristics of the EUA have been independently  verified by [facility] Department of  Pathology and Laboratory Medicine.   _________________________________________________________________  The ID NOW COVID-19 Letter of Authorization, along with the   authorized Fact Sheet for Healthcare Providers, the authorized Fact  Sheet for Patients, and authorized labeling are available on the FDA   website:  www.fda.gov/MedicalDevices/Safety/EmergencySituations/kmj154408.htm           Saturated Iron 18               Sodium 141               Specific [place], UA         1.015       Specimen UA         Urine, Clean Catch       Squam Epithel, UA         8       TIBC 297               Transferrin 212               UROBILINOGEN UA         Negative       Vit D, 25-Hydroxy 22  Comment: Vitamin D deficiency.........<10 ng/mL                              Vitamin D insufficiency......10-29 ng/mL       Vitamin D sufficiency........> or equal to 30 ng/mL  Vitamin D toxicity............>100 ng/mL                 Vitamin B-12 599               WBC, UA         5                               [P] - Preliminary Result             Significant Diagnostics:      CT head 9/5/22: Decrease in size of the subarachnoid hemorrhage along the falx with stable subarachnoid hemorrhage in the anterior frontal lobes     New small amount of intraventricular hemorrhage within the occipital lobes     Generalized cerebral atrophy with old lacunar infarcts and periventricular small vessel disease     Assessment/Plan:  s/p  Fall on plavix/asa w mild SDH w/o mass effect and baseline subdural hygromas due to brain atrophy;  Pt is stable.  Rec: restart plavix/asa in 7 days;  no driving;  ok to eat;  PT/OT/ST;  will sign off;  fall precautions;  pt can f/u w his PCP;      Active Diagnoses:    Diagnosis Date Noted POA    PRINCIPAL PROBLEM:  Subdural hematoma [S06.5X9A] 09/05/2022 Yes      Problems Resolved During this Admission:       Thank you for your consult. I will sign off. Please contact us if you have any additional questions.    Su Stone MD  Neurosurgery  [facility]

## 2022-09-05 NOTE — PT/OT/SLP EVAL
Physical Therapy Evaluation    Patient Name:  Gene Hartman   MRN:  [id number]    Recommendations:     Discharge Recommendations:  home health PT   Discharge Equipment Recommendations: walker, rolling   Barriers to discharge:  increase assist with mobility    Assessment:     Gene Hartman is a 88 y.o. male admitted with a medical diagnosis of Subdural hematoma.  He presents with the following impairments/functional limitations:  weakness, impaired endurance, impaired self care skills, impaired functional mobility, gait instability, impaired balance, decreased lower extremity function, decreased safety awareness, pain, impaired cardiopulmonary response to activity.    Pt found in bed with HOB elevated. Pt agreeable to visit. Pt presents with no significant LE weakness, no change in sensation and gross motor coordination intacted. Pt ambulated 100 ft with RW and CGA. Pt verbalizes that he needs to use his RW more at home.     Rehab Prognosis: Fair; patient would benefit from acute skilled PT services to address these deficits and reach maximum level of function.    Recent Surgery: * No surgery found *      Plan:     During this hospitalization, patient to be seen 6 x/week to address the identified rehab impairments via gait training, therapeutic activities, therapeutic exercises, neuromuscular re-education and progress toward the following goals:    Plan of Care Expires:  10/05/22    Subjective     Chief Complaint: none, mild confusion  Patient/Family Comments/goals: return home  Pain/Comfort:  Pain Rating 1: 10/10  Location - Side 1: Right  Location 1: flank  Pain Addressed 1: Reposition, Distraction, Cessation of Activity    Patients cultural, spiritual, Anabaptism conflicts given the current situation: no    Living Environment:  Pt lives with his son and daughter in law in a one story home with no MARVA. Pt has a rollator that he uses infrequently.   Prior to admission, patients level of function was MI no AD  majority of the time.  Equipment used at home: rollator.  DME owned (not currently used): none.  Upon discharge, patient will have assistance from son and daughter in law.    Objective:     Communicated with RN prior to session.  Patient found HOB elevated with peripheral IV, telemetry, pulse ox (continuous), blood pressure cuff, oxygen  upon PT entry to room.    General Precautions: Standard, fall   Orthopedic Precautions:N/A   Braces: N/A  Respiratory Status: Nasal cannula, flow 3 L/min    Exams:  Cognitive Exam:  Patient is oriented to Person, Place, and Situation  Gross Motor Coordination:  WFL  Sensation:    -       Intact  RLE ROM: WFL  RLE Strength: WFL except hip flexion 3+/5  LLE ROM: WFL  LLE Strength: WFL except hip flexion 3+/5    Functional Mobility:  Bed Mobility:     Supine to Sit: contact guard assistance  Transfers:     Sit to Stand:  contact guard assistance with rolling walker  Gait: 100 ft with RW and CGA    Therapeutic Activities and Exercises:   Pt educated on POC, discharge recommendation, benefit of consistent use of RW at home, importance of time OOB, proper form with bed mobility and transfers, RW management, pacing/energy conservation, need for assist with mobility, use of call bell to seek assistance as needed and fall prevention      AM-PAC 6 CLICK MOBILITY  Total Score:17     Patient left up in chair with all lines intact, call button in reach, chair alarm on, and RN notified.    GOALS:   Multidisciplinary Problems       Physical Therapy Goals          Problem: Physical Therapy    Goal Priority Disciplines Outcome Goal Variances Interventions   Physical Therapy Goal     PT, PT/OT Ongoing, Progressing     Description: Goals to be met by: 10/5/22     Patient will increase functional independence with mobility by performin. Supine to sit with Supervision  2. Sit to stand transfer with Supervision  3. Bed to chair transfer with Supervision using Rolling Walker  4. Gait  x 150 feet  with Supervision using Rolling Walker.                              History:     Past Medical History:   Diagnosis Date    Abnormal echocardiogram 11/21/2019    Normal left ventricular systolic function with estimated ejection fraction of 60%.  Grade 2 moderate left ventricular diastolic dysfunction consistent with pseudonormalization.  Mild left atrial enlargement.  Mild aortic regurgitation.  Mild to moderate mitral regurgitation.  Mild tricuspid regurgitation.  Estimated PA systolic pressure is 38 mm of mercury.  Diagnosis is syncope.    Anxiety     Arthritis     CAD (coronary artery disease) age 68    Carotid artery occlusion     Cerebrovascular small vessel disease     Colon polyps age 78    Coronary artery disease of native artery of native heart with stable angina pectoris 5/6/2020    GERD (gastroesophageal reflux disease)     H. pylori infection     HTN (hypertension), benign age 65    Hyperlipidemia LDL goal <70 age 65    Hypothyroidism     Multiple fractures of ribs of right side 11/27/2012    Myocardial infarction     Pernicious anemia 1/26/2018    Primary insomnia 10/9/2018    Prostate cancer age 67    Subdural hematoma 9/5/2022    Syncopal episodes 3/2013    Urge incontinence 5/8/2018       Past Surgical History:   Procedure Laterality Date    CARDIAC PACEMAKER PLACEMENT  10/2015    [employer] Pacemaker    CARDIAC SURGERY      CATARACT EXTRACTION W/  INTRAOCULAR LENS IMPLANT Bilateral     COLONOSCOPY  ~2013    Dr. Edge    COLONOSCOPY N/A 06/08/2016    Procedure: COLONOSCOPY;  Surgeon: Shamar Barahona MD;  Location: [facility];  Service: Endoscopy;  Laterality: N/A; REPEAT IN 1 YEAR    CORONARY ANGIOPLASTY WITH STENT PLACEMENT  2003    x 2 RCA    PROSTATECTOMY  2002    UPPER GASTROINTESTINAL ENDOSCOPY  11/15/2016    Dr. Barahona       Time Tracking:     PT Received On: 09/05/22  PT Start Time: 0922     PT Stop Time: 0947  PT Total Time (min): 25 min     Billable Minutes: Evaluation 11 and Gait  Training 14      09/05/2022

## 2022-09-05 NOTE — SUBJECTIVE & OBJECTIVE
Past Medical History:   Diagnosis Date    Abnormal echocardiogram 11/21/2019    Normal left ventricular systolic function with estimated ejection fraction of 60%.  Grade 2 moderate left ventricular diastolic dysfunction consistent with pseudonormalization.  Mild left atrial enlargement.  Mild aortic regurgitation.  Mild to moderate mitral regurgitation.  Mild tricuspid regurgitation.  Estimated PA systolic pressure is 38 mm of mercury.  Diagnosis is syncope.    Anxiety     Arthritis     CAD (coronary artery disease) age 68    Carotid artery occlusion     Cerebrovascular small vessel disease     Colon polyps age 78    Coronary artery disease of native artery of native heart with stable angina pectoris 5/6/2020    GERD (gastroesophageal reflux disease)     H. pylori infection     HTN (hypertension), benign age 65    Hyperlipidemia LDL goal <70 age 65    Hypothyroidism     Multiple fractures of ribs of right side 11/27/2012    Myocardial infarction     Pernicious anemia 1/26/2018    Primary insomnia 10/9/2018    Prostate cancer age 67    Syncopal episodes 3/2013    Urge incontinence 5/8/2018       Past Surgical History:   Procedure Laterality Date    CARDIAC PACEMAKER PLACEMENT  10/2015    [employer] Pacemaker    CARDIAC SURGERY      CATARACT EXTRACTION W/  INTRAOCULAR LENS IMPLANT Bilateral     COLONOSCOPY  ~2013    Dr. Edge    COLONOSCOPY N/A 06/08/2016    Procedure: COLONOSCOPY;  Surgeon: Shamar Barahona MD;  Location: [facility];  Service: Endoscopy;  Laterality: N/A; REPEAT IN 1 YEAR    CORONARY ANGIOPLASTY WITH STENT PLACEMENT  2003    x 2 RCA    PROSTATECTOMY  2002    UPPER GASTROINTESTINAL ENDOSCOPY  11/15/2016    Dr. Barahona       Review of patient's allergies indicates:   Allergen Reactions    Iodinated contrast media Rash    Pontocaine [tetracaine hcl] Anaphylaxis     hypotension       No current facility-administered medications on file prior to encounter.     Current Outpatient Medications on File  Prior to Encounter   Medication Sig    acetaminophen (TYLENOL ARTHRITIS PAIN ORAL) Take 2 tablets by mouth 2 (two) times a day.    amiodarone (PACERONE) 200 MG Tab Take 200 mg by mouth 2 (two) times daily.    amLODIPine (NORVASC) 5 MG tablet Take 5 mg by mouth once daily.    aspirin 81 MG Chew Take 81 mg by mouth Daily.     atorvastatin (LIPITOR) 40 MG tablet Take 1 tablet (40 mg total) by mouth once daily.    cetirizine (ZYRTEC) 10 MG tablet Take 1 tablet (10 mg total) by mouth once daily.    clopidogrel (PLAVIX) 75 mg tablet Take 1 tablet (75 mg total) by mouth once daily.    cyanocobalamin, vitamin B-12, (VITAMIN B-12) 5,000 mcg Subl Place 1 tablet under the tongue once daily.    cycloSPORINE (RESTASIS) 0.05 % ophthalmic emulsion Apply 1 drop to eye 2 (two) times daily.    fluticasone propionate (FLONASE) 50 mcg/actuation nasal spray USE 1 SPRAY IN EACH NOSTRIL TWICE DAILY (Patient taking differently: 1 spray by Each Nostril route 2 (two) times a day.)    levothyroxine (SYNTHROID) 75 MCG tablet Take 75 mcg by mouth before breakfast.    losartan (COZAAR) 50 MG tablet Take 1 tablet (50 mg total) by mouth 2 (two) times daily.    memantine (NAMENDA) 10 MG Tab Take 5 mg by mouth every evening. Dr Betancourt decreased dose from 10>5    metoprolol succinate (TOPROL-XL) 50 MG 24 hr tablet TAKE 1 TABLET EVERY DAY (Patient taking differently: Take 50 mg by mouth once daily.)    mirtazapine (REMERON) 15 MG tablet Take 15 mg by mouth every evening.    pantoprazole (PROTONIX) 40 MG tablet Take 40 mg by mouth once daily.    amLODIPine (NORVASC) 10 MG tablet Take 1 tablet (10 mg total) by mouth once daily.    cloNIDine (CATAPRES) 0.1 MG tablet Take 1 tablet (0.1 mg total) by mouth 2 (two) times daily as needed (SBP greater than 160).    folic acid (FOLVITE) 1 MG tablet folic acid 1 mg tablet    LORazepam (ATIVAN) 0.5 MG tablet lorazepam 0.5 mg tablet   prn    melatonin 5 mg Subl Take 1 tablet by mouth nightly as needed.     mirtazapine (REMERON) 7.5 MG Tab TAKE 1 TABLET (7.5 MG TOTAL) BY MOUTH EVERY EVENING.    montelukast (SINGULAIR) 10 mg tablet montelukast 10 mg tablet    nitroGLYCERIN (NITROSTAT) 0.4 MG SL tablet Place 0.4 mg under the tongue every 5 (five) minutes as needed.    sotaloL (BETAPACE) 80 MG tablet Take 80 mg by mouth 2 (two) times daily.     Family History       Problem Relation (Age of Onset)    Diabetes Son    Heart disease Father (56), Brother, Brother    Heart failure Father, Brother    Hypertension Son    Mental illness Brother    Migraines Mother    No Known Problems Son          Tobacco Use    Smoking status: Never    Smokeless tobacco: Never   Substance and Sexual Activity    Alcohol use: No    Drug use: No    Sexual activity: Not Currently     Review of Systems   Reason unable to perform ROS: Ten point system review pertinent positives are present HPI.  Not totally reliable since patient has dementia family helps with review of systems.   Objective:     Vital Signs (Most Recent):  Temp: 98.6 °F (37 °C) (09/04/22 2228)  Pulse: 86 (09/05/22 0245)  Resp: (!) 26 (09/05/22 0245)  BP: 136/65 (09/05/22 0245)  SpO2: 95 % (09/05/22 0245)   Vital Signs (24h Range):  Temp:  [98.6 °F (37 °C)] 98.6 °F (37 °C)  Pulse:  [80-98] 86  Resp:  [17-32] 26  SpO2:  [91 %-98 %] 95 %  BP: (133-237)/() 136/65        There is no height or weight on file to calculate BMI.    Physical Exam  Constitutional:       Appearance: He is not ill-appearing, toxic-appearing or diaphoretic.   HENT:      Head: Normocephalic.      Nose: Nose normal.   Eyes:      General: No scleral icterus.        Right eye: No discharge.         Left eye: No discharge.   Cardiovascular:      Rate and Rhythm: Normal rate and regular rhythm.      Heart sounds: No murmur heard.    No friction rub. No gallop.   Pulmonary:      Effort: Pulmonary effort is normal. No respiratory distress.      Breath sounds: Normal breath sounds. No stridor. No wheezing, rhonchi or  rales.   Chest:      Chest wall: No tenderness.   Abdominal:      General: There is no distension.      Palpations: There is no mass.      Tenderness: There is no abdominal tenderness. There is no guarding or rebound.      Hernia: No hernia is present.   Musculoskeletal:         General: No swelling, deformity or signs of injury.      Cervical back: Neck supple.      Right lower leg: No edema.      Left lower leg: No edema.   Skin:     Coloration: Skin is not jaundiced.      Findings: No erythema, lesion or rash.   Neurological:      General: No focal deficit present.      Mental Status: He is alert.      Comments: Patient has dementia awake and alert follows commands   Strength +4/5 proximally throughout patient has generalized weakness. Distally 5/5           Significant Labs: All pertinent labs within the past 24 hours have been reviewed.  BMP:   Recent Labs   Lab 09/04/22 2307   *      K 4.0      CO2 25   BUN 22   CREATININE 1.4   CALCIUM 8.5*   MG 2.0     CBC:   Recent Labs   Lab 09/04/22 2307   WBC 6.62   HGB 12.9*   HCT 39.0*        CMP:   Recent Labs   Lab 09/04/22 2307      K 4.0      CO2 25   *   BUN 22   CREATININE 1.4   CALCIUM 8.5*   PROT 7.3   ALBUMIN 4.0   BILITOT 0.6   ALKPHOS 79   AST 27   ALT 23   ANIONGAP 6*     Troponin:   Recent Labs   Lab 09/04/22 2307   TROPONINI <0.030     TSH:   Recent Labs   Lab 09/04/22 2307   TSH 6.990*     Urine Culture: No results for input(s): LABURIN in the last 48 hours.  Urine Studies:   Recent Labs   Lab 09/04/22 2327   COLORU Yellow   APPEARANCEUA Clear   PHUR 7.0   SPECGRAV 1.015   PROTEINUA 3+*   GLUCUA Negative   KETONESU Negative   BILIRUBINUA Negative   OCCULTUA 2+*   NITRITE Negative   UROBILINOGEN Negative   LEUKOCYTESUR Negative   RBCUA 1   WBCUA 5   BACTERIA Negative   SQUAMEPITHEL 8   HYALINECASTS 1       Significant Imaging: I have reviewed all pertinent imaging results/findings within the past 24  hours.

## 2022-09-05 NOTE — PT/OT/SLP EVAL
Occupational Therapy   Evaluation, tx    Name: Gene Hartman  MRN: [id number]  Admitting Diagnosis:  Subdural hematoma  Recent Surgery: * No surgery found *      Recommendations:     Discharge Recommendations: home health OT, home with home health (24/7 caregiver supervision)  Discharge Equipment Recommendations:   (TBD)  Barriers to discharge:  None    Assessment:     Gene Hartman is a 88 y.o. male with a medical diagnosis of Subdural hematoma.  He presents with Performance deficits affecting function: weakness, impaired endurance, impaired self care skills, impaired functional mobility, gait instability, impaired balance, impaired cognition, decreased coordination, decreased upper extremity function, decreased lower extremity function, decreased safety awareness.      Rehab Prognosis: Good; patient would benefit from acute skilled OT services to address these deficits and reach maximum level of function.       Plan:     Patient to be seen 5 x/week to address the above listed problems via self-care/home management, therapeutic activities, therapeutic exercises  Plan of Care Expires: 10/05/22  Plan of Care Reviewed with: patient    Subjective     Chief Complaint: R neck and mid back sharp pain that comes and goes.  Patient/Family Comments/goals: pt expressed eagerness to see his family.    Occupational Profile:  Living Environment:  pt is an unreliable historian, hx of baseline dementia/impaired cognition. Per chart. Pt lives with son, DIL and spouse in [facility] with no steps. Bathroom setup TBD.   Previous level of function: per chart, pt was Mod I ADLS, ambulated Indep-Mod I, did not use his rollator. Pt does not drive.  Roles and Routines: active in basic selfcare act.  Equipment Used at Home:   (has rollator but was not using per chart report)  Assistance upon Discharge:  family    Pain/Comfort:  Pain Rating 1:  (difficult rating, said pain was moderate)  Location - Side 1: Right  Location - Orientation 1:  medial  Location 1: back  Pain Addressed 1: Reposition, Distraction, Cessation of Activity  Pain Rating Post-Intervention 1: 0/10  Pain Rating 2:  (same as above)  Location - Side 2: Right  Location - Orientation 2: lateral  Location 2: neck  Pain Addressed 2: Reposition, Cessation of Activity  Pain Rating Post-Intervention 2: 0/10    Patients cultural, spiritual, Mormon conflicts given the current situation: no    Objective:     Communicated with:  nurse prior to session.  Patient found up in chair with telemetry, blood pressure cuff, peripheral IV, pulse ox (continuous) upon OT entry to room.    General Precautions: Standard, fall, NPO   Orthopedic Precautions:N/A   Braces: N/A  Respiratory Status: Nasal cannula, flow 3 L/min    Occupational Performance:        Functional Mobility/Transfers:  Patient completed Sit <> Stand Transfer with contact guard assistance  with  hand-held assist   Patient completed Bed <> Chair Transfer using Step Transfer technique with contact guard assistance with hand-held assist  Functional Mobility: ambulated short distance in room with CGA/HHA, unsteady, poor safety/inattentive and needed redirection.    Activities of Daily Living:  Feeding:  NPO   Grooming:  stand by assistance to don/doff socks seated with ankle crossed over opposite knee, effortful to don.  Toileting pt declined use    Cognitive/Visual Perceptual:  Cognitive/Psychosocial Skills:     -       Oriented to: Person, Place, and with prompts   -       Follows Commands/attention:Easily distracted and Follows one-step commands  -       Communication: clear/fluent  -       Memory: Impaired STM, Impaired LTM, and Poor immediate recall  -       Safety awareness/insight to disability: impaired   -       Mood/Affect/Coping skills/emotional control: Cooperative and Pleasant  Visual/Perceptual:      -Intact grossly    Physical Exam:  Balance:    -       sitting: good  dynamic: good  standing;:fair   dynamic; fair -  Postural  examination/scapula alignment:    -       Rounded shoulders  Skin integrity: Bruising of arms and back of head  Motor Planning:    -       intact  Dominant hand:    -       right  Upper Extremity Range of Motion:     -       Right Upper Extremity: WFL  -       Left Upper Extremity: WFL  Upper Extremity Strength:    -       Right Upper Extremity: WFL  -       Left Upper Extremity: WFL   Strength:    -       Right Upper Extremity: WFL  -       Left Upper Extremity: WFL  Fine Motor Coordination:    -       Intact  Gross motor coordination:   WFL  Neurological:    -       normal tone    AMPAC 6 Click ADL:  AMPA Total Score: 20    Treatment & Education:  Purpose of OT and POC explained, ongoing as pt has impaired cognition ezio memory, has hx dementia and needed min redirection to task.  Pt performed self care seated for safety, explained call don't fall and chair check purpose.     Patient left up in chair with all lines intact, call button in reach, chair alarm on, and nurse notified    GOALS:   Multidisciplinary Problems       Occupational Therapy Goals          Problem: Occupational Therapy    Goal Priority Disciplines Outcome Interventions   Occupational Therapy Goal     OT, PT/OT Ongoing, Progressing    Description: Goals to be met by: 10/26/2022    Patient will increase functional independence with ADLs by performing:    UE Dressing with Supervision.  LE Dressing with Supervision.  Grooming while standing at sink with Supervision.  Toileting from toilet with Supervision for hygiene and clothing management.   Toilet transfer to toilet with Supervision.                         History:     Past Medical History:   Diagnosis Date    Abnormal echocardiogram 11/21/2019    Normal left ventricular systolic function with estimated ejection fraction of 60%.  Grade 2 moderate left ventricular diastolic dysfunction consistent with pseudonormalization.  Mild left atrial enlargement.  Mild aortic regurgitation.  Mild to  moderate mitral regurgitation.  Mild tricuspid regurgitation.  Estimated PA systolic pressure is 38 mm of mercury.  Diagnosis is syncope.    Anxiety     Arthritis     CAD (coronary artery disease) age 68    Carotid artery occlusion     Cerebrovascular small vessel disease     Colon polyps age 78    Coronary artery disease of native artery of native heart with stable angina pectoris 5/6/2020    GERD (gastroesophageal reflux disease)     H. pylori infection     HTN (hypertension), benign age 65    Hyperlipidemia LDL goal <70 age 65    Hypothyroidism     Multiple fractures of ribs of right side 11/27/2012    Myocardial infarction     Pernicious anemia 1/26/2018    Primary insomnia 10/9/2018    Prostate cancer age 67    Subdural hematoma 9/5/2022    Syncopal episodes 3/2013    Urge incontinence 5/8/2018         Past Surgical History:   Procedure Laterality Date    CARDIAC PACEMAKER PLACEMENT  10/2015    [employer] Pacemaker    CARDIAC SURGERY      CATARACT EXTRACTION W/  INTRAOCULAR LENS IMPLANT Bilateral     COLONOSCOPY  ~2013    Dr. Edge    COLONOSCOPY N/A 06/08/2016    Procedure: COLONOSCOPY;  Surgeon: Shamar Barahona MD;  Location: [facility];  Service: Endoscopy;  Laterality: N/A; REPEAT IN 1 YEAR    CORONARY ANGIOPLASTY WITH STENT PLACEMENT  2003    x 2 RCA    PROSTATECTOMY  2002    UPPER GASTROINTESTINAL ENDOSCOPY  11/15/2016    Dr. Barahona       Time Tracking:     OT Date of Treatment: 09/05/22  OT Start Time: 1103  OT Stop Time: 1128  OT Total Time (min): 25 min    Billable Minutes:Evaluation 10  Self Care/Home Management 15  Total Time 25    9/5/2022

## 2022-09-05 NOTE — CONSULTS
[facility]   Cardiology Note    Consult Requested By: er  Reason for Consult: syncope    SUBJECTIVE:     History of Present Illness:   Patient of Dr Betancourt  H/o HTN, HLP, Dementia, Soarin PM, CAD  PCI RCA, CX 2015, carotid artery diease   2/2022 -stress negative ischemia  7/2022 - ECHO- ef 45-50%, LVH, DD, INF apical HK  Recent interrogation of PM 18 episodes ventricular ectopy lasting 1-38 minutes and started on amiodarone  Recent hospitalization noted he was high risk [facility] due to age an dementia and was going to medically manage     CT head Decrease in size of the subarachnoid hemorrhage along the falx with stable subarachnoid hemorrhage in the anterior frontal lobes, New small amount of intraventricular hemorrhage within the occipital lobes  US Carotid 50-69% l ICA unchanged from previous  Labs reviewed MG 1.6, potassium 4.6 creat 1.3, trop normal,   CXR no significant findings  EKG- SR prolonged Qtc    Sitting in chair at bedside  HPI from ER reviewed, patient with Dementia and unable to recall events leading to hospitalization  Questionable whether it was a syncopal event VS mechanical fall-- unwitnessed   Denies any current chest pain, pressure  BP 150s            Review of patient's allergies indicates:   Allergen Reactions    Iodinated contrast media Rash    Pontocaine [tetracaine hcl] Anaphylaxis     hypotension       Past Medical History:   Diagnosis Date    Abnormal echocardiogram 11/21/2019    Normal left ventricular systolic function with estimated ejection fraction of 60%.  Grade 2 moderate left ventricular diastolic dysfunction consistent with pseudonormalization.  Mild left atrial enlargement.  Mild aortic regurgitation.  Mild to moderate mitral regurgitation.  Mild tricuspid regurgitation.  Estimated PA systolic pressure is 38 mm of mercury.  Diagnosis is syncope.    Anxiety     Arthritis     CAD (coronary artery disease) age 68    Carotid artery occlusion     Cerebrovascular  small vessel disease     Colon polyps age 78    Coronary artery disease of native artery of native heart with stable angina pectoris 5/6/2020    GERD (gastroesophageal reflux disease)     H. pylori infection     HTN (hypertension), benign age 65    Hyperlipidemia LDL goal <70 age 65    Hypothyroidism     Multiple fractures of ribs of right side 11/27/2012    Myocardial infarction     Pernicious anemia 1/26/2018    Primary insomnia 10/9/2018    Prostate cancer age 67    Subdural hematoma 9/5/2022    Syncopal episodes 3/2013    Urge incontinence 5/8/2018     Past Surgical History:   Procedure Laterality Date    CARDIAC PACEMAKER PLACEMENT  10/2015    [employer] Pacemaker    CARDIAC SURGERY      CATARACT EXTRACTION W/  INTRAOCULAR LENS IMPLANT Bilateral     COLONOSCOPY  ~2013    Dr. Edge    COLONOSCOPY N/A 06/08/2016    Procedure: COLONOSCOPY;  Surgeon: Shamar Barahona MD;  Location: [facility];  Service: Endoscopy;  Laterality: N/A; REPEAT IN 1 YEAR    CORONARY ANGIOPLASTY WITH STENT PLACEMENT  2003    x 2 RCA    PROSTATECTOMY  2002    UPPER GASTROINTESTINAL ENDOSCOPY  11/15/2016    Dr. Barahona     Family History   Problem Relation Age of Onset    Migraines Mother     Heart failure Father     Heart disease Father 56        MI    Heart failure Brother     Heart disease Brother     Diabetes Son     Hypertension Son     Heart disease Brother     Mental illness Brother     No Known Problems Son     Colon cancer Neg Hx     Colon polyps Neg Hx     Crohn's disease Neg Hx     Ulcerative colitis Neg Hx     Stomach cancer Neg Hx     Esophageal cancer Neg Hx      Social History     Tobacco Use    Smoking status: Never    Smokeless tobacco: Never   Substance Use Topics    Alcohol use: No    Drug use: No       Review of Systems:  Review of Systems   Unable to perform ROS: Dementia     OBJECTIVE:     Vital Signs (Most Recent)  Temp: 98.1 °F (36.7 °C) (09/05/22 0730)  Pulse: 92 (09/05/22 0800)  Resp: (!) 24 (09/05/22 0800)  BP:  (!) 156/67 (09/05/22 0800)  SpO2: 99 % (09/05/22 0800)    Vital Signs Range (Last 24H):  Temp:  [98 °F (36.7 °C)-98.6 °F (37 °C)]   Pulse:  [77-98]   Resp:  [16-32]   BP: (133-237)/()   SpO2:  [91 %-100 %]     I & O (Last 24H):    Intake/Output Summary (Last 24 hours) at 9/5/2022 0824  Last data filed at 9/5/2022 0600  Gross per 24 hour   Intake 369.08 ml   Output 100 ml   Net 269.08 ml       Current Diet:     Current Diet Order   Procedures    Diet NPO        Allergies:  Review of patient's allergies indicates:   Allergen Reactions    Iodinated contrast media Rash    Pontocaine [tetracaine hcl] Anaphylaxis     hypotension       Meds:  Scheduled Meds:   amiodarone  200 mg Oral BID    amLODIPine  5 mg Oral Daily    atorvastatin  40 mg Oral Daily    cyanocobalamin  1,000 mcg Oral Daily    levothyroxine  75 mcg Oral Before breakfast    memantine  5 mg Oral QHS    metoprolol succinate  50 mg Oral Daily    sodium chloride 0.9%  10 mL Intravenous Q12H     Continuous Infusions:   sodium chloride 0.9% 68 mL/hr at 09/05/22 0400    nicardipine 6.5 mg/hr (09/05/22 0823)     PRN Meds:acetaminophen    Oxygen/Ventilator Data (Last 24H):  (if applicable)        Hemodynamic Parameters (Last 24H):   (if applicable)        Laboratory and Radiology Data:  Recent Results (from the past 24 hour(s))   CBC auto differential    Collection Time: 09/04/22 11:07 PM   Result Value Ref Range    WBC 6.62 3.90 - 12.70 K/uL    RBC 3.92 (L) 4.60 - 6.20 M/uL    Hemoglobin 12.9 (L) 14.0 - 18.0 g/dL    Hematocrit 39.0 (L) 40.0 - 54.0 %     (H) 82 - 98 fL    MCH 32.9 (H) 27.0 - 31.0 pg    MCHC 33.1 32.0 - 36.0 g/dL    RDW 15.8 (H) 11.5 - 14.5 %    Platelets 186 150 - 450 K/uL    MPV 10.8 9.2 - 12.9 fL    Immature Granulocytes 0.5 0.0 - 0.5 %    Gran # (ANC) 3.1 1.8 - 7.7 K/uL    Immature Grans (Abs) 0.03 0.00 - 0.04 K/uL    Lymph # 2.8 1.0 - 4.8 K/uL    Mono # 0.6 0.3 - 1.0 K/uL    Eos # 0.0 0.0 - 0.5 K/uL    Baso # 0.02 0.00 - 0.20 K/uL     nRBC 0 0 /100 WBC    Gran % 47.3 38.0 - 73.0 %    Lymph % 42.1 18.0 - 48.0 %    Mono % 9.2 4.0 - 15.0 %    Eosinophil % 0.6 0.0 - 8.0 %    Basophil % 0.3 0.0 - 1.9 %    Differential Method Automated    Comprehensive metabolic panel    Collection Time: 09/04/22 11:07 PM   Result Value Ref Range    Sodium 138 136 - 145 mmol/L    Potassium 4.0 3.5 - 5.1 mmol/L    Chloride 107 95 - 110 mmol/L    CO2 25 23 - 29 mmol/L    Glucose 124 (H) 70 - 110 mg/dL    BUN 22 8 - 23 mg/dL    Creatinine 1.4 0.5 - 1.4 mg/dL    Calcium 8.5 (L) 8.7 - 10.5 mg/dL    Total Protein 7.3 6.0 - 8.4 g/dL    Albumin 4.0 3.5 - 5.2 g/dL    Total Bilirubin 0.6 0.1 - 1.0 mg/dL    Alkaline Phosphatase 79 55 - 135 U/L    AST 27 10 - 40 U/L    ALT 23 10 - 44 U/L    Anion Gap 6 (L) 8 - 16 mmol/L    eGFR 48.3 (A) >60 mL/min/1.73 m^2   Brain natriuretic peptide    Collection Time: 09/04/22 11:07 PM   Result Value Ref Range     (H) 0 - 99 pg/mL   APTT    Collection Time: 09/04/22 11:07 PM   Result Value Ref Range    aPTT 25.3 23.3 - 35.1 sec   Protime-INR    Collection Time: 09/04/22 11:07 PM   Result Value Ref Range    PT 12.7 11.4 - 13.7 sec    INR 1.0    Troponin I    Collection Time: 09/04/22 11:07 PM   Result Value Ref Range    Troponin I <0.030 <=0.040 ng/mL   TSH    Collection Time: 09/04/22 11:07 PM   Result Value Ref Range    TSH 6.990 (H) 0.340 - 5.600 uIU/mL   Blood culture #1 **CANNOT BE ORDERED STAT**    Collection Time: 09/04/22 11:07 PM    Specimen: Peripheral, Upper Arm, Right; Blood   Result Value Ref Range    Blood Culture, Routine No Growth to date    Magnesium    Collection Time: 09/04/22 11:07 PM   Result Value Ref Range    Magnesium 2.0 1.6 - 2.6 mg/dL   Lipase    Collection Time: 09/04/22 11:07 PM   Result Value Ref Range    Lipase 38 4 - 60 U/L   Type & Screen    Collection Time: 09/04/22 11:07 PM   Result Value Ref Range    Group & Rh O POS     Indirect Rocio NEG    T4, Free    Collection Time: 09/04/22 11:07 PM   Result  Value Ref Range    Free T4 1.26 0.71 - 1.51 ng/dL   Urinalysis, Reflex to Urine Culture Urine, Clean Catch    Collection Time: 09/04/22 11:27 PM    Specimen: Urine, Clean Catch   Result Value Ref Range    Specimen UA Urine, Clean Catch     Color, UA Yellow Yellow, Straw, Kaley    Appearance, UA Clear Clear    pH, UA 7.0 5.0 - 8.0    Specific Gravity, UA 1.015 1.005 - 1.030    Protein, UA 3+ (A) Negative    Glucose, UA Negative Negative    Ketones, UA Negative Negative    Bilirubin (UA) Negative Negative    Occult Blood UA 2+ (A) Negative    Nitrite, UA Negative Negative    Urobilinogen, UA Negative Negative EU/dL    Leukocytes, UA Negative Negative   Urinalysis Microscopic    Collection Time: 09/04/22 11:27 PM   Result Value Ref Range    RBC, UA 1 0 - 4 /hpf    WBC, UA 5 0 - 5 /hpf    Bacteria Negative None-Occ /hpf    Squam Epithel, UA 8 /hpf    Hyaline Casts, UA 1 0-1/lpf /lpf    Microscopic Comment SEE COMMENT    COVID-19 Rapid Screening    Collection Time: 09/04/22 11:29 PM   Result Value Ref Range    SARS-CoV-2 RNA, Amplification, Qual Negative Negative   Blood culture #2 **CANNOT BE ORDERED STAT**    Collection Time: 09/05/22  1:02 AM    Specimen: Peripheral, Wrist, Left; Blood   Result Value Ref Range    Blood Culture, Routine No Growth to date    Lactic acid, plasma    Collection Time: 09/05/22  1:05 AM   Result Value Ref Range    Lactate (Lactic Acid) 1.5 0.5 - 1.9 mmol/L   Comprehensive Metabolic Panel    Collection Time: 09/05/22  3:53 AM   Result Value Ref Range    Sodium 141 136 - 145 mmol/L    Potassium 4.6 3.5 - 5.1 mmol/L    Chloride 107 95 - 110 mmol/L    CO2 22 (L) 23 - 29 mmol/L    Glucose 159 (H) 70 - 110 mg/dL    BUN 22 8 - 23 mg/dL    Creatinine 1.3 0.5 - 1.4 mg/dL    Calcium 8.6 (L) 8.7 - 10.5 mg/dL    Total Protein 6.8 6.0 - 8.4 g/dL    Albumin 3.7 3.5 - 5.2 g/dL    Total Bilirubin 0.7 0.1 - 1.0 mg/dL    Alkaline Phosphatase 73 55 - 135 U/L    AST 28 10 - 40 U/L    ALT 21 10 - 44 U/L    Anion  Gap 12 8 - 16 mmol/L    eGFR 52.8 (A) >60 mL/min/1.73 m^2   Magnesium    Collection Time: 09/05/22  3:53 AM   Result Value Ref Range    Magnesium 1.6 1.6 - 2.6 mg/dL   Phosphorus    Collection Time: 09/05/22  3:53 AM   Result Value Ref Range    Phosphorus 2.7 2.7 - 4.5 mg/dL   Vitamin B12    Collection Time: 09/05/22  3:53 AM   Result Value Ref Range    Vitamin B-12 599 210 - 950 pg/mL   Folate    Collection Time: 09/05/22  3:53 AM   Result Value Ref Range    Folate 8.9 4.0 - 24.0 ng/mL   Iron and TIBC    Collection Time: 09/05/22  3:53 AM   Result Value Ref Range    Iron 52 45 - 160 ug/dL    Transferrin 212 200 - 375 mg/dL    TIBC 297 250 - 450 ug/dL    Saturated Iron 18 (L) 20 - 50 %   Ferritin    Collection Time: 09/05/22  3:53 AM   Result Value Ref Range    Ferritin 115 20.0 - 300.0 ng/mL   Vitamin D    Collection Time: 09/05/22  3:53 AM   Result Value Ref Range    Vit D, 25-Hydroxy 22 (L) 30 - 96 ng/mL     Imaging Results              US Carotid Bilateral (Final result)  Result time 09/05/22 06:35:48      Final result by Magdalena Salazar MD (09/05/22 06:35:48)                   Narrative:    Reason: Syncope    Grayscale, color and spectral Doppler analysis was performed. Criteria for stenosis is based upon the Society of Radiologists in Ultrasound Consensus Conference, 2003 (Radiology, November 2003, 229, 340-346).    Comparison: 11/2/2019    Findings:  Grayscale images show minimal plaque bilaterally    Estimated peak systolic velocity in right internal carotid artery is 65 cm/s. Antegrade flow is present in the right vertebral artery.    Estimated peak systolic velocity in left internal carotid artery is 128 cm/s.This corresponds to a 50-69% hemodynamically significant stenosis. This is unchanged when compared to the prior study Antegrade flow is present in the left vertebral artery.    Impression:  minimal plaque bilaterally with a 50-69% hemodynamically significant stenosis on the left closer to  50%    No hemodynamically significant stenosis on the right    Electronically signed by:  Magdalena Salazar MD  9/5/2022 6:35 AM CDT Workstation: [id number]                                     X-Ray Chest AP Portable (Final result)  Result time 09/05/22 06:36:27      Final result by Magdalena Salazar MD (09/05/22 06:36:27)                   Narrative:    Portable chest x-ray at 10:55 PM is compared to prior study dated 7/24/2010    Clinical history is unconscious    The heart is enlarged. The left subclavian vein pacemaker is in stable position. There is a heart monitor.    Lungs are clear. There are no acute osseous abnormalities.    IMPRESSION: Cardiomegaly with no acute pulmonary process    Electronically signed by:  Magdalena Salazar MD  9/5/2022 6:36 AM CDT Workstation: [id number]                                     CT Cervical Spine Without Contrast (Final result)  Result time 09/04/22 23:08:19      Final result by Clifton Mustafa MD (09/04/22 23:08:19)                   Narrative:      EXAM:    CT cervical spine without contrast.    INDICATION:    Trauma. Neck pain.    TECHNIQUE:    Contiguous axial CT images of the cervical spine.  Intravenous contrast: Absent.  Reformats: MPRs created and utilized.   mGy-cm.  This exam was performed according to our departmental dose-optimization program, which includes automated exposure control, adjustment of the mA and/or kV according to patient size and/or use of iterative reconstruction technique.    COMPARISON:    None.    FINDINGS:    Alignment: Preserved.    Fracture:  No acute fracture or subluxation.    Odontoid process: Intact.    Prevertebral soft tissues: No edema.    Spondylosis: Multilevel spondylosis with disc space narrowing, endplate sclerosis and marginal osteophytes. Bilateral facet arthropathy with multilevel neural foraminal narrowing.    Other: None.    IMPRESSION:    1. No CT evidence of acute osseous injury of the cervical  spine.    Electronically signed by:  Clifton Mustafa MD  9/4/2022 11:08 PM CDT Workstation: [id number]                                     CT Head Without Contrast (Edited Result - FINAL)  Result time 09/04/22 23:09:43      Edited Result - FINAL by Clifton Mustafa MD (09/04/22 23:09:43)                   Narrative:         ADDENDUM #1       The above findings were discussed with and acknowledged by Dr. Banerjee at 11:03 PM CST on 9/4/2022.    Electronically signed by:  Clifton Mustafa MD  9/4/2022 11:09 PM CDT Workstation: [id number]     ORIGINAL REPORT       EXAM:    CT head without contrast.    INDICATION:    Trauma.    TECHNIQUE:    Contiguous axial CT images of the brain.  Intravenous contrast: Absent.   mGy-cm.  This exam was performed according to our departmental dose-optimization program, which includes automated exposure control, adjustment of the mA and/or kV according to patient size and/or use of iterative reconstruction technique.    COMPARISON:    7/24/2022.    FINDINGS:  There is subdural hemorrhage along the anterior and posterior falx measuring up to 1.2 cm in thickness. Small amount of subarachnoid hemorrhage along the bifrontal lobes. No midline shift, downward herniation, or hydrocephalus. Diffuse cerebral atrophy with periventricular and deep white matter chronic microvascular changes. Old lacunar infarcts involving the bilateral basal ganglia. Paranasal sinuses and mastoid air cells are clear. No acute fracture.    IMPRESSION:  Subdural hemorrhage along the anterior and posterior falx measuring up to 1.2 cm in thickness. Small amount of subarachnoid hemorrhage along the bifrontal lobes. No midline shift, downward herniation, or hydrocephalus.    Electronically signed by:  Clifton Mustafa MD  9/4/2022 11:01 PM CDT Workstation: [id number]                      Final result by Clifton Mustafa MD (09/04/22 23:01:15)                   Narrative:    EXAM:    CT head without  contrast.    INDICATION:    Trauma.    TECHNIQUE:    Contiguous axial CT images of the brain.  Intravenous contrast: Absent.   mGy-cm.  This exam was performed according to our departmental dose-optimization program, which includes automated exposure control, adjustment of the mA and/or kV according to patient size and/or use of iterative reconstruction technique.    COMPARISON:    7/24/2022.    FINDINGS:  There is subdural hemorrhage along the anterior and posterior falx measuring up to 1.2 cm in thickness. Small amount of subarachnoid hemorrhage along the bifrontal lobes. No midline shift, downward herniation, or hydrocephalus. Diffuse cerebral atrophy with periventricular and deep white matter chronic microvascular changes. Old lacunar infarcts involving the bilateral basal ganglia. Paranasal sinuses and mastoid air cells are clear. No acute fracture.    IMPRESSION:  Subdural hemorrhage along the anterior and posterior falx measuring up to 1.2 cm in thickness. Small amount of subarachnoid hemorrhage along the bifrontal lobes. No midline shift, downward herniation, or hydrocephalus.    Electronically signed by:  Clifton Mustafa MD  9/4/2022 11:01 PM CDT Workstation: [id number]                                    12-lead EKG interpretation:  (if applicable)      Current Cardiac Rhythm:   (if applicable)    Physical Exam:   Physical Exam  Vitals reviewed.   Constitutional:       Comments: Elderly male,NAD   HENT:      Head: Normocephalic.   Cardiovascular:      Rate and Rhythm: Normal rate and regular rhythm.   Pulmonary:      Effort: Pulmonary effort is normal.      Breath sounds: Normal breath sounds.   Abdominal:      Palpations: Abdomen is soft.   Musculoskeletal:         General: No swelling.   Skin:     General: Skin is dry.   Neurological:      Comments: Alert and Ox2, cognitive impairment       ASSESSMENT/PLAN:   Assessment: Plan:     CAD -PCI CX, RCA 2015  Ventricular Ectopy   Syncope vs mechanical  fall  Hypomagnesia   Dementia   Subdural hemorrhage     PCI RCA, CX 2015 2/2022 -stress negative ischemia  7/2022 - ECHO- ef 45-50%, LVH, DD, INF apical HK  EKG- NSR with prolonged QTc      Monitor electrolytes, replace Mag  Interrogation of PM  Monitor for arrhythmias   Continue amiodarone  Stop sotalol  STATIN, BB  Add home ARB  Wean nicardipine //keep SBP <150  Plavix on hold  EKG in am

## 2022-09-05 NOTE — PLAN OF CARE
09/05/22 0327   Patient Assessment/Suction   Level of Consciousness (AVPU) alert   Respiratory Effort Unlabored   Expansion/Accessory Muscles/Retractions expansion symmetric   All Lung Fields Breath Sounds clear   Rhythm/Pattern, Respiratory depth regular;pattern regular   Cough Frequency infrequent   Cough Type good;nonproductive   PRE-TX-O2   O2 Device (Oxygen Therapy) nasal cannula   $ Is the patient on Low Flow Oxygen? Yes   SpO2 96 %   Pulse Oximetry Type Continuous   $ Pulse Oximetry - Multiple Charge Pulse Oximetry - Multiple   Pulse 77   Resp (!) 22   Education   $ Education DME Oxygen;15 min

## 2022-09-05 NOTE — ED PROVIDER NOTES
Encounter Date: 9/4/2022       History     Chief Complaint   Patient presents with    Fall     Pt states mechanical fall, family  reports syncope, positive head strike, no thinners, projectile vomiting on arrival to ED     88-year-old male history CAD, hypertension, hyperlipidemia, hypothyroidism, anemia and reported dementia presents to the emergency room via EMS for evaluation of syncope.  Patient does have a pacemaker although unsure what brand.  Patient did hit his head.  He takes dual antiplatelet therapy with Plavix and aspirin.  Patient was doing well and did not have any complaints via EMS but reportedly projectile vomited in the back of the ambulance.  On my exam, patient is alert and oriented, does not have any complaints.   Family presents to corroborate story.  Seemingly poor historians.  Patient on dual antiplatelet therapy with Plavix and aspirin, had a fall from a chair in which he fell forward and hit his head.  They are unsure if he lost consciousness before the fall but states that after the fall he was minimally responsive.  They do report a cardiac history but are unsure of the extend, do states that he has a pacemaker and present a card with pacemaker information.  Review of systems also obtained from patient and family.  While patient is alert and oriented, he does have a history of dementia.    Review of patient's allergies indicates:   Allergen Reactions    Iodinated contrast media Rash    Pontocaine [tetracaine hcl] Anaphylaxis     hypotension     Past Medical History:   Diagnosis Date    Abnormal echocardiogram 11/21/2019    Normal left ventricular systolic function with estimated ejection fraction of 60%.  Grade 2 moderate left ventricular diastolic dysfunction consistent with pseudonormalization.  Mild left atrial enlargement.  Mild aortic regurgitation.  Mild to moderate mitral regurgitation.  Mild tricuspid regurgitation.  Estimated PA systolic pressure is 38 mm of mercury.  Diagnosis  is syncope.    Anxiety     Arthritis     CAD (coronary artery disease) age 68    Carotid artery occlusion     Cerebrovascular small vessel disease     Colon polyps age 78    Coronary artery disease of native artery of native heart with stable angina pectoris 5/6/2020    GERD (gastroesophageal reflux disease)     H. pylori infection     HTN (hypertension), benign age 65    Hyperlipidemia LDL goal <70 age 65    Hypothyroidism     Multiple fractures of ribs of right side 11/27/2012    Myocardial infarction     Pernicious anemia 1/26/2018    Primary insomnia 10/9/2018    Prostate cancer age 67    Syncopal episodes 3/2013    Urge incontinence 5/8/2018     Past Surgical History:   Procedure Laterality Date    CARDIAC PACEMAKER PLACEMENT  10/2015    [employer] Pacemaker    CARDIAC SURGERY      CATARACT EXTRACTION W/  INTRAOCULAR LENS IMPLANT Bilateral     COLONOSCOPY  ~2013    Dr. Edge    COLONOSCOPY N/A 06/08/2016    Procedure: COLONOSCOPY;  Surgeon: Shamar Barahona MD;  Location: [facility];  Service: Endoscopy;  Laterality: N/A; REPEAT IN 1 YEAR    CORONARY ANGIOPLASTY WITH STENT PLACEMENT  2003    x 2 RCA    PROSTATECTOMY  2002    UPPER GASTROINTESTINAL ENDOSCOPY  11/15/2016    Dr. Barahona     Family History   Problem Relation Age of Onset    Migraines Mother     Heart failure Father     Heart disease Father 56        MI    Heart failure Brother     Heart disease Brother     Diabetes Son     Hypertension Son     Heart disease Brother     Mental illness Brother     No Known Problems Son     Colon cancer Neg Hx     Colon polyps Neg Hx     Crohn's disease Neg Hx     Ulcerative colitis Neg Hx     Stomach cancer Neg Hx     Esophageal cancer Neg Hx      Social History     Tobacco Use    Smoking status: Never    Smokeless tobacco: Never   Substance Use Topics    Alcohol use: No    Drug use: No     Review of Systems   Constitutional:  Negative for chills, fatigue and fever.   HENT:  Negative for congestion, hearing loss,  sore throat and trouble swallowing.    Eyes:  Negative for visual disturbance.   Respiratory:  Negative for cough, chest tightness and shortness of breath.    Cardiovascular:  Negative for chest pain.   Gastrointestinal:  Negative for abdominal pain and nausea.   Endocrine: Negative for polyuria.   Genitourinary:  Negative for difficulty urinating.   Musculoskeletal:  Negative for arthralgias and myalgias.   Skin:  Negative for rash.   Neurological:  Positive for syncope. Negative for dizziness and headaches.   Psychiatric/Behavioral:  The patient is not nervous/anxious.      Physical Exam     Initial Vitals [09/04/22 2228]   BP Pulse Resp Temp SpO2   (!) 191/100 87 20 98.6 °F (37 °C) 98 %      MAP       --         Physical Exam    Nursing note and vitals reviewed.  Constitutional: He appears well-developed and well-nourished.   HENT:   Head: Normocephalic and atraumatic.   Eyes: Conjunctivae and EOM are normal.   Neck: Neck supple.   Cardiovascular:  Normal rate, regular rhythm and normal heart sounds.           Pulmonary/Chest: Breath sounds normal. No respiratory distress.   Abdominal: Abdomen is soft. He exhibits no distension. There is no abdominal tenderness.   Musculoskeletal:         General: Normal range of motion.      Cervical back: Neck supple.     Neurological: GCS eye subscore is 4. GCS verbal subscore is 5. GCS motor subscore is 6.   Patient is slow to respond but is following commands.  He does need redirection.    Patient is alert and oriented to person, place, time, and event. GCS 15: Motor 6, Verbal 5, Eye 4. No focal neurologic deficits.  No facial droop, dysarthria, or aphasia.     CN 2-12 Intact.   -Patient has no deviation of baseline vision. Normal Confrontation (CN II)  -Extraocular movements are full, physiologic nystagmus is present. Eyes converge equally and pupils constrict with near focus. (CN III, IV, VI)  -Patient able to fully open and close jaw. Equal sensation over bilateral  temples, cheeks, and jawline. (CN V)  -Patient able to raise eyebrows and expand cheeks (CN VII)  -Patient able to lateralize sounds bilaterally (CN VIII)  -Uvula is midline and rises symmetrically with soft palate on phonation, active gag reflex (CN IX, X)  -Patient with equal rise of shoulders and equal strength on resisted rotation of neck b/l. Strength 5/5. (CN XI)  -Patient able to stick out tongue at midline and move side to side, resists against deviation by me (CN XII)  PERRLA. No visual field defects.  Strength 5/5 bilateral upper and lower extremities. No atrophy. Reflexes 2+   No decreased sensation to light touch, pain, or pressure to suggest spinothalamic pathway injury.  No cerebellar signs:  -No dysmetria. Heel-to-Shin and Finger-To-Nose b/l with smooth movements and no overshoot.   -No dysdiadochokinesis. Hand and Feet rapid alternating movements are quick, smooth, and rhythmic b/l.   -No pronator drift.       Skin: Skin is warm and dry. Capillary refill takes less than 2 seconds.   Psychiatric: He has a normal mood and affect. His behavior is normal. Judgment and thought content normal.       ED Course   Critical Care    Date/Time: 9/4/2022 11:15 PM  Performed by: Gerardo Juan PA-C  Authorized by: Vladimir Banerjee MD   Direct patient critical care time: 20 minutes  Additional history critical care time: 5 minutes  Ordering / reviewing critical care time: 5 minutes  Documentation critical care time: 10 minutes  Consulting other physicians critical care time: 10 minutes  Consult with family critical care time: 5 minutes  Total critical care time (exclusive of procedural time) : 55 minutes  Critical care time was exclusive of separately billable procedures and treating other patients and teaching time.  Critical care was necessary to treat or prevent imminent or life-threatening deterioration of the following conditions: CNS failure or compromise and trauma.  Critical care was time spent personally  by me on the following activities: development of treatment plan with patient or surrogate, discussions with consultants, evaluation of patient's response to treatment, examination of patient, obtaining history from patient or surrogate, ordering and performing treatments and interventions, ordering and review of laboratory studies, re-evaluation of patient's condition and review of old charts.      Labs Reviewed   CBC W/ AUTO DIFFERENTIAL - Abnormal; Notable for the following components:       Result Value    RBC 3.92 (*)     Hemoglobin 12.9 (*)     Hematocrit 39.0 (*)      (*)     MCH 32.9 (*)     RDW 15.8 (*)     All other components within normal limits   COMPREHENSIVE METABOLIC PANEL - Abnormal; Notable for the following components:    Glucose 124 (*)     Calcium 8.5 (*)     Anion Gap 6 (*)     eGFR 48.3 (*)     All other components within normal limits   B-TYPE NATRIURETIC PEPTIDE - Abnormal; Notable for the following components:     (*)     All other components within normal limits   TSH - Abnormal; Notable for the following components:    TSH 6.990 (*)     All other components within normal limits   URINALYSIS, REFLEX TO URINE CULTURE - Abnormal; Notable for the following components:    Protein, UA 3+ (*)     Occult Blood UA 2+ (*)     All other components within normal limits    Narrative:     Specimen Source->Urine   CULTURE, BLOOD   CULTURE, BLOOD   APTT   PROTIME-INR   TROPONIN I   MAGNESIUM   SARS-COV-2 RNA AMPLIFICATION, QUAL   LIPASE   URINALYSIS MICROSCOPIC    Narrative:     Specimen Source->Urine   T4, FREE   LACTIC ACID, PLASMA   TYPE & SCREEN     EKG Readings: (Independently Interpreted)   Initial Reading: No STEMI. Previous EKG: Compared with most recent EKG Rhythm: Normal Sinus Rhythm. Heart Rate: 90. Ectopy: PVCs. ST Segments: Normal ST Segments. T Waves: Normal. Axis: Normal. Other Findings: Prolonged QT Interval. Clinical Impression: Normal Sinus Rhythm with PVCs     Imaging  Results              X-Ray Chest AP Portable (In process)                      CT Cervical Spine Without Contrast (Final result)  Result time 09/04/22 23:08:19      Final result by Clifton Mustafa MD (09/04/22 23:08:19)                   Narrative:      EXAM:    CT cervical spine without contrast.    INDICATION:    Trauma. Neck pain.    TECHNIQUE:    Contiguous axial CT images of the cervical spine.  Intravenous contrast: Absent.  Reformats: MPRs created and utilized.   mGy-cm.  This exam was performed according to our departmental dose-optimization program, which includes automated exposure control, adjustment of the mA and/or kV according to patient size and/or use of iterative reconstruction technique.    COMPARISON:    None.    FINDINGS:    Alignment: Preserved.    Fracture:  No acute fracture or subluxation.    Odontoid process: Intact.    Prevertebral soft tissues: No edema.    Spondylosis: Multilevel spondylosis with disc space narrowing, endplate sclerosis and marginal osteophytes. Bilateral facet arthropathy with multilevel neural foraminal narrowing.    Other: None.    IMPRESSION:    1. No CT evidence of acute osseous injury of the cervical spine.    Electronically signed by:  Clifton Mustafa MD  9/4/2022 11:08 PM CDT Workstation: [id number]                                     CT Head Without Contrast (Edited Result - FINAL)  Result time 09/04/22 23:09:43      Edited Result - FINAL by Clifton Mustafa MD (09/04/22 23:09:43)                   Narrative:         ADDENDUM #1       The above findings were discussed with and acknowledged by Dr. Banerjee at 11:03 PM CST on 9/4/2022.    Electronically signed by:  Clifton Mustafa MD  9/4/2022 11:09 PM CDT Workstation: [id number]     ORIGINAL REPORT       EXAM:    CT head without contrast.    INDICATION:    Trauma.    TECHNIQUE:    Contiguous axial CT images of the brain.  Intravenous contrast: Absent.   mGy-cm.  This exam was performed according to our  departmental dose-optimization program, which includes automated exposure control, adjustment of the mA and/or kV according to patient size and/or use of iterative reconstruction technique.    COMPARISON:    7/24/2022.    FINDINGS:  There is subdural hemorrhage along the anterior and posterior falx measuring up to 1.2 cm in thickness. Small amount of subarachnoid hemorrhage along the bifrontal lobes. No midline shift, downward herniation, or hydrocephalus. Diffuse cerebral atrophy with periventricular and deep white matter chronic microvascular changes. Old lacunar infarcts involving the bilateral basal ganglia. Paranasal sinuses and mastoid air cells are clear. No acute fracture.    IMPRESSION:  Subdural hemorrhage along the anterior and posterior falx measuring up to 1.2 cm in thickness. Small amount of subarachnoid hemorrhage along the bifrontal lobes. No midline shift, downward herniation, or hydrocephalus.    Electronically signed by:  Clifton Mustafa MD  9/4/2022 11:01 PM CDT Workstation: [id number]                      Final result by Clifton Mustafa MD (09/04/22 23:01:15)                   Narrative:    EXAM:    CT head without contrast.    INDICATION:    Trauma.    TECHNIQUE:    Contiguous axial CT images of the brain.  Intravenous contrast: Absent.   mGy-cm.  This exam was performed according to our departmental dose-optimization program, which includes automated exposure control, adjustment of the mA and/or kV according to patient size and/or use of iterative reconstruction technique.    COMPARISON:    7/24/2022.    FINDINGS:  There is subdural hemorrhage along the anterior and posterior falx measuring up to 1.2 cm in thickness. Small amount of subarachnoid hemorrhage along the bifrontal lobes. No midline shift, downward herniation, or hydrocephalus. Diffuse cerebral atrophy with periventricular and deep white matter chronic microvascular changes. Old lacunar infarcts involving the bilateral basal  ganglia. Paranasal sinuses and mastoid air cells are clear. No acute fracture.    IMPRESSION:  Subdural hemorrhage along the anterior and posterior falx measuring up to 1.2 cm in thickness. Small amount of subarachnoid hemorrhage along the bifrontal lobes. No midline shift, downward herniation, or hydrocephalus.    Electronically signed by:  Clifton Mustafa MD  9/4/2022 11:01 PM CDT Workstation: [id number]                                     Medications   niCARdipine 40 mg/200 mL (0.2 mg/mL) infusion (5 mg/hr Intravenous New Bag 9/5/22 0019)   0.9%  NaCl infusion ( Intravenous New Bag 9/5/22 0000)   ondansetron 4 mg/2 mL injection (4 mg  Given 9/5/22 0034)     Medical Decision Making:   ED Management:  88-year-old male presents emergency room for evaluation of fall from chair with a head strike.    -Patient has baseline dementia but is neurologically intact on my initial exam.  CT was performed swiftly and does demonstrate subdural falx bleed and bifrontal subarachnoid bleeds.  Patient did have sudden onset of left-sided facial paralysis that was not present on initial exam.  He was also initially hypertensive to 200s over 100s but improved to 140s over 90s without intervention.  I discussed these findings with Dr. Stone, neuro surgery.  She recommended Cardene drip to maintain SBP less than 150.  She also recommended iNPO, maintenance fluids and repeat CT at 6:00 a.m. patient is on Plavix and aspirin therapy.  Will hold that medication.  -no CT evidence of acute fracture dislocation of the cervical spine.  No other osseous abnormalities on imaging.  C-spine was cleared and C-collar removed.  -unclear etiology of initial injury, could represent syncope versus mechanical fall.  Patient does have pacemaker that needs to be interrogated but unsuccessful interrogation in the ER.  Model was obtained from contact card.  Charge nurse to  and request interrogation.  EKG without acute ischemic changes  but does not appear paced.  There are PVCs.  No acute cardiopulmonary abnormality noted on chest x-ray.  -labs partially returned.  No evidence of anemia or infection.  Negative troponin.  There is elevation of the BNP to 732 although no clinical manifestation of heart failure.  TSH is elevated, pending T4.    Hospital Medicine was consulted for admission to ICU with the above recommendations given acute subdural and subarachnoid bleeds.      Disposition:  Stable, admitted.      I discuss this patient case with the cosigning physician, who performed his own independent exam.  Consult, interventions and medical management were performed and collaboration with the attending physician.  This note was written using the assistance of a dictation program and may contain grammatical errors.             Attending Attestation:     Physician Attestation Statement for NP/PA:   I have conducted a face to face encounter with this patient in addition to the NP/PA, due to    Other NP/PA Attestation Additions:    History of Present Illness: Patient has findings consistent with intracranial bleed patient admitted to Internal Medicine with Neurosurgery to follow patient will go to ICU.             ED Course as of 09/05/22 0314   Sun Sep 04, 2022   [id number] Recommendations per Dr. Stone, neuro surgery:  Cardene drip titrated to systolic blood pressure less than 150.  No steroid, no Keppra.  Keep patient NPO, maintenance fluids.  Repeat CT scan at 6:00 a.m. in the morning. [AN]   2647 Discussed patient case with Hospital Medicine. [AN]      ED Course User Index  [AN] Gerardo Juan PA-C               Clinical Impression:   Final diagnoses:  [R55] Syncope  [I62.00] Subdural hemorrhage (Primary)        ED Disposition Condition    Admit                 Gerardo Juan PA-C  09/04/22 [id number]       Gerardo Juan PA-C  09/05/22 0053       Vladimir Banerjee MD  09/05/22 0315

## 2022-09-05 NOTE — PLAN OF CARE
VSS, Safety maintained.  Pt oriented to self only. Continues to repetitively ask what happened, where he is. Cardene Gtt maintained at 6mg/hr. NS running at 68ml/hr.  Pt transported to CT scan at 0530 without incident. Awaiting results.  Voiding per urinal.   Problem: Adult Inpatient Plan of Care  Goal: Plan of Care Review  Outcome: Ongoing, Progressing  Goal: Patient-Specific Goal (Individualized)  Outcome: Ongoing, Progressing  Goal: Absence of Hospital-Acquired Illness or Injury  Outcome: Ongoing, Progressing  Goal: Optimal Comfort and Wellbeing  Outcome: Ongoing, Progressing  Goal: Readiness for Transition of Care  Outcome: Ongoing, Progressing     Problem: Fall Injury Risk  Goal: Absence of Fall and Fall-Related Injury  Outcome: Ongoing, Progressing     Problem: Skin Injury Risk Increased  Goal: Skin Health and Integrity  Outcome: Ongoing, Progressing     Problem: Adjustment to Illness (Stroke, Hemorrhagic)  Goal: Optimal Coping  Outcome: Ongoing, Progressing     Problem: Bowel Elimination Impaired (Stroke, Hemorrhagic)  Goal: Effective Bowel Elimination  Outcome: Ongoing, Progressing     Problem: Cognitive Impairment (Stroke, Hemorrhagic)  Goal: Optimal Cognitive Function  Outcome: Ongoing, Progressing     Problem: Respiratory Compromise (Stroke, Hemorrhagic)  Goal: Effective Oxygenation and Ventilation  Outcome: Ongoing, Progressing     Problem: Sensorimotor Impairment (Stroke, Hemorrhagic)  Goal: Improved Sensorimotor Function  Outcome: Ongoing, Progressing     Problem: Swallowing Impairment (Stroke, Hemorrhagic)  Goal: Optimal Eating and Swallowing Without Aspiration  Outcome: Ongoing, Progressing     Problem: Urinary Elimination Impaired (Stroke, Hemorrhagic)  Goal: Effective Urinary Elimination  Outcome: Ongoing, Progressing     Problem: Pain (Stroke, Hemorrhagic)  Goal: Acceptable Pain Control  Outcome: Ongoing, Progressing

## 2022-09-05 NOTE — ASSESSMENT & PLAN NOTE
After fall.  Will follow up Dr. Stone neurosurgeon on call recommendations input appreciated NPO NS for maintenance nicardipine drip CT head 6 in the morning patient be admitted to ICU neurochecks q.1 hour   Cardiology consult, according to records patient currently on ARB sotalol metoprolol amiodarone  Continue with home medications as appropriate  PT OT

## 2022-09-05 NOTE — PROGRESS NOTES
Reviewed the H&P  Evaluated the patient  Intensivist, neurosurgery and cardiology recommendations noted  Continue to monitor in ICU overnight  Interrogation of PM  Wean nicardipine and switch to PO home meds  PT/OT evaluation  Anticipate discharge tomorrow on no DAPT

## 2022-09-06 ENCOUNTER — CLINICAL SUPPORT (OUTPATIENT)
Dept: CARDIOLOGY | Facility: HOSPITAL | Age: 87
DRG: 084 | End: 2022-09-06
Attending: INTERNAL MEDICINE
Payer: MEDICARE

## 2022-09-06 VITALS — HEIGHT: 60 IN | WEIGHT: 152 LBS | BODY MASS INDEX: 29.84 KG/M2

## 2022-09-06 LAB
ALBUMIN SERPL BCP-MCNC: 3.6 G/DL (ref 3.5–5.2)
ALP SERPL-CCNC: 56 U/L (ref 55–135)
ALT SERPL W/O P-5'-P-CCNC: 18 U/L (ref 10–44)
ANION GAP SERPL CALC-SCNC: 8 MMOL/L (ref 8–16)
AORTIC ROOT ANNULUS: 3.25 CM
AST SERPL-CCNC: 21 U/L (ref 10–40)
BACTERIA BLD CULT: ABNORMAL
BASOPHILS # BLD AUTO: 0.02 K/UL (ref 0–0.2)
BASOPHILS NFR BLD: 0.3 % (ref 0–1.9)
BILIRUB SERPL-MCNC: 0.7 MG/DL (ref 0.1–1)
BSA FOR ECHO PROCEDURE: 1.71 M2
BUN SERPL-MCNC: 30 MG/DL (ref 8–23)
CALCIUM SERPL-MCNC: 8.5 MG/DL (ref 8.7–10.5)
CHLORIDE SERPL-SCNC: 106 MMOL/L (ref 95–110)
CO2 SERPL-SCNC: 23 MMOL/L (ref 23–29)
CREAT SERPL-MCNC: 1.6 MG/DL (ref 0.5–1.4)
CV ECHO LV RWT: 0.47 CM
DIFFERENTIAL METHOD: ABNORMAL
DOP CALC LVOT PEAK VEL: 74.8 M/S
DOP CALCLVOT PEAK VEL VTI: 19.32 CM
E WAVE DECELERATION TIME: 314.94 MSEC
E/A RATIO: 0.57
E/E' RATIO: 9.5 M/S
ECHO LV POSTERIOR WALL: 1.14 CM (ref 0.6–1.1)
EJECTION FRACTION: 45 %
EOSINOPHIL # BLD AUTO: 0 K/UL (ref 0–0.5)
EOSINOPHIL NFR BLD: 0.3 % (ref 0–8)
ERYTHROCYTE [DISTWIDTH] IN BLOOD BY AUTOMATED COUNT: 16 % (ref 11.5–14.5)
EST. GFR  (NO RACE VARIABLE): 41.2 ML/MIN/1.73 M^2
FRACTIONAL SHORTENING: 27 % (ref 28–44)
GLUCOSE SERPL-MCNC: 120 MG/DL (ref 70–110)
HCT VFR BLD AUTO: 32.7 % (ref 40–54)
HGB BLD-MCNC: 10.7 G/DL (ref 14–18)
IMM GRANULOCYTES # BLD AUTO: 0.03 K/UL (ref 0–0.04)
IMM GRANULOCYTES NFR BLD AUTO: 0.4 % (ref 0–0.5)
INTERVENTRICULAR SEPTUM: 1.02 CM (ref 0.6–1.1)
LEFT ATRIUM SIZE: 3.15 CM
LEFT INTERNAL DIMENSION IN SYSTOLE: 3.5 CM (ref 2.1–4)
LEFT VENTRICLE DIASTOLIC VOLUME INDEX: 72.31 ML/M2
LEFT VENTRICLE DIASTOLIC VOLUME: 120.04 ML
LEFT VENTRICLE MASS INDEX: 114 G/M2
LEFT VENTRICLE SYSTOLIC VOLUME INDEX: 31.8 ML/M2
LEFT VENTRICLE SYSTOLIC VOLUME: 52.73 ML
LEFT VENTRICULAR INTERNAL DIMENSION IN DIASTOLE: 4.81 CM (ref 3.5–6)
LEFT VENTRICULAR MASS: 189.73 G
LV LATERAL E/E' RATIO: 9.5 M/S
LV SEPTAL E/E' RATIO: 9.5 M/S
LYMPHOCYTES # BLD AUTO: 1.2 K/UL (ref 1–4.8)
LYMPHOCYTES NFR BLD: 16.2 % (ref 18–48)
MAGNESIUM SERPL-MCNC: 1.9 MG/DL (ref 1.6–2.6)
MCH RBC QN AUTO: 32.9 PG (ref 27–31)
MCHC RBC AUTO-ENTMCNC: 32.7 G/DL (ref 32–36)
MCV RBC AUTO: 101 FL (ref 82–98)
MONOCYTES # BLD AUTO: 0.8 K/UL (ref 0.3–1)
MONOCYTES NFR BLD: 9.8 % (ref 4–15)
MV PEAK A VEL: 1 M/S
MV PEAK E VEL: 0.57 M/S
NEUTROPHILS # BLD AUTO: 5.6 K/UL (ref 1.8–7.7)
NEUTROPHILS NFR BLD: 73 % (ref 38–73)
NRBC BLD-RTO: 0 /100 WBC
PHOSPHATE SERPL-MCNC: 3.2 MG/DL (ref 2.7–4.5)
PISA TR MAX VEL: 2.97 M/S
PLATELET # BLD AUTO: 144 K/UL (ref 150–450)
PMV BLD AUTO: 10.7 FL (ref 9.2–12.9)
POTASSIUM SERPL-SCNC: 3.9 MMOL/L (ref 3.5–5.1)
PROT SERPL-MCNC: 6.5 G/DL (ref 6–8.4)
RA PRESSURE: 3 MMHG
RBC # BLD AUTO: 3.25 M/UL (ref 4.6–6.2)
RIGHT VENTRICULAR END-DIASTOLIC DIMENSION: 2.69 CM
SODIUM SERPL-SCNC: 137 MMOL/L (ref 136–145)
TDI LATERAL: 0.06 M/S
TDI SEPTAL: 0.06 M/S
TDI: 0.06 M/S
TR MAX PG: 35 MMHG
TRICUSPID ANNULAR PLANE SYSTOLIC EXCURSION: 1.82 CM
TV REST PULMONARY ARTERY PRESSURE: 38 MMHG
WBC # BLD AUTO: 7.66 K/UL (ref 3.9–12.7)

## 2022-09-06 PROCEDURE — 97535 SELF CARE MNGMENT TRAINING: CPT

## 2022-09-06 PROCEDURE — 94761 N-INVAS EAR/PLS OXIMETRY MLT: CPT

## 2022-09-06 PROCEDURE — 25000003 PHARM REV CODE 250: Performed by: INTERNAL MEDICINE

## 2022-09-06 PROCEDURE — 25000003 PHARM REV CODE 250: Performed by: NURSE PRACTITIONER

## 2022-09-06 PROCEDURE — 27000221 HC OXYGEN, UP TO 24 HOURS

## 2022-09-06 PROCEDURE — 36415 COLL VENOUS BLD VENIPUNCTURE: CPT | Performed by: INTERNAL MEDICINE

## 2022-09-06 PROCEDURE — A4216 STERILE WATER/SALINE, 10 ML: HCPCS | Performed by: INTERNAL MEDICINE

## 2022-09-06 PROCEDURE — 21400001 HC TELEMETRY ROOM

## 2022-09-06 PROCEDURE — 93010 ELECTROCARDIOGRAM REPORT: CPT | Mod: ,,, | Performed by: INTERNAL MEDICINE

## 2022-09-06 PROCEDURE — 93005 ELECTROCARDIOGRAM TRACING: CPT | Performed by: INTERNAL MEDICINE

## 2022-09-06 PROCEDURE — 99233 PR SUBSEQUENT HOSPITAL CARE,LEVL III: ICD-10-PCS | Mod: ,,, | Performed by: INTERNAL MEDICINE

## 2022-09-06 PROCEDURE — 99233 SBSQ HOSP IP/OBS HIGH 50: CPT | Mod: ,,, | Performed by: INTERNAL MEDICINE

## 2022-09-06 PROCEDURE — 93308 TTE F-UP OR LMTD: CPT

## 2022-09-06 PROCEDURE — 85025 COMPLETE CBC W/AUTO DIFF WBC: CPT | Performed by: INTERNAL MEDICINE

## 2022-09-06 PROCEDURE — 80053 COMPREHEN METABOLIC PANEL: CPT | Performed by: INTERNAL MEDICINE

## 2022-09-06 PROCEDURE — 97116 GAIT TRAINING THERAPY: CPT

## 2022-09-06 PROCEDURE — 84100 ASSAY OF PHOSPHORUS: CPT | Performed by: INTERNAL MEDICINE

## 2022-09-06 PROCEDURE — 93010 EKG 12-LEAD: ICD-10-PCS | Mod: ,,, | Performed by: INTERNAL MEDICINE

## 2022-09-06 PROCEDURE — 25000003 PHARM REV CODE 250

## 2022-09-06 PROCEDURE — 83735 ASSAY OF MAGNESIUM: CPT | Performed by: NURSE PRACTITIONER

## 2022-09-06 PROCEDURE — 93308 TTE F-UP OR LMTD: CPT | Mod: 26,,, | Performed by: INTERNAL MEDICINE

## 2022-09-06 PROCEDURE — 93308 ECHO (CUPID ONLY): ICD-10-PCS | Mod: 26,,, | Performed by: INTERNAL MEDICINE

## 2022-09-06 PROCEDURE — 97530 THERAPEUTIC ACTIVITIES: CPT

## 2022-09-06 RX ORDER — MAGNESIUM SULFATE HEPTAHYDRATE 40 MG/ML
2 INJECTION, SOLUTION INTRAVENOUS
Status: DISCONTINUED | OUTPATIENT
Start: 2022-09-06 | End: 2022-09-07 | Stop reason: HOSPADM

## 2022-09-06 RX ORDER — POTASSIUM CHLORIDE 7.45 MG/ML
40 INJECTION INTRAVENOUS
Status: DISCONTINUED | OUTPATIENT
Start: 2022-09-06 | End: 2022-09-07 | Stop reason: HOSPADM

## 2022-09-06 RX ORDER — POTASSIUM CHLORIDE 7.45 MG/ML
20 INJECTION INTRAVENOUS
Status: DISCONTINUED | OUTPATIENT
Start: 2022-09-06 | End: 2022-09-07 | Stop reason: HOSPADM

## 2022-09-06 RX ORDER — MUPIROCIN 20 MG/G
OINTMENT TOPICAL 2 TIMES DAILY
Status: DISCONTINUED | OUTPATIENT
Start: 2022-09-06 | End: 2022-09-07 | Stop reason: HOSPADM

## 2022-09-06 RX ORDER — POTASSIUM CHLORIDE 20 MEQ/1
40 TABLET, EXTENDED RELEASE ORAL
Status: DISCONTINUED | OUTPATIENT
Start: 2022-09-06 | End: 2022-09-07 | Stop reason: HOSPADM

## 2022-09-06 RX ORDER — LANOLIN ALCOHOL/MO/W.PET/CERES
800 CREAM (GRAM) TOPICAL
Status: DISCONTINUED | OUTPATIENT
Start: 2022-09-06 | End: 2022-09-07 | Stop reason: HOSPADM

## 2022-09-06 RX ORDER — POTASSIUM CHLORIDE 20 MEQ/1
20 TABLET, EXTENDED RELEASE ORAL
Status: DISCONTINUED | OUTPATIENT
Start: 2022-09-06 | End: 2022-09-07 | Stop reason: HOSPADM

## 2022-09-06 RX ORDER — MAGNESIUM SULFATE HEPTAHYDRATE 40 MG/ML
4 INJECTION, SOLUTION INTRAVENOUS
Status: DISCONTINUED | OUTPATIENT
Start: 2022-09-06 | End: 2022-09-07 | Stop reason: HOSPADM

## 2022-09-06 RX ORDER — CHLORHEXIDINE GLUCONATE ORAL RINSE 1.2 MG/ML
15 SOLUTION DENTAL 2 TIMES DAILY
Status: DISCONTINUED | OUTPATIENT
Start: 2022-09-06 | End: 2022-09-07 | Stop reason: HOSPADM

## 2022-09-06 RX ORDER — HYDRALAZINE HYDROCHLORIDE 20 MG/ML
10 INJECTION INTRAMUSCULAR; INTRAVENOUS EVERY 8 HOURS PRN
Status: DISCONTINUED | OUTPATIENT
Start: 2022-09-06 | End: 2022-09-07 | Stop reason: HOSPADM

## 2022-09-06 RX ORDER — MAGNESIUM SULFATE 1 G/100ML
1 INJECTION INTRAVENOUS
Status: DISCONTINUED | OUTPATIENT
Start: 2022-09-06 | End: 2022-09-07 | Stop reason: HOSPADM

## 2022-09-06 RX ADMIN — AMIODARONE HYDROCHLORIDE 200 MG: 200 TABLET ORAL at 08:09

## 2022-09-06 RX ADMIN — MUPIROCIN 1 G: 20 OINTMENT TOPICAL at 08:09

## 2022-09-06 RX ADMIN — ACETAMINOPHEN 650 MG: 325 TABLET ORAL at 08:09

## 2022-09-06 RX ADMIN — AMLODIPINE BESYLATE 5 MG: 5 TABLET ORAL at 08:09

## 2022-09-06 RX ADMIN — ATORVASTATIN CALCIUM 40 MG: 40 TABLET, FILM COATED ORAL at 08:09

## 2022-09-06 RX ADMIN — Medication 6 MG: at 08:09

## 2022-09-06 RX ADMIN — METOPROLOL SUCCINATE 50 MG: 50 TABLET, FILM COATED, EXTENDED RELEASE ORAL at 08:09

## 2022-09-06 RX ADMIN — MEMANTINE HYDROCHLORIDE 5 MG: 5 TABLET ORAL at 08:09

## 2022-09-06 RX ADMIN — LOSARTAN POTASSIUM 50 MG: 50 TABLET, FILM COATED ORAL at 08:09

## 2022-09-06 RX ADMIN — SODIUM CHLORIDE, PRESERVATIVE FREE 10 ML: 5 INJECTION INTRAVENOUS at 09:09

## 2022-09-06 RX ADMIN — ACETAMINOPHEN 650 MG: 325 TABLET ORAL at 05:09

## 2022-09-06 RX ADMIN — CYANOCOBALAMIN TAB 1000 MCG 1000 MCG: 1000 TAB at 08:09

## 2022-09-06 RX ADMIN — CHLORHEXIDINE GLUCONATE 15 ML: 1.2 RINSE ORAL at 08:09

## 2022-09-06 RX ADMIN — LEVOTHYROXINE SODIUM 75 MCG: 0.03 TABLET ORAL at 06:09

## 2022-09-06 NOTE — PROGRESS NOTES
[facility]   Cardiology Note    Consult Requested By: er  Reason for Consult: syncope    SUBJECTIVE:     History of Present Illness:   Patient of Dr Betancourt  H/o HTN, HLP, Dementia, Soarin PM, CAD  PCI RCA, CX 2015, carotid artery diease   2/2022 -stress negative ischemia  7/2022 - ECHO- ef 45-50%, LVH, DD, INF apical HK  Recent interrogation of PM 18 episodes ventricular ectopy lasting 1-38 minutes and started on amiodarone  Recent hospitalization noted he was high risk [facility] due to age an dementia and was going to medically manage     CT head Decrease in size of the subarachnoid hemorrhage along the falx with stable subarachnoid hemorrhage in the anterior frontal lobes, New small amount of intraventricular hemorrhage within the occipital lobes  US Carotid 50-69% l ICA unchanged from previous  Labs reviewed MG 1.6, potassium 4.6 creat 1.3, trop normal,   CXR no significant findings  EKG- SR prolonged Qtc    Sitting in chair at bedside  HPI from ER reviewed, patient with Dementia and unable to recall events leading to hospitalization  Questionable whether it was a syncopal event VS mechanical fall-- unwitnessed   Denies any current chest pain, pressure  BP 150s    9/6/2022   Lying in bed alert   No complaints  Blood pressure controlled  NSR with PVCs  Bun 30, creat 1.6, potassium 3.9, mag 1.9  EKG- Qtc 481  BC- contaminate   Does not appear interrogation was completed  No significant arrhythmias per nurse, uneventful night          Review of patient's allergies indicates:   Allergen Reactions    Iodinated contrast media Rash    Pontocaine [tetracaine hcl] Anaphylaxis     hypotension       Past Medical History:   Diagnosis Date    Abnormal echocardiogram 11/21/2019    Normal left ventricular systolic function with estimated ejection fraction of 60%.  Grade 2 moderate left ventricular diastolic dysfunction consistent with pseudonormalization.  Mild left atrial enlargement.  Mild aortic  regurgitation.  Mild to moderate mitral regurgitation.  Mild tricuspid regurgitation.  Estimated PA systolic pressure is 38 mm of mercury.  Diagnosis is syncope.    Anxiety     Arthritis     CAD (coronary artery disease) age 68    Carotid artery occlusion     Cerebrovascular small vessel disease     Colon polyps age 78    Coronary artery disease of native artery of native heart with stable angina pectoris 5/6/2020    GERD (gastroesophageal reflux disease)     H. pylori infection     HTN (hypertension), benign age 65    Hyperlipidemia LDL goal <70 age 65    Hypothyroidism     Multiple fractures of ribs of right side 11/27/2012    Myocardial infarction     Pernicious anemia 1/26/2018    Primary insomnia 10/9/2018    Prostate cancer age 67    Subdural hematoma 9/5/2022    Syncopal episodes 3/2013    Urge incontinence 5/8/2018     Past Surgical History:   Procedure Laterality Date    CARDIAC PACEMAKER PLACEMENT  10/2015    [employer] Pacemaker    CARDIAC SURGERY      CATARACT EXTRACTION W/  INTRAOCULAR LENS IMPLANT Bilateral     COLONOSCOPY  ~2013    Dr. Edge    COLONOSCOPY N/A 06/08/2016    Procedure: COLONOSCOPY;  Surgeon: Shamar Barahona MD;  Location: [facility];  Service: Endoscopy;  Laterality: N/A; REPEAT IN 1 YEAR    CORONARY ANGIOPLASTY WITH STENT PLACEMENT  2003    x 2 RCA    PROSTATECTOMY  2002    UPPER GASTROINTESTINAL ENDOSCOPY  11/15/2016    Dr. Barahona     Family History   Problem Relation Age of Onset    Migraines Mother     Heart failure Father     Heart disease Father 56        MI    Heart failure Brother     Heart disease Brother     Diabetes Son     Hypertension Son     Heart disease Brother     Mental illness Brother     No Known Problems Son     Colon cancer Neg Hx     Colon polyps Neg Hx     Crohn's disease Neg Hx     Ulcerative colitis Neg Hx     Stomach cancer Neg Hx     Esophageal cancer Neg Hx      Social History     Tobacco Use    Smoking status: Never    Smokeless tobacco: Never    Substance Use Topics    Alcohol use: No    Drug use: No       Review of Systems:  Review of Systems   Unable to perform ROS: Dementia     OBJECTIVE:     Vital Signs (Most Recent)  Temp: 98.3 °F (36.8 °C) (09/06/22 0400)  Pulse: 93 (09/06/22 0600)  Resp: (!) 37 (09/06/22 0600)  BP: 135/77 (09/06/22 0600)  SpO2: 96 % (09/06/22 0600)    Vital Signs Range (Last 24H):  Temp:  [97.9 °F (36.6 °C)-98.4 °F (36.9 °C)]   Pulse:  [70-95]   Resp:  [12-37]   BP: (124-167)/(55-89)   SpO2:  [92 %-100 %]     I & O (Last 24H):    Intake/Output Summary (Last 24 hours) at 9/6/2022 0818  Last data filed at 9/6/2022 0550  Gross per 24 hour   Intake 2016 ml   Output 500 ml   Net 1516 ml         Current Diet:     Current Diet Order   Procedures    Diet Adult Regular (IDDSI Level 7)        Allergies:  Review of patient's allergies indicates:   Allergen Reactions    Iodinated contrast media Rash    Pontocaine [tetracaine hcl] Anaphylaxis     hypotension       Meds:  Scheduled Meds:   amiodarone  200 mg Oral BID    amLODIPine  5 mg Oral Daily    atorvastatin  40 mg Oral Daily    cyanocobalamin  1,000 mcg Oral Daily    levothyroxine  75 mcg Oral Before breakfast    losartan  50 mg Oral BID    memantine  5 mg Oral QHS    metoprolol succinate  50 mg Oral Daily    sodium chloride 0.9%  10 mL Intravenous Q12H    vancomycin (VANCOCIN) IVPB  1,250 mg Intravenous Q24H     Continuous Infusions:   sodium chloride 0.9% 68 mL/hr at 09/05/22 0400    nicardipine Stopped (09/05/22 1500)     PRN Meds:acetaminophen, melatonin, Pharmacy to dose Vancomycin consult **AND** vancomycin - pharmacy to dose    Oxygen/Ventilator Data (Last 24H):  (if applicable)        Hemodynamic Parameters (Last 24H):   (if applicable)        Laboratory and Radiology Data:  Recent Results (from the past 24 hour(s))   Blood culture    Collection Time: 09/05/22  6:51 PM    Specimen: Antecubital, Left Arm; Blood   Result Value Ref Range    Blood Culture, Routine No Growth to date     CBC auto differential    Collection Time: 09/06/22  4:16 AM   Result Value Ref Range    WBC 7.66 3.90 - 12.70 K/uL    RBC 3.25 (L) 4.60 - 6.20 M/uL    Hemoglobin 10.7 (L) 14.0 - 18.0 g/dL    Hematocrit 32.7 (L) 40.0 - 54.0 %     (H) 82 - 98 fL    MCH 32.9 (H) 27.0 - 31.0 pg    MCHC 32.7 32.0 - 36.0 g/dL    RDW 16.0 (H) 11.5 - 14.5 %    Platelets 144 (L) 150 - 450 K/uL    MPV 10.7 9.2 - 12.9 fL    Immature Granulocytes 0.4 0.0 - 0.5 %    Gran # (ANC) 5.6 1.8 - 7.7 K/uL    Immature Grans (Abs) 0.03 0.00 - 0.04 K/uL    Lymph # 1.2 1.0 - 4.8 K/uL    Mono # 0.8 0.3 - 1.0 K/uL    Eos # 0.0 0.0 - 0.5 K/uL    Baso # 0.02 0.00 - 0.20 K/uL    nRBC 0 0 /100 WBC    Gran % 73.0 38.0 - 73.0 %    Lymph % 16.2 (L) 18.0 - 48.0 %    Mono % 9.8 4.0 - 15.0 %    Eosinophil % 0.3 0.0 - 8.0 %    Basophil % 0.3 0.0 - 1.9 %    Differential Method Automated    Comprehensive Metabolic Panel    Collection Time: 09/06/22  4:16 AM   Result Value Ref Range    Sodium 137 136 - 145 mmol/L    Potassium 3.9 3.5 - 5.1 mmol/L    Chloride 106 95 - 110 mmol/L    CO2 23 23 - 29 mmol/L    Glucose 120 (H) 70 - 110 mg/dL    BUN 30 (H) 8 - 23 mg/dL    Creatinine 1.6 (H) 0.5 - 1.4 mg/dL    Calcium 8.5 (L) 8.7 - 10.5 mg/dL    Total Protein 6.5 6.0 - 8.4 g/dL    Albumin 3.6 3.5 - 5.2 g/dL    Total Bilirubin 0.7 0.1 - 1.0 mg/dL    Alkaline Phosphatase 56 55 - 135 U/L    AST 21 10 - 40 U/L    ALT 18 10 - 44 U/L    Anion Gap 8 8 - 16 mmol/L    eGFR 41.2 (A) >60 mL/min/1.73 m^2   Phosphorus    Collection Time: 09/06/22  4:16 AM   Result Value Ref Range    Phosphorus 3.2 2.7 - 4.5 mg/dL   Magnesium    Collection Time: 09/06/22  4:16 AM   Result Value Ref Range    Magnesium 1.9 1.6 - 2.6 mg/dL     Imaging Results              US Carotid Bilateral (Final result)  Result time 09/05/22 06:35:48      Final result by Magdalena Salazar MD (09/05/22 06:35:48)                   Narrative:    Reason: Syncope    Grayscale, color and spectral Doppler analysis was  performed. Criteria for stenosis is based upon the Society of Radiologists in Ultrasound Consensus Conference, 2003 (Radiology, November 2003, 229, 340-346).    Comparison: 11/2/2019    Findings:  Grayscale images show minimal plaque bilaterally    Estimated peak systolic velocity in right internal carotid artery is 65 cm/s. Antegrade flow is present in the right vertebral artery.    Estimated peak systolic velocity in left internal carotid artery is 128 cm/s.This corresponds to a 50-69% hemodynamically significant stenosis. This is unchanged when compared to the prior study Antegrade flow is present in the left vertebral artery.    Impression:  minimal plaque bilaterally with a 50-69% hemodynamically significant stenosis on the left closer to 50%    No hemodynamically significant stenosis on the right    Electronically signed by:  Magdalena Salazar MD  9/5/2022 6:35 AM CDT Workstation: [id number]                                     X-Ray Chest AP Portable (Final result)  Result time 09/05/22 06:36:27      Final result by Magdalena Salazar MD (09/05/22 06:36:27)                   Narrative:    Portable chest x-ray at 10:55 PM is compared to prior study dated 7/24/2010    Clinical history is unconscious    The heart is enlarged. The left subclavian vein pacemaker is in stable position. There is a heart monitor.    Lungs are clear. There are no acute osseous abnormalities.    IMPRESSION: Cardiomegaly with no acute pulmonary process    Electronically signed by:  Magdalena Salazar MD  9/5/2022 6:36 AM CDT Workstation: [id number]                                     CT Cervical Spine Without Contrast (Final result)  Result time 09/04/22 23:08:19      Final result by Clifton Mustafa MD (09/04/22 23:08:19)                   Narrative:      EXAM:    CT cervical spine without contrast.    INDICATION:    Trauma. Neck pain.    TECHNIQUE:    Contiguous axial CT images of the cervical spine.  Intravenous contrast:  Absent.  Reformats: MPRs created and utilized.   mGy-cm.  This exam was performed according to our departmental dose-optimization program, which includes automated exposure control, adjustment of the mA and/or kV according to patient size and/or use of iterative reconstruction technique.    COMPARISON:    None.    FINDINGS:    Alignment: Preserved.    Fracture:  No acute fracture or subluxation.    Odontoid process: Intact.    Prevertebral soft tissues: No edema.    Spondylosis: Multilevel spondylosis with disc space narrowing, endplate sclerosis and marginal osteophytes. Bilateral facet arthropathy with multilevel neural foraminal narrowing.    Other: None.    IMPRESSION:    1. No CT evidence of acute osseous injury of the cervical spine.    Electronically signed by:  Clifton Mustafa MD  9/4/2022 11:08 PM CDT Workstation: [id number]                                     CT Head Without Contrast (Edited Result - FINAL)  Result time 09/04/22 23:09:43      Edited Result - FINAL by Clifton Mustafa MD (09/04/22 23:09:43)                   Narrative:         ADDENDUM #1       The above findings were discussed with and acknowledged by Dr. Banerjee at 11:03 PM CST on 9/4/2022.    Electronically signed by:  Clifton Mustafa MD  9/4/2022 11:09 PM CDT Workstation: [id number]     ORIGINAL REPORT       EXAM:    CT head without contrast.    INDICATION:    Trauma.    TECHNIQUE:    Contiguous axial CT images of the brain.  Intravenous contrast: Absent.   mGy-cm.  This exam was performed according to our departmental dose-optimization program, which includes automated exposure control, adjustment of the mA and/or kV according to patient size and/or use of iterative reconstruction technique.    COMPARISON:    7/24/2022.    FINDINGS:  There is subdural hemorrhage along the anterior and posterior falx measuring up to 1.2 cm in thickness. Small amount of subarachnoid hemorrhage along the bifrontal lobes. No midline shift, downward  herniation, or hydrocephalus. Diffuse cerebral atrophy with periventricular and deep white matter chronic microvascular changes. Old lacunar infarcts involving the bilateral basal ganglia. Paranasal sinuses and mastoid air cells are clear. No acute fracture.    IMPRESSION:  Subdural hemorrhage along the anterior and posterior falx measuring up to 1.2 cm in thickness. Small amount of subarachnoid hemorrhage along the bifrontal lobes. No midline shift, downward herniation, or hydrocephalus.    Electronically signed by:  Clifton Mustafa MD  9/4/2022 11:01 PM CDT Workstation: [id number]                      Final result by Clifton Mustafa MD (09/04/22 23:01:15)                   Narrative:    EXAM:    CT head without contrast.    INDICATION:    Trauma.    TECHNIQUE:    Contiguous axial CT images of the brain.  Intravenous contrast: Absent.   mGy-cm.  This exam was performed according to our departmental dose-optimization program, which includes automated exposure control, adjustment of the mA and/or kV according to patient size and/or use of iterative reconstruction technique.    COMPARISON:    7/24/2022.    FINDINGS:  There is subdural hemorrhage along the anterior and posterior falx measuring up to 1.2 cm in thickness. Small amount of subarachnoid hemorrhage along the bifrontal lobes. No midline shift, downward herniation, or hydrocephalus. Diffuse cerebral atrophy with periventricular and deep white matter chronic microvascular changes. Old lacunar infarcts involving the bilateral basal ganglia. Paranasal sinuses and mastoid air cells are clear. No acute fracture.    IMPRESSION:  Subdural hemorrhage along the anterior and posterior falx measuring up to 1.2 cm in thickness. Small amount of subarachnoid hemorrhage along the bifrontal lobes. No midline shift, downward herniation, or hydrocephalus.    Electronically signed by:  Clifton Mustafa MD  9/4/2022 11:01 PM CDT Workstation: [id number]                                     12-lead EKG interpretation:  (if applicable)      Current Cardiac Rhythm:   (if applicable)    Physical Exam:   Physical Exam  Vitals reviewed.   Constitutional:       Comments: Elderly male,NAD   HENT:      Head: Normocephalic.   Cardiovascular:      Rate and Rhythm: Normal rate and regular rhythm.   Pulmonary:      Effort: Pulmonary effort is normal.      Breath sounds: Normal breath sounds.   Abdominal:      Palpations: Abdomen is soft.   Musculoskeletal:         General: No swelling.   Skin:     General: Skin is dry.   Neurological:      Comments: Alert and Ox2, cognitive impairment       ASSESSMENT/PLAN:   Assessment: Plan:     CAD -PCI CX, RCA 2015  Ventricular Ectopy   Syncope vs mechanical fall  Hypomagnesia   Dementia   Subdural hemorrhage     PCI RCA, CX 2015 2/2022 -stress negative ischemia  7/2022 - ECHO- ef 45-50%, LVH, DD, INF apical HK  EKG- NSR with prolonged QTc      Keep mag 2 and potassium 4  Check orthostatics  Continue amiodarone, need to ensure patient has stopped home sotalol dose  STATIN, BB  Plavix on hold per neurosurgeon can restart in 7 days  Has CV workup scheduled in office with Dr Betancourt

## 2022-09-06 NOTE — RESPIRATORY THERAPY
09/05/22 1950   Patient Assessment/Suction   Level of Consciousness (AVPU) alert   Respiratory Effort Unlabored   Expansion/Accessory Muscles/Retractions expansion symmetric   All Lung Fields Breath Sounds clear   PRE-TX-O2   O2 Device (Oxygen Therapy) room air   SpO2 (!) 93 %   Pulse Oximetry Type Continuous   $ Pulse Oximetry - Multiple Charge Pulse Oximetry - Multiple   Pulse 84   Resp (!) 23   Education   $ Education 15 min   Respiratory Evaluation   $ Care Plan Tech Time 15 min   $ Eval/Re-eval Charges Re-evaluation

## 2022-09-06 NOTE — PT/OT/SLP PROGRESS
Occupational Therapy   Treatment    Name: Gene Hartman  MRN: [id number]  Admitting Diagnosis:  Subdural hematoma       Recommendations:     Discharge Recommendations: home health OT, home with home health (24/7 caregiver supervision)  Discharge Equipment Recommendations:   (TBD)  Barriers to discharge:  None    Assessment:     Gene Hartman is a 88 y.o. male with a medical diagnosis of Subdural hematoma.  He presents with general weakness. Patient participated in LB dressing sitting in chair and grooming standing at sink. Performance deficits affecting function are weakness, impaired endurance, impaired self care skills, impaired functional mobility, gait instability, impaired balance, impaired cognition, decreased upper extremity function, decreased lower extremity function, decreased safety awareness.     Rehab Prognosis:  Good; patient would benefit from acute skilled OT services to address these deficits and reach maximum level of function.       Plan:     Patient to be seen 5 x/week to address the above listed problems via self-care/home management, therapeutic activities, therapeutic exercises  Plan of Care Expires: 10/05/22  Plan of Care Reviewed with: patient    Subjective     Pain/Comfort:  Pain Rating 1: 0/10  Pain Rating Post-Intervention 1: 0/10    Objective:     Communicated with: nurse prior to session.  Patient found up in chair with telemetry, peripheral IV upon OT entry to room.    General Precautions: Standard, fall   Orthopedic Precautions:N/A   Braces: N/A  Respiratory Status: Nasal cannula, flow 2 L/min     Occupational Performance:     Functional Mobility/Transfers:  Patient completed Sit <> Stand Transfer with minimum assistance  with  hand-held assist     Activities of Daily Living:  Grooming: minimum assistance to wash hands standing at sink.  Lower Body Dressing: contact guard assistance to don/doff socks sitting in chair.      [facility] 6 Click ADL: 19    Treatment & Education:  Patient able  to follow 1 step commands with minimal verbal cues.    Patient left up in chair with all lines intact, call button in reach, and chair alarm on    GOALS:   Multidisciplinary Problems       Occupational Therapy Goals          Problem: Occupational Therapy    Goal Priority Disciplines Outcome Interventions   Occupational Therapy Goal     OT, PT/OT Ongoing, Progressing    Description: Goals to be met by: 10/26/2022    Patient will increase functional independence with ADLs by performing:    UE Dressing with Supervision.  LE Dressing with Supervision.  Grooming while standing at sink with Supervision.  Toileting from toilet with Supervision for hygiene and clothing management.   Toilet transfer to toilet with Supervision.                         Time Tracking:     OT Date of Treatment: 09/06/22  OT Start Time: 0941  OT Stop Time: 1004  OT Total Time (min): 23 min    Billable Minutes:Self Care/Home Management 23    OT/ANI: OT          9/6/2022

## 2022-09-06 NOTE — PROGRESS NOTES
Pulmonary/Critical Care  Progress Note      Patient name: Gene Hartman  MRN: [id number]  Date: 09/06/2022    Admit Date: 9/4/2022  Consult Requested By: Hansel Owen MD    Reason for Consult: SDH    HPI:    9/5/2022 - 89 yo male h/o ASCVD (s/p stent), s/p pacer, h/o prostate cancer HTN, GERD, HLP, hypothyroid, anemia, dementia,  who fell at home.  The mechanism of the fall is to clear to me.  He was found to have a SDH and small SAH and was admitted to ICU for care.  He has been on cardene for BP control.  He has been seen by neurosurgery and repeat CT this AM showing decrease in SDH and stable SAH.  He is in no distress and has no c/o except for HA.  He is a little confused (not sure what his baseline condition is with his h/o dementia).  No focal neurologic findings.      9/6/2022 - Stable overnight, no new issues reported.  Off of cardene drip.  Better neurologically but not oriented to data or president.  No new respiratory complaints.    Review of Systems    Review of Systems   Reason unable to perform ROS: unreliable due to confusion.   Constitutional:  Negative for chills and fever.   Respiratory:  Negative for cough, hemoptysis, sputum production and shortness of breath.    Cardiovascular:  Positive for chest pain (some right rib pain).   Musculoskeletal:  Positive for falls.   Neurological:  Positive for headaches.     Past Medical History    Past Medical History:   Diagnosis Date    Abnormal echocardiogram 11/21/2019    Normal left ventricular systolic function with estimated ejection fraction of 60%.  Grade 2 moderate left ventricular diastolic dysfunction consistent with pseudonormalization.  Mild left atrial enlargement.  Mild aortic regurgitation.  Mild to moderate mitral regurgitation.  Mild tricuspid regurgitation.  Estimated PA systolic pressure is 38 mm of mercury.  Diagnosis is syncope.    Anxiety     Arthritis     CAD (coronary artery disease) age 68    Carotid artery occlusion      Cerebrovascular small vessel disease     Colon polyps age 78    Coronary artery disease of native artery of native heart with stable angina pectoris 5/6/2020    GERD (gastroesophageal reflux disease)     H. pylori infection     HTN (hypertension), benign age 65    Hyperlipidemia LDL goal <70 age 65    Hypothyroidism     Multiple fractures of ribs of right side 11/27/2012    Myocardial infarction     Pernicious anemia 1/26/2018    Primary insomnia 10/9/2018    Prostate cancer age 67    Subdural hematoma 9/5/2022    Syncopal episodes 3/2013    Urge incontinence 5/8/2018       Past Surgical History    Past Surgical History:   Procedure Laterality Date    CARDIAC PACEMAKER PLACEMENT  10/2015    [facility] Pacemaker    CARDIAC SURGERY      CATARACT EXTRACTION W/  INTRAOCULAR LENS IMPLANT Bilateral     COLONOSCOPY  ~2013    Dr. Edge    COLONOSCOPY N/A 06/08/2016    Procedure: COLONOSCOPY;  Surgeon: Shamar Baarhona MD;  Location: [facility];  Service: Endoscopy;  Laterality: N/A; REPEAT IN 1 YEAR    CORONARY ANGIOPLASTY WITH STENT PLACEMENT  2003    x 2 RCA    PROSTATECTOMY  2002    UPPER GASTROINTESTINAL ENDOSCOPY  11/15/2016    Dr. Barahona       Medications (scheduled):      amiodarone  200 mg Oral BID    amLODIPine  5 mg Oral Daily    atorvastatin  40 mg Oral Daily    chlorhexidine  15 mL Mouth/Throat BID    cyanocobalamin  1,000 mcg Oral Daily    levothyroxine  75 mcg Oral Before breakfast    losartan  50 mg Oral BID    memantine  5 mg Oral QHS    metoprolol succinate  50 mg Oral Daily    mupirocin   Nasal BID    sodium chloride 0.9%  10 mL Intravenous Q12H    vancomycin (VANCOCIN) IVPB  1,250 mg Intravenous Q24H       Medications (infusions):      sodium chloride 0.9% 68 mL/hr at 09/05/22 0400    nicardipine Stopped (09/05/22 1500)       Medications (prn):     acetaminophen, melatonin, Pharmacy to dose Vancomycin consult **AND** vancomycin - pharmacy to dose    Family History:   Family History   Problem Relation  Age of Onset    Migraines Mother     Heart failure Father     Heart disease Father 56        MI    Heart failure Brother     Heart disease Brother     Diabetes Son     Hypertension Son     Heart disease Brother     Mental illness Brother     No Known Problems Son     Colon cancer Neg Hx     Colon polyps Neg Hx     Crohn's disease Neg Hx     Ulcerative colitis Neg Hx     Stomach cancer Neg Hx     Esophageal cancer Neg Hx        Social History: Tobacco:   Social History     Tobacco Use   Smoking Status Never   Smokeless Tobacco Never                                EtOH:   Social History     Substance and Sexual Activity   Alcohol Use No                                Drugs:   Social History     Substance and Sexual Activity   Drug Use No     Physical Exam    Vital signs:  Temp:  [97.9 °F (36.6 °C)-98.4 °F (36.9 °C)]   Pulse:  [70-95]   Resp:  [12-37]   BP: (124-167)/(55-89)   SpO2:  [92 %-100 %]     Intake/Output:   Intake/Output Summary (Last 24 hours) at 9/6/2022 0848  Last data filed at 9/6/2022 0550  Gross per 24 hour   Intake 2016 ml   Output 500 ml   Net 1516 ml          BMI: Estimated body mass index is 29.71 kg/m² as calculated from the following:    Height as of this encounter: 5' (1.524 m).    Weight as of this encounter: 69 kg (152 lb 1.9 oz).    Physical Exam  Vitals and nursing note reviewed.   Constitutional:       General: He is not in acute distress.     Appearance: Normal appearance. He is not ill-appearing, toxic-appearing or diaphoretic.   HENT:      Head: Normocephalic and atraumatic.      Right Ear: External ear normal.      Left Ear: External ear normal.      Nose: Nose normal.      Mouth/Throat:      Mouth: Mucous membranes are moist.      Pharynx: Oropharynx is clear. No oropharyngeal exudate.   Eyes:      General: No scleral icterus.        Right eye: No discharge.         Left eye: No discharge.      Extraocular Movements: Extraocular movements intact.      Conjunctiva/sclera: Conjunctivae  normal.      Pupils: Pupils are equal, round, and reactive to light.   Neck:      Vascular: No carotid bruit.   Cardiovascular:      Rate and Rhythm: Normal rate and regular rhythm.      Pulses: Normal pulses.      Heart sounds: Normal heart sounds. No murmur heard.    No friction rub. No gallop.   Pulmonary:      Effort: Pulmonary effort is normal. No respiratory distress.      Breath sounds: Normal breath sounds. No stridor. No wheezing, rhonchi or rales.   Chest:      Chest wall: No tenderness.   Abdominal:      General: Bowel sounds are normal. There is no distension.      Tenderness: There is no abdominal tenderness. There is no guarding.   Musculoskeletal:         General: No swelling. Normal range of motion.      Cervical back: Normal range of motion and neck supple. No rigidity or tenderness.      Right lower leg: No edema.      Left lower leg: No edema.   Lymphadenopathy:      Cervical: No cervical adenopathy.   Skin:     General: Skin is warm and dry.      Capillary Refill: Capillary refill takes less than 2 seconds.      Coloration: Skin is not jaundiced.      Findings: No bruising.   Neurological:      General: No focal deficit present.      Mental Status: He is alert. Mental status is at baseline.      Cranial Nerves: No cranial nerve deficit.      Sensory: No sensory deficit.      Motor: No weakness.      Comments: Oriented x 2   Psychiatric:         Mood and Affect: Mood normal.         Behavior: Behavior normal.      Comments: Calm        Laboratory    Recent Labs   Lab 09/06/22  0416   WBC 7.66   RBC 3.25*   HGB 10.7*   HCT 32.7*   *   *   MCH 32.9*   MCHC 32.7         Recent Labs   Lab 09/06/22  0416   CALCIUM 8.5*   PROT 6.5      K 3.9   CO2 23      BUN 30*   CREATININE 1.6*   ALKPHOS 56   ALT 18   AST 21   BILITOT 0.7         No results for input(s): PT, INR, APTT in the last 24 hours.      No results for input(s): CPK, CPKMB, TROPONINI, MB in the last 24  hours.      Additional labs: reviewed    Microbiology:       Microbiology Results (last 7 days)       Procedure Component Value Units Date/Time    Blood culture #2 **CANNOT BE ORDERED STAT** [[id number]]  (Abnormal) Collected: 09/05/22 0102    Order Status: Completed Specimen: Blood from Peripheral, Wrist, Left Updated: 09/06/22 0631     Blood Culture, Routine Gram stain vasyl bottle: Gram positive cocci      Results called to and read back by:Nolan Busch RN 3ICU  09/05/2022      16:31 JBM      VIRIDANS STREPTOCOCCUS  Organism is a probable contaminant      Blood culture [[id number]] Collected: 09/05/22 1851    Order Status: Completed Specimen: Blood from Antecubital, Left Arm Updated: 09/06/22 0358     Blood Culture, Routine No Growth to date    Blood culture #1 **CANNOT BE ORDERED STAT** [[id number]] Collected: 09/04/22 2307    Order Status: Completed Specimen: Blood from Peripheral, Upper Arm, Right Updated: 09/06/22 0232     Blood Culture, Routine No Growth to date      No Growth to date            Radiology    X-Ray Chest AP Portable  Portable chest x-ray at 10:55 PM is compared to prior study dated 7/24/2010    Clinical history is unconscious    The heart is enlarged. The left subclavian vein pacemaker is in stable position. There is a heart monitor.    Lungs are clear. There are no acute osseous abnormalities.    IMPRESSION: Cardiomegaly with no acute pulmonary process    Electronically signed by:  Magdalena Salazar MD  9/5/2022 6:36 AM CDT Workstation: [id number]  US Carotid Bilateral  Reason: Syncope    Grayscale, color and spectral Doppler analysis was performed. Criteria for stenosis is based upon the Society of Radiologists in Ultrasound Consensus Conference, 2003 (Radiology, November 2003, 229, 340-346).    Comparison: 11/2/2019    Findings:  Grayscale images show minimal plaque bilaterally    Estimated peak systolic velocity in right internal carotid artery is 65 cm/s. Antegrade flow is present in the  right vertebral artery.    Estimated peak systolic velocity in left internal carotid artery is 128 cm/s.This corresponds to a 50-69% hemodynamically significant stenosis. This is unchanged when compared to the prior study Antegrade flow is present in the left vertebral artery.    Impression:  minimal plaque bilaterally with a 50-69% hemodynamically significant stenosis on the left closer to 50%    No hemodynamically significant stenosis on the right    Electronically signed by:  Magdalena Salazar MD  9/5/2022 6:35 AM CDT Workstation: [id number]  CT Head Without Contrast  All CT scans at this facility used dose modulation, iterative reconstruction and/or weight-based dosing when appropriate to reduce radiation doses  as low as reasonably achievable.    CLINICAL INFORMATION:  Subdural hemorrhage    COMPARISON: 9/4/2022    FINDINGS: There is decreasing size of the subdural hemorrhage along the falx measuring seven mm in thickness. Previously this measured 12 mm.  There is small amount of subarachnoid hemorrhage along the bifrontal lobes which are stable.    There is small amount of intraventricular hemorrhage within the occipital horns which is normal. The ventricles and sulci are prominent compatible with generalized cerebral atrophy.    There is no midline shift. There are bilateral lacunar infarcts in the basal ganglia. There is periventricular low attenuation compatible with small vessel disease. Cerebellum and brainstem are normal.    The orbits are unremarkable. There is rightward deviation of the nasal septum. The paranasal sinuses and mastoid air cells are clear. There is no calvarial fracture.    IMPRESSION: Decrease in size of the subarachnoid hemorrhage along the falx with stable subarachnoid hemorrhage in the anterior frontal lobes    New small amount of intraventricular hemorrhage within the occipital lobes    Generalized cerebral atrophy with old lacunar infarcts and periventricular small vessel  disease    Electronically signed by:  Magdalena Salazar MD  9/5/2022 6:27 AM CDT Workstation: [id number]        Additional Studies    reviewed    Ventilator Information              No results for input(s): PH, PCO2, PO2, HCO3, POCSATURATED, BE in the last 72 hours.      Impression    Active Hospital Problems    Diagnosis  POA    *Subdural hematoma [S06.5X9A]  Yes      Resolved Hospital Problems   No resolved problems to display.       Plan    Stable at this time  Continue further management and observation - OK to transfer to floor  Respiratory status is stable  Cardiology following  Watch BUN, Cr have increased some    Thank you for this consult.  I will follow with you while the patient is hospitalized.        Nilesh Martinez MD  [facility] Pulmonary/Critical Care  09/06/2022

## 2022-09-06 NOTE — PROGRESS NOTES
[facility] Medicine  Progress Note    Patient name: Gene Hartman  MRN: [id number]  Admit Date: 9/4/2022   LOS: 1 day     SUBJECTIVE:     Principal problem: Subdural hematoma    Interval History:  Patient was seen and examined bedside, he is sitting in the chair, denies any new symptoms, no acute events overnight as per nursing staff telemetry shows some PVCs but no wide complex tachycardia.  His cardiologist recently started amiodarone, it is not clear to me if he was still taking sotalol at home    Scheduled Meds:   amiodarone  200 mg Oral BID    amLODIPine  5 mg Oral Daily    atorvastatin  40 mg Oral Daily    chlorhexidine  15 mL Mouth/Throat BID    cyanocobalamin  1,000 mcg Oral Daily    levothyroxine  75 mcg Oral Before breakfast    losartan  50 mg Oral BID    memantine  5 mg Oral QHS    metoprolol succinate  50 mg Oral Daily    mupirocin   Nasal BID    sodium chloride 0.9%  10 mL Intravenous Q12H     Continuous Infusions:  PRN Meds:acetaminophen, melatonin    Review of patient's allergies indicates:   Allergen Reactions    Iodinated contrast media Rash    Pontocaine [tetracaine hcl] Anaphylaxis     hypotension       Review of Systems: As per interval history    OBJECTIVE:     Vital Signs (Most Recent)  Temp: 98.3 °F (36.8 °C) (09/06/22 0400)  Pulse: 79 (09/06/22 0700)  Resp: 18 (09/06/22 0700)  BP: (!) 174/75 (09/06/22 0700)  SpO2: (!) 94 % (09/06/22 0700)    Vital Signs Range (Last 24H):  Temp:  [97.9 °F (36.6 °C)-98.4 °F (36.9 °C)]   Pulse:  [70-95]   Resp:  [12-37]   BP: (135-174)/(61-88)   SpO2:  [92 %-100 %]     I & O (Last 24H):  Intake/Output Summary (Last 24 hours) at 9/6/2022 1545  Last data filed at 9/6/2022 0550  Gross per 24 hour   Intake 2016 ml   Output 500 ml   Net 1516 ml       Physical Exam:  General: Patient resting comfortably in no acute distress. Appears as stated age. Calm  Eyes: No conjunctival injection. No scleral icterus.  ENT:  Bilateral hearing loss noted. No  discharge from ears. No nasal discharge.   Neck: Supple, trachea midline. No JVD  CVS: RRR. No LE edema BL  Lungs:  No tachypnea or accessory muscle use.  Clear to auscultation bilaterally  Abdomen:  Soft, nontender and nondistended.  No organomegaly  Neuro: Alert. Moves all extremities. Follows commands. Responds appropriately   Skin:  Mild bruising in occipital area noted    Laboratory:  I have reviewed all pertinent lab results within the past 24 hours.    Diagnostic Results:  Reviewed all imaging    ASSESSMENT/PLAN:     88-year-old unfortunate gentleman with prior history of CAD status post PCI on dual antiplatelets, frequent syncope and collapse was brought to emergency room after encountering a syncope and collapse resulting in subdural hematoma    Active Hospital Problems    Diagnosis  POA    *Subdural hematoma [S06.5X9A]  Yes     Priority: 1 - High    Syncope and collapse [R55]  Yes     Priority: 2     Dementia without behavioral disturbance [F03.90]  Yes    BPH associated with nocturia [N40.1, R35.1]  Yes    CAD (coronary artery disease) [I25.10]  Yes    HTN (hypertension), benign [I10]  Yes      Resolved Hospital Problems   No resolved problems to display.         Plan:  Subdural hematoma is stable, neurosurgery consulted-recommended conservative management  Continue holding aspirin and Plavix.  Would appreciate cardiology's input on need of dual antiplatelets considering his last PCI was more than a year ago?  As per family his frequent falls and syncope a getting worse, he was recently started on amiodarone due to frequent ectopy found on pacemaker interrogation.  I fear that patient was taking both sotalol and amiodarone at home  Continue Toprol-XL, amiodarone, losartan  Interrogate pacemaker  Cardiology consulted-would appreciate their input regarding need for dual antiplatelets, 2nd look at his cardiac regimen considering frequent syncope and collapse.  Does he need new pacemaker?  Transfer to  cardiology floor    VTE Risk Mitigation (From admission, onward)           Ordered     IP VTE HIGH RISK PATIENT  Once         09/05/22 0233     Place sequential compression device  Until discontinued         09/05/22 0233                        Department Hospital Medicine  Formerly [facility]  Hansel Owen MD  Date of service: 09/06/2022

## 2022-09-06 NOTE — PROGRESS NOTES
[facility]  Adult Nutrition   Progress Note (Initial Assessment)     SUMMARY     Recommendations  Continue current Regular diet as tolerated and encourage intake.    Goals:   Pt to meet 75 to 100% of his EEN and EPN.    Nutrition Goal Status: goal met    Communication of RD Recs: other (comment)    Dietitian Rounds Brief  LOS: Spoke to family member and she states that pt is eating well and does not need an ONS. His LBM was 9/4/2022. Will continue to follow prn till discharge.    Per MD PN's today:  Principal problem: Subdural hematoma     Interval History:  Patient was seen and examined bedside, he is sitting in the chair, denies any new symptoms, no acute events overnight as per nursing staff telemetry shows some PVCs but no wide complex tachycardia.  His cardiologist recently started amiodarone, it is not clear to me if he was still taking sotalol at home    As per family his frequent falls and syncope a getting worse, he was recently started on amiodarone due to frequent ectopy found on pacemaker interrogation.  I fear that patient was taking both sotalol and amiodarone at home    Diet order:   Current Diet Order: Regular      Evaluation of Received Nutrient/Fluid Intake  Energy Calories Required: meeting needs  Protein Required: meeting needs  Fluid Required: meeting needs  Tolerance: tolerating     % Intake of Estimated Energy Needs: 75 - 100 %  % Meal Intake: 75 - 100 %      Intake/Output Summary (Last 24 hours) at 9/6/2022 1557  Last data filed at 9/6/2022 0550  Gross per 24 hour   Intake 2016 ml   Output 500 ml   Net 1516 ml        Anthropometrics  Temp: 98.3 °F (36.8 °C)  Height Method: Stated  Height: 5' (152.4 cm)  Height (inches): 60 in  Weight Method: Bed Scale  Weight: 69 kg (152 lb 1.9 oz)  Weight (lb): 152.12 lb  Ideal Body Weight (IBW), Male: 106 lb  % Ideal Body Weight, Male (lb): 143.51 %  BMI (Calculated): 29.7  BMI Grade: 25 - 29.9 - overweight       Estimated/Assessed  Needs  Weight Used For Calorie Calculations: 69 kg (152 lb 1.9 oz)  Energy Calorie Requirements (kcal): 0495-5068 kcals/day (25-30 kcals/kg ABW)  Energy Need Method: Kcal/kg  Protein Requirements: 60-98 g/day (1.2-2 g/kg IBW)  Weight Used For Protein Calculations: 49 kg (108 lb 0.4 oz)     Estimated Fluid Requirement Method: RDA Method  RDA Method (mL): 1725       Reason for Assessment  Reason For Assessment: length of stay  Diagnosis: other (see comments) (Subdural hematoma)  Relevant Medical History: HTN (hypertension), benign, age 65, Hyperlipidemia LDL goal <70, age 65, CAD (coronary artery disease), age 68, Colon polyps, age 78, Multiple fractures of ribs of right side, Cerebrovascular small vessel disease, Syncopal episodes, Prostate cancer, age 67, GERD (gastroesophageal reflux disease), Myocardial infarction, Anxiety, Arthritis, Hypothyroidism, Carotid artery occlusion, H. pylori infection, Pernicious anemia, Urge incontinence, Primary insomnia, Coronary artery disease of native artery of native heart with stable angina pectoris, Abnormal echocardiogram  Interdisciplinary Rounds: did not attend    Nutrition/Diet History  Spiritual, Cultural Beliefs, Islam Practices, Values that Affect Care: no  Food Allergies: NKFA  Factors Affecting Nutritional Intake: difficulty/impaired swallowing    Nutrition Risk Screen  Nutrition Risk Screen: dysphagia or difficulty swallowing     MST Score: 0  Have you recently lost weight without trying?: No  Weight loss score: 0  Have you been eating poorly because of a decreased appetite?: No  Appetite score: 0       Weight History:  Wt Readings from Last 5 Encounters:   09/05/22 69 kg (152 lb 1.9 oz)   09/06/22 68.9 kg (152 lb)   07/26/22 68.5 kg (151 lb 0.2 oz)   07/25/22 68.5 kg (151 lb)   12/28/21 69.4 kg (153 lb)        Lab/Procedures/Meds: Pertinent Labs/Meds Reviewed    Medications:Pertinent Medications Reviewed  Scheduled Meds:   amiodarone  200 mg Oral BID     amLODIPine  5 mg Oral Daily    atorvastatin  40 mg Oral Daily    chlorhexidine  15 mL Mouth/Throat BID    cyanocobalamin  1,000 mcg Oral Daily    levothyroxine  75 mcg Oral Before breakfast    losartan  50 mg Oral BID    memantine  5 mg Oral QHS    metoprolol succinate  50 mg Oral Daily    mupirocin   Nasal BID    sodium chloride 0.9%  10 mL Intravenous Q12H     Continuous Infusions:  PRN Meds:.acetaminophen, melatonin    Labs: Pertinent Labs Reviewed  Clinical Chemistry:  Recent Labs   Lab 09/04/22  2307 09/05/22  0353 09/06/22  0416    141 137   K 4.0 4.6 3.9    107 106   CO2 25 22* 23   * 159* 120*   BUN 22 22 30*   CREATININE 1.4 1.3 1.6*   CALCIUM 8.5* 8.6* 8.5*   PROT 7.3 6.8 6.5   ALBUMIN 4.0 3.7 3.6   BILITOT 0.6 0.7 0.7   ALKPHOS 79 73 56   AST 27 28 21   ALT 23 21 18   ANIONGAP 6* 12 8   MG 2.0 1.6 1.9   PHOS  --  2.7 3.2   LIPASE 38  --   --      CBC:   Recent Labs   Lab 09/06/22 0416   WBC 7.66   RBC 3.25*   HGB 10.7*   HCT 32.7*   *   *   MCH 32.9*   MCHC 32.7     Cardiac Profile:  Recent Labs   Lab 09/04/22 2307   *   TROPONINI <0.030     IThyroid & Parathyroid:  Recent Labs   Lab 09/04/22 2307   TSH 6.990*   FREET4 1.26       Monitor and Evaluation  Food and Nutrient Intake: food and beverage intake, energy intake  Food and Nutrient Adminstration: diet order  Knowledge/Beliefs/Attitudes: food and nutrition knowledge/skill, beliefs and attitudes  Physical Activity and Function: nutrition-related ADLs and IADLs, factors affecting access to physical activity  Anthropometric Measurements: weight, weight change  Biochemical Data, Medical Tests and Procedures: lipid profile, inflammatory profile, glucose/endocrine profile, electrolyte and renal panel, gastrointestinal profile  Nutrition-Focused Physical Findings: overall appearance     Nutrition Risk  Level of Risk/Frequency of Follow-up: low     Nutrition Follow-Up         Feli Louis RD 09/06/2022 3:57 PM

## 2022-09-06 NOTE — PT/OT/SLP PROGRESS
"Physical Therapy Treatment    Patient Name:  Gene Hartman   MRN:  [id number]    Recommendations:     Discharge Recommendations:  home health PT (and close supervision at home.)   Discharge Equipment Recommendations: walker, rolling   Barriers to discharge: None    Assessment:     Gene Hartman is a 88 y.o. male admitted with a medical diagnosis of Subdural hematoma.  He presents with the following impairments/functional limitations:  weakness, impaired endurance, impaired self care skills, impaired functional mobility, gait instability, impaired balance, decreased safety awareness, pain, impaired cardiopulmonary response to activity.    Pt found dozing in bed with HOB elevated and breakfast unfinished. Pt agreeable to working with PT for gait training, but was also complaining about pain and feeling "nervous" after gait. PT took extra time with pt as this was her first time working with this pt and not sure about his pain complaints which were all reported to pt's RN Katlin. Pt seemed generally quiet and not interested in finishing his breakfast despite encouragement. Pt moves fairly well, but is somewhat impulsive and exhibits decreased safety awareness. PT feels pt should be supervised closely at home as he is still a high fall risk.    Rehab Prognosis: Fair; patient would benefit from acute skilled PT services to address these deficits and reach maximum level of function.    Recent Surgery: * No surgery found *      Plan:     During this hospitalization, patient to be seen 6 x/week to address the identified rehab impairments via gait training, therapeutic activities, therapeutic exercises, neuromuscular re-education and progress toward the following goals:    Plan of Care Expires:  10/05/22    Subjective     Chief Complaint: Pt c/o back pain B lower thoracic region  Patient/Family Comments/goals: home with his wife and son/DIL.  Pain/Comfort:  Pain Rating 1:  (not rated)  Location - Side 1: Bilateral  Location - " Orientation 1: lower  Location 1: back (lower thoracic region)  Pain Addressed 1: Reposition, Distraction, Cessation of Activity, Nurse notified      Objective:     Communicated with RN Katlin prior to session.  Patient found HOB elevated with bed alarm, blood pressure cuff, peripheral IV, telemetry, pulse ox (continuous) upon PT entry to room.     General Precautions: Standard, fall   Orthopedic Precautions:N/A   Braces: N/A  Respiratory Status: Room air     Functional Mobility:  Bed Mobility:     Rolling Right: contact guard assistance  Supine to Sit: contact guard assistance  Transfers:     Sit to Stand:  contact guard assistance with rolling walker  Gait: x 150' with rolling walker and CGA for safety plus vc for slowing pace and remaining inside RW base       AM-PAC 6 CLICK MOBILITY  Turning over in bed (including adjusting bedclothes, sheets and blankets)?: 4  Sitting down on and standing up from a chair with arms (e.g., wheelchair, bedside commode, etc.): 4  Moving from lying on back to sitting on the side of the bed?: 4  Moving to and from a bed to a chair (including a wheelchair)?: 3  Need to walk in hospital room?: 3  Climbing 3-5 steps with a railing?: 3  Basic Mobility Total Score: 21       Therapeutic Activities and Exercises:   Pt was educated on the following: call light use, importance of OOB activity and functional mobility to negate the negative effects of prolonged bed rest during this hospitalization, safe transfers/ambulation including decreasing pace to avoid building momentum/remaining inside RW base and discharge planning recommendations/options.      Patient left up in chair with all lines intact, call button in reach, chair alarm on, and RN notified..    GOALS:   Multidisciplinary Problems       Physical Therapy Goals          Problem: Physical Therapy    Goal Priority Disciplines Outcome Goal Variances Interventions   Physical Therapy Goal     PT, PT/OT Ongoing, Progressing     Description:  Goals to be met by: 10/5/22     Patient will increase functional independence with mobility by performin. Supine to sit with Supervision  2. Sit to stand transfer with Supervision  3. Bed to chair transfer with Supervision using Rolling Walker  4. Gait  x 150 feet with Supervision using Rolling Walker.                              Time Tracking:     PT Received On: 22  PT Start Time: 918     PT Stop Time: 945  PT Total Time (min): 27 min     Billable Minutes: Gait Training 15 and Therapeutic Activity 12    Treatment Type: Treatment  PT/PTA: PT     PTA Visit Number: 0     2022

## 2022-09-06 NOTE — NURSING
Orthostatic BP     09/06/22 1730 09/06/22 1740 09/06/22 1745   Vital Signs   Pulse 63 72 70   SpO2 98 % 97 % 96 %   BP (!) 165/72  (lying) (!) 157/69  (sitting) (!) 141/67  (standing)   MAP (mmHg) 108  (lying) 100  (sitting) 97  (standing)

## 2022-09-06 NOTE — PLAN OF CARE
Problem: Physical Therapy  Goal: Physical Therapy Goal  Description: Goals to be met by: 10/5/22     Patient will increase functional independence with mobility by performin. Supine to sit with Supervision  2. Sit to stand transfer with Supervision  3. Bed to chair transfer with Supervision using Rolling Walker  4. Gait  x 150 feet with Supervision using Rolling Walker.         Outcome: Ongoing, Progressing      none

## 2022-09-07 VITALS
DIASTOLIC BLOOD PRESSURE: 120 MMHG | HEART RATE: 84 BPM | SYSTOLIC BLOOD PRESSURE: 149 MMHG | TEMPERATURE: 98 F | OXYGEN SATURATION: 93 % | HEIGHT: 60 IN | BODY MASS INDEX: 29.99 KG/M2 | RESPIRATION RATE: 18 BRPM | WEIGHT: 152.75 LBS

## 2022-09-07 PROBLEM — R55 SYNCOPE AND COLLAPSE: Status: RESOLVED | Noted: 2019-11-02 | Resolved: 2022-09-07

## 2022-09-07 LAB
ALBUMIN SERPL BCP-MCNC: 3.4 G/DL (ref 3.5–5.2)
ALP SERPL-CCNC: 60 U/L (ref 55–135)
ALT SERPL W/O P-5'-P-CCNC: 19 U/L (ref 10–44)
ANION GAP SERPL CALC-SCNC: 6 MMOL/L (ref 8–16)
AST SERPL-CCNC: 22 U/L (ref 10–40)
BACTERIA BLD CULT: ABNORMAL
BASOPHILS # BLD AUTO: 0.04 K/UL (ref 0–0.2)
BASOPHILS NFR BLD: 0.5 % (ref 0–1.9)
BILIRUB SERPL-MCNC: 0.6 MG/DL (ref 0.1–1)
BUN SERPL-MCNC: 25 MG/DL (ref 8–23)
CALCIUM SERPL-MCNC: 8.7 MG/DL (ref 8.7–10.5)
CHLORIDE SERPL-SCNC: 110 MMOL/L (ref 95–110)
CO2 SERPL-SCNC: 24 MMOL/L (ref 23–29)
CREAT SERPL-MCNC: 1.3 MG/DL (ref 0.5–1.4)
DIFFERENTIAL METHOD: ABNORMAL
EOSINOPHIL # BLD AUTO: 0.1 K/UL (ref 0–0.5)
EOSINOPHIL NFR BLD: 1.1 % (ref 0–8)
ERYTHROCYTE [DISTWIDTH] IN BLOOD BY AUTOMATED COUNT: 16.1 % (ref 11.5–14.5)
EST. GFR  (NO RACE VARIABLE): 52.8 ML/MIN/1.73 M^2
GLUCOSE SERPL-MCNC: 105 MG/DL (ref 70–110)
HCT VFR BLD AUTO: 33.9 % (ref 40–54)
HGB BLD-MCNC: 11.1 G/DL (ref 14–18)
IMM GRANULOCYTES # BLD AUTO: 0.05 K/UL (ref 0–0.04)
IMM GRANULOCYTES NFR BLD AUTO: 0.7 % (ref 0–0.5)
LYMPHOCYTES # BLD AUTO: 1.8 K/UL (ref 1–4.8)
LYMPHOCYTES NFR BLD: 24.2 % (ref 18–48)
MCH RBC QN AUTO: 32.9 PG (ref 27–31)
MCHC RBC AUTO-ENTMCNC: 32.7 G/DL (ref 32–36)
MCV RBC AUTO: 101 FL (ref 82–98)
MONOCYTES # BLD AUTO: 0.7 K/UL (ref 0.3–1)
MONOCYTES NFR BLD: 9.7 % (ref 4–15)
NEUTROPHILS # BLD AUTO: 4.9 K/UL (ref 1.8–7.7)
NEUTROPHILS NFR BLD: 63.8 % (ref 38–73)
NRBC BLD-RTO: 0 /100 WBC
PHOSPHATE SERPL-MCNC: 3.4 MG/DL (ref 2.7–4.5)
PLATELET # BLD AUTO: 142 K/UL (ref 150–450)
PMV BLD AUTO: 10.5 FL (ref 9.2–12.9)
POTASSIUM SERPL-SCNC: 4.2 MMOL/L (ref 3.5–5.1)
PROT SERPL-MCNC: 6.7 G/DL (ref 6–8.4)
RBC # BLD AUTO: 3.37 M/UL (ref 4.6–6.2)
SODIUM SERPL-SCNC: 140 MMOL/L (ref 136–145)
WBC # BLD AUTO: 7.61 K/UL (ref 3.9–12.7)

## 2022-09-07 PROCEDURE — A4216 STERILE WATER/SALINE, 10 ML: HCPCS | Performed by: INTERNAL MEDICINE

## 2022-09-07 PROCEDURE — 97116 GAIT TRAINING THERAPY: CPT

## 2022-09-07 PROCEDURE — 25000003 PHARM REV CODE 250

## 2022-09-07 PROCEDURE — 84100 ASSAY OF PHOSPHORUS: CPT | Performed by: INTERNAL MEDICINE

## 2022-09-07 PROCEDURE — 80053 COMPREHEN METABOLIC PANEL: CPT | Performed by: INTERNAL MEDICINE

## 2022-09-07 PROCEDURE — 63600175 PHARM REV CODE 636 W HCPCS: Performed by: HOSPITALIST

## 2022-09-07 PROCEDURE — 25000003 PHARM REV CODE 250: Performed by: INTERNAL MEDICINE

## 2022-09-07 PROCEDURE — 36415 COLL VENOUS BLD VENIPUNCTURE: CPT | Performed by: INTERNAL MEDICINE

## 2022-09-07 PROCEDURE — 25000003 PHARM REV CODE 250: Performed by: NURSE PRACTITIONER

## 2022-09-07 PROCEDURE — 85025 COMPLETE CBC W/AUTO DIFF WBC: CPT | Performed by: INTERNAL MEDICINE

## 2022-09-07 RX ORDER — CEFUROXIME AXETIL 500 MG/1
500 TABLET ORAL 2 TIMES DAILY
Qty: 10 TABLET | Refills: 0 | Status: SHIPPED | OUTPATIENT
Start: 2022-09-07 | End: 2022-09-07 | Stop reason: SDUPTHER

## 2022-09-07 RX ORDER — METOPROLOL SUCCINATE 50 MG/1
50 TABLET, EXTENDED RELEASE ORAL 2 TIMES DAILY
Status: DISCONTINUED | OUTPATIENT
Start: 2022-09-07 | End: 2022-09-07 | Stop reason: HOSPADM

## 2022-09-07 RX ORDER — CEFUROXIME AXETIL 500 MG/1
500 TABLET ORAL 2 TIMES DAILY
Qty: 10 TABLET | Refills: 0 | Status: SHIPPED | OUTPATIENT
Start: 2022-09-07 | End: 2022-09-12

## 2022-09-07 RX ORDER — METOPROLOL SUCCINATE 50 MG/1
50 TABLET, EXTENDED RELEASE ORAL 2 TIMES DAILY
Qty: 60 TABLET | Refills: 0 | Status: SHIPPED | OUTPATIENT
Start: 2022-09-07 | End: 2023-02-18 | Stop reason: DRUGHIGH

## 2022-09-07 RX ADMIN — METOPROLOL SUCCINATE 50 MG: 50 TABLET, FILM COATED, EXTENDED RELEASE ORAL at 10:09

## 2022-09-07 RX ADMIN — SODIUM CHLORIDE, PRESERVATIVE FREE 10 ML: 5 INJECTION INTRAVENOUS at 09:09

## 2022-09-07 RX ADMIN — HYDRALAZINE HYDROCHLORIDE 10 MG: 20 INJECTION INTRAMUSCULAR; INTRAVENOUS at 05:09

## 2022-09-07 RX ADMIN — AMLODIPINE BESYLATE 5 MG: 5 TABLET ORAL at 10:09

## 2022-09-07 RX ADMIN — AMIODARONE HYDROCHLORIDE 200 MG: 200 TABLET ORAL at 10:09

## 2022-09-07 RX ADMIN — LEVOTHYROXINE SODIUM 75 MCG: 0.03 TABLET ORAL at 05:09

## 2022-09-07 RX ADMIN — CYANOCOBALAMIN TAB 1000 MCG 1000 MCG: 1000 TAB at 10:09

## 2022-09-07 RX ADMIN — ACETAMINOPHEN 650 MG: 325 TABLET ORAL at 01:09

## 2022-09-07 RX ADMIN — ATORVASTATIN CALCIUM 40 MG: 40 TABLET, FILM COATED ORAL at 10:09

## 2022-09-07 RX ADMIN — MUPIROCIN 1 G: 20 OINTMENT TOPICAL at 10:09

## 2022-09-07 RX ADMIN — LOSARTAN POTASSIUM 50 MG: 50 TABLET, FILM COATED ORAL at 10:09

## 2022-09-07 RX ADMIN — ACETAMINOPHEN 650 MG: 325 TABLET ORAL at 10:09

## 2022-09-07 RX ADMIN — CHLORHEXIDINE GLUCONATE 15 ML: 1.2 RINSE ORAL at 10:09

## 2022-09-07 NOTE — PLAN OF CARE
Plan of care reviewed with patient and pt's daughter. Education provided on administered medications. Understanding verbalized. Plan of care ongoing.

## 2022-09-07 NOTE — PLAN OF CARE
Discharge orders and chart reviewed. No other discharge needs noted at this time. Pt is clear for discharge from case management. Pt is discharging to home with [facility]. Per Minerva, start of care date scheduled for tomorrow, 09/08/2022.[facility] added to patient's AVS.     PCP follow up scheduled and added to patient's AVS.        09/07/22 1135   Final Note   Assessment Type Final Discharge Note   Anticipated Discharge Disposition Home-Health   What phone number can be called within the next 1-3 days to see how you are doing after discharge? [phone]   Hospital Resources/Appts/Education Provided Provided patient/caregiver with written discharge plan information;Post-Acute resouces added to AVS;Appointments scheduled and added to AVS   Post-Acute Status   Post-Acute Authorization Home Health   Home Health Status Set-up Complete/Auth obtained

## 2022-09-07 NOTE — PLAN OF CARE
CM spoke with Mrs. Robins, patient's daughter in law who he resides with, and she stated no preference in home health company. Consent for patient choice given via telephone, patient choice form uploaded into media manager. Referral sent via [employer]. LEIDY following       09/07/22 1117   Post-Acute Status   Post-Acute Authorization Home Health   Home Health Status Referrals Sent   Patient choice form signed by patient/caregiver List with quality metrics by geographic area provided

## 2022-09-07 NOTE — PLAN OF CARE
Problem: Adult Inpatient Plan of Care  Goal: Plan of Care Review  Outcome: Met  Goal: Patient-Specific Goal (Individualized)  Outcome: Met  Goal: Absence of Hospital-Acquired Illness or Injury  Outcome: Met  Goal: Optimal Comfort and Wellbeing  Outcome: Met  Goal: Readiness for Transition of Care  Outcome: Met     Problem: Fall Injury Risk  Goal: Absence of Fall and Fall-Related Injury  Outcome: Met     Problem: Skin Injury Risk Increased  Goal: Skin Health and Integrity  Outcome: Met     Problem: Adjustment to Illness (Stroke, Hemorrhagic)  Goal: Optimal Coping  Outcome: Met     Problem: Bowel Elimination Impaired (Stroke, Hemorrhagic)  Goal: Effective Bowel Elimination  Outcome: Met     Problem: Cerebral Tissue Perfusion (Stroke, Hemorrhagic)  Goal: Optimal Cerebral Tissue Perfusion  Outcome: Met     Problem: Cognitive Impairment (Stroke, Hemorrhagic)  Goal: Optimal Cognitive Function  Outcome: Met     Problem: Communication Impairment (Stroke, Hemorrhagic)  Goal: Effective Communication Skills  Outcome: Met     Problem: Functional Ability Impaired (Stroke, Hemorrhagic)  Goal: Optimal Functional Ability  Outcome: Met     Problem: Pain (Stroke, Hemorrhagic)  Goal: Acceptable Pain Control  Outcome: Met     Problem: Respiratory Compromise (Stroke, Hemorrhagic)  Goal: Effective Oxygenation and Ventilation  Outcome: Met     Problem: Sensorimotor Impairment (Stroke, Hemorrhagic)  Goal: Improved Sensorimotor Function  Outcome: Met     Problem: Swallowing Impairment (Stroke, Hemorrhagic)  Goal: Optimal Eating and Swallowing Without Aspiration  Outcome: Met     Problem: Urinary Elimination Impaired (Stroke, Hemorrhagic)  Goal: Effective Urinary Elimination  Outcome: Met

## 2022-09-07 NOTE — DISCHARGE SUMMARY
"[facility] Medicine  Discharge Summary      Patient Name: Gene Hartman  MRN: [id number]  Patient Class: IP- Inpatient  Admission Date: 9/4/2022  Hospital Length of Stay: 2 days  Discharge Date and Time: 9/7/2022 12:33 PM  Attending Physician: No att. providers found   Discharging Provider: Hansel Owen MD  Primary Care Provider: Mariajose Thorne MD      HPI:   88 years old male past medical history of coronary artery disease status post stent patient on aspirin and Plavix hypertension dyslipidemia hypothyroidism anemia dementia is status post pacemaker placement GERD history of MI history prostate cancer he was recently admitted to our facility due to episode of syncope after taking nitroglycerin he when she was stable and sent home today he comes back because he had a fall and family her when he fell they went to check on him his eyes were open but for at least 2 minutes patient was not very responsive according to them patient recently has not complained of chest pain shortness a breath no fevers no UTI like symptoms or nausea vomit or diarrhea once here after the eye he had a CT head done that reports " Subdural hemorrhage along the anterior and posterior falx measuring up to 1.2 cm in thickness. Small amount of subarachnoid hemorrhage along the bifrontal lobes. No midline shift, downward herniation, or hydrocephalus" Dr. Stone neurosurgeon on-call was consulted by ER staff she recommended to repeat CT head at 6 in the morning nicardipine drip for SBP less than 150 NPO NS.      * No surgery found *      Hospital Course:   88-year-old unfortunate gentleman with prior history of CAD status post PCI on dual antiplatelets, frequent syncope and collapse was brought to emergency room after encountering a syncope and collapse resulting in subdural hematoma.  He remained hemodynamically stable while inpatient, was evaluated by Cardiology and Neurosurgery, Neurosurgery recommended " conservative management, repeat CT scan was promising.  I fear that patient was taking both amiodarone and sotalol at home.  Patient was also on dual antiplatelets even though his PCI was more than 5 years ago.  As per last device interrogation he still has more than a year battery left on pacemaker.  After discussion with Cardiology we discontinued aspirin, increased his metoprolol, discontinued sotalol.  Patient was advised to hold all antiplatelets for 7 days and then resume Plavix.  Home health was arranged, patient was advised to follow-up with his PCP and Cardiology.  Blood cultures appeared to be contaminant, has small hematoma in occipital area, provided prescription of Ceftin to prevent abscess formation    I have seen the patient on the day of discharge and reviewed the discharge instructions as outlined below. Patient verbalized understanding and is aware to contact primary care physician or return to ED if new or worsening symptoms.        Goals of Care Treatment Preferences:  Code Status: Full Code      Consults:   Consults (From admission, onward)        Status Ordering Provider     Inpatient consult to Cardiology  Once        Provider:  Hiram Betancourt MD    Completed TIFFANIE DOWNEY     Inpatient consult to Pulmonology  Once        Provider:  Carlotta Canales MD    Completed TIFFANIE DOWNEY          No new Assessment & Plan notes have been filed under this hospital service since the last note was generated.  Service: Hospital Medicine    Final Active Diagnoses:    Diagnosis Date Noted POA    PRINCIPAL PROBLEM:  Subdural hematoma [S06.5X9A] 09/05/2022 Yes    Dementia without behavioral disturbance [F03.90] 12/04/2019 Yes    BPH associated with nocturia [N40.1, R35.1] 09/06/2017 Yes    CAD (coronary artery disease) [I25.10]  Yes    HTN (hypertension), benign [I10]  Yes      Problems Resolved During this Admission:    Diagnosis Date Noted Date Resolved POA    Syncope and collapse [R55]  11/02/2019 09/07/2022 Yes       Discharged Condition: good    Disposition: Home-Health Care [facility]    Follow Up:   Contact information for follow-up providers     Hiram Betancourt MD Follow up on 9/20/2022.    Specialties: Cardiology, Interventional Cardiology  Why: hospital follow up at 9:30 a.m.  Contact information:  [address]  [facility] [address]  [phone]                   Contact information for after-discharge care     Home Medical Care     [place] [facility] .    Services: Home Health Services, Home Rehabilitation  Contact information:  [address] [facility] [address] [phone]                           Patient Instructions:      Ambulatory referral/consult to Home Health   Standing Status: Future   Referral Priority: Routine Referral Type: Home Health   Referral Reason: Specialty Services Required   Requested Specialty: Home Health Services   Number of Visits Requested: 1     Diet Cardiac     Notify your health care provider if you experience any of the following:  temperature >100.4     Notify your health care provider if you experience any of the following:  persistent dizziness, light-headedness, or visual disturbances     Notify your health care provider if you experience any of the following:  increased confusion or weakness     Notify your health care provider if you experience any of the following:  severe persistent headache     Activity as tolerated       Significant Diagnostic Studies: Labs: All labs within the past 24 hours have been reviewed    Pending Diagnostic Studies:     None         Medications:  Reconciled Home Medications:      Medication List      START taking these medications    cefUROXime 500 MG tablet  Commonly known as: CEFTIN  Take 1 tablet (500 mg total) by mouth 2 (two) times daily. for 5 days        CHANGE how you take these medications    fluticasone propionate 50 mcg/actuation nasal spray  Commonly known as: FLONASE  USE 1 SPRAY  IN EACH NOSTRIL TWICE DAILY  What changed: when to take this     metoprolol succinate 50 MG 24 hr tablet  Commonly known as: TOPROL-XL  Take 1 tablet (50 mg total) by mouth 2 (two) times daily.  What changed: when to take this     mirtazapine 15 MG tablet  Commonly known as: REMERON  Take 15 mg by mouth every evening.  What changed: Another medication with the same name was removed. Continue taking this medication, and follow the directions you see here.        CONTINUE taking these medications    amiodarone 200 MG Tab  Commonly known as: PACERONE  Take 200 mg by mouth 2 (two) times daily.     atorvastatin 40 MG tablet  Commonly known as: LIPITOR  Take 1 tablet (40 mg total) by mouth once daily.     cetirizine 10 MG tablet  Commonly known as: ZYRTEC  Take 1 tablet (10 mg total) by mouth once daily.     cloNIDine 0.1 MG tablet  Commonly known as: CATAPRES  Take 1 tablet (0.1 mg total) by mouth 2 (two) times daily as needed (SBP greater than 160).     clopidogreL 75 mg tablet  Commonly known as: PLAVIX  Take 1 tablet (75 mg total) by mouth once daily.     cyanocobalamin (vitamin B-12) 5,000 mcg Subl  Commonly known as: VITAMIN B-12  Place 1 tablet under the tongue once daily.     cycloSPORINE 0.05 % ophthalmic emulsion  Commonly known as: RESTASIS  Apply 1 drop to eye 2 (two) times daily.     levothyroxine 75 MCG tablet  Commonly known as: SYNTHROID  Take 75 mcg by mouth before breakfast.     losartan 50 MG tablet  Commonly known as: COZAAR  Take 1 tablet (50 mg total) by mouth 2 (two) times daily.     melatonin 5 mg Subl  Take 1 tablet by mouth nightly as needed.     nitroGLYCERIN 0.4 MG SL tablet  Commonly known as: NITROSTAT  Place 0.4 mg under the tongue every 5 (five) minutes as needed.     pantoprazole 40 MG tablet  Commonly known as: PROTONIX  Take 40 mg by mouth once daily.     TYLENOL ARTHRITIS PAIN ORAL  Take 2 tablets by mouth 2 (two) times a day.        STOP taking these medications    amLODIPine 10 MG  tablet  Commonly known as: NORVASC     amLODIPine 5 MG tablet  Commonly known as: NORVASC     aspirin 81 MG Chew     folic acid 1 MG tablet  Commonly known as: FOLVITE     LORazepam 0.5 MG tablet  Commonly known as: ATIVAN     memantine 10 MG Tab  Commonly known as: NAMENDA     montelukast 10 mg tablet  Commonly known as: SINGULAIR     sotaloL 80 MG tablet  Commonly known as: BETAPACE            Indwelling Lines/Drains at time of discharge:   Lines/Drains/Airways     None                 Time spent on the discharge of patient: 35 minutes         Hansel Owen MD  Department of Hospital Medicine  [facility]

## 2022-09-07 NOTE — NURSING
Patient discharged per orders, all instructions reviewed with Patient's son and daughter-in-law, both verbalized understanding.  All belongings with patient.  IV discontinued x 1.  Tele box discontinued.  Patient transferred to wheelchair x 1.

## 2022-09-07 NOTE — PT/OT/SLP PROGRESS
Occupational Therapy      Patient Name:  Gene Hartman   MRN:  [id number]    Patient not seen today secondary to being discharged prior to OT attempt to see pt.     9/7/2022

## 2022-09-07 NOTE — PROGRESS NOTES
[facility]    Cardiology Progress Note    Subjective:  Pt seen and examined this AM.  Patient states he is feeling well today.   Transferred from ICU to the floor yesterday.   Sotalol discontinued yesterday.  Plavix and ASA on hold per neurosurgery for 7 days.  Vital reviewed. BP has been elevated.   Labs reviewed.   Tele reviewed. PVCs trigeminy.       Objective:  Vital Signs (Most Recent)  Temp: 98.1 °F (36.7 °C) (09/07/22 0718)  Pulse: 84 (09/07/22 0718)  Resp: 18 (09/07/22 0718)  BP: (!) 149/120 (09/07/22 0718)  SpO2: (!) 93 % (09/07/22 0718)    Vital Signs Range (Last 24H):  Temp:  [98.1 °F (36.7 °C)-98.5 °F (36.9 °C)]   Pulse:  [60-91]   Resp:  [16-27]   BP: (122-195)/()   SpO2:  [92 %-98 %]     I & O (Last 24H):    Intake/Output Summary (Last 24 hours) at 9/7/2022 0829  Last data filed at 9/7/2022 0345  Gross per 24 hour   Intake 580 ml   Output 1150 ml   Net -570 ml       Current Diet:     Current Diet Order   Procedures    Diet Adult Regular (IDDSI Level 7)        Allergies:  Review of patient's allergies indicates:   Allergen Reactions    Iodinated contrast media Rash    Pontocaine [tetracaine hcl] Anaphylaxis     hypotension       Meds:  Scheduled Meds:   amiodarone  200 mg Oral BID    amLODIPine  5 mg Oral Daily    atorvastatin  40 mg Oral Daily    chlorhexidine  15 mL Mouth/Throat BID    cyanocobalamin  1,000 mcg Oral Daily    levothyroxine  75 mcg Oral Before breakfast    losartan  50 mg Oral BID    metoprolol succinate  50 mg Oral BID    mupirocin   Nasal BID    sodium chloride 0.9%  10 mL Intravenous Q12H     Continuous Infusions:  PRN Meds:acetaminophen, calcium chloride IVPB, calcium chloride IVPB, calcium chloride IVPB, hydrALAZINE, magnesium oxide, magnesium sulfate IVPB, magnesium sulfate IVPB, magnesium sulfate IVPB, magnesium sulfate IVPB, melatonin, potassium chloride in water, potassium chloride in water, potassium chloride in water, potassium chloride in water, potassium  chloride, potassium chloride, potassium chloride, potassium chloride, sodium phosphate IVPB, sodium phosphate IVPB, sodium phosphate IVPB, sodium phosphate IVPB, sodium phosphate IVPB    Lab Results :  Recent Results (from the past 24 hour(s))   Echo    Collection Time: 09/06/22  1:27 PM   Result Value Ref Range    BSA 1.71 m2    TDI SEPTAL 0.06 m/s    LV LATERAL E/E' RATIO 9.50 m/s    LV SEPTAL E/E' RATIO 9.50 m/s    TDI LATERAL 0.06 m/s    LVIDd 4.81 3.5 - 6.0 cm    IVS 1.02 0.6 - 1.1 cm    Posterior Wall 1.14 (A) 0.6 - 1.1 cm    Ao root annulus 3.25 cm    LVIDs 3.50 2.1 - 4.0 cm    FS 27 28 - 44 %    LV mass 189.73 g    LA size 3.15 cm    RVDD 2.69 cm    TAPSE 1.82 cm    Left Ventricle Relative Wall Thickness 0.47 cm    E/A ratio 0.57     Mean e' 0.06 m/s    E wave deceleration time 314.94 msec    LVOT peak talha 74.80 m/s    LVOT peak VTI 19.32 cm    E/E' ratio 9.50 m/s    MV Peak E Talha 0.57 m/s    TR Max Talha 2.97 m/s    MV Peak A Talha 1.00 m/s    LV Systolic Volume 52.73 mL    LV Systolic Volume Index 31.8 mL/m2    LV Diastolic Volume 120.04 mL    LV Diastolic Volume Index 72.31 mL/m2    LV Mass Index 114 g/m2    Triscuspid Valve Regurgitation Peak Gradient 35 mmHg    Right Atrial Pressure (from IVC) 3 mmHg    EF 45 %    TV rest pulmonary artery pressure 38 mmHg   CBC auto differential    Collection Time: 09/07/22  4:54 AM   Result Value Ref Range    WBC 7.61 3.90 - 12.70 K/uL    RBC 3.37 (L) 4.60 - 6.20 M/uL    Hemoglobin 11.1 (L) 14.0 - 18.0 g/dL    Hematocrit 33.9 (L) 40.0 - 54.0 %     (H) 82 - 98 fL    MCH 32.9 (H) 27.0 - 31.0 pg    MCHC 32.7 32.0 - 36.0 g/dL    RDW 16.1 (H) 11.5 - 14.5 %    Platelets 142 (L) 150 - 450 K/uL    MPV 10.5 9.2 - 12.9 fL    Immature Granulocytes 0.7 (H) 0.0 - 0.5 %    Gran # (ANC) 4.9 1.8 - 7.7 K/uL    Immature Grans (Abs) 0.05 (H) 0.00 - 0.04 K/uL    Lymph # 1.8 1.0 - 4.8 K/uL    Mono # 0.7 0.3 - 1.0 K/uL    Eos # 0.1 0.0 - 0.5 K/uL    Baso # 0.04 0.00 - 0.20 K/uL    nRBC 0  0 /100 WBC    Gran % 63.8 38.0 - 73.0 %    Lymph % 24.2 18.0 - 48.0 %    Mono % 9.7 4.0 - 15.0 %    Eosinophil % 1.1 0.0 - 8.0 %    Basophil % 0.5 0.0 - 1.9 %    Differential Method Automated    Comprehensive Metabolic Panel    Collection Time: 09/07/22  4:54 AM   Result Value Ref Range    Sodium 140 136 - 145 mmol/L    Potassium 4.2 3.5 - 5.1 mmol/L    Chloride 110 95 - 110 mmol/L    CO2 24 23 - 29 mmol/L    Glucose 105 70 - 110 mg/dL    BUN 25 (H) 8 - 23 mg/dL    Creatinine 1.3 0.5 - 1.4 mg/dL    Calcium 8.7 8.7 - 10.5 mg/dL    Total Protein 6.7 6.0 - 8.4 g/dL    Albumin 3.4 (L) 3.5 - 5.2 g/dL    Total Bilirubin 0.6 0.1 - 1.0 mg/dL    Alkaline Phosphatase 60 55 - 135 U/L    AST 22 10 - 40 U/L    ALT 19 10 - 44 U/L    Anion Gap 6 (L) 8 - 16 mmol/L    eGFR 52.8 (A) >60 mL/min/1.73 m^2   Phosphorus    Collection Time: 09/07/22  4:54 AM   Result Value Ref Range    Phosphorus 3.4 2.7 - 4.5 mg/dL       Diagnostic Results:  Imaging Results              US Carotid Bilateral (Final result)  Result time 09/05/22 06:35:48      Final result by Magdalena Salazar MD (09/05/22 06:35:48)                   Narrative:    Reason: Syncope    Grayscale, color and spectral Doppler analysis was performed. Criteria for stenosis is based upon the Society of Radiologists in Ultrasound Consensus Conference, 2003 (Radiology, November 2003, 229, 340-346).    Comparison: 11/2/2019    Findings:  Grayscale images show minimal plaque bilaterally    Estimated peak systolic velocity in right internal carotid artery is 65 cm/s. Antegrade flow is present in the right vertebral artery.    Estimated peak systolic velocity in left internal carotid artery is 128 cm/s.This corresponds to a 50-69% hemodynamically significant stenosis. This is unchanged when compared to the prior study Antegrade flow is present in the left vertebral artery.    Impression:  minimal plaque bilaterally with a 50-69% hemodynamically significant stenosis on the left closer to  50%    No hemodynamically significant stenosis on the right    Electronically signed by:  Magdalena Salazar MD  9/5/2022 6:35 AM CDT Workstation: [id number]                                     X-Ray Chest AP Portable (Final result)  Result time 09/05/22 06:36:27      Final result by Magdalena Salazar MD (09/05/22 06:36:27)                   Narrative:    Portable chest x-ray at 10:55 PM is compared to prior study dated 7/24/2010    Clinical history is unconscious    The heart is enlarged. The left subclavian vein pacemaker is in stable position. There is a heart monitor.    Lungs are clear. There are no acute osseous abnormalities.    IMPRESSION: Cardiomegaly with no acute pulmonary process    Electronically signed by:  Magdalena Salazar MD  9/5/2022 6:36 AM CDT Workstation: [id number]                                     CT Cervical Spine Without Contrast (Final result)  Result time 09/04/22 23:08:19      Final result by Clifton Mustafa MD (09/04/22 23:08:19)                   Narrative:      EXAM:    CT cervical spine without contrast.    INDICATION:    Trauma. Neck pain.    TECHNIQUE:    Contiguous axial CT images of the cervical spine.  Intravenous contrast: Absent.  Reformats: MPRs created and utilized.   mGy-cm.  This exam was performed according to our departmental dose-optimization program, which includes automated exposure control, adjustment of the mA and/or kV according to patient size and/or use of iterative reconstruction technique.    COMPARISON:    None.    FINDINGS:    Alignment: Preserved.    Fracture:  No acute fracture or subluxation.    Odontoid process: Intact.    Prevertebral soft tissues: No edema.    Spondylosis: Multilevel spondylosis with disc space narrowing, endplate sclerosis and marginal osteophytes. Bilateral facet arthropathy with multilevel neural foraminal narrowing.    Other: None.    IMPRESSION:    1. No CT evidence of acute osseous injury of the cervical  spine.    Electronically signed by:  Clifton Mustafa MD  9/4/2022 11:08 PM CDT Workstation: [id number]                                     CT Head Without Contrast (Edited Result - FINAL)  Result time 09/04/22 23:09:43      Edited Result - FINAL by Clifton Mustafa MD (09/04/22 23:09:43)                   Narrative:         ADDENDUM #1       The above findings were discussed with and acknowledged by Dr. Banerjee at 11:03 PM CST on 9/4/2022.    Electronically signed by:  Clifton Mustafa MD  9/4/2022 11:09 PM CDT Workstation: [id number]     ORIGINAL REPORT       EXAM:    CT head without contrast.    INDICATION:    Trauma.    TECHNIQUE:    Contiguous axial CT images of the brain.  Intravenous contrast: Absent.   mGy-cm.  This exam was performed according to our departmental dose-optimization program, which includes automated exposure control, adjustment of the mA and/or kV according to patient size and/or use of iterative reconstruction technique.    COMPARISON:    7/24/2022.    FINDINGS:  There is subdural hemorrhage along the anterior and posterior falx measuring up to 1.2 cm in thickness. Small amount of subarachnoid hemorrhage along the bifrontal lobes. No midline shift, downward herniation, or hydrocephalus. Diffuse cerebral atrophy with periventricular and deep white matter chronic microvascular changes. Old lacunar infarcts involving the bilateral basal ganglia. Paranasal sinuses and mastoid air cells are clear. No acute fracture.    IMPRESSION:  Subdural hemorrhage along the anterior and posterior falx measuring up to 1.2 cm in thickness. Small amount of subarachnoid hemorrhage along the bifrontal lobes. No midline shift, downward herniation, or hydrocephalus.    Electronically signed by:  Clifton Mustafa MD  9/4/2022 11:01 PM CDT Workstation: [id number]                      Final result by Clifton Mustafa MD (09/04/22 23:01:15)                   Narrative:    EXAM:    CT head without  contrast.    INDICATION:    Trauma.    TECHNIQUE:    Contiguous axial CT images of the brain.  Intravenous contrast: Absent.   mGy-cm.  This exam was performed according to our departmental dose-optimization program, which includes automated exposure control, adjustment of the mA and/or kV according to patient size and/or use of iterative reconstruction technique.    COMPARISON:    7/24/2022.    FINDINGS:  There is subdural hemorrhage along the anterior and posterior falx measuring up to 1.2 cm in thickness. Small amount of subarachnoid hemorrhage along the bifrontal lobes. No midline shift, downward herniation, or hydrocephalus. Diffuse cerebral atrophy with periventricular and deep white matter chronic microvascular changes. Old lacunar infarcts involving the bilateral basal ganglia. Paranasal sinuses and mastoid air cells are clear. No acute fracture.    IMPRESSION:  Subdural hemorrhage along the anterior and posterior falx measuring up to 1.2 cm in thickness. Small amount of subarachnoid hemorrhage along the bifrontal lobes. No midline shift, downward herniation, or hydrocephalus.    Electronically signed by:  Clifton Mustafa MD  9/4/2022 11:01 PM CDT Workstation: [id number]                                    Recent Cardiac Rhythm   (if applicable)      Physical Exam:  Objective:  General Appearance:  Comfortable, well-appearing and in no acute distress.    Vital signs: (most recent): Blood pressure (!) 149/120, pulse 84, temperature 98.1 °F (36.7 °C), temperature source Oral, resp. rate 18, height 5' (1.524 m), weight 69.3 kg (152 lb 12.5 oz), SpO2 (!) 93 %.  Vital signs are normal.    Lungs:  Normal effort and normal respiratory rate.  Breath sounds clear to auscultation.  He is not in respiratory distress.    Heart: Normal rate.  Regular rhythm.  No murmur.   Extremities: There is no dependent edema.    Neurological: Patient is alert.    Skin:  Warm and dry.  No rash.     Current Consults:  IP CONSULT TO  HOSPITAL MEDICINE  IP CONSULT TO NEUROSURGERY  IP CONSULT TO PULMONOLOGY  IP CONSULT TO CARDIOLOGY    Assessment/Plan:  Assessment:   CAD -PCI CX, RCA 2015  Ventricular Ectopy   Syncope vs mechanical fall  Hypomagnesia   Dementia   Subdural hemorrhage     PCI RCA, CX 2015 2/2022 -stress negative ischemia  7/2022 - ECHO- ef 45-50%, LVH, DD, INF apical HK  EKG- NSR with prolonged Qtc    Plan:    Continue amiodarone. Discussed medications with patient's daughter. Ensured that sotalol is discontinued.   BP elevated.   Increase Toprol to 50 mg BID.  Discontinue memantine due to orthostatic hypotension.   Plavix and ASA on hold for 7 days per neurosurgery on 9/5. Will restart only plavix at that time.   PM interrogation from 8/12 showed 1 year and 9 months of battery life remaining. He does not need a generator change at this time.   CV workup outpatient.

## 2022-09-07 NOTE — HOSPITAL COURSE
88-year-old unfortunate gentleman with prior history of CAD status post PCI on dual antiplatelets, frequent syncope and collapse was brought to emergency room after encountering a syncope and collapse resulting in subdural hematoma.  He remained hemodynamically stable while inpatient, was evaluated by Cardiology and Neurosurgery, Neurosurgery recommended conservative management, repeat CT scan was promising.  I fear that patient was taking both amiodarone and sotalol at home.  Patient was also on dual antiplatelets even though his PCI was more than 5 years ago.  As per last device interrogation he still has more than a year battery left on pacemaker.  After discussion with Cardiology we discontinued aspirin, increased his metoprolol, discontinued sotalol.  Patient was advised to hold all antiplatelets for 7 days and then resume Plavix.  Home health was arranged, patient was advised to follow-up with his PCP and Cardiology.  Blood cultures appeared to be contaminant, has small hematoma in occipital area, provided prescription of Ceftin to prevent abscess formation    I have seen the patient on the day of discharge and reviewed the discharge instructions as outlined below. Patient verbalized understanding and is aware to contact primary care physician or return to ED if new or worsening symptoms.

## 2022-09-07 NOTE — PT/OT/SLP DISCHARGE
Occupational Therapy Discharge Summary    Gene Hartman  MRN: [id number]   Principal Problem: Subdural hematoma      Patient Discharged from acute Occupational Therapy on 9/7/22.  Please refer to prior OT note dated 9/6/22 for functional status.    Assessment:      Patient has not met goals.    Objective:     GOALS:   Multidisciplinary Problems       Occupational Therapy Goals          Problem: Occupational Therapy    Goal Priority Disciplines Outcome Interventions   Occupational Therapy Goal     OT, PT/OT Ongoing, Progressing    Description: Goals to be met by: 10/26/2022    Patient will increase functional independence with ADLs by performing:    UE Dressing with Supervision.  LE Dressing with Supervision.  Grooming while standing at sink with Supervision.  Toileting from toilet with Supervision for hygiene and clothing management.   Toilet transfer to toilet with Supervision.                         Reasons for Discontinuation of Therapy Services  Transfer to alternate level of care.      Plan:     Patient Discharged to: Home with Home Health Service    9/7/2022

## 2022-09-08 NOTE — PT/OT/SLP PROGRESS
Physical Therapy Treatment    Patient Name:  Gene Hartman   MRN:  [id number]    Recommendations:     Discharge Recommendations:  home health PT (and close supervision at home.)   Discharge Equipment Recommendations: walker, rolling   Barriers to discharge: None    Assessment:     Gene Hartman is a 88 y.o. male admitted with a medical diagnosis of Subdural hematoma.  He presents with the following impairments/functional limitations:  weakness, impaired endurance, impaired self care skills, impaired functional mobility, gait instability, impaired balance, decreased safety awareness, pain, impaired cardiopulmonary response to activity.    Pt found sitting up in chair with his wife, son & DIL present and awaiting discharge home. Pt agreeable to gait training with PT and tolerated session only fairly due to decreased safety with rollator use. PT encouraged pt to avoid shuffling his feet along the floor and slow his pace for increased safety due to pt building momentum while using rollator which increases his fall risk. After gait trial PT discussed pt's fall risk with family who reported they tell pt the same things all the time, but he does not listen. PT encouraged family to have pt use regular RW instead of rollator and to supervise him closely during transfers and gait at home.    Rehab Prognosis: Fair; patient would benefit from acute skilled PT services to address these deficits and reach maximum level of function.    Recent Surgery: * No surgery found *      Plan:     During this hospitalization, patient to be seen 6 x/week to address the identified rehab impairments via gait training, therapeutic activities, therapeutic exercises, neuromuscular re-education and progress toward the following goals:    Plan of Care Expires:  10/05/22    Subjective     Chief Complaint: pt with no complaints and ready to go home   Patient/Family Comments/goals: home with family and  PT  Pain/Comfort:         Objective:  "    Communicated with RN prior to session.  Patient found up in chair with bed alarm, blood pressure cuff, peripheral IV, telemetry, pulse ox (continuous) upon PT entry to room.     General Precautions: Standard, fall   Orthopedic Precautions:N/A   Braces:    Respiratory Status: Room air     Functional Mobility:  Transfers:     Sit to Stand:  stand by assistance with 4 wheeled walker and since family had it present and they reported pt has one at home, but rarely uses an AD.  Gait: x 150' with pt's wife's rollator with close CGA for safety and max vc for increased foot clearance/avoid shuffling and to decrease pace for increased safety due to momentum using rollator. 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 PT discussed pt's fall risk with family and  encouraged family to have pt use regular RW instead of rollator and to supervise him closely during transfers and gait at home.      Patient left up in chair with all lines intact, call button in reach, and pt's family present..    GOALS:   Multidisciplinary Problems       Physical Therapy Goals          Problem: Physical Therapy    Goal Priority Disciplines Outcome Goal Variances Interventions   Physical Therapy Goal     PT, PT/OT Ongoing, Progressing     Description: Goals to be met by: 10/5/22     Patient will increase functional independence with mobility by performin. Supine to sit with Supervision  2. Sit to stand transfer with Supervision  3. Bed to chair transfer with Supervision using Rolling Walker  4. Gait  x 150 feet with Supervision using Rolling Walker.                        "       Time Tracking:     PT Received On: 09/07/22  PT Start Time: 1110     PT Stop Time: 1119  PT Total Time (min): 9 min     Billable Minutes: Gait Training 9    Treatment Type: Treatment  PT/PTA: PT     PTA Visit Number: 0     09/08/2022

## 2022-09-10 LAB — BACTERIA BLD CULT: NORMAL

## 2022-10-05 ENCOUNTER — HOSPITAL ENCOUNTER (INPATIENT)
Facility: HOSPITAL | Age: 87
LOS: 2 days | Discharge: HOME HEALTH | DRG: 065 | End: 2022-10-07
Attending: EMERGENCY MEDICINE | Admitting: FAMILY MEDICINE
Payer: MEDICARE

## 2022-10-05 DIAGNOSIS — R07.9 CHEST PAIN: ICD-10-CM

## 2022-10-05 DIAGNOSIS — R55 VASOVAGAL SYNCOPE: ICD-10-CM

## 2022-10-05 DIAGNOSIS — R55 SYNCOPE: ICD-10-CM

## 2022-10-05 DIAGNOSIS — E83.51 HYPOCALCEMIA: Primary | ICD-10-CM

## 2022-10-05 DIAGNOSIS — I61.5 INTRAVENTRICULAR HEMORRHAGE: ICD-10-CM

## 2022-10-05 PROBLEM — E83.42 HYPOMAGNESEMIA: Status: ACTIVE | Noted: 2022-10-05

## 2022-10-05 LAB
ALBUMIN SERPL BCP-MCNC: 2.4 G/DL (ref 3.5–5.2)
ALP SERPL-CCNC: 45 U/L (ref 55–135)
ALT SERPL W/O P-5'-P-CCNC: 19 U/L (ref 10–44)
ANION GAP SERPL CALC-SCNC: 3 MMOL/L (ref 8–16)
AST SERPL-CCNC: 18 U/L (ref 10–40)
BACTERIA #/AREA URNS HPF: NEGATIVE /HPF
BASOPHILS # BLD AUTO: 0.01 K/UL (ref 0–0.2)
BASOPHILS NFR BLD: 0.2 % (ref 0–1.9)
BILIRUB SERPL-MCNC: 0.6 MG/DL (ref 0.1–1)
BILIRUB UR QL STRIP: NEGATIVE
BNP SERPL-MCNC: 351 PG/ML (ref 0–99)
BUN SERPL-MCNC: 23 MG/DL (ref 8–23)
CALCIUM SERPL-MCNC: 6.3 MG/DL (ref 8.7–10.5)
CHLORIDE SERPL-SCNC: 116 MMOL/L (ref 95–110)
CLARITY UR: CLEAR
CO2 SERPL-SCNC: 19 MMOL/L (ref 23–29)
COLOR UR: YELLOW
CREAT SERPL-MCNC: 1.1 MG/DL (ref 0.5–1.4)
DIFFERENTIAL METHOD: ABNORMAL
EOSINOPHIL # BLD AUTO: 0 K/UL (ref 0–0.5)
EOSINOPHIL NFR BLD: 0.4 % (ref 0–8)
ERYTHROCYTE [DISTWIDTH] IN BLOOD BY AUTOMATED COUNT: 15.4 % (ref 11.5–14.5)
EST. GFR  (NO RACE VARIABLE): >60 ML/MIN/1.73 M^2
GLUCOSE SERPL-MCNC: 101 MG/DL (ref 70–110)
GLUCOSE SERPL-MCNC: 142 MG/DL (ref 70–110)
GLUCOSE UR QL STRIP: NEGATIVE
HCT VFR BLD AUTO: 29.2 % (ref 40–54)
HGB BLD-MCNC: 9.4 G/DL (ref 14–18)
HGB UR QL STRIP: NEGATIVE
HYALINE CASTS #/AREA URNS LPF: 7 /LPF
IMM GRANULOCYTES # BLD AUTO: 0.01 K/UL (ref 0–0.04)
IMM GRANULOCYTES NFR BLD AUTO: 0.2 % (ref 0–0.5)
INR PPP: 1.3
KETONES UR QL STRIP: NEGATIVE
LEUKOCYTE ESTERASE UR QL STRIP: NEGATIVE
LYMPHOCYTES # BLD AUTO: 1.5 K/UL (ref 1–4.8)
LYMPHOCYTES NFR BLD: 32.3 % (ref 18–48)
MAGNESIUM SERPL-MCNC: 1.4 MG/DL (ref 1.6–2.6)
MCH RBC QN AUTO: 33 PG (ref 27–31)
MCHC RBC AUTO-ENTMCNC: 32.2 G/DL (ref 32–36)
MCV RBC AUTO: 103 FL (ref 82–98)
MICROSCOPIC COMMENT: ABNORMAL
MONOCYTES # BLD AUTO: 0.3 K/UL (ref 0.3–1)
MONOCYTES NFR BLD: 6.9 % (ref 4–15)
NEUTROPHILS # BLD AUTO: 2.8 K/UL (ref 1.8–7.7)
NEUTROPHILS NFR BLD: 60 % (ref 38–73)
NITRITE UR QL STRIP: NEGATIVE
NRBC BLD-RTO: 0 /100 WBC
PH UR STRIP: 6 [PH] (ref 5–8)
PLATELET # BLD AUTO: 116 K/UL (ref 150–450)
PMV BLD AUTO: 11 FL (ref 9.2–12.9)
POTASSIUM SERPL-SCNC: 3.5 MMOL/L (ref 3.5–5.1)
PROT SERPL-MCNC: 4.3 G/DL (ref 6–8.4)
PROT UR QL STRIP: ABNORMAL
PROTHROMBIN TIME: 15.5 SEC (ref 11.4–13.7)
RBC # BLD AUTO: 2.85 M/UL (ref 4.6–6.2)
RBC #/AREA URNS HPF: 1 /HPF (ref 0–4)
SODIUM SERPL-SCNC: 138 MMOL/L (ref 136–145)
SP GR UR STRIP: 1.03 (ref 1–1.03)
SQUAMOUS #/AREA URNS HPF: 2 /HPF
TROPONIN I SERPL DL<=0.01 NG/ML-MCNC: <0.03 NG/ML
TROPONIN I SERPL DL<=0.01 NG/ML-MCNC: <0.03 NG/ML
URN SPEC COLLECT METH UR: ABNORMAL
UROBILINOGEN UR STRIP-ACNC: NEGATIVE EU/DL
WBC # BLD AUTO: 4.64 K/UL (ref 3.9–12.7)
WBC #/AREA URNS HPF: 2 /HPF (ref 0–5)

## 2022-10-05 PROCEDURE — 36415 COLL VENOUS BLD VENIPUNCTURE: CPT | Performed by: FAMILY MEDICINE

## 2022-10-05 PROCEDURE — 84484 ASSAY OF TROPONIN QUANT: CPT | Performed by: EMERGENCY MEDICINE

## 2022-10-05 PROCEDURE — 20000000 HC ICU ROOM

## 2022-10-05 PROCEDURE — 81001 URINALYSIS AUTO W/SCOPE: CPT | Performed by: EMERGENCY MEDICINE

## 2022-10-05 PROCEDURE — 85025 COMPLETE CBC W/AUTO DIFF WBC: CPT | Performed by: EMERGENCY MEDICINE

## 2022-10-05 PROCEDURE — 99285 EMERGENCY DEPT VISIT HI MDM: CPT | Mod: 25

## 2022-10-05 PROCEDURE — 96365 THER/PROPH/DIAG IV INF INIT: CPT

## 2022-10-05 PROCEDURE — 25000003 PHARM REV CODE 250: Performed by: FAMILY MEDICINE

## 2022-10-05 PROCEDURE — 85610 PROTHROMBIN TIME: CPT | Performed by: EMERGENCY MEDICINE

## 2022-10-05 PROCEDURE — 51701 INSERT BLADDER CATHETER: CPT

## 2022-10-05 PROCEDURE — 93005 ELECTROCARDIOGRAM TRACING: CPT | Performed by: GENERAL PRACTICE

## 2022-10-05 PROCEDURE — 63600175 PHARM REV CODE 636 W HCPCS: Performed by: EMERGENCY MEDICINE

## 2022-10-05 PROCEDURE — 80053 COMPREHEN METABOLIC PANEL: CPT | Performed by: EMERGENCY MEDICINE

## 2022-10-05 PROCEDURE — 83880 ASSAY OF NATRIURETIC PEPTIDE: CPT | Performed by: EMERGENCY MEDICINE

## 2022-10-05 PROCEDURE — 63600175 PHARM REV CODE 636 W HCPCS: Performed by: FAMILY MEDICINE

## 2022-10-05 PROCEDURE — 25000003 PHARM REV CODE 250: Performed by: EMERGENCY MEDICINE

## 2022-10-05 PROCEDURE — 99900031 HC PATIENT EDUCATION (STAT)

## 2022-10-05 PROCEDURE — 93010 ELECTROCARDIOGRAM REPORT: CPT | Mod: ,,, | Performed by: GENERAL PRACTICE

## 2022-10-05 PROCEDURE — 83735 ASSAY OF MAGNESIUM: CPT | Performed by: EMERGENCY MEDICINE

## 2022-10-05 PROCEDURE — 94761 N-INVAS EAR/PLS OXIMETRY MLT: CPT

## 2022-10-05 PROCEDURE — 84484 ASSAY OF TROPONIN QUANT: CPT | Mod: 91 | Performed by: FAMILY MEDICINE

## 2022-10-05 PROCEDURE — 93010 EKG 12-LEAD: ICD-10-PCS | Mod: ,,, | Performed by: GENERAL PRACTICE

## 2022-10-05 PROCEDURE — 99223 PR INITIAL HOSPITAL CARE,LEVL III: ICD-10-PCS | Mod: ,,, | Performed by: NEUROLOGICAL SURGERY

## 2022-10-05 PROCEDURE — 36415 COLL VENOUS BLD VENIPUNCTURE: CPT | Performed by: EMERGENCY MEDICINE

## 2022-10-05 PROCEDURE — 99223 1ST HOSP IP/OBS HIGH 75: CPT | Mod: ,,, | Performed by: NEUROLOGICAL SURGERY

## 2022-10-05 RX ORDER — CALCIUM GLUCONATE 20 MG/ML
1 INJECTION, SOLUTION INTRAVENOUS
Status: COMPLETED | OUTPATIENT
Start: 2022-10-05 | End: 2022-10-05

## 2022-10-05 RX ORDER — MAGNESIUM SULFATE HEPTAHYDRATE 40 MG/ML
4 INJECTION, SOLUTION INTRAVENOUS
Status: DISCONTINUED | OUTPATIENT
Start: 2022-10-05 | End: 2022-10-07 | Stop reason: HOSPADM

## 2022-10-05 RX ORDER — IBUPROFEN 200 MG
16 TABLET ORAL
Status: DISCONTINUED | OUTPATIENT
Start: 2022-10-05 | End: 2022-10-07 | Stop reason: HOSPADM

## 2022-10-05 RX ORDER — TAMSULOSIN HYDROCHLORIDE 0.4 MG/1
1 CAPSULE ORAL DAILY
Status: ON HOLD | COMMUNITY
Start: 2022-10-02 | End: 2022-11-11 | Stop reason: HOSPADM

## 2022-10-05 RX ORDER — AMOXICILLIN 250 MG
1 CAPSULE ORAL DAILY PRN
Status: DISCONTINUED | OUTPATIENT
Start: 2022-10-05 | End: 2022-10-07 | Stop reason: HOSPADM

## 2022-10-05 RX ORDER — TAMSULOSIN HYDROCHLORIDE 0.4 MG/1
1 CAPSULE ORAL DAILY
Status: DISCONTINUED | OUTPATIENT
Start: 2022-10-06 | End: 2022-10-07 | Stop reason: HOSPADM

## 2022-10-05 RX ORDER — ATORVASTATIN CALCIUM 40 MG/1
40 TABLET, FILM COATED ORAL DAILY
Status: DISCONTINUED | OUTPATIENT
Start: 2022-10-05 | End: 2022-10-07 | Stop reason: HOSPADM

## 2022-10-05 RX ORDER — LEVOTHYROXINE SODIUM 25 UG/1
75 TABLET ORAL
Status: DISCONTINUED | OUTPATIENT
Start: 2022-10-06 | End: 2022-10-07 | Stop reason: HOSPADM

## 2022-10-05 RX ORDER — NICARDIPINE HYDROCHLORIDE 0.2 MG/ML
0-15 INJECTION INTRAVENOUS CONTINUOUS
Status: DISCONTINUED | OUTPATIENT
Start: 2022-10-05 | End: 2022-10-06

## 2022-10-05 RX ORDER — MORPHINE SULFATE 4 MG/ML
4 INJECTION, SOLUTION INTRAMUSCULAR; INTRAVENOUS EVERY 4 HOURS PRN
Status: DISCONTINUED | OUTPATIENT
Start: 2022-10-05 | End: 2022-10-07 | Stop reason: HOSPADM

## 2022-10-05 RX ORDER — POTASSIUM CHLORIDE 20 MEQ/1
20 TABLET, EXTENDED RELEASE ORAL
Status: DISCONTINUED | OUTPATIENT
Start: 2022-10-05 | End: 2022-10-07 | Stop reason: HOSPADM

## 2022-10-05 RX ORDER — ACETAMINOPHEN 325 MG/1
650 TABLET ORAL EVERY 8 HOURS PRN
Status: DISCONTINUED | OUTPATIENT
Start: 2022-10-05 | End: 2022-10-07 | Stop reason: HOSPADM

## 2022-10-05 RX ORDER — AMIODARONE HYDROCHLORIDE 200 MG/1
200 TABLET ORAL 2 TIMES DAILY
Status: DISCONTINUED | OUTPATIENT
Start: 2022-10-05 | End: 2022-10-07 | Stop reason: HOSPADM

## 2022-10-05 RX ORDER — POTASSIUM CHLORIDE 7.45 MG/ML
20 INJECTION INTRAVENOUS
Status: DISCONTINUED | OUTPATIENT
Start: 2022-10-05 | End: 2022-10-07 | Stop reason: HOSPADM

## 2022-10-05 RX ORDER — GLUCAGON 1 MG
1 KIT INJECTION
Status: DISCONTINUED | OUTPATIENT
Start: 2022-10-05 | End: 2022-10-07 | Stop reason: HOSPADM

## 2022-10-05 RX ORDER — ONDANSETRON 2 MG/ML
4 INJECTION INTRAMUSCULAR; INTRAVENOUS EVERY 8 HOURS PRN
Status: DISCONTINUED | OUTPATIENT
Start: 2022-10-05 | End: 2022-10-07 | Stop reason: HOSPADM

## 2022-10-05 RX ORDER — LOSARTAN POTASSIUM 50 MG/1
50 TABLET ORAL 2 TIMES DAILY
Status: DISCONTINUED | OUTPATIENT
Start: 2022-10-05 | End: 2022-10-07 | Stop reason: HOSPADM

## 2022-10-05 RX ORDER — SODIUM CHLORIDE 0.9 % (FLUSH) 0.9 %
10 SYRINGE (ML) INJECTION EVERY 12 HOURS PRN
Status: DISCONTINUED | OUTPATIENT
Start: 2022-10-05 | End: 2022-10-07 | Stop reason: HOSPADM

## 2022-10-05 RX ORDER — PANTOPRAZOLE SODIUM 40 MG/1
40 TABLET, DELAYED RELEASE ORAL DAILY
Status: DISCONTINUED | OUTPATIENT
Start: 2022-10-06 | End: 2022-10-07 | Stop reason: HOSPADM

## 2022-10-05 RX ORDER — SODIUM CHLORIDE 0.9 % (FLUSH) 0.9 %
10 SYRINGE (ML) INJECTION
Status: DISCONTINUED | OUTPATIENT
Start: 2022-10-05 | End: 2022-10-07 | Stop reason: HOSPADM

## 2022-10-05 RX ORDER — MAGNESIUM SULFATE 1 G/100ML
1 INJECTION INTRAVENOUS ONCE
Status: COMPLETED | OUTPATIENT
Start: 2022-10-05 | End: 2022-10-05

## 2022-10-05 RX ORDER — NALOXONE HCL 0.4 MG/ML
0.02 VIAL (ML) INJECTION
Status: DISCONTINUED | OUTPATIENT
Start: 2022-10-05 | End: 2022-10-07 | Stop reason: HOSPADM

## 2022-10-05 RX ORDER — MAGNESIUM SULFATE 1 G/100ML
1 INJECTION INTRAVENOUS
Status: DISCONTINUED | OUTPATIENT
Start: 2022-10-05 | End: 2022-10-07 | Stop reason: HOSPADM

## 2022-10-05 RX ORDER — POTASSIUM CHLORIDE 7.45 MG/ML
40 INJECTION INTRAVENOUS
Status: DISCONTINUED | OUTPATIENT
Start: 2022-10-05 | End: 2022-10-07 | Stop reason: HOSPADM

## 2022-10-05 RX ORDER — PROCHLORPERAZINE EDISYLATE 5 MG/ML
5 INJECTION INTRAMUSCULAR; INTRAVENOUS EVERY 6 HOURS PRN
Status: DISCONTINUED | OUTPATIENT
Start: 2022-10-05 | End: 2022-10-07 | Stop reason: HOSPADM

## 2022-10-05 RX ORDER — POTASSIUM CHLORIDE 20 MEQ/1
40 TABLET, EXTENDED RELEASE ORAL
Status: DISCONTINUED | OUTPATIENT
Start: 2022-10-05 | End: 2022-10-07 | Stop reason: HOSPADM

## 2022-10-05 RX ORDER — LANOLIN ALCOHOL/MO/W.PET/CERES
800 CREAM (GRAM) TOPICAL
Status: DISCONTINUED | OUTPATIENT
Start: 2022-10-05 | End: 2022-10-07 | Stop reason: HOSPADM

## 2022-10-05 RX ORDER — ACETAMINOPHEN 325 MG/1
650 TABLET ORAL EVERY 4 HOURS PRN
Status: DISCONTINUED | OUTPATIENT
Start: 2022-10-05 | End: 2022-10-07 | Stop reason: HOSPADM

## 2022-10-05 RX ORDER — CARBIDOPA AND LEVODOPA 25; 100 MG/1; MG/1
1 TABLET ORAL 3 TIMES DAILY
Status: ON HOLD | COMMUNITY
End: 2022-10-07 | Stop reason: HOSPADM

## 2022-10-05 RX ORDER — MAGNESIUM SULFATE HEPTAHYDRATE 40 MG/ML
2 INJECTION, SOLUTION INTRAVENOUS
Status: DISCONTINUED | OUTPATIENT
Start: 2022-10-05 | End: 2022-10-07 | Stop reason: HOSPADM

## 2022-10-05 RX ORDER — IBUPROFEN 200 MG
24 TABLET ORAL
Status: DISCONTINUED | OUTPATIENT
Start: 2022-10-05 | End: 2022-10-07 | Stop reason: HOSPADM

## 2022-10-05 RX ORDER — MIRTAZAPINE 15 MG/1
15 TABLET, FILM COATED ORAL NIGHTLY
Status: DISCONTINUED | OUTPATIENT
Start: 2022-10-05 | End: 2022-10-07 | Stop reason: HOSPADM

## 2022-10-05 RX ORDER — HYDROCODONE BITARTRATE AND ACETAMINOPHEN 5; 325 MG/1; MG/1
1 TABLET ORAL EVERY 6 HOURS PRN
Status: DISCONTINUED | OUTPATIENT
Start: 2022-10-05 | End: 2022-10-07 | Stop reason: HOSPADM

## 2022-10-05 RX ORDER — MAG HYDROX/ALUMINUM HYD/SIMETH 200-200-20
30 SUSPENSION, ORAL (FINAL DOSE FORM) ORAL 4 TIMES DAILY PRN
Status: DISCONTINUED | OUTPATIENT
Start: 2022-10-05 | End: 2022-10-07 | Stop reason: HOSPADM

## 2022-10-05 RX ORDER — METOPROLOL SUCCINATE 50 MG/1
50 TABLET, EXTENDED RELEASE ORAL 2 TIMES DAILY
Status: DISCONTINUED | OUTPATIENT
Start: 2022-10-05 | End: 2022-10-07 | Stop reason: HOSPADM

## 2022-10-05 RX ADMIN — AMIODARONE HYDROCHLORIDE 200 MG: 200 TABLET ORAL at 08:10

## 2022-10-05 RX ADMIN — HYDROCODONE BITARTRATE AND ACETAMINOPHEN 1 TABLET: 5; 325 TABLET ORAL at 08:10

## 2022-10-05 RX ADMIN — CALCIUM GLUCONATE 1 G: 20 INJECTION, SOLUTION INTRAVENOUS at 02:10

## 2022-10-05 RX ADMIN — ONDANSETRON 4 MG: 2 INJECTION INTRAMUSCULAR; INTRAVENOUS at 06:10

## 2022-10-05 RX ADMIN — LOSARTAN POTASSIUM 50 MG: 50 TABLET, FILM COATED ORAL at 08:10

## 2022-10-05 RX ADMIN — MORPHINE SULFATE 4 MG: 4 INJECTION, SOLUTION INTRAMUSCULAR; INTRAVENOUS at 06:10

## 2022-10-05 RX ADMIN — ATORVASTATIN CALCIUM 40 MG: 40 TABLET, FILM COATED ORAL at 08:10

## 2022-10-05 RX ADMIN — MAGNESIUM SULFATE HEPTAHYDRATE 2 G: 40 INJECTION, SOLUTION INTRAVENOUS at 09:10

## 2022-10-05 RX ADMIN — NICARDIPINE HYDROCHLORIDE 2.5 MG/HR: 0.2 INJECTION INTRAVENOUS at 05:10

## 2022-10-05 RX ADMIN — MAGNESIUM SULFATE 1 G: 1 INJECTION INTRAVENOUS at 03:10

## 2022-10-05 RX ADMIN — METOPROLOL SUCCINATE 50 MG: 50 TABLET, FILM COATED, EXTENDED RELEASE ORAL at 08:10

## 2022-10-05 RX ADMIN — POTASSIUM BICARBONATE 50 MEQ: 977.5 TABLET, EFFERVESCENT ORAL at 09:10

## 2022-10-05 RX ADMIN — ALUMINA, MAGNESIA, AND SIMETHICONE ORAL SUSPENSION REGULAR STRENGTH 30 ML: 1200; 1200; 120 SUSPENSION ORAL at 09:10

## 2022-10-05 RX ADMIN — MORPHINE SULFATE 4 MG: 4 INJECTION, SOLUTION INTRAMUSCULAR; INTRAVENOUS at 11:10

## 2022-10-05 RX ADMIN — MIRTAZAPINE 15 MG: 15 TABLET, FILM COATED ORAL at 08:10

## 2022-10-05 RX ADMIN — NICARDIPINE HYDROCHLORIDE 5 MG/HR: 0.2 INJECTION INTRAVENOUS at 03:10

## 2022-10-05 NOTE — SUBJECTIVE & OBJECTIVE
Past Medical History:   Diagnosis Date    Abnormal echocardiogram 11/21/2019    Normal left ventricular systolic function with estimated ejection fraction of 60%.  Grade 2 moderate left ventricular diastolic dysfunction consistent with pseudonormalization.  Mild left atrial enlargement.  Mild aortic regurgitation.  Mild to moderate mitral regurgitation.  Mild tricuspid regurgitation.  Estimated PA systolic pressure is 38 mm of mercury.  Diagnosis is syncope.    Anxiety     Arthritis     CAD (coronary artery disease) age 68    Carotid artery occlusion     Cerebrovascular small vessel disease     Colon polyps age 78    Coronary artery disease of native artery of native heart with stable angina pectoris 5/6/2020    GERD (gastroesophageal reflux disease)     H. pylori infection     HTN (hypertension), benign age 65    Hyperlipidemia LDL goal <70 age 65    Hypothyroidism     Multiple fractures of ribs of right side 11/27/2012    Myocardial infarction     Pernicious anemia 1/26/2018    Primary insomnia 10/9/2018    Prostate cancer age 67    Subdural hematoma 9/5/2022    Syncopal episodes 3/2013    Urge incontinence 5/8/2018       Past Surgical History:   Procedure Laterality Date    CARDIAC PACEMAKER PLACEMENT  10/2015    [employer] [employer] Pacemaker    CARDIAC SURGERY      CATARACT EXTRACTION W/  INTRAOCULAR LENS IMPLANT Bilateral     COLONOSCOPY  ~2013    Dr. Edge    COLONOSCOPY N/A 06/08/2016    Procedure: COLONOSCOPY;  Surgeon: Shamar Barahona MD;  Location: [facility];  Service: Endoscopy;  Laterality: N/A; REPEAT IN 1 YEAR    CORONARY ANGIOPLASTY WITH STENT PLACEMENT  2003    x 2 RCA    PROSTATECTOMY  2002    UPPER GASTROINTESTINAL ENDOSCOPY  11/15/2016    Dr. Barahona       Review of patient's allergies indicates:   Allergen Reactions    Iodinated contrast media Rash    Pontocaine [tetracaine hcl] Anaphylaxis     hypotension       No current facility-administered medications on file prior  to encounter.     Current Outpatient Medications on File Prior to Encounter   Medication Sig    acetaminophen (TYLENOL ARTHRITIS PAIN ORAL) Take 2 tablets by mouth 2 (two) times a day.    amiodarone (PACERONE) 200 MG Tab Take 200 mg by mouth 2 (two) times daily.    atorvastatin (LIPITOR) 40 MG tablet Take 1 tablet (40 mg total) by mouth once daily.    cetirizine (ZYRTEC) 10 MG tablet Take 1 tablet (10 mg total) by mouth once daily.    clopidogrel (PLAVIX) 75 mg tablet Take 1 tablet (75 mg total) by mouth once daily.    cyanocobalamin, vitamin B-12, (VITAMIN B-12) 5,000 mcg Subl Place 1 tablet under the tongue once daily.    cycloSPORINE (RESTASIS) 0.05 % ophthalmic emulsion Apply 1 drop to eye 2 (two) times daily.    fluticasone propionate (FLONASE) 50 mcg/actuation nasal spray USE 1 SPRAY IN EACH NOSTRIL TWICE DAILY (Patient taking differently: 1 spray by Each Nostril route 2 (two) times a day.)    levothyroxine (SYNTHROID) 75 MCG tablet Take 75 mcg by mouth before breakfast.    losartan (COZAAR) 50 MG tablet Take 1 tablet (50 mg total) by mouth 2 (two) times daily.    melatonin 5 mg Subl Take 2 tablets by mouth nightly as needed.    metoprolol succinate (TOPROL-XL) 50 MG 24 hr tablet Take 1 tablet (50 mg total) by mouth 2 (two) times daily.    mirtazapine (REMERON) 15 MG tablet Take 15 mg by mouth every evening.    nitroGLYCERIN (NITROSTAT) 0.4 MG SL tablet Place 0.4 mg under the tongue every 5 (five) minutes as needed.    pantoprazole (PROTONIX) 40 MG tablet Take 40 mg by mouth once daily.    tamsulosin (FLOMAX) 0.4 mg Cap Take 1 capsule by mouth once daily.    carbidopa-levodopa  mg (SINEMET)  mg per tablet Take 1 tablet by mouth 3 (three) times daily.    cloNIDine (CATAPRES) 0.1 MG tablet Take 1 tablet (0.1 mg total) by mouth 2 (two) times daily as needed (SBP greater than 160).     Family History       Problem Relation (Age of Onset)    Diabetes Son    Heart disease Father (56),  Brother, Brother    Heart failure Father, Brother    Hypertension Son    Mental illness Brother    Migraines Mother    No Known Problems Son          Tobacco Use    Smoking status: Never    Smokeless tobacco: Never   Substance and Sexual Activity    Alcohol use: No    Drug use: No    Sexual activity: Not Currently     Review of Systems  All systems normal except as indicated HPI    Objective:     Vital Signs (Most Recent):  Temp: 97.6 °F (36.4 °C) (10/05/22 1148)  Pulse: 81 (10/05/22 1359)  Resp: 18 (10/05/22 1139)  BP: (!) 154/66 (10/05/22 1359)  SpO2: 98 % (10/05/22 1359)   Vital Signs (24h Range):  Temp:  [97.6 °F (36.4 °C)] 97.6 °F (36.4 °C)  Pulse:  [57-81] 81  Resp:  [18] 18  SpO2:  [98 %] 98 %  BP: (154-175)/(66-76) 154/66     Weight: 67.5 kg (148 lb 13 oz)  Body mass index is 29.06 kg/m².    Physical Exam   General:  Alert and oriented x4.  No acute distress  HEENT:  Normocephalic  Cardiovascular:  Regular rate and rhythm, no murmurs rubs or gallops.  No lower extremity edema.  Pulmonary:  Clear to auscultation bilaterally  Abdomen:  Soft, nontender, nondistended.  No guarding.  No rebound.  Negative Wiseman's.  Positive bowel sounds.  Extremity:  Moves all extremities equally.  Dermatology:  No rashes appreciated on exposed skin  Psychiatric:  Normal affect  CNS:  Strength 5/5 bilateral upper and lower extremities.    Cranial nerves well with the exception of some labial flattening which is reported to be chronic by family.  Sensation to light touch grossly intact.     Significant Labs: All pertinent labs within the past 24 hours have been reviewed.  Recent Lab Results         10/05/22  1342   10/05/22  1134        Albumin   2.4       Alkaline Phosphatase   45       ALT   19  Comment: CORRECTED RESULT; previously reported as <5 on 10/05/2022 at 13:51.  [C]       Anion Gap   3       Appearance, UA Clear         AST   18       Bacteria, UA Negative         Baso #   0.01       Basophil %   0.2        Bilirubin (UA) Negative         BILIRUBIN TOTAL   0.6  Comment: For infants and newborns, interpretation of results should be based  on gestational age, weight and in agreement with clinical  observations.    Premature Infant recommended reference ranges:  Up to 24 hours.............<8.0 mg/dL  Up to 48 hours............<12.0 mg/dL  3-5 days..................<15.0 mg/dL  6-29 days.................<15.0 mg/dL         BNP   351  Comment: Values of less than 100 pg/ml are consistent with non-CHF populations.       BUN   23       Calcium   6.3  Comment: Ca critical result(s) repeated. Called and verbal readback obtained   from Josr Fagan RN/ED by KENDAL 10/05/2022 13:51         Chloride   116       CO2   19       Color, UA Yellow         Creatinine   1.1       Differential Method   Automated       eGFR   >60.0       Eos #   0.0       Eosinophil %   0.4       Glucose   101       Glucose, UA Negative         Gran # (ANC)   2.8       Gran %   60.0       Hematocrit   29.2       Hemoglobin   9.4       Hyaline Casts, UA 7         Immature Grans (Abs)   0.01  Comment: Mild elevation in immature granulocytes is non specific and   can be seen in a variety of conditions including stress response,   acute inflammation, trauma and pregnancy. Correlation with other   laboratory and clinical findings is essential.         Immature Granulocytes   0.2       INR   1.3  Comment: Coumadin Therapy:  INR: 2.0-3.0 conventional anticoagulation    INR: 2.5-3.5 intensive anticoagulation         Ketones, UA Negative         Leukocytes, UA Negative         Lymph #   1.5       Lymph %   32.3       Magnesium   1.4       MCH   33.0       MCHC   32.2       MCV   103       Microscopic Comment SEE COMMENT  Comment: Other formed elements not mentioned in the report are not   present in the microscopic examination.            Mono #   0.3       Mono %   6.9       MPV   11.0       NITRITE UA Negative         nRBC   0       Occult Blood UA Negative          pH, UA 6.0         Platelets   116       Potassium   3.5       PROTEIN TOTAL   4.3       Protein, UA 2+  Comment: Recommend a 24 hour urine protein or a urine   protein/creatinine ratio if globulin induced proteinuria is  clinically suspected.           PT   15.5       RBC   2.85       RBC, UA 1         RDW   15.4       Sodium   138       Specific Gravity, UA 1.030         Specimen UA Urine, Catheterized         Squam Epithel, UA 2         Troponin I   <0.030       UROBILINOGEN UA Negative         WBC, UA 2         WBC   4.64                [C] - Corrected Result               Significant Imaging: I have reviewed all pertinent imaging results/findings within the past 24 hours.    Imaging Results              CT Head Without Contrast (Final result)  Result time 10/05/22 13:07:41      Final result by Francisco Arroyo MD (10/05/22 13:07:41)                   Narrative:    CMS MANDATED QUALITY DATA - CT RADIATION - 436    All CT scans at this facility utilize dose modulation, iterative reconstruction, and/or weight based dosing when appropriate to reduce radiation dose to as low as reasonably achievable.          Reason: Mental status change, unknown cause    TECHNIQUE: Head CT without IV contrast.    COMPARISON: 9/5/2022    FINDINGS:  Gray-white differentiation is maintained without hemorrhage, midline shift, or mass effect.  Previous subdural hematoma along the falx and right tentorium has resolved. Previous extra-axial hemorrhage overlying frontal lobes has resolved. Previous intraventricular hemorrhage within occipital horns of lateral ventricles shows resolution of the left and persistence in right occipital horn posteriorly.    Hypodensities affecting the corona radiata bilaterally, unchanged suggesting old lacunar infarcts. Chronic small vessel ischemic changes affecting the periventricular white matter bilaterally unchanged. Cerebral and cerebellar atrophy unchanged.    The ventricles and cisterns are  maintained.    Calvarium is intact. Visualized sinuses are clear.    IMPRESSION:    1. New or persistent hyperdense hemorrhage layering dependently in the occipital horn of right lateral ventricle.  2. Resolution of prior extra-axial hemorrhage as discussed above, including resolved hemorrhage previously in the dependent portion of left lateral ventricle occipital horn.  3. Chronic small vessel ischemic changes including old lacunar infarcts in corona radiata bilaterally.    Electronically signed by:  Francisco Arroyo MD  10/5/2022 1:07 PM CDT Workstation: [id number]                                     X-ray Chest AP Portable (Final result)  Result time 10/05/22 12:46:36      Final result by Francisco Arroyo MD (10/05/22 12:46:36)                   Narrative:    Reason: Loss of Consciousness    FINDINGS:  Portable chest at 1214 compared with 9/4/2022 shows unchanged slightly enlarged cardiac silhouette size with normal mediastinal contours. Portable cardiac monitor projecting over left chest and dual lead left transvenous pacer unchanged.    Lung volumes are low. Patchy alveolar opacities are new in right lower lung zone. Left lung is clear. Pulmonary vasculature is normal. No acute osseous abnormality.    IMPRESSION:  New patchy right basilar opacities either reflecting atelectasis or consolidation.    Electronically signed by:  Francisco Arroyo MD  10/5/2022 12:46 PM CDT Workstation: [id number]

## 2022-10-05 NOTE — HPI
88-year-old with a past medical history significant for recent hospitalization for subdural and ICH:  On 09/07/2022.  Reported to be resolving on outpatient follow-up a week or so ago.  He presents today with episode of loss of consciousness while sitting at the table earlier today.  He was unconscious for monitor to.  Per family, he also had a fall at 3 in the morning 4 days prior to this presentation- unknown whether he hit his head.  Past medical history significant for hypertension, lacunar infarct, prostate cancer, hyperlipidemia, CAD, history of MI, hypothyroidism, carotid artery stenosis, vertebral artery stenosis, Parkinson's and dementia.    After last admission plan was for him to discontinue aspirin and restart his Plavix 7 days after his discharge.  He was also discontinue sotalol.  Based on review of medications it appears that the family has followed these instructions.    Per EMS report, patient was hypotensive into the 70s systolic.  During emergency department stay, his blood pressure was in the 110s to 1 teens systolic and orthostatics blood pressure were negative.  At time of my evaluation, blood pressure is noted to be in the 200s to 210 systolic on multiple psych: Some of the blood pressure cuff.  Cardene drip was started.      ED evaluation otherwise significant for stable anemia with hemoglobin 9 4.  Thrombocytopenia with platelets 116.  Creatinine 1.1.  Magnesium 1.4.  This was repleted in the emergency department.  Albumin 2.4.  .  Troponins negative x1.    Corrected calcium 7.6.  Repeat patient received dose of calcium gluconate in ED.    CT of the head significant for persistent hemorrhagic right lateral ventricle persistent versus new.      Chest x-ray-new patchy right basilar opacity atelectasis versus consolidation.

## 2022-10-05 NOTE — CONSULTS
On license of [facility]  Neurosurgery  Consult Note    Inpatient consult to Neurosurgery  Consult performed by: Regina Braun MD  Consult ordered by: Carlos Iniguez MD  Reason for consult: IVH      Subjective:     Chief Complaint/Reason for Admission: IVH.    History of Present Illness: Mr. Hartman is an 88-year-old male with past medical history significany for anxiety, arthritis, CAD, carotid artery occlusion, colon polyps, GERD, H. Pylori, HTN, hyperlipidemia, hypothyroidism, MI, prostate cancer, and SDH who was recently hospitalized for SDH/ICH/IVH in the beginning of September 2022. He held ASA and plavix for one week after discharge from the last admission.He presents after an episode of LOC today while sitting at the table. He did sustain a fall approximately 4 days prior to presentation but it is unknown whether there was any head trauma. Per EMS, he was hypotensive to the 70s in the field. Neurosurgery was consulted for persistent IVH.     PTA Medications   Medication Sig    acetaminophen (TYLENOL ARTHRITIS PAIN ORAL) Take 2 tablets by mouth 2 (two) times a day.    amiodarone (PACERONE) 200 MG Tab Take 200 mg by mouth 2 (two) times daily.    atorvastatin (LIPITOR) 40 MG tablet Take 1 tablet (40 mg total) by mouth once daily.    cetirizine (ZYRTEC) 10 MG tablet Take 1 tablet (10 mg total) by mouth once daily.    clopidogrel (PLAVIX) 75 mg tablet Take 1 tablet (75 mg total) by mouth once daily.    cyanocobalamin, vitamin B-12, (VITAMIN B-12) 5,000 mcg Subl Place 1 tablet under the tongue once daily.    cycloSPORINE (RESTASIS) 0.05 % ophthalmic emulsion Apply 1 drop to eye 2 (two) times daily.    fluticasone propionate (FLONASE) 50 mcg/actuation nasal spray USE 1 SPRAY IN EACH NOSTRIL TWICE DAILY (Patient taking differently: 1 spray by Each Nostril route 2 (two) times a day.)    levothyroxine (SYNTHROID) 75 MCG tablet Take 75 mcg by mouth before breakfast.    losartan (COZAAR) 50 MG tablet Take 1 tablet (50 mg  total) by mouth 2 (two) times daily.    melatonin 5 mg Subl Take 2 tablets by mouth nightly as needed.    metoprolol succinate (TOPROL-XL) 50 MG 24 hr tablet Take 1 tablet (50 mg total) by mouth 2 (two) times daily.    mirtazapine (REMERON) 15 MG tablet Take 15 mg by mouth every evening.    nitroGLYCERIN (NITROSTAT) 0.4 MG SL tablet Place 0.4 mg under the tongue every 5 (five) minutes as needed.    pantoprazole (PROTONIX) 40 MG tablet Take 40 mg by mouth once daily.    tamsulosin (FLOMAX) 0.4 mg Cap Take 1 capsule by mouth once daily.    carbidopa-levodopa  mg (SINEMET)  mg per tablet Take 1 tablet by mouth 3 (three) times daily.    cloNIDine (CATAPRES) 0.1 MG tablet Take 1 tablet (0.1 mg total) by mouth 2 (two) times daily as needed (SBP greater than 160).       Review of patient's allergies indicates:   Allergen Reactions    Iodinated contrast media Rash    Pontocaine [tetracaine hcl] Anaphylaxis     hypotension       Past Medical History:   Diagnosis Date    Abnormal echocardiogram 11/21/2019    Normal left ventricular systolic function with estimated ejection fraction of 60%.  Grade 2 moderate left ventricular diastolic dysfunction consistent with pseudonormalization.  Mild left atrial enlargement.  Mild aortic regurgitation.  Mild to moderate mitral regurgitation.  Mild tricuspid regurgitation.  Estimated PA systolic pressure is 38 mm of mercury.  Diagnosis is syncope.    Anxiety     Arthritis     CAD (coronary artery disease) age 68    Carotid artery occlusion     Cerebrovascular small vessel disease     Colon polyps age 78    Coronary artery disease of native artery of native heart with stable angina pectoris 5/6/2020    GERD (gastroesophageal reflux disease)     H. pylori infection     HTN (hypertension), benign age 65    Hyperlipidemia LDL goal <70 age 65    Hypothyroidism     Multiple fractures of ribs of right side 11/27/2012    Myocardial infarction     Pernicious anemia 1/26/2018    Primary  insomnia 10/9/2018    Prostate cancer age 67    Subdural hematoma 9/5/2022    Syncopal episodes 3/2013    Urge incontinence 5/8/2018     Past Surgical History:   Procedure Laterality Date    CARDIAC PACEMAKER PLACEMENT  10/2015    [facility] Pacemaker    CARDIAC SURGERY      CATARACT EXTRACTION W/  INTRAOCULAR LENS IMPLANT Bilateral     COLONOSCOPY  ~2013    Dr. Edge    COLONOSCOPY N/A 06/08/2016    Procedure: COLONOSCOPY;  Surgeon: Shamar Barahona MD;  Location: [facility];  Service: Endoscopy;  Laterality: N/A; REPEAT IN 1 YEAR    CORONARY ANGIOPLASTY WITH STENT PLACEMENT  2003    x 2 RCA    PROSTATECTOMY  2002    UPPER GASTROINTESTINAL ENDOSCOPY  11/15/2016    Dr. Barahona     Family History       Problem Relation (Age of Onset)    Diabetes Son    Heart disease Father (56), Brother, Brother    Heart failure Father, Brother    Hypertension Son    Mental illness Brother    Migraines Mother    No Known Problems Son          Tobacco Use    Smoking status: Never    Smokeless tobacco: Never   Substance and Sexual Activity    Alcohol use: No    Drug use: No    Sexual activity: Not Currently     Review of Systems   Constitutional:  Negative for activity change, diaphoresis, fatigue, fever and unexpected weight change.   HENT:  Negative for hearing loss, nosebleeds, tinnitus and trouble swallowing.    Eyes:  Negative for visual disturbance.   Respiratory:  Negative for cough, shortness of breath and wheezing.    Cardiovascular:  Negative for chest pain and palpitations.   Gastrointestinal:  Negative for abdominal distention, abdominal pain, blood in stool, constipation, diarrhea, nausea and vomiting.   Endocrine: Negative for cold intolerance and heat intolerance.   Genitourinary:  Negative for difficulty urinating, enuresis, frequency and urgency.   Musculoskeletal:  Negative for back pain, gait problem, joint swelling, myalgias, neck pain and neck stiffness.   Skin:  Negative for color change, rash and wound.    Allergic/Immunologic: Negative for environmental allergies and food allergies.   Neurological:  Negative for dizziness, seizures, facial asymmetry, speech difficulty, weakness, light-headedness, numbness and headaches.   Hematological:  Does not bruise/bleed easily.   Psychiatric/Behavioral:  Negative for agitation, behavioral problems, dysphoric mood and hallucinations. The patient is not nervous/anxious.    Objective:     Weight: 68.5 kg (151 lb 0.2 oz)  Body mass index is 29.49 kg/m².  Vital Signs (Most Recent):  Temp: 98.7 °F (37.1 °C) (10/05/22 1720)  Pulse: 64 (10/05/22 1720)  Resp: (!) 22 (10/05/22 1834)  BP: (!) 181/70 (10/05/22 1720)  SpO2: 99 % (10/05/22 1720)   Vital Signs (24h Range):  Temp:  [97.6 °F (36.4 °C)-98.7 °F (37.1 °C)] 98.7 °F (37.1 °C)  Pulse:  [57-81] 64  Resp:  [18-32] 22  SpO2:  [95 %-99 %] 99 %  BP: (118-196)/(59-88) 181/70     Date 10/05/22 0700 - 10/06/22 0659   Shift 0333-3489 3962-3582 4831-8914 24 Hour Total   INTAKE   I.V.(mL/kg)  127.3(1.9)  127.3(1.9)   IV Piggyback  50  50   Shift Total(mL/kg)  177.3(2.6)  177.3(2.6)   OUTPUT   Urine(mL/kg/hr)  75  75   Shift Total(mL/kg)  75(1.1)  75(1.1)   Weight (kg) 67.5 68.5 68.5 68.5                        Physical Exam:  Vitals reviewed.    Constitutional: He appears well-developed and well-nourished. No distress.     Eyes: Pupils are equal, round, and reactive to light. Conjunctivae and EOM are normal.     Cardiovascular: Normal rate, regular rhythm, normal pulses and no edema.     Abdominal: Soft. Bowel sounds are normal.     Skin: Skin displays no rash on trunk and no rash on extremities. Skin displays no lesions on trunk and no lesions on extremities.     Psych/Behavior: He is alert. He is oriented to person, place, and time. He has a normal mood and affect.     Musculoskeletal: Gait is normal.        Neck: Range of motion is full. There is no tenderness. Muscle strength is 5/5. Tone is normal.        Back: Range of motion is full.  There is no tenderness. Muscle strength is 5/5. Tone is normal.        Right Upper Extremities: Range of motion is full. There is no tenderness. Muscle strength is 5/5. Tone is normal.        Left Upper Extremities: Range of motion is full. There is no tenderness. Muscle strength is 5/5. Tone is normal.       Right Lower Extremities: Range of motion is full. There is no tenderness. Muscle strength is 5/5. Tone is normal.        Left Lower Extremities: Range of motion is full. There is no tenderness. Muscle strength is 5/5. Tone is normal.     Neurological:        Coordination: He has a normal Romberg Test, normal finger to nose coordination and normal tandem walking coordination.        Sensory: There is no sensory deficit in the trunk. There is no sensory deficit in the extremities.        DTRs: DTRs are DTRS NORMAL AND SYMMETRICnormal and symmetric. He displays no Babinski's sign on the right side. He displays no Babinski's sign on the left side.        Cranial nerves: Cranial nerve(s) II, III, IV, V, VI, VII, VIII, IX, X, XI and XII are intact.     Significant Labs:  Recent Labs   Lab 10/05/22  1134         K 3.5   *   CO2 19*   BUN 23   CREATININE 1.1   CALCIUM 6.3*   MG 1.4*     Recent Labs   Lab 10/05/22  1134   WBC 4.64   HGB 9.4*   HCT 29.2*   *     Recent Labs   Lab 10/05/22  1134   LABPT 15.5*   INR 1.3     Microbiology Results (last 7 days)       ** No results found for the last 168 hours. **          All pertinent labs from the last 24 hours have been reviewed.    Significant Diagnostics:  He has a CT head available for review which I personally reviewed. This shows minimal R IVH. This is significantly improved when compared to his last CT head with ICH/SDH/IVH.    Assessment/Plan:     Active Diagnoses:    Diagnosis Date Noted POA    PRINCIPAL PROBLEM:  Intraventricular hemorrhage [I61.5] 10/05/2022 Yes    Hypomagnesemia [E83.42] 10/05/2022 Yes    Hypocalcemia [E83.51]  10/05/2022 Yes    Vasovagal syncope [R55] 11/26/2014 Yes    Hypothyroidism [E03.9] 04/23/2013 Yes    HTN (hypertension), benign [I10]  Yes    CAD (coronary artery disease) [I25.10]  Yes      Problems Resolved During this Admission:     Mr. Hartman is an 88-year-old male with syncopal episode and a recent history of ICH/IVH/SDH. His current CT head shows a significant interval improvement and only shows minimal right IVH. Would admit patient to floor for syncope work-up. No ICU necessary from neurosurgery standpoint. Keppra 1g now then 500BID x1week. Repeat CT head later tonight. If this is stable, he is cleared from a neurosurgery standpoint for discharge home tomorrow with previously scheduled neurosurgery follow-up. If CT head stable, ok to resume ASA/plavix as warranted.   Please call with any questions.     Thank you for your consult. I will follow-up with patient. Please contact us if you have any additional questions.    Regina Braun MD  Neurosurgery  [facility]  [phone]

## 2022-10-05 NOTE — ED PROVIDER NOTES
Encounter Date: 10/5/2022       History     Chief Complaint   Patient presents with    Loss of Consciousness     Syncopal episode witness by family, initial Bp60s/40s, 500ml NS given by EMS PTA, PVC's noted on EMS EKG     Patient presents complaining of syncope.  Patient was at his residence at the dinner table when he had a syncopal event just prior to arrival.  He reports that he recently was in the hospital with bleeding in the brain.  He currently is on Plavix therapy.  He upon arrival to the ER is now awake and alert and does not recall passing out.  He denies any one-sided numbness tingling or weakness.  He denies any difficulty walking.  He denies headache.  At the worst symptoms are moderate.    Review of patient's allergies indicates:   Allergen Reactions    Iodinated contrast media Rash    Pontocaine [tetracaine hcl] Anaphylaxis     hypotension     Past Medical History:   Diagnosis Date    Abnormal echocardiogram 11/21/2019    Normal left ventricular systolic function with estimated ejection fraction of 60%.  Grade 2 moderate left ventricular diastolic dysfunction consistent with pseudonormalization.  Mild left atrial enlargement.  Mild aortic regurgitation.  Mild to moderate mitral regurgitation.  Mild tricuspid regurgitation.  Estimated PA systolic pressure is 38 mm of mercury.  Diagnosis is syncope.    Anxiety     Arthritis     CAD (coronary artery disease) age 68    Carotid artery occlusion     Cerebrovascular small vessel disease     Colon polyps age 78    Coronary artery disease of native artery of native heart with stable angina pectoris 5/6/2020    GERD (gastroesophageal reflux disease)     H. pylori infection     HTN (hypertension), benign age 65    Hyperlipidemia LDL goal <70 age 65    Hypothyroidism     Multiple fractures of ribs of right side 11/27/2012    Myocardial infarction     Pernicious anemia 1/26/2018    Primary insomnia 10/9/2018    Prostate cancer age 67    Subdural hematoma 9/5/2022     Syncopal episodes 3/2013    Urge incontinence 5/8/2018     Past Surgical History:   Procedure Laterality Date    CARDIAC PACEMAKER PLACEMENT  10/2015    [facility] Pacemaker    CARDIAC SURGERY      CATARACT EXTRACTION W/  INTRAOCULAR LENS IMPLANT Bilateral     COLONOSCOPY  ~2013    Dr. Edge    COLONOSCOPY N/A 06/08/2016    Procedure: COLONOSCOPY;  Surgeon: Shamar Barahona MD;  Location: [facility];  Service: Endoscopy;  Laterality: N/A; REPEAT IN 1 YEAR    CORONARY ANGIOPLASTY WITH STENT PLACEMENT  2003    x 2 RCA    PROSTATECTOMY  2002    UPPER GASTROINTESTINAL ENDOSCOPY  11/15/2016    Dr. Barahona     Family History   Problem Relation Age of Onset    Migraines Mother     Heart failure Father     Heart disease Father 56        MI    Heart failure Brother     Heart disease Brother     Diabetes Son     Hypertension Son     Heart disease Brother     Mental illness Brother     No Known Problems Son     Colon cancer Neg Hx     Colon polyps Neg Hx     Crohn's disease Neg Hx     Ulcerative colitis Neg Hx     Stomach cancer Neg Hx     Esophageal cancer Neg Hx      Social History     Tobacco Use    Smoking status: Never    Smokeless tobacco: Never   Substance Use Topics    Alcohol use: No    Drug use: No     Review of Systems   All other systems reviewed and are negative.    Physical Exam     Initial Vitals   BP Pulse Resp Temp SpO2   10/05/22 1139 10/05/22 1139 10/05/22 1139 10/05/22 1148 10/05/22 1139   (!) 175/76 (!) 57 18 97.6 °F (36.4 °C) 98 %      MAP       --                Physical Exam    Nursing note and vitals reviewed.  Constitutional: He appears well-developed and well-nourished. He is not diaphoretic. No distress.   HENT:   Head: Normocephalic and atraumatic.   Eyes: EOM are normal.   Neck: Neck supple.   Normal range of motion.  Cardiovascular:  Normal rate, regular rhythm, normal heart sounds and intact distal pulses.           Pulmonary/Chest: Breath sounds normal. No respiratory distress.    Musculoskeletal:      Cervical back: Normal range of motion and neck supple.     Neurological: He is alert and oriented to person, place, and time. He has normal strength.   Vision-normal  Neglect-normal  Aphasia - normal  Pronator drift - normal  Cerebellum - normal   Skin: Skin is warm and dry.   Psychiatric: He has a normal mood and affect. His behavior is normal. Judgment and thought content normal.       ED Course   Procedures  Labs Reviewed   CBC W/ AUTO DIFFERENTIAL - Abnormal; Notable for the following components:       Result Value    RBC 2.85 (*)     Hemoglobin 9.4 (*)     Hematocrit 29.2 (*)      (*)     MCH 33.0 (*)     RDW 15.4 (*)     Platelets 116 (*)     All other components within normal limits   COMPREHENSIVE METABOLIC PANEL - Abnormal; Notable for the following components:    Chloride 116 (*)     CO2 19 (*)     Calcium 6.3 (*)     Total Protein 4.3 (*)     Albumin 2.4 (*)     Alkaline Phosphatase 45 (*)     Anion Gap 3 (*)     All other components within normal limits    Narrative:     Ca critical result(s) repeated. Called and verbal readback obtained   from Josr Fagan RN/ED by KENDAL 10/05/2022 13:51   URINALYSIS, REFLEX TO URINE CULTURE - Abnormal; Notable for the following components:    Protein, UA 2+ (*)     All other components within normal limits    Narrative:     Specimen Source->Urine   PROTIME-INR - Abnormal; Notable for the following components:    PT 15.5 (*)     All other components within normal limits   B-TYPE NATRIURETIC PEPTIDE - Abnormal; Notable for the following components:     (*)     All other components within normal limits   MAGNESIUM - Abnormal; Notable for the following components:    Magnesium 1.4 (*)     All other components within normal limits   URINALYSIS MICROSCOPIC - Abnormal; Notable for the following components:    Hyaline Casts, UA 7 (*)     All other components within normal limits    Narrative:     Specimen Source->Urine   B-TYPE  NATRIURETIC PEPTIDE   MAGNESIUM   PROTIME-INR   TROPONIN I   TROPONIN I   POCT GLUCOSE MONITORING CONTINUOUS        ECG Results              EKG and show to ED MD (In process)  Result time 10/05/22 12:02:11      In process by Interface, Lab In [facility] (10/05/22 12:02:11)                   Narrative:    Test Reason : R55,    Vent. Rate : 074 BPM     Atrial Rate : 074 BPM     P-R Int : 212 ms          QRS Dur : 090 ms      QT Int : 464 ms       P-R-T Axes : 019 041 015 degrees     QTc Int : 515 ms    Atrial-paced rhythm with prolonged AV conduction with frequent AV  dual-paced complexes  Nonspecific ST abnormality  Prolonged QT  Abnormal ECG  When compared with ECG of 06-SEP-2022 06:24,  Electronic ventricular pacemaker has replaced Sinus rhythm    Referred By: [email or web address]   SELF           Confirmed By:                                   Imaging Results              CT Head Without Contrast (Final result)  Result time 10/05/22 13:07:41      Final result by Francisco Arroyo MD (10/05/22 13:07:41)                   Narrative:    CMS MANDATED QUALITY DATA - CT RADIATION - 436    All CT scans at this facility utilize dose modulation, iterative reconstruction, and/or weight based dosing when appropriate to reduce radiation dose to as low as reasonably achievable.          Reason: Mental status change, unknown cause    TECHNIQUE: Head CT without IV contrast.    COMPARISON: 9/5/2022    FINDINGS:  Gray-white differentiation is maintained without hemorrhage, midline shift, or mass effect.  Previous subdural hematoma along the falx and right tentorium has resolved. Previous extra-axial hemorrhage overlying frontal lobes has resolved. Previous intraventricular hemorrhage within occipital horns of lateral ventricles shows resolution of the left and persistence in right occipital horn posteriorly.    Hypodensities affecting the corona radiata bilaterally, unchanged suggesting old lacunar infarcts. Chronic small vessel ischemic changes  affecting the periventricular white matter bilaterally unchanged. Cerebral and cerebellar atrophy unchanged.    The ventricles and cisterns are maintained.    Calvarium is intact. Visualized sinuses are clear.    IMPRESSION:    1. New or persistent hyperdense hemorrhage layering dependently in the occipital horn of right lateral ventricle.  2. Resolution of prior extra-axial hemorrhage as discussed above, including resolved hemorrhage previously in the dependent portion of left lateral ventricle occipital horn.  3. Chronic small vessel ischemic changes including old lacunar infarcts in corona radiata bilaterally.    Electronically signed by:  Francisco Arroyo MD  10/5/2022 1:07 PM CDT Workstation: [id number]                                     X-ray Chest AP Portable (Final result)  Result time 10/05/22 12:46:36      Final result by Francisco Arroyo MD (10/05/22 12:46:36)                   Narrative:    Reason: Loss of Consciousness    FINDINGS:  Portable chest at 1214 compared with 9/4/2022 shows unchanged slightly enlarged cardiac silhouette size with normal mediastinal contours. Portable cardiac monitor projecting over left chest and dual lead left transvenous pacer unchanged.    Lung volumes are low. Patchy alveolar opacities are new in right lower lung zone. Left lung is clear. Pulmonary vasculature is normal. No acute osseous abnormality.    IMPRESSION:  New patchy right basilar opacities either reflecting atelectasis or consolidation.    Electronically signed by:  Francisco Arroyo MD  10/5/2022 12:46 PM CDT Workstation: [id number]                                     Medications   sodium chloride 0.9% flush 10 mL (has no administration in time range)   magnesium sulfate in dextrose IVPB (premix) 1 g (has no administration in time range)   niCARdipine 40 mg/200 mL (0.2 mg/mL) infusion (has no administration in time range)   calcium gluconate 1 g in NS IVPB (premixed) (1 g Intravenous New Bag 10/5/22 1416)     Medical  Decision Making:   History:   Old Medical Records: I decided to obtain old medical records.  Old Records Summarized: records from previous admission(s).  Initial Assessment:   No acute distress  Differential Diagnosis:   Considerations include but are not limited to electrolyte abnormalities, dysrhythmia, acute kidney injury, dehydration, intracranial hemorrhage  Independently Interpreted Test(s):   I have ordered and independently interpreted EKG Reading(s) - see summary below       <> Summary of EKG Reading(s): EKG is with multiple PVCs, atrial paced, no ST elevation  Clinical Tests:   Lab Tests: Ordered and Reviewed  Radiological Study: Reviewed and Ordered  Medical Tests: Reviewed and Ordered  ED Management:  In the emergency department patient found to have low calcium but sonata mass with low albumin.  He was given calcium replacement therapy in the emergency department.  His head CT shows a persistent area of hypoattenuation.  This was discussed with the neurosurgeon on-call Dr. Braun who recommended observation and repeat head CT in 6 hours.  She did recommend patient will not need the ICU.  Patient remains clinically stable with a normal neurological exam.  She remains awake alert.  His pacemaker is still pending interrogation as we are waiting for the rep to come to the hospital.  He has been consulted to Hospital Medicine  for admission.  He does remain stable.    During patient's ER stay he became hypertensive Hospital Medicine requesting Cardene drip.                        Clinical Impression:   Final diagnoses:  [R55] Syncope  [E83.51] Hypocalcemia (Primary)        ED Disposition Condition    Admit Stable                Vladimir Lion MD  10/05/22 1507       Vladimir Lion MD  10/05/22 1514

## 2022-10-05 NOTE — ED NOTES
Bed: 01A Trauma  Expected date:   Expected time:   Means of arrival:   Comments:  EMS- Syncope/Hypotension/PVCs

## 2022-10-05 NOTE — H&P
[facility]  Hospital Medicine  History & Physical    Patient Name: Gene Hartman  MRN: [id number]  Patient Class: IP- Inpatient  Admission Date: 10/5/2022  Attending Physician: Carlos Iniguez MD  Primary Care Provider: Mariajose Thorne MD         Patient information was obtained from patient, spouse/SO, relative(s), past medical records and ER records   Discussed with ED physician.     Subjective:     Principal Problem:Intraventricular hemorrhage    Chief Complaint:   Chief Complaint   Patient presents with    Loss of Consciousness     Syncopal episode witness by family, initial Bp60s/40s, 500ml NS given by EMS PTA, PVC's noted on EMS EKG        HPI: 88-year-old with a past medical history significant for recent hospitalization for subdural and ICH:  On 09/07/2022.  Reported to be resolving on outpatient follow-up a week or so ago.  He presents today with episode of loss of consciousness while sitting at the table earlier today.  He was unconscious for monitor to.  Per family, he also had a fall at 3 in the morning 4 days prior to this presentation- unknown whether he hit his head.  Past medical history significant for hypertension, lacunar infarct, prostate cancer, hyperlipidemia, CAD, history of MI, hypothyroidism, carotid artery stenosis, vertebral artery stenosis, Parkinson's and dementia.    After last admission plan was for him to discontinue aspirin and restart his Plavix 7 days after his discharge.  He was also discontinue sotalol.  Based on review of medications it appears that the family has followed these instructions.    Per EMS report, patient was hypotensive into the 70s systolic.  During emergency department stay, his blood pressure was in the 110s to 1 teens systolic and orthostatics blood pressure were negative.  At time of my evaluation, blood pressure is noted to be in the 200s to 210 systolic on multiple psych: Some of the blood pressure cuff.  Cardene drip  was started.      ED evaluation otherwise significant for stable anemia with hemoglobin 9 4.  Thrombocytopenia with platelets 116.  Creatinine 1.1.  Magnesium 1.4.  This was repleted in the emergency department.  Albumin 2.4.  .  Troponins negative x1.    Corrected calcium 7.6.  Repeat patient received dose of calcium gluconate in ED.    CT of the head significant for persistent hemorrhagic right lateral ventricle persistent versus new.      Chest x-ray-new patchy right basilar opacity atelectasis versus consolidation.          Past Medical History:   Diagnosis Date    Abnormal echocardiogram 11/21/2019    Normal left ventricular systolic function with estimated ejection fraction of 60%.  Grade 2 moderate left ventricular diastolic dysfunction consistent with pseudonormalization.  Mild left atrial enlargement.  Mild aortic regurgitation.  Mild to moderate mitral regurgitation.  Mild tricuspid regurgitation.  Estimated PA systolic pressure is 38 mm of mercury.  Diagnosis is syncope.    Anxiety     Arthritis     CAD (coronary artery disease) age 68    Carotid artery occlusion     Cerebrovascular small vessel disease     Colon polyps age 78    Coronary artery disease of native artery of native heart with stable angina pectoris 5/6/2020    GERD (gastroesophageal reflux disease)     H. pylori infection     HTN (hypertension), benign age 65    Hyperlipidemia LDL goal <70 age 65    Hypothyroidism     Multiple fractures of ribs of right side 11/27/2012    Myocardial infarction     Pernicious anemia 1/26/2018    Primary insomnia 10/9/2018    Prostate cancer age 67    Subdural hematoma 9/5/2022    Syncopal episodes 3/2013    Urge incontinence 5/8/2018       Past Surgical History:   Procedure Laterality Date    CARDIAC PACEMAKER PLACEMENT  10/2015    [employer]emaker    CARDIAC SURGERY      CATARACT EXTRACTION W/  INTRAOCULAR LENS IMPLANT Bilateral     COLONOSCOPY  ~2013    Dr. Edge     COLONOSCOPY N/A 06/08/2016    Procedure: COLONOSCOPY;  Surgeon: Shamar Barahona MD;  Location: [facility];  Service: Endoscopy;  Laterality: N/A; REPEAT IN 1 YEAR    CORONARY ANGIOPLASTY WITH STENT PLACEMENT  2003    x 2 RCA    PROSTATECTOMY  2002    UPPER GASTROINTESTINAL ENDOSCOPY  11/15/2016    Dr. Barahona       Review of patient's allergies indicates:   Allergen Reactions    Iodinated contrast media Rash    Pontocaine [tetracaine hcl] Anaphylaxis     hypotension       No current facility-administered medications on file prior to encounter.     Current Outpatient Medications on File Prior to Encounter   Medication Sig    acetaminophen (TYLENOL ARTHRITIS PAIN ORAL) Take 2 tablets by mouth 2 (two) times a day.    amiodarone (PACERONE) 200 MG Tab Take 200 mg by mouth 2 (two) times daily.    atorvastatin (LIPITOR) 40 MG tablet Take 1 tablet (40 mg total) by mouth once daily.    cetirizine (ZYRTEC) 10 MG tablet Take 1 tablet (10 mg total) by mouth once daily.    clopidogrel (PLAVIX) 75 mg tablet Take 1 tablet (75 mg total) by mouth once daily.    cyanocobalamin, vitamin B-12, (VITAMIN B-12) 5,000 mcg Subl Place 1 tablet under the tongue once daily.    cycloSPORINE (RESTASIS) 0.05 % ophthalmic emulsion Apply 1 drop to eye 2 (two) times daily.    fluticasone propionate (FLONASE) 50 mcg/actuation nasal spray USE 1 SPRAY IN EACH NOSTRIL TWICE DAILY (Patient taking differently: 1 spray by Each Nostril route 2 (two) times a day.)    levothyroxine (SYNTHROID) 75 MCG tablet Take 75 mcg by mouth before breakfast.    losartan (COZAAR) 50 MG tablet Take 1 tablet (50 mg total) by mouth 2 (two) times daily.    melatonin 5 mg Subl Take 2 tablets by mouth nightly as needed.    metoprolol succinate (TOPROL-XL) 50 MG 24 hr tablet Take 1 tablet (50 mg total) by mouth 2 (two) times daily.    mirtazapine (REMERON) 15 MG tablet Take 15 mg by mouth every evening.    nitroGLYCERIN (NITROSTAT) 0.4 MG SL tablet Place 0.4  mg under the tongue every 5 (five) minutes as needed.    pantoprazole (PROTONIX) 40 MG tablet Take 40 mg by mouth once daily.    tamsulosin (FLOMAX) 0.4 mg Cap Take 1 capsule by mouth once daily.    carbidopa-levodopa  mg (SINEMET)  mg per tablet Take 1 tablet by mouth 3 (three) times daily.    cloNIDine (CATAPRES) 0.1 MG tablet Take 1 tablet (0.1 mg total) by mouth 2 (two) times daily as needed (SBP greater than 160).     Family History       Problem Relation (Age of Onset)    Diabetes Son    Heart disease Father (56), Brother, Brother    Heart failure Father, Brother    Hypertension Son    Mental illness Brother    Migraines Mother    No Known Problems Son          Tobacco Use    Smoking status: Never    Smokeless tobacco: Never   Substance and Sexual Activity    Alcohol use: No    Drug use: No    Sexual activity: Not Currently     Review of Systems  All systems normal except as indicated HPI    Objective:     Vital Signs (Most Recent):  Temp: 97.6 °F (36.4 °C) (10/05/22 1148)  Pulse: 81 (10/05/22 1359)  Resp: 18 (10/05/22 1139)  BP: (!) 154/66 (10/05/22 1359)  SpO2: 98 % (10/05/22 1359)   Vital Signs (24h Range):  Temp:  [97.6 °F (36.4 °C)] 97.6 °F (36.4 °C)  Pulse:  [57-81] 81  Resp:  [18] 18  SpO2:  [98 %] 98 %  BP: (154-175)/(66-76) 154/66     Weight: 67.5 kg (148 lb 13 oz)  Body mass index is 29.06 kg/m².    Physical Exam   General:  Alert and oriented x4.  No acute distress  HEENT:  Normocephalic  Cardiovascular:  Regular rate and rhythm, no murmurs rubs or gallops.  No lower extremity edema.  Pulmonary:  Clear to auscultation bilaterally  Abdomen:  Soft, nontender, nondistended.  No guarding.  No rebound.  Negative Wiseman's.  Positive bowel sounds.  Extremity:  Moves all extremities equally.  Dermatology:  No rashes appreciated on exposed skin  Psychiatric:  Normal affect  CNS:  Strength 5/5 bilateral upper and lower extremities.    Cranial nerves well with the exception of some labial  flattening which is reported to be chronic by family.  Sensation to light touch grossly intact.     Significant Labs: All pertinent labs within the past 24 hours have been reviewed.  Recent Lab Results         10/05/22  1342   10/05/22  1134        Albumin   2.4       Alkaline Phosphatase   45       ALT   19  Comment: CORRECTED RESULT; previously reported as <5 on 10/05/2022 at 13:51.  [C]       Anion Gap   3       Appearance, UA Clear         AST   18       Bacteria, UA Negative         Baso #   0.01       Basophil %   0.2       Bilirubin (UA) Negative         BILIRUBIN TOTAL   0.6  Comment: For infants and newborns, interpretation of results should be based  on gestational age, weight and in agreement with clinical  observations.    Premature Infant recommended reference ranges:  Up to 24 hours.............<8.0 mg/dL  Up to 48 hours............<12.0 mg/dL  3-5 days..................<15.0 mg/dL  6-29 days.................<15.0 mg/dL         BNP   351  Comment: Values of less than 100 pg/ml are consistent with non-CHF populations.       BUN   23       Calcium   6.3  Comment: Ca critical result(s) repeated. Called and verbal readback obtained   from Josr Fagan RN/ED by KENDAL 10/05/2022 13:51         Chloride   116       CO2   19       Color, UA Yellow         Creatinine   1.1       Differential Method   Automated       eGFR   >60.0       Eos #   0.0       Eosinophil %   0.4       Glucose   101       Glucose, UA Negative         Gran # (ANC)   2.8       Gran %   60.0       Hematocrit   29.2       Hemoglobin   9.4       Hyaline Casts, UA 7         Immature Grans (Abs)   0.01  Comment: Mild elevation in immature granulocytes is non specific and   can be seen in a variety of conditions including stress response,   acute inflammation, trauma and pregnancy. Correlation with other   laboratory and clinical findings is essential.         Immature Granulocytes   0.2       INR   1.3  Comment: Coumadin Therapy:  INR: 2.0-3.0  conventional anticoagulation    INR: 2.5-3.5 intensive anticoagulation         Ketones, UA Negative         Leukocytes, UA Negative         Lymph #   1.5       Lymph %   32.3       Magnesium   1.4       MCH   33.0       MCHC   32.2       MCV   103       Microscopic Comment SEE COMMENT  Comment: Other formed elements not mentioned in the report are not   present in the microscopic examination.            Mono #   0.3       Mono %   6.9       MPV   11.0       NITRITE UA Negative         nRBC   0       Occult Blood UA Negative         pH, UA 6.0         Platelets   116       Potassium   3.5       PROTEIN TOTAL   4.3       Protein, UA 2+  Comment: Recommend a 24 hour urine protein or a urine   protein/creatinine ratio if globulin induced proteinuria is  clinically suspected.           PT   15.5       RBC   2.85       RBC, UA 1         RDW   15.4       Sodium   138       Specific Gravity, UA 1.030         Specimen UA Urine, Catheterized         Squam Epithel, UA 2         Troponin I   <0.030       UROBILINOGEN UA Negative         WBC, UA 2         WBC   4.64                [C] - Corrected Result               Significant Imaging: I have reviewed all pertinent imaging results/findings within the past 24 hours.    Imaging Results              CT Head Without Contrast (Final result)  Result time 10/05/22 13:07:41      Final result by Francisco Arroyo MD (10/05/22 13:07:41)                   Narrative:    CMS MANDATED QUALITY DATA - CT RADIATION - 436    All CT scans at this facility utilize dose modulation, iterative reconstruction, and/or weight based dosing when appropriate to reduce radiation dose to as low as reasonably achievable.          Reason: Mental status change, unknown cause    TECHNIQUE: Head CT without IV contrast.    COMPARISON: 9/5/2022    FINDINGS:  Gray-white differentiation is maintained without hemorrhage, midline shift, or mass effect.  Previous subdural hematoma along the falx and right tentorium has  resolved. Previous extra-axial hemorrhage overlying frontal lobes has resolved. Previous intraventricular hemorrhage within occipital horns of lateral ventricles shows resolution of the left and persistence in right occipital horn posteriorly.    Hypodensities affecting the corona radiata bilaterally, unchanged suggesting old lacunar infarcts. Chronic small vessel ischemic changes affecting the periventricular white matter bilaterally unchanged. Cerebral and cerebellar atrophy unchanged.    The ventricles and cisterns are maintained.    Calvarium is intact. Visualized sinuses are clear.    IMPRESSION:    1. New or persistent hyperdense hemorrhage layering dependently in the occipital horn of right lateral ventricle.  2. Resolution of prior extra-axial hemorrhage as discussed above, including resolved hemorrhage previously in the dependent portion of left lateral ventricle occipital horn.  3. Chronic small vessel ischemic changes including old lacunar infarcts in corona radiata bilaterally.    Electronically signed by:  Francisco Arroyo MD  10/5/2022 1:07 PM CDT Workstation: [id number]                                     X-ray Chest AP Portable (Final result)  Result time 10/05/22 12:46:36      Final result by Francisco Arroyo MD (10/05/22 12:46:36)                   Narrative:    Reason: Loss of Consciousness    FINDINGS:  Portable chest at 1214 compared with 9/4/2022 shows unchanged slightly enlarged cardiac silhouette size with normal mediastinal contours. Portable cardiac monitor projecting over left chest and dual lead left transvenous pacer unchanged.    Lung volumes are low. Patchy alveolar opacities are new in right lower lung zone. Left lung is clear. Pulmonary vasculature is normal. No acute osseous abnormality.    IMPRESSION:  New patchy right basilar opacities either reflecting atelectasis or consolidation.    Electronically signed by:  Francisco Arroyo MD  10/5/2022 12:46 PM CDT Workstation: [id number]                                       Assessment/Plan:     No notes have been filed under this hospital service.  Service: Hospital Medicine    VTE Risk Mitigation (From admission, onward)    None           Active Hospital Problems    Diagnosis  POA    *Intraventricular hemorrhage [I61.5]  Yes     Priority: 1 - High    Hypomagnesemia [E83.42]  Yes    Hypocalcemia [E83.51]  Yes    Vasovagal syncope [R55]  Yes    Hypothyroidism [E03.9]  Yes    HTN (hypertension), benign [I10]  Yes    CAD (coronary artery disease) [I25.10]  Yes      Resolved Hospital Problems   No resolved problems to display.     1. Intraventricular hemorrhage   2. Hypertensive emergency   Start Cardene drip-target systolic blood pressure less 150  Admit to ICU  Neurosurgery consulted  Follow-up CT at 7:00 p.m.-6 hours after initial CT  Hold blood thinners    3. Hypomagnesemia  Replete and follow    4. Hypocalcemia   Replete and follow    5. Hypertension   Home blood pressure medicines plus Cardene drip as above    6. Atrial fibrillation  Continue beta-blocker and amiodarone    7. Syncope   Vasovagal versus underlying intracranial hemorrhage  Cardiology familiar with patient.  Consult Cardiology.  Interrogate Pacemaker        Carlos Iniguez MD  Department of Hospital Medicine   Former[facility] - Emergency Dept

## 2022-10-06 LAB
ANION GAP SERPL CALC-SCNC: 8 MMOL/L (ref 8–16)
BASOPHILS # BLD AUTO: 0.01 K/UL (ref 0–0.2)
BASOPHILS NFR BLD: 0.1 % (ref 0–1.9)
BUN SERPL-MCNC: 21 MG/DL (ref 8–23)
CALCIUM SERPL-MCNC: 9 MG/DL (ref 8.7–10.5)
CHLORIDE SERPL-SCNC: 108 MMOL/L (ref 95–110)
CO2 SERPL-SCNC: 23 MMOL/L (ref 23–29)
CREAT SERPL-MCNC: 1.2 MG/DL (ref 0.5–1.4)
DIFFERENTIAL METHOD: ABNORMAL
EOSINOPHIL # BLD AUTO: 0 K/UL (ref 0–0.5)
EOSINOPHIL NFR BLD: 0.1 % (ref 0–8)
ERYTHROCYTE [DISTWIDTH] IN BLOOD BY AUTOMATED COUNT: 15.5 % (ref 11.5–14.5)
EST. GFR  (NO RACE VARIABLE): 58.2 ML/MIN/1.73 M^2
GLUCOSE SERPL-MCNC: 101 MG/DL (ref 70–110)
HCT VFR BLD AUTO: 36.3 % (ref 40–54)
HGB BLD-MCNC: 12.2 G/DL (ref 14–18)
IMM GRANULOCYTES # BLD AUTO: 0.05 K/UL (ref 0–0.04)
IMM GRANULOCYTES NFR BLD AUTO: 0.7 % (ref 0–0.5)
LYMPHOCYTES # BLD AUTO: 2.1 K/UL (ref 1–4.8)
LYMPHOCYTES NFR BLD: 30.3 % (ref 18–48)
MAGNESIUM SERPL-MCNC: 2.4 MG/DL (ref 1.6–2.6)
MCH RBC QN AUTO: 33.6 PG (ref 27–31)
MCHC RBC AUTO-ENTMCNC: 33.6 G/DL (ref 32–36)
MCV RBC AUTO: 100 FL (ref 82–98)
MONOCYTES # BLD AUTO: 0.5 K/UL (ref 0.3–1)
MONOCYTES NFR BLD: 7.4 % (ref 4–15)
NEUTROPHILS # BLD AUTO: 4.3 K/UL (ref 1.8–7.7)
NEUTROPHILS NFR BLD: 61.4 % (ref 38–73)
NRBC BLD-RTO: 0 /100 WBC
PHOSPHATE SERPL-MCNC: 3.3 MG/DL (ref 2.7–4.5)
PLATELET # BLD AUTO: 167 K/UL (ref 150–450)
PMV BLD AUTO: 11.3 FL (ref 9.2–12.9)
POTASSIUM SERPL-SCNC: 5.1 MMOL/L (ref 3.5–5.1)
RBC # BLD AUTO: 3.63 M/UL (ref 4.6–6.2)
SODIUM SERPL-SCNC: 139 MMOL/L (ref 136–145)
TROPONIN I SERPL DL<=0.01 NG/ML-MCNC: <0.03 NG/ML
WBC # BLD AUTO: 7.06 K/UL (ref 3.9–12.7)

## 2022-10-06 PROCEDURE — 36415 COLL VENOUS BLD VENIPUNCTURE: CPT | Performed by: FAMILY MEDICINE

## 2022-10-06 PROCEDURE — 80048 BASIC METABOLIC PNL TOTAL CA: CPT | Performed by: FAMILY MEDICINE

## 2022-10-06 PROCEDURE — 84484 ASSAY OF TROPONIN QUANT: CPT | Performed by: FAMILY MEDICINE

## 2022-10-06 PROCEDURE — 85025 COMPLETE CBC W/AUTO DIFF WBC: CPT | Performed by: FAMILY MEDICINE

## 2022-10-06 PROCEDURE — 25000003 PHARM REV CODE 250

## 2022-10-06 PROCEDURE — 94761 N-INVAS EAR/PLS OXIMETRY MLT: CPT

## 2022-10-06 PROCEDURE — 84100 ASSAY OF PHOSPHORUS: CPT | Performed by: FAMILY MEDICINE

## 2022-10-06 PROCEDURE — 21400001 HC TELEMETRY ROOM

## 2022-10-06 PROCEDURE — 51798 US URINE CAPACITY MEASURE: CPT

## 2022-10-06 PROCEDURE — 25000003 PHARM REV CODE 250: Performed by: FAMILY MEDICINE

## 2022-10-06 PROCEDURE — 83735 ASSAY OF MAGNESIUM: CPT | Performed by: FAMILY MEDICINE

## 2022-10-06 RX ORDER — CHLORHEXIDINE GLUCONATE ORAL RINSE 1.2 MG/ML
15 SOLUTION DENTAL 2 TIMES DAILY
Status: DISCONTINUED | OUTPATIENT
Start: 2022-10-06 | End: 2022-10-07 | Stop reason: HOSPADM

## 2022-10-06 RX ORDER — MUPIROCIN 20 MG/G
OINTMENT TOPICAL 2 TIMES DAILY
Status: DISCONTINUED | OUTPATIENT
Start: 2022-10-06 | End: 2022-10-07 | Stop reason: HOSPADM

## 2022-10-06 RX ADMIN — AMIODARONE HYDROCHLORIDE 200 MG: 200 TABLET ORAL at 08:10

## 2022-10-06 RX ADMIN — LEVOTHYROXINE SODIUM 75 MCG: 0.03 TABLET ORAL at 06:10

## 2022-10-06 RX ADMIN — MUPIROCIN 1 G: 20 OINTMENT TOPICAL at 08:10

## 2022-10-06 RX ADMIN — SENNOSIDES AND DOCUSATE SODIUM 1 TABLET: 8.6; 5 TABLET ORAL at 08:10

## 2022-10-06 RX ADMIN — LOSARTAN POTASSIUM 50 MG: 50 TABLET, FILM COATED ORAL at 08:10

## 2022-10-06 RX ADMIN — HYDROCODONE BITARTRATE AND ACETAMINOPHEN 1 TABLET: 5; 325 TABLET ORAL at 03:10

## 2022-10-06 RX ADMIN — ALUMINA, MAGNESIA, AND SIMETHICONE ORAL SUSPENSION REGULAR STRENGTH 30 ML: 1200; 1200; 120 SUSPENSION ORAL at 10:10

## 2022-10-06 RX ADMIN — METOPROLOL SUCCINATE 50 MG: 50 TABLET, FILM COATED, EXTENDED RELEASE ORAL at 08:10

## 2022-10-06 RX ADMIN — TAMSULOSIN HYDROCHLORIDE 0.4 MG: 0.4 CAPSULE ORAL at 08:10

## 2022-10-06 RX ADMIN — ALUMINA, MAGNESIA, AND SIMETHICONE ORAL SUSPENSION REGULAR STRENGTH 30 ML: 1200; 1200; 120 SUSPENSION ORAL at 05:10

## 2022-10-06 RX ADMIN — CHLORHEXIDINE GLUCONATE 15 ML: 1.2 RINSE ORAL at 08:10

## 2022-10-06 RX ADMIN — ATORVASTATIN CALCIUM 40 MG: 40 TABLET, FILM COATED ORAL at 08:10

## 2022-10-06 RX ADMIN — PANTOPRAZOLE SODIUM 40 MG: 40 TABLET, DELAYED RELEASE ORAL at 06:10

## 2022-10-06 RX ADMIN — MIRTAZAPINE 15 MG: 15 TABLET, FILM COATED ORAL at 08:10

## 2022-10-06 NOTE — PLAN OF CARE
Patient resting in bed. Complains of occasional neck pain but I am told this is chronic. He has no focal neurologic deficits, moving all extremities. Orientated x1-2 at baseline, dementia. Is alert. Complains of some anxiety, reassured.    Titrating cardene for blood pressure control. Stopped now per MD and will adjust oral medications. Order obtained for diet d/t no need for surgical intervention. Dentures were removed and placed in labeled container, cleaned.    Voiding small amounts per urinal when he realizes he has the urge, clear yellow, but otherwise is incontinent. Pericare done.    The bed is low and alarm on. Clinical alarms reviewed at start of shift and armed. Monitor strip reviewed, Is A-pacing with occasional PVCs. He is saturating well 97% on room air.    Unsure of last BM (charted 10/4), so bowel regimen given.    [employer] pac[employer] ([phone] / [phone]) was called previously, awaiting their response. Apparently there is only one representative for the entire [place] and there have been problems getting his pacemaker interrogated in the past.    ...    [employer] representative [employer] [phone] called back and said the soonest they could do the interrogation is Monday. Notified cardiology group, they will arrange for outpatient.    Patient was placed in chair. Tolerated, but was unable to transfer on his own. With generalized weakness / shakiness.    Blood pressure remains within limits while off cardene drip.    ...    Report given and patient transferred to [id number] with all his belongings, family at bedside.

## 2022-10-06 NOTE — CONSULTS
[facility]   Cardiology Note    Consult Requested By: hospital medicine  Reason for Consult: hypertensive emergency, syncope    SUBJECTIVE:     History of Present Illness: Pt is a 87 yo male with PMHx of CAD, carotid disease, dementia, hypertension, hyperlipidemia, hypothyroidism, and syncope who presented to the ED yesterday via EMS after a syncopal event at home. Family reported he had an episode of loss of consciousness while sitting at the table. He also had 2 falls a few days prior to this event. They are unsure if he hit his head. He was in the hospital in September for subdural and ICH. Upon arrival his BP was 60/40s. However in the ED his blood pressure was 200s systolic. He was started on a cardene gtt. EKG showed no ischemic changes, troponins were negative. . Anemia stable. Cr 1.1. Magnesium was 1.4, now 2.4. Calcium was 6.3, now 9. CT head showed small interventricular hemorrhage. This morning  patient is sleeping in bed, there are no family members at bedside. BP is much improved, 116/70s in the room.     Review of patient's allergies indicates:   Allergen Reactions    Iodinated contrast media Rash    Pontocaine [tetracaine hcl] Anaphylaxis     hypotension       Past Medical History:   Diagnosis Date    Abnormal echocardiogram 11/21/2019    Normal left ventricular systolic function with estimated ejection fraction of 60%.  Grade 2 moderate left ventricular diastolic dysfunction consistent with pseudonormalization.  Mild left atrial enlargement.  Mild aortic regurgitation.  Mild to moderate mitral regurgitation.  Mild tricuspid regurgitation.  Estimated PA systolic pressure is 38 mm of mercury.  Diagnosis is syncope.    Anxiety     Arthritis     CAD (coronary artery disease) age 68    Carotid artery occlusion     Cerebrovascular small vessel disease     Colon polyps age 78    Coronary artery disease of native artery of native heart with stable angina pectoris 5/6/2020    GERD  (gastroesophageal reflux disease)     H. pylori infection     HTN (hypertension), benign age 65    Hyperlipidemia LDL goal <70 age 65    Hypothyroidism     Multiple fractures of ribs of right side 11/27/2012    Myocardial infarction     Pernicious anemia 1/26/2018    Primary insomnia 10/9/2018    Prostate cancer age 67    Subdural hematoma 9/5/2022    Syncopal episodes 3/2013    Urge incontinence 5/8/2018     Past Surgical History:   Procedure Laterality Date    CARDIAC PACEMAKER PLACEMENT  10/2015    [employer] Pacemaker    CARDIAC SURGERY      CATARACT EXTRACTION W/  INTRAOCULAR LENS IMPLANT Bilateral     COLONOSCOPY  ~2013    Dr. Edge    COLONOSCOPY N/A 06/08/2016    Procedure: COLONOSCOPY;  Surgeon: Shamar Barahona MD;  Location: [facility];  Service: Endoscopy;  Laterality: N/A; REPEAT IN 1 YEAR    CORONARY ANGIOPLASTY WITH STENT PLACEMENT  2003    x 2 RCA    PROSTATECTOMY  2002    UPPER GASTROINTESTINAL ENDOSCOPY  11/15/2016    Dr. Barahona     Family History   Problem Relation Age of Onset    Migraines Mother     Heart failure Father     Heart disease Father 56        MI    Heart failure Brother     Heart disease Brother     Diabetes Son     Hypertension Son     Heart disease Brother     Mental illness Brother     No Known Problems Son     Colon cancer Neg Hx     Colon polyps Neg Hx     Crohn's disease Neg Hx     Ulcerative colitis Neg Hx     Stomach cancer Neg Hx     Esophageal cancer Neg Hx      Social History     Tobacco Use    Smoking status: Never    Smokeless tobacco: Never   Substance Use Topics    Alcohol use: No    Drug use: No       Review of Systems:  Review of Systems   Constitutional:  Negative for diaphoresis and malaise/fatigue.   Cardiovascular:  Negative for chest pain, palpitations and leg swelling.   Neurological:  Positive for loss of consciousness.     OBJECTIVE:     Vital Signs (Most Recent)  Temp: 98.1 °F (36.7 °C) (10/06/22 0750)  Pulse: 71 (10/06/22 0800)  Resp: (!) 23 (10/06/22  0800)  BP: 126/77 (10/06/22 0800)  SpO2: 96 % (10/06/22 0800)    Vital Signs Range (Last 24H):  Temp:  [97.6 °F (36.4 °C)-98.7 °F (37.1 °C)]   Pulse:  [57-99]   Resp:  [11-43]   BP: ()/()   SpO2:  [80 %-99 %]     I & O (Last 24H):    Intake/Output Summary (Last 24 hours) at 10/6/2022 0922  Last data filed at 10/6/2022 0820  Gross per 24 hour   Intake 297.29 ml   Output 975 ml   Net -677.71 ml       Current Diet:     Current Diet Order   Procedures    Diet NPO        Allergies:  Review of patient's allergies indicates:   Allergen Reactions    Iodinated contrast media Rash    Pontocaine [tetracaine hcl] Anaphylaxis     hypotension       Meds:  Scheduled Meds:   amiodarone  200 mg Oral BID    atorvastatin  40 mg Oral Daily    chlorhexidine  15 mL Mouth/Throat BID    levothyroxine  75 mcg Oral Before breakfast    losartan  50 mg Oral BID    metoprolol succinate  50 mg Oral BID    mirtazapine  15 mg Oral QHS    mupirocin   Nasal BID    pantoprazole  40 mg Oral Daily    tamsulosin  1 capsule Oral Daily     Continuous Infusions:   niCARdipine 2 mg/hr (10/06/22 0819)     PRN Meds:acetaminophen, acetaminophen, aluminum-magnesium hydroxide-simethicone, calcium chloride IVPB, calcium chloride IVPB, calcium chloride IVPB, dextrose 10%, dextrose 10%, glucagon (human recombinant), glucose, glucose, HYDROcodone-acetaminophen, magnesium oxide, magnesium oxide, magnesium oxide, magnesium sulfate IVPB, magnesium sulfate IVPB, magnesium sulfate IVPB, magnesium sulfate IVPB, morphine, naloxone, ondansetron, potassium bicarbonate, potassium bicarbonate, potassium bicarbonate, potassium chloride in water, potassium chloride in water, potassium chloride in water, potassium chloride in water, potassium chloride, potassium chloride, potassium chloride, potassium chloride, prochlorperazine, senna-docusate 8.6-50 mg, sodium chloride 0.9%, sodium chloride 0.9%, sodium phosphate IVPB, sodium phosphate IVPB, sodium phosphate IVPB,  sodium phosphate IVPB, sodium phosphate IVPB    Oxygen/Ventilator Data (Last 24H):  (if applicable)        Hemodynamic Parameters (Last 24H):   (if applicable)        Laboratory and Radiology Data:  Recent Results (from the past 24 hour(s))   CBC auto differential    Collection Time: 10/05/22 11:34 AM   Result Value Ref Range    WBC 4.64 3.90 - 12.70 K/uL    RBC 2.85 (L) 4.60 - 6.20 M/uL    Hemoglobin 9.4 (L) 14.0 - 18.0 g/dL    Hematocrit 29.2 (L) 40.0 - 54.0 %     (H) 82 - 98 fL    MCH 33.0 (H) 27.0 - 31.0 pg    MCHC 32.2 32.0 - 36.0 g/dL    RDW 15.4 (H) 11.5 - 14.5 %    Platelets 116 (L) 150 - 450 K/uL    MPV 11.0 9.2 - 12.9 fL    Immature Granulocytes 0.2 0.0 - 0.5 %    Gran # (ANC) 2.8 1.8 - 7.7 K/uL    Immature Grans (Abs) 0.01 0.00 - 0.04 K/uL    Lymph # 1.5 1.0 - 4.8 K/uL    Mono # 0.3 0.3 - 1.0 K/uL    Eos # 0.0 0.0 - 0.5 K/uL    Baso # 0.01 0.00 - 0.20 K/uL    nRBC 0 0 /100 WBC    Gran % 60.0 38.0 - 73.0 %    Lymph % 32.3 18.0 - 48.0 %    Mono % 6.9 4.0 - 15.0 %    Eosinophil % 0.4 0.0 - 8.0 %    Basophil % 0.2 0.0 - 1.9 %    Differential Method Automated    Comprehensive metabolic panel    Collection Time: 10/05/22 11:34 AM   Result Value Ref Range    Sodium 138 136 - 145 mmol/L    Potassium 3.5 3.5 - 5.1 mmol/L    Chloride 116 (H) 95 - 110 mmol/L    CO2 19 (L) 23 - 29 mmol/L    Glucose 101 70 - 110 mg/dL    BUN 23 8 - 23 mg/dL    Creatinine 1.1 0.5 - 1.4 mg/dL    Calcium 6.3 (LL) 8.7 - 10.5 mg/dL    Total Protein 4.3 (L) 6.0 - 8.4 g/dL    Albumin 2.4 (L) 3.5 - 5.2 g/dL    Total Bilirubin 0.6 0.1 - 1.0 mg/dL    Alkaline Phosphatase 45 (L) 55 - 135 U/L    AST 18 10 - 40 U/L    ALT 19 10 - 44 U/L    Anion Gap 3 (L) 8 - 16 mmol/L    eGFR >60.0 >60 mL/min/1.73 m^2   Protime-INR    Collection Time: 10/05/22 11:34 AM   Result Value Ref Range    PT 15.5 (H) 11.4 - 13.7 sec    INR 1.3    BNP    Collection Time: 10/05/22 11:34 AM   Result Value Ref Range     (H) 0 - 99 pg/mL   Magnesium    Collection  Time: 10/05/22 11:34 AM   Result Value Ref Range    Magnesium 1.4 (L) 1.6 - 2.6 mg/dL   Troponin I    Collection Time: 10/05/22 11:34 AM   Result Value Ref Range    Troponin I <0.030 <=0.040 ng/mL   Urinalysis, Reflex to Urine Culture Urine, Catheterized    Collection Time: 10/05/22  1:42 PM    Specimen: Urine, Catheterized   Result Value Ref Range    Specimen UA Urine, Catheterized     Color, UA Yellow Yellow, Straw, Kaley    Appearance, UA Clear Clear    pH, UA 6.0 5.0 - 8.0    Specific Gravity, UA 1.030 1.005 - 1.030    Protein, UA 2+ (A) Negative    Glucose, UA Negative Negative    Ketones, UA Negative Negative    Bilirubin (UA) Negative Negative    Occult Blood UA Negative Negative    Nitrite, UA Negative Negative    Urobilinogen, UA Negative Negative EU/dL    Leukocytes, UA Negative Negative   Urinalysis Microscopic    Collection Time: 10/05/22  1:42 PM   Result Value Ref Range    RBC, UA 1 0 - 4 /hpf    WBC, UA 2 0 - 5 /hpf    Bacteria Negative None-Occ /hpf    Squam Epithel, UA 2 /hpf    Hyaline Casts, UA 7 (A) 0-1/lpf /lpf    Microscopic Comment SEE COMMENT    POCT glucose    Collection Time: 10/05/22  5:21 PM   Result Value Ref Range    POC Glucose 142 (H) 70 - 110   Troponin I    Collection Time: 10/05/22  5:37 PM   Result Value Ref Range    Troponin I <0.030 <=0.040 ng/mL   Troponin I    Collection Time: 10/06/22  3:28 AM   Result Value Ref Range    Troponin I <0.030 <=0.040 ng/mL   Basic Metabolic Panel (BMP)    Collection Time: 10/06/22  3:28 AM   Result Value Ref Range    Sodium 139 136 - 145 mmol/L    Potassium 5.1 3.5 - 5.1 mmol/L    Chloride 108 95 - 110 mmol/L    CO2 23 23 - 29 mmol/L    Glucose 101 70 - 110 mg/dL    BUN 21 8 - 23 mg/dL    Creatinine 1.2 0.5 - 1.4 mg/dL    Calcium 9.0 8.7 - 10.5 mg/dL    Anion Gap 8 8 - 16 mmol/L    eGFR 58.2 (A) >60 mL/min/1.73 m^2   Magnesium    Collection Time: 10/06/22  3:28 AM   Result Value Ref Range    Magnesium 2.4 1.6 - 2.6 mg/dL   Phosphorus     Collection Time: 10/06/22  3:28 AM   Result Value Ref Range    Phosphorus 3.3 2.7 - 4.5 mg/dL   CBC with Automated Differential    Collection Time: 10/06/22  3:28 AM   Result Value Ref Range    WBC 7.06 3.90 - 12.70 K/uL    RBC 3.63 (L) 4.60 - 6.20 M/uL    Hemoglobin 12.2 (L) 14.0 - 18.0 g/dL    Hematocrit 36.3 (L) 40.0 - 54.0 %     (H) 82 - 98 fL    MCH 33.6 (H) 27.0 - 31.0 pg    MCHC 33.6 32.0 - 36.0 g/dL    RDW 15.5 (H) 11.5 - 14.5 %    Platelets 167 150 - 450 K/uL    MPV 11.3 9.2 - 12.9 fL    Immature Granulocytes 0.7 (H) 0.0 - 0.5 %    Gran # (ANC) 4.3 1.8 - 7.7 K/uL    Immature Grans (Abs) 0.05 (H) 0.00 - 0.04 K/uL    Lymph # 2.1 1.0 - 4.8 K/uL    Mono # 0.5 0.3 - 1.0 K/uL    Eos # 0.0 0.0 - 0.5 K/uL    Baso # 0.01 0.00 - 0.20 K/uL    nRBC 0 0 /100 WBC    Gran % 61.4 38.0 - 73.0 %    Lymph % 30.3 18.0 - 48.0 %    Mono % 7.4 4.0 - 15.0 %    Eosinophil % 0.1 0.0 - 8.0 %    Basophil % 0.1 0.0 - 1.9 %    Differential Method Automated      Imaging Results              CT Head Without Contrast (Final result)  Result time 10/05/22 13:07:41      Final result by Francisco Arroyo MD (10/05/22 13:07:41)                   Narrative:    CMS MANDATED QUALITY DATA - CT RADIATION - 436    All CT scans at this facility utilize dose modulation, iterative reconstruction, and/or weight based dosing when appropriate to reduce radiation dose to as low as reasonably achievable.          Reason: Mental status change, unknown cause    TECHNIQUE: Head CT without IV contrast.    COMPARISON: 9/5/2022    FINDINGS:  Gray-white differentiation is maintained without hemorrhage, midline shift, or mass effect.  Previous subdural hematoma along the falx and right tentorium has resolved. Previous extra-axial hemorrhage overlying frontal lobes has resolved. Previous intraventricular hemorrhage within occipital horns of lateral ventricles shows resolution of the left and persistence in right occipital horn posteriorly.    Hypodensities  affecting the corona radiata bilaterally, unchanged suggesting old lacunar infarcts. Chronic small vessel ischemic changes affecting the periventricular white matter bilaterally unchanged. Cerebral and cerebellar atrophy unchanged.    The ventricles and cisterns are maintained.    Calvarium is intact. Visualized sinuses are clear.    IMPRESSION:    1. New or persistent hyperdense hemorrhage layering dependently in the occipital horn of right lateral ventricle.  2. Resolution of prior extra-axial hemorrhage as discussed above, including resolved hemorrhage previously in the dependent portion of left lateral ventricle occipital horn.  3. Chronic small vessel ischemic changes including old lacunar infarcts in corona radiata bilaterally.    Electronically signed by:  Francisco Arroyo MD  10/5/2022 1:07 PM CDT Workstation: [id number]                                     X-ray Chest AP Portable (Final result)  Result time 10/05/22 12:46:36      Final result by Francisco Arroyo MD (10/05/22 12:46:36)                   Narrative:    Reason: Loss of Consciousness    FINDINGS:  Portable chest at 1214 compared with 9/4/2022 shows unchanged slightly enlarged cardiac silhouette size with normal mediastinal contours. Portable cardiac monitor projecting over left chest and dual lead left transvenous pacer unchanged.    Lung volumes are low. Patchy alveolar opacities are new in right lower lung zone. Left lung is clear. Pulmonary vasculature is normal. No acute osseous abnormality.    IMPRESSION:  New patchy right basilar opacities either reflecting atelectasis or consolidation.    Electronically signed by:  Francisco Arroyo MD  10/5/2022 12:46 PM CDT Workstation: [id number]                                    12-lead EKG interpretation:  (if applicable)      Current Cardiac Rhythm:   (if applicable)    Physical Exam:   Physical Exam  Vitals and nursing note reviewed.   Constitutional:       General: He is not in acute distress.      Appearance: Normal appearance.   Cardiovascular:      Rate and Rhythm: Normal rate and regular rhythm.      Pulses: Normal pulses.      Heart sounds: Normal heart sounds.   Pulmonary:      Effort: Pulmonary effort is normal.      Breath sounds: Normal breath sounds.   Musculoskeletal:      Right lower leg: No edema.      Left lower leg: No edema.   Skin:     General: Skin is warm and dry.      Findings: No rash.   Neurological:      Mental Status: He is alert and oriented to person, place, and time.       ASSESSMENT/PLAN:   Assessment:   Syncope - patient was hypotensive when EMS arrived. EKG with no acute abnormalities, troponins negative,   Hypertension- on cardene gtt, losartan and toprol  Persistent IVH - neurology following, will repeat CT head and if stable can follow up outpatient. Plavix and ASA on hold until cleared by neurosurgery.   Hypomagnesemia - resolved  Hypocalcemia - resolved   CAD  Hyperlipidemia   S/p PM   Atrial Fibrillation - on BB and amiodarone     Plan:   Blood pressure has improved.  Stop cardene gtt and continue losartan and toprol.  PM interrogation in process. Contacted the ramirez rep, they cannot check his PM until Monday.   Continue to monitor tele.   Neurosurgery consulted. Appreciate recommendations and evaluation for possible neurologic causes of syncope. ASA/plavix on hold.      Thank you for your consult. We will continue to follow the patient.

## 2022-10-06 NOTE — PLAN OF CARE
[facility]  Initial Discharge Assessment       Primary Care Provider: Mariajose Thorne MD    Admission Diagnosis: Hypocalcemia [E83.51]    Admission Date: 10/5/2022  Expected Discharge Date:     Discharge Barriers Identified: None    Initial assessment completed at bedside with patient. Patient lives with son Gene and daughter in law Maria C. Patient is confused and dependent on family for all needs. Family provides 24/7 care according to Maria C. Patient is established with [facility]. Patient with resume home health at discharge.    Payor: HU[employer] MANAGED MEDICARE / Plan: [employer] MEDICARE HMO / Product Type: Capitation /     Extended Emergency Contact Information  Primary Emergency Contact: Gene Hartman Jr  Address: [address] [place] [id number] [place]  Mobile Phone: [phone]  Relation: Son  Preferred language: English   needed? No  Secondary Emergency Contact: Jeffery Hartman  Address: [place]   [place]  Mobile Phone: [phone]  Relation: Son  Preferred language: English   needed? No    Discharge Plan A: Home Health  Discharge Plan B: Home [facility] - [facility] [address]  [address] [facility] [address] [address]  Phone: [phone] Fax: [phone]    [id number] - [place] - [address] AT [facility] &   [address]  [place] [address]  Phone: [phone] Fax: [phone]    [facility] [id number] - [address]  [address]  [place] [address]  Phone: [phone] Fax: [phone]      Initial Assessment (most recent)       Adult Discharge Assessment - 10/06/22 1302          Discharge Assessment    Assessment Type Discharge Planning Assessment     Confirmed/corrected address, phone number and insurance Yes     Confirmed Demographics Correct on Facesheet     Source of Information  patient     When was your last doctors appointment? 09/29/22     Reason For Admission Hypocalcemia     Lives With child(ant), adult     Facility Arrived From: Home     Do you expect to return to your current living situation? Yes     Do you have help at home or someone to help you manage your care at home? Yes     Who are your caregiver(s) and their phone number(s)? Maria C (daughter in law, primary caregiver) [phone]     Walking or Climbing Stairs Difficulty ambulation difficulty, requires equipment     Mobility Management wheelchair     Dressing/Bathing Difficulty bathing difficulty, assistance 1 person     Do you have any problems with: Needs other help     Equipment Currently Used at Home wheelchair;walker, rolling     Readmission within 30 days? No   >30 days    Patient currently being followed by outpatient case management? Yes     If yes, name of outpatient case management following: insurance company assigned oupatient case management     Do you currently have service(s) that help you manage your care at home? Yes     Name and Contact number of agency [facility]     Is the pt/caregiver preference to resume services with current agency Yes     Do you take prescription medications? Yes     Do you have prescription coverage? Yes     Coverage [employer]     Do you have any problems affording any of your prescribed medications? Yes     Is the patient taking medications as prescribed? yes     Who is going to help you get home at discharge? Maria C (daughter in law, primary caregiver) [phone]     How do you get to doctors appointments? family or friend will provide     Are you on dialysis? No     Do you take coumadin? No     Discharge Plan A Home Health     Discharge Plan B Home Health     DME Needed Upon Discharge  none     Discharge Plan discussed with: Adult children;Patient     Discharge Barriers Identified None        Physical Activity    On average, how many days per week do you engage in  moderate to strenuous exercise (like a brisk walk)? 0 days     On average, how many minutes do you engage in exercise at this level? 0 min        Financial Resource Strain    How hard is it for you to pay for the very basics like food, housing, medical care, and heating? Not very hard        Housing Stability    In the last 12 months, was there a time when you were not able to pay the mortgage or rent on time? No     In the last 12 months, how many places have you lived? 1     In the last 12 months, was there a time when you did not have a steady place to sleep or slept in a shelter (including now)? No        Transportation Needs    In the past 12 months, has lack of transportation kept you from medical appointments or from getting medications? No     In the past 12 months, has lack of transportation kept you from meetings, work, or from getting things needed for daily living? No        Food Insecurity    Within the past 12 months, you worried that your food would run out before you got the money to buy more. Never true     Within the past 12 months, the food you bought just didn't last and you didn't have money to get more. Never true        Stress    Do you feel stress - tense, restless, nervous, or anxious, or unable to sleep at night because your mind is troubled all the time - these days? Not at all        Social Connections    In a typical week, how many times do you talk on the phone with family, friends, or neighbors? More than three times a week     How often do you get together with friends or relatives? More than three times a week     How often do you attend Hoahaoism or Amish services? Never     Do you belong to any clubs or organizations such as Hoahaoism groups, unions, fraternal or athletic groups, or school groups? No     How often do you attend meetings of the clubs or organizations you belong to? Never     Are you , , , , never , or living with a partner?          Alcohol Use    Q1: How often do you have a drink containing alcohol? Never     Q2: How many drinks containing alcohol do you have on a typical day when you are drinking? Patient does not drink     Q3: How often do you have six or more drinks on one occasion? Never        Relationship/Environment    Name(s) of Who Lives With Patient Maria C (daughter in law, primary caregiver) [phone]

## 2022-10-06 NOTE — PROGRESS NOTES
"[facility]  Progress Note    Patient name: Gene Hartman  MRN: [id number]  Admit Date: 10/5/2022   LOS: 1 day     SUBJECTIVE:     Principal problem: Intraventricular hemorrhage    Interval History:  Patient with PMHx Parkinsons, Dementia, recent falls, recent subdural and ICH 9/7/22 admitted with syncope while sitting down at the table.  EMS noted SBP in the 70s.  In the ED, he nael to SBP in the 200s. CT head revealed "New or persistent hyperdense hemorrhage layering dependently in the occipital horn of right lateral ventricle.  Resolution of prior extra-axial hemorrhage as discussed above, including resolved hemorrhage previously in the dependent portion of left lateral ventricle occipital horn."  He was admitted to the ICU on a cardene gtt for blood pressure control and Neurosurgery consultation. Evaluated by Neurosurgery who felt that CT findings represented improving hemorrhage from prior, not new.  Head CT repeated several hours later and demonstrated no worsening. Orthostatics were checked and were negative. No events on tele thus far.  Cardiology consulted and evaluated the patient.  Pacemaker interrogation was ordered but there is only one representative in the state and they are not able to evaluate him until 4-5 days from now- Cardiology will plan to arrange as an outpatient. Troponin has been trended and is normal. He is euglycemic with no significant electrolyte abnormalities. Echo done just recently on 9/6/22 and EF 45%, Grade I diastolic dysfunction, no significant valvular abnormalities.  I suspect this could be autonomic dysfunction from his Parkinson's.     Hospital Course:    Scheduled Meds:   amiodarone  200 mg Oral BID    atorvastatin  40 mg Oral Daily    chlorhexidine  15 mL Mouth/Throat BID    levothyroxine  75 mcg Oral Before breakfast    losartan  50 mg Oral BID    metoprolol succinate  50 mg Oral BID    mirtazapine  15 mg Oral QHS    mupirocin   Nasal BID    " pantoprazole  40 mg Oral Daily    tamsulosin  1 capsule Oral Daily     Continuous Infusions:  PRN Meds:acetaminophen, acetaminophen, aluminum-magnesium hydroxide-simethicone, calcium chloride IVPB, calcium chloride IVPB, calcium chloride IVPB, dextrose 10%, dextrose 10%, glucagon (human recombinant), glucose, glucose, HYDROcodone-acetaminophen, magnesium oxide, magnesium oxide, magnesium oxide, magnesium sulfate IVPB, magnesium sulfate IVPB, magnesium sulfate IVPB, magnesium sulfate IVPB, morphine, naloxone, ondansetron, potassium bicarbonate, potassium bicarbonate, potassium bicarbonate, potassium chloride in water, potassium chloride in water, potassium chloride in water, potassium chloride in water, potassium chloride, potassium chloride, potassium chloride, potassium chloride, prochlorperazine, senna-docusate 8.6-50 mg, sodium chloride 0.9%, sodium chloride 0.9%, sodium phosphate IVPB, sodium phosphate IVPB, sodium phosphate IVPB, sodium phosphate IVPB, sodium phosphate IVPB    Review of patient's allergies indicates:   Allergen Reactions    Iodinated contrast media Rash    Pontocaine [tetracaine hcl] Anaphylaxis     hypotension       Review of Systems: As per interval history    OBJECTIVE:     Vital Signs (Most Recent)  Temp: 98.6 °F (37 °C) (10/06/22 1535)  Pulse: 76 (10/06/22 1535)  Resp: 18 (10/06/22 1535)  BP: 134/77 (10/06/22 1535)  SpO2: 97 % (10/06/22 1535)    Vital Signs Range (Last 24H):  Temp:  [97.9 °F (36.6 °C)-98.7 °F (37.1 °C)]   Pulse:  [59-99]   Resp:  [9-43]   BP: ()/()   SpO2:  [80 %-100 %]     I & O (Last 24H):  Intake/Output Summary (Last 24 hours) at 10/6/2022 1759  Last data filed at 10/6/2022 0820  Gross per 24 hour   Intake 297.29 ml   Output 950 ml   Net -652.71 ml       Physical Exam:    Vitals and nursing note reviewed.     Constitutional:       General: Not in acute distress.     Appearance: Well-developed.   HENT:      Head: Normocephalic and atraumatic.   Eyes:       Pupils: Pupils are equal, round, and reactive to light.   Cardiovascular:      Rate and Rhythm: Regular rhythm.   Pulmonary:      Effort: Pulmonary effort is normal.      Breath sounds: Normal breath sounds. No wheezing.   Abdominal:      General: There is no distension.      Palpations: Abdomen is soft.      Tenderness: There is no abdominal tenderness. There is no guarding or rebound.   Musculoskeletal:         General: Normal range of motion.      Cervical back: Normal range of motion.   Skin:     Findings: No rash.   Neurological:      Mental Status: Alert and oriented to person, place, and time.      Cranial Nerves: No cranial nerve deficit.      Sensory: No sensory deficit.     Laboratory:  I have reviewed all pertinent lab results within the past 24 hours.  CBC:   Recent Labs   Lab 10/06/22  0328   WBC 7.06   RBC 3.63*   HGB 12.2*   HCT 36.3*      *   MCH 33.6*   MCHC 33.6     CMP:   Recent Labs   Lab 10/05/22  1134 10/06/22  0328    101   CALCIUM 6.3* 9.0   ALBUMIN 2.4*  --    PROT 4.3*  --     139   K 3.5 5.1   CO2 19* 23   * 108   BUN 23 21   CREATININE 1.1 1.2   ALKPHOS 45*  --    ALT 19  --    AST 18  --    BILITOT 0.6  --      Cardiac markers:   Recent Labs   Lab 10/06/22  0328   TROPONINI <0.030       Diagnostic Results:  CT: Reviewed  Stable ICH    ASSESSMENT/PLAN:         Active Hospital Problems    Diagnosis  POA    *Intraventricular hemorrhage [I61.5]  Yes    Hypomagnesemia [E83.42]  Yes    Hypocalcemia [E83.51]  Yes    Vasovagal syncope [R55]  Yes    Hypothyroidism [E03.9]  Yes    HTN (hypertension), benign [I10]  Yes    CAD (coronary artery disease) [I25.10]  Yes      Resolved Hospital Problems   No resolved problems to display.         Plan:     -Unfortunately, cannot interrogate his pacemaker- cardiology will arrange as outpatient.  -Cardiac workup for syncope has been unrevealing thus far.  Will continue tele monitoring.  -I suspect possible autonomic  dysfunction from his Parkinson's.  I have discussed following back up with his Neurologist, Dr. Kaur. Was on   Sinemet but family stopped as seemed to be exacerbating some of his symptoms.   -PT/OT consult, mobilizing.  He has been getting weaker over time per family at bedside and now sometimes uses a wheelchair.   -Replacing electrolytes  -ICH is stable and improving from prior. I do not think this explains any current symptoms.       VTE Risk Mitigation (From admission, onward)           Ordered     IP VTE HIGH RISK PATIENT  Once         10/05/22 1654     Place sequential compression device  Until discontinued         10/05/22 1654     Reason for No Pharmacological VTE Prophylaxis  Once        Question:  Reasons:  Answer:  Active Bleeding    10/05/22 1654                      Department Hospital Medicine  [facility]  Yue Mccall MD  Date of service: 10/06/2022

## 2022-10-06 NOTE — PLAN OF CARE
10/05/22 2015   Patient Assessment/Suction   Level of Consciousness (AVPU) alert   Respiratory Effort Unlabored   Expansion/Accessory Muscles/Retractions expansion symmetric   All Lung Fields Breath Sounds coarse   Rhythm/Pattern, Respiratory depth regular;pattern regular   Cough Frequency infrequent   Cough Type good;nonproductive   PRE-TX-O2   O2 Device (Oxygen Therapy) room air   SpO2 95 %   Pulse Oximetry Type Continuous   $ Pulse Oximetry - Multiple Charge Pulse Oximetry - Multiple   Pulse 73   Resp 14   Education   $ Education DME Oxygen;15 min

## 2022-10-07 VITALS
RESPIRATION RATE: 18 BRPM | HEIGHT: 60 IN | WEIGHT: 142.63 LBS | BODY MASS INDEX: 28 KG/M2 | TEMPERATURE: 98 F | SYSTOLIC BLOOD PRESSURE: 133 MMHG | HEART RATE: 80 BPM | OXYGEN SATURATION: 100 % | DIASTOLIC BLOOD PRESSURE: 59 MMHG

## 2022-10-07 PROBLEM — E83.42 HYPOMAGNESEMIA: Status: RESOLVED | Noted: 2022-10-05 | Resolved: 2022-10-07

## 2022-10-07 PROBLEM — E83.51 HYPOCALCEMIA: Status: RESOLVED | Noted: 2022-10-05 | Resolved: 2022-10-07

## 2022-10-07 LAB
ANION GAP SERPL CALC-SCNC: 5 MMOL/L (ref 8–16)
BASOPHILS # BLD AUTO: 0.01 K/UL (ref 0–0.2)
BASOPHILS NFR BLD: 0.2 % (ref 0–1.9)
BUN SERPL-MCNC: 24 MG/DL (ref 8–23)
CALCIUM SERPL-MCNC: 8.3 MG/DL (ref 8.7–10.5)
CHLORIDE SERPL-SCNC: 105 MMOL/L (ref 95–110)
CO2 SERPL-SCNC: 25 MMOL/L (ref 23–29)
CREAT SERPL-MCNC: 1.6 MG/DL (ref 0.5–1.4)
DIFFERENTIAL METHOD: ABNORMAL
EOSINOPHIL # BLD AUTO: 0 K/UL (ref 0–0.5)
EOSINOPHIL NFR BLD: 0.5 % (ref 0–8)
ERYTHROCYTE [DISTWIDTH] IN BLOOD BY AUTOMATED COUNT: 15.5 % (ref 11.5–14.5)
EST. GFR  (NO RACE VARIABLE): 41.2 ML/MIN/1.73 M^2
GLUCOSE SERPL-MCNC: 88 MG/DL (ref 70–110)
HCT VFR BLD AUTO: 32.4 % (ref 40–54)
HGB BLD-MCNC: 10.6 G/DL (ref 14–18)
IMM GRANULOCYTES # BLD AUTO: 0.01 K/UL (ref 0–0.04)
IMM GRANULOCYTES NFR BLD AUTO: 0.2 % (ref 0–0.5)
LYMPHOCYTES # BLD AUTO: 1.3 K/UL (ref 1–4.8)
LYMPHOCYTES NFR BLD: 23.5 % (ref 18–48)
MAGNESIUM SERPL-MCNC: 2.3 MG/DL (ref 1.6–2.6)
MCH RBC QN AUTO: 33.1 PG (ref 27–31)
MCHC RBC AUTO-ENTMCNC: 32.7 G/DL (ref 32–36)
MCV RBC AUTO: 101 FL (ref 82–98)
MONOCYTES # BLD AUTO: 0.6 K/UL (ref 0.3–1)
MONOCYTES NFR BLD: 10.5 % (ref 4–15)
NEUTROPHILS # BLD AUTO: 3.7 K/UL (ref 1.8–7.7)
NEUTROPHILS NFR BLD: 65.1 % (ref 38–73)
NRBC BLD-RTO: 0 /100 WBC
PHOSPHATE SERPL-MCNC: 3 MG/DL (ref 2.7–4.5)
PLATELET # BLD AUTO: 135 K/UL (ref 150–450)
PMV BLD AUTO: 11.2 FL (ref 9.2–12.9)
POTASSIUM SERPL-SCNC: 4.9 MMOL/L (ref 3.5–5.1)
RBC # BLD AUTO: 3.2 M/UL (ref 4.6–6.2)
SODIUM SERPL-SCNC: 135 MMOL/L (ref 136–145)
WBC # BLD AUTO: 5.61 K/UL (ref 3.9–12.7)

## 2022-10-07 PROCEDURE — 36415 COLL VENOUS BLD VENIPUNCTURE: CPT | Performed by: FAMILY MEDICINE

## 2022-10-07 PROCEDURE — 83735 ASSAY OF MAGNESIUM: CPT | Performed by: FAMILY MEDICINE

## 2022-10-07 PROCEDURE — 80048 BASIC METABOLIC PNL TOTAL CA: CPT | Performed by: FAMILY MEDICINE

## 2022-10-07 PROCEDURE — 97161 PT EVAL LOW COMPLEX 20 MIN: CPT

## 2022-10-07 PROCEDURE — 97530 THERAPEUTIC ACTIVITIES: CPT

## 2022-10-07 PROCEDURE — 25000003 PHARM REV CODE 250: Performed by: INTERNAL MEDICINE

## 2022-10-07 PROCEDURE — 97166 OT EVAL MOD COMPLEX 45 MIN: CPT

## 2022-10-07 PROCEDURE — 85025 COMPLETE CBC W/AUTO DIFF WBC: CPT | Performed by: FAMILY MEDICINE

## 2022-10-07 PROCEDURE — 25000003 PHARM REV CODE 250: Performed by: FAMILY MEDICINE

## 2022-10-07 PROCEDURE — 84100 ASSAY OF PHOSPHORUS: CPT | Performed by: FAMILY MEDICINE

## 2022-10-07 PROCEDURE — 97535 SELF CARE MNGMENT TRAINING: CPT

## 2022-10-07 RX ADMIN — TAMSULOSIN HYDROCHLORIDE 0.4 MG: 0.4 CAPSULE ORAL at 08:10

## 2022-10-07 RX ADMIN — METOPROLOL SUCCINATE 50 MG: 50 TABLET, FILM COATED, EXTENDED RELEASE ORAL at 08:10

## 2022-10-07 RX ADMIN — LEVOTHYROXINE SODIUM 75 MCG: 0.03 TABLET ORAL at 05:10

## 2022-10-07 RX ADMIN — PANTOPRAZOLE SODIUM 40 MG: 40 TABLET, DELAYED RELEASE ORAL at 05:10

## 2022-10-07 RX ADMIN — AMIODARONE HYDROCHLORIDE 200 MG: 200 TABLET ORAL at 08:10

## 2022-10-07 RX ADMIN — LOSARTAN POTASSIUM 50 MG: 50 TABLET, FILM COATED ORAL at 08:10

## 2022-10-07 RX ADMIN — SODIUM CHLORIDE 500 ML: 0.9 INJECTION, SOLUTION INTRAVENOUS at 10:10

## 2022-10-07 NOTE — PT/OT/SLP EVAL
Physical Therapy Evaluation    Patient Name:  Gene Hartman   MRN:  [id number]    Recommendations:     Discharge Recommendations:  home health PT   Discharge Equipment Recommendations: none   Barriers to discharge:  medical status    Assessment:     Gene Hartman is a 88 y.o. male admitted with a medical diagnosis of Intraventricular hemorrhage.  He presents with the following impairments/functional limitations:  weakness, impaired endurance, impaired self care skills, impaired functional mobility, gait instability, impaired balance, decreased lower extremity function, decreased safety awareness, impaired cardiopulmonary response to activity.    Pt found in bed with wife and daughter in law at bedside. Eager to mobilize. Pt performed bed mobility and transfers with min A RW. Pt ambulated 3ft with RW and mod A limited due to report of LE weakness and instability. DIL reports she can take him home at this level but requesting HHPT which patient would benefit from.     Rehab Prognosis: Fair; patient would benefit from acute skilled PT services to address these deficits and reach maximum level of function.    Recent Surgery: * No surgery found *      Plan:     During this hospitalization, patient to be seen 6 x/week to address the identified rehab impairments via gait training, therapeutic activities, therapeutic exercises, neuromuscular re-education and progress toward the following goals:    Plan of Care Expires:  11/07/22    Subjective     Chief Complaint: frustrated with limited mobility  Patient/Family Comments/goals: go home  Pain/Comfort:  Pain Rating 1: 0/10    Patients cultural, spiritual, Zoroastrian conflicts given the current situation: no    Living Environment:  Pt lives with his wife, son and DIL. Wife and DIL always home with him. RW and ambulation short household and increase reliance on wheelchair.   Prior to admission, patients level of function was ax1 RW.  Equipment used at home: walker, rolling,  wheelchair.  DME owned (not currently used): none.  Upon discharge, patient will have assistance from family.    Objective:     Communicated with RN prior to session.  Patient found HOB elevated with peripheral IV, telemetry  upon PT entry to room.    General Precautions: Standard, fall   Orthopedic Precautions:N/A   Braces: N/A  Respiratory Status: Room air    Exams:  RLE ROM: WFL  RLE Strength: 4/5  LLE ROM: WFL  LLE Strength: 4/5    Functional Mobility:  Bed Mobility:     Supine to Sit: minimum assistance  Transfers:     Sit to Stand:  minimum assistance with rolling walker  Gait: 3 ft with RW and mod A    Therapeutic Activities and Exercises:   Pt educated on POC, discharge recommendation, importance of time OOB, proper form with bed mobility and transfers, need for assist with mobility, use of call bell to seek assistance as needed and fall prevention      AM-PAC 6 CLICK MOBILITY  Total Score:15     Patient left up in chair with all lines intact, call button in reach, chair alarm on, and wife and DIL present.    GOALS:   Multidisciplinary Problems       Physical Therapy Goals          Problem: Physical Therapy    Goal Priority Disciplines Outcome Goal Variances Interventions   Physical Therapy Goal     PT, PT/OT Ongoing, Progressing     Description: Goals to be met by: 22     Patient will increase functional independence with mobility by performin. Supine to sit with Supervision  2. Sit to stand transfer with Supervision  3. Bed to chair transfer with Supervision using Rolling Walker  4. Gait  x 100 feet with contact guard assist using Rolling Walker.                              History:     Past Medical History:   Diagnosis Date    Abnormal echocardiogram 2019    Normal left ventricular systolic function with estimated ejection fraction of 60%.  Grade 2 moderate left ventricular diastolic dysfunction consistent with pseudonormalization.  Mild left atrial enlargement.  Mild aortic  regurgitation.  Mild to moderate mitral regurgitation.  Mild tricuspid regurgitation.  Estimated PA systolic pressure is 38 mm of mercury.  Diagnosis is syncope.    Anxiety     Arthritis     CAD (coronary artery disease) age 68    Carotid artery occlusion     Cerebrovascular small vessel disease     Colon polyps age 78    Coronary artery disease of native artery of native heart with stable angina pectoris 5/6/2020    GERD (gastroesophageal reflux disease)     H. pylori infection     HTN (hypertension), benign age 65    Hyperlipidemia LDL goal <70 age 65    Hypothyroidism     Multiple fractures of ribs of right side 11/27/2012    Myocardial infarction     Pernicious anemia 1/26/2018    Primary insomnia 10/9/2018    Prostate cancer age 67    Subdural hematoma 9/5/2022    Syncopal episodes 3/2013    Urge incontinence 5/8/2018       Past Surgical History:   Procedure Laterality Date    CARDIAC PACEMAKER PLACEMENT  10/2015    [employer] Pacemaker    CARDIAC SURGERY      CATARACT EXTRACTION W/  INTRAOCULAR LENS IMPLANT Bilateral     COLONOSCOPY  ~2013    Dr. Edge    COLONOSCOPY N/A 06/08/2016    Procedure: COLONOSCOPY;  Surgeon: Shamar Barahona MD;  Location: [facility];  Service: Endoscopy;  Laterality: N/A; REPEAT IN 1 YEAR    CORONARY ANGIOPLASTY WITH STENT PLACEMENT  2003    x 2 RCA    PROSTATECTOMY  2002    UPPER GASTROINTESTINAL ENDOSCOPY  11/15/2016    Dr. Barahona       Time Tracking:     PT Received On: 10/07/22  PT Start Time: 0905     PT Stop Time: 0923  PT Total Time (min): 18 min     Billable Minutes: Evaluation 8 and Therapeutic Activity 10      10/07/2022

## 2022-10-07 NOTE — PLAN OF CARE
Reviewed discharge instructions with patient and family at bedside. Given opportunity to ask follow-up questions. No acute signs of distress noted. Will transport patient from floor via wheelchair when ready.    Problem: Infection  Goal: Absence of Infection Signs and Symptoms  Outcome: Met     Problem: Adult Inpatient Plan of Care  Goal: Plan of Care Review  Outcome: Met  Goal: Patient-Specific Goal (Individualized)  Outcome: Met  Goal: Absence of Hospital-Acquired Illness or Injury  Outcome: Met  Goal: Optimal Comfort and Wellbeing  Outcome: Met  Goal: Readiness for Transition of Care  Outcome: Met     Problem: Fall Injury Risk  Goal: Absence of Fall and Fall-Related Injury  Outcome: Met     Problem: Skin Injury Risk Increased  Goal: Skin Health and Integrity  Outcome: Met

## 2022-10-07 NOTE — PT/OT/SLP EVAL
Occupational Therapy   Evaluation and Discharge Note    Name: Gene Hartman  MRN: [id number]  Admitting Diagnosis:  Intraventricular hemorrhage   Recent Surgery: * No surgery found *      Recommendations:     Discharge Recommendations: home health OT  Discharge Equipment Recommendations:  none  Barriers to discharge:  None    Assessment:     Gene Hartman is a 88 y.o. male with a medical diagnosis of Intraventricular hemorrhage. At this time, patient does not require further acute OT services.     Plan:     During this hospitalization, patient does not require further acute OT services.  Please re-consult if situation changes.    Plan of Care Reviewed with: patient, spouse (DIL)    Subjective     Chief Complaint: headache  Patient/Family Comments/goals: return home    Occupational Profile:  Living Environment: Lives with his spouse, son, and DIL in a [facility] 0STE; walk-in shower  Previous level of function: 24/7 SPV for safety; supervision with bathing; Min   A for LB dressing; feeds and grooms self independently; has been having more falls lately (DIL states he forgets to use his walker sometimes)  Roles and Routines: enjoys sitting outside  Equipment Used at home:  walker, rolling, wheelchair, shower chair  Assistance upon Discharge: spouse, son, and DIL    Pain/Comfort:  Pain Rating 1: 0/10  Pain Rating Post-Intervention 1: 0/10    Objective:     Communicated with: nurse prior to session.  Patient found supine with telemetry upon OT entry to room.    General Precautions: Standard, fall   Orthopedic Precautions:N/A   Braces: N/A  Respiratory Status: Room air     Occupational Performance:    Bed Mobility:    Patient completed Supine to Sit with moderate assistance    Functional Mobility/Transfers:  Patient completed Sit <> Stand Transfer with minimum assistance  with  rolling walker   Patient c/o mild dizziness in sitting at EOB; supine BP was 129/62, sitting 157/71, standing 133/59; pt had no c/o dizziness in  standing    Activities of Daily Living:  Grooming: stand by assistance seated EOB  Upper Body Dressing: minimum assistance limited by lines    Lower Body Dressing: minimum assistance to don socks/depends  Toileting: contact guard assistance standing to use urinal at EOB    Cognitive/Visual Perceptual:  Cognitive/Psychosocial Skills:     -       Follows Commands/attention:Follows two-step commands  -       Safety awareness/insight to disability: impaired     Physical Exam:  Upper Extremity Range of Motion:     -       Right Upper Extremity: WFL  -       Left Upper Extremity: WFL  Upper Extremity Strength:    -       Right Upper Extremity: WFL  -       Left Upper Extremity: WFL   Strength:    -       Right Upper Extremity: WFL  -       Left Upper Extremity: WFL  Fine Motor Coordination:    -       Intact  Gross motor coordination:   WFL    AMPAC 6 Click ADL:  AMPAC Total Score: 22    Treatment & Education:  Therapist provided facilitation and instruction of proper body mechanics and fall prevention strategies during tasks listed above.   Instructed patient to sit in bedside chair as tolerated daily to increase OOB/activity tolerance.   Instructed patient to use call light to have nursing staff assist with needs/transfers.   Discussed OT POC and answered all questions within OT scope of practice.    Patient left sitting edge of bed with all lines intact, call button in reach, and nurse present    History:     Past Medical History:   Diagnosis Date    Abnormal echocardiogram 11/21/2019    Normal left ventricular systolic function with estimated ejection fraction of 60%.  Grade 2 moderate left ventricular diastolic dysfunction consistent with pseudonormalization.  Mild left atrial enlargement.  Mild aortic regurgitation.  Mild to moderate mitral regurgitation.  Mild tricuspid regurgitation.  Estimated PA systolic pressure is 38 mm of mercury.  Diagnosis is syncope.    Anxiety     Arthritis     CAD (coronary artery  disease) age 68    Carotid artery occlusion     Cerebrovascular small vessel disease     Colon polyps age 78    Coronary artery disease of native artery of native heart with stable angina pectoris 5/6/2020    GERD (gastroesophageal reflux disease)     H. pylori infection     HTN (hypertension), benign age 65    Hyperlipidemia LDL goal <70 age 65    Hypothyroidism     Multiple fractures of ribs of right side 11/27/2012    Myocardial infarction     Pernicious anemia 1/26/2018    Primary insomnia 10/9/2018    Prostate cancer age 67    Subdural hematoma 9/5/2022    Syncopal episodes 3/2013    Urge incontinence 5/8/2018         Past Surgical History:   Procedure Laterality Date    CARDIAC PACEMAKER PLACEMENT  10/2015    [employer] Pacemaker    CARDIAC SURGERY      CATARACT EXTRACTION W/  INTRAOCULAR LENS IMPLANT Bilateral     COLONOSCOPY  ~2013    Dr. Edge    COLONOSCOPY N/A 06/08/2016    Procedure: COLONOSCOPY;  Surgeon: Shamar Barahona MD;  Location: [facility];  Service: Endoscopy;  Laterality: N/A; REPEAT IN 1 YEAR    CORONARY ANGIOPLASTY WITH STENT PLACEMENT  2003    x 2 RCA    PROSTATECTOMY  2002    UPPER GASTROINTESTINAL ENDOSCOPY  11/15/2016    Dr. Barahona       Time Tracking:     OT Date of Treatment: 10/07/22  OT Start Time: 1115  OT Stop Time: 1202  OT Total Time (min): 47 min    Billable Minutes:Evaluation 09  Self Care/Home Management 38    10/7/2022

## 2022-10-07 NOTE — PROGRESS NOTES
[facility]   Cardiology Note    Consult Requested By:  Hospital medicine  Reason for Consult:  Syncope    SUBJECTIVE:     History of Present Illness:  88-year-old white male well known to our group with multiple episodes of syncope.  He has had a syncopal episode yesterday and now has a worsening subdural hematoma.  He states that he does not feel right in his head but cannot give specifics.  He does have a history of orthostatic hypotension as well as Parkinson's and dementia.  He was on multiple dementia meds which was stopped due to the possibility of precipitating syncope however his Parkinson's can be an etiology for this.  The patient does have a history of a DDD pacemaker which last checked was working fine with no significant arrhythmias    Review of patient's allergies indicates:   Allergen Reactions    Iodinated contrast media Rash    Pontocaine [tetracaine hcl] Anaphylaxis     hypotension       Past Medical History:   Diagnosis Date    Abnormal echocardiogram 11/21/2019    Normal left ventricular systolic function with estimated ejection fraction of 60%.  Grade 2 moderate left ventricular diastolic dysfunction consistent with pseudonormalization.  Mild left atrial enlargement.  Mild aortic regurgitation.  Mild to moderate mitral regurgitation.  Mild tricuspid regurgitation.  Estimated PA systolic pressure is 38 mm of mercury.  Diagnosis is syncope.    Anxiety     Arthritis     CAD (coronary artery disease) age 68    Carotid artery occlusion     Cerebrovascular small vessel disease     Colon polyps age 78    Coronary artery disease of native artery of native heart with stable angina pectoris 5/6/2020    GERD (gastroesophageal reflux disease)     H. pylori infection     HTN (hypertension), benign age 65    Hyperlipidemia LDL goal <70 age 65    Hypothyroidism     Multiple fractures of ribs of right side 11/27/2012    Myocardial infarction     Pernicious anemia 1/26/2018    Primary insomnia  10/9/2018    Prostate cancer age 67    Subdural hematoma 9/5/2022    Syncopal episodes 3/2013    Urge incontinence 5/8/2018     Past Surgical History:   Procedure Laterality Date    CARDIAC PACEMAKER PLACEMENT  10/2015    [facility] Pacemaker    CARDIAC SURGERY      CATARACT EXTRACTION W/  INTRAOCULAR LENS IMPLANT Bilateral     COLONOSCOPY  ~2013    Dr. Edge    COLONOSCOPY N/A 06/08/2016    Procedure: COLONOSCOPY;  Surgeon: Shamar Barahona MD;  Location: [facility];  Service: Endoscopy;  Laterality: N/A; REPEAT IN 1 YEAR    CORONARY ANGIOPLASTY WITH STENT PLACEMENT  2003    x 2 RCA    PROSTATECTOMY  2002    UPPER GASTROINTESTINAL ENDOSCOPY  11/15/2016    Dr. Barahona     Family History   Problem Relation Age of Onset    Migraines Mother     Heart failure Father     Heart disease Father 56        MI    Heart failure Brother     Heart disease Brother     Diabetes Son     Hypertension Son     Heart disease Brother     Mental illness Brother     No Known Problems Son     Colon cancer Neg Hx     Colon polyps Neg Hx     Crohn's disease Neg Hx     Ulcerative colitis Neg Hx     Stomach cancer Neg Hx     Esophageal cancer Neg Hx      Social History     Tobacco Use    Smoking status: Never    Smokeless tobacco: Never   Substance Use Topics    Alcohol use: No    Drug use: No       Review of Systems:  Review of Systems   All other systems reviewed and are negative.    OBJECTIVE:     Vital Signs (Most Recent)  Temp: 98.9 °F (37.2 °C) (10/07/22 0740)  Pulse: 74 (10/07/22 0740)  Resp: 17 (10/07/22 0740)  BP: 135/63 (10/07/22 0740)  SpO2: 97 % (10/07/22 0740)    Vital Signs Range (Last 24H):  Temp:  [97.8 °F (36.6 °C)-99.2 °F (37.3 °C)]   Pulse:  [70-79]   Resp:  [9-35]   BP: (101-165)/()   SpO2:  [92 %-100 %]     I & O (Last 24H):    Intake/Output Summary (Last 24 hours) at 10/7/2022 0900  Last data filed at 10/7/2022 0217  Gross per 24 hour   Intake 120 ml   Output 100 ml   Net 20 ml       Current Diet:     Current Diet  Order   Procedures    Diet Dysphagia Mechanical Soft (IDDSI Level 5)     CARDIAC        Allergies:  Review of patient's allergies indicates:   Allergen Reactions    Iodinated contrast media Rash    Pontocaine [tetracaine hcl] Anaphylaxis     hypotension       Meds:  Scheduled Meds:   amiodarone  200 mg Oral BID    atorvastatin  40 mg Oral Daily    chlorhexidine  15 mL Mouth/Throat BID    levothyroxine  75 mcg Oral Before breakfast    losartan  50 mg Oral BID    metoprolol succinate  50 mg Oral BID    mirtazapine  15 mg Oral QHS    mupirocin   Nasal BID    pantoprazole  40 mg Oral Daily    tamsulosin  1 capsule Oral Daily     Continuous Infusions:  PRN Meds:acetaminophen, acetaminophen, aluminum-magnesium hydroxide-simethicone, calcium chloride IVPB, calcium chloride IVPB, calcium chloride IVPB, dextrose 10%, dextrose 10%, glucagon (human recombinant), glucose, glucose, HYDROcodone-acetaminophen, magnesium oxide, magnesium oxide, magnesium oxide, magnesium sulfate IVPB, magnesium sulfate IVPB, magnesium sulfate IVPB, magnesium sulfate IVPB, morphine, naloxone, ondansetron, potassium bicarbonate, potassium bicarbonate, potassium bicarbonate, potassium chloride in water, potassium chloride in water, potassium chloride in water, potassium chloride in water, potassium chloride, potassium chloride, potassium chloride, potassium chloride, prochlorperazine, senna-docusate 8.6-50 mg, sodium chloride 0.9%, sodium chloride 0.9%, sodium phosphate IVPB, sodium phosphate IVPB, sodium phosphate IVPB, sodium phosphate IVPB, sodium phosphate IVPB    Oxygen/Ventilator Data (Last 24H):  (if applicable)        Hemodynamic Parameters (Last 24H):   (if applicable)        Laboratory and Radiology Data:  Recent Results (from the past 24 hour(s))   Basic Metabolic Panel (BMP)    Collection Time: 10/07/22  4:10 AM   Result Value Ref Range    Sodium 135 (L) 136 - 145 mmol/L    Potassium 4.9 3.5 - 5.1 mmol/L    Chloride 105 95 - 110 mmol/L     CO2 25 23 - 29 mmol/L    Glucose 88 70 - 110 mg/dL    BUN 24 (H) 8 - 23 mg/dL    Creatinine 1.6 (H) 0.5 - 1.4 mg/dL    Calcium 8.3 (L) 8.7 - 10.5 mg/dL    Anion Gap 5 (L) 8 - 16 mmol/L    eGFR 41.2 (A) >60 mL/min/1.73 m^2   Magnesium    Collection Time: 10/07/22  4:10 AM   Result Value Ref Range    Magnesium 2.3 1.6 - 2.6 mg/dL   Phosphorus    Collection Time: 10/07/22  4:10 AM   Result Value Ref Range    Phosphorus 3.0 2.7 - 4.5 mg/dL   CBC with Automated Differential    Collection Time: 10/07/22  4:10 AM   Result Value Ref Range    WBC 5.61 3.90 - 12.70 K/uL    RBC 3.20 (L) 4.60 - 6.20 M/uL    Hemoglobin 10.6 (L) 14.0 - 18.0 g/dL    Hematocrit 32.4 (L) 40.0 - 54.0 %     (H) 82 - 98 fL    MCH 33.1 (H) 27.0 - 31.0 pg    MCHC 32.7 32.0 - 36.0 g/dL    RDW 15.5 (H) 11.5 - 14.5 %    Platelets 135 (L) 150 - 450 K/uL    MPV 11.2 9.2 - 12.9 fL    Immature Granulocytes 0.2 0.0 - 0.5 %    Gran # (ANC) 3.7 1.8 - 7.7 K/uL    Immature Grans (Abs) 0.01 0.00 - 0.04 K/uL    Lymph # 1.3 1.0 - 4.8 K/uL    Mono # 0.6 0.3 - 1.0 K/uL    Eos # 0.0 0.0 - 0.5 K/uL    Baso # 0.01 0.00 - 0.20 K/uL    nRBC 0 0 /100 WBC    Gran % 65.1 38.0 - 73.0 %    Lymph % 23.5 18.0 - 48.0 %    Mono % 10.5 4.0 - 15.0 %    Eosinophil % 0.5 0.0 - 8.0 %    Basophil % 0.2 0.0 - 1.9 %    Differential Method Automated      Imaging Results              CT Head Without Contrast (Final result)  Result time 10/05/22 13:07:41      Final result by Francisco Arroyo MD (10/05/22 13:07:41)                   Narrative:    CMS MANDATED QUALITY DATA - CT RADIATION - 436    All CT scans at this facility utilize dose modulation, iterative reconstruction, and/or weight based dosing when appropriate to reduce radiation dose to as low as reasonably achievable.          Reason: Mental status change, unknown cause    TECHNIQUE: Head CT without IV contrast.    COMPARISON: 9/5/2022    FINDINGS:  Gray-white differentiation is maintained without hemorrhage, midline shift, or mass  effect.  Previous subdural hematoma along the falx and right tentorium has resolved. Previous extra-axial hemorrhage overlying frontal lobes has resolved. Previous intraventricular hemorrhage within occipital horns of lateral ventricles shows resolution of the left and persistence in right occipital horn posteriorly.    Hypodensities affecting the corona radiata bilaterally, unchanged suggesting old lacunar infarcts. Chronic small vessel ischemic changes affecting the periventricular white matter bilaterally unchanged. Cerebral and cerebellar atrophy unchanged.    The ventricles and cisterns are maintained.    Calvarium is intact. Visualized sinuses are clear.    IMPRESSION:    1. New or persistent hyperdense hemorrhage layering dependently in the occipital horn of right lateral ventricle.  2. Resolution of prior extra-axial hemorrhage as discussed above, including resolved hemorrhage previously in the dependent portion of left lateral ventricle occipital horn.  3. Chronic small vessel ischemic changes including old lacunar infarcts in corona radiata bilaterally.    Electronically signed by:  Francisco Arroyo MD  10/5/2022 1:07 PM CDT Workstation: [id number]                                     X-ray Chest AP Portable (Final result)  Result time 10/05/22 12:46:36      Final result by Francisco Arroyo MD (10/05/22 12:46:36)                   Narrative:    Reason: Loss of Consciousness    FINDINGS:  Portable chest at 1214 compared with 9/4/2022 shows unchanged slightly enlarged cardiac silhouette size with normal mediastinal contours. Portable cardiac monitor projecting over left chest and dual lead left transvenous pacer unchanged.    Lung volumes are low. Patchy alveolar opacities are new in right lower lung zone. Left lung is clear. Pulmonary vasculature is normal. No acute osseous abnormality.    IMPRESSION:  New patchy right basilar opacities either reflecting atelectasis or consolidation.    Electronically signed by:   Francisco Arroyo MD  10/5/2022 12:46 PM CDT Workstation: [id number]                                    12-lead EKG interpretation:  (if applicable)      Current Cardiac Rhythm:   (if applicable)    Physical Exam:   Physical Exam  Constitutional:       General: He is not in acute distress.  Neck:      Vascular: No carotid bruit.   Cardiovascular:      Rate and Rhythm: Normal rate and regular rhythm.      Heart sounds: Murmur heard.   Pulmonary:      Effort: Pulmonary effort is normal.      Breath sounds: Normal breath sounds.   Abdominal:      General: Abdomen is flat. Bowel sounds are normal.   Musculoskeletal:         General: No swelling.       ASSESSMENT/PLAN:   Assessment:   Recurrent syncope with subdural hematoma  History of Parkinson's disease  History of orthostatic hypotension  Status post DDD pacemaker  Mildly decreased LV function EF 45%  Plan:   Will DC Sinemet  Await Neuro consult and possibly neurosurgery consult

## 2022-10-07 NOTE — PLAN OF CARE
Patient cleared for discharge from case management standpoint.    Family prefers to self schedule follow up appointments.    Home health resumption of care sent to [facility] via [email or web address].     Chart and discharge orders reviewed.  Patient discharged home with [facility], [facility] planned for 10/8/22 no further case management needs.       10/07/22 1355   Final Note   Assessment Type Final Discharge Note   Anticipated Discharge Disposition Home-Health   What phone number can be called within the next 1-3 days to see how you are doing after discharge? [phone]   Hospital Resources/Appts/Education Provided Provided patient/caregiver with written discharge plan information   Post-Acute Status   Post-Acute Authorization Home [facility]   Home Health Status Set-up Complete/Auth obtained   Discharge Delays None known at this time

## 2022-10-08 NOTE — DISCHARGE SUMMARY
[facility] Medicine  Discharge Summary      Patient Name: Gene Hartman  MRN: [id number]  Patient Class: IP- Inpatient  Admission Date: 10/5/2022  Hospital Length of Stay: 2 days  Discharge Date and Time: 10/7/2022  2:45 PM  Attending Physician: No att. providers found   Discharging Provider: Yue Mccall MD  Primary Care Provider: Mariajose Thorne MD      HPI:   88-year-old with a past medical history significant for recent hospitalization for subdural and ICH:  On 09/07/2022.  Reported to be resolving on outpatient follow-up a week or so ago.  He presents today with episode of loss of consciousness while sitting at the table earlier today.  He was unconscious for monitor to.  Per family, he also had a fall at 3 in the morning 4 days prior to this presentation- unknown whether he hit his head.  Past medical history significant for hypertension, lacunar infarct, prostate cancer, hyperlipidemia, CAD, history of MI, hypothyroidism, carotid artery stenosis, vertebral artery stenosis, Parkinson's and dementia.    After last admission plan was for him to discontinue aspirin and restart his Plavix 7 days after his discharge.  He was also discontinue sotalol.  Based on review of medications it appears that the family has followed these instructions.    Per EMS report, patient was hypotensive into the 70s systolic.  During emergency department stay, his blood pressure was in the 110s to 1 teens systolic and orthostatics blood pressure were negative.  At time of my evaluation, blood pressure is noted to be in the 200s to 210 systolic on multiple psych: Some of the blood pressure cuff.  Cardene drip was started.      ED evaluation otherwise significant for stable anemia with hemoglobin 9 4.  Thrombocytopenia with platelets 116.  Creatinine 1.1.  Magnesium 1.4.  This was repleted in the emergency department.  Albumin 2.4.  .  Troponins negative x1.    Corrected calcium 7.6.  Repeat  "patient received dose of calcium gluconate in ED.    CT of the head significant for persistent hemorrhagic right lateral ventricle persistent versus new.      Chest x-ray-new patchy right basilar opacity atelectasis versus consolidation.          * No surgery found *      Hospital Course:   Patient with PMHx Parkinsons, Dementia, recent falls, recent subdural and ICH 9/7/22 admitted with syncope while sitting down at the table.  EMS noted SBP in the 70s.  In the ED, he nael to SBP in the 200s. CT head revealed "New or persistent hyperdense hemorrhage layering dependently in the occipital horn of right lateral ventricle.  Resolution of prior extra-axial hemorrhage as discussed above, including resolved hemorrhage previously in the dependent portion of left lateral ventricle occipital horn."  He was admitted to the ICU on a cardene gtt for blood pressure control and Neurosurgery consultation. Evaluated by Neurosurgery who felt that CT findings represented improving hemorrhage from prior, not new.  Head CT repeated several hours later and demonstrated no worsening. Orthostatics were checked and were negative. No events on tele thus far.  Cardiology consulted and evaluated the patient.  Pacemaker interrogation was ordered but there is only one representative in the state and they are not able to evaluate him until 4-5 days from now- Cardiology will plan to arrange as an outpatient. Troponin has been trended and is normal. He is euglycemic with no significant electrolyte abnormalities. Echo done just recently on 9/6/22 and EF 45%, Grade I diastolic dysfunction, no significant valvular abnormalities.  I suspect this could be autonomic dysfunction from his Parkinson's. Orthostatics repeated 10/6 and were positive.  Seen by Dr. Betancourt, his cardiologist, who kindly supplements he does carry a diagnosis of orthostatic hypotension. This could be the etiology of his syncope if his blood pressure fell while sitting.  I " discussed compression stockings and wearing them whenever he is out of bed and in an upright position.  I discussed with Neurology who referred him back to his Neurologist for an alternative treatment to his parkinson's.  Family had stopped Sinement as they felt it was making his symptoms worse.  Neurology did recommend a Rivastigmine patch for memory if family was interested.  Given its side effect of HTN, they preferred to follow up with Dr. Kaur and discuss furtehr.  Seen by PT/OT and no dizziness when standing on with ambulation.  has kindly arranged HH.  No changes in medication.  He will follow up with Dr. Kaur and Dr. Betancourt.  Small bump in Cr- I suspect because he was not drinking well.  Bolus fluid given and I've encouraged good oral hydration.  He looks well and would like to go home.  Examined on day of discharge and alert, NAD, comfortable respirations on room air.  Return precautions given.       Goals of Care Treatment Preferences:  Code Status: DNR      Consults:   Consults (From admission, onward)        Status Ordering Provider     Inpatient consult to Cardiology  Once        Provider:  (Not yet assigned)    Completed KELLY LAU     Inpatient consult to Neurosurgery  Once        Provider:  (Not yet assigned)    Completed KELLY LAU          No new Assessment & Plan notes have been filed under this hospital service since the last note was generated.  Service: Hospital Medicine    Final Active Diagnoses:    Diagnosis Date Noted POA    PRINCIPAL PROBLEM:  Intraventricular hemorrhage [I61.5] 10/05/2022 Yes    Vasovagal syncope [R55] 11/26/2014 Yes    Hypothyroidism [E03.9] 04/23/2013 Yes    HTN (hypertension), benign [I10]  Yes    CAD (coronary artery disease) [I25.10]  Yes      Problems Resolved During this Admission:    Diagnosis Date Noted Date Resolved POA    Hypomagnesemia [E83.42] 10/05/2022 10/07/2022 Yes    Hypocalcemia [E83.51] 10/05/2022 10/07/2022 Yes       Discharged  Condition: good    Disposition: Home or Self Care    Follow Up:   Follow-up Information     Dr. Kaur, Neurology. Schedule an appointment as soon as possible for a visit in 2 week(s).    Why: post hospital discharge follow up for syncope with possible autonomic dysregulation from his Parkinson's.           Hiram Betancourt MD Follow up.    Specialties: Cardiology, Interventional Cardiology  Why: Keep scheduled appointment  Contact information:  [address] [place] [address]  [phone]             [facility] Follow up.    Specialties: DME Provider, Home Health Services  Contact information:  [address] [facility] [address]  [phone]                       Patient Instructions:      Ambulatory referral/consult to Home Health   Standing Status: Future   Referral Priority: Routine Referral Type: Home Health   Referral Reason: Specialty Services Required   Requested Specialty: Home Health Services   Number of Visits Requested: 1     Diet Cardiac     Notify your health care provider if you experience any of the following:  persistent dizziness, light-headedness, or visual disturbances       Significant Diagnostic Studies: Labs:   CMP   Recent Labs   Lab 10/06/22  0328 10/07/22  0410    135*   K 5.1 4.9    105   CO2 23 25    88   BUN 21 24*   CREATININE 1.2 1.6*   CALCIUM 9.0 8.3*   ANIONGAP 8 5*    and CBC   Recent Labs   Lab 10/06/22  0328 10/07/22  0410   WBC 7.06 5.61   HGB 12.2* 10.6*   HCT 36.3* 32.4*    135*       Pending Diagnostic Studies:     None         Medications:  Reconciled Home Medications:      Medication List      CHANGE how you take these medications    fluticasone propionate 50 mcg/actuation nasal spray  Commonly known as: FLONASE  USE 1 SPRAY IN EACH NOSTRIL TWICE DAILY  What changed: when to take this        CONTINUE taking these medications    amiodarone 200 MG Tab  Commonly known as: PACERONE  Take 200 mg by mouth 2 (two) times  daily.     atorvastatin 40 MG tablet  Commonly known as: LIPITOR  Take 1 tablet (40 mg total) by mouth once daily.     cetirizine 10 MG tablet  Commonly known as: ZYRTEC  Take 1 tablet (10 mg total) by mouth once daily.     clopidogreL 75 mg tablet  Commonly known as: PLAVIX  Take 1 tablet (75 mg total) by mouth once daily.     cyanocobalamin (vitamin B-12) 5,000 mcg Subl  Commonly known as: VITAMIN B-12  Place 1 tablet under the tongue once daily.     cycloSPORINE 0.05 % ophthalmic emulsion  Commonly known as: RESTASIS  Apply 1 drop to eye 2 (two) times daily.     levothyroxine 75 MCG tablet  Commonly known as: SYNTHROID  Take 75 mcg by mouth before breakfast.     losartan 50 MG tablet  Commonly known as: COZAAR  Take 1 tablet (50 mg total) by mouth 2 (two) times daily.     melatonin 5 mg Subl  Take 2 tablets by mouth nightly as needed.     metoprolol succinate 50 MG 24 hr tablet  Commonly known as: TOPROL-XL  Take 1 tablet (50 mg total) by mouth 2 (two) times daily.     mirtazapine 15 MG tablet  Commonly known as: REMERON  Take 15 mg by mouth every evening.     nitroGLYCERIN 0.4 MG SL tablet  Commonly known as: NITROSTAT  Place 0.4 mg under the tongue every 5 (five) minutes as needed.     pantoprazole 40 MG tablet  Commonly known as: PROTONIX  Take 40 mg by mouth once daily.     tamsulosin 0.4 mg Cap  Commonly known as: FLOMAX  Take 1 capsule by mouth once daily.     TYLENOL ARTHRITIS PAIN ORAL  Take 2 tablets by mouth 2 (two) times a day.        STOP taking these medications    carbidopa-levodopa  mg  mg per tablet  Commonly known as: SINEMET     cloNIDine 0.1 MG tablet  Commonly known as: CATAPRES            Indwelling Lines/Drains at time of discharge:   Lines/Drains/Airways     None                 Time spent on the discharge of patient: 35 minutes         Yue Mccall MD  Department of Hospital Medicine  [facility]

## 2022-10-08 NOTE — HOSPITAL COURSE
"Patient with PMHx Parkinsons, Dementia, recent falls, recent subdural and ICH 9/7/22 admitted with syncope while sitting down at the table.  EMS noted SBP in the 70s.  In the ED, he nael to SBP in the 200s. CT head revealed "New or persistent hyperdense hemorrhage layering dependently in the occipital horn of right lateral ventricle.  Resolution of prior extra-axial hemorrhage as discussed above, including resolved hemorrhage previously in the dependent portion of left lateral ventricle occipital horn."  He was admitted to the ICU on a cardene gtt for blood pressure control and Neurosurgery consultation. Evaluated by Neurosurgery who felt that CT findings represented improving hemorrhage from prior, not new.  Head CT repeated several hours later and demonstrated no worsening. Orthostatics were checked and were negative. No events on tele thus far.  Cardiology consulted and evaluated the patient.  Pacemaker interrogation was ordered but there is only one representative in the state and they are not able to evaluate him until 4-5 days from now- Cardiology will plan to arrange as an outpatient. Troponin has been trended and is normal. He is euglycemic with no significant electrolyte abnormalities. Echo done just recently on 9/6/22 and EF 45%, Grade I diastolic dysfunction, no significant valvular abnormalities.  I suspect this could be autonomic dysfunction from his Parkinson's. Orthostatics repeated 10/6 and were positive.  Seen by Dr. Betancourt, his cardiologist, who kindly supplements he does carry a diagnosis of orthostatic hypotension. This could be the etiology of his syncope if his blood pressure fell while sitting.  I discussed compression stockings and wearing them whenever he is out of bed and in an upright position.  I discussed with Neurology who referred him back to his Neurologist for an alternative treatment to his parkinson's.  Family had stopped Sinement as they felt it was making his symptoms worse.  " Neurology did recommend a Rivastigmine patch for memory if family was interested.  Given its side effect of HTN, they preferred to follow up with Dr. Kaur and discuss furtehr.  Seen by PT/OT and no dizziness when standing on with ambulation.  has kindly arranged HH.  No changes in medication.  He will follow up with Dr. Kaur and Dr. Betancourt.  Small bump in Cr- I suspect because he was not drinking well.  Bolus fluid given and I've encouraged good oral hydration.  He looks well and would like to go home.  Examined on day of discharge and alert, NAD, comfortable respirations on room air.  Return precautions given.

## 2022-10-09 ENCOUNTER — HOSPITAL ENCOUNTER (EMERGENCY)
Facility: HOSPITAL | Age: 87
Discharge: HOME | End: 2022-10-09
Attending: EMERGENCY MEDICINE
Payer: MEDICARE

## 2022-10-09 VITALS
SYSTOLIC BLOOD PRESSURE: 192 MMHG | WEIGHT: 143 LBS | DIASTOLIC BLOOD PRESSURE: 86 MMHG | HEIGHT: 60 IN | TEMPERATURE: 98 F | OXYGEN SATURATION: 97 % | HEART RATE: 76 BPM | BODY MASS INDEX: 28.07 KG/M2 | RESPIRATION RATE: 20 BRPM

## 2022-10-09 DIAGNOSIS — R55 NEAR SYNCOPE: ICD-10-CM

## 2022-10-09 LAB
ALBUMIN SERPL BCP-MCNC: 3.4 G/DL (ref 3.5–5.2)
ALP SERPL-CCNC: 74 U/L (ref 55–135)
ALT SERPL W/O P-5'-P-CCNC: 19 U/L (ref 10–44)
ANION GAP SERPL CALC-SCNC: 8 MMOL/L (ref 8–16)
APTT PPP: 23.3 SEC (ref 23.3–35.1)
AST SERPL-CCNC: 22 U/L (ref 10–40)
BACTERIA #/AREA URNS HPF: NEGATIVE /HPF
BASOPHILS # BLD AUTO: 0.02 K/UL (ref 0–0.2)
BASOPHILS NFR BLD: 0.4 % (ref 0–1.9)
BILIRUB SERPL-MCNC: 0.7 MG/DL (ref 0.1–1)
BILIRUB UR QL STRIP: NEGATIVE
BUN SERPL-MCNC: 23 MG/DL (ref 8–23)
CALCIUM SERPL-MCNC: 8.7 MG/DL (ref 8.7–10.5)
CHLORIDE SERPL-SCNC: 103 MMOL/L (ref 95–110)
CK MB SERPL-MCNC: 2 NG/ML (ref 0.1–6.5)
CK SERPL-CCNC: 43 U/L (ref 20–200)
CLARITY UR: CLEAR
CO2 SERPL-SCNC: 24 MMOL/L (ref 23–29)
COLOR UR: YELLOW
CREAT SERPL-MCNC: 1.5 MG/DL (ref 0.5–1.4)
DIFFERENTIAL METHOD: ABNORMAL
EOSINOPHIL # BLD AUTO: 0 K/UL (ref 0–0.5)
EOSINOPHIL NFR BLD: 0.7 % (ref 0–8)
ERYTHROCYTE [DISTWIDTH] IN BLOOD BY AUTOMATED COUNT: 15.3 % (ref 11.5–14.5)
EST. GFR  (NO RACE VARIABLE): 44.5 ML/MIN/1.73 M^2
GLUCOSE SERPL-MCNC: 99 MG/DL (ref 70–110)
GLUCOSE UR QL STRIP: NEGATIVE
HCT VFR BLD AUTO: 39.1 % (ref 40–54)
HGB BLD-MCNC: 12.8 G/DL (ref 14–18)
HGB UR QL STRIP: NEGATIVE
HYALINE CASTS #/AREA URNS LPF: 8 /LPF
IMM GRANULOCYTES # BLD AUTO: 0.03 K/UL (ref 0–0.04)
IMM GRANULOCYTES NFR BLD AUTO: 0.7 % (ref 0–0.5)
INR PPP: 1.1
KETONES UR QL STRIP: NEGATIVE
LEUKOCYTE ESTERASE UR QL STRIP: NEGATIVE
LYMPHOCYTES # BLD AUTO: 1.1 K/UL (ref 1–4.8)
LYMPHOCYTES NFR BLD: 22.9 % (ref 18–48)
MAGNESIUM SERPL-MCNC: 2.3 MG/DL (ref 1.6–2.6)
MCH RBC QN AUTO: 33.2 PG (ref 27–31)
MCHC RBC AUTO-ENTMCNC: 32.7 G/DL (ref 32–36)
MCV RBC AUTO: 102 FL (ref 82–98)
MICROSCOPIC COMMENT: ABNORMAL
MONOCYTES # BLD AUTO: 0.4 K/UL (ref 0.3–1)
MONOCYTES NFR BLD: 8.5 % (ref 4–15)
NEUTROPHILS # BLD AUTO: 3.1 K/UL (ref 1.8–7.7)
NEUTROPHILS NFR BLD: 66.8 % (ref 38–73)
NITRITE UR QL STRIP: NEGATIVE
NRBC BLD-RTO: 0 /100 WBC
PH UR STRIP: 7 [PH] (ref 5–8)
PLATELET # BLD AUTO: 136 K/UL (ref 150–450)
PMV BLD AUTO: 11 FL (ref 9.2–12.9)
POTASSIUM SERPL-SCNC: 4.9 MMOL/L (ref 3.5–5.1)
PROT SERPL-MCNC: 6.7 G/DL (ref 6–8.4)
PROT UR QL STRIP: ABNORMAL
PROTHROMBIN TIME: 13 SEC (ref 11.4–13.7)
RBC # BLD AUTO: 3.85 M/UL (ref 4.6–6.2)
RBC #/AREA URNS HPF: 0 /HPF (ref 0–4)
SARS-COV-2 RDRP RESP QL NAA+PROBE: NEGATIVE
SODIUM SERPL-SCNC: 135 MMOL/L (ref 136–145)
SP GR UR STRIP: 1.01 (ref 1–1.03)
SQUAMOUS #/AREA URNS HPF: 1 /HPF
TROPONIN I SERPL DL<=0.01 NG/ML-MCNC: <0.03 NG/ML
URN SPEC COLLECT METH UR: ABNORMAL
UROBILINOGEN UR STRIP-ACNC: NEGATIVE EU/DL
WBC # BLD AUTO: 4.59 K/UL (ref 3.9–12.7)
WBC #/AREA URNS HPF: 1 /HPF (ref 0–5)

## 2022-10-09 PROCEDURE — 83735 ASSAY OF MAGNESIUM: CPT | Performed by: EMERGENCY MEDICINE

## 2022-10-09 PROCEDURE — U0002 COVID-19 LAB TEST NON-CDC: HCPCS | Performed by: EMERGENCY MEDICINE

## 2022-10-09 PROCEDURE — 85025 COMPLETE CBC W/AUTO DIFF WBC: CPT | Performed by: EMERGENCY MEDICINE

## 2022-10-09 PROCEDURE — 93010 EKG 12-LEAD: ICD-10-PCS | Mod: ,,, | Performed by: SPECIALIST

## 2022-10-09 PROCEDURE — 93010 ELECTROCARDIOGRAM REPORT: CPT | Mod: ,,, | Performed by: SPECIALIST

## 2022-10-09 PROCEDURE — 82550 ASSAY OF CK (CPK): CPT | Performed by: EMERGENCY MEDICINE

## 2022-10-09 PROCEDURE — 84484 ASSAY OF TROPONIN QUANT: CPT | Performed by: EMERGENCY MEDICINE

## 2022-10-09 PROCEDURE — 80053 COMPREHEN METABOLIC PANEL: CPT | Performed by: EMERGENCY MEDICINE

## 2022-10-09 PROCEDURE — 82553 CREATINE MB FRACTION: CPT | Performed by: EMERGENCY MEDICINE

## 2022-10-09 PROCEDURE — 81001 URINALYSIS AUTO W/SCOPE: CPT | Performed by: EMERGENCY MEDICINE

## 2022-10-09 PROCEDURE — 85730 THROMBOPLASTIN TIME PARTIAL: CPT | Performed by: EMERGENCY MEDICINE

## 2022-10-09 PROCEDURE — 99285 EMERGENCY DEPT VISIT HI MDM: CPT | Mod: 25

## 2022-10-09 PROCEDURE — 85610 PROTHROMBIN TIME: CPT | Performed by: EMERGENCY MEDICINE

## 2022-10-09 PROCEDURE — 93005 ELECTROCARDIOGRAM TRACING: CPT | Performed by: SPECIALIST

## 2022-10-09 RX ORDER — SOTALOL HYDROCHLORIDE 80 MG/1
80 TABLET ORAL
Status: ON HOLD | COMMUNITY
Start: 2022-05-01 | End: 2022-11-11 | Stop reason: HOSPADM

## 2022-10-09 RX ORDER — CLONIDINE HYDROCHLORIDE 0.1 MG/1
0.1 TABLET ORAL DAILY PRN
Status: ON HOLD | COMMUNITY
Start: 2022-07-27 | End: 2022-11-11 | Stop reason: HOSPADM

## 2022-10-09 NOTE — ED NOTES
Patient dressed and given prescriptions and discharge instructions. Patient wheeled out by son to car.

## 2022-10-09 NOTE — ED PROVIDER NOTES
Encounter Date: 10/9/2022       History     Chief Complaint   Patient presents with    near syncope     Sitting at kitchen table, son states patient became pale and lightheaded this morning.      Patient with history of orthostatic hypotension and syncope.  Patient has a resolving subdural hematoma.  Reportedly patient woke up fine.  Patient was sitting at the table getting ready to eat breakfast.  Sign noted patient seemed pale.  They took his blood pressure and it was 74/50 with pulse rate of 76.  Patient did not have complete syncope.  EMS called and patient transported here.  Patient does have a pacemaker.  There was no trauma.    Review of patient's allergies indicates:   Allergen Reactions    Iodinated contrast media Rash    Pontocaine [tetracaine hcl] Anaphylaxis     hypotension     Past Medical History:   Diagnosis Date    Abnormal echocardiogram 11/21/2019    Normal left ventricular systolic function with estimated ejection fraction of 60%.  Grade 2 moderate left ventricular diastolic dysfunction consistent with pseudonormalization.  Mild left atrial enlargement.  Mild aortic regurgitation.  Mild to moderate mitral regurgitation.  Mild tricuspid regurgitation.  Estimated PA systolic pressure is 38 mm of mercury.  Diagnosis is syncope.    Anxiety     Arthritis     CAD (coronary artery disease) age 68    Carotid artery occlusion     Cerebrovascular small vessel disease     Colon polyps age 78    Coronary artery disease of native artery of native heart with stable angina pectoris 5/6/2020    GERD (gastroesophageal reflux disease)     H. pylori infection     HTN (hypertension), benign age 65    Hyperlipidemia LDL goal <70 age 65    Hypothyroidism     Multiple fractures of ribs of right side 11/27/2012    Myocardial infarction     Pernicious anemia 1/26/2018    Primary insomnia 10/9/2018    Prostate cancer age 67    Subdural hematoma 9/5/2022    Syncopal episodes 3/2013    Urge incontinence 5/8/2018     Past  Surgical History:   Procedure Laterality Date    CARDIAC PACEMAKER PLACEMENT  10/2015    [employer] Pacemaker    CARDIAC SURGERY      CATARACT EXTRACTION W/  INTRAOCULAR LENS IMPLANT Bilateral     COLONOSCOPY  ~2013    Dr. Edge    COLONOSCOPY N/A 06/08/2016    Procedure: COLONOSCOPY;  Surgeon: Shamar Barahona MD;  Location: [facility];  Service: Endoscopy;  Laterality: N/A; REPEAT IN 1 YEAR    CORONARY ANGIOPLASTY WITH STENT PLACEMENT  2003    x 2 RCA    PROSTATECTOMY  2002    UPPER GASTROINTESTINAL ENDOSCOPY  11/15/2016    Dr. Barahona     Family History   Problem Relation Age of Onset    Migraines Mother     Heart failure Father     Heart disease Father 56        MI    Heart failure Brother     Heart disease Brother     Diabetes Son     Hypertension Son     Heart disease Brother     Mental illness Brother     No Known Problems Son     Colon cancer Neg Hx     Colon polyps Neg Hx     Crohn's disease Neg Hx     Ulcerative colitis Neg Hx     Stomach cancer Neg Hx     Esophageal cancer Neg Hx      Social History     Tobacco Use    Smoking status: Never    Smokeless tobacco: Never   Substance Use Topics    Alcohol use: No    Drug use: No     Review of Systems   Constitutional:  Negative for chills and fever.   HENT:  Negative for sore throat.    Eyes:  Negative for photophobia and visual disturbance.   Respiratory:  Negative for shortness of breath.    Cardiovascular:  Negative for chest pain.   Gastrointestinal:  Negative for abdominal pain and vomiting.   Genitourinary:  Negative for dysuria.   Musculoskeletal:  Negative for joint swelling.   Skin:  Negative for rash.   Neurological:  Negative for seizures, weakness and headaches.   Psychiatric/Behavioral:  Negative for agitation.      Physical Exam     Initial Vitals [10/09/22 1115]   BP Pulse Resp Temp SpO2   (!) 167/70 69 18 97.7 °F (36.5 °C) 97 %      MAP       --         Physical Exam    Nursing note and vitals reviewed.  Constitutional: He is not diaphoretic.  No distress.   HENT:   Head: Normocephalic and atraumatic.   Eyes: Conjunctivae are normal.   Neck:   Normal range of motion.  Cardiovascular:  Regular rhythm.           Pulmonary/Chest: Breath sounds normal.   Abdominal: Abdomen is soft. There is no abdominal tenderness.   Musculoskeletal:         General: Normal range of motion.      Cervical back: Normal range of motion.     Neurological: He has normal strength. No cranial nerve deficit or sensory deficit.   Skin: No rash noted.   Psychiatric:   Patient alert and pleasant.  Patient is somewhat of a poor historian.  Flat affect.       ED Course   Procedures  Labs Reviewed   URINALYSIS, REFLEX TO URINE CULTURE - Abnormal; Notable for the following components:       Result Value    Protein, UA 1+ (*)     All other components within normal limits    Narrative:     Specimen Source->Urine   CBC W/ AUTO DIFFERENTIAL - Abnormal; Notable for the following components:    RBC 3.85 (*)     Hemoglobin 12.8 (*)     Hematocrit 39.1 (*)      (*)     MCH 33.2 (*)     RDW 15.3 (*)     Platelets 136 (*)     Immature Granulocytes 0.7 (*)     All other components within normal limits   COMPREHENSIVE METABOLIC PANEL - Abnormal; Notable for the following components:    Sodium 135 (*)     Creatinine 1.5 (*)     Albumin 3.4 (*)     eGFR 44.5 (*)     All other components within normal limits   URINALYSIS MICROSCOPIC - Abnormal; Notable for the following components:    Hyaline Casts, UA 8 (*)     All other components within normal limits    Narrative:     Specimen Source->Urine   PROTIME-INR   APTT   SARS-COV-2 RNA AMPLIFICATION, QUAL   CK-MB   CK   MAGNESIUM   TROPONIN I        ECG Results              EKG 12-lead (In process)  Result time 10/09/22 11:56:41      In process by Interface, Lab In [facility] (10/09/22 11:56:41)                   Narrative:    Test Reason : R55,    Vent. Rate : 070 BPM     Atrial Rate : 070 BPM     P-R Int : 230 ms          QRS Dur : 094 ms      QT Int :  434 ms       P-R-T Axes : 022 046 009 degrees     QTc Int : 468 ms    AV dual-paced rhythm with prolonged AV conduction with occasional   ventricular-paced complexes  Abnormal ECG  When compared with ECG of 05-OCT-2022 11:44,  Vent. rate has decreased BY   4 BPM    Referred By:             Confirmed By:                                   Imaging Results              CT Head Without Contrast (Final result)  Result time 10/09/22 12:38:13      Final result by Jamie Lawson IV, MD (10/09/22 12:38:13)                   Narrative:    All CT scans at this facility use dose modulation, degenerative reconstruction  and/or weight-based dosing when appropriate to reduce radiation dose to as low as reasonably achievable.    CT of the brain without contrast    CLINICAL HISTORY: Headache.    Comparison 10/5/2022.    Previously described tiny component of intraventricular hemorrhage within the occipital horn of the right lateral ventricle has become less apparent in the interval. There are no new findings of intracranial hemorrhage.    Scattered foci of decreased white matter attenuation within the subcortical and periventricular regions is compatible with chronic small vessel ischemia. There are no findings of acute intracranial infarction. There is no intra-axial mass, mass effect or shift of the midline.    Prominence of the ventricular system and sulci are a reflection of cortical atrophy. Arteriosclerotic calcification is observed within the intracranial segments of the carotid and vertebral arteries.    Viewed at bone windows there is no evidence of skull fracture. The visualized portions of the paranasal sinuses and mastoid air cells are appropriately aerated.    IMPRESSION:    Minimal intraventricular hemorrhage within the occipital horn of the right lateral ventricle has become less apparent compared to the recent exam.    Chronic small vessel ischemic changes and age-related involutional  changes.    Arteriosclerosis.    Electronically signed by:  Jamie Lawson MD  10/9/2022 12:38 PM CDT Workstation: [id number]                                     X-Ray Chest AP Portable (Final result)  Result time 10/09/22 11:53:12      Final result by Jamie Lawson IV, MD (10/09/22 11:53:12)                   Narrative:    Chest, single view    HISTORY: Syncope.    Comparison 10/5/2022.    The heart size is within normal limits. There is no acute pulmonary vascular engorgement. A loop recorder and dual lead left-sided cardiac device are again noted.  There is arteriosclerotic calcification within the aortic arch.    The lungs are appropriately expanded. There has been improvement in aeration of the lower lung zones in the interval. There is minimal platelike atelectasis or scarring in the left lung base. There are no confluent infiltrates or significant volume loss. There is no effusion.    IMPRESSION:    No acute cardiopulmonary disease.    Electronically signed by:  Jamie Lawson MD  10/9/2022 11:53 AM CDT Workstation: [id number]                                     Medications - No data to display  Medical Decision Making:   History:   Old Medical Records: I decided to obtain old medical records.  Clinical Tests:   Lab Tests: Reviewed  Radiological Study: Reviewed  Medical Tests: Reviewed  ED Management:  Patient presents with near syncope.  Patient does have pacemaker.  Time of incident pulse rate was normal.  Patient no focal neurologic deficits.  Subdural hematoma is improving on CT imaging.  Patient is not orthostatic now.  Hospitalist, Dr. Mccall consulted.  She discussed with patient's cardiologist.  They will begin compression stockings and Florinef.                        Clinical Impression:   Final diagnoses:  [R55] Near syncope        ED Disposition Condition    Discharge Stable          ED Prescriptions    None       Follow-up Information       Follow up With Specialties Details Why Contact Info  Additional Information    [facility] - Emergency Dept Emergency Medicine Schedule an appointment as soon as possible for a visit  If symptoms worsen [address] [id number] [phone] 1st floor    Hiram Betancourt MD Cardiology, Interventional Cardiology Schedule an appointment as soon as possible for a visit   [address]  [address]  [facility] [address]  [phone]                Gilles Rangel MD  10/09/22 1537

## 2022-10-09 NOTE — CONSULTS
"[facility] - Emergency Dept  Hospital Medicine  Consult Note    Patient Name: Gene Hartman  MRN: [id number]  Admission Date: 10/9/2022  Hospital Length of Stay: 0 days  Attending Physician: Yue Mccall MD  Primary Care Provider: Primary Doctor No           Patient information was obtained from patient, spouse/SO, relative(s) and ER records.     Consults  Subjective:     Principal Problem: <principal problem not specified>    Chief Complaint:   Chief Complaint   Patient presents with    near syncope     Sitting at kitchen table, son states patient became pale and lightheaded this morning.         HPI: 88 year old male with PMHx Parkinsons, Dementia, recent falls, recent subdural and ICH 9/7/22, orthostatic hypotension presents from home with near syncope.  He unfortunately was just admitted for same symptoms and discharged 10/7.   Again, he was at the table about to eat breakfast when he felt lightheaded and then turned pale.  His son and wife are at bedside and supplement that he had been sitting up for a little while before onset of symptoms- no recent change in position.  They put a wet cloth to his face and head and he did not completely pass out.  His blood pressure was checked and per ED MD: "74/50 with pulse rate of 76."  EMS was called and he was brought to the ED.  He denies associated chest pain or palpitations.  BP now running on the higher side of SBP 160s-180s. Labs in the ED are unchanged from prior including normal troponin.  Renal function has improved since prior and thus I do not believe he is volume depleted.  Orthostatics checked in the ED and were negative which is similar to last admission where they were negative in the ED but positive the next day. EKG with no ischemic features.  He is euglycemic and no big electrolyte abnormalities.  No focal deficits on exam and no evidence that this was a seizure.  I again think this is due to his known orthostatic hypotension versus " autonomic dysfunction from his Parkinson's.  His son ironically was going to  the compression stockings that were talked about last admission this evening. He will pick those up on the way home, he tells me.  I discussed the case with patient's Cardiologist, Dr. Betancourt. I affirmed the trial of compression stockings and also encouraged good oral hydration.  Dr. Betancourt and I discussed trial of Florinef and I have provided a Rx but family understandably is concerned as his blood pressure sometimes already runs high.  They will hold on to it while they think about it further and give the compression stockings a try.  We also talked about that we will likely need to let his blood pressure run higher as we know he is likely dropping. At this time, no indication for hospitalization.  I discussed results and recommendations with Mr. Hartman and family and they are in agreement with plan to wear compression stockings, maintain good hydration, consider Florinef and will plan to follow up with Cardiology and Neurology.      Past Medical History:   Diagnosis Date    Abnormal echocardiogram 11/21/2019    Normal left ventricular systolic function with estimated ejection fraction of 60%.  Grade 2 moderate left ventricular diastolic dysfunction consistent with pseudonormalization.  Mild left atrial enlargement.  Mild aortic regurgitation.  Mild to moderate mitral regurgitation.  Mild tricuspid regurgitation.  Estimated PA systolic pressure is 38 mm of mercury.  Diagnosis is syncope.    Anxiety     Arthritis     CAD (coronary artery disease) age 68    Carotid artery occlusion     Cerebrovascular small vessel disease     Colon polyps age 78    Coronary artery disease of native artery of native heart with stable angina pectoris 5/6/2020    GERD (gastroesophageal reflux disease)     H. pylori infection     HTN (hypertension), benign age 65    Hyperlipidemia LDL goal <70 age 65    Hypothyroidism     Multiple  fractures of ribs of right side 11/27/2012    Myocardial infarction     Pernicious anemia 1/26/2018    Primary insomnia 10/9/2018    Prostate cancer age 67    Subdural hematoma 9/5/2022    Syncopal episodes 3/2013    Urge incontinence 5/8/2018       Past Surgical History:   Procedure Laterality Date    CARDIAC PACEMAKER PLACEMENT  10/2015    [employer] Pacemaker    CARDIAC SURGERY      CATARACT EXTRACTION W/  INTRAOCULAR LENS IMPLANT Bilateral     COLONOSCOPY  ~2013    Dr. Edge    COLONOSCOPY N/A 06/08/2016    Procedure: COLONOSCOPY;  Surgeon: Shamar Barahona MD;  Location: [facility];  Service: Endoscopy;  Laterality: N/A; REPEAT IN 1 YEAR    CORONARY ANGIOPLASTY WITH STENT PLACEMENT  2003    x 2 RCA    PROSTATECTOMY  2002    UPPER GASTROINTESTINAL ENDOSCOPY  11/15/2016    Dr. Barahona       Review of patient's allergies indicates:   Allergen Reactions    Iodinated contrast media Rash    Pontocaine [tetracaine hcl] Anaphylaxis     hypotension       No current facility-administered medications on file prior to encounter.     Current Outpatient Medications on File Prior to Encounter   Medication Sig    cloNIDine (CATAPRES) 0.1 MG tablet Take 0.1 mg by mouth daily as needed.    sotaloL (BETAPACE) 80 MG tablet Take 80 mg by mouth.    acetaminophen (TYLENOL ARTHRITIS PAIN ORAL) Take 2 tablets by mouth 2 (two) times a day.    amiodarone (PACERONE) 200 MG Tab Take 200 mg by mouth 2 (two) times daily.    atorvastatin (LIPITOR) 40 MG tablet Take 1 tablet (40 mg total) by mouth once daily.    cetirizine (ZYRTEC) 10 MG tablet Take 1 tablet (10 mg total) by mouth once daily.    clopidogrel (PLAVIX) 75 mg tablet Take 1 tablet (75 mg total) by mouth once daily.    cyanocobalamin, vitamin B-12, (VITAMIN B-12) 5,000 mcg Subl Place 1 tablet under the tongue once daily.    cycloSPORINE (RESTASIS) 0.05 % ophthalmic emulsion Apply 1 drop to eye 2 (two) times daily.    fluticasone propionate (FLONASE) 50  mcg/actuation nasal spray USE 1 SPRAY IN EACH NOSTRIL TWICE DAILY (Patient taking differently: 1 spray by Each Nostril route 2 (two) times a day.)    levothyroxine (SYNTHROID) 75 MCG tablet Take 75 mcg by mouth before breakfast.    losartan (COZAAR) 50 MG tablet Take 1 tablet (50 mg total) by mouth 2 (two) times daily.    melatonin 5 mg Subl Take 2 tablets by mouth nightly as needed.    metoprolol succinate (TOPROL-XL) 50 MG 24 hr tablet Take 1 tablet (50 mg total) by mouth 2 (two) times daily.    mirtazapine (REMERON) 15 MG tablet Take 15 mg by mouth every evening.    nitroGLYCERIN (NITROSTAT) 0.4 MG SL tablet Place 0.4 mg under the tongue every 5 (five) minutes as needed.    pantoprazole (PROTONIX) 40 MG tablet Take 40 mg by mouth once daily.    tamsulosin (FLOMAX) 0.4 mg Cap Take 1 capsule by mouth once daily.     Family History       Problem Relation (Age of Onset)    Diabetes Son    Heart disease Father (56), Brother, Brother    Heart failure Father, Brother    Hypertension Son    Mental illness Brother    Migraines Mother    No Known Problems Son          Tobacco Use    Smoking status: Never    Smokeless tobacco: Never   Substance and Sexual Activity    Alcohol use: No    Drug use: No    Sexual activity: Not Currently     Review of Systems   Constitutional: Negative.    HENT: Negative.     Eyes: Negative.    Respiratory: Negative.     Cardiovascular: Negative.    Gastrointestinal: Negative.    Endocrine: Negative.    Genitourinary: Negative.    Musculoskeletal: Negative.    Skin: Negative.    Allergic/Immunologic: Negative.    Neurological:  Positive for syncope.   Hematological: Negative.    Psychiatric/Behavioral: Negative.     Objective:     Vital Signs (Most Recent):  Temp: 97.7 °F (36.5 °C) (10/09/22 1115)  Pulse: 75 (10/09/22 1431)  Resp: 19 (10/09/22 1431)  BP: (!) 189/89 (10/09/22 1431)  SpO2: 100 % (10/09/22 1431)   Vital Signs (24h Range):  Temp:  [97.7 °F (36.5 °C)] 97.7 °F (36.5  °C)  Pulse:  [69-85] 75  Resp:  [18-28] 19  SpO2:  [91 %-100 %] 100 %  BP: (142-210)/(59-99) 189/89     Weight: 64.9 kg (143 lb)  Body mass index is 27.93 kg/m².    Physical Exam  Vitals and nursing note reviewed.   Constitutional:       General: He is not in acute distress.     Appearance: He is well-developed.   HENT:      Head: Normocephalic and atraumatic.   Eyes:      Pupils: Pupils are equal, round, and reactive to light.   Cardiovascular:      Rate and Rhythm: Regular rhythm.      Heart sounds: No murmur heard.  Pulmonary:      Effort: Pulmonary effort is normal.      Breath sounds: Normal breath sounds. No wheezing.   Abdominal:      General: There is no distension.      Palpations: Abdomen is soft.      Tenderness: There is no abdominal tenderness. There is no guarding or rebound.   Musculoskeletal:         General: Normal range of motion.      Cervical back: Normal range of motion.   Skin:     Findings: No rash.   Neurological:      Mental Status: He is alert and oriented to person, place, and time.      Cranial Nerves: No cranial nerve deficit.      Sensory: No sensory deficit.       Significant Labs: All pertinent labs within the past 24 hours have been reviewed.  CBC:   Recent Labs   Lab 10/09/22  1201   WBC 4.59   HGB 12.8*   HCT 39.1*   *     CMP:   Recent Labs   Lab 10/09/22  1201   *   K 4.9      CO2 24   GLU 99   BUN 23   CREATININE 1.5*   CALCIUM 8.7   PROT 6.7   ALBUMIN 3.4*   BILITOT 0.7   ALKPHOS 74   AST 22   ALT 19   ANIONGAP 8     Cardiac Markers: No results for input(s): CKMB, MYOGLOBIN, BNP, TROPISTAT in the last 48 hours.  Troponin:   Recent Labs   Lab 10/09/22  1201   TROPONINI <0.030     Urine Studies:   Recent Labs   Lab 10/09/22  1334   COLORU Yellow   APPEARANCEUA Clear   PHUR 7.0   SPECGRAV 1.015   PROTEINUA 1+*   GLUCUA Negative   KETONESU Negative   BILIRUBINUA Negative   OCCULTUA Negative   NITRITE Negative   UROBILINOGEN Negative   LEUKOCYTESUR Negative    RBCUA 0   WBCUA 1   BACTERIA Negative   SQUAMEPITHEL 1   HYALINECASTS 8*       Significant Imaging: I have reviewed all pertinent imaging results/findings within the past 24 hours.  EKG: I have reviewed all pertinent results/findings within the past 24 hours and my personal findings are: NSR. No ischemic features.    Assessment/Plan:     Active Hospital Problems    Diagnosis    Intraventricular hemorrhage    Coronary artery disease of native artery of native heart with stable angina pectoris    Vasovagal syncope    Hypothyroidism    HTN (hypertension), benign    Hyperlipidemia LDL goal <70    CAD (coronary artery disease)     Plan:    Please see HPI for plan and recommendations  Discussed return precautions  RN is kindly finding some compression stockings prior to leaving the ED.  I discussed with ED staff.    VTE Risk Mitigation (From admission, onward)    None              Thank you for your consult. I will sign off. Please contact us if you have any additional questions.    Yue Mccall MD  Department of Hospital Medicine   [facility] - Emergency Dept

## 2022-10-09 NOTE — HPI
"88 year old male with PMHx Parkinsons, Dementia, recent falls, recent subdural and ICH 9/7/22, orthostatic hypotension presents from home with near syncope.  He unfortunately was just admitted for same symptoms and discharged 10/7.   Again, he was at the table about to eat breakfast when he felt lightheaded and then turned pale.  His son and wife are at bedside and supplement that he had been sitting up for a little while before onset of symptoms- no recent change in position.  They put a wet cloth to his face and head and he did not completely pass out.  His blood pressure was checked and per ED MD: "74/50 with pulse rate of 76."  EMS was called and he was brought to the ED.  He denies associated chest pain or palpitations.  BP now running on the higher side of SBP 160s-180s. Labs in the ED are unchanged from prior including normal troponin.  Renal function has improved since prior and thus I do not believe he is volume depleted.  Orthostatics checked in the ED and were negative which is similar to last admission where they were negative in the ED but positive the next day. EKG with no ischemic features.  He is euglycemic and no big electrolyte abnormalities.  No focal deficits on exam and no evidence that this was a seizure.  I again think this is due to his known orthostatic hypotension versus autonomic dysfunction from his Parkinson's.  His son ironically was going to  the compression stockings that were talked about last admission this evening. He will pick those up on the way home, he tells me.  I discussed the case with patient's Cardiologist, Dr. Betancourt. I affirmed the trial of compression stockings and also encouraged good oral hydration.  Dr. Betancourt and I discussed trial of Florinef and I have provided a Rx but family understandably is concerned as his blood pressure sometimes already runs high.  They will hold on to it while they think about it further and give the compression stockings a " try.  We also talked about that we will likely need to let his blood pressure run higher as we know he is likely dropping. At this time, no indication for hospitalization.  I discussed results and recommendations with Mr. Hartman and family and they are in agreement with plan to wear compression stockings, maintain good hydration, consider Florinef and will plan to follow up with Cardiology and Neurology.

## 2022-10-09 NOTE — ED NOTES
Patient given thigh high compression stockings per Dr. Mccall.  Instructed patient and family on the correct way to install hose on legs. Both patient and family vocalized understanding and have no further questions or concerns.

## 2022-10-09 NOTE — SUBJECTIVE & OBJECTIVE
Past Medical History:   Diagnosis Date    Abnormal echocardiogram 11/21/2019    Normal left ventricular systolic function with estimated ejection fraction of 60%.  Grade 2 moderate left ventricular diastolic dysfunction consistent with pseudonormalization.  Mild left atrial enlargement.  Mild aortic regurgitation.  Mild to moderate mitral regurgitation.  Mild tricuspid regurgitation.  Estimated PA systolic pressure is 38 mm of mercury.  Diagnosis is syncope.    Anxiety     Arthritis     CAD (coronary artery disease) age 68    Carotid artery occlusion     Cerebrovascular small vessel disease     Colon polyps age 78    Coronary artery disease of native artery of native heart with stable angina pectoris 5/6/2020    GERD (gastroesophageal reflux disease)     H. pylori infection     HTN (hypertension), benign age 65    Hyperlipidemia LDL goal <70 age 65    Hypothyroidism     Multiple fractures of ribs of right side 11/27/2012    Myocardial infarction     Pernicious anemia 1/26/2018    Primary insomnia 10/9/2018    Prostate cancer age 67    Subdural hematoma 9/5/2022    Syncopal episodes 3/2013    Urge incontinence 5/8/2018       Past Surgical History:   Procedure Laterality Date    CARDIAC PACEMAKER PLACEMENT  10/2015    [employer] Pacemaker    CARDIAC SURGERY      CATARACT EXTRACTION W/  INTRAOCULAR LENS IMPLANT Bilateral     COLONOSCOPY  ~2013    Dr. Edge    COLONOSCOPY N/A 06/08/2016    Procedure: COLONOSCOPY;  Surgeon: Shamar Barahona MD;  Location: [facility];  Service: Endoscopy;  Laterality: N/A; REPEAT IN 1 YEAR    CORONARY ANGIOPLASTY WITH STENT PLACEMENT  2003    x 2 RCA    PROSTATECTOMY  2002    UPPER GASTROINTESTINAL ENDOSCOPY  11/15/2016    Dr. Barahona       Review of patient's allergies indicates:   Allergen Reactions    Iodinated contrast media Rash    Pontocaine [tetracaine hcl] Anaphylaxis     hypotension       No current facility-administered medications on file prior to encounter.     Current  Outpatient Medications on File Prior to Encounter   Medication Sig    cloNIDine (CATAPRES) 0.1 MG tablet Take 0.1 mg by mouth daily as needed.    sotaloL (BETAPACE) 80 MG tablet Take 80 mg by mouth.    acetaminophen (TYLENOL ARTHRITIS PAIN ORAL) Take 2 tablets by mouth 2 (two) times a day.    amiodarone (PACERONE) 200 MG Tab Take 200 mg by mouth 2 (two) times daily.    atorvastatin (LIPITOR) 40 MG tablet Take 1 tablet (40 mg total) by mouth once daily.    cetirizine (ZYRTEC) 10 MG tablet Take 1 tablet (10 mg total) by mouth once daily.    clopidogrel (PLAVIX) 75 mg tablet Take 1 tablet (75 mg total) by mouth once daily.    cyanocobalamin, vitamin B-12, (VITAMIN B-12) 5,000 mcg Subl Place 1 tablet under the tongue once daily.    cycloSPORINE (RESTASIS) 0.05 % ophthalmic emulsion Apply 1 drop to eye 2 (two) times daily.    fluticasone propionate (FLONASE) 50 mcg/actuation nasal spray USE 1 SPRAY IN EACH NOSTRIL TWICE DAILY (Patient taking differently: 1 spray by Each Nostril route 2 (two) times a day.)    levothyroxine (SYNTHROID) 75 MCG tablet Take 75 mcg by mouth before breakfast.    losartan (COZAAR) 50 MG tablet Take 1 tablet (50 mg total) by mouth 2 (two) times daily.    melatonin 5 mg Subl Take 2 tablets by mouth nightly as needed.    metoprolol succinate (TOPROL-XL) 50 MG 24 hr tablet Take 1 tablet (50 mg total) by mouth 2 (two) times daily.    mirtazapine (REMERON) 15 MG tablet Take 15 mg by mouth every evening.    nitroGLYCERIN (NITROSTAT) 0.4 MG SL tablet Place 0.4 mg under the tongue every 5 (five) minutes as needed.    pantoprazole (PROTONIX) 40 MG tablet Take 40 mg by mouth once daily.    tamsulosin (FLOMAX) 0.4 mg Cap Take 1 capsule by mouth once daily.     Family History       Problem Relation (Age of Onset)    Diabetes Son    Heart disease Father (56), Brother, Brother    Heart failure Father, Brother    Hypertension Son    Mental illness Brother    Migraines Mother    No Known Problems Son           Tobacco Use    Smoking status: Never    Smokeless tobacco: Never   Substance and Sexual Activity    Alcohol use: No    Drug use: No    Sexual activity: Not Currently     Review of Systems   Constitutional: Negative.    HENT: Negative.     Eyes: Negative.    Respiratory: Negative.     Cardiovascular: Negative.    Gastrointestinal: Negative.    Endocrine: Negative.    Genitourinary: Negative.    Musculoskeletal: Negative.    Skin: Negative.    Allergic/Immunologic: Negative.    Neurological:  Positive for syncope.   Hematological: Negative.    Psychiatric/Behavioral: Negative.     Objective:     Vital Signs (Most Recent):  Temp: 97.7 °F (36.5 °C) (10/09/22 1115)  Pulse: 75 (10/09/22 1431)  Resp: 19 (10/09/22 1431)  BP: (!) 189/89 (10/09/22 1431)  SpO2: 100 % (10/09/22 1431)   Vital Signs (24h Range):  Temp:  [97.7 °F (36.5 °C)] 97.7 °F (36.5 °C)  Pulse:  [69-85] 75  Resp:  [18-28] 19  SpO2:  [91 %-100 %] 100 %  BP: (142-210)/(59-99) 189/89     Weight: 64.9 kg (143 lb)  Body mass index is 27.93 kg/m².    Physical Exam  Vitals and nursing note reviewed.   Constitutional:       General: He is not in acute distress.     Appearance: He is well-developed.   HENT:      Head: Normocephalic and atraumatic.   Eyes:      Pupils: Pupils are equal, round, and reactive to light.   Cardiovascular:      Rate and Rhythm: Regular rhythm.      Heart sounds: No murmur heard.  Pulmonary:      Effort: Pulmonary effort is normal.      Breath sounds: Normal breath sounds. No wheezing.   Abdominal:      General: There is no distension.      Palpations: Abdomen is soft.      Tenderness: There is no abdominal tenderness. There is no guarding or rebound.   Musculoskeletal:         General: Normal range of motion.      Cervical back: Normal range of motion.   Skin:     Findings: No rash.   Neurological:      Mental Status: He is alert and oriented to person, place, and time.      Cranial Nerves: No cranial nerve deficit.      Sensory: No  sensory deficit.       Significant Labs: All pertinent labs within the past 24 hours have been reviewed.  CBC:   Recent Labs   Lab 10/09/22  1201   WBC 4.59   HGB 12.8*   HCT 39.1*   *     CMP:   Recent Labs   Lab 10/09/22  1201   *   K 4.9      CO2 24   GLU 99   BUN 23   CREATININE 1.5*   CALCIUM 8.7   PROT 6.7   ALBUMIN 3.4*   BILITOT 0.7   ALKPHOS 74   AST 22   ALT 19   ANIONGAP 8     Cardiac Markers: No results for input(s): CKMB, MYOGLOBIN, BNP, TROPISTAT in the last 48 hours.  Troponin:   Recent Labs   Lab 10/09/22  1201   TROPONINI <0.030     Urine Studies:   Recent Labs   Lab 10/09/22  1334   COLORU Yellow   APPEARANCEUA Clear   PHUR 7.0   SPECGRAV 1.015   PROTEINUA 1+*   GLUCUA Negative   KETONESU Negative   BILIRUBINUA Negative   OCCULTUA Negative   NITRITE Negative   UROBILINOGEN Negative   LEUKOCYTESUR Negative   RBCUA 0   WBCUA 1   BACTERIA Negative   SQUAMEPITHEL 1   HYALINECASTS 8*       Significant Imaging: I have reviewed all pertinent imaging results/findings within the past 24 hours.  EKG: I have reviewed all pertinent results/findings within the past 24 hours and my personal findings are: NSR. No ischemic features.

## 2022-11-03 ENCOUNTER — HOSPITAL ENCOUNTER (EMERGENCY)
Facility: HOSPITAL | Age: 87
Discharge: HOME | End: 2022-11-03
Attending: EMERGENCY MEDICINE
Payer: MEDICARE

## 2022-11-03 VITALS
RESPIRATION RATE: 21 BRPM | OXYGEN SATURATION: 96 % | DIASTOLIC BLOOD PRESSURE: 92 MMHG | SYSTOLIC BLOOD PRESSURE: 194 MMHG | BODY MASS INDEX: 29.25 KG/M2 | WEIGHT: 149 LBS | TEMPERATURE: 98 F | HEART RATE: 69 BPM | HEIGHT: 60 IN

## 2022-11-03 DIAGNOSIS — G20.A1 PARKINSONS DISEASE: ICD-10-CM
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
## 2022-11-03 DIAGNOSIS — S22.41XA CLOSED FRACTURE OF MULTIPLE RIBS OF RIGHT SIDE, INITIAL ENCOUNTER: Primary | ICD-10-CM

## 2022-11-03 LAB
ALBUMIN SERPL BCP-MCNC: 3.8 G/DL (ref 3.5–5.2)
ALP SERPL-CCNC: 78 U/L (ref 55–135)
ALT SERPL W/O P-5'-P-CCNC: 21 U/L (ref 10–44)
ANION GAP SERPL CALC-SCNC: 6 MMOL/L (ref 8–16)
AST SERPL-CCNC: 26 U/L (ref 10–40)
BACTERIA #/AREA URNS HPF: NEGATIVE /HPF
BASOPHILS # BLD AUTO: 0.02 K/UL (ref 0–0.2)
BASOPHILS NFR BLD: 0.3 % (ref 0–1.9)
BILIRUB SERPL-MCNC: 0.7 MG/DL (ref 0.1–1)
BILIRUB UR QL STRIP: NEGATIVE
BUN SERPL-MCNC: 17 MG/DL (ref 8–23)
CALCIUM SERPL-MCNC: 8.7 MG/DL (ref 8.7–10.5)
CHLORIDE SERPL-SCNC: 102 MMOL/L (ref 95–110)
CLARITY UR: CLEAR
CO2 SERPL-SCNC: 25 MMOL/L (ref 23–29)
COLOR UR: YELLOW
CREAT SERPL-MCNC: 1.4 MG/DL (ref 0.5–1.4)
DIFFERENTIAL METHOD: ABNORMAL
EOSINOPHIL # BLD AUTO: 0 K/UL (ref 0–0.5)
EOSINOPHIL NFR BLD: 0 % (ref 0–8)
ERYTHROCYTE [DISTWIDTH] IN BLOOD BY AUTOMATED COUNT: 15.7 % (ref 11.5–14.5)
EST. GFR  (NO RACE VARIABLE): 48.3 ML/MIN/1.73 M^2
GLUCOSE SERPL-MCNC: 125 MG/DL (ref 70–110)
GLUCOSE UR QL STRIP: NEGATIVE
HCT VFR BLD AUTO: 39.2 % (ref 40–54)
HGB BLD-MCNC: 12.9 G/DL (ref 14–18)
HGB UR QL STRIP: NEGATIVE
HYALINE CASTS #/AREA URNS LPF: 3 /LPF
IMM GRANULOCYTES # BLD AUTO: 0.02 K/UL (ref 0–0.04)
IMM GRANULOCYTES NFR BLD AUTO: 0.3 % (ref 0–0.5)
KETONES UR QL STRIP: NEGATIVE
LEUKOCYTE ESTERASE UR QL STRIP: NEGATIVE
LYMPHOCYTES # BLD AUTO: 1.4 K/UL (ref 1–4.8)
LYMPHOCYTES NFR BLD: 21.5 % (ref 18–48)
MAGNESIUM SERPL-MCNC: 2.1 MG/DL (ref 1.6–2.6)
MCH RBC QN AUTO: 33.8 PG (ref 27–31)
MCHC RBC AUTO-ENTMCNC: 32.9 G/DL (ref 32–36)
MCV RBC AUTO: 103 FL (ref 82–98)
MICROSCOPIC COMMENT: ABNORMAL
MONOCYTES # BLD AUTO: 0.6 K/UL (ref 0.3–1)
MONOCYTES NFR BLD: 9.7 % (ref 4–15)
NEUTROPHILS # BLD AUTO: 4.5 K/UL (ref 1.8–7.7)
NEUTROPHILS NFR BLD: 68.2 % (ref 38–73)
NITRITE UR QL STRIP: NEGATIVE
NRBC BLD-RTO: 0 /100 WBC
PH UR STRIP: 6 [PH] (ref 5–8)
PLATELET # BLD AUTO: 221 K/UL (ref 150–450)
PMV BLD AUTO: 10.7 FL (ref 9.2–12.9)
POTASSIUM SERPL-SCNC: 4.8 MMOL/L (ref 3.5–5.1)
PROT SERPL-MCNC: 7.2 G/DL (ref 6–8.4)
PROT UR QL STRIP: ABNORMAL
RBC # BLD AUTO: 3.82 M/UL (ref 4.6–6.2)
RBC #/AREA URNS HPF: 1 /HPF (ref 0–4)
SODIUM SERPL-SCNC: 133 MMOL/L (ref 136–145)
SP GR UR STRIP: 1.02 (ref 1–1.03)
SQUAMOUS #/AREA URNS HPF: 1 /HPF
TROPONIN I SERPL DL<=0.01 NG/ML-MCNC: <0.03 NG/ML
URN SPEC COLLECT METH UR: ABNORMAL
UROBILINOGEN UR STRIP-ACNC: NEGATIVE EU/DL
WBC # BLD AUTO: 6.57 K/UL (ref 3.9–12.7)
WBC #/AREA URNS HPF: 0 /HPF (ref 0–5)

## 2022-11-03 PROCEDURE — 93010 EKG 12-LEAD: ICD-10-PCS | Mod: ,,, | Performed by: GENERAL PRACTICE

## 2022-11-03 PROCEDURE — 25000003 PHARM REV CODE 250: Performed by: EMERGENCY MEDICINE

## 2022-11-03 PROCEDURE — 93010 ELECTROCARDIOGRAM REPORT: CPT | Mod: ,,, | Performed by: GENERAL PRACTICE

## 2022-11-03 PROCEDURE — 99285 EMERGENCY DEPT VISIT HI MDM: CPT | Mod: 25

## 2022-11-03 PROCEDURE — 85025 COMPLETE CBC W/AUTO DIFF WBC: CPT | Performed by: EMERGENCY MEDICINE

## 2022-11-03 PROCEDURE — 83735 ASSAY OF MAGNESIUM: CPT | Performed by: EMERGENCY MEDICINE

## 2022-11-03 PROCEDURE — 84484 ASSAY OF TROPONIN QUANT: CPT | Performed by: EMERGENCY MEDICINE

## 2022-11-03 PROCEDURE — 81001 URINALYSIS AUTO W/SCOPE: CPT | Performed by: EMERGENCY MEDICINE

## 2022-11-03 PROCEDURE — 93005 ELECTROCARDIOGRAM TRACING: CPT | Performed by: GENERAL PRACTICE

## 2022-11-03 PROCEDURE — 80053 COMPREHEN METABOLIC PANEL: CPT | Performed by: EMERGENCY MEDICINE

## 2022-11-03 RX ORDER — CLONIDINE HYDROCHLORIDE 0.1 MG/1
0.1 TABLET ORAL
Status: COMPLETED | OUTPATIENT
Start: 2022-11-03 | End: 2022-11-03

## 2022-11-03 RX ORDER — LOSARTAN POTASSIUM 25 MG/1
50 TABLET ORAL DAILY
Status: DISCONTINUED | OUTPATIENT
Start: 2022-11-03 | End: 2022-11-03 | Stop reason: HOSPADM

## 2022-11-03 RX ORDER — ESCITALOPRAM OXALATE 10 MG/1
10 TABLET ORAL DAILY
COMMUNITY

## 2022-11-03 RX ORDER — LOSARTAN POTASSIUM 25 MG/1
50 TABLET ORAL DAILY
Status: DISCONTINUED | OUTPATIENT
Start: 2022-11-04 | End: 2022-11-03

## 2022-11-03 RX ORDER — HYDROCODONE BITARTRATE AND ACETAMINOPHEN 5; 325 MG/1; MG/1
1 TABLET ORAL EVERY 6 HOURS PRN
Qty: 8 TABLET | Refills: 0 | Status: ON HOLD | OUTPATIENT
Start: 2022-11-03 | End: 2022-11-11 | Stop reason: SDUPTHER

## 2022-11-03 RX ORDER — AMOXICILLIN AND CLAVULANATE POTASSIUM 400; 57 MG/5ML; MG/5ML
400 POWDER, FOR SUSPENSION ORAL 2 TIMES DAILY
Status: ON HOLD | COMMUNITY
Start: 2022-11-02 | End: 2022-11-11 | Stop reason: HOSPADM

## 2022-11-03 RX ORDER — ACETAMINOPHEN AND CODEINE PHOSPHATE 300; 60 MG/1; MG/1
1 TABLET ORAL EVERY 6 HOURS PRN
COMMUNITY
Start: 2022-11-02 | End: 2022-11-12

## 2022-11-03 RX ORDER — ALBUTEROL SULFATE 0.83 MG/ML
2.5 SOLUTION RESPIRATORY (INHALATION)
COMMUNITY
Start: 2022-11-02 | End: 2023-11-02

## 2022-11-03 RX ADMIN — LOSARTAN POTASSIUM 50 MG: 25 TABLET, FILM COATED ORAL at 07:11

## 2022-11-03 RX ADMIN — CLONIDINE HYDROCHLORIDE 0.1 MG: 0.1 TABLET ORAL at 07:11

## 2022-11-03 NOTE — ED PROVIDER NOTES
Encounter Date: 11/3/2022       History     Chief Complaint   Patient presents with    Fall     Last night. Seen by Dr. Peña today and sent for evaluation of multiple fxd ribs and pneumonia     88-year-old male presents emergency department with left sided chest pain after a fall in the shower/bathroom.  He was seen earlier today by his family physician and referred to the emergency department for further evaluation.  Patient has Parkinson's and falls frequently.  Patient had a fall for unclear etiology yesterday in the bathroom and hit the right side of his chest on the tub.  He does not complain of any pain to the right side of his chest but went for x-rays earlier today and sustained 5th through 7th rib fractures.  No pneumothorax.  No obvious pulmonary contusion.  Patient complains of pain to his left ribs however.  He does not think that he hit his head.  He does not remember exactly what happened.  Normal GCS is 14 and he is currently at 14.  He denies pain anywhere.  He says he feels okay.  History also obtained from his daughter in-law who is present with him at bedside.    Review of patient's allergies indicates:   Allergen Reactions    Iodinated contrast media Rash    Pontocaine [tetracaine hcl] Anaphylaxis     hypotension     Past Medical History:   Diagnosis Date    Abnormal echocardiogram 11/21/2019    Normal left ventricular systolic function with estimated ejection fraction of 60%.  Grade 2 moderate left ventricular diastolic dysfunction consistent with pseudonormalization.  Mild left atrial enlargement.  Mild aortic regurgitation.  Mild to moderate mitral regurgitation.  Mild tricuspid regurgitation.  Estimated PA systolic pressure is 38 mm of mercury.  Diagnosis is syncope.    Anxiety     Arthritis     CAD (coronary artery disease) age 68    Carotid artery occlusion     Cerebrovascular small vessel disease     Colon polyps age 78    Coronary artery disease of native artery of native heart with  stable angina pectoris 5/6/2020    GERD (gastroesophageal reflux disease)     H. pylori infection     HTN (hypertension), benign age 65    Hyperlipidemia LDL goal <70 age 65    Hypothyroidism     Multiple fractures of ribs of right side 11/27/2012    Myocardial infarction     Pernicious anemia 1/26/2018    Primary insomnia 10/9/2018    Prostate cancer age 67    Subdural hematoma 9/5/2022    Syncopal episodes 3/2013    Urge incontinence 5/8/2018     Past Surgical History:   Procedure Laterality Date    CARDIAC PACEMAKER PLACEMENT  10/2015    [employer] Pacemaker    CARDIAC SURGERY      CATARACT EXTRACTION W/  INTRAOCULAR LENS IMPLANT Bilateral     COLONOSCOPY  ~2013    Dr. Edge    COLONOSCOPY N/A 06/08/2016    Procedure: COLONOSCOPY;  Surgeon: Shamar Barahona MD;  Location: [facility];  Service: Endoscopy;  Laterality: N/A; REPEAT IN 1 YEAR    CORONARY ANGIOPLASTY WITH STENT PLACEMENT  2003    x 2 RCA    PROSTATECTOMY  2002    UPPER GASTROINTESTINAL ENDOSCOPY  11/15/2016    Dr. Barahona     Family History   Problem Relation Age of Onset    Migraines Mother     Heart failure Father     Heart disease Father 56        MI    Heart failure Brother     Heart disease Brother     Diabetes Son     Hypertension Son     Heart disease Brother     Mental illness Brother     No Known Problems Son     Colon cancer Neg Hx     Colon polyps Neg Hx     Crohn's disease Neg Hx     Ulcerative colitis Neg Hx     Stomach cancer Neg Hx     Esophageal cancer Neg Hx      Social History     Tobacco Use    Smoking status: Never    Smokeless tobacco: Never   Substance Use Topics    Alcohol use: No    Drug use: No     Review of Systems   Unable to perform ROS: Dementia     Physical Exam     Initial Vitals [11/03/22 1603]   BP Pulse Resp Temp SpO2   (!) 171/98 (!) 133 18 97.5 °F (36.4 °C) 97 %      MAP       --         Physical Exam    Nursing note and vitals reviewed.  Constitutional: He appears well-developed and well-nourished. He is not  diaphoretic. No distress.   HENT:   Head: Normocephalic and atraumatic.   Mouth/Throat: Oropharynx is clear and moist. No oropharyngeal exudate.   Eyes: Conjunctivae and EOM are normal. Pupils are equal, round, and reactive to light.   Neck: Neck supple. No tracheal deviation present.   No midline tenderness to palpation.   Normal range of motion.  Cardiovascular:  Normal rate, regular rhythm, normal heart sounds and intact distal pulses.           No murmur heard.  Pulmonary/Chest: Breath sounds normal. No stridor. No respiratory distress. He has no wheezes. He has no rhonchi. He has no rales. He exhibits tenderness (left inferior rib chest wall tenderness to palpation).   Clear and equal breath sounds bilaterally   Abdominal: Abdomen is soft. Bowel sounds are normal. He exhibits no distension. There is no abdominal tenderness. There is no rebound and no guarding.   Musculoskeletal:         General: No tenderness or edema. Normal range of motion.      Cervical back: Normal range of motion and neck supple.      Comments: Thoracolumbar spine:  No midline tenderness to palpation,    Pelvis:  Stable to pelvic rock.  Full range of motion in the hips bilaterally.     Neurological: He is alert and oriented to person, place, and time. He has normal strength. No cranial nerve deficit or sensory deficit. GCS score is 15. GCS eye subscore is 4. GCS verbal subscore is 5. GCS motor subscore is 6.   Skin: Skin is warm and dry. Capillary refill takes less than 2 seconds. No rash noted. No erythema. No pallor.   Psychiatric: He has a normal mood and affect. His behavior is normal. Thought content normal.       ED Course   Procedures  Labs Reviewed   CBC W/ AUTO DIFFERENTIAL - Abnormal; Notable for the following components:       Result Value    RBC 3.82 (*)     Hemoglobin 12.9 (*)     Hematocrit 39.2 (*)      (*)     MCH 33.8 (*)     RDW 15.7 (*)     All other components within normal limits   COMPREHENSIVE METABOLIC PANEL  - Abnormal; Notable for the following components:    Sodium 133 (*)     Glucose 125 (*)     Anion Gap 6 (*)     eGFR 48.3 (*)     All other components within normal limits   URINALYSIS, REFLEX TO URINE CULTURE - Abnormal; Notable for the following components:    Protein, UA 2+ (*)     All other components within normal limits    Narrative:     Specimen Source->Urine   URINALYSIS MICROSCOPIC - Abnormal; Notable for the following components:    Hyaline Casts, UA 3 (*)     All other components within normal limits    Narrative:     Specimen Source->Urine   MAGNESIUM   TROPONIN I     Results for orders placed or performed during the hospital encounter of 11/03/22   CBC auto differential   Result Value Ref Range    WBC 6.57 3.90 - 12.70 K/uL    RBC 3.82 (L) 4.60 - 6.20 M/uL    Hemoglobin 12.9 (L) 14.0 - 18.0 g/dL    Hematocrit 39.2 (L) 40.0 - 54.0 %     (H) 82 - 98 fL    MCH 33.8 (H) 27.0 - 31.0 pg    MCHC 32.9 32.0 - 36.0 g/dL    RDW 15.7 (H) 11.5 - 14.5 %    Platelets 221 150 - 450 K/uL    MPV 10.7 9.2 - 12.9 fL    Immature Granulocytes 0.3 0.0 - 0.5 %    Gran # (ANC) 4.5 1.8 - 7.7 K/uL    Immature Grans (Abs) 0.02 0.00 - 0.04 K/uL    Lymph # 1.4 1.0 - 4.8 K/uL    Mono # 0.6 0.3 - 1.0 K/uL    Eos # 0.0 0.0 - 0.5 K/uL    Baso # 0.02 0.00 - 0.20 K/uL    nRBC 0 0 /100 WBC    Gran % 68.2 38.0 - 73.0 %    Lymph % 21.5 18.0 - 48.0 %    Mono % 9.7 4.0 - 15.0 %    Eosinophil % 0.0 0.0 - 8.0 %    Basophil % 0.3 0.0 - 1.9 %    Differential Method Automated    Comprehensive metabolic panel   Result Value Ref Range    Sodium 133 (L) 136 - 145 mmol/L    Potassium 4.8 3.5 - 5.1 mmol/L    Chloride 102 95 - 110 mmol/L    CO2 25 23 - 29 mmol/L    Glucose 125 (H) 70 - 110 mg/dL    BUN 17 8 - 23 mg/dL    Creatinine 1.4 0.5 - 1.4 mg/dL    Calcium 8.7 8.7 - 10.5 mg/dL    Total Protein 7.2 6.0 - 8.4 g/dL    Albumin 3.8 3.5 - 5.2 g/dL    Total Bilirubin 0.7 0.1 - 1.0 mg/dL    Alkaline Phosphatase 78 55 - 135 U/L    AST 26 10 - 40 U/L     ALT 21 10 - 44 U/L    Anion Gap 6 (L) 8 - 16 mmol/L    eGFR 48.3 (A) >60 mL/min/1.73 m^2   Magnesium   Result Value Ref Range    Magnesium 2.1 1.6 - 2.6 mg/dL   Urinalysis, Reflex to Urine Culture Urine, Clean Catch    Specimen: Urine, Clean Catch   Result Value Ref Range    Specimen UA Urine, Clean Catch     Color, UA Yellow Yellow, Straw, [place]    Appearance, UA Clear Clear    pH, UA 6.0 5.0 - 8.0    Specific Gravity, UA 1.020 1.005 - 1.030    Protein, UA 2+ (A) Negative    Glucose, UA Negative Negative    Ketones, UA Negative Negative    Bilirubin (UA) Negative Negative    Occult Blood UA Negative Negative    Nitrite, UA Negative Negative    Urobilinogen, UA Negative Negative EU/dL    Leukocytes, UA Negative Negative   Troponin I   Result Value Ref Range    Troponin I <0.030 <=0.040 ng/mL   Urinalysis Microscopic   Result Value Ref Range    RBC, UA 1 0 - 4 /hpf    WBC, UA 0 0 - 5 /hpf    Bacteria Negative None-Occ /hpf    Squam Epithel, UA 1 /hpf    Hyaline Casts, UA 3 (A) 0-1/lpf /lpf    Microscopic Comment SEE COMMENT      CT Head Without Contrast   Final Result             ECG Results              EKG 12-lead (In process)  Result time 11/03/22 16:38:38      In process by Interface, Lab In [facility] (11/03/22 16:38:38)                   Narrative:    Test Reason : W19.XXXA,    Vent. Rate : 077 BPM     Atrial Rate : 077 BPM     P-R Int : 218 ms          QRS Dur : 098 ms      QT Int : 434 ms       P-R-T Axes : 007 033 050 degrees     QTc Int : 491 ms    Atrial-paced rhythm with prolonged AV conduction  Nonspecific ST and T wave abnormality  Prolonged QT  Abnormal ECG  When compared with ECG of 09-OCT-2022 11:21,  Electronic atrial pacemaker has replaced Electronic ventricular pacemaker    Referred By:             Confirmed By:                                   Imaging Results              CT Head Without Contrast (Final result)  Result time 11/03/22 17:28:16      Final result by Carmelo Chan MD (11/03/22  17:28:16)                   Narrative:    CMS MANDATED QUALITY DATA - CT RADIATION - 436    All CT scans at this facility utilize dose modulation, iterative reconstruction, and/or weight based dosing when appropriate to reduce radiation dose to as low as reasonably achievable.        Reason: Head trauma, minor (Age >= 65y)    TECHNIQUE: Head CT without IV contrast.    COMPARISON: CT head October 9, 2022    FINDINGS:    Gray-white differentiation is maintained without hemorrhage, midline shift, or mass effect. Periventricular and subcortical white matter low-attenuation bilaterally suggest chronic small vessel ischemic changes. Diffuse cerebral and cerebellar atrophy is evident. Chronic lacunar infarcts of the basal ganglia again noted.    Ex vacuo dilatation of the ventricles.    Calvarium is intact. Visualized sinuses are clear.    IMPRESSION:    1. No acute intracranial abnormality.  2. Chronic and involutional changes of the brain.        Electronically signed by:  Carmelo Chan DO  11/3/2022 5:28 PM CDT Workstation: [id number]                                     Medications   losartan tablet 50 mg (50 mg Oral Given 11/3/22 1926)   cloNIDine tablet 0.1 mg (0.1 mg Oral Given 11/3/22 1926)     Medical Decision Making:   Clinical Tests:   Lab Tests: Ordered and Reviewed  Radiological Study: Ordered and Reviewed  Medical Tests: Ordered and Reviewed  ED Management:  88-year-old male presents emergency department for further evaluation of his rib fractures.  He saw his doctor today and had x-rays on outpatient basis and had 3 rib fractures on the right side of his chest.  I actually has no tenderness on the right side of his chest no crepitance, did not comment on any pneumothorax on the x-ray.  Patient has a oxygen saturation 96% on room air, not in any respiratory distress.  Patient given his fall, I do not see any evidence of head trauma but did elect to obtain a CT scan Ace he is unclear if he hit his head or  not.  He has had recent subdural hematoma so wanted to ensure no bleed.  No bleed found.  I did obtain labs including a urinalysis to elicit any reversible etiology for his fall and no obvious etiology was found.  Patient's daughter in-law says his blood pressure is always significantly elevated in that they preferred to go home.  Do not feel in [place] indicated given he has pain control at home.  I will discharge the several Norco.  He says he has Tylenol No. 3 at home but he will try Norco if this Tylenol No. 3 does not work.  He knows not to mix these medicines.  I will also give him an incentive spirometer.  Patient pain is under good control here in the ER.  He did not require any pain medicine.  I did give him his nighttime blood pressure medicine.  He will follow up with PCP on an outpatient basis.  Will return if symptoms change or worsen.    A dictation software program was used for this note.  Please expect some simple typographical  errors in this note.    I had a detailed discussion with the patient and/or guardian regarding: The historical points, exam findings, and diagnostic results supporting the discharge diagnosis, lab results, pertinent radiology results, and the need for outpatient follow-up, for definitive care with a family practitioner and to return to the emergency department if symptoms worsen or persist or if there are any questions or concerns that arise at home. All questions have been answered in detail. Strict return to Emergency Department precautions have been provided.              ED Course as of 11/03/22 2102   u Nov 03, 2022   1807 EKG 4:35 p.m. atrial paced rate of 77.  No STEMI.  Artifact present which limits EKG interpretation.  EKG interpreted by me. [JR]   1943 Patient feeling better, daughter in-law states that blood pressure is always elevated.  Did take his blood pressure medicine tonight.  They want to go home.  Will discharge home. [JR]      ED Course User Index  [JR]  Filipe Ball DO                 Clinical Impression:   Final diagnoses:  [W19.XXXA] Fall  [S22.41XA] Closed fracture of multiple ribs of right side, initial encounter (Primary)  [G20] Parkinsons disease        ED Disposition Condition    Discharge Stable          ED Prescriptions       Medication Sig Dispense Start Date End Date Auth. Provider    HYDROcodone-acetaminophen (NORCO) 5-325 mg per tablet Take 1 tablet by mouth every 6 (six) hours as needed for Pain. 8 tablet 11/3/2022 -- Filipe Ball DO          Follow-up Information       Follow up With Specialties Details Why Contact Info Additional Information    [facility] - Emergency Dept Emergency Medicine  If symptoms worsen [address] [address] [phone] 1st floor    Your primary care provider  In 3 days                Filipe Ball DO  11/03/22 [id number]

## 2022-11-04 NOTE — DISCHARGE INSTRUCTIONS
RETURN TO EMERGENCY DEPARTMENT WITHOUT FAIL, IF YOUR SYMPTOMS WORSEN, IF YOU GET NEW OR DIFFERENT SYMPTOMS, IF YOU ARE UNABLE TO FOLLOW UP AS DIRECTED, OR IF YOU HAVE ANY CONCERNS OR WORRIES.    Do not take Norco while drinking alcohol, driving, operating heavy machinery, showering, swimming alone.  Take stool softener with Norco as it can constipate you.    Take pain medication as directed.  Follow-up with your primary care provider.  Use the incentive spirometer every 20-30 minutes if you can.

## 2022-11-09 ENCOUNTER — HOSPITAL ENCOUNTER (OUTPATIENT)
Facility: HOSPITAL | Age: 87
Discharge: HOME HEALTH | End: 2022-11-11
Attending: EMERGENCY MEDICINE | Admitting: INTERNAL MEDICINE
Payer: MEDICARE

## 2022-11-09 DIAGNOSIS — I50.41 ACUTE COMBINED SYSTOLIC AND DIASTOLIC HEART FAILURE: ICD-10-CM

## 2022-11-09 DIAGNOSIS — R79.89 ELEVATED BRAIN NATRIURETIC PEPTIDE (BNP) LEVEL: ICD-10-CM

## 2022-11-09 DIAGNOSIS — R07.9 CHEST PAIN: ICD-10-CM

## 2022-11-09 DIAGNOSIS — F03.90 DEMENTIA WITHOUT BEHAVIORAL DISTURBANCE: ICD-10-CM

## 2022-11-09 DIAGNOSIS — R29.6 FALLS FREQUENTLY: ICD-10-CM

## 2022-11-09 DIAGNOSIS — R06.02 SHORTNESS OF BREATH: Primary | ICD-10-CM

## 2022-11-09 DIAGNOSIS — I50.41 ACUTE COMBINED SYSTOLIC (CONGESTIVE) AND DIASTOLIC (CONGESTIVE) HEART FAILURE: ICD-10-CM
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
## 2022-11-09 LAB
ALBUMIN SERPL BCP-MCNC: 3.4 G/DL (ref 3.5–5.2)
ALP SERPL-CCNC: 70 U/L (ref 55–135)
ALT SERPL W/O P-5'-P-CCNC: 23 U/L (ref 10–44)
ANION GAP SERPL CALC-SCNC: 8 MMOL/L (ref 8–16)
AST SERPL-CCNC: 28 U/L (ref 10–40)
BACTERIA #/AREA URNS HPF: NEGATIVE /HPF
BASOPHILS # BLD AUTO: 0.02 K/UL (ref 0–0.2)
BASOPHILS NFR BLD: 0.3 % (ref 0–1.9)
BILIRUB SERPL-MCNC: 0.8 MG/DL (ref 0.1–1)
BILIRUB UR QL STRIP: NEGATIVE
BNP SERPL-MCNC: 1472 PG/ML (ref 0–99)
BUN SERPL-MCNC: 28 MG/DL (ref 8–23)
CALCIUM SERPL-MCNC: 8.6 MG/DL (ref 8.7–10.5)
CHLORIDE SERPL-SCNC: 105 MMOL/L (ref 95–110)
CLARITY UR: CLEAR
CO2 SERPL-SCNC: 25 MMOL/L (ref 23–29)
COLOR UR: YELLOW
CREAT SERPL-MCNC: 1.6 MG/DL (ref 0.5–1.4)
DIFFERENTIAL METHOD: ABNORMAL
EOSINOPHIL # BLD AUTO: 0 K/UL (ref 0–0.5)
EOSINOPHIL NFR BLD: 0.6 % (ref 0–8)
ERYTHROCYTE [DISTWIDTH] IN BLOOD BY AUTOMATED COUNT: 15.5 % (ref 11.5–14.5)
EST. GFR  (NO RACE VARIABLE): 41.2 ML/MIN/1.73 M^2
GLUCOSE SERPL-MCNC: 99 MG/DL (ref 70–110)
GLUCOSE UR QL STRIP: NEGATIVE
HCT VFR BLD AUTO: 39.4 % (ref 40–54)
HGB BLD-MCNC: 12.7 G/DL (ref 14–18)
HGB UR QL STRIP: NEGATIVE
HYALINE CASTS #/AREA URNS LPF: 4 /LPF
IMM GRANULOCYTES # BLD AUTO: 0.04 K/UL (ref 0–0.04)
IMM GRANULOCYTES NFR BLD AUTO: 0.6 % (ref 0–0.5)
KETONES UR QL STRIP: ABNORMAL
LEUKOCYTE ESTERASE UR QL STRIP: NEGATIVE
LYMPHOCYTES # BLD AUTO: 1.6 K/UL (ref 1–4.8)
LYMPHOCYTES NFR BLD: 23.6 % (ref 18–48)
MCH RBC QN AUTO: 32.8 PG (ref 27–31)
MCHC RBC AUTO-ENTMCNC: 32.2 G/DL (ref 32–36)
MCV RBC AUTO: 102 FL (ref 82–98)
MICROSCOPIC COMMENT: ABNORMAL
MONOCYTES # BLD AUTO: 0.7 K/UL (ref 0.3–1)
MONOCYTES NFR BLD: 10.4 % (ref 4–15)
NEUTROPHILS # BLD AUTO: 4.4 K/UL (ref 1.8–7.7)
NEUTROPHILS NFR BLD: 64.5 % (ref 38–73)
NITRITE UR QL STRIP: NEGATIVE
NRBC BLD-RTO: 0 /100 WBC
PH UR STRIP: 6 [PH] (ref 5–8)
PLATELET # BLD AUTO: 228 K/UL (ref 150–450)
PMV BLD AUTO: 10.9 FL (ref 9.2–12.9)
POTASSIUM SERPL-SCNC: 4.7 MMOL/L (ref 3.5–5.1)
PROT SERPL-MCNC: 7.2 G/DL (ref 6–8.4)
PROT UR QL STRIP: ABNORMAL
RBC # BLD AUTO: 3.87 M/UL (ref 4.6–6.2)
RBC #/AREA URNS HPF: 1 /HPF (ref 0–4)
SODIUM SERPL-SCNC: 138 MMOL/L (ref 136–145)
SP GR UR STRIP: 1.02 (ref 1–1.03)
SQUAMOUS #/AREA URNS HPF: 0 /HPF
TROPONIN I SERPL DL<=0.01 NG/ML-MCNC: 0.05 NG/ML
URN SPEC COLLECT METH UR: ABNORMAL
UROBILINOGEN UR STRIP-ACNC: NEGATIVE EU/DL
WBC # BLD AUTO: 6.83 K/UL (ref 3.9–12.7)
WBC #/AREA URNS HPF: 0 /HPF (ref 0–5)

## 2022-11-09 PROCEDURE — 80053 COMPREHEN METABOLIC PANEL: CPT | Performed by: NURSE PRACTITIONER

## 2022-11-09 PROCEDURE — 96376 TX/PRO/DX INJ SAME DRUG ADON: CPT

## 2022-11-09 PROCEDURE — 63600175 PHARM REV CODE 636 W HCPCS: Performed by: STUDENT IN AN ORGANIZED HEALTH CARE EDUCATION/TRAINING PROGRAM

## 2022-11-09 PROCEDURE — 84484 ASSAY OF TROPONIN QUANT: CPT | Performed by: NURSE PRACTITIONER

## 2022-11-09 PROCEDURE — 93010 ELECTROCARDIOGRAM REPORT: CPT | Mod: ,,, | Performed by: INTERNAL MEDICINE

## 2022-11-09 PROCEDURE — 83880 ASSAY OF NATRIURETIC PEPTIDE: CPT | Performed by: NURSE PRACTITIONER

## 2022-11-09 PROCEDURE — 81001 URINALYSIS AUTO W/SCOPE: CPT | Performed by: NURSE PRACTITIONER

## 2022-11-09 PROCEDURE — 85025 COMPLETE CBC W/AUTO DIFF WBC: CPT | Performed by: NURSE PRACTITIONER

## 2022-11-09 PROCEDURE — 93010 EKG 12-LEAD: ICD-10-PCS | Mod: ,,, | Performed by: INTERNAL MEDICINE

## 2022-11-09 PROCEDURE — G0378 HOSPITAL OBSERVATION PER HR: HCPCS

## 2022-11-09 PROCEDURE — 99285 EMERGENCY DEPT VISIT HI MDM: CPT | Mod: 25

## 2022-11-09 PROCEDURE — 93005 ELECTROCARDIOGRAM TRACING: CPT | Performed by: INTERNAL MEDICINE

## 2022-11-09 RX ORDER — FUROSEMIDE 10 MG/ML
20 INJECTION INTRAMUSCULAR; INTRAVENOUS
Status: COMPLETED | OUTPATIENT
Start: 2022-11-09 | End: 2022-11-09

## 2022-11-09 RX ORDER — ENOXAPARIN SODIUM 100 MG/ML
40 INJECTION SUBCUTANEOUS EVERY 24 HOURS
Status: CANCELLED | OUTPATIENT
Start: 2022-11-09

## 2022-11-09 RX ADMIN — FUROSEMIDE 20 MG: 10 INJECTION, SOLUTION INTRAVENOUS at 07:11

## 2022-11-09 NOTE — ED PROVIDER NOTES
Encounter Date: 11/9/2022       History     Chief Complaint   Patient presents with    Back Pain     Back pain that starts and radiates to his head.  Pt had a fall last Wednesday and was dx with rib fx.     HPI  Patient is an 88-year-old man with history of CAD, pacemaker, afib on plavix and aspirin no other blood thinners, HTN, Parkinson's presenting due to continued back pain after a fall 1 week ago.  Patient was evaluated in the emergency department 1 week ago diagnosed with 5th through 7th rib fractures and discharged with pain medication.  He had a follow-up appointment with his primary care physician today and he and his daughter noted that he had been complaining of this intermittent back pain that radiated to his head.  Patient's daughter states that their doctor felt he had decreased breath sounds and brought him to the emergency department for possible collapsed lung.  Patient has been more short of breath over last several days, especially with movements.  Patient's daughter states they have not been having him ambulate as frequently ever since the fall a week ago.  He denies any chest pain, fever, cough, rhinorrhea, congestion.  In terms of his back pain uses it comes intermittently, denies worsening with movements.  Does not have the pain right now, denies headache, dizziness, vision changes.  He also denies any urinary or bowel incontinence, saddle anesthesia.    Review of patient's allergies indicates:   Allergen Reactions    Iodinated contrast media Rash    Pontocaine [tetracaine hcl] Anaphylaxis     hypotension     Past Medical History:   Diagnosis Date    Abnormal echocardiogram 11/21/2019    Normal left ventricular systolic function with estimated ejection fraction of 60%.  Grade 2 moderate left ventricular diastolic dysfunction consistent with pseudonormalization.  Mild left atrial enlargement.  Mild aortic regurgitation.  Mild to moderate mitral regurgitation.  Mild tricuspid regurgitation.   Estimated PA systolic pressure is 38 mm of mercury.  Diagnosis is syncope.    Anxiety     Arthritis     CAD (coronary artery disease) age 68    Carotid artery occlusion     Cerebrovascular small vessel disease     Colon polyps age 78    Coronary artery disease of native artery of native heart with stable angina pectoris 5/6/2020    GERD (gastroesophageal reflux disease)     H. pylori infection     HTN (hypertension), benign age 65    Hyperlipidemia LDL goal <70 age 65    Hypothyroidism     Multiple fractures of ribs of right side 11/27/2012    Myocardial infarction     Pernicious anemia 1/26/2018    Primary insomnia 10/9/2018    Prostate cancer age 67    Subdural hematoma 9/5/2022    Syncopal episodes 3/2013    Urge incontinence 5/8/2018     Past Surgical History:   Procedure Laterality Date    CARDIAC PACEMAKER PLACEMENT  10/2015    [employer] Pacemaker    CARDIAC SURGERY      CATARACT EXTRACTION W/  INTRAOCULAR LENS IMPLANT Bilateral     COLONOSCOPY  ~2013    Dr. Edge    COLONOSCOPY N/A 06/08/2016    Procedure: COLONOSCOPY;  Surgeon: Shamar Barahona MD;  Location: [facility];  Service: Endoscopy;  Laterality: N/A; REPEAT IN 1 YEAR    CORONARY ANGIOPLASTY WITH STENT PLACEMENT  2003    x 2 RCA    PROSTATECTOMY  2002    UPPER GASTROINTESTINAL ENDOSCOPY  11/15/2016    Dr. Barahona     Family History   Problem Relation Age of Onset    Migraines Mother     Heart failure Father     Heart disease Father 56        MI    Heart failure Brother     Heart disease Brother     Diabetes Son     Hypertension Son     Heart disease Brother     Mental illness Brother     No Known Problems Son     Colon cancer Neg Hx     Colon polyps Neg Hx     Crohn's disease Neg Hx     Ulcerative colitis Neg Hx     Stomach cancer Neg Hx     Esophageal cancer Neg Hx      Social History     Tobacco Use    Smoking status: Never    Smokeless tobacco: Never   Substance Use Topics    Alcohol use: No    Drug use: No     Review of Systems    Constitutional:  Negative for fever.   HENT:  Negative for congestion, rhinorrhea and sore throat.    Respiratory:  Positive for shortness of breath. Negative for cough.    Cardiovascular:  Negative for chest pain.   Gastrointestinal:  Negative for abdominal pain, nausea and vomiting.   Genitourinary:  Negative for difficulty urinating and dysuria.   Musculoskeletal:  Positive for back pain.   Skin:  Negative for rash.   Neurological:  Negative for weakness and headaches.   Hematological:  Does not bruise/bleed easily.     Physical Exam     Initial Vitals [11/09/22 1453]   BP Pulse Resp Temp SpO2   (!) 151/94 74 18 98.3 °F (36.8 °C) 100 %      MAP       --         Physical Exam    Nursing note and vitals reviewed.  Constitutional: He appears well-developed and well-nourished. He is not diaphoretic.   HENT:   Head: Normocephalic and atraumatic.   Mouth/Throat: No oropharyngeal exudate.   Eyes: EOM are normal. Pupils are equal, round, and reactive to light.   Neck: Neck supple.   Normal range of motion.  Cardiovascular:  Normal rate, regular rhythm, normal heart sounds and intact distal pulses.           Pulmonary/Chest: No respiratory distress. He has no wheezes. He has no rhonchi. He has no rales. He exhibits no tenderness.   Decreased breath sounds in the bases.   Abdominal: Abdomen is soft. He exhibits no distension. There is no abdominal tenderness. There is no rebound and no guarding.   Musculoskeletal:         General: No tenderness or edema. Normal range of motion.      Cervical back: Normal range of motion and neck supple.      Comments: No midline or paraspinal c-spine, t-spine, l-spine TTP     Neurological: He is alert and oriented to person, place, and time.   Skin: Skin is warm and dry.   Ecchymosis to left lateral mid abdomen   Psychiatric: He has a normal mood and affect. Thought content normal.     HO-4 [facility]  Patient is an 87 y/o M history of CAD, pacemaker, afib on plavix and aspirin no other blood  "thinners, HTN, Parkinson's initially presenting with back pain that radiates to his head in the setting of a fall 1 week ago and decreased breath sounds while with primary care doctor outpatient today and urged to come to the emergency department for possible pneumothorax.  Has had increased shortness of breath especially with small movements, denies chest pain. BP (!) 151/94   Pulse 74   Temp 98.3 °F (36.8 °C) (Oral)   Resp 18   Ht 5' 2" (1.575 m)   Wt 67.6 kg (149 lb)   SpO2 100%   BMI 27.25 kg/m² vital signs stable, SpO2 100%.  Back without spinal tenderness, no red flag signs including no urinary retention, urinary or bowel incontinence.  CT head and C-spine performed from triage without any abnormalities.  Chest x-ray and rib x-ray was also performed and showed cardiomegaly with small pleural effusions, no pneumothorax.  Workup from triage showed a BNP 1,472, troponin slightly elevated at 0.050.  EKG shows normal sinus rhythm 70 beats per minute normal axis and intervals T-wave inversion to lead 3 and biphasic T-wave in AVF similar to prior dated 10/09/2022.  CBC and CMP with creatinine 1.6, has been between 1.4-1.6 previously.  CBC without significant findings.  He has a prior ago with an ejection fraction of 45% and grade 1 diastolic dysfunction.  Family states he has not been prescribed Lasix.  Concern for heart failure exacerbation today given Lasix 20 mg IV.  Discussed results with patient and daughter and plan for admission.  Patient admitted to medicine for further evaluation and management.    Tiff Castillo MD  [facility], PGY-4  11/9/22 20:08      ED Course   Procedures  Labs Reviewed   CBC W/ AUTO DIFFERENTIAL - Abnormal; Notable for the following components:       Result Value    RBC 3.87 (*)     Hemoglobin 12.7 (*)     Hematocrit 39.4 (*)      (*)     MCH 32.8 (*)     RDW 15.5 (*)     Immature Granulocytes 0.6 (*)     All other components within normal limits "   COMPREHENSIVE METABOLIC PANEL - Abnormal; Notable for the following components:    BUN 28 (*)     Creatinine 1.6 (*)     Calcium 8.6 (*)     Albumin 3.4 (*)     eGFR 41.2 (*)     All other components within normal limits   TROPONIN I - Abnormal; Notable for the following components:    Troponin I 0.050 (*)     All other components within normal limits   URINALYSIS, REFLEX TO URINE CULTURE - Abnormal; Notable for the following components:    Protein, UA 2+ (*)     Ketones, UA 1+ (*)     All other components within normal limits    Narrative:     Specimen Source->Urine   B-TYPE NATRIURETIC PEPTIDE - Abnormal; Notable for the following components:    BNP 1,472 (*)     All other components within normal limits   URINALYSIS MICROSCOPIC - Abnormal; Notable for the following components:    Hyaline Casts, UA 4 (*)     All other components within normal limits    Narrative:     Specimen Source->Urine        ECG Results              EKG 12-lead (In process)  Result time 11/09/22 15:56:56      In process by Interface, Lab In [facility] (11/09/22 15:56:56)                   Narrative:    Test Reason : W19.XXXA,    Vent. Rate : 070 BPM     Atrial Rate : 070 BPM     P-R Int : 122 ms          QRS Dur : 094 ms      QT Int : 424 ms       P-R-T Axes : 035 063 018 degrees     QTc Int : 457 ms    Normal sinus rhythm  Nonspecific ST abnormality  Abnormal ECG  When compared with ECG of 03-NOV-2022 16:35,  Sinus rhythm has replaced Electronic atrial pacemaker  Inverted T waves have replaced nonspecific T wave abnormality in Inferior  leads  Nonspecific T wave abnormality, improved in Lateral leads    Referred By: [email or web address]   SELF           Confirmed By:                                   Imaging Results              CT Cervical Spine Without Contrast (Final result)  Result time 11/09/22 15:55:12      Final result by Luis Lemus MD (11/09/22 15:55:12)                   Narrative:    CMS MANDATED QUALITY DATA-CT RADIATION DOSE-436  All CT  scans at this facility use dose modulation, iterative reconstruction, and or weight based dosing when appropriate, to reduce radiation dose to as low as reasonably achievable.    HISTORY: Neck trauma (Age >= 65y)    FINDINGS: Thin axial imaging was performed without contrast, with sagittal and coronal reformatted images reviewed.  Comparison to multiple prior exams.    The cervical spine has normal alignment and curvature, with no acute fractures or destructive osseous lesions. There is moderate to advanced multilevel intervertebral disc space narrowing with osteophytes, with multilevel cervical facet arthropathy. There is osseous fusion across the disc space and facet articulations at C4-C5, with no interfacetal subluxation or dislocation.    The craniocervical junction and prevertebral soft tissues are normal. There are carotid arterial vascular calcifications. There is no evidence of spinal epidural hematoma, with no acute cervical soft tissue abnormalities. Sagittal and coronal reformatted images show no acute fracture or malalignment.    IMPRESSION: Negative for acute cervical spine fracture or subluxation.    Electronically signed by:  Luis Lemus MD  11/9/2022 3:55 PM CST Workstation: [id number]                                     CT Head Without Contrast (Final result)  Result time 11/09/22 15:53:25      Final result by Carlos Patel MD (11/09/22 15:53:25)                   Narrative:    CT head without contrast    CLINICAL DATA: Trauma    CMS MANDATED QUALITY DATA - CT RADIATION  436    All CT scans at this facility utilize dose modulation, iterative reconstruction, and/or weight based dosing when appropriate to reduce radiation dose to as low as reasonably achievable.    Findings: Thin section axial noncontrast images are compared to November 3, 2022    There is no intracranial mass, hemorrhage, or midline shift. Ventricles and sulci are prominent and unchanged, consistent with moderate to marked  atrophy. There are no pathologic extra-axial fluid collections.    There is no evidence of acute ischemic change. Changes of chronic microvascular white matter disease are noted, with multiple small chronic lacunar infarcts noted at the bilateral basal ganglia. The cerebellum and brainstem are unremarkable.    The calvarium is intact.    IMPRESSION:  1. Chronic findings as above. No acute intracranial abnormalities.    Electronically signed by:  Carlos Patel MD  11/9/2022 3:53 PM CST Workstation: [id number]                                     XR Ribs Min 3 views w/PA Chest Left (Final result)  Result time 11/09/22 15:33:32      Final result by Magdalena Salazar MD (11/09/22 15:33:32)                   Narrative:    PA chest with left RIBS Is compared to prior study dated 10/9/2022    Clinical history is pain after fall    The heart is enlarged. There is a left subclavian vein pacemaker with lead tips overlying the right atrium and right ventricle. There is a heart monitor.  There are small bilateral pleural effusions. There is no pneumothorax.    There are no left rib fractures. There are old right rib fractures. There are degenerative changes of the spine.    IMPRESSION: Cardiomegaly with small pleural effusions    No evidence of left rib fracture    Multiple old right rib fractures    Electronically signed by:  Magdalena Salazar MD  11/9/2022 3:33 PM CST Workstation: [id number]                                     Medications   furosemide injection 20 mg (20 mg Intravenous Given 11/9/22 1911)                              Clinical Impression:   Final diagnoses:  [W19.XXXA] Fall  [R06.02] Shortness of breath (Primary)  [I50.41] Acute combined systolic and diastolic heart failure        ED Disposition Condition    Admit Stable                Tiff Castillo MD  Resident  11/09/22 2008

## 2022-11-09 NOTE — FIRST PROVIDER EVALUATION
"Medical screening examination initiated.  I have conducted a focused provider triage encounter, findings are as follows:    Brief history of present illness:  88 year old male with history of dementia presents to the ER from doctors offocie for further management due to concerns for new onset left upper back pain that radiates into head. Family reports on 11/03 pt had a fall and hit head. CT completed at this facility. Apparently this fall was also worked up at another facility bc he was told he also had multiple rib fx to left side and pneumonia. Family state he has been coughing, more left side chest and rib pain and PCP said they can't hear any breath sounds on left side so to go to the ER.". no additional falls. Worsening headache since falll.     Vitals:    11/09/22 1453   BP: (!) 151/94   Pulse: 74   Resp: 18   Temp: 98.3 °F (36.8 °C)   TempSrc: Oral   SpO2: 100%   Weight: 67.6 kg (149 lb)   Height: 5' 2" (1.575 m)       Pertinent physical exam: slightly labored breath sounds.     Brief workup plan:  see orders placed.     Preliminary workup initiated; this workup will be continued and followed by the physician or advanced practice provider that is assigned to the patient when roomed.  "

## 2022-11-10 PROBLEM — R29.6 FALLS FREQUENTLY: Status: ACTIVE | Noted: 2022-11-10

## 2022-11-10 PROBLEM — I50.41 ACUTE COMBINED SYSTOLIC (CONGESTIVE) AND DIASTOLIC (CONGESTIVE) HEART FAILURE: Status: ACTIVE | Noted: 2022-11-10

## 2022-11-10 LAB
ANION GAP SERPL CALC-SCNC: 13 MMOL/L (ref 8–16)
BASOPHILS # BLD AUTO: 0.02 K/UL (ref 0–0.2)
BASOPHILS NFR BLD: 0.3 % (ref 0–1.9)
BNP SERPL-MCNC: 2561 PG/ML (ref 0–99)
BUN SERPL-MCNC: 28 MG/DL (ref 8–23)
CALCIUM SERPL-MCNC: 8.7 MG/DL (ref 8.7–10.5)
CHLORIDE SERPL-SCNC: 102 MMOL/L (ref 95–110)
CO2 SERPL-SCNC: 25 MMOL/L (ref 23–29)
CREAT SERPL-MCNC: 1.5 MG/DL (ref 0.5–1.4)
DIFFERENTIAL METHOD: ABNORMAL
EOSINOPHIL # BLD AUTO: 0 K/UL (ref 0–0.5)
EOSINOPHIL NFR BLD: 0.5 % (ref 0–8)
ERYTHROCYTE [DISTWIDTH] IN BLOOD BY AUTOMATED COUNT: 15 % (ref 11.5–14.5)
EST. GFR  (NO RACE VARIABLE): 44.5 ML/MIN/1.73 M^2
ESTIMATED AVG GLUCOSE: 120 MG/DL (ref 68–131)
GLUCOSE SERPL-MCNC: 101 MG/DL (ref 70–110)
GLUCOSE SERPL-MCNC: 113 MG/DL (ref 70–110)
GLUCOSE SERPL-MCNC: 119 MG/DL (ref 70–110)
GLUCOSE SERPL-MCNC: 84 MG/DL (ref 70–110)
GLUCOSE SERPL-MCNC: 88 MG/DL (ref 70–110)
HBA1C MFR BLD: 5.8 % (ref 4.5–6.2)
HCT VFR BLD AUTO: 36.7 % (ref 40–54)
HGB BLD-MCNC: 12.1 G/DL (ref 14–18)
IMM GRANULOCYTES # BLD AUTO: 0.03 K/UL (ref 0–0.04)
IMM GRANULOCYTES NFR BLD AUTO: 0.5 % (ref 0–0.5)
LYMPHOCYTES # BLD AUTO: 1.6 K/UL (ref 1–4.8)
LYMPHOCYTES NFR BLD: 27.5 % (ref 18–48)
MAGNESIUM SERPL-MCNC: 2 MG/DL (ref 1.6–2.6)
MCH RBC QN AUTO: 33.5 PG (ref 27–31)
MCHC RBC AUTO-ENTMCNC: 33 G/DL (ref 32–36)
MCV RBC AUTO: 102 FL (ref 82–98)
MONOCYTES # BLD AUTO: 0.6 K/UL (ref 0.3–1)
MONOCYTES NFR BLD: 10.8 % (ref 4–15)
NEUTROPHILS # BLD AUTO: 3.5 K/UL (ref 1.8–7.7)
NEUTROPHILS NFR BLD: 60.4 % (ref 38–73)
NRBC BLD-RTO: 0 /100 WBC
PLATELET # BLD AUTO: 205 K/UL (ref 150–450)
PMV BLD AUTO: 10.9 FL (ref 9.2–12.9)
POTASSIUM SERPL-SCNC: 4.2 MMOL/L (ref 3.5–5.1)
RBC # BLD AUTO: 3.61 M/UL (ref 4.6–6.2)
SODIUM SERPL-SCNC: 140 MMOL/L (ref 136–145)
WBC # BLD AUTO: 5.81 K/UL (ref 3.9–12.7)

## 2022-11-10 PROCEDURE — 96376 TX/PRO/DX INJ SAME DRUG ADON: CPT

## 2022-11-10 PROCEDURE — 94761 N-INVAS EAR/PLS OXIMETRY MLT: CPT

## 2022-11-10 PROCEDURE — 99900035 HC TECH TIME PER 15 MIN (STAT)

## 2022-11-10 PROCEDURE — 96372 THER/PROPH/DIAG INJ SC/IM: CPT | Performed by: INTERNAL MEDICINE

## 2022-11-10 PROCEDURE — 25000003 PHARM REV CODE 250

## 2022-11-10 PROCEDURE — 96366 THER/PROPH/DIAG IV INF ADDON: CPT

## 2022-11-10 PROCEDURE — 25000242 PHARM REV CODE 250 ALT 637 W/ HCPCS: Performed by: INTERNAL MEDICINE

## 2022-11-10 PROCEDURE — G0378 HOSPITAL OBSERVATION PER HR: HCPCS

## 2022-11-10 PROCEDURE — 83880 ASSAY OF NATRIURETIC PEPTIDE: CPT | Performed by: INTERNAL MEDICINE

## 2022-11-10 PROCEDURE — 83036 HEMOGLOBIN GLYCOSYLATED A1C: CPT | Performed by: INTERNAL MEDICINE

## 2022-11-10 PROCEDURE — 80048 BASIC METABOLIC PNL TOTAL CA: CPT | Performed by: INTERNAL MEDICINE

## 2022-11-10 PROCEDURE — 96365 THER/PROPH/DIAG IV INF INIT: CPT

## 2022-11-10 PROCEDURE — 94640 AIRWAY INHALATION TREATMENT: CPT

## 2022-11-10 PROCEDURE — 92610 EVALUATE SWALLOWING FUNCTION: CPT

## 2022-11-10 PROCEDURE — 85025 COMPLETE CBC W/AUTO DIFF WBC: CPT | Performed by: INTERNAL MEDICINE

## 2022-11-10 PROCEDURE — 63600175 PHARM REV CODE 636 W HCPCS: Performed by: INTERNAL MEDICINE

## 2022-11-10 PROCEDURE — 83735 ASSAY OF MAGNESIUM: CPT | Performed by: INTERNAL MEDICINE

## 2022-11-10 PROCEDURE — 99900031 HC PATIENT EDUCATION (STAT)

## 2022-11-10 PROCEDURE — 25000003 PHARM REV CODE 250: Performed by: INTERNAL MEDICINE

## 2022-11-10 PROCEDURE — 63600175 PHARM REV CODE 636 W HCPCS

## 2022-11-10 PROCEDURE — 36415 COLL VENOUS BLD VENIPUNCTURE: CPT | Performed by: INTERNAL MEDICINE

## 2022-11-10 RX ORDER — IBUPROFEN 200 MG
24 TABLET ORAL
Status: DISCONTINUED | OUTPATIENT
Start: 2022-11-10 | End: 2022-11-11 | Stop reason: HOSPADM

## 2022-11-10 RX ORDER — ONDANSETRON 2 MG/ML
4 INJECTION INTRAMUSCULAR; INTRAVENOUS EVERY 6 HOURS PRN
Status: DISCONTINUED | OUTPATIENT
Start: 2022-11-10 | End: 2022-11-11 | Stop reason: HOSPADM

## 2022-11-10 RX ORDER — POLYETHYLENE GLYCOL 3350 17 G/17G
17 POWDER, FOR SOLUTION ORAL 2 TIMES DAILY PRN
Status: DISCONTINUED | OUTPATIENT
Start: 2022-11-10 | End: 2022-11-11 | Stop reason: HOSPADM

## 2022-11-10 RX ORDER — AMIODARONE HYDROCHLORIDE 200 MG/1
200 TABLET ORAL 2 TIMES DAILY
Status: DISCONTINUED | OUTPATIENT
Start: 2022-11-10 | End: 2022-11-10

## 2022-11-10 RX ORDER — CETIRIZINE HYDROCHLORIDE 10 MG/1
10 TABLET ORAL DAILY
Status: DISCONTINUED | OUTPATIENT
Start: 2022-11-10 | End: 2022-11-11 | Stop reason: HOSPADM

## 2022-11-10 RX ORDER — ACETAMINOPHEN AND CODEINE PHOSPHATE 300; 60 MG/1; MG/1
1 TABLET ORAL EVERY 6 HOURS PRN
Status: DISCONTINUED | OUTPATIENT
Start: 2022-11-10 | End: 2022-11-11 | Stop reason: HOSPADM

## 2022-11-10 RX ORDER — GLUCAGON 1 MG
1 KIT INJECTION
Status: DISCONTINUED | OUTPATIENT
Start: 2022-11-10 | End: 2022-11-11 | Stop reason: HOSPADM

## 2022-11-10 RX ORDER — CLOPIDOGREL BISULFATE 75 MG/1
75 TABLET ORAL DAILY
Status: DISCONTINUED | OUTPATIENT
Start: 2022-11-10 | End: 2022-11-11 | Stop reason: HOSPADM

## 2022-11-10 RX ORDER — LOSARTAN POTASSIUM 50 MG/1
50 TABLET ORAL 2 TIMES DAILY
Status: DISCONTINUED | OUTPATIENT
Start: 2022-11-10 | End: 2022-11-10

## 2022-11-10 RX ORDER — AMOXICILLIN 250 MG
1 CAPSULE ORAL 2 TIMES DAILY PRN
Status: DISCONTINUED | OUTPATIENT
Start: 2022-11-10 | End: 2022-11-11 | Stop reason: HOSPADM

## 2022-11-10 RX ORDER — TALC
3 POWDER (GRAM) TOPICAL NIGHTLY
Status: DISCONTINUED | OUTPATIENT
Start: 2022-11-10 | End: 2022-11-11 | Stop reason: HOSPADM

## 2022-11-10 RX ORDER — ALBUTEROL SULFATE 0.83 MG/ML
2.5 SOLUTION RESPIRATORY (INHALATION)
Status: DISCONTINUED | OUTPATIENT
Start: 2022-11-10 | End: 2022-11-11 | Stop reason: HOSPADM

## 2022-11-10 RX ORDER — MIRTAZAPINE 15 MG/1
15 TABLET, FILM COATED ORAL NIGHTLY
Status: DISCONTINUED | OUTPATIENT
Start: 2022-11-10 | End: 2022-11-10

## 2022-11-10 RX ORDER — METOPROLOL SUCCINATE 50 MG/1
50 TABLET, EXTENDED RELEASE ORAL 2 TIMES DAILY
Status: DISCONTINUED | OUTPATIENT
Start: 2022-11-10 | End: 2022-11-10

## 2022-11-10 RX ORDER — ESCITALOPRAM OXALATE 10 MG/1
10 TABLET ORAL DAILY
Status: DISCONTINUED | OUTPATIENT
Start: 2022-11-10 | End: 2022-11-11 | Stop reason: HOSPADM

## 2022-11-10 RX ORDER — CYANOCOBALAMIN 1000 UG/ML
1000 INJECTION, SOLUTION INTRAMUSCULAR; SUBCUTANEOUS
Status: DISCONTINUED | OUTPATIENT
Start: 2022-11-10 | End: 2022-11-10

## 2022-11-10 RX ORDER — TALC
6 POWDER (GRAM) TOPICAL NIGHTLY PRN
Status: DISCONTINUED | OUTPATIENT
Start: 2022-11-10 | End: 2022-11-11 | Stop reason: HOSPADM

## 2022-11-10 RX ORDER — NALOXONE HCL 0.4 MG/ML
0.02 VIAL (ML) INJECTION
Status: DISCONTINUED | OUTPATIENT
Start: 2022-11-10 | End: 2022-11-11 | Stop reason: HOSPADM

## 2022-11-10 RX ORDER — HYDROCODONE BITARTRATE AND ACETAMINOPHEN 5; 325 MG/1; MG/1
1 TABLET ORAL EVERY 6 HOURS PRN
Status: DISCONTINUED | OUTPATIENT
Start: 2022-11-10 | End: 2022-11-11 | Stop reason: HOSPADM

## 2022-11-10 RX ORDER — METOPROLOL SUCCINATE 50 MG/1
50 TABLET, EXTENDED RELEASE ORAL 2 TIMES DAILY
Status: DISCONTINUED | OUTPATIENT
Start: 2022-11-10 | End: 2022-11-11 | Stop reason: HOSPADM

## 2022-11-10 RX ORDER — LEVOTHYROXINE SODIUM 25 UG/1
75 TABLET ORAL
Status: DISCONTINUED | OUTPATIENT
Start: 2022-11-10 | End: 2022-11-11 | Stop reason: HOSPADM

## 2022-11-10 RX ORDER — ALBUTEROL SULFATE 0.83 MG/ML
2.5 SOLUTION RESPIRATORY (INHALATION) EVERY 4 HOURS
Status: DISCONTINUED | OUTPATIENT
Start: 2022-11-10 | End: 2022-11-10

## 2022-11-10 RX ORDER — AMIODARONE HYDROCHLORIDE 200 MG/1
200 TABLET ORAL 2 TIMES DAILY
Status: DISCONTINUED | OUTPATIENT
Start: 2022-11-10 | End: 2022-11-11 | Stop reason: HOSPADM

## 2022-11-10 RX ORDER — ATORVASTATIN CALCIUM 40 MG/1
40 TABLET, FILM COATED ORAL DAILY
Status: DISCONTINUED | OUTPATIENT
Start: 2022-11-10 | End: 2022-11-11 | Stop reason: HOSPADM

## 2022-11-10 RX ORDER — IBUPROFEN 200 MG
16 TABLET ORAL
Status: DISCONTINUED | OUTPATIENT
Start: 2022-11-10 | End: 2022-11-11 | Stop reason: HOSPADM

## 2022-11-10 RX ORDER — ACETAMINOPHEN 325 MG/1
650 TABLET ORAL EVERY 8 HOURS PRN
Status: DISCONTINUED | OUTPATIENT
Start: 2022-11-10 | End: 2022-11-10

## 2022-11-10 RX ORDER — PANTOPRAZOLE SODIUM 40 MG/1
40 TABLET, DELAYED RELEASE ORAL 2 TIMES DAILY
Status: DISCONTINUED | OUTPATIENT
Start: 2022-11-10 | End: 2022-11-11 | Stop reason: HOSPADM

## 2022-11-10 RX ORDER — FUROSEMIDE 10 MG/ML
20 INJECTION INTRAMUSCULAR; INTRAVENOUS
Status: COMPLETED | OUTPATIENT
Start: 2022-11-10 | End: 2022-11-10

## 2022-11-10 RX ORDER — MIRTAZAPINE 15 MG/1
15 TABLET, FILM COATED ORAL NIGHTLY
Status: DISCONTINUED | OUTPATIENT
Start: 2022-11-10 | End: 2022-11-11 | Stop reason: HOSPADM

## 2022-11-10 RX ORDER — SIMETHICONE 80 MG
1 TABLET,CHEWABLE ORAL 4 TIMES DAILY PRN
Status: DISCONTINUED | OUTPATIENT
Start: 2022-11-10 | End: 2022-11-11 | Stop reason: HOSPADM

## 2022-11-10 RX ORDER — NITROGLYCERIN 0.4 MG/1
0.4 TABLET SUBLINGUAL EVERY 5 MIN PRN
Status: DISCONTINUED | OUTPATIENT
Start: 2022-11-10 | End: 2022-11-11 | Stop reason: HOSPADM

## 2022-11-10 RX ADMIN — FUROSEMIDE 2.5 MG/HR: 10 INJECTION, SOLUTION INTRAVENOUS at 10:11

## 2022-11-10 RX ADMIN — MIRTAZAPINE 15 MG: 15 TABLET, FILM COATED ORAL at 08:11

## 2022-11-10 RX ADMIN — MIRTAZAPINE 15 MG: 15 TABLET, FILM COATED ORAL at 01:11

## 2022-11-10 RX ADMIN — HYDROCODONE BITARTRATE AND ACETAMINOPHEN 1 TABLET: 5; 325 TABLET ORAL at 01:11

## 2022-11-10 RX ADMIN — Medication 6 MG: at 08:11

## 2022-11-10 RX ADMIN — CYANOCOBALAMIN 1000 MCG: 1000 INJECTION, SOLUTION INTRAMUSCULAR; SUBCUTANEOUS at 10:11

## 2022-11-10 RX ADMIN — PANTOPRAZOLE SODIUM 40 MG: 40 TABLET, DELAYED RELEASE ORAL at 06:11

## 2022-11-10 RX ADMIN — ALBUTEROL SULFATE 2.5 MG: 2.5 SOLUTION RESPIRATORY (INHALATION) at 01:11

## 2022-11-10 RX ADMIN — AMIODARONE HYDROCHLORIDE 200 MG: 200 TABLET ORAL at 08:11

## 2022-11-10 RX ADMIN — METOPROLOL SUCCINATE 50 MG: 50 TABLET, FILM COATED, EXTENDED RELEASE ORAL at 08:11

## 2022-11-10 RX ADMIN — ALBUTEROL SULFATE 2.5 MG: 2.5 SOLUTION RESPIRATORY (INHALATION) at 08:11

## 2022-11-10 RX ADMIN — METOPROLOL SUCCINATE 50 MG: 50 TABLET, FILM COATED, EXTENDED RELEASE ORAL at 01:11

## 2022-11-10 RX ADMIN — FUROSEMIDE 20 MG: 10 INJECTION, SOLUTION INTRAMUSCULAR; INTRAVENOUS at 01:11

## 2022-11-10 RX ADMIN — LOSARTAN POTASSIUM 50 MG: 50 TABLET, FILM COATED ORAL at 01:11

## 2022-11-10 RX ADMIN — AMIODARONE HYDROCHLORIDE 200 MG: 200 TABLET ORAL at 09:11

## 2022-11-10 RX ADMIN — ESCITALOPRAM OXALATE 10 MG: 10 TABLET ORAL at 09:11

## 2022-11-10 RX ADMIN — AMIODARONE HYDROCHLORIDE 200 MG: 200 TABLET ORAL at 01:11

## 2022-11-10 RX ADMIN — ATORVASTATIN CALCIUM 40 MG: 40 TABLET, FILM COATED ORAL at 08:11

## 2022-11-10 RX ADMIN — PANTOPRAZOLE SODIUM 40 MG: 40 TABLET, DELAYED RELEASE ORAL at 05:11

## 2022-11-10 RX ADMIN — LEVOTHYROXINE SODIUM 75 MCG: 0.03 TABLET ORAL at 06:11

## 2022-11-10 RX ADMIN — Medication 3 MG: at 01:11

## 2022-11-10 RX ADMIN — ALBUTEROL SULFATE 2.5 MG: 2.5 SOLUTION RESPIRATORY (INHALATION) at 03:11

## 2022-11-10 RX ADMIN — CETIRIZINE HYDROCHLORIDE 10 MG: 10 TABLET, FILM COATED ORAL at 09:11

## 2022-11-10 RX ADMIN — CLOPIDOGREL BISULFATE 75 MG: 75 TABLET, FILM COATED ORAL at 09:11

## 2022-11-10 RX ADMIN — ALBUTEROL SULFATE 2.5 MG: 2.5 SOLUTION RESPIRATORY (INHALATION) at 07:11

## 2022-11-10 RX ADMIN — METOPROLOL SUCCINATE 50 MG: 50 TABLET, FILM COATED, EXTENDED RELEASE ORAL at 09:11

## 2022-11-10 RX ADMIN — LOSARTAN POTASSIUM 50 MG: 50 TABLET, FILM COATED ORAL at 09:11

## 2022-11-10 NOTE — CARE UPDATE
11/10/22 0741   Patient Assessment/Suction   Level of Consciousness (AVPU) alert   Respiratory Effort Unlabored;Normal   Expansion/Accessory Muscles/Retractions expansion symmetric   All Lung Fields Breath Sounds diminished   Rhythm/Pattern, Respiratory unlabored   PRE-TX-O2   O2 Device (Oxygen Therapy) room air   SpO2 96 %   Pulse Oximetry Type Intermittent   Pulse 74   Resp 16   Positioning   Body Position position changed independently   Head of Bed (HOB) Positioning HOB elevated;HOB at 30-45 degrees   Aerosol Therapy   $ Aerosol Therapy Charges Aerosol Treatment   Daily Review of Necessity (SVN) completed   Respiratory Treatment Status (SVN) given   Treatment Route (SVN) mask;oxygen   Patient Position (SVN) semi-Craig's   Post Treatment Assessment (SVN) increased aeration   Signs of Intolerance (SVN) none   Breath Sounds Post-Respiratory Treatment   Throughout All Fields Post-Treatment All Fields   Throughout All Fields Post-Treatment aeration increased   Post-treatment Heart Rate (beats/min) 74   Post-treatment Resp Rate (breaths/min) 16   Education   $ Education Bronchodilator;15 min   Respiratory Evaluation   $ Care Plan Tech Time 15 min

## 2022-11-10 NOTE — H&P
Critical access hospital Medicine  History & Physical    Patient Name: Gene Hartman  MRN: [id number]  Patient Class: OP- Observation  Admission Date: 11/9/2022  Attending Physician: Navya Still MD   Primary Care Provider: Mariajose Thorne MD         Patient information was obtained from patient, past medical records and ER records.     Subjective:     Principal Problem:Elevated brain natriuretic peptide (BNP) level    Chief Complaint:   Chief Complaint   Patient presents with    Back Pain     Back pain that starts and radiates to his head.  Pt had a fall last Wednesday and was dx with rib fx.        HPI: The patient is an 88-year-old male, who presents to the emergency room with complaint of back pain and fall and was diagnosed with rib fractures.  On exam, patient was noted to have a high BNP and he carries no diagnosis of heart failure.  He is followed by Dr. Betancourt, his primary cardiologist, for pacemaker and was seen about 2 weeks ago.  He has a past history of dementia and Parkinson's disease.  His medication was adjusted due to affecting heart rate.  He lives at home with his wife and primary caretaker is his daughter.  His BNP was noted to be 1149 and his troponin was elevated at 0.5.  He denies any chest pain nausea vomiting and only complains of this back pain which is reproducible.  EKG shows no change.    Plan to admit observation diurese obtain echocardiogram and consult Cardiology      Past Medical History:   Diagnosis Date    Abnormal echocardiogram 11/21/2019    Normal left ventricular systolic function with estimated ejection fraction of 60%.  Grade 2 moderate left ventricular diastolic dysfunction consistent with pseudonormalization.  Mild left atrial enlargement.  Mild aortic regurgitation.  Mild to moderate mitral regurgitation.  Mild tricuspid regurgitation.  Estimated PA systolic pressure is 38 mm of mercury.  Diagnosis is syncope.    Anxiety     Arthritis     CAD  (coronary artery disease) age 68    Carotid artery occlusion     Cerebrovascular small vessel disease     Colon polyps age 78    Coronary artery disease of native artery of native heart with stable angina pectoris 5/6/2020    GERD (gastroesophageal reflux disease)     H. pylori infection     HTN (hypertension), benign age 65    Hyperlipidemia LDL goal <70 age 65    Hypothyroidism     Multiple fractures of ribs of right side 11/27/2012    Myocardial infarction     Pernicious anemia 1/26/2018    Primary insomnia 10/9/2018    Prostate cancer age 67    Subdural hematoma 9/5/2022    Syncopal episodes 3/2013    Urge incontinence 5/8/2018       Past Surgical History:   Procedure Laterality Date    CARDIAC PACEMAKER PLACEMENT  10/2015    [employer] Pacemaker    CARDIAC SURGERY      CATARACT EXTRACTION W/  INTRAOCULAR LENS IMPLANT Bilateral     COLONOSCOPY  ~2013    Dr. Edge    COLONOSCOPY N/A 06/08/2016    Procedure: COLONOSCOPY;  Surgeon: Shamar Barahona MD;  Location: [facility];  Service: Endoscopy;  Laterality: N/A; REPEAT IN 1 YEAR    CORONARY ANGIOPLASTY WITH STENT PLACEMENT  2003    x 2 RCA    PROSTATECTOMY  2002    UPPER GASTROINTESTINAL ENDOSCOPY  11/15/2016    Dr. Barahona       Review of patient's allergies indicates:   Allergen Reactions    Iodinated contrast media Rash    Pontocaine [tetracaine hcl] Anaphylaxis     hypotension       No current facility-administered medications on file prior to encounter.     Current Outpatient Medications on File Prior to Encounter   Medication Sig    acetaminophen-codeine 300-60mg (TYLENOL #4) 300-60 mg Tab Take 1 tablet by mouth every 6 (six) hours as needed.    albuterol (PROVENTIL) 2.5 mg /3 mL (0.083 %) nebulizer solution Inhale 2.5 mg into the lungs.    amiodarone (PACERONE) 200 MG Tab Take 200 mg by mouth 2 (two) times daily.    amoxicillin-clavulanate (AUGMENTIN) 400-57 mg/5 mL SusR Take 400 mg by mouth 2 (two) times a day.     atorvastatin (LIPITOR) 40 MG tablet Take 1 tablet (40 mg total) by mouth once daily.    cetirizine (ZYRTEC) 10 MG tablet Take 1 tablet (10 mg total) by mouth once daily.    clopidogrel (PLAVIX) 75 mg tablet Take 1 tablet (75 mg total) by mouth once daily.    cyanocobalamin, vitamin B-12, (VITAMIN B-12) 5,000 mcg Subl Place 1 tablet under the tongue once daily.    cycloSPORINE (RESTASIS) 0.05 % ophthalmic emulsion Apply 1 drop to eye 2 (two) times daily.    EScitalopram oxalate (LEXAPRO) 10 MG tablet Take 10 mg by mouth once daily.    fluticasone propionate (FLONASE) 50 mcg/actuation nasal spray USE 1 SPRAY IN EACH NOSTRIL TWICE DAILY (Patient taking differently: 1 spray by Each Nostril route 2 (two) times a day.)    HYDROcodone-acetaminophen (NORCO) 5-325 mg per tablet Take 1 tablet by mouth every 6 (six) hours as needed for Pain.    levothyroxine (SYNTHROID) 75 MCG tablet Take 75 mcg by mouth before breakfast.    losartan (COZAAR) 50 MG tablet Take 1 tablet (50 mg total) by mouth 2 (two) times daily.    melatonin 5 mg Subl Take 2 tablets by mouth nightly as needed.    metoprolol succinate (TOPROL-XL) 50 MG 24 hr tablet Take 1 tablet (50 mg total) by mouth 2 (two) times daily.    mirtazapine (REMERON) 15 MG tablet Take 15 mg by mouth every evening.    nitroGLYCERIN (NITROSTAT) 0.4 MG SL tablet Place 0.4 mg under the tongue every 5 (five) minutes as needed.    pantoprazole (PROTONIX) 40 MG tablet Take 40 mg by mouth 2 (two) times daily.    acetaminophen (TYLENOL ARTHRITIS PAIN ORAL) Take 2 tablets by mouth 2 (two) times a day.    cloNIDine (CATAPRES) 0.1 MG tablet Take 0.1 mg by mouth daily as needed.    sotaloL (BETAPACE) 80 MG tablet Take 80 mg by mouth.    tamsulosin (FLOMAX) 0.4 mg Cap Take 1 capsule by mouth once daily.     Family History       Problem Relation (Age of Onset)    Diabetes Son    Heart disease Father (56), Brother, Brother    Heart failure Father, Brother    Hypertension Son     Mental illness Brother    Migraines Mother    No Known Problems Son          Tobacco Use    Smoking status: Never    Smokeless tobacco: Never   Substance and Sexual Activity    Alcohol use: No    Drug use: No    Sexual activity: Not Currently     Review of Systems   Constitutional: Negative.    HENT: Negative.     Eyes: Negative.    Respiratory:  Positive for shortness of breath.    Cardiovascular: Negative.    Gastrointestinal: Negative.    Endocrine: Negative.    Genitourinary: Negative.    Musculoskeletal:  Positive for back pain.   Allergic/Immunologic: Negative.    Neurological: Negative.    Hematological: Negative.    Objective:     Vital Signs (Most Recent):  Temp: 98.3 °F (36.8 °C) (11/09/22 1453)  Pulse: 73 (11/09/22 2300)  Resp: 20 (11/09/22 2300)  BP: (!) 155/90 (11/09/22 2300)  SpO2: 95 % (11/09/22 2300)   Vital Signs (24h Range):  Temp:  [98.3 °F (36.8 °C)] 98.3 °F (36.8 °C)  Pulse:  [70-80] 73  Resp:  [16-26] 20  SpO2:  [92 %-100 %] 95 %  BP: (151-205)/() 155/90     Weight: (P) 66.5 kg (146 lb 9.7 oz)  Body mass index is 26.81 kg/m² (pended).    Physical Exam  Vitals and nursing note reviewed. Exam conducted with a chaperone present.   HENT:      Head: Normocephalic and atraumatic.   Eyes:      Conjunctiva/sclera: Conjunctivae normal.      Pupils: Pupils are equal, round, and reactive to light.   Cardiovascular:      Rate and Rhythm: Normal rate and regular rhythm.   Pulmonary:      Effort: Pulmonary effort is normal.      Breath sounds: Normal breath sounds.   Abdominal:      General: Bowel sounds are normal.      Palpations: Abdomen is soft.   Musculoskeletal:         General: Tenderness present.      Cervical back: Normal range of motion.   Skin:     General: Skin is warm.      Capillary Refill: Capillary refill takes less than 2 seconds.   Neurological:      General: No focal deficit present.      Mental Status: He is alert.         CRANIAL NERVES     CN III, IV, VI   Pupils are equal,  round, and reactive to light.     Significant Labs: All pertinent labs within the past 24 hours have been reviewed.    Significant Imaging: I have reviewed all pertinent imaging results/findings within the past 24 hours.    Assessment/Plan:     * Elevated brain natriuretic peptide (BNP) level  Elevated BNP  Check a echocardiogram  Consult Cardiology  Gentle diuresis      Dementia without behavioral disturbance  Stable      Pacemaker; originally placed two years   Placed 2 years ago  Functioning normally  Recently evaluated per Cardiology      CAD (coronary artery disease)  Known coronary artery disease  No chest pain at present  Suspect elevated troponin type 2  Probably related to new onset heart failure      Hyperlipidemia LDL goal <70  Obtain fasting lipid panel  Continue present treatment      HTN (hypertension), benign  Monitor and treat        VTE Risk Mitigation (From admission, onward)         Ordered     IP VTE HIGH RISK PATIENT  Once         11/10/22 0007     Place sequential compression device  Until discontinued         11/10/22 0007                   Navya Still MD  Department of Hospital Medicine   [facility]

## 2022-11-10 NOTE — SUBJECTIVE & OBJECTIVE
Past Medical History:   Diagnosis Date    Abnormal echocardiogram 11/21/2019    Normal left ventricular systolic function with estimated ejection fraction of 60%.  Grade 2 moderate left ventricular diastolic dysfunction consistent with pseudonormalization.  Mild left atrial enlargement.  Mild aortic regurgitation.  Mild to moderate mitral regurgitation.  Mild tricuspid regurgitation.  Estimated PA systolic pressure is 38 mm of mercury.  Diagnosis is syncope.    Anxiety     Arthritis     CAD (coronary artery disease) age 68    Carotid artery occlusion     Cerebrovascular small vessel disease     Colon polyps age 78    Coronary artery disease of native artery of native heart with stable angina pectoris 5/6/2020    GERD (gastroesophageal reflux disease)     H. pylori infection     HTN (hypertension), benign age 65    Hyperlipidemia LDL goal <70 age 65    Hypothyroidism     Multiple fractures of ribs of right side 11/27/2012    Myocardial infarction     Pernicious anemia 1/26/2018    Primary insomnia 10/9/2018    Prostate cancer age 67    Subdural hematoma 9/5/2022    Syncopal episodes 3/2013    Urge incontinence 5/8/2018       Past Surgical History:   Procedure Laterality Date    CARDIAC PACEMAKER PLACEMENT  10/2015    [employer] Pacemaker    CARDIAC SURGERY      CATARACT EXTRACTION W/  INTRAOCULAR LENS IMPLANT Bilateral     COLONOSCOPY  ~2013    Dr. Edge    COLONOSCOPY N/A 06/08/2016    Procedure: COLONOSCOPY;  Surgeon: Shamar Barahona MD;  Location: [facility];  Service: Endoscopy;  Laterality: N/A; REPEAT IN 1 YEAR    CORONARY ANGIOPLASTY WITH STENT PLACEMENT  2003    x 2 RCA    PROSTATECTOMY  2002    UPPER GASTROINTESTINAL ENDOSCOPY  11/15/2016    Dr. Barahona       Review of patient's allergies indicates:   Allergen Reactions    Iodinated contrast media Rash    Pontocaine [tetracaine hcl] Anaphylaxis     hypotension       No current facility-administered medications on file prior to encounter.     Current  Outpatient Medications on File Prior to Encounter   Medication Sig    acetaminophen-codeine 300-60mg (TYLENOL #4) 300-60 mg Tab Take 1 tablet by mouth every 6 (six) hours as needed.    albuterol (PROVENTIL) 2.5 mg /3 mL (0.083 %) nebulizer solution Inhale 2.5 mg into the lungs.    amiodarone (PACERONE) 200 MG Tab Take 200 mg by mouth 2 (two) times daily.    amoxicillin-clavulanate (AUGMENTIN) 400-57 mg/5 mL SusR Take 400 mg by mouth 2 (two) times a day.    atorvastatin (LIPITOR) 40 MG tablet Take 1 tablet (40 mg total) by mouth once daily.    cetirizine (ZYRTEC) 10 MG tablet Take 1 tablet (10 mg total) by mouth once daily.    clopidogrel (PLAVIX) 75 mg tablet Take 1 tablet (75 mg total) by mouth once daily.    cyanocobalamin, vitamin B-12, (VITAMIN B-12) 5,000 mcg Subl Place 1 tablet under the tongue once daily.    cycloSPORINE (RESTASIS) 0.05 % ophthalmic emulsion Apply 1 drop to eye 2 (two) times daily.    EScitalopram oxalate (LEXAPRO) 10 MG tablet Take 10 mg by mouth once daily.    fluticasone propionate (FLONASE) 50 mcg/actuation nasal spray USE 1 SPRAY IN EACH NOSTRIL TWICE DAILY (Patient taking differently: 1 spray by Each Nostril route 2 (two) times a day.)    HYDROcodone-acetaminophen (NORCO) 5-325 mg per tablet Take 1 tablet by mouth every 6 (six) hours as needed for Pain.    levothyroxine (SYNTHROID) 75 MCG tablet Take 75 mcg by mouth before breakfast.    losartan (COZAAR) 50 MG tablet Take 1 tablet (50 mg total) by mouth 2 (two) times daily.    melatonin 5 mg Subl Take 2 tablets by mouth nightly as needed.    metoprolol succinate (TOPROL-XL) 50 MG 24 hr tablet Take 1 tablet (50 mg total) by mouth 2 (two) times daily.    mirtazapine (REMERON) 15 MG tablet Take 15 mg by mouth every evening.    nitroGLYCERIN (NITROSTAT) 0.4 MG SL tablet Place 0.4 mg under the tongue every 5 (five) minutes as needed.    pantoprazole (PROTONIX) 40 MG tablet Take 40 mg by mouth 2 (two) times daily.    acetaminophen (TYLENOL  ARTHRITIS PAIN ORAL) Take 2 tablets by mouth 2 (two) times a day.    cloNIDine (CATAPRES) 0.1 MG tablet Take 0.1 mg by mouth daily as needed.    sotaloL (BETAPACE) 80 MG tablet Take 80 mg by mouth.    tamsulosin (FLOMAX) 0.4 mg Cap Take 1 capsule by mouth once daily.     Family History       Problem Relation (Age of Onset)    Diabetes Son    Heart disease Father (56), Brother, Brother    Heart failure Father, Brother    Hypertension Son    Mental illness Brother    Migraines Mother    No Known Problems Son          Tobacco Use    Smoking status: Never    Smokeless tobacco: Never   Substance and Sexual Activity    Alcohol use: No    Drug use: No    Sexual activity: Not Currently     Review of Systems   Constitutional: Negative.    HENT: Negative.     Eyes: Negative.    Respiratory:  Positive for shortness of breath.    Cardiovascular: Negative.    Gastrointestinal: Negative.    Endocrine: Negative.    Genitourinary: Negative.    Musculoskeletal:  Positive for back pain.   Allergic/Immunologic: Negative.    Neurological: Negative.    Hematological: Negative.    Objective:     Vital Signs (Most Recent):  Temp: 98.3 °F (36.8 °C) (11/09/22 1453)  Pulse: 73 (11/09/22 2300)  Resp: 20 (11/09/22 2300)  BP: (!) 155/90 (11/09/22 2300)  SpO2: 95 % (11/09/22 2300)   Vital Signs (24h Range):  Temp:  [98.3 °F (36.8 °C)] 98.3 °F (36.8 °C)  Pulse:  [70-80] 73  Resp:  [16-26] 20  SpO2:  [92 %-100 %] 95 %  BP: (151-205)/() 155/90     Weight: (P) 66.5 kg (146 lb 9.7 oz)  Body mass index is 26.81 kg/m² (pended).    Physical Exam  Vitals and nursing note reviewed. Exam conducted with a chaperone present.   HENT:      Head: Normocephalic and atraumatic.   Eyes:      Conjunctiva/sclera: Conjunctivae normal.      Pupils: Pupils are equal, round, and reactive to light.   Cardiovascular:      Rate and Rhythm: Normal rate and regular rhythm.   Pulmonary:      Effort: Pulmonary effort is normal.      Breath sounds: Normal breath sounds.    Abdominal:      General: Bowel sounds are normal.      Palpations: Abdomen is soft.   Musculoskeletal:         General: Tenderness present.      Cervical back: Normal range of motion.   Skin:     General: Skin is warm.      Capillary Refill: Capillary refill takes less than 2 seconds.   Neurological:      General: No focal deficit present.      Mental Status: He is alert.         CRANIAL NERVES     CN III, IV, VI   Pupils are equal, round, and reactive to light.     Significant Labs: All pertinent labs within the past 24 hours have been reviewed.    Significant Imaging: I have reviewed all pertinent imaging results/findings within the past 24 hours.

## 2022-11-10 NOTE — CARE UPDATE
11/10/22 0328   Patient Assessment/Suction   Level of Consciousness (AVPU) alert   Respiratory Effort Normal;Unlabored   Expansion/Accessory Muscles/Retractions no use of accessory muscles   All Lung Fields Breath Sounds Anterior:;Posterior:;clear   Rhythm/Pattern, Respiratory unlabored   PRE-TX-O2   O2 Device (Oxygen Therapy) room air   SpO2 97 %   Pulse Oximetry Type Intermittent   $ Pulse Oximetry - Multiple Charge Pulse Oximetry - Multiple   Pulse 68   Resp 16   Aerosol Therapy   $ Aerosol Therapy Charges Aerosol Treatment   Daily Review of Necessity (SVN) completed   Respiratory Treatment Status (SVN) given   Treatment Route (SVN) mask;oxygen   Patient Position (SVN) HOB elevated   Post Treatment Assessment (SVN) increased aeration   Signs of Intolerance (SVN) none   Breath Sounds Post-Respiratory Treatment   Post-treatment Heart Rate (beats/min) 68   Post-treatment Resp Rate (breaths/min) 18   Education   $ Education Bronchodilator;15 min   Respiratory Evaluation   $ Care Plan Tech Time 15 min

## 2022-11-10 NOTE — PT/OT/SLP EVAL
Speech Language Pathology Evaluation  Bedside Swallow    Patient Name:  Gene Hartman   MRN:  [id number]  Admitting Diagnosis: Elevated brain natriuretic peptide (BNP) level    Recommendations:                 General Recommendations:  Follow-up not indicated  Diet recommendations:  Soft & Bite Sized Diet - IDDSI Level 6, Other (Comment) (meats with gravy), Thin   Aspiration Precautions: 1 bite/sip at a time, Alternating bites/sips, Eliminate distractions, HOB to 90 degrees, Meds whole 1 at a time, Remain upright 30 minutes post meal, Small bites/sips, and Standard aspiration precautions   General Precautions: Standard, fall  Communication strategies:  none    History:   Gene Hartman is a 88 y.o. male with an admitting diagnosis of elevated BPN level. Pt with hx of dementia, Parkinson's, CAD, HTN, GERD, and subdural hematoma. Pt reporting that he does not have difficulties swallowing; however, his family reports that pt frequently experiences prolonged mastication, especially with protein. Family also reporting pt used to eat all textures, but now the pt prefers softer textures. Family reporting pt at baseline cognitively with no changes in cognitive-linguistic status.     Past Medical History:   Diagnosis Date    Abnormal echocardiogram 11/21/2019    Normal left ventricular systolic function with estimated ejection fraction of 60%.  Grade 2 moderate left ventricular diastolic dysfunction consistent with pseudonormalization.  Mild left atrial enlargement.  Mild aortic regurgitation.  Mild to moderate mitral regurgitation.  Mild tricuspid regurgitation.  Estimated PA systolic pressure is 38 mm of mercury.  Diagnosis is syncope.    Anxiety     Arthritis     CAD (coronary artery disease) age 68    Carotid artery occlusion     Cerebrovascular small vessel disease     Colon polyps age 78    Coronary artery disease of native artery of native heart with stable angina pectoris 5/6/2020    GERD (gastroesophageal reflux  disease)     H. pylori infection     HTN (hypertension), benign age 65    Hyperlipidemia LDL goal <70 age 65    Hypothyroidism     Multiple fractures of ribs of right side 11/27/2012    Myocardial infarction     Pernicious anemia 1/26/2018    Primary insomnia 10/9/2018    Prostate cancer age 67    Subdural hematoma 9/5/2022    Syncopal episodes 3/2013    Urge incontinence 5/8/2018       Past Surgical History:   Procedure Laterality Date    CARDIAC PACEMAKER PLACEMENT  10/2015    [employer] Pacemaker    CARDIAC SURGERY      CATARACT EXTRACTION W/  INTRAOCULAR LENS IMPLANT Bilateral     COLONOSCOPY  ~2013    Dr. Edge    COLONOSCOPY N/A 06/08/2016    Procedure: COLONOSCOPY;  Surgeon: Shamar Barahona MD;  Location: [facility];  Service: Endoscopy;  Laterality: N/A; REPEAT IN 1 YEAR    CORONARY ANGIOPLASTY WITH STENT PLACEMENT  2003    x 2 RCA    PROSTATECTOMY  2002    UPPER GASTROINTESTINAL ENDOSCOPY  11/15/2016    Dr. Barahona       Social History: Patient lives with wife.    Prior Intubation HX:  none this admit    Modified Barium Swallow: none found in Epic    Chest X-Rays:   Imaging Results              CT Cervical Spine Without Contrast (Final result)  Result time 11/09/22 15:55:12      Final result by Luis Lemus MD (11/09/22 15:55:12)                   Narrative:    CMS MANDATED QUALITY DATA-CT RADIATION DOSE-436  All CT scans at this facility use dose modulation, iterative reconstruction, and or weight based dosing when appropriate, to reduce radiation dose to as low as reasonably achievable.    HISTORY: Neck trauma (Age >= 65y)    FINDINGS: Thin axial imaging was performed without contrast, with sagittal and coronal reformatted images reviewed.  Comparison to multiple prior exams.    The cervical spine has normal alignment and curvature, with no acute fractures or destructive osseous lesions. There is moderate to advanced multilevel intervertebral disc space narrowing with osteophytes, with multilevel  cervical facet arthropathy. There is osseous fusion across the disc space and facet articulations at C4-C5, with no interfacetal subluxation or dislocation.    The craniocervical junction and prevertebral soft tissues are normal. There are carotid arterial vascular calcifications. There is no evidence of spinal epidural hematoma, with no acute cervical soft tissue abnormalities. Sagittal and coronal reformatted images show no acute fracture or malalignment.    IMPRESSION: Negative for acute cervical spine fracture or subluxation.    Electronically signed by:  Luis Lemus MD  11/9/2022 3:55 PM CST Workstation: [id number]                                     CT Head Without Contrast (Final result)  Result time 11/09/22 15:53:25      Final result by Carlos Patel MD (11/09/22 15:53:25)                   Narrative:    CT head without contrast    CLINICAL DATA: Trauma    CMS MANDATED QUALITY DATA - CT RADIATION  436    All CT scans at this facility utilize dose modulation, iterative reconstruction, and/or weight based dosing when appropriate to reduce radiation dose to as low as reasonably achievable.    Findings: Thin section axial noncontrast images are compared to November 3, 2022    There is no intracranial mass, hemorrhage, or midline shift. Ventricles and sulci are prominent and unchanged, consistent with moderate to marked atrophy. There are no pathologic extra-axial fluid collections.    There is no evidence of acute ischemic change. Changes of chronic microvascular white matter disease are noted, with multiple small chronic lacunar infarcts noted at the bilateral basal ganglia. The cerebellum and brainstem are unremarkable.    The calvarium is intact.    IMPRESSION:  1. Chronic findings as above. No acute intracranial abnormalities.    Electronically signed by:  Carlos Patel MD  11/9/2022 3:53 PM CST Workstation: [id number]                                     XR Ribs Min 3 views w/PA Chest Left (Final  result)  Result time 11/09/22 15:33:32      Final result by Magdalena Salazar MD (11/09/22 15:33:32)                   Narrative:    PA chest with left RIBS Is compared to prior study dated 10/9/2022    Clinical history is pain after fall    The heart is enlarged. There is a left subclavian vein pacemaker with lead tips overlying the right atrium and right ventricle. There is a heart monitor.  There are small bilateral pleural effusions. There is no pneumothorax.    There are no left rib fractures. There are old right rib fractures. There are degenerative changes of the spine.    IMPRESSION: Cardiomegaly with small pleural effusions    No evidence of left rib fracture    Multiple old right rib fractures    Electronically signed by:  Magdalena Salazar MD  11/9/2022 3:33 PM CST Workstation: [id number]                                     Prior diet: Cardiac diet, regular textures since admit. Regular, mostly soft textures including finely chopped, tender meats, with thin liquids at home, per pt's family.    Occupation/hobbies/homemaking: none stated.    Subjective     Pt reclined in bed with wife and son at bedside upon entering. Pt agreeable to BSE.  Patient goals: none stated     Pain/Comfort:       Respiratory Status: Room air    Objective:     Oral Musculature Evaluation  Oral Musculature: facial asymmetry present, left weakness  Dentition: upper and lower dentures  Secretion Management: adequate  Mucosal Quality: good  Mandibular Strength and Mobility: WFL  Oral Labial Strength and Mobility: WFL  Lingual Strength and Mobility: WFL  Velar Elevation: WFL  Buccal Strength and Mobility: other (see comments) (reduced L buccal strength)  Volitional Cough: elicited; adequate, clear  Volitional Swallow: palpated; reduced excursion, adequate elevation, timely initiation  Voice Prior to PO Intake: clear    Bedside Swallow Eval:   Consistencies Assessed:  Thin liquids ice chip x1, tsp water x2, cup edge water x1, single  straw sip water x4  Puree self-regulated tsp bites pudding x3  Mixed consistencies self-regulated tsp bites of peaches in juice x3  Solids self-regulated bites of cracker x2      Oral Phase:   WFL  MILD Lingual residue with dry cracker; cleared with liquid wash    Pharyngeal Phase:   no overt clinical signs/symptoms of aspiration  no overt clinical signs/symptoms of pharyngeal dysphagia    Compensatory Strategies  None    Treatment: Pt and family educated re purpose of BSE, role of SLP, results of BSE, diet recs, and swallowing precautions. Pt and family provided with written recs. Pt and family expressed understanding of recs.    Assessment:     Gene Hartman is a 88 y.o. male with an admitting diagnosis of  elevated BPN level .  He presents with adequate oral and pharyngeal phases of the swallow. Pt's family c/o prolonged mastication/avoidance of protein addressed with diet modification and swallowing precaution recs. Rec IDDSI level 6 with thin liquids. F/u not indicated.    Goals:   Multidisciplinary Problems       SLP Goals       Not on file                    Plan:     Patient to be seen:      Plan of Care expires:     Plan of Care reviewed with:  patient, spouse, son   SLP Follow-Up:  No       Discharge recommendations:  home   Barriers to Discharge:  None    Time Tracking:     SLP Treatment Date:   11/10/22  Speech Start Time:  1433  Speech Stop Time:  1457     Speech Total Time (min):  24 min    Billable Minutes: Eval Swallow and Oral Function 24 mins    11/10/2022

## 2022-11-10 NOTE — PLAN OF CARE
[facility]  Initial Discharge Assessment       Primary Care Provider: Mariajose Thorne MD    Admission Diagnosis: Fall [W19.XXXA]    Admission Date: 11/9/2022  Expected Discharge Date: 11/11/2022    Social work intern met with Pt at bedside to complete discharge assessment. Pt's daughter in law Maria C Hartman (Relative)   [phone]  also present at time and volunteered to participate.Pt AAOx4s. Demographics, PCP, and insurance verified. No home health. No dialysis. Pt reports ability to complete ADLs with the assistance of a wheelchair, rolling walker, rollator, and shower chair. Pt verbalized plan to discharge home via family transport. Pt has no other needs to be addressed at this time.      Discharge Barriers Identified: None    Payor: [employer] MEDICARE / Plan: [employer] MEDICARE HMO / Product Type: Capitation /     Extended Emergency Contact Information  Primary Emergency Contact: Maria C Hartman  Mobile Phone: [phone]  Relation: Relative  Preferred language: English   needed? No  Secondary Emergency Contact: Gene Hartman   Address: [address] [place] [address]  Mobile Phone: [phone]  Relation: Son  Preferred language: English   needed? No    Discharge Plan A: Home with family  Discharge Plan B: Home with family      [facility] - [facility] [address]  [address] [facility] [address]  Phone: [phone] Fax: [phone]    [id number]  [place] - [address] AT [facility] [address]  [address]  [place] [address]  Phone: [phone] Fax: [phone]    [id number] - [place] - [address]  [address]  [place] [address]  Phone: [phone] Fax: [phone]      Initial Assessment (most recent)       Adult Discharge Assessment - 11/10/22 1413          Discharge Assessment    Assessment Type  Discharge Planning Assessment     Confirmed/corrected address, phone number and insurance Yes     Confirmed Demographics Correct on Facesheet     Source of Information family;patient     Does patient/caregiver understand observation status Yes     Communicated PARDEEP with patient/caregiver Yes     Reason For Admission Elevated brain natriuretic peptide (BNP) level     Lives With child(ant), adult;other relative(s);spouse     Facility Arrived From: Home     Do you expect to return to your current living situation? Yes     Do you have help at home or someone to help you manage your care at home? Yes     Who are your caregiver(s) and their phone number(s)? Maria C Hartman (Relative)   [phone]     Prior to hospitilization cognitive status: Alert/Oriented     Current cognitive status: Alert/Oriented     Walking or Climbing Stairs Difficulty ambulation difficulty, requires equipment     Mobility Management Pt reports using rollator and rolling walker for ambulation     Dressing/Bathing Difficulty bathing difficulty, requires equipment     Dressing/Bathing Management Pt uses shower chair for bathing     Home Accessibility wheelchair accessible     Home Layout Able to live on 1st floor     Equipment Currently Used at Home wheelchair;walker, rolling;shower chair;rollator     Readmission within 30 days? No     Patient currently being followed by outpatient case management? No     Do you currently have service(s) that help you manage your care at home? Yes     Name and Contact number of agency [facility]     Is the pt/caregiver preference to resume services with current agency Yes     Do you take prescription medications? Yes     Do you have prescription coverage? Yes     Coverage Payor:  [employer] MANAGED MEDICARE - [employer] MEDICARE HMO     Do you have any problems affording any of your prescribed medications? No     Is the patient taking medications as prescribed? yes     Who is going to help you get home at discharge?  Maria C Hartman (Relative)   [phone]     How do you get to doctors appointments? family or friend will provide     Are you on dialysis? No     Do you take coumadin? No     Discharge Plan A Home with family     Discharge Plan B Home with family     DME Needed Upon Discharge  none     Discharge Plan discussed with: Patient;Adult children     Discharge Barriers Identified None        Social Connections    Are you , , , , never , or living with a partner?

## 2022-11-10 NOTE — HPI
The patient is an 88-year-old male, who presents to the emergency room with complaint of back pain and fall and was diagnosed with rib fractures.  On exam, patient was noted to have a high BNP and he carries no diagnosis of heart failure.  He is followed by Dr. eBtancourt, his primary cardiologist, for pacemaker and was seen about 2 weeks ago.  He has a past history of dementia and Parkinson's disease.  His medication was adjusted due to affecting heart rate.  He lives at home with his wife and primary caretaker is his daughter.  His BNP was noted to be 1149 and his troponin was elevated at 0.5.  He denies any chest pain nausea vomiting and only complains of this back pain which is reproducible.  EKG shows no change.    Plan to admit observation diurese obtain echocardiogram and consult Cardiology

## 2022-11-10 NOTE — ASSESSMENT & PLAN NOTE
Known coronary artery disease  No chest pain at present  Suspect elevated troponin type 2  Probably related to new onset heart failure

## 2022-11-10 NOTE — PROGRESS NOTES
Patient arrived to floor via stretcher with wheelchair, no evidence of distress. Patient arrived on tele. Vital signs obtained. SBP elevated- Dr. Still notified and ordered home medications.  Patient voices no complaints at this time. Plan of care initiated with patient. Bed in lowest position, locked, SR up x2, call bell in reach. Will continue to monitor patient.

## 2022-11-10 NOTE — CARE UPDATE
11/10/22 1344   Patient Assessment/Suction   Level of Consciousness (AVPU) alert   Respiratory Effort Unlabored;Normal   Expansion/Accessory Muscles/Retractions expansion symmetric   All Lung Fields Breath Sounds clear;diminished   Rhythm/Pattern, Respiratory unlabored   PRE-TX-O2   O2 Device (Oxygen Therapy) room air   SpO2 97 %   Pulse 72   Resp 16   Aerosol Therapy   $ Aerosol Therapy Charges Aerosol Treatment   Daily Review of Necessity (SVN) completed   Respiratory Treatment Status (SVN) given   Treatment Route (SVN) mask;oxygen   Patient Position (SVN) semi-Craig's   Post Treatment Assessment (SVN) increased aeration   Signs of Intolerance (SVN) none   Breath Sounds Post-Respiratory Treatment   Throughout All Fields Post-Treatment aeration increased   Post-treatment Heart Rate (beats/min) 74   Post-treatment Resp Rate (breaths/min) 20   Home Oxygen Qualification   Room Air SpO2 At Rest 97 %   Home O2 Eval Comments   (cannot complete home O2 eval. Patient's legs tremor badly when stand up, family stated patient uses wheelchair at home. Cannot determine if pt needs O2. However, at rest on bed, O2 is not required.)

## 2022-11-10 NOTE — CONSULTS
[facility]   Cardiology Note    Consult Requested By: SOFIA  Reason for Consult: SOB, CHF    SUBJECTIVE:     History of Present Illness: The pt is 87 y/o M pt of . He has been hospitalized frequently over the past few months. PMH-includes CAD, afib, Parkinson's, recent syncopal episodes with subsequent subdural hematoma, rib fx, dementia, and DDD PM. His last echo in July EF was 45-50%. He  present ed to the ER with c/o back pain and fall. The fall happened a few days ago. The pt did not pass out, but was dizzy, thus causing the fall.  The pt also c/o SOB and upon seeing PCP, was found to have diminished Breath sounds bilaterally on exam. He was sent to ER for eval. He denies CP.     The pt states feels better this am. He states his back pain has improved greatly. He states he is not SOB at rest, but he is still very short of breath on exertion. He does not have any swelling to BLE    Review of patient's allergies indicates:   Allergen Reactions    Iodinated contrast media Rash    Pontocaine [tetracaine hcl] Anaphylaxis     hypotension       Past Medical History:   Diagnosis Date    Abnormal echocardiogram 11/21/2019    Normal left ventricular systolic function with estimated ejection fraction of 60%.  Grade 2 moderate left ventricular diastolic dysfunction consistent with pseudonormalization.  Mild left atrial enlargement.  Mild aortic regurgitation.  Mild to moderate mitral regurgitation.  Mild tricuspid regurgitation.  Estimated PA systolic pressure is 38 mm of mercury.  Diagnosis is syncope.    Anxiety     Arthritis     CAD (coronary artery disease) age 68    Carotid artery occlusion     Cerebrovascular small vessel disease     Colon polyps age 78    Coronary artery disease of native artery of native heart with stable angina pectoris 5/6/2020    GERD (gastroesophageal reflux disease)     H. pylori infection     HTN (hypertension), benign age 65    Hyperlipidemia LDL goal <70 age 65     Hypothyroidism     Multiple fractures of ribs of right side 11/27/2012    Myocardial infarction     Pernicious anemia 1/26/2018    Primary insomnia 10/9/2018    Prostate cancer age 67    Subdural hematoma 9/5/2022    Syncopal episodes 3/2013    Urge incontinence 5/8/2018     Past Surgical History:   Procedure Laterality Date    CARDIAC PACEMAKER PLACEMENT  10/2015    [employer] Pacemaker    CARDIAC SURGERY      CATARACT EXTRACTION W/  INTRAOCULAR LENS IMPLANT Bilateral     COLONOSCOPY  ~2013    Dr. Edge    COLONOSCOPY N/A 06/08/2016    Procedure: COLONOSCOPY;  Surgeon: Shamar Barahona MD;  Location: [facility];  Service: Endoscopy;  Laterality: N/A; REPEAT IN 1 YEAR    CORONARY ANGIOPLASTY WITH STENT PLACEMENT  2003    x 2 RCA    PROSTATECTOMY  2002    UPPER GASTROINTESTINAL ENDOSCOPY  11/15/2016    Dr. Barahona     Family History   Problem Relation Age of Onset    Migraines Mother     Heart failure Father     Heart disease Father 56        MI    Heart failure Brother     Heart disease Brother     Diabetes Son     Hypertension Son     Heart disease Brother     Mental illness Brother     No Known Problems Son     Colon cancer Neg Hx     Colon polyps Neg Hx     Crohn's disease Neg Hx     Ulcerative colitis Neg Hx     Stomach cancer Neg Hx     Esophageal cancer Neg Hx      Social History     Tobacco Use    Smoking status: Never    Smokeless tobacco: Never   Substance Use Topics    Alcohol use: No    Drug use: No       Review of Systems:  Review of Systems   Constitutional:  Negative for chills, fever and malaise/fatigue.   Cardiovascular:  Negative for chest pain, palpitations, leg swelling and PND.   Gastrointestinal:  Negative for abdominal pain, nausea and vomiting.   Neurological:  Negative for dizziness.     OBJECTIVE:     Vital Signs (Most Recent)  Temp: 97.6 °F (36.4 °C) (11/10/22 0328)  Pulse: 74 (11/10/22 0741)  Resp: 16 (11/10/22 0741)  BP: (!) 142/70 (11/10/22 0328)  SpO2: 96 % (11/10/22 0741)    Vital  Signs Range (Last 24H):  Temp:  [97.6 °F (36.4 °C)-98.3 °F (36.8 °C)]   Pulse:  [68-80]   Resp:  [16-26]   BP: (142-205)/()   SpO2:  [92 %-100 %]     I & O (Last 24H):    Intake/Output Summary (Last 24 hours) at 11/10/2022 0865  Last data filed at 11/10/2022 0419  Gross per 24 hour   Intake 220 ml   Output 375 ml   Net -155 ml       Current Diet:     Current Diet Order   Procedures    Diet Cardiac        Allergies:  Review of patient's allergies indicates:   Allergen Reactions    Iodinated contrast media Rash    Pontocaine [tetracaine hcl] Anaphylaxis     hypotension       Meds:  Scheduled Meds:   albuterol  2.5 mg Nebulization Q6H WAKE    amiodarone  200 mg Oral BID    atorvastatin  40 mg Oral Daily    cetirizine  10 mg Oral Daily    clopidogreL  75 mg Oral Daily    cyanocobalamin  1,000 mcg Intramuscular Q30 Days    EScitalopram oxalate  10 mg Oral Daily    levothyroxine  75 mcg Oral Before breakfast    losartan  50 mg Oral BID    melatonin  3 mg Oral Nightly    metoprolol succinate  50 mg Oral BID    mirtazapine  15 mg Oral QHS    pantoprazole  40 mg Oral BID     Continuous Infusions:  PRN Meds:acetaminophen-codeine 300-60mg, dextrose 10%, dextrose 10%, glucagon (human recombinant), glucose, glucose, HYDROcodone-acetaminophen, melatonin, naloxone, nitroGLYCERIN, ondansetron, polyethylene glycol, senna-docusate 8.6-50 mg, simethicone    Oxygen/Ventilator Data (Last 24H):  (if applicable)        Hemodynamic Parameters (Last 24H):   (if applicable)        Laboratory and Radiology Data:  Recent Results (from the past 24 hour(s))   CBC auto differential    Collection Time: 11/09/22  3:56 PM   Result Value Ref Range    WBC 6.83 3.90 - 12.70 K/uL    RBC 3.87 (L) 4.60 - 6.20 M/uL    Hemoglobin 12.7 (L) 14.0 - 18.0 g/dL    Hematocrit 39.4 (L) 40.0 - 54.0 %     (H) 82 - 98 fL    MCH 32.8 (H) 27.0 - 31.0 pg    MCHC 32.2 32.0 - 36.0 g/dL    RDW 15.5 (H) 11.5 - 14.5 %    Platelets 228 150 - 450 K/uL    MPV 10.9  9.2 - 12.9 fL    Immature Granulocytes 0.6 (H) 0.0 - 0.5 %    Gran # (ANC) 4.4 1.8 - 7.7 K/uL    Immature Grans (Abs) 0.04 0.00 - 0.04 K/uL    Lymph # 1.6 1.0 - 4.8 K/uL    Mono # 0.7 0.3 - 1.0 K/uL    Eos # 0.0 0.0 - 0.5 K/uL    Baso # 0.02 0.00 - 0.20 K/uL    nRBC 0 0 /100 WBC    Gran % 64.5 38.0 - 73.0 %    Lymph % 23.6 18.0 - 48.0 %    Mono % 10.4 4.0 - 15.0 %    Eosinophil % 0.6 0.0 - 8.0 %    Basophil % 0.3 0.0 - 1.9 %    Differential Method Automated    Comprehensive metabolic panel    Collection Time: 11/09/22  3:56 PM   Result Value Ref Range    Sodium 138 136 - 145 mmol/L    Potassium 4.7 3.5 - 5.1 mmol/L    Chloride 105 95 - 110 mmol/L    CO2 25 23 - 29 mmol/L    Glucose 99 70 - 110 mg/dL    BUN 28 (H) 8 - 23 mg/dL    Creatinine 1.6 (H) 0.5 - 1.4 mg/dL    Calcium 8.6 (L) 8.7 - 10.5 mg/dL    Total Protein 7.2 6.0 - 8.4 g/dL    Albumin 3.4 (L) 3.5 - 5.2 g/dL    Total Bilirubin 0.8 0.1 - 1.0 mg/dL    Alkaline Phosphatase 70 55 - 135 U/L    AST 28 10 - 40 U/L    ALT 23 10 - 44 U/L    Anion Gap 8 8 - 16 mmol/L    eGFR 41.2 (A) >60 mL/min/1.73 m^2   Troponin I    Collection Time: 11/09/22  3:56 PM   Result Value Ref Range    Troponin I 0.050 (H) <=0.040 ng/mL   Brain natriuretic peptide    Collection Time: 11/09/22  3:56 PM   Result Value Ref Range    BNP 1,472 (H) 0 - 99 pg/mL   Urinalysis, Reflex to Urine Culture Urine, Clean Catch    Collection Time: 11/09/22  7:00 PM    Specimen: Urine   Result Value Ref Range    Specimen UA Urine, Clean Catch     Color, UA Yellow Yellow, Straw, Kaley    Appearance, UA Clear Clear    pH, UA 6.0 5.0 - 8.0    Specific Gravity, UA 1.020 1.005 - 1.030    Protein, UA 2+ (A) Negative    Glucose, UA Negative Negative    Ketones, UA 1+ (A) Negative    Bilirubin (UA) Negative Negative    Occult Blood UA Negative Negative    Nitrite, UA Negative Negative    Urobilinogen, UA Negative Negative EU/dL    Leukocytes, UA Negative Negative   Urinalysis Microscopic    Collection Time:  11/09/22  7:00 PM   Result Value Ref Range    RBC, UA 1 0 - 4 /hpf    WBC, UA 0 0 - 5 /hpf    Bacteria Negative None-Occ /hpf    Squam Epithel, UA 0 /hpf    Hyaline Casts, UA 4 (A) 0-1/lpf /lpf    Microscopic Comment SEE COMMENT    Basic Metabolic Panel (BMP)    Collection Time: 11/10/22  3:22 AM   Result Value Ref Range    Sodium 140 136 - 145 mmol/L    Potassium 4.2 3.5 - 5.1 mmol/L    Chloride 102 95 - 110 mmol/L    CO2 25 23 - 29 mmol/L    Glucose 88 70 - 110 mg/dL    BUN 28 (H) 8 - 23 mg/dL    Creatinine 1.5 (H) 0.5 - 1.4 mg/dL    Calcium 8.7 8.7 - 10.5 mg/dL    Anion Gap 13 8 - 16 mmol/L    eGFR 44.5 (A) >60 mL/min/1.73 m^2   Magnesium    Collection Time: 11/10/22  3:22 AM   Result Value Ref Range    Magnesium 2.0 1.6 - 2.6 mg/dL   CBC with Automated Differential    Collection Time: 11/10/22  3:22 AM   Result Value Ref Range    WBC 5.81 3.90 - 12.70 K/uL    RBC 3.61 (L) 4.60 - 6.20 M/uL    Hemoglobin 12.1 (L) 14.0 - 18.0 g/dL    Hematocrit 36.7 (L) 40.0 - 54.0 %     (H) 82 - 98 fL    MCH 33.5 (H) 27.0 - 31.0 pg    MCHC 33.0 32.0 - 36.0 g/dL    RDW 15.0 (H) 11.5 - 14.5 %    Platelets 205 150 - 450 K/uL    MPV 10.9 9.2 - 12.9 fL    Immature Granulocytes 0.5 0.0 - 0.5 %    Gran # (ANC) 3.5 1.8 - 7.7 K/uL    Immature Grans (Abs) 0.03 0.00 - 0.04 K/uL    Lymph # 1.6 1.0 - 4.8 K/uL    Mono # 0.6 0.3 - 1.0 K/uL    Eos # 0.0 0.0 - 0.5 K/uL    Baso # 0.02 0.00 - 0.20 K/uL    nRBC 0 0 /100 WBC    Gran % 60.4 38.0 - 73.0 %    Lymph % 27.5 18.0 - 48.0 %    Mono % 10.8 4.0 - 15.0 %    Eosinophil % 0.5 0.0 - 8.0 %    Basophil % 0.3 0.0 - 1.9 %    Differential Method Automated    Brain natriuretic peptide    Collection Time: 11/10/22  3:22 AM   Result Value Ref Range    BNP 2,561 (H) 0 - 99 pg/mL   POCT glucose    Collection Time: 11/10/22  5:22 AM   Result Value Ref Range    POC Glucose 84 70 - 110     Imaging Results              CT Cervical Spine Without Contrast (Final result)  Result time 11/09/22 15:55:12       Final result by Luis Lemus MD (11/09/22 15:55:12)                   Narrative:    CMS MANDATED QUALITY DATA-CT RADIATION DOSE-436  All CT scans at this facility use dose modulation, iterative reconstruction, and or weight based dosing when appropriate, to reduce radiation dose to as low as reasonably achievable.    HISTORY: Neck trauma (Age >= 65y)    FINDINGS: Thin axial imaging was performed without contrast, with sagittal and coronal reformatted images reviewed.  Comparison to multiple prior exams.    The cervical spine has normal alignment and curvature, with no acute fractures or destructive osseous lesions. There is moderate to advanced multilevel intervertebral disc space narrowing with osteophytes, with multilevel cervical facet arthropathy. There is osseous fusion across the disc space and facet articulations at C4-C5, with no interfacetal subluxation or dislocation.    The craniocervical junction and prevertebral soft tissues are normal. There are carotid arterial vascular calcifications. There is no evidence of spinal epidural hematoma, with no acute cervical soft tissue abnormalities. Sagittal and coronal reformatted images show no acute fracture or malalignment.    IMPRESSION: Negative for acute cervical spine fracture or subluxation.    Electronically signed by:  Luis Lemus MD  11/9/2022 3:55 PM CST Workstation: [id number]                                     CT Head Without Contrast (Final result)  Result time 11/09/22 15:53:25      Final result by Carlos Patel MD (11/09/22 15:53:25)                   Narrative:    CT head without contrast    CLINICAL DATA: Trauma    CMS MANDATED QUALITY DATA - CT RADIATION  436    All CT scans at this facility utilize dose modulation, iterative reconstruction, and/or weight based dosing when appropriate to reduce radiation dose to as low as reasonably achievable.    Findings: Thin section axial noncontrast images are compared to November 3, 2022    There  is no intracranial mass, hemorrhage, or midline shift. Ventricles and sulci are prominent and unchanged, consistent with moderate to marked atrophy. There are no pathologic extra-axial fluid collections.    There is no evidence of acute ischemic change. Changes of chronic microvascular white matter disease are noted, with multiple small chronic lacunar infarcts noted at the bilateral basal ganglia. The cerebellum and brainstem are unremarkable.    The calvarium is intact.    IMPRESSION:  1. Chronic findings as above. No acute intracranial abnormalities.    Electronically signed by:  Carlos Patel MD  11/9/2022 3:53 PM CST Workstation: [id number]                                     XR Ribs Min 3 views w/PA Chest Left (Final result)  Result time 11/09/22 15:33:32      Final result by Magdalena Salazar MD (11/09/22 15:33:32)                   Narrative:    PA chest with left RIBS Is compared to prior study dated 10/9/2022    Clinical history is pain after fall    The heart is enlarged. There is a left subclavian vein pacemaker with lead tips overlying the right atrium and right ventricle. There is a heart monitor.  There are small bilateral pleural effusions. There is no pneumothorax.    There are no left rib fractures. There are old right rib fractures. There are degenerative changes of the spine.    IMPRESSION: Cardiomegaly with small pleural effusions    No evidence of left rib fracture    Multiple old right rib fractures    Electronically signed by:  Magdalena Salazar MD  11/9/2022 3:33 PM [facility] Workstation: [id number]                                    12-lead EKG interpretation:  (if applicable)      Current Cardiac Rhythm:   (if applicable)    Physical Exam:   Physical Exam  Constitutional:       Appearance: Normal appearance.   Cardiovascular:      Rate and Rhythm: Normal rate and regular rhythm.   Pulmonary:      Effort: No respiratory distress.      Comments: Diminshed lung sounds. BLL   Abdominal:       General: Abdomen is flat. Bowel sounds are normal.      Palpations: Abdomen is soft.   Musculoskeletal:         General: No swelling.   Skin:     General: Skin is warm and dry.   Neurological:      Mental Status: He is alert and oriented to person, place, and time.   Psychiatric:         Mood and Affect: Mood normal.         Behavior: Behavior normal.       ASSESSMENT/PLAN:   Assessment:   Trop 0.05  Echo pending   BP slightly elevated this am.   Cr 1.5    BNP 1472 on admit, 2561 this am  SR   CXR, small pleural effusion      Plan:   CHF--  Lasix 20 mg * 2 doses given yesterday.   Will start low dose lasix drip this am  Accurate I/O  Daily wt  EF 45-50 % in July, repeat pending    CAD--  Stable  Elevated trop likely r/t CHF exacerbation  Continue Plavix , statin, BB    HTN--BP slightly elevated  Continue losartan  Will observe bp in response to diuresis    pAF--continue amiodarone  Recently decreased dose to 200 mg daily in October.     Thank you for your consult.

## 2022-11-11 VITALS
RESPIRATION RATE: 18 BRPM | SYSTOLIC BLOOD PRESSURE: 90 MMHG | HEIGHT: 62 IN | DIASTOLIC BLOOD PRESSURE: 53 MMHG | WEIGHT: 145.81 LBS | BODY MASS INDEX: 26.83 KG/M2 | TEMPERATURE: 98 F | OXYGEN SATURATION: 96 % | HEART RATE: 79 BPM

## 2022-11-11 PROBLEM — I50.41 ACUTE COMBINED SYSTOLIC (CONGESTIVE) AND DIASTOLIC (CONGESTIVE) HEART FAILURE: Status: RESOLVED | Noted: 2022-11-10 | Resolved: 2022-11-11

## 2022-11-11 PROBLEM — R79.89 ELEVATED BRAIN NATRIURETIC PEPTIDE (BNP) LEVEL: Status: RESOLVED | Noted: 2019-12-04 | Resolved: 2022-11-11

## 2022-11-11 LAB
ANION GAP SERPL CALC-SCNC: 7 MMOL/L (ref 8–16)
BASOPHILS # BLD AUTO: 0.02 K/UL (ref 0–0.2)
BASOPHILS NFR BLD: 0.4 % (ref 0–1.9)
BUN SERPL-MCNC: 31 MG/DL (ref 8–23)
CALCIUM SERPL-MCNC: 8.2 MG/DL (ref 8.7–10.5)
CHLORIDE SERPL-SCNC: 103 MMOL/L (ref 95–110)
CO2 SERPL-SCNC: 24 MMOL/L (ref 23–29)
CREAT SERPL-MCNC: 1.9 MG/DL (ref 0.5–1.4)
DIFFERENTIAL METHOD: ABNORMAL
EOSINOPHIL # BLD AUTO: 0.1 K/UL (ref 0–0.5)
EOSINOPHIL NFR BLD: 1 % (ref 0–8)
ERYTHROCYTE [DISTWIDTH] IN BLOOD BY AUTOMATED COUNT: 14.7 % (ref 11.5–14.5)
EST. GFR  (NO RACE VARIABLE): 33.5 ML/MIN/1.73 M^2
GLUCOSE SERPL-MCNC: 91 MG/DL (ref 70–110)
GLUCOSE SERPL-MCNC: 95 MG/DL (ref 70–110)
GLUCOSE SERPL-MCNC: 96 MG/DL (ref 70–110)
HCT VFR BLD AUTO: 33.3 % (ref 40–54)
HGB BLD-MCNC: 11.2 G/DL (ref 14–18)
IMM GRANULOCYTES # BLD AUTO: 0.01 K/UL (ref 0–0.04)
IMM GRANULOCYTES NFR BLD AUTO: 0.2 % (ref 0–0.5)
LYMPHOCYTES # BLD AUTO: 2 K/UL (ref 1–4.8)
LYMPHOCYTES NFR BLD: 39.6 % (ref 18–48)
MAGNESIUM SERPL-MCNC: 1.9 MG/DL (ref 1.6–2.6)
MCH RBC QN AUTO: 33.4 PG (ref 27–31)
MCHC RBC AUTO-ENTMCNC: 33.6 G/DL (ref 32–36)
MCV RBC AUTO: 99 FL (ref 82–98)
MONOCYTES # BLD AUTO: 0.6 K/UL (ref 0.3–1)
MONOCYTES NFR BLD: 11.1 % (ref 4–15)
NEUTROPHILS # BLD AUTO: 2.4 K/UL (ref 1.8–7.7)
NEUTROPHILS NFR BLD: 47.7 % (ref 38–73)
NRBC BLD-RTO: 0 /100 WBC
PLATELET # BLD AUTO: 187 K/UL (ref 150–450)
PMV BLD AUTO: 10.6 FL (ref 9.2–12.9)
POTASSIUM SERPL-SCNC: 4.2 MMOL/L (ref 3.5–5.1)
RBC # BLD AUTO: 3.35 M/UL (ref 4.6–6.2)
SODIUM SERPL-SCNC: 134 MMOL/L (ref 136–145)
WBC # BLD AUTO: 5.03 K/UL (ref 3.9–12.7)

## 2022-11-11 PROCEDURE — 36415 COLL VENOUS BLD VENIPUNCTURE: CPT | Performed by: INTERNAL MEDICINE

## 2022-11-11 PROCEDURE — 80048 BASIC METABOLIC PNL TOTAL CA: CPT | Performed by: INTERNAL MEDICINE

## 2022-11-11 PROCEDURE — 25000003 PHARM REV CODE 250

## 2022-11-11 PROCEDURE — 97162 PT EVAL MOD COMPLEX 30 MIN: CPT

## 2022-11-11 PROCEDURE — 94640 AIRWAY INHALATION TREATMENT: CPT

## 2022-11-11 PROCEDURE — 25000003 PHARM REV CODE 250: Performed by: INTERNAL MEDICINE

## 2022-11-11 PROCEDURE — 85025 COMPLETE CBC W/AUTO DIFF WBC: CPT | Performed by: INTERNAL MEDICINE

## 2022-11-11 PROCEDURE — 99900031 HC PATIENT EDUCATION (STAT)

## 2022-11-11 PROCEDURE — 83735 ASSAY OF MAGNESIUM: CPT | Performed by: INTERNAL MEDICINE

## 2022-11-11 PROCEDURE — 97530 THERAPEUTIC ACTIVITIES: CPT

## 2022-11-11 PROCEDURE — G0378 HOSPITAL OBSERVATION PER HR: HCPCS

## 2022-11-11 PROCEDURE — 96366 THER/PROPH/DIAG IV INF ADDON: CPT

## 2022-11-11 PROCEDURE — 94761 N-INVAS EAR/PLS OXIMETRY MLT: CPT

## 2022-11-11 PROCEDURE — 99900035 HC TECH TIME PER 15 MIN (STAT)

## 2022-11-11 PROCEDURE — 25000242 PHARM REV CODE 250 ALT 637 W/ HCPCS: Performed by: INTERNAL MEDICINE

## 2022-11-11 RX ORDER — AMIODARONE HYDROCHLORIDE 200 MG/1
200 TABLET ORAL DAILY
Start: 2022-11-11

## 2022-11-11 RX ORDER — FUROSEMIDE 20 MG/1
20 TABLET ORAL DAILY
Status: DISCONTINUED | OUTPATIENT
Start: 2022-11-11 | End: 2022-11-11 | Stop reason: HOSPADM

## 2022-11-11 RX ORDER — HYDROCODONE BITARTRATE AND ACETAMINOPHEN 5; 325 MG/1; MG/1
1 TABLET ORAL EVERY 12 HOURS PRN
Qty: 12 TABLET | Refills: 0 | Status: SHIPPED | OUTPATIENT
Start: 2022-11-11

## 2022-11-11 RX ORDER — FUROSEMIDE 20 MG/1
20 TABLET ORAL DAILY
Qty: 30 TABLET | Refills: 0 | Status: ON HOLD | OUTPATIENT
Start: 2022-11-12 | End: 2023-02-24 | Stop reason: HOSPADM

## 2022-11-11 RX ADMIN — FUROSEMIDE 20 MG: 20 TABLET ORAL at 09:11

## 2022-11-11 RX ADMIN — LEVOTHYROXINE SODIUM 75 MCG: 0.03 TABLET ORAL at 06:11

## 2022-11-11 RX ADMIN — AMIODARONE HYDROCHLORIDE 200 MG: 200 TABLET ORAL at 09:11

## 2022-11-11 RX ADMIN — CETIRIZINE HYDROCHLORIDE 10 MG: 10 TABLET, FILM COATED ORAL at 09:11

## 2022-11-11 RX ADMIN — PANTOPRAZOLE SODIUM 40 MG: 40 TABLET, DELAYED RELEASE ORAL at 06:11

## 2022-11-11 RX ADMIN — ESCITALOPRAM OXALATE 10 MG: 10 TABLET ORAL at 09:11

## 2022-11-11 RX ADMIN — METOPROLOL SUCCINATE 50 MG: 50 TABLET, FILM COATED, EXTENDED RELEASE ORAL at 09:11

## 2022-11-11 RX ADMIN — ALBUTEROL SULFATE 2.5 MG: 2.5 SOLUTION RESPIRATORY (INHALATION) at 07:11

## 2022-11-11 RX ADMIN — CLOPIDOGREL BISULFATE 75 MG: 75 TABLET, FILM COATED ORAL at 09:11

## 2022-11-11 NOTE — SUBJECTIVE & OBJECTIVE
Interval History:     Review of Systems   Constitutional:  Negative for activity change and appetite change.   HENT:  Negative for congestion and dental problem.    Eyes:  Negative for discharge and itching.   Respiratory:  Positive for shortness of breath.    Cardiovascular:  Negative for chest pain.   Gastrointestinal:  Negative for abdominal distention and abdominal pain.   Endocrine: Negative for cold intolerance.   Genitourinary:  Negative for difficulty urinating and dysuria.   Musculoskeletal:  Negative for arthralgias and back pain.   Skin:  Negative for color change.   Neurological:  Negative for dizziness and facial asymmetry.   Hematological:  Negative for adenopathy.   Psychiatric/Behavioral:  Negative for agitation and behavioral problems.    Objective:     Vital Signs (Most Recent):  Temp: 98.1 °F (36.7 °C) (11/10/22 1900)  Pulse: 72 (11/10/22 2022)  Resp: 15 (11/10/22 2022)  BP: 133/66 (11/10/22 1900)  SpO2: 98 % (11/10/22 2022) Vital Signs (24h Range):  Temp:  [97.6 °F (36.4 °C)-98.1 °F (36.7 °C)] 98.1 °F (36.7 °C)  Pulse:  [68-80] 72  Resp:  [15-20] 15  SpO2:  [93 %-98 %] 98 %  BP: (129-194)/(63-95) 133/66     Weight: 66.5 kg (146 lb 9.7 oz)  Body mass index is 26.81 kg/m².    Intake/Output Summary (Last 24 hours) at 11/10/2022 2030  Last data filed at 11/10/2022 1800  Gross per 24 hour   Intake 700 ml   Output 375 ml   Net 325 ml      Physical Exam  Vitals and nursing note reviewed.   Constitutional:       Appearance: He is well-developed.   HENT:      Head: Atraumatic.      Right Ear: External ear normal.      Left Ear: External ear normal.      Nose: Nose normal.      Mouth/Throat:      Mouth: Mucous membranes are moist.   Eyes:      General: No scleral icterus.  Cardiovascular:      Rate and Rhythm: Normal rate.   Pulmonary:      Effort: Pulmonary effort is normal.   Abdominal:      Palpations: Abdomen is soft.   Musculoskeletal:         General: Normal range of motion.      Cervical back: Full  passive range of motion without pain and normal range of motion.   Skin:     General: Skin is warm.   Neurological:      Mental Status: He is alert and oriented to person, place, and time.   Psychiatric:         Behavior: Behavior normal.       Significant Labs: All pertinent labs within the past 24 hours have been reviewed.  CBC:   Recent Labs   Lab 11/09/22  1556 11/10/22  0322   WBC 6.83 5.81   HGB 12.7* 12.1*   HCT 39.4* 36.7*    205     CMP:   Recent Labs   Lab 11/09/22  1556 11/10/22  0322    140   K 4.7 4.2    102   CO2 25 25   GLU 99 88   BUN 28* 28*   CREATININE 1.6* 1.5*   CALCIUM 8.6* 8.7   PROT 7.2  --    ALBUMIN 3.4*  --    BILITOT 0.8  --    ALKPHOS 70  --    AST 28  --    ALT 23  --    ANIONGAP 8 13       Significant Imaging: I have reviewed all pertinent imaging results/findings within the past 24 hours.

## 2022-11-11 NOTE — PLAN OF CARE
[facility]  Discharge Final Note    Primary Care Provider: Mariajose Thorne MD    Expected Discharge Date: 11/11/2022    Social work intern called Pt's daughter-in-law (Maria C Hartman (Relative) [phone] ([place])) confirm discharge plans. Pt's daughter-in-law verbalized plan for Pt to discharge home and resume home health services with [facility].  sent referral to [facility] to resume services.    Social work intern called Pt's PCP office to schedule Pt's follow-up appointment.  added scheduled appointment to Pt AVS. Pt has no other needs to be addressed by case management. Pt cleared to discharge by case management.      Final Discharge Note (most recent)       Final Note - 11/11/22 1303          Final Note    Assessment Type Final Discharge Note     Anticipated Discharge Disposition Home-Health Care c     What phone number can be called within the next 1-3 days to see how you are doing after discharge? [phone]     Hospital Resources/Appts/Education Provided Provided patient/caregiver with written discharge plan information;Appointments scheduled and added to AVS        Post-Acute Status    Post-Acute Authorization Home Health     Home Health Status Referrals Sent     Coverage Payor:  [employer] MANAGED MEDICARE - [employer] MEDICARE HMO     Discharge Delays None known at this time                     Important Message from Medicare             Contact Info       Hiram Betancourt MD   Specialty: Cardiology, Interventional Cardiology    [address]  [place] [address]   Phone: [phone]       Next Steps: Follow up in 1 week(s)    Mariajose Thorne MD   Specialty: Hospitalist, Internal Medicine   Relationship: PCP - General    [address]  [address]  [place] [address]   Phone: [phone]       Next Steps: Go on 11/17/2022    Instructions: Appoinment scheduled for 11/17/2022 at 9:00am.

## 2022-11-11 NOTE — ASSESSMENT & PLAN NOTE
Already had ECHOs from July and September 2022   Per ECHO dated September    The left ventricle is normal in size with concentric remodeling and mildly decreased systolic function.   The estimated ejection fraction is 45%.   Grade I left ventricular diastolic dysfunction      Presently on lasix drip  He uses wheel chair all time and has tremors on legs  so home o2 assessment is impossible per Respiratory team  He is saturating on room air for past 24 hrs   Home tomorrow AM with HH  Ideally should be under hospice care

## 2022-11-11 NOTE — PT/OT/SLP EVAL
Physical Therapy Evaluation    Patient Name:  Gene Hartman   MRN:  [id number]    Recommendations:     Discharge Recommendations:  home, home health PT   Discharge Equipment Recommendations: 3-in-1 commode   Barriers to discharge: None    Assessment:     Gene Hartman is a 88 y.o. male admitted with a medical diagnosis of Acute combined systolic (congestive) and diastolic (congestive) heart failure.  He presents with the following impairments/functional limitations:  weakness, impaired endurance, impaired functional mobility, impaired balance, impaired cognition, decreased lower extremity function, decreased upper extremity function, pain, decreased safety awareness, impaired skin, impaired cardiopulmonary response to activity.    Pt found supine in bed with HOB elevated and multi;le family members at bedside.  Pt is alert and oriented to self and type of place, he is agreeable to working with PT.  He has the following co-morbidities:  Parkingsons, CAD, A-fib, dementia, recent falls with injury.  Pt tolerated session fair and required modA for safe mobilization during session today. Pt would benefit from acute PT during hospitalization to increase strength, endurance and safety with mobility prior to discharge home.     Rehab Prognosis: Fair; patient would benefit from acute skilled PT services to address these deficits and reach maximum level of function.    Recent Surgery: * No surgery found *      Plan:     During this hospitalization, patient to be seen 5 x/week to address the identified rehab impairments via gait training, therapeutic activities, therapeutic exercises and progress toward the following goals:    Plan of Care Expires:  12/11/22    Subjective     Chief Complaint: weakness  Patient/Family Comments/goals: get stronger and walk again  Pain/Comfort:  Pain Rating 1: 5/10  Location - Side 1: Right  Location 1: rib(s)  Pain Addressed 1: Reposition, Distraction  Pain Rating Post-Intervention 1:  5/10    Patients cultural, spiritual, Amish conflicts given the current situation:      Living Environment:  Pt lives in  home with single step to enter with wife, son, and daughter in law who is primary caregiver  Prior to admission, patients level of function was modified independent with transfers, assist for gait short distances with RW, frequent falls.  Equipment used at home: wheelchair, walker, rolling, shower chair.  DME owned (not currently used): none.  Upon discharge, patient will have assistance from family.    Objective:     Communicated with RN prior to session.  Patient found HOB elevated with telemetry, peripheral IV  upon PT entry to room.    General Precautions: Standard, fall   Orthopedic Precautions:    Braces:    Respiratory Status: Room air    Exams:  Cognitive Exam:  Patient is oriented to Person and Situation  RLE ROM: WFL  RLE Strength: 3+/5  LLE ROM: WFL  LLE Strength: 3+/5    Functional Mobility:  Bed Mobility:     Rolling Left:  maximal assistance  Rolling Right: maximal assistance  Supine to Sit: maximal assistance  Transfers:     Sit to Stand:  maximal assistance with hand-held assist  Bed to Chair: moderate assistance with  hand-held assist  using  Stand Pivot      AM-PAC 6 CLICK MOBILITY  Total Score:10       Treatment & Education:  Pt was educated on the following: call light use, importance of OOB activity and functional mobility to negate the negative effects of prolonged bed rest during this hospitalization, safe transfers/ambulation and discharge planning recommendations/options.     Patient left up in chair with all lines intact, call button in reach, RN notified, and family present.    GOALS:   Multidisciplinary Problems       Physical Therapy Goals          Problem: Physical Therapy    Goal Priority Disciplines Outcome Goal Variances Interventions   Physical Therapy Goal     PT, PT/OT      Description: All phsycial therapy goals to be met by discharge:    1. Supine to  sit with Stand-by Assistance  2. Sit to stand transfer with Stand-by Assistance  3.. Bed to chair transfer with Supervision using Rolling Walker  4. Gait  x 10 feet with Minimal Assistance using Rolling Walker.                          History:     Past Medical History:   Diagnosis Date    Abnormal echocardiogram 11/21/2019    Normal left ventricular systolic function with estimated ejection fraction of 60%.  Grade 2 moderate left ventricular diastolic dysfunction consistent with pseudonormalization.  Mild left atrial enlargement.  Mild aortic regurgitation.  Mild to moderate mitral regurgitation.  Mild tricuspid regurgitation.  Estimated PA systolic pressure is 38 mm of mercury.  Diagnosis is syncope.    Acute combined systolic (congestive) and diastolic (congestive) heart failure 11/10/2022    Anxiety     Arthritis     CAD (coronary artery disease) age 68    Carotid artery occlusion     Cerebrovascular small vessel disease     Colon polyps age 78    Coronary artery disease of native artery of native heart with stable angina pectoris 5/6/2020    GERD (gastroesophageal reflux disease)     H. pylori infection     HTN (hypertension), benign age 65    Hyperlipidemia LDL goal <70 age 65    Hypothyroidism     Multiple fractures of ribs of right side 11/27/2012    Myocardial infarction     Pernicious anemia 1/26/2018    Primary insomnia 10/9/2018    Prostate cancer age 67    Subdural hematoma 9/5/2022    Syncopal episodes 3/2013    Urge incontinence 5/8/2018       Past Surgical History:   Procedure Laterality Date    CARDIAC PACEMAKER PLACEMENT  10/2015    [employer] Pacemaker    CARDIAC SURGERY      CATARACT EXTRACTION W/  INTRAOCULAR LENS IMPLANT Bilateral     COLONOSCOPY  ~2013    Dr. Edge    COLONOSCOPY N/A 06/08/2016    Procedure: COLONOSCOPY;  Surgeon: Shamar Barahona MD;  Location: [facility];  Service: Endoscopy;  Laterality: N/A; REPEAT IN 1 YEAR    CORONARY ANGIOPLASTY WITH STENT PLACEMENT  2003    x 2 RCA     PROSTATECTOMY  2002    UPPER GASTROINTESTINAL ENDOSCOPY  11/15/2016    Dr. Dowell       Time Tracking:     PT Received On: 11/11/22  PT Start Time: 1010     PT Stop Time: 1038  PT Total Time (min): 28 min     Billable Minutes: Evaluation 10 and Therapeutic Activity 18      11/11/2022

## 2022-11-11 NOTE — PLAN OF CARE
Plan of care discussed with pt. Pt AAOx4. No issues overnight. Diuresing with lasix gtt. Urinal to measure output.  VS stable overnight, afebrile. Pt denies any pain or SOB.  Pt educated on fall precautions, verbalized understanding. Call light in reach. Pt free of injuries this shift.  All questions addressed. Pt voices no concerns at this time. Safety and comfort measures maintained during hourly rounding.

## 2022-11-11 NOTE — PROGRESS NOTES
Critical access hospital Medicine  Progress Note    Patient Name: Gene Hartman  MRN: [id number]  Patient Class: OP- Observation   Admission Date: 11/9/2022  Length of Stay: 0 days  Attending Physician: Walter Carpio MD  Primary Care Provider: Mariajose Thorne MD        Subjective:     Principal Problem:Acute combined systolic (congestive) and diastolic (congestive) heart failure        HPI:  The patient is an 88-year-old male, who presents to the emergency room with complaint of back pain and fall and was diagnosed with rib fractures.  On exam, patient was noted to have a high BNP and he carries no diagnosis of heart failure.  He is followed by Dr. Betancourt, his primary cardiologist, for pacemaker and was seen about 2 weeks ago.  He has a past history of dementia and Parkinson's disease.  His medication was adjusted due to affecting heart rate.  He lives at home with his wife and primary caretaker is his daughter.  His BNP was noted to be 1149 and his troponin was elevated at 0.5.  He denies any chest pain nausea vomiting and only complains of this back pain which is reproducible.  EKG shows no change.    Plan to admit observation diurese obtain echocardiogram and consult Cardiology      Overview/Hospital Course:  11/10  Pt wants to go home  Now he is on lasix drip  H/o falls  He uses wheel chair all time and has tremors on legs  so home o2 assessment is impossible per Respiratory team  He is saturating on room air for past 24 hrs      Interval History:     Review of Systems   Constitutional:  Negative for activity change and appetite change.   HENT:  Negative for congestion and dental problem.    Eyes:  Negative for discharge and itching.   Respiratory:  Positive for shortness of breath.    Cardiovascular:  Negative for chest pain.   Gastrointestinal:  Negative for abdominal distention and abdominal pain.   Endocrine: Negative for cold intolerance.   Genitourinary:  Negative for difficulty urinating  and dysuria.   Musculoskeletal:  Negative for arthralgias and back pain.   Skin:  Negative for color change.   Neurological:  Negative for dizziness and facial asymmetry.   Hematological:  Negative for adenopathy.   Psychiatric/Behavioral:  Negative for agitation and behavioral problems.    Objective:     Vital Signs (Most Recent):  Temp: 98.1 °F (36.7 °C) (11/10/22 1900)  Pulse: 72 (11/10/22 2022)  Resp: 15 (11/10/22 2022)  BP: 133/66 (11/10/22 1900)  SpO2: 98 % (11/10/22 2022) Vital Signs (24h Range):  Temp:  [97.6 °F (36.4 °C)-98.1 °F (36.7 °C)] 98.1 °F (36.7 °C)  Pulse:  [68-80] 72  Resp:  [15-20] 15  SpO2:  [93 %-98 %] 98 %  BP: (129-194)/(63-95) 133/66     Weight: 66.5 kg (146 lb 9.7 oz)  Body mass index is 26.81 kg/m².    Intake/Output Summary (Last 24 hours) at 11/10/2022 2030  Last data filed at 11/10/2022 1800  Gross per 24 hour   Intake 700 ml   Output 375 ml   Net 325 ml      Physical Exam  Vitals and nursing note reviewed.   Constitutional:       Appearance: He is well-developed.   HENT:      Head: Atraumatic.      Right Ear: External ear normal.      Left Ear: External ear normal.      Nose: Nose normal.      Mouth/Throat:      Mouth: Mucous membranes are moist.   Eyes:      General: No scleral icterus.  Cardiovascular:      Rate and Rhythm: Normal rate.   Pulmonary:      Effort: Pulmonary effort is normal.   Abdominal:      Palpations: Abdomen is soft.   Musculoskeletal:         General: Normal range of motion.      Cervical back: Full passive range of motion without pain and normal range of motion.   Skin:     General: Skin is warm.   Neurological:      Mental Status: He is alert and oriented to person, place, and time.   Psychiatric:         Behavior: Behavior normal.       Significant Labs: All pertinent labs within the past 24 hours have been reviewed.  CBC:   Recent Labs   Lab 11/09/22  1556 11/10/22  0322   WBC 6.83 5.81   HGB 12.7* 12.1*   HCT 39.4* 36.7*    205     CMP:   Recent Labs    Lab 11/09/22  1556 11/10/22  0322    140   K 4.7 4.2    102   CO2 25 25   GLU 99 88   BUN 28* 28*   CREATININE 1.6* 1.5*   CALCIUM 8.6* 8.7   PROT 7.2  --    ALBUMIN 3.4*  --    BILITOT 0.8  --    ALKPHOS 70  --    AST 28  --    ALT 23  --    ANIONGAP 8 13       Significant Imaging: I have reviewed all pertinent imaging results/findings within the past 24 hours.      Assessment/Plan:      * Acute combined systolic (congestive) and diastolic (congestive) heart failure  Already had ECHOs from July and September 2022   Per ECHO dated September    The left ventricle is normal in size with concentric remodeling and mildly decreased systolic function.   The estimated ejection fraction is 45%.   Grade I left ventricular diastolic dysfunction      Presently on lasix drip  He uses wheel chair all time and has tremors on legs  so home o2 assessment is impossible per Respiratory team  He is saturating on room air for past 24 hrs   Home tomorrow AM with HH  Ideally should be under hospice care       Falls frequently  Coupled with tremor      Dementia without behavioral disturbance  Stable      Elevated brain natriuretic peptide (BNP) level  From CHF flare      Pacemaker; originally placed two years   Placed 2 years ago  Functioning normally  Recently evaluated per Cardiology      CAD (coronary artery disease)  Known coronary artery disease  No chest pain at present        Hyperlipidemia LDL goal <70  Continue present treatment      HTN (hypertension), benign  Monitor and treat        VTE Risk Mitigation (From admission, onward)         Ordered     IP VTE HIGH RISK PATIENT  Once         11/10/22 0007     Place sequential compression device  Until discontinued         11/10/22 0007                Discharge Planning   PARDEEP: 11/11/2022     Code Status: Full Code   Is the patient medically ready for discharge?:     Reason for patient still in hospital (select all that apply): Treatment  Discharge Plan A: Home with  family                  Walter Carpio MD  Department of Hospital Medicine   [facility]

## 2022-11-11 NOTE — HOSPITAL COURSE
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  Pt was evaluated by cardiology team and was started on iv lasix drip  Later lasix gtt was changed to Lasix PO   Eventually pt was discharged to home with Home health  Home oxygen assessment was unsuccesful because pt cant walk(wheel chair bound) and because of extreme tremors(Per RRT)  Ideally he should be under hospice care

## 2022-11-11 NOTE — CARE UPDATE
11/11/22 0719   Patient Assessment/Suction   Level of Consciousness (AVPU) alert   Respiratory Effort Unlabored;Normal   Expansion/Accessory Muscles/Retractions expansion symmetric   All Lung Fields Breath Sounds clear   Rhythm/Pattern, Respiratory unlabored   Cough Frequency no cough   PRE-TX-O2   O2 Device (Oxygen Therapy) room air   SpO2 97 %   Pulse Oximetry Type Intermittent   $ Pulse Oximetry - Multiple Charge Pulse Oximetry - Multiple   Pulse 79   Resp 16   Aerosol Therapy   $ Aerosol Therapy Charges Aerosol Treatment   Daily Review of Necessity (SVN) completed   Respiratory Treatment Status (SVN) given   Treatment Route (SVN) mask;oxygen   Patient Position (SVN) semi-Craig's   Post Treatment Assessment (SVN) increased aeration   Signs of Intolerance (SVN) none   Breath Sounds Post-Respiratory Treatment   Throughout All Fields Post-Treatment aeration increased   Post-treatment Heart Rate (beats/min) 79   Post-treatment Resp Rate (breaths/min) 20   Education   $ Education Bronchodilator;15 min   Respiratory Evaluation   $ Care Plan Tech Time 15 min

## 2022-11-11 NOTE — PROGRESS NOTES
[facility]   Cardiology Note    Consult Requested By: SOFIA  Reason for Consult: SOB, CHF    SUBJECTIVE:     History of Present Illness: The pt is 89 y/o M pt of . He has been hospitalized frequently over the past few months. PMH-includes CAD, afib, Parkinson's, recent syncopal episodes with subsequent subdural hematoma, rib fx, dementia, and DDD PM. His last echo in July EF was 45-50%. He  present ed to the ER with c/o back pain and fall. The fall happened a few days ago. The pt did not pass out, but was dizzy, thus causing the fall.  The pt also c/o SOB and upon seeing PCP, was found to have diminished Breath sounds bilaterally on exam. He was sent to ER for eval. He denies CP.     The pt states feels better this am. He states his back pain has improved greatly. He reports SOB has improved. Family is at bedside and pt would like to be discharged home today .       Review of patient's allergies indicates:   Allergen Reactions    Iodinated contrast media Rash    Pontocaine [tetracaine hcl] Anaphylaxis     hypotension       Past Medical History:   Diagnosis Date    Abnormal echocardiogram 11/21/2019    Normal left ventricular systolic function with estimated ejection fraction of 60%.  Grade 2 moderate left ventricular diastolic dysfunction consistent with pseudonormalization.  Mild left atrial enlargement.  Mild aortic regurgitation.  Mild to moderate mitral regurgitation.  Mild tricuspid regurgitation.  Estimated PA systolic pressure is 38 mm of mercury.  Diagnosis is syncope.    Acute combined systolic (congestive) and diastolic (congestive) heart failure 11/10/2022    Anxiety     Arthritis     CAD (coronary artery disease) age 68    Carotid artery occlusion     Cerebrovascular small vessel disease     Colon polyps age 78    Coronary artery disease of native artery of native heart with stable angina pectoris 5/6/2020    GERD (gastroesophageal reflux disease)     H. pylori infection     HTN  (hypertension), benign age 65    Hyperlipidemia LDL goal <70 age 65    Hypothyroidism     Multiple fractures of ribs of right side 11/27/2012    Myocardial infarction     Pernicious anemia 1/26/2018    Primary insomnia 10/9/2018    Prostate cancer age 67    Subdural hematoma 9/5/2022    Syncopal episodes 3/2013    Urge incontinence 5/8/2018     Past Surgical History:   Procedure Laterality Date    CARDIAC PACEMAKER PLACEMENT  10/2015    [employer] Pacemaker    CARDIAC SURGERY      CATARACT EXTRACTION W/  INTRAOCULAR LENS IMPLANT Bilateral     COLONOSCOPY  ~2013    Dr. Edge    COLONOSCOPY N/A 06/08/2016    Procedure: COLONOSCOPY;  Surgeon: Shamar Barahona MD;  Location: [facility] ENDO;  Service: Endoscopy;  Laterality: N/A; REPEAT IN 1 YEAR    CORONARY ANGIOPLASTY WITH STENT PLACEMENT  2003    x 2 RCA    PROSTATECTOMY  2002    UPPER GASTROINTESTINAL ENDOSCOPY  11/15/2016    Dr. Barahona     Family History   Problem Relation Age of Onset    Migraines Mother     Heart failure Father     Heart disease Father 56        MI    Heart failure Brother     Heart disease Brother     Diabetes Son     Hypertension Son     Heart disease Brother     Mental illness Brother     No Known Problems Son     Colon cancer Neg Hx     Colon polyps Neg Hx     Crohn's disease Neg Hx     Ulcerative colitis Neg Hx     Stomach cancer Neg Hx     Esophageal cancer Neg Hx      Social History     Tobacco Use    Smoking status: Never    Smokeless tobacco: Never   Substance Use Topics    Alcohol use: No    Drug use: No       Review of Systems:  Review of Systems   Constitutional:  Negative for chills, fever and malaise/fatigue.   Cardiovascular:  Negative for chest pain, palpitations, leg swelling and PND.   Gastrointestinal:  Negative for abdominal pain, nausea and vomiting.   Neurological:  Negative for dizziness.     OBJECTIVE:     Vital Signs (Most Recent)  Temp: 97.9 °F (36.6 °C) (11/11/22 0300)  Pulse: 79 (11/11/22 0719)  Resp: 16 (11/11/22  0719)  BP: (!) 157/67 (11/11/22 0717)  SpO2: 97 % (11/11/22 0719)    Vital Signs Range (Last 24H):  Temp:  [97.5 °F (36.4 °C)-98.1 °F (36.7 °C)]   Pulse:  [70-79]   Resp:  [15-18]   BP: (133-157)/(64-77)   SpO2:  [94 %-98 %]     I & O (Last 24H):    Intake/Output Summary (Last 24 hours) at 11/11/2022 0907  Last data filed at 11/11/2022 0740  Gross per 24 hour   Intake 770 ml   Output 300 ml   Net 470 ml         Current Diet:     Current Diet Order   Procedures    Diet Dysphagia Soft (IDDSI Level 6) Thin     Cardiac     Order Specific Question:   Fluid consistency:     Answer:   Thin        Allergies:  Review of patient's allergies indicates:   Allergen Reactions    Iodinated contrast media Rash    Pontocaine [tetracaine hcl] Anaphylaxis     hypotension       Meds:  Scheduled Meds:   albuterol  2.5 mg Nebulization Q6H WAKE    amiodarone  200 mg Oral BID    atorvastatin  40 mg Oral Daily    cetirizine  10 mg Oral Daily    clopidogreL  75 mg Oral Daily    EScitalopram oxalate  10 mg Oral Daily    levothyroxine  75 mcg Oral Before breakfast    melatonin  3 mg Oral Nightly    metoprolol succinate  50 mg Oral BID    mirtazapine  15 mg Oral QHS    pantoprazole  40 mg Oral BID     Continuous Infusions:   furosemide (LASIX) 2 mg/mL continuous infusion (non-titrating) 2.5 mg/hr (11/10/22 1007)     PRN Meds:acetaminophen-codeine 300-60mg, dextrose 10%, dextrose 10%, glucagon (human recombinant), glucose, glucose, HYDROcodone-acetaminophen, melatonin, naloxone, nitroGLYCERIN, ondansetron, polyethylene glycol, senna-docusate 8.6-50 mg, simethicone    Oxygen/Ventilator Data (Last 24H):  (if applicable)        Hemodynamic Parameters (Last 24H):   (if applicable)        Laboratory and Radiology Data:  Recent Results (from the past 24 hour(s))   POCT glucose    Collection Time: 11/10/22 11:11 AM   Result Value Ref Range    POC Glucose 101 70 - 110   POCT glucose    Collection Time: 11/10/22  4:48 PM   Result Value Ref Range    POC  Glucose 113 (H) 70 - 110   POCT glucose    Collection Time: 11/10/22  8:26 PM   Result Value Ref Range    POC Glucose 119 (H) 70 - 110   Basic Metabolic Panel (BMP)    Collection Time: 11/11/22  4:06 AM   Result Value Ref Range    Sodium 134 (L) 136 - 145 mmol/L    Potassium 4.2 3.5 - 5.1 mmol/L    Chloride 103 95 - 110 mmol/L    CO2 24 23 - 29 mmol/L    Glucose 95 70 - 110 mg/dL    BUN 31 (H) 8 - 23 mg/dL    Creatinine 1.9 (H) 0.5 - 1.4 mg/dL    Calcium 8.2 (L) 8.7 - 10.5 mg/dL    Anion Gap 7 (L) 8 - 16 mmol/L    eGFR 33.5 (A) >60 mL/min/1.73 m^2   Magnesium    Collection Time: 11/11/22  4:06 AM   Result Value Ref Range    Magnesium 1.9 1.6 - 2.6 mg/dL   CBC with Automated Differential    Collection Time: 11/11/22  4:06 AM   Result Value Ref Range    WBC 5.03 3.90 - 12.70 K/uL    RBC 3.35 (L) 4.60 - 6.20 M/uL    Hemoglobin 11.2 (L) 14.0 - 18.0 g/dL    Hematocrit 33.3 (L) 40.0 - 54.0 %    MCV 99 (H) 82 - 98 fL    MCH 33.4 (H) 27.0 - 31.0 pg    MCHC 33.6 32.0 - 36.0 g/dL    RDW 14.7 (H) 11.5 - 14.5 %    Platelets 187 150 - 450 K/uL    MPV 10.6 9.2 - 12.9 fL    Immature Granulocytes 0.2 0.0 - 0.5 %    Gran # (ANC) 2.4 1.8 - 7.7 K/uL    Immature Grans (Abs) 0.01 0.00 - 0.04 K/uL    Lymph # 2.0 1.0 - 4.8 K/uL    Mono # 0.6 0.3 - 1.0 K/uL    Eos # 0.1 0.0 - 0.5 K/uL    Baso # 0.02 0.00 - 0.20 K/uL    nRBC 0 0 /100 WBC    Gran % 47.7 38.0 - 73.0 %    Lymph % 39.6 18.0 - 48.0 %    Mono % 11.1 4.0 - 15.0 %    Eosinophil % 1.0 0.0 - 8.0 %    Basophil % 0.4 0.0 - 1.9 %    Differential Method Automated    POCT glucose    Collection Time: 11/11/22  4:14 AM   Result Value Ref Range    POC Glucose 91 70 - 110     Imaging Results              CT Cervical Spine Without Contrast (Final result)  Result time 11/09/22 15:55:12      Final result by Luis Lemus MD (11/09/22 15:55:12)                   Narrative:    CMS MANDATED QUALITY DATA-CT RADIATION DOSE-436  All CT scans at this facility use dose modulation, iterative  reconstruction, and or weight based dosing when appropriate, to reduce radiation dose to as low as reasonably achievable.    HISTORY: Neck trauma (Age >= 65y)    FINDINGS: Thin axial imaging was performed without contrast, with sagittal and coronal reformatted images reviewed.  Comparison to multiple prior exams.    The cervical spine has normal alignment and curvature, with no acute fractures or destructive osseous lesions. There is moderate to advanced multilevel intervertebral disc space narrowing with osteophytes, with multilevel cervical facet arthropathy. There is osseous fusion across the disc space and facet articulations at C4-C5, with no interfacetal subluxation or dislocation.    The craniocervical junction and prevertebral soft tissues are normal. There are carotid arterial vascular calcifications. There is no evidence of spinal epidural hematoma, with no acute cervical soft tissue abnormalities. Sagittal and coronal reformatted images show no acute fracture or malalignment.    IMPRESSION: Negative for acute cervical spine fracture or subluxation.    Electronically signed by:  Luis Lemus MD  11/9/2022 3:55 PM CST Workstation: [id number]                                     CT Head Without Contrast (Final result)  Result time 11/09/22 15:53:25      Final result by Carlos Patel MD (11/09/22 15:53:25)                   Narrative:    CT head without contrast    CLINICAL DATA: Trauma    CMS MANDATED QUALITY DATA - CT RADIATION  436    All CT scans at this facility utilize dose modulation, iterative reconstruction, and/or weight based dosing when appropriate to reduce radiation dose to as low as reasonably achievable.    Findings: Thin section axial noncontrast images are compared to November 3, 2022    There is no intracranial mass, hemorrhage, or midline shift. Ventricles and sulci are prominent and unchanged, consistent with moderate to marked atrophy. There are no pathologic extra-axial fluid  collections.    There is no evidence of acute ischemic change. Changes of chronic microvascular white matter disease are noted, with multiple small chronic lacunar infarcts noted at the bilateral basal ganglia. The cerebellum and brainstem are unremarkable.    The calvarium is intact.    IMPRESSION:  1. Chronic findings as above. No acute intracranial abnormalities.    Electronically signed by:  Carlos Patel MD  11/9/2022 3:53 PM CST Workstation: [id number]                                     XR Ribs Min 3 views w/PA Chest Left (Final result)  Result time 11/09/22 15:33:32      Final result by Magdalena Salazar MD (11/09/22 15:33:32)                   Narrative:    PA chest with left RIBS Is compared to prior study dated 10/9/2022    Clinical history is pain after fall    The heart is enlarged. There is a left subclavian vein pacemaker with lead tips overlying the right atrium and right ventricle. There is a heart monitor.  There are small bilateral pleural effusions. There is no pneumothorax.    There are no left rib fractures. There are old right rib fractures. There are degenerative changes of the spine.    IMPRESSION: Cardiomegaly with small pleural effusions    No evidence of left rib fracture    Multiple old right rib fractures    Electronically signed by:  Magdalena Salazar MD  11/9/2022 3:33 PM CST Workstation: [id number]                                    12-lead EKG interpretation:  (if applicable)      Current Cardiac Rhythm:   (if applicable)    Physical Exam:   Physical Exam  Constitutional:       Appearance: Normal appearance.   Cardiovascular:      Rate and Rhythm: Normal rate and regular rhythm.   Pulmonary:      Effort: No respiratory distress.      Comments: Diminshed lung sounds. BLL   Abdominal:      General: Abdomen is flat. Bowel sounds are normal.      Palpations: Abdomen is soft.   Musculoskeletal:         General: No swelling.   Skin:     General: Skin is warm and dry.    Neurological:      Mental Status: He is alert and oriented to person, place, and time.   Psychiatric:         Mood and Affect: Mood normal.         Behavior: Behavior normal.       ASSESSMENT/PLAN:   Assessment:   Trop 0.05  September echo EF 45% Grade I DD   BP stable   Cr 1.9 today --will recheck as outpatient   Output---570 cc , but likely more according to family  BNP 1472 on admit, 2561 11/10  CXR, small pleural effusion  SOB improved     Plan:   HFpEF --d/c lasix drip  Start lasix 20 mg     CAD--  Stable  Elevated trop likely r/t CHF exacerbation  Continue Plavix , statin, BB    HTN-stable    pAF--continue amiodarone  Recently decreased dose to 200 mg daily in October.     The pt and family would like to go home today.   Recommend home with home health.   Follow up in office with  in one week.

## 2022-11-12 NOTE — DISCHARGE SUMMARY
[facility]  Discharge Summary      Patient Name: Gene Hartman  MRN: [id number]  JANUARY: [id number]  Patient Class: OP- Observation  Admission Date: 11/9/2022  Hospital Length of Stay: 0 days  Discharge Date and Time: 11/11/2022  2:20 PM  Attending Physician: No att. providers found   Discharging Provider: Walter Carpio MD  Primary Care Provider: Mariajose Thorne MD    Primary Care Team: Networked reference to record PCT     HPI:   The patient is an 88-year-old male, who presents to the emergency room with complaint of back pain and fall and was diagnosed with rib fractures.  On exam, patient was noted to have a high BNP and he carries no diagnosis of heart failure.  He is followed by Dr. Betancourt, his primary cardiologist, for pacemaker and was seen about 2 weeks ago.  He has a past history of dementia and Parkinson's disease.  His medication was adjusted due to affecting heart rate.  He lives at home with his wife and primary caretaker is his daughter.  His BNP was noted to be 1149 and his troponin was elevated at 0.5.  He denies any chest pain nausea vomiting and only complains of this back pain which is reproducible.  EKG shows no change.    Plan to admit observation diurese obtain echocardiogram and consult Cardiology      * No surgery found *      Hospital Course:   Patient who is wheel chair bound, h/o falls and extreme tremors from end stage Parkinsons disease got admitted with   Acute combined systolic (congestive) and diastolic (congestive) heart failure.  Pt was evaluated by cardiology team and was started on iv lasix drip  Later lasix gtt was changed to Lasix PO   Eventually pt was discharged to home with Home health  Home oxygen assessment was unsuccesful because pt cant walk(wheel chair bound) and because of extreme tremors(Per RRT)  Ideally he should be under hospice care        Goals of Care Treatment Preferences:  Code Status: Full Code      Consults:   Consults  (From admission, onward)        Status Ordering Provider     Inpatient consult to Cardiology  Once        Provider:  Hiram Betancourt MD    Completed KAYLA ALONSO     Inpatient consult to Registered Dietitian/Nutritionist  Once        Provider:  (Not yet assigned)    Completed VIPUL IVAN     Inpatient consult to Cardiology  Once        Provider:  Hiram Betancourt MD    Completed VIPUL IVAN          No new Assessment & Plan notes have been filed under this hospital service since the last note was generated.  Service: Hospital Medicine    Final Active Diagnoses:    Diagnosis Date Noted POA    Falls frequently [R29.6] 11/10/2022 Not Applicable    Dementia without behavioral disturbance [F03.90] 12/04/2019 Yes    HTN (hypertension), benign [I10]  Yes    Hyperlipidemia LDL goal <70 [E78.5]  Yes    CAD (coronary artery disease) [I25.10]  Yes      Problems Resolved During this Admission:    Diagnosis Date Noted Date Resolved POA    PRINCIPAL PROBLEM:  Acute combined systolic (congestive) and diastolic (congestive) heart failure [I50.41] 11/10/2022 11/11/2022 Unknown    Elevated brain natriuretic peptide (BNP) level [R79.89] 12/04/2019 11/11/2022 Unknown       Discharged Condition: fair    Disposition: Home-Health Care [facility] [place] [facility]    Follow Up:   Follow-up Information     Hiram Betancourt MD Follow up in 1 week(s).    Specialties: Cardiology, Interventional Cardiology  Contact information:  [address]  [facility] [address]  [phone]             Mariajose Thorne MD. Go on 11/17/2022.    Specialties: Hospitalist, Internal Medicine  Why: Appoinment scheduled for 11/17/2022 at 9:00am.  Contact information:  [address]  [place] [address]  [phone]                       Patient Instructions:      Ambulatory referral/consult to Home Health   Standing Status: Future   Referral Priority: Routine Referral Type: Home Health Care   Referral Reason: Specialty Services Required   Requested  Specialty: Home Health Services   Number of Visits Requested: 1       Significant Diagnostic Studies: Labs:   CMP   Recent Labs   Lab 11/11/22  0406   *   K 4.2      CO2 24   GLU 95   BUN 31*   CREATININE 1.9*   CALCIUM 8.2*   ANIONGAP 7*    and CBC   Recent Labs   Lab 11/11/22  0406   WBC 5.03   HGB 11.2*   HCT 33.3*          Pending Diagnostic Studies:     None         Medications:  Reconciled Home Medications:      Medication List      START taking these medications    furosemide 20 MG tablet  Commonly known as: LASIX  Take 1 tablet (20 mg total) by mouth once daily.        CHANGE how you take these medications    amiodarone 200 MG Tab  Commonly known as: PACERONE  Take 1 tablet (200 mg total) by mouth once daily.  What changed: when to take this     fluticasone propionate 50 mcg/actuation nasal spray  Commonly known as: FLONASE  USE 1 SPRAY IN EACH NOSTRIL TWICE DAILY  What changed: when to take this     HYDROcodone-acetaminophen 5-325 mg per tablet  Commonly known as: NORCO  Take 1 tablet by mouth every 12 (twelve) hours as needed for Pain.  What changed: when to take this        CONTINUE taking these medications    acetaminophen-codeine 300-60mg 300-60 mg Tab  Commonly known as: TYLENOL #4  Take 1 tablet by mouth every 6 (six) hours as needed.     albuterol 2.5 mg /3 mL (0.083 %) nebulizer solution  Commonly known as: PROVENTIL  Inhale 2.5 mg into the lungs.     atorvastatin 40 MG tablet  Commonly known as: LIPITOR  Take 1 tablet (40 mg total) by mouth once daily.     cetirizine 10 MG tablet  Commonly known as: ZYRTEC  Take 1 tablet (10 mg total) by mouth once daily.     clopidogreL 75 mg tablet  Commonly known as: PLAVIX  Take 1 tablet (75 mg total) by mouth once daily.     cyanocobalamin (vitamin B-12) 5,000 mcg Subl  Commonly known as: VITAMIN B-12  Place 1 tablet under the tongue once daily.     cycloSPORINE 0.05 % ophthalmic emulsion  Commonly known as: RESTASIS  Apply 1 drop to eye 2  (two) times daily.     EScitalopram oxalate 10 MG tablet  Commonly known as: LEXAPRO  Take 10 mg by mouth once daily.     levothyroxine 75 MCG tablet  Commonly known as: SYNTHROID  Take 75 mcg by mouth before breakfast.     losartan 50 MG tablet  Commonly known as: COZAAR  Take 1 tablet (50 mg total) by mouth 2 (two) times daily.     melatonin 5 mg Subl  Take 2 tablets by mouth nightly as needed.     metoprolol succinate 50 MG 24 hr tablet  Commonly known as: TOPROL-XL  Take 1 tablet (50 mg total) by mouth 2 (two) times daily.     mirtazapine 15 MG tablet  Commonly known as: REMERON  Take 15 mg by mouth every evening.     nitroGLYCERIN 0.4 MG SL tablet  Commonly known as: NITROSTAT  Place 0.4 mg under the tongue every 5 (five) minutes as needed.     pantoprazole 40 MG tablet  Commonly known as: PROTONIX  Take 40 mg by mouth 2 (two) times daily.        STOP taking these medications    amoxicillin-clavulanate 400-57 mg/5 mL Susr  Commonly known as: AUGMENTIN     cloNIDine 0.1 MG tablet  Commonly known as: CATAPRES     sotaloL 80 MG tablet  Commonly known as: BETAPACE     tamsulosin 0.4 mg Cap  Commonly known as: FLOMAX     TYLENOL ARTHRITIS PAIN ORAL            Indwelling Lines/Drains at time of discharge:   Lines/Drains/Airways     None               Physical Exam   Cardiovascular: Normal rate.   Neurological: He is alert.     Time spent on the discharge of patient: 43 minutes         Walter Carpio MD  Department of Hospital Medicine  [facility]

## 2022-11-22 ENCOUNTER — LAB VISIT (OUTPATIENT)
Dept: LAB | Facility: HOSPITAL | Age: 87
End: 2022-11-22
Attending: INTERNAL MEDICINE
Payer: MEDICARE

## 2022-11-22 DIAGNOSIS — I49.3 VENTRICULAR PREMATURE BEATS: ICD-10-CM

## 2022-11-22 DIAGNOSIS — E78.2 MIXED HYPERLIPIDEMIA: ICD-10-CM

## 2022-11-22 DIAGNOSIS — I25.10 CORONARY ATHEROSCLEROSIS OF NATIVE CORONARY ARTERY: ICD-10-CM

## 2022-11-22 DIAGNOSIS — Z98.61 POSTSURGICAL PERCUTANEOUS TRANSLUMINAL CORONARY ANGIOPLASTY STATUS: Primary | ICD-10-CM

## 2022-11-22 DIAGNOSIS — I10 ESSENTIAL HYPERTENSION, MALIGNANT: ICD-10-CM

## 2022-11-22 DIAGNOSIS — I65.23 BILATERAL CAROTID ARTERY OCCLUSION: ICD-10-CM

## 2022-11-22 DIAGNOSIS — R55 SYNCOPE AND COLLAPSE: ICD-10-CM

## 2022-11-22 DIAGNOSIS — I62.03 CHRONIC INTRACRANIAL SUBDURAL HEMATOMA: ICD-10-CM

## 2022-11-22 DIAGNOSIS — I49.5 SINOATRIAL NODE DYSFUNCTION: ICD-10-CM

## 2022-11-22 DIAGNOSIS — R00.1 SEVERE SINUS BRADYCARDIA: ICD-10-CM

## 2022-11-22 DIAGNOSIS — Z95.0 CARDIAC PACEMAKER IN SITU: ICD-10-CM

## 2022-11-22 PROCEDURE — 85027 COMPLETE CBC AUTOMATED: CPT | Performed by: INTERNAL MEDICINE

## 2022-11-22 PROCEDURE — 83880 ASSAY OF NATRIURETIC PEPTIDE: CPT | Performed by: INTERNAL MEDICINE

## 2022-11-22 PROCEDURE — 80053 COMPREHEN METABOLIC PANEL: CPT | Performed by: INTERNAL MEDICINE

## 2022-11-23 ENCOUNTER — DOCUMENT SCAN (OUTPATIENT)
Dept: HOME HEALTH SERVICES | Facility: HOSPITAL | Age: 87
End: 2022-11-23
Payer: MEDICARE

## 2022-11-23 LAB
ALBUMIN SERPL BCP-MCNC: 3.6 G/DL (ref 3.5–5.2)
ALP SERPL-CCNC: 107 U/L (ref 55–135)
ALT SERPL W/O P-5'-P-CCNC: 44 U/L (ref 10–44)
ANION GAP SERPL CALC-SCNC: 11 MMOL/L (ref 8–16)
AST SERPL-CCNC: 34 U/L (ref 10–40)
BILIRUB SERPL-MCNC: 0.5 MG/DL (ref 0.1–1)
BNP SERPL-MCNC: 1643 PG/ML (ref 0–99)
BUN SERPL-MCNC: 22 MG/DL (ref 8–23)
CALCIUM SERPL-MCNC: 10 MG/DL (ref 8.7–10.5)
CHLORIDE SERPL-SCNC: 103 MMOL/L (ref 95–110)
CO2 SERPL-SCNC: 27 MMOL/L (ref 23–29)
CREAT SERPL-MCNC: 1.7 MG/DL (ref 0.5–1.4)
ERYTHROCYTE [DISTWIDTH] IN BLOOD BY AUTOMATED COUNT: 14.6 % (ref 11.5–14.5)
EST. GFR  (NO RACE VARIABLE): 38.3 ML/MIN/1.73 M^2
GLUCOSE SERPL-MCNC: 82 MG/DL (ref 70–110)
HCT VFR BLD AUTO: 43.5 % (ref 40–54)
HGB BLD-MCNC: 13.9 G/DL (ref 14–18)
MCH RBC QN AUTO: 33.6 PG (ref 27–31)
MCHC RBC AUTO-ENTMCNC: 32 G/DL (ref 32–36)
MCV RBC AUTO: 105 FL (ref 82–98)
PLATELET # BLD AUTO: 288 K/UL (ref 150–450)
PMV BLD AUTO: 11.1 FL (ref 9.2–12.9)
POTASSIUM SERPL-SCNC: 4 MMOL/L (ref 3.5–5.1)
PROT SERPL-MCNC: 8.2 G/DL (ref 6–8.4)
RBC # BLD AUTO: 4.14 M/UL (ref 4.6–6.2)
SODIUM SERPL-SCNC: 141 MMOL/L (ref 136–145)
WBC # BLD AUTO: 6.36 K/UL (ref 3.9–12.7)

## 2022-12-21 ENCOUNTER — LAB VISIT (OUTPATIENT)
Dept: LAB | Facility: HOSPITAL | Age: 87
End: 2022-12-21
Attending: PSYCHIATRY & NEUROLOGY
Payer: MEDICARE

## 2022-12-21 DIAGNOSIS — Z00.00 ROUTINE GENERAL MEDICAL EXAMINATION AT A HEALTH CARE FACILITY: Primary | ICD-10-CM

## 2022-12-21 PROCEDURE — 82607 VITAMIN B-12: CPT | Performed by: PSYCHIATRY & NEUROLOGY

## 2022-12-21 PROCEDURE — 85025 COMPLETE CBC W/AUTO DIFF WBC: CPT | Performed by: PSYCHIATRY & NEUROLOGY

## 2022-12-21 PROCEDURE — 84443 ASSAY THYROID STIM HORMONE: CPT | Performed by: PSYCHIATRY & NEUROLOGY

## 2022-12-21 PROCEDURE — 84436 ASSAY OF TOTAL THYROXINE: CPT | Performed by: PSYCHIATRY & NEUROLOGY

## 2022-12-21 PROCEDURE — 80053 COMPREHEN METABOLIC PANEL: CPT | Performed by: PSYCHIATRY & NEUROLOGY

## 2022-12-21 PROCEDURE — 83921 ORGANIC ACID SINGLE QUANT: CPT | Performed by: PSYCHIATRY & NEUROLOGY

## 2022-12-22 LAB
ALBUMIN SERPL BCP-MCNC: 3.1 G/DL (ref 3.5–5.2)
ALP SERPL-CCNC: 79 U/L (ref 55–135)
ALT SERPL W/O P-5'-P-CCNC: 24 U/L (ref 10–44)
ANION GAP SERPL CALC-SCNC: 12 MMOL/L (ref 8–16)
AST SERPL-CCNC: 22 U/L (ref 10–40)
BASOPHILS # BLD AUTO: 0.03 K/UL (ref 0–0.2)
BASOPHILS NFR BLD: 0.6 % (ref 0–1.9)
BILIRUB SERPL-MCNC: 0.4 MG/DL (ref 0.1–1)
BUN SERPL-MCNC: 22 MG/DL (ref 8–23)
CALCIUM SERPL-MCNC: 8.4 MG/DL (ref 8.7–10.5)
CHLORIDE SERPL-SCNC: 111 MMOL/L (ref 95–110)
CO2 SERPL-SCNC: 21 MMOL/L (ref 23–29)
CREAT SERPL-MCNC: 1.4 MG/DL (ref 0.5–1.4)
DIFFERENTIAL METHOD: ABNORMAL
EOSINOPHIL # BLD AUTO: 0.1 K/UL (ref 0–0.5)
EOSINOPHIL NFR BLD: 1.3 % (ref 0–8)
ERYTHROCYTE [DISTWIDTH] IN BLOOD BY AUTOMATED COUNT: 15 % (ref 11.5–14.5)
EST. GFR  (NO RACE VARIABLE): 48.3 ML/MIN/1.73 M^2
GLUCOSE SERPL-MCNC: 84 MG/DL (ref 70–110)
HCT VFR BLD AUTO: 36.9 % (ref 40–54)
HGB BLD-MCNC: 11.8 G/DL (ref 14–18)
IMM GRANULOCYTES # BLD AUTO: 0.01 K/UL (ref 0–0.04)
IMM GRANULOCYTES NFR BLD AUTO: 0.2 % (ref 0–0.5)
LYMPHOCYTES # BLD AUTO: 1.9 K/UL (ref 1–4.8)
LYMPHOCYTES NFR BLD: 39 % (ref 18–48)
MCH RBC QN AUTO: 32.8 PG (ref 27–31)
MCHC RBC AUTO-ENTMCNC: 32 G/DL (ref 32–36)
MCV RBC AUTO: 103 FL (ref 82–98)
MONOCYTES # BLD AUTO: 0.3 K/UL (ref 0.3–1)
MONOCYTES NFR BLD: 7.1 % (ref 4–15)
NEUTROPHILS # BLD AUTO: 2.5 K/UL (ref 1.8–7.7)
NEUTROPHILS NFR BLD: 51.8 % (ref 38–73)
NRBC BLD-RTO: 0 /100 WBC
PLATELET # BLD AUTO: 195 K/UL (ref 150–450)
PMV BLD AUTO: 11.8 FL (ref 9.2–12.9)
POTASSIUM SERPL-SCNC: 4.3 MMOL/L (ref 3.5–5.1)
PROT SERPL-MCNC: 6.2 G/DL (ref 6–8.4)
RBC # BLD AUTO: 3.6 M/UL (ref 4.6–6.2)
SODIUM SERPL-SCNC: 144 MMOL/L (ref 136–145)
T4 SERPL-MCNC: 9.5 UG/DL (ref 4.5–11.5)
TSH SERPL DL<=0.005 MIU/L-ACNC: 2.97 UIU/ML (ref 0.4–4)
VIT B12 SERPL-MCNC: 1229 PG/ML (ref 210–950)
WBC # BLD AUTO: 4.77 K/UL (ref 3.9–12.7)

## 2022-12-30 LAB — METHYLMALONATE SERPL-SCNC: 0.26 UMOL/L

## 2023-02-10 ENCOUNTER — LAB VISIT (OUTPATIENT)
Dept: LAB | Facility: HOSPITAL | Age: 88
End: 2023-02-10
Payer: MEDICARE

## 2023-02-10 DIAGNOSIS — I10 ESSENTIAL HYPERTENSION, MALIGNANT: Primary | ICD-10-CM

## 2023-02-10 DIAGNOSIS — I25.10 CORONARY ATHEROSCLEROSIS OF NATIVE CORONARY ARTERY: ICD-10-CM

## 2023-02-10 DIAGNOSIS — E78.2 MIXED HYPERLIPIDEMIA: ICD-10-CM

## 2023-02-10 DIAGNOSIS — R79.9 ABNORMAL BLOOD CHEMISTRY: ICD-10-CM

## 2023-02-10 PROCEDURE — 85018 HEMOGLOBIN: CPT | Performed by: INTERNAL MEDICINE

## 2023-02-10 PROCEDURE — 80053 COMPREHEN METABOLIC PANEL: CPT | Performed by: INTERNAL MEDICINE

## 2023-02-10 PROCEDURE — 84443 ASSAY THYROID STIM HORMONE: CPT | Performed by: INTERNAL MEDICINE

## 2023-02-10 PROCEDURE — 80061 LIPID PANEL: CPT | Performed by: INTERNAL MEDICINE

## 2023-02-11 LAB
ALBUMIN SERPL BCP-MCNC: 3.2 G/DL (ref 3.5–5.2)
ALP SERPL-CCNC: 96 U/L (ref 55–135)
ALT SERPL W/O P-5'-P-CCNC: 26 U/L (ref 10–44)
ANION GAP SERPL CALC-SCNC: 15 MMOL/L (ref 8–16)
AST SERPL-CCNC: 23 U/L (ref 10–40)
BILIRUB SERPL-MCNC: 0.4 MG/DL (ref 0.1–1)
BUN SERPL-MCNC: 23 MG/DL (ref 8–23)
CALCIUM SERPL-MCNC: 9.4 MG/DL (ref 8.7–10.5)
CHLORIDE SERPL-SCNC: 112 MMOL/L (ref 95–110)
CHOLEST SERPL-MCNC: 169 MG/DL (ref 120–199)
CHOLEST/HDLC SERPL: 3.6 {RATIO} (ref 2–5)
CO2 SERPL-SCNC: 17 MMOL/L (ref 23–29)
CREAT SERPL-MCNC: 1.5 MG/DL (ref 0.5–1.4)
EST. GFR  (NO RACE VARIABLE): 44.2 ML/MIN/1.73 M^2
GLUCOSE SERPL-MCNC: 94 MG/DL (ref 70–110)
HDLC SERPL-MCNC: 47 MG/DL (ref 40–75)
HDLC SERPL: 27.8 % (ref 20–50)
HGB BLD-MCNC: 12 G/DL (ref 14–18)
LDLC SERPL CALC-MCNC: 98 MG/DL (ref 63–159)
NONHDLC SERPL-MCNC: 122 MG/DL
POTASSIUM SERPL-SCNC: 4.3 MMOL/L (ref 3.5–5.1)
PROT SERPL-MCNC: 6.7 G/DL (ref 6–8.4)
SODIUM SERPL-SCNC: 144 MMOL/L (ref 136–145)
TRIGL SERPL-MCNC: 120 MG/DL (ref 30–150)
TSH SERPL DL<=0.005 MIU/L-ACNC: 3.14 UIU/ML (ref 0.4–4)

## 2023-02-17 ENCOUNTER — HOSPITAL ENCOUNTER (INPATIENT)
Facility: HOSPITAL | Age: 88
LOS: 6 days | Discharge: HOME HEALTH | DRG: 291 | End: 2023-02-24
Attending: EMERGENCY MEDICINE | Admitting: INTERNAL MEDICINE
Payer: MEDICARE

## 2023-02-17 DIAGNOSIS — R13.10 DYSPHAGIA, UNSPECIFIED TYPE: ICD-10-CM

## 2023-02-17 DIAGNOSIS — R06.02 SHORTNESS OF BREATH: ICD-10-CM

## 2023-02-17 DIAGNOSIS — I50.9 CHRONIC CONGESTIVE HEART FAILURE, UNSPECIFIED HEART FAILURE TYPE: ICD-10-CM

## 2023-02-17 DIAGNOSIS — J90 PLEURAL EFFUSION: ICD-10-CM
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
## 2023-02-17 DIAGNOSIS — R29.6 FALLS FREQUENTLY: ICD-10-CM

## 2023-02-17 DIAGNOSIS — I50.9 CONGESTIVE HEART FAILURE, UNSPECIFIED HF CHRONICITY, UNSPECIFIED HEART FAILURE TYPE: Primary | ICD-10-CM

## 2023-02-17 DIAGNOSIS — R07.9 CHEST PAIN: ICD-10-CM

## 2023-02-17 LAB
ALBUMIN SERPL BCP-MCNC: 3.4 G/DL (ref 3.5–5.2)
ALP SERPL-CCNC: 92 U/L (ref 55–135)
ALT SERPL W/O P-5'-P-CCNC: 32 U/L (ref 10–44)
ANION GAP SERPL CALC-SCNC: 9 MMOL/L (ref 8–16)
AST SERPL-CCNC: 28 U/L (ref 10–40)
BASOPHILS # BLD AUTO: 0.02 K/UL (ref 0–0.2)
BASOPHILS NFR BLD: 0.3 % (ref 0–1.9)
BILIRUB SERPL-MCNC: 0.4 MG/DL (ref 0.1–1)
BUN SERPL-MCNC: 28 MG/DL (ref 8–23)
CALCIUM SERPL-MCNC: 8.6 MG/DL (ref 8.7–10.5)
CHLORIDE SERPL-SCNC: 110 MMOL/L (ref 95–110)
CO2 SERPL-SCNC: 24 MMOL/L (ref 23–29)
CREAT SERPL-MCNC: 1.4 MG/DL (ref 0.5–1.4)
DIFFERENTIAL METHOD: ABNORMAL
EOSINOPHIL # BLD AUTO: 0 K/UL (ref 0–0.5)
EOSINOPHIL NFR BLD: 0.3 % (ref 0–8)
ERYTHROCYTE [DISTWIDTH] IN BLOOD BY AUTOMATED COUNT: 15.7 % (ref 11.5–14.5)
EST. GFR  (NO RACE VARIABLE): 48 ML/MIN/1.73 M^2
GLUCOSE SERPL-MCNC: 175 MG/DL (ref 70–110)
HCT VFR BLD AUTO: 37.9 % (ref 40–54)
HGB BLD-MCNC: 12.3 G/DL (ref 14–18)
IMM GRANULOCYTES # BLD AUTO: 0.04 K/UL (ref 0–0.04)
IMM GRANULOCYTES NFR BLD AUTO: 0.6 % (ref 0–0.5)
LYMPHOCYTES # BLD AUTO: 1 K/UL (ref 1–4.8)
LYMPHOCYTES NFR BLD: 15.2 % (ref 18–48)
MCH RBC QN AUTO: 33.6 PG (ref 27–31)
MCHC RBC AUTO-ENTMCNC: 32.5 G/DL (ref 32–36)
MCV RBC AUTO: 104 FL (ref 82–98)
MONOCYTES # BLD AUTO: 0.4 K/UL (ref 0.3–1)
MONOCYTES NFR BLD: 5.3 % (ref 4–15)
NEUTROPHILS # BLD AUTO: 5.2 K/UL (ref 1.8–7.7)
NEUTROPHILS NFR BLD: 78.3 % (ref 38–73)
NRBC BLD-RTO: 0 /100 WBC
PLATELET # BLD AUTO: 228 K/UL (ref 150–450)
PMV BLD AUTO: 11 FL (ref 9.2–12.9)
POTASSIUM SERPL-SCNC: 4.4 MMOL/L (ref 3.5–5.1)
PROT SERPL-MCNC: 7.3 G/DL (ref 6–8.4)
RBC # BLD AUTO: 3.66 M/UL (ref 4.6–6.2)
SODIUM SERPL-SCNC: 143 MMOL/L (ref 136–145)
WBC # BLD AUTO: 6.65 K/UL (ref 3.9–12.7)

## 2023-02-17 PROCEDURE — 99285 EMERGENCY DEPT VISIT HI MDM: CPT | Mod: 25

## 2023-02-17 PROCEDURE — 85025 COMPLETE CBC W/AUTO DIFF WBC: CPT | Performed by: EMERGENCY MEDICINE

## 2023-02-17 PROCEDURE — 93010 EKG 12-LEAD: ICD-10-PCS | Mod: ,,, | Performed by: INTERNAL MEDICINE

## 2023-02-17 PROCEDURE — 83880 ASSAY OF NATRIURETIC PEPTIDE: CPT | Performed by: EMERGENCY MEDICINE

## 2023-02-17 PROCEDURE — 93005 ELECTROCARDIOGRAM TRACING: CPT | Performed by: INTERNAL MEDICINE

## 2023-02-17 PROCEDURE — 80053 COMPREHEN METABOLIC PANEL: CPT | Performed by: EMERGENCY MEDICINE

## 2023-02-17 PROCEDURE — 96374 THER/PROPH/DIAG INJ IV PUSH: CPT

## 2023-02-17 PROCEDURE — 93010 ELECTROCARDIOGRAM REPORT: CPT | Mod: ,,, | Performed by: INTERNAL MEDICINE

## 2023-02-17 PROCEDURE — 96376 TX/PRO/DX INJ SAME DRUG ADON: CPT

## 2023-02-18 PROBLEM — I50.9 CHF (CONGESTIVE HEART FAILURE): Status: ACTIVE | Noted: 2023-02-18

## 2023-02-18 LAB
ANION GAP SERPL CALC-SCNC: 8 MMOL/L (ref 8–16)
BASOPHILS # BLD AUTO: 0.01 K/UL (ref 0–0.2)
BASOPHILS NFR BLD: 0.1 % (ref 0–1.9)
BILIRUB UR QL STRIP: NEGATIVE
BNP SERPL-MCNC: 2349 PG/ML (ref 0–99)
BUN SERPL-MCNC: 26 MG/DL (ref 8–23)
CALCIUM SERPL-MCNC: 8.6 MG/DL (ref 8.7–10.5)
CHLORIDE SERPL-SCNC: 109 MMOL/L (ref 95–110)
CLARITY UR: CLEAR
CO2 SERPL-SCNC: 26 MMOL/L (ref 23–29)
COLOR UR: COLORLESS
CREAT SERPL-MCNC: 1.5 MG/DL (ref 0.5–1.4)
DIFFERENTIAL METHOD: ABNORMAL
EOSINOPHIL # BLD AUTO: 0 K/UL (ref 0–0.5)
EOSINOPHIL NFR BLD: 0 % (ref 0–8)
ERYTHROCYTE [DISTWIDTH] IN BLOOD BY AUTOMATED COUNT: 15.8 % (ref 11.5–14.5)
EST. GFR  (NO RACE VARIABLE): 44.2 ML/MIN/1.73 M^2
GLUCOSE SERPL-MCNC: 121 MG/DL (ref 70–110)
GLUCOSE UR QL STRIP: NEGATIVE
HCT VFR BLD AUTO: 33.8 % (ref 40–54)
HGB BLD-MCNC: 11.2 G/DL (ref 14–18)
HGB UR QL STRIP: NEGATIVE
IMM GRANULOCYTES # BLD AUTO: 0.03 K/UL (ref 0–0.04)
IMM GRANULOCYTES NFR BLD AUTO: 0.4 % (ref 0–0.5)
KETONES UR QL STRIP: NEGATIVE
LEUKOCYTE ESTERASE UR QL STRIP: NEGATIVE
LYMPHOCYTES # BLD AUTO: 1.7 K/UL (ref 1–4.8)
LYMPHOCYTES NFR BLD: 21.5 % (ref 18–48)
MCH RBC QN AUTO: 33.9 PG (ref 27–31)
MCHC RBC AUTO-ENTMCNC: 33.1 G/DL (ref 32–36)
MCV RBC AUTO: 102 FL (ref 82–98)
MONOCYTES # BLD AUTO: 0.6 K/UL (ref 0.3–1)
MONOCYTES NFR BLD: 7 % (ref 4–15)
NEUTROPHILS # BLD AUTO: 5.5 K/UL (ref 1.8–7.7)
NEUTROPHILS NFR BLD: 71 % (ref 38–73)
NITRITE UR QL STRIP: NEGATIVE
NRBC BLD-RTO: 0 /100 WBC
PH UR STRIP: 5 [PH] (ref 5–8)
PLATELET # BLD AUTO: 186 K/UL (ref 150–450)
PMV BLD AUTO: 11 FL (ref 9.2–12.9)
POTASSIUM SERPL-SCNC: 4.1 MMOL/L (ref 3.5–5.1)
PROT UR QL STRIP: NEGATIVE
RBC # BLD AUTO: 3.3 M/UL (ref 4.6–6.2)
SODIUM SERPL-SCNC: 143 MMOL/L (ref 136–145)
SP GR UR STRIP: 1.01 (ref 1–1.03)
URN SPEC COLLECT METH UR: ABNORMAL
UROBILINOGEN UR STRIP-ACNC: NEGATIVE EU/DL
WBC # BLD AUTO: 7.81 K/UL (ref 3.9–12.7)

## 2023-02-18 PROCEDURE — 63600175 PHARM REV CODE 636 W HCPCS: Performed by: INTERNAL MEDICINE

## 2023-02-18 PROCEDURE — 93005 ELECTROCARDIOGRAM TRACING: CPT | Performed by: INTERNAL MEDICINE

## 2023-02-18 PROCEDURE — 25000003 PHARM REV CODE 250: Performed by: EMERGENCY MEDICINE

## 2023-02-18 PROCEDURE — 84484 ASSAY OF TROPONIN QUANT: CPT | Performed by: INTERNAL MEDICINE

## 2023-02-18 PROCEDURE — 81003 URINALYSIS AUTO W/O SCOPE: CPT | Performed by: INTERNAL MEDICINE

## 2023-02-18 PROCEDURE — 99900035 HC TECH TIME PER 15 MIN (STAT)

## 2023-02-18 PROCEDURE — 94640 AIRWAY INHALATION TREATMENT: CPT

## 2023-02-18 PROCEDURE — 63600175 PHARM REV CODE 636 W HCPCS: Performed by: NURSE PRACTITIONER

## 2023-02-18 PROCEDURE — 80048 BASIC METABOLIC PNL TOTAL CA: CPT | Performed by: INTERNAL MEDICINE

## 2023-02-18 PROCEDURE — 36415 COLL VENOUS BLD VENIPUNCTURE: CPT | Performed by: EMERGENCY MEDICINE

## 2023-02-18 PROCEDURE — 93010 EKG 12-LEAD: ICD-10-PCS | Mod: ,,, | Performed by: INTERNAL MEDICINE

## 2023-02-18 PROCEDURE — 85025 COMPLETE CBC W/AUTO DIFF WBC: CPT | Performed by: INTERNAL MEDICINE

## 2023-02-18 PROCEDURE — 36415 COLL VENOUS BLD VENIPUNCTURE: CPT | Performed by: INTERNAL MEDICINE

## 2023-02-18 PROCEDURE — 63600175 PHARM REV CODE 636 W HCPCS: Performed by: EMERGENCY MEDICINE

## 2023-02-18 PROCEDURE — 21400001 HC TELEMETRY ROOM

## 2023-02-18 PROCEDURE — 25000242 PHARM REV CODE 250 ALT 637 W/ HCPCS: Performed by: INTERNAL MEDICINE

## 2023-02-18 PROCEDURE — 97162 PT EVAL MOD COMPLEX 30 MIN: CPT

## 2023-02-18 PROCEDURE — 25000003 PHARM REV CODE 250: Performed by: INTERNAL MEDICINE

## 2023-02-18 PROCEDURE — 93010 ELECTROCARDIOGRAM REPORT: CPT | Mod: ,,, | Performed by: INTERNAL MEDICINE

## 2023-02-18 RX ORDER — TALC
6 POWDER (GRAM) TOPICAL NIGHTLY PRN
Status: DISCONTINUED | OUTPATIENT
Start: 2023-02-18 | End: 2023-02-24 | Stop reason: HOSPADM

## 2023-02-18 RX ORDER — CLONIDINE HYDROCHLORIDE 0.1 MG/1
0.1 TABLET ORAL
Status: COMPLETED | OUTPATIENT
Start: 2023-02-18 | End: 2023-02-18

## 2023-02-18 RX ORDER — CLOPIDOGREL BISULFATE 75 MG/1
75 TABLET ORAL DAILY
Status: DISCONTINUED | OUTPATIENT
Start: 2023-02-18 | End: 2023-02-20

## 2023-02-18 RX ORDER — FUROSEMIDE 20 MG/1
20 TABLET ORAL DAILY
Status: DISCONTINUED | OUTPATIENT
Start: 2023-02-18 | End: 2023-02-18

## 2023-02-18 RX ORDER — SIMETHICONE 80 MG
1 TABLET,CHEWABLE ORAL 4 TIMES DAILY PRN
Status: DISCONTINUED | OUTPATIENT
Start: 2023-02-18 | End: 2023-02-24 | Stop reason: HOSPADM

## 2023-02-18 RX ORDER — ENOXAPARIN SODIUM 100 MG/ML
30 INJECTION SUBCUTANEOUS EVERY 24 HOURS
Status: DISCONTINUED | OUTPATIENT
Start: 2023-02-18 | End: 2023-02-20

## 2023-02-18 RX ORDER — LOSARTAN POTASSIUM 50 MG/1
50 TABLET ORAL 2 TIMES DAILY
Status: DISCONTINUED | OUTPATIENT
Start: 2023-02-18 | End: 2023-02-20

## 2023-02-18 RX ORDER — LEVOTHYROXINE SODIUM 25 UG/1
75 TABLET ORAL
Status: DISCONTINUED | OUTPATIENT
Start: 2023-02-18 | End: 2023-02-24 | Stop reason: HOSPADM

## 2023-02-18 RX ORDER — FUROSEMIDE 10 MG/ML
40 INJECTION INTRAMUSCULAR; INTRAVENOUS
Status: DISCONTINUED | OUTPATIENT
Start: 2023-02-18 | End: 2023-02-20

## 2023-02-18 RX ORDER — MORPHINE SULFATE 2 MG/ML
2 INJECTION, SOLUTION INTRAMUSCULAR; INTRAVENOUS EVERY 4 HOURS PRN
Status: DISCONTINUED | OUTPATIENT
Start: 2023-02-18 | End: 2023-02-19

## 2023-02-18 RX ORDER — METOPROLOL SUCCINATE 50 MG/1
1 TABLET, EXTENDED RELEASE ORAL DAILY
Status: ON HOLD | COMMUNITY
Start: 2022-05-10 | End: 2023-02-24 | Stop reason: HOSPADM

## 2023-02-18 RX ORDER — AMIODARONE HYDROCHLORIDE 200 MG/1
200 TABLET ORAL DAILY
Status: DISCONTINUED | OUTPATIENT
Start: 2023-02-18 | End: 2023-02-24 | Stop reason: HOSPADM

## 2023-02-18 RX ORDER — GLUCAGON 1 MG
1 KIT INJECTION
Status: DISCONTINUED | OUTPATIENT
Start: 2023-02-18 | End: 2023-02-24 | Stop reason: HOSPADM

## 2023-02-18 RX ORDER — ALBUTEROL SULFATE 0.83 MG/ML
2.5 SOLUTION RESPIRATORY (INHALATION) EVERY 4 HOURS PRN
Status: DISCONTINUED | OUTPATIENT
Start: 2023-02-18 | End: 2023-02-24 | Stop reason: HOSPADM

## 2023-02-18 RX ORDER — KETOROLAC TROMETHAMINE 30 MG/ML
15 INJECTION, SOLUTION INTRAMUSCULAR; INTRAVENOUS ONCE
Status: COMPLETED | OUTPATIENT
Start: 2023-02-18 | End: 2023-02-18

## 2023-02-18 RX ORDER — CETIRIZINE HYDROCHLORIDE 10 MG/1
10 TABLET ORAL DAILY
Status: DISCONTINUED | OUTPATIENT
Start: 2023-02-18 | End: 2023-02-20

## 2023-02-18 RX ORDER — FUROSEMIDE 10 MG/ML
40 INJECTION INTRAMUSCULAR; INTRAVENOUS
Status: COMPLETED | OUTPATIENT
Start: 2023-02-18 | End: 2023-02-18

## 2023-02-18 RX ORDER — ACETAMINOPHEN 325 MG/1
650 TABLET ORAL EVERY 8 HOURS PRN
Status: DISCONTINUED | OUTPATIENT
Start: 2023-02-18 | End: 2023-02-24 | Stop reason: HOSPADM

## 2023-02-18 RX ORDER — IBUPROFEN 200 MG
16 TABLET ORAL
Status: DISCONTINUED | OUTPATIENT
Start: 2023-02-18 | End: 2023-02-24 | Stop reason: HOSPADM

## 2023-02-18 RX ORDER — SODIUM CHLORIDE 0.9 % (FLUSH) 0.9 %
10 SYRINGE (ML) INJECTION
Status: DISCONTINUED | OUTPATIENT
Start: 2023-02-18 | End: 2023-02-24 | Stop reason: HOSPADM

## 2023-02-18 RX ORDER — IBUPROFEN 200 MG
24 TABLET ORAL
Status: DISCONTINUED | OUTPATIENT
Start: 2023-02-18 | End: 2023-02-24 | Stop reason: HOSPADM

## 2023-02-18 RX ORDER — FUROSEMIDE 10 MG/ML
20 INJECTION INTRAMUSCULAR; INTRAVENOUS DAILY
Status: DISCONTINUED | OUTPATIENT
Start: 2023-02-18 | End: 2023-02-18

## 2023-02-18 RX ORDER — LORAZEPAM 0.5 MG/1
0.5 TABLET ORAL DAILY PRN
Status: ON HOLD | COMMUNITY
Start: 2023-02-15 | End: 2023-02-24 | Stop reason: HOSPADM

## 2023-02-18 RX ORDER — ONDANSETRON 2 MG/ML
4 INJECTION INTRAMUSCULAR; INTRAVENOUS EVERY 6 HOURS PRN
Status: DISCONTINUED | OUTPATIENT
Start: 2023-02-18 | End: 2023-02-24 | Stop reason: HOSPADM

## 2023-02-18 RX ORDER — NITROGLYCERIN 0.4 MG/1
0.4 TABLET SUBLINGUAL EVERY 5 MIN PRN
Status: DISCONTINUED | OUTPATIENT
Start: 2023-02-18 | End: 2023-02-24 | Stop reason: HOSPADM

## 2023-02-18 RX ORDER — ATORVASTATIN CALCIUM 40 MG/1
40 TABLET, FILM COATED ORAL DAILY
Status: DISCONTINUED | OUTPATIENT
Start: 2023-02-18 | End: 2023-02-24 | Stop reason: HOSPADM

## 2023-02-18 RX ORDER — POLYETHYLENE GLYCOL 3350 17 G/17G
17 POWDER, FOR SOLUTION ORAL 2 TIMES DAILY PRN
Status: DISCONTINUED | OUTPATIENT
Start: 2023-02-18 | End: 2023-02-24 | Stop reason: HOSPADM

## 2023-02-18 RX ORDER — ESCITALOPRAM OXALATE 10 MG/1
10 TABLET ORAL DAILY
Status: DISCONTINUED | OUTPATIENT
Start: 2023-02-18 | End: 2023-02-24 | Stop reason: HOSPADM

## 2023-02-18 RX ORDER — PANTOPRAZOLE SODIUM 40 MG/1
40 TABLET, DELAYED RELEASE ORAL 2 TIMES DAILY
Status: DISCONTINUED | OUTPATIENT
Start: 2023-02-18 | End: 2023-02-24 | Stop reason: HOSPADM

## 2023-02-18 RX ORDER — ALBUTEROL SULFATE 0.83 MG/ML
2.5 SOLUTION RESPIRATORY (INHALATION) EVERY 6 HOURS
Status: DISCONTINUED | OUTPATIENT
Start: 2023-02-18 | End: 2023-02-18

## 2023-02-18 RX ORDER — AMOXICILLIN 250 MG
1 CAPSULE ORAL 2 TIMES DAILY PRN
Status: DISCONTINUED | OUTPATIENT
Start: 2023-02-18 | End: 2023-02-24 | Stop reason: HOSPADM

## 2023-02-18 RX ORDER — NALOXONE HCL 0.4 MG/ML
0.02 VIAL (ML) INJECTION
Status: DISCONTINUED | OUTPATIENT
Start: 2023-02-18 | End: 2023-02-24 | Stop reason: HOSPADM

## 2023-02-18 RX ORDER — MIRTAZAPINE 15 MG/1
15 TABLET, FILM COATED ORAL NIGHTLY
Status: DISCONTINUED | OUTPATIENT
Start: 2023-02-18 | End: 2023-02-24 | Stop reason: HOSPADM

## 2023-02-18 RX ADMIN — PANTOPRAZOLE SODIUM 40 MG: 40 TABLET, DELAYED RELEASE ORAL at 09:02

## 2023-02-18 RX ADMIN — MIRTAZAPINE 15 MG: 15 TABLET, FILM COATED ORAL at 08:02

## 2023-02-18 RX ADMIN — FUROSEMIDE 40 MG: 10 INJECTION, SOLUTION INTRAMUSCULAR; INTRAVENOUS at 09:02

## 2023-02-18 RX ADMIN — FUROSEMIDE 40 MG: 10 INJECTION, SOLUTION INTRAMUSCULAR; INTRAVENOUS at 08:02

## 2023-02-18 RX ADMIN — PANTOPRAZOLE SODIUM 40 MG: 40 TABLET, DELAYED RELEASE ORAL at 08:02

## 2023-02-18 RX ADMIN — ACETAMINOPHEN 650 MG: 325 TABLET ORAL at 05:02

## 2023-02-18 RX ADMIN — CLONIDINE HYDROCHLORIDE 0.1 MG: 0.1 TABLET ORAL at 02:02

## 2023-02-18 RX ADMIN — ALBUTEROL SULFATE 2.5 MG: 2.5 SOLUTION RESPIRATORY (INHALATION) at 08:02

## 2023-02-18 RX ADMIN — AMIODARONE HYDROCHLORIDE 200 MG: 200 TABLET ORAL at 09:02

## 2023-02-18 RX ADMIN — ENOXAPARIN SODIUM 30 MG: 30 INJECTION SUBCUTANEOUS at 05:02

## 2023-02-18 RX ADMIN — FUROSEMIDE 40 MG: 10 INJECTION, SOLUTION INTRAMUSCULAR; INTRAVENOUS at 02:02

## 2023-02-18 RX ADMIN — MORPHINE SULFATE 2 MG: 2 INJECTION, SOLUTION INTRAMUSCULAR; INTRAVENOUS at 08:02

## 2023-02-18 RX ADMIN — ATORVASTATIN CALCIUM 40 MG: 40 TABLET, FILM COATED ORAL at 09:02

## 2023-02-18 RX ADMIN — CETIRIZINE HYDROCHLORIDE 10 MG: 10 TABLET, FILM COATED ORAL at 09:02

## 2023-02-18 RX ADMIN — KETOROLAC TROMETHAMINE 15 MG: 30 INJECTION, SOLUTION INTRAMUSCULAR; INTRAVENOUS at 11:02

## 2023-02-18 RX ADMIN — CLOPIDOGREL BISULFATE 75 MG: 75 TABLET, FILM COATED ORAL at 09:02

## 2023-02-18 RX ADMIN — ACETAMINOPHEN 650 MG: 325 TABLET ORAL at 09:02

## 2023-02-18 RX ADMIN — LOSARTAN POTASSIUM 50 MG: 50 TABLET, FILM COATED ORAL at 09:02

## 2023-02-18 RX ADMIN — CLONIDINE HYDROCHLORIDE 0.1 MG: 0.1 TABLET ORAL at 12:02

## 2023-02-18 RX ADMIN — LEVOTHYROXINE SODIUM 75 MCG: 0.03 TABLET ORAL at 06:02

## 2023-02-18 RX ADMIN — LOSARTAN POTASSIUM 50 MG: 50 TABLET, FILM COATED ORAL at 08:02

## 2023-02-18 RX ADMIN — ALBUTEROL SULFATE 2.5 MG: 2.5 SOLUTION RESPIRATORY (INHALATION) at 12:02

## 2023-02-18 NOTE — ED PROVIDER NOTES
Encounter Date: 2/17/2023       History     Chief Complaint   Patient presents with    Fall     Patient fell backwards while walking on walker. Hit head. Negative LOC.      Chief complaint- weakness and subsequent fall, the patient was being assisted by the family members having him walk a few steps when he became very weak which he states occurs sometimes fell backwards striking his head and right shoulder and right side of his chest on the floor.  No LOC. Patient has a history of dementia but is alert and talkative.  He is on Plavix.  He denies any headache neck pain.  Moves all extremities.  He is awake alert cooperative.      Review of patient's allergies indicates:   Allergen Reactions    Iodinated contrast media Rash    Pontocaine [tetracaine hcl] Anaphylaxis     hypotension     Past Medical History:   Diagnosis Date    Abnormal echocardiogram 11/21/2019    Normal left ventricular systolic function with estimated ejection fraction of 60%.  Grade 2 moderate left ventricular diastolic dysfunction consistent with pseudonormalization.  Mild left atrial enlargement.  Mild aortic regurgitation.  Mild to moderate mitral regurgitation.  Mild tricuspid regurgitation.  Estimated PA systolic pressure is 38 mm of mercury.  Diagnosis is syncope.    Acute combined systolic (congestive) and diastolic (congestive) heart failure 11/10/2022    Anxiety     Arthritis     CAD (coronary artery disease) age 68    Carotid artery occlusion     Cerebrovascular small vessel disease     Colon polyps age 78    Coronary artery disease of native artery of native heart with stable angina pectoris 5/6/2020    GERD (gastroesophageal reflux disease)     H. pylori infection     HTN (hypertension), benign age 65    Hyperlipidemia LDL goal <70 age 65    Hypothyroidism     Multiple fractures of ribs of right side 11/27/2012    Myocardial infarction     Pernicious anemia 1/26/2018    Primary insomnia 10/9/2018    Prostate cancer age 67    Subdural  hematoma 9/5/2022    Syncopal episodes 3/2013    Urge incontinence 5/8/2018     Past Surgical History:   Procedure Laterality Date    CARDIAC PACEMAKER PLACEMENT  10/2015    [facility] Pacemaker    CARDIAC SURGERY      CATARACT EXTRACTION W/  INTRAOCULAR LENS IMPLANT Bilateral     COLONOSCOPY  ~2013    Dr. Edge    COLONOSCOPY N/A 06/08/2016    Procedure: COLONOSCOPY;  Surgeon: Shamar Barahona MD;  Location: [facility];  Service: Endoscopy;  Laterality: N/A; REPEAT IN 1 YEAR    CORONARY ANGIOPLASTY WITH STENT PLACEMENT  2003    x 2 RCA    PROSTATECTOMY  2002    UPPER GASTROINTESTINAL ENDOSCOPY  11/15/2016    Dr. Barahona     Family History   Problem Relation Age of Onset    Migraines Mother     Heart failure Father     Heart disease Father 56        MI    Heart failure Brother     Heart disease Brother     Diabetes Son     Hypertension Son     Heart disease Brother     Mental illness Brother     No Known Problems Son     Colon cancer Neg Hx     Colon polyps Neg Hx     Crohn's disease Neg Hx     Ulcerative colitis Neg Hx     Stomach cancer Neg Hx     Esophageal cancer Neg Hx      Social History     Tobacco Use    Smoking status: Never    Smokeless tobacco: Never   Substance Use Topics    Alcohol use: No    Drug use: No     Review of Systems   Constitutional:  Negative for chills and fever.   HENT:  Negative for ear pain, rhinorrhea and sore throat.    Eyes:  Negative for pain and visual disturbance.   Respiratory:  Negative for cough and shortness of breath.    Cardiovascular:  Negative for chest pain and palpitations.   Gastrointestinal:  Negative for abdominal pain, constipation, diarrhea, nausea and vomiting.   Genitourinary:  Negative for dysuria, frequency, hematuria and urgency.   Musculoskeletal:  Negative for back pain, joint swelling and myalgias.   Skin:  Negative for rash.   Neurological:  Positive for weakness. Negative for dizziness, seizures and headaches.   Psychiatric/Behavioral:  Negative for  dysphoric mood. The patient is not nervous/anxious.      Physical Exam     Initial Vitals [02/17/23 2252]   BP Pulse Resp Temp SpO2   (!) 195/84 75 18 98.4 °F (36.9 °C) 97 %      MAP       --         Physical Exam    Nursing note and vitals reviewed.  Constitutional: He appears well-developed and well-nourished.   HENT:   Head: Normocephalic and atraumatic.   Eyes: Conjunctivae, EOM and lids are normal. Pupils are equal, round, and reactive to light.   Neck: Trachea normal. Neck supple. No thyroid mass present.   Cardiovascular:  Normal rate, regular rhythm and normal heart sounds.           Pulmonary/Chest: Breath sounds normal. No respiratory distress.   Abdominal: Abdomen is soft. Bowel sounds are normal. There is no abdominal tenderness.   Musculoskeletal:         General: Normal range of motion.      Cervical back: Neck supple.     Neurological: He is alert and oriented to person, place, and time. He has normal strength and normal reflexes. No cranial nerve deficit or sensory deficit.   Skin: Skin is warm and dry.   Small right shoulder abrasion size of a dime.  Slight tenderness to the right-sided ribs posterior axillary line approximately T10   Psychiatric: He has a normal mood and affect. His speech is normal and behavior is normal. Judgment and thought content normal.       ED Course   Procedures  Labs Reviewed   COMPREHENSIVE METABOLIC PANEL - Abnormal; Notable for the following components:       Result Value    Glucose 175 (*)     BUN 28 (*)     Calcium 8.6 (*)     Albumin 3.4 (*)     eGFR 48.0 (*)     All other components within normal limits   CBC W/ AUTO DIFFERENTIAL - Abnormal; Notable for the following components:    RBC 3.66 (*)     Hemoglobin 12.3 (*)     Hematocrit 37.9 (*)      (*)     MCH 33.6 (*)     RDW 15.7 (*)     Immature Granulocytes 0.6 (*)     Gran % 78.3 (*)     Lymph % 15.2 (*)     All other components within normal limits   B-TYPE NATRIURETIC PEPTIDE - Abnormal; Notable for the  following components:    BNP 2,349 (*)     All other components within normal limits   TROPONIN I HIGH SENSITIVITY   B-TYPE NATRIURETIC PEPTIDE   URINALYSIS, REFLEX TO URINE CULTURE          Imaging Results              X-Ray Hip 2 or 3 views Right (with Pelvis when performed) (In process)                      CT Chest Without Contrast (Final result)  Result time 02/17/23 23:54:09      Final result by Richard Cisneros MD (02/17/23 23:54:09)                   Narrative:      INDICATION: fall    EXAMINATION: CT CHEST WITHOUT CONTRAST - CT CHEST WITHOUT IV CONTRAST    TECHNIQUE:  Helically acquired images were obtained of the chest. A radiation dose optimization technique was used for this scan.  IV Contrast dosage and agent: None.    COMPARISON: None.  ____________________________________________    FINDINGS:    LUNGS, PLEURA AND LARGE AIRWAYS: There is bilateral pulmonary edema with basilar atelectasis. There are moderate bilateral pleural effusions. There is no evidence of pneumothorax.    SOFT TISSUES: There is no chest wall hematoma or soft tissue gas.    HEART AND PERICARDIUM: Heart appears mildly prominent. There is no pericardial effusion.    VESSELS: Aorta and pulmonary artery are normal in caliber There is no evidence of aneurysm.    MEDIASTINUM AND IMANI: No evidence of mediastinal hematoma. No mass or adenopathy. No hiatal hernia.    UPPER ABDOMEN: There appears be a partially visualizes exophytic lesion in the midpole left kidney. This appears soft tissue density. Renal mass is not excluded. This measures 2.2 cm. Significance in this 89-year-old patient is doubtful.    BONES: No obvious acute fracture.    There is motion does limit evaluation of the ribs. Healed rib fractures are noted on the right.        IMPRESSION:    1.  No evidence of acute visceral, vascular, or skeletal injury.  2.  A small follicle 2.2 cm lesion partially visualized left kidney. Significance is uncertain.  3.  Bilateral pleural  effusions with pulmonary edema consistent with mild CHF.        Electronically signed by:  Richard Cisneros MD  2/17/2023 11:54 PM CST Workstation: [id number]                                     CT Head Without Contrast (Final result)  Result time 02/17/23 23:53:03      Final result by Mildred Cobos MD (02/17/23 23:53:03)                   Narrative:    CT HEAD WITHOUT IV CONTRAST    CLINICAL STATEMENT:  fall    TECHNIQUE: Axial CT images from skull base to vertex without IV contrast. This exam was performed according to our departmental dose optimization program, and includes the following measures where applicable: automated exposure control, adjustment of the mAs and/or kVp according to patient size and/or exam, and an iterative reconstruction algorithm.    COMPARISON: CT scan of the brain without contrast November 9, 2022    FINDINGS:  There is no acute intracranial hemorrhage, mass, mass effect or abnormal extra-axial fluid collection. No evidence of an acute territorial infarct is identified. Age-related volume loss is again identified. There are scattered low density in the periventricular white matter. 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 No acute intracranial abnormality. There is no significant interval change.    Calvaria:  The skull base and calvaria demonstrate no abnormality.  Paranasal sinuses: Visualized portions of the orbits and paranasal sinuses are unremarkable.    IMPRESSION:  1.  No hemorrhage or mass lesion.  2.  Age-related volume loss with nonspecific white matter changes. 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 No significant interval change.    Electronically signed by:  Mildred Cobos MD  2/17/2023 11:53 PM CST Workstation: [id number]                                     CT Cervical Spine Without Contrast (Final result)  Result time 02/17/23 23:56:07      Final result by Mildred Cobos MD (02/17/23 23:56:07)                   Narrative:      CT CERVICAL SPINE WITHOUT IV  CONTRAST    CLINICAL STATEMENT:  fall    TECHNIQUE: Noncontrast cervical spine CT with sagittal and coronal reconstructions.    This exam was performed according to our departmental dose optimization program, and includes the following measures where applicable: automated exposure control, adjustment of the mAs and/or kVp according to patient size and/or exam, and an iterative reconstruction algorithm.    COMPARISON: CT scan of the cervical spine without contrast November 9, 2022    FINDINGS:  General: Cervical spine is visualized from the skull base through T1 . Straightening of the normal cervical lordosis is seen in the patient's head is tilted anteriorly. The alignment is similar to the previous exam. There is diffuse disc height narrowing throughout the cervical spine. No uncovering of the facets. On the left there is fusion of the C4-5 facets. No acute cervical spine fracture.    Lung apices are clear. The airway is patent. Prevertebral soft tissues are unremarkable. Scattered vascular calcifications..    C1-2: Narrowing of the preodontoid space with osteophyte formation the remainder the cervical spine is difficult to assess on the axial images secondary to positioning of the patient. There is extensive facet arthropathy and uncovertebral hypertrophy which is unchanged.    IMPRESSION:  1.  No acute fracture.  2.  Multilevel degenerative disc disease of the cervical spine. The alignment is similar to the previous exam.    Electronically signed by:  Mildred Cobos MD  2/17/2023 11:56 PM CST Workstation: [id number]                                     Medications   cloNIDine tablet 0.1 mg (0.1 mg Oral Given 2/18/23 0045)   furosemide injection 40 mg (40 mg Intravenous Given 2/18/23 0203)   cloNIDine tablet 0.1 mg (0.1 mg Oral Given 2/18/23 0203)     Medical Decision Making:   ED Management:  The patient has a diagnosis of congestive heart failure and weakness.  The patient's case was discussed with the hospitalist  and the patient is to be admitted for evaluation.  The patient has pain to the right hip but x-ray showed no gross fracture.  CT scan shows possible rib fracture did palpate what appeared to be a palpable fracture on the right lower aspect of his chest.  The patient will be admitted however for congestive heart failure.                        Clinical Impression:   Final diagnoses:  [W19.XXXA] Fall               Donell Barnes MD  02/18/23 0426

## 2023-02-18 NOTE — SUBJECTIVE & OBJECTIVE
Past Medical History:   Diagnosis Date    Abnormal echocardiogram 11/21/2019    Normal left ventricular systolic function with estimated ejection fraction of 60%.  Grade 2 moderate left ventricular diastolic dysfunction consistent with pseudonormalization.  Mild left atrial enlargement.  Mild aortic regurgitation.  Mild to moderate mitral regurgitation.  Mild tricuspid regurgitation.  Estimated PA systolic pressure is 38 mm of mercury.  Diagnosis is syncope.    Acute combined systolic (congestive) and diastolic (congestive) heart failure 11/10/2022    Anxiety     Arthritis     CAD (coronary artery disease) age 68    Carotid artery occlusion     Cerebrovascular small vessel disease     Colon polyps age 78    Coronary artery disease of native artery of native heart with stable angina pectoris 5/6/2020    GERD (gastroesophageal reflux disease)     H. pylori infection     HTN (hypertension), benign age 65    Hyperlipidemia LDL goal <70 age 65    Hypothyroidism     Multiple fractures of ribs of right side 11/27/2012    Myocardial infarction     Pernicious anemia 1/26/2018    Primary insomnia 10/9/2018    Prostate cancer age 67    Subdural hematoma 9/5/2022    Syncopal episodes 3/2013    Urge incontinence 5/8/2018       Past Surgical History:   Procedure Laterality Date    CARDIAC PACEMAKER PLACEMENT  10/2015    [facility] Pacemaker    CARDIAC SURGERY      CATARACT EXTRACTION W/  INTRAOCULAR LENS IMPLANT Bilateral     COLONOSCOPY  ~2013    Dr. Edge    COLONOSCOPY N/A 06/08/2016    Procedure: COLONOSCOPY;  Surgeon: Shamar Barahona MD;  Location: [facility];  Service: Endoscopy;  Laterality: N/A; REPEAT IN 1 YEAR    CORONARY ANGIOPLASTY WITH STENT PLACEMENT  2003    x 2 RCA    PROSTATECTOMY  2002    UPPER GASTROINTESTINAL ENDOSCOPY  11/15/2016    Dr. Barahona       Review of patient's allergies indicates:   Allergen Reactions    Iodinated contrast media Rash    Pontocaine [tetracaine hcl] Anaphylaxis     hypotension        No current facility-administered medications on file prior to encounter.     Current Outpatient Medications on File Prior to Encounter   Medication Sig    albuterol (PROVENTIL) 2.5 mg /3 mL (0.083 %) nebulizer solution Inhale 2.5 mg into the lungs.    amiodarone (PACERONE) 200 MG Tab Take 1 tablet (200 mg total) by mouth once daily.    atorvastatin (LIPITOR) 40 MG tablet Take 1 tablet (40 mg total) by mouth once daily.    cetirizine (ZYRTEC) 10 MG tablet Take 1 tablet (10 mg total) by mouth once daily.    clopidogrel (PLAVIX) 75 mg tablet Take 1 tablet (75 mg total) by mouth once daily.    cyanocobalamin, vitamin B-12, (VITAMIN B-12) 5,000 mcg Subl Place 1 tablet under the tongue once daily.    cycloSPORINE (RESTASIS) 0.05 % ophthalmic emulsion Apply 1 drop to eye 2 (two) times daily.    EScitalopram oxalate (LEXAPRO) 10 MG tablet Take 10 mg by mouth once daily.    fluticasone propionate (FLONASE) 50 mcg/actuation nasal spray USE 1 SPRAY IN EACH NOSTRIL TWICE DAILY (Patient taking differently: 1 spray by Each Nostril route 2 (two) times a day.)    furosemide (LASIX) 20 MG tablet Take 1 tablet (20 mg total) by mouth once daily.    HYDROcodone-acetaminophen (NORCO) 5-325 mg per tablet Take 1 tablet by mouth every 12 (twelve) hours as needed for Pain.    levothyroxine (SYNTHROID) 75 MCG tablet Take 75 mcg by mouth before breakfast.    losartan (COZAAR) 50 MG tablet Take 1 tablet (50 mg total) by mouth 2 (two) times daily.    melatonin 5 mg Subl Take 2 tablets by mouth nightly as needed.    metoprolol succinate (TOPROL-XL) 50 MG 24 hr tablet Take 1 tablet (50 mg total) by mouth 2 (two) times daily.    mirtazapine (REMERON) 15 MG tablet Take 15 mg by mouth every evening.    nitroGLYCERIN (NITROSTAT) 0.4 MG SL tablet Place 0.4 mg under the tongue every 5 (five) minutes as needed.    pantoprazole (PROTONIX) 40 MG tablet Take 40 mg by mouth 2 (two) times daily.     Family History       Problem Relation (Age of Onset)     Diabetes Son    Heart disease Father (56), Brother, Brother    Heart failure Father, Brother    Hypertension Son    Mental illness Brother    Migraines Mother    No Known Problems Son          Tobacco Use    Smoking status: Never    Smokeless tobacco: Never   Substance and Sexual Activity    Alcohol use: No    Drug use: No    Sexual activity: Not Currently     Review of Systems   Constitutional: Negative.    HENT: Negative.     Eyes: Negative.    Respiratory:  Positive for shortness of breath.    Cardiovascular: Negative.    Gastrointestinal: Negative.    Endocrine: Negative.    Genitourinary: Negative.    Musculoskeletal:  Positive for myalgias.   Skin: Negative.    Allergic/Immunologic: Negative.    Neurological: Negative.    Hematological: Negative.    Objective:     Vital Signs (Most Recent):  Temp: 98.4 °F (36.9 °C) (02/17/23 2252)  Pulse: 85 (02/18/23 0202)  Resp: 18 (02/17/23 2252)  BP: (!) 187/90 (02/18/23 0202)  SpO2: 97 % (02/17/23 2252)   Vital Signs (24h Range):  Temp:  [98.4 °F (36.9 °C)] 98.4 °F (36.9 °C)  Pulse:  [75-85] 85  Resp:  [18] 18  SpO2:  [97 %] 97 %  BP: (187-203)/(84-90) 187/90     Weight: 63.5 kg (140 lb)  Body mass index is 25.61 kg/m².    Physical Exam  Vitals and nursing note reviewed. Exam conducted with a chaperone present.   Constitutional:       Appearance: He is obese.   HENT:      Head: Normocephalic and atraumatic.   Eyes:      Pupils: Pupils are equal, round, and reactive to light.   Cardiovascular:      Rate and Rhythm: Normal rate and regular rhythm.   Pulmonary:      Comments: Moderate air movement  Crackles and wheezes in the right  Decreased breath sounds in the bilateral lower lung zones  Abdominal:      General: Bowel sounds are normal.      Palpations: Abdomen is soft.   Skin:     General: Skin is warm and dry.      Capillary Refill: Capillary refill takes less than 2 seconds.   Neurological:      General: No focal deficit present.      Mental Status: He is alert.          CRANIAL NERVES     CN III, IV, VI   Pupils are equal, round, and reactive to light.     Significant Labs: All pertinent labs within the past 24 hours have been reviewed.    Significant Imaging: I have reviewed all pertinent imaging results/findings within the past 24 hours.

## 2023-02-18 NOTE — HPI
The patient is an 89-year-old male, who presents emergency room after falling backwards from walker.  Patient states that he would lost balance and fell backwards hitting his head.  He denies loss of consciousness .  He is significant past medical history to include heart failure and Parkinson's disease.  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    Plan to admit patient gently diurese.  Consult Physical therapy for conditioning and safety ambulation.  Consult  potential for living situation.

## 2023-02-18 NOTE — CONSULTS
[facility]  Adult Nutrition   Consult Note (Nutrition Education)     SUMMARY       Dietitian Rounds Brief   Educated pt on heart healthy diet. Education focused on including healthy fats- gave examples and lowering LDL levels by excluding saturated/trans fat in the diet. Went over types of foods that have saturated/trans fat. Encouraged cooking with olive oil instead of butter. Choosing whole grains over refined grains, choosing canned foods with no salt added and plan frozen veggies instead of veggies cooked in butter and cream sauces. Encouraged patient to choose leaner cuts of meat and decreasing high fat meats such as pool, sausage, hot dogs, etc. Educated pt on decreasing sodium in diet. To try not to cook with salt and use herbs and spices to make food more flavorful. To limit eating out-if patient chooses to eat out, informed patient to discuss with  to cook meats and veggies with no salt and olive oil instead of butter. Provided handouts on all of these topics for pt to use in the future. Also provided RD contact information for pt to call with future questions.       Diet order:   Current Diet Order: cardiac      Reason for Assessment  Reason For Assessment: consult (education)    Nutrition Risk Screen  Nutrition Risk Screen: no indicators present     MST Score: 0  Have you recently lost weight without trying?: No  Weight loss score: 0  Have you been eating poorly because of a decreased appetite?: No  Appetite score: 0       Weight History:  Wt Readings from Last 5 Encounters:   02/18/23 62.1 kg (136 lb 14.5 oz)   11/11/22 66.1 kg (145 lb 12.8 oz)   11/03/22 67.6 kg (149 lb)   10/09/22 64.9 kg (143 lb)   10/07/22 64.7 kg (142 lb 10.2 oz)      Lab/Procedures/Meds: Pertinent Labs/Meds Reviewed      Nutrition Risk  Level of Risk/Frequency of Follow-up: low     Nutrition Follow-Up  RD Follow-up?: Yes      Giselle Coto RD 02/18/2023 1:33 PM

## 2023-02-18 NOTE — H&P
[facility] - Emergency Dept  Hospital Medicine  History & Physical    Patient Name: Gene Hartman  MRN: [id number]  Patient Class: IP- Inpatient  Admission Date: 2/17/2023  Attending Physician: Navya Still MD   Primary Care Provider: Mariajose Thorne MD         Patient information was obtained from patient, relative(s) and ER records.     Subjective:     Principal Problem:CHF (congestive heart failure)    Chief Complaint:   Chief Complaint   Patient presents with    Fall     Patient fell backwards while walking on walker. Hit head. Negative LOC.         HPI: The patient is an 89-year-old male, who presents emergency room after falling backwards from walker.  Patient states that he would lost balance and fell backwards hitting his head.  He denies loss of consciousness .  He is significant past medical history to include heart failure and Parkinson's disease.  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    Plan to admit patient gently diurese.  Consult Physical therapy for conditioning and safety ambulation.  Consult  potential for living situation.      Past Medical History:   Diagnosis Date    Abnormal echocardiogram 11/21/2019    Normal left ventricular systolic function with estimated ejection fraction of 60%.  Grade 2 moderate left ventricular diastolic dysfunction consistent with pseudonormalization.  Mild left atrial enlargement.  Mild aortic regurgitation.  Mild to moderate mitral regurgitation.  Mild tricuspid regurgitation.  Estimated PA systolic pressure is 38 mm of mercury.  Diagnosis is syncope.    Acute combined systolic (congestive) and diastolic (congestive) heart failure 11/10/2022    Anxiety     Arthritis     CAD (coronary artery disease) age 68    Carotid artery occlusion     Cerebrovascular small vessel disease     Colon polyps age 78    Coronary artery disease of native artery of native heart with stable  angina pectoris 5/6/2020    GERD (gastroesophageal reflux disease)     H. pylori infection     HTN (hypertension), benign age 65    Hyperlipidemia LDL goal <70 age 65    Hypothyroidism     Multiple fractures of ribs of right side 11/27/2012    Myocardial infarction     Pernicious anemia 1/26/2018    Primary insomnia 10/9/2018    Prostate cancer age 67    Subdural hematoma 9/5/2022    Syncopal episodes 3/2013    Urge incontinence 5/8/2018       Past Surgical History:   Procedure Laterality Date    CARDIAC PACEMAKER PLACEMENT  10/2015    [facility] Pacemaker    CARDIAC SURGERY      CATARACT EXTRACTION W/  INTRAOCULAR LENS IMPLANT Bilateral     COLONOSCOPY  ~2013    Dr. Edge    COLONOSCOPY N/A 06/08/2016    Procedure: COLONOSCOPY;  Surgeon: Shamar Barahona MD;  Location: [facility];  Service: Endoscopy;  Laterality: N/A; REPEAT IN 1 YEAR    CORONARY ANGIOPLASTY WITH STENT PLACEMENT  2003    x 2 RCA    PROSTATECTOMY  2002    UPPER GASTROINTESTINAL ENDOSCOPY  11/15/2016    Dr. Barahona       Review of patient's allergies indicates:   Allergen Reactions    Iodinated contrast media Rash    Pontocaine [tetracaine hcl] Anaphylaxis     hypotension       No current facility-administered medications on file prior to encounter.     Current Outpatient Medications on File Prior to Encounter   Medication Sig    albuterol (PROVENTIL) 2.5 mg /3 mL (0.083 %) nebulizer solution Inhale 2.5 mg into the lungs.    amiodarone (PACERONE) 200 MG Tab Take 1 tablet (200 mg total) by mouth once daily.    atorvastatin (LIPITOR) 40 MG tablet Take 1 tablet (40 mg total) by mouth once daily.    cetirizine (ZYRTEC) 10 MG tablet Take 1 tablet (10 mg total) by mouth once daily.    clopidogrel (PLAVIX) 75 mg tablet Take 1 tablet (75 mg total) by mouth once daily.    cyanocobalamin, vitamin B-12, (VITAMIN B-12) 5,000 mcg Subl Place 1 tablet under the tongue once daily.    cycloSPORINE (RESTASIS) 0.05 % ophthalmic emulsion  Apply 1 drop to eye 2 (two) times daily.    EScitalopram oxalate (LEXAPRO) 10 MG tablet Take 10 mg by mouth once daily.    fluticasone propionate (FLONASE) 50 mcg/actuation nasal spray USE 1 SPRAY IN EACH NOSTRIL TWICE DAILY (Patient taking differently: 1 spray by Each Nostril route 2 (two) times a day.)    furosemide (LASIX) 20 MG tablet Take 1 tablet (20 mg total) by mouth once daily.    HYDROcodone-acetaminophen (NORCO) 5-325 mg per tablet Take 1 tablet by mouth every 12 (twelve) hours as needed for Pain.    levothyroxine (SYNTHROID) 75 MCG tablet Take 75 mcg by mouth before breakfast.    losartan (COZAAR) 50 MG tablet Take 1 tablet (50 mg total) by mouth 2 (two) times daily.    melatonin 5 mg Subl Take 2 tablets by mouth nightly as needed.    metoprolol succinate (TOPROL-XL) 50 MG 24 hr tablet Take 1 tablet (50 mg total) by mouth 2 (two) times daily.    mirtazapine (REMERON) 15 MG tablet Take 15 mg by mouth every evening.    nitroGLYCERIN (NITROSTAT) 0.4 MG SL tablet Place 0.4 mg under the tongue every 5 (five) minutes as needed.    pantoprazole (PROTONIX) 40 MG tablet Take 40 mg by mouth 2 (two) times daily.     Family History       Problem Relation (Age of Onset)    Diabetes Son    Heart disease Father (56), Brother, Brother    Heart failure Father, Brother    Hypertension Son    Mental illness Brother    Migraines Mother    No Known Problems Son          Tobacco Use    Smoking status: Never    Smokeless tobacco: Never   Substance and Sexual Activity    Alcohol use: No    Drug use: No    Sexual activity: Not Currently     Review of Systems   Constitutional: Negative.    HENT: Negative.     Eyes: Negative.    Respiratory:  Positive for shortness of breath.    Cardiovascular: Negative.    Gastrointestinal: Negative.    Endocrine: Negative.    Genitourinary: Negative.    Musculoskeletal:  Positive for myalgias.   Skin: Negative.    Allergic/Immunologic: Negative.    Neurological: Negative.     Hematological: Negative.    Objective:     Vital Signs (Most Recent):  Temp: 98.4 °F (36.9 °C) (02/17/23 2252)  Pulse: 85 (02/18/23 0202)  Resp: 18 (02/17/23 2252)  BP: (!) 187/90 (02/18/23 0202)  SpO2: 97 % (02/17/23 2252)   Vital Signs (24h Range):  Temp:  [98.4 °F (36.9 °C)] 98.4 °F (36.9 °C)  Pulse:  [75-85] 85  Resp:  [18] 18  SpO2:  [97 %] 97 %  BP: (187-203)/(84-90) 187/90     Weight: 63.5 kg (140 lb)  Body mass index is 25.61 kg/m².    Physical Exam  Vitals and nursing note reviewed. Exam conducted with a chaperone present.   Constitutional:       Appearance: He is obese.   HENT:      Head: Normocephalic and atraumatic.   Eyes:      Pupils: Pupils are equal, round, and reactive to light.   Cardiovascular:      Rate and Rhythm: Normal rate and regular rhythm.   Pulmonary:      Comments: Moderate air movement  Crackles and wheezes in the right  Decreased breath sounds in the bilateral lower lung zones  Abdominal:      General: Bowel sounds are normal.      Palpations: Abdomen is soft.   Skin:     General: Skin is warm and dry.      Capillary Refill: Capillary refill takes less than 2 seconds.   Neurological:      General: No focal deficit present.      Mental Status: He is alert.         CRANIAL NERVES     CN III, IV, VI   Pupils are equal, round, and reactive to light.     Significant Labs: All pertinent labs within the past 24 hours have been reviewed.    Significant Imaging: I have reviewed all pertinent imaging results/findings within the past 24 hours.    Assessment/Plan:     * CHF (congestive heart failure)  Patient is identified as having Combined Systolic and Diastolic heart failure that is Acute on chronic. CHF is currently controlled. Latest ECHO performed and demonstrates- Results for orders placed during the hospital encounter of 09/04/22    Echo    Interpretation Summary  · The left ventricle is normal in size with concentric remodeling and mildly decreased systolic function.  · The estimated  ejection fraction is 45%.  · Grade I left ventricular diastolic dysfunction.  · Normal right ventricular size with normal right ventricular systolic function.  · Mild aortic regurgitation.  · Mild mitral regurgitation.  · Mild to moderate tricuspid regurgitation.  · Normal central venous pressure (3 mmHg).  · The estimated PA systolic pressure is 38 mmHg.  . Continue Beta Blocker, ACE/ARB and Furosemide and monitor clinical status closely. Monitor on telemetry. Patient is off CHF pathway.  Monitor strict Is&Os and daily weights.  Place on fluid restriction of 2 L. Continue to stress to patient importance of self efficacy and  on diet for CHF. Last BNP reviewed- and noted below   Recent Labs   Lab 02/17/23  2321   BNP 2,349*   .  Reason for admission  Possible etiology of constant falling      Falls frequently  Frequent falls  Recommend physical therapy for strengthening and balance training  Consider living situation      Dementia without behavioral disturbance  Pleasant dementia  Probably vascular      HTN (hypertension), benign  Monitor and treat        VTE Risk Mitigation (From admission, onward)    None             Navya Still MD  Department of Hospital Medicine   [facility] - Emergency Dept

## 2023-02-18 NOTE — PROGRESS NOTES
I have reviewed, personally assessed and agree with the H&P, assessment and plan completed by Dr. Still      Patient AAOX4, sitting up in bed, with family ay bedside. POC discussed.  Questions answered.  Patient with complaint of right-sided rib pain status post fall.  Pain not managed with Tylenol.  Will add additional pain management to profile. Cardiology, dietary, PT/OT consulted.  Labs reviewed. Will continue current POC at this time.       EZ Noriega, FNP-C CCRN  Nurse Practitioner - Hospitalist  Department of Hospital Medicine  [facility]  [phone]

## 2023-02-18 NOTE — ASSESSMENT & PLAN NOTE
Patient is identified as having Combined Systolic and Diastolic heart failure that is Acute on chronic. CHF is currently controlled. Latest ECHO performed and demonstrates- Results for orders placed during the hospital encounter of 09/04/22    Echo    Interpretation Summary  · The left ventricle is normal in size with concentric remodeling and mildly decreased systolic function.  · The estimated ejection fraction is 45%.  · Grade I left ventricular diastolic dysfunction.  · Normal right ventricular size with normal right ventricular systolic function.  · Mild aortic regurgitation.  · Mild mitral regurgitation.  · Mild to moderate tricuspid regurgitation.  · Normal central venous pressure (3 mmHg).  · The estimated PA systolic pressure is 38 mmHg.  . Continue Beta Blocker, ACE/ARB and Furosemide and monitor clinical status closely. Monitor on telemetry. Patient is off CHF pathway.  Monitor strict Is&Os and daily weights.  Place on fluid restriction of 2 L. Continue to stress to patient importance of self efficacy and  on diet for CHF. Last BNP reviewed- and noted below   Recent Labs   Lab 02/17/23  2321   BNP 2,349*   .  Reason for admission  Possible etiology of constant falling

## 2023-02-18 NOTE — PROGRESS NOTES
Pharmacist Renal Dose Adjustment Note    Gene Hartman is a 89 y.o. male being treated with the medication Enoxaparin     Patient Data:    Vital Signs (Most Recent):  Temp: 98.4 °F (36.9 °C) (02/17/23 2252)  Pulse: 85 (02/18/23 0202)  Resp: 18 (02/17/23 2252)  BP: (!) 187/90 (02/18/23 0202)  SpO2: 97 % (02/17/23 2252)   Vital Signs (72h Range):  Temp:  [98.4 °F (36.9 °C)]   Pulse:  [75-85]   Resp:  [18]   BP: (187-203)/(84-90)   SpO2:  [97 %]      Recent Labs   Lab 02/17/23  2321   CREATININE 1.4     Serum creatinine: 1.4 mg/dL 02/17/23 2321  Estimated creatinine clearance: 27.6 mL/min  Enoxaparin 40 mg Q24hr will be changed to Enoxaparin 30 mg Q24hr     Pharmacist's Name: Mara Farmer  Pharmacist's Extension: 0217

## 2023-02-18 NOTE — ASSESSMENT & PLAN NOTE
Frequent falls  Recommend physical therapy for strengthening and balance training  Consider living situation

## 2023-02-18 NOTE — PT/OT/SLP EVAL
"Physical Therapy Evaluation    Patient Name:  Gene Hartman   MRN:  [id number]    Recommendations:     Discharge Recommendations: Family supervision   HHPT  Discharge Equipment Recommendations:  (family is awaiting Mariluz Lift)   Barriers to discharge: None    Assessment:     Gene Hartman is a 89 y.o. male admitted with a medical diagnosis of CHF (congestive heart failure).  He presents with the following impairments/functional limitations: weakness, impaired endurance, impaired self care skills, impaired functional mobility, decreased lower extremity function, impaired cognition, pain, impaired cardiopulmonary response to activity .  Pt was  seen in the ED but PT eval was limited  to MMT and repositioning in bed as pt had severe  R ribs pain. He presented  in supine with B LE in hook lying position.    Rehab Prognosis: Fair; patient would benefit from acute skilled PT services to address these deficits and reach maximum level of function.    Recent Surgery: * No surgery found *      Plan:     During this hospitalization, patient to be seen 6 x/week to address the identified rehab impairments via gait training, therapeutic activities, therapeutic exercises and progress toward the following goals:    Plan of Care Expires:  03/18/23    Subjective     Chief Complaint: R rib pain  Patient/Family Comments/goals: Return home with family  Pain/Comfort:  Pain Rating 1:  (Did not rate)  Location - Side 1: Right  Location 1: rib(s)  Pain Addressed 2: Nurse notified    Patients cultural, spiritual, Adventist conflicts given the current situation:      Living Environment:  Pt live at home with his wife , son and daughter -in-law, who are primary caregivers of pt and his wife.  Prior to admission, patient "needed help with everything."  He spends much time in bed .  Family is awaiting Mariluz Lift. With limited ambulation with RW pt legs became weak resulting in frequent falls.  Equipment used at home: wheelchair, walker, rolling, " shower chair.  DME owned (not currently used): none.  Upon discharge, patient will have assistance from family.    Objective:     Communicated with nurse prior to session.  Patient found supine with blood pressure cuff, telemetry  upon PT entry to room.    General Precautions: Standard, fall  Orthopedic Precautions:    Braces:    Respiratory Status: Room air    Exams:  Cognitive Exam:  Patient is oriented to Person and Place  RLE ROM: WFL  RLE Strength: WFL  LLE ROM: WFL  LLE Strength: WFL    Functional Mobility:Not  assessed due to pt's pain level        AM-PAC 6 CLICK MOBILITY  Total Score:7       Treatment & Education:  Pt and  family were educated on the role of PT  for assessment, treatment with emphasis on safety and increased mobility and D/C  recommendations.       Patient left supine with all lines intact, call button in reach, and family present.    GOALS:   Multidisciplinary Problems       Physical Therapy Goals          Problem: Physical Therapy    Goal Priority Disciplines Outcome Goal Variances Interventions   Physical Therapy Goal     PT, PT/OT      Description: Goals to be met by: 3/18/2023    Patient will increase functional independence with mobility by performin. Supine to sit with MInimal Assistance  2. Sit to stand transfer with Minimal Assistance  3. Gait  x 50 feet with Minimal Assistance using Rolling Walker.                          History:     Past Medical History:   Diagnosis Date    Abnormal echocardiogram 2019    Normal left ventricular systolic function with estimated ejection fraction of 60%.  Grade 2 moderate left ventricular diastolic dysfunction consistent with pseudonormalization.  Mild left atrial enlargement.  Mild aortic regurgitation.  Mild to moderate mitral regurgitation.  Mild tricuspid regurgitation.  Estimated PA systolic pressure is 38 mm of mercury.  Diagnosis is syncope.    Acute combined systolic (congestive) and diastolic (congestive) heart failure  11/10/2022    Anxiety     Arthritis     CAD (coronary artery disease) age 68    Carotid artery occlusion     Cerebrovascular small vessel disease     Colon polyps age 78    Coronary artery disease of native artery of native heart with stable angina pectoris 5/6/2020    GERD (gastroesophageal reflux disease)     H. pylori infection     HTN (hypertension), benign age 65    Hyperlipidemia LDL goal <70 age 65    Hypothyroidism     Multiple fractures of ribs of right side 11/27/2012    Myocardial infarction     Pernicious anemia 1/26/2018    Primary insomnia 10/9/2018    Prostate cancer age 67    Subdural hematoma 9/5/2022    Syncopal episodes 3/2013    Urge incontinence 5/8/2018       Past Surgical History:   Procedure Laterality Date    CARDIAC PACEMAKER PLACEMENT  10/2015    [facility] Pacemaker    CARDIAC SURGERY      CATARACT EXTRACTION W/  INTRAOCULAR LENS IMPLANT Bilateral     COLONOSCOPY  ~2013    Dr. Edge    COLONOSCOPY N/A 06/08/2016    Procedure: COLONOSCOPY;  Surgeon: Shamar Barahona MD;  Location: [facility];  Service: Endoscopy;  Laterality: N/A; REPEAT IN 1 YEAR    CORONARY ANGIOPLASTY WITH STENT PLACEMENT  2003    x 2 RCA    PROSTATECTOMY  2002    UPPER GASTROINTESTINAL ENDOSCOPY  11/15/2016    Dr. Barahona       Time Tracking:     PT Received On: 02/18/23  PT Start Time: 0931     PT Stop Time: 0943  PT Total Time (min): 12 min     Billable Minutes: Evaluation 12 minutes      02/18/2023

## 2023-02-19 LAB
ANION GAP SERPL CALC-SCNC: 8 MMOL/L (ref 8–16)
BASOPHILS # BLD AUTO: 0.01 K/UL (ref 0–0.2)
BASOPHILS NFR BLD: 0.1 % (ref 0–1.9)
BNP SERPL-MCNC: 1128 PG/ML (ref 0–99)
BUN SERPL-MCNC: 32 MG/DL (ref 8–23)
CALCIUM SERPL-MCNC: 8.4 MG/DL (ref 8.7–10.5)
CHLORIDE SERPL-SCNC: 109 MMOL/L (ref 95–110)
CO2 SERPL-SCNC: 25 MMOL/L (ref 23–29)
CREAT SERPL-MCNC: 2.3 MG/DL (ref 0.5–1.4)
DIFFERENTIAL METHOD: ABNORMAL
EOSINOPHIL # BLD AUTO: 0 K/UL (ref 0–0.5)
EOSINOPHIL NFR BLD: 0 % (ref 0–8)
ERYTHROCYTE [DISTWIDTH] IN BLOOD BY AUTOMATED COUNT: 16 % (ref 11.5–14.5)
EST. GFR  (NO RACE VARIABLE): 26.5 ML/MIN/1.73 M^2
GLUCOSE SERPL-MCNC: 149 MG/DL (ref 70–110)
HCT VFR BLD AUTO: 33.6 % (ref 40–54)
HGB BLD-MCNC: 10.8 G/DL (ref 14–18)
IMM GRANULOCYTES # BLD AUTO: 0.05 K/UL (ref 0–0.04)
IMM GRANULOCYTES NFR BLD AUTO: 0.6 % (ref 0–0.5)
LYMPHOCYTES # BLD AUTO: 0.9 K/UL (ref 1–4.8)
LYMPHOCYTES NFR BLD: 10.7 % (ref 18–48)
MAGNESIUM SERPL-MCNC: 2.1 MG/DL (ref 1.6–2.6)
MCH RBC QN AUTO: 33.4 PG (ref 27–31)
MCHC RBC AUTO-ENTMCNC: 32.1 G/DL (ref 32–36)
MCV RBC AUTO: 104 FL (ref 82–98)
MONOCYTES # BLD AUTO: 0.8 K/UL (ref 0.3–1)
MONOCYTES NFR BLD: 9.2 % (ref 4–15)
NEUTROPHILS # BLD AUTO: 6.8 K/UL (ref 1.8–7.7)
NEUTROPHILS NFR BLD: 79.4 % (ref 38–73)
NRBC BLD-RTO: 0 /100 WBC
PLATELET # BLD AUTO: 185 K/UL (ref 150–450)
PMV BLD AUTO: 11.2 FL (ref 9.2–12.9)
POTASSIUM SERPL-SCNC: 4.6 MMOL/L (ref 3.5–5.1)
RBC # BLD AUTO: 3.23 M/UL (ref 4.6–6.2)
SODIUM SERPL-SCNC: 142 MMOL/L (ref 136–145)
TROPONIN I SERPL HS-MCNC: 29.7 PG/ML (ref 0–14.9)
WBC # BLD AUTO: 8.61 K/UL (ref 3.9–12.7)

## 2023-02-19 PROCEDURE — 63600175 PHARM REV CODE 636 W HCPCS: Performed by: INTERNAL MEDICINE

## 2023-02-19 PROCEDURE — 63600175 PHARM REV CODE 636 W HCPCS: Performed by: NURSE PRACTITIONER

## 2023-02-19 PROCEDURE — 83735 ASSAY OF MAGNESIUM: CPT | Performed by: INTERNAL MEDICINE

## 2023-02-19 PROCEDURE — 21400001 HC TELEMETRY ROOM

## 2023-02-19 PROCEDURE — 25000003 PHARM REV CODE 250: Performed by: NURSE PRACTITIONER

## 2023-02-19 PROCEDURE — 83880 ASSAY OF NATRIURETIC PEPTIDE: CPT | Performed by: NURSE PRACTITIONER

## 2023-02-19 PROCEDURE — 25000003 PHARM REV CODE 250: Performed by: INTERNAL MEDICINE

## 2023-02-19 PROCEDURE — 36415 COLL VENOUS BLD VENIPUNCTURE: CPT | Performed by: NURSE PRACTITIONER

## 2023-02-19 PROCEDURE — 99900035 HC TECH TIME PER 15 MIN (STAT)

## 2023-02-19 PROCEDURE — 85025 COMPLETE CBC W/AUTO DIFF WBC: CPT | Performed by: INTERNAL MEDICINE

## 2023-02-19 PROCEDURE — 27000221 HC OXYGEN, UP TO 24 HOURS

## 2023-02-19 PROCEDURE — 36415 COLL VENOUS BLD VENIPUNCTURE: CPT | Performed by: INTERNAL MEDICINE

## 2023-02-19 PROCEDURE — 94761 N-INVAS EAR/PLS OXIMETRY MLT: CPT

## 2023-02-19 PROCEDURE — 99900031 HC PATIENT EDUCATION (STAT)

## 2023-02-19 PROCEDURE — 80048 BASIC METABOLIC PNL TOTAL CA: CPT | Performed by: INTERNAL MEDICINE

## 2023-02-19 RX ORDER — TRAMADOL HYDROCHLORIDE 50 MG/1
50 TABLET ORAL EVERY 8 HOURS PRN
Status: DISCONTINUED | OUTPATIENT
Start: 2023-02-19 | End: 2023-02-24 | Stop reason: HOSPADM

## 2023-02-19 RX ORDER — DIPHENHYDRAMINE HYDROCHLORIDE 50 MG/ML
25 INJECTION INTRAMUSCULAR; INTRAVENOUS EVERY 6 HOURS PRN
Status: DISCONTINUED | OUTPATIENT
Start: 2023-02-19 | End: 2023-02-24 | Stop reason: HOSPADM

## 2023-02-19 RX ADMIN — MORPHINE SULFATE 2 MG: 2 INJECTION, SOLUTION INTRAMUSCULAR; INTRAVENOUS at 12:02

## 2023-02-19 RX ADMIN — CLOPIDOGREL BISULFATE 75 MG: 75 TABLET, FILM COATED ORAL at 09:02

## 2023-02-19 RX ADMIN — FUROSEMIDE 40 MG: 10 INJECTION, SOLUTION INTRAMUSCULAR; INTRAVENOUS at 09:02

## 2023-02-19 RX ADMIN — AMIODARONE HYDROCHLORIDE 200 MG: 200 TABLET ORAL at 09:02

## 2023-02-19 RX ADMIN — TRAMADOL HYDROCHLORIDE 50 MG: 50 TABLET, COATED ORAL at 09:02

## 2023-02-19 RX ADMIN — PANTOPRAZOLE SODIUM 40 MG: 40 TABLET, DELAYED RELEASE ORAL at 09:02

## 2023-02-19 RX ADMIN — LOSARTAN POTASSIUM 50 MG: 50 TABLET, FILM COATED ORAL at 08:02

## 2023-02-19 RX ADMIN — ESCITALOPRAM OXALATE 10 MG: 10 TABLET ORAL at 09:02

## 2023-02-19 RX ADMIN — ATORVASTATIN CALCIUM 40 MG: 40 TABLET, FILM COATED ORAL at 09:02

## 2023-02-19 RX ADMIN — MIRTAZAPINE 15 MG: 15 TABLET, FILM COATED ORAL at 08:02

## 2023-02-19 RX ADMIN — CETIRIZINE HYDROCHLORIDE 10 MG: 10 TABLET, FILM COATED ORAL at 09:02

## 2023-02-19 RX ADMIN — PANTOPRAZOLE SODIUM 40 MG: 40 TABLET, DELAYED RELEASE ORAL at 08:02

## 2023-02-19 RX ADMIN — LOSARTAN POTASSIUM 50 MG: 50 TABLET, FILM COATED ORAL at 09:02

## 2023-02-19 RX ADMIN — ACETAMINOPHEN 650 MG: 325 TABLET ORAL at 08:02

## 2023-02-19 RX ADMIN — FUROSEMIDE 40 MG: 10 INJECTION, SOLUTION INTRAMUSCULAR; INTRAVENOUS at 08:02

## 2023-02-19 RX ADMIN — SIMETHICONE CHEW TAB 80 MG 80 MG: 80 TABLET ORAL at 12:02

## 2023-02-19 RX ADMIN — DIPHENHYDRAMINE HYDROCHLORIDE 25 MG: 50 INJECTION, SOLUTION INTRAMUSCULAR; INTRAVENOUS at 08:02

## 2023-02-19 RX ADMIN — DIPHENHYDRAMINE HYDROCHLORIDE 25 MG: 50 INJECTION, SOLUTION INTRAMUSCULAR; INTRAVENOUS at 01:02

## 2023-02-19 RX ADMIN — ENOXAPARIN SODIUM 30 MG: 30 INJECTION SUBCUTANEOUS at 04:02

## 2023-02-19 NOTE — ASSESSMENT & PLAN NOTE
Patient is identified as having Combined Systolic and Diastolic heart failure that is Acute on chronic. CHF is currently controlled. Latest ECHO performed and demonstrates- Results for orders placed during the hospital encounter of 09/04/22    Echo    Interpretation Summary  · The left ventricle is normal in size with concentric remodeling and mildly decreased systolic function.  · The estimated ejection fraction is 45%.  · Grade I left ventricular diastolic dysfunction.  · Normal right ventricular size with normal right ventricular systolic function.  · Mild aortic regurgitation.  · Mild mitral regurgitation.  · Mild to moderate tricuspid regurgitation.  · Normal central venous pressure (3 mmHg).  · The estimated PA systolic pressure is 38 mmHg.  . Continue Beta Blocker, ACE/ARB and Furosemide and monitor clinical status closely. Monitor on telemetry. Patient is off CHF pathway.  Monitor strict Is&Os and daily weights.  Place on fluid restriction of 2 L. Continue to stress to patient importance of self efficacy and  on diet for CHF. Last BNP reviewed- and noted below   Recent Labs   Lab 02/19/23  1457   BNP 1,128*     Reason for admission  Possible etiology of constant falling  Cardiology consult appreciated  Continue IV diuresing x 3 more doses

## 2023-02-19 NOTE — SUBJECTIVE & OBJECTIVE
Interval History: No acute events reported per nursing. Patient AAOx4 in NAD, lying in bed. Family at bedside, POC discussed. Denies SOB, palpitation and CP. Endorses intermittent rib cage pain, improving with current pain medication. Medically stable. Labs reviewed. Further plan as per clinical course    Of note, family tells me patient already has PT/OT that comes out to the house. Will needed to resume post discharge.      Review of Systems   Constitutional:  Negative for chills, diaphoresis and fever.   HENT:  Negative for congestion, postnasal drip, sinus pressure and sore throat.    Eyes:  Negative for visual disturbance.   Respiratory:  Negative for cough, chest tightness, shortness of breath and wheezing.    Cardiovascular:  Negative for chest pain, palpitations and leg swelling.   Gastrointestinal:  Negative for abdominal distention, abdominal pain, blood in stool, constipation, diarrhea, nausea and vomiting.   Endocrine: Negative.    Genitourinary:  Negative for dysuria.   Musculoskeletal: Negative.    Skin: Negative.    Allergic/Immunologic: Negative.    Neurological:  Negative for dizziness, weakness, numbness and headaches.   Hematological: Negative.    Psychiatric/Behavioral: Negative.     Objective:     Vital Signs (Most Recent):  Temp: 98.1 °F (36.7 °C) (02/19/23 1100)  Pulse: 72 (02/19/23 1100)  Resp: 20 (02/19/23 1100)  BP: (!) 106/58 (02/19/23 1100)  SpO2: (!) 93 % (02/19/23 1100)   Vital Signs (24h Range):  Temp:  [97.6 °F (36.4 °C)-98.1 °F (36.7 °C)] 98.1 °F (36.7 °C)  Pulse:  [70-80] 72  Resp:  [18-22] 20  SpO2:  [93 %] 93 %  BP: (104-165)/(58-90) 106/58     Weight: 62.1 kg (136 lb 14.5 oz)  Body mass index is 25.04 kg/m².    Intake/Output Summary (Last 24 hours) at 2/19/2023 7974  Last data filed at 2/19/2023 0701  Gross per 24 hour   Intake 240 ml   Output 250 ml   Net -10 ml      Physical Exam  Constitutional:       General: He is not in acute distress.     Appearance: Normal appearance. He  is well-developed. He is not toxic-appearing or diaphoretic.   HENT:      Head: Normocephalic and atraumatic.   Eyes:      General: Lids are normal.      Conjunctiva/sclera: Conjunctivae normal.      Pupils: Pupils are equal, round, and reactive to light.   Neck:      Thyroid: No thyroid mass or thyromegaly.      Vascular: Normal carotid pulses. No JVD.      Trachea: Trachea normal. No tracheal deviation.   Cardiovascular:      Rate and Rhythm: Normal rate and regular rhythm.      Pulses: Normal pulses.      Heart sounds: Normal heart sounds, S1 normal and S2 normal.   Pulmonary:      Effort: Pulmonary effort is normal.      Breath sounds: Normal breath sounds. No stridor.   Abdominal:      General: Bowel sounds are normal.      Palpations: Abdomen is soft.      Tenderness: There is no abdominal tenderness.   Musculoskeletal:         General: Normal range of motion.      Cervical back: Full passive range of motion without pain, normal range of motion and neck supple.   Skin:     General: Skin is warm and dry.      Nails: There is no clubbing.   Neurological:      Mental Status: He is alert and oriented to person, place, and time.      Cranial Nerves: No cranial nerve deficit.      Sensory: No sensory deficit.   Psychiatric:         Speech: Speech normal.         Behavior: Behavior normal. Behavior is cooperative.         Thought Content: Thought content normal.         Judgment: Judgment normal.       Significant Labs: All pertinent labs within the past 24 hours have been reviewed.  A1C:   Recent Labs   Lab 11/10/22  0322   HGBA1C 5.8     Bilirubin:   Recent Labs   Lab 02/10/23  1439 02/17/23  2321   BILITOT 0.4 0.4     BMP:   Recent Labs   Lab 02/19/23  0527   *      K 4.6      CO2 25   BUN 32*   CREATININE 2.3*   CALCIUM 8.4*   MG 2.1     CBC:   Recent Labs   Lab 02/17/23  2321 02/18/23  0834 02/19/23  0529   WBC 6.65 7.81 8.61   HGB 12.3* 11.2* 10.8*   HCT 37.9* 33.8* 33.6*    186 185      CMP:   Recent Labs   Lab 02/17/23  2321 02/18/23  0834 02/19/23  0527    143 142   K 4.4 4.1 4.6    109 109   CO2 24 26 25   * 121* 149*   BUN 28* 26* 32*   CREATININE 1.4 1.5* 2.3*   CALCIUM 8.6* 8.6* 8.4*   PROT 7.3  --   --    ALBUMIN 3.4*  --   --    BILITOT 0.4  --   --    ALKPHOS 92  --   --    AST 28  --   --    ALT 32  --   --    ANIONGAP 9 8 8     Cardiac Markers:   Recent Labs   Lab 02/19/23  1457   BNP 1,128*     Magnesium:   Recent Labs   Lab 02/19/23  0527   MG 2.1       Recent Labs   Lab 02/18/23  2335   TROPONINIHS 29.7*     TSH:   Recent Labs   Lab 02/10/23  1439   TSH 3.136       Urine Studies:   Recent Labs   Lab 02/18/23  0623   COLORU Colorless*   APPEARANCEUA Clear   PHUR 5.0   SPECGRAV 1.010   PROTEINUA Negative   GLUCUA Negative   KETONESU Negative   BILIRUBINUA Negative   OCCULTUA Negative   NITRITE Negative   UROBILINOGEN Negative   LEUKOCYTESUR Negative       Significant Imaging: I have reviewed all pertinent imaging results/findings within the past 24 hours.  X-Ray Hip 2 or 3 views Right (with Pelvis when performed)    Result Date: 2/18/2023  Reason: Pain status post fall. FINDINGS: AP pelvis and 2 views of the right hip show no fracture, dislocation, or destructive osseous lesion. There is mild bilateral hip joint space narrowing with osteophytosis. Pelvic enthesopathy noted. The bones appear osteopenic. No gross soft tissue abnormality. IMPRESSION: Degenerative changes of the pelvis and hips. No acute osseous abnormality. Electronically signed by:  Carmelo Chan DO  2/18/2023 6:17 AM [facility] Workstation: [id number]    CT Head Without Contrast    Result Date: 2/17/2023  CT HEAD WITHOUT IV CONTRAST CLINICAL STATEMENT:  fall TECHNIQUE: Axial CT images from skull base to vertex without IV contrast. This exam was performed according to our departmental dose optimization program, and includes the following measures where applicable: automated exposure control, adjustment  of the mAs and/or kVp according to patient size and/or exam, and an iterative reconstruction algorithm. COMPARISON: CT scan of the brain without contrast November 9, 2022 FINDINGS: There is no acute intracranial hemorrhage, mass, mass effect or abnormal extra-axial fluid collection. No evidence of an acute territorial infarct is identified. Age-related volume loss is again identified. There are scattered low density in the periventricular white matter. 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 No acute intracranial abnormality. There is no significant interval change. Calvaria:  The skull base and calvaria demonstrate no abnormality. Paranasal sinuses: Visualized portions of the orbits and paranasal sinuses are unremarkable. IMPRESSION: 1.  No hemorrhage or mass lesion. 2.  Age-related volume loss with nonspecific white matter changes. 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 No significant interval change. Electronically signed by:  Mildred Cobos MD  2/17/2023 11:53 PM [facility] Workstation: [id number]    CT Chest Without Contrast    Result Date: 2/17/2023  INDICATION: fall EXAMINATION: CT CHEST WITHOUT CONTRAST - CT CHEST WITHOUT IV CONTRAST TECHNIQUE: Helically acquired images were obtained of the chest. A radiation dose optimization technique was used for this scan. IV Contrast dosage and agent: None. COMPARISON: None. ____________________________________________ FINDINGS: LUNGS, PLEURA AND LARGE AIRWAYS: There is bilateral pulmonary edema with basilar atelectasis. There are moderate bilateral pleural effusions. There is no evidence of pneumothorax. SOFT TISSUES: There is no chest wall hematoma or soft tissue gas. HEART AND PERICARDIUM: Heart appears mildly prominent. There is no pericardial effusion. VESSELS: Aorta and pulmonary artery are normal in caliber There is no evidence of aneurysm. MEDIASTINUM AND IMANI: No evidence of mediastinal hematoma. No mass or adenopathy. No hiatal hernia. UPPER  ABDOMEN: There appears be a partially visualizes exophytic lesion in the midpole left kidney. This appears soft tissue density. Renal mass is not excluded. This measures 2.2 cm. Significance in this 89-year-old patient is doubtful. BONES: No obvious acute fracture. There is motion does limit evaluation of the ribs. Healed rib fractures are noted on the right. IMPRESSION: 1.  No evidence of acute visceral, vascular, or skeletal injury. 2.  A small follicle 2.2 cm lesion partially visualized left kidney. Significance is uncertain. 3.  Bilateral pleural effusions with pulmonary edema consistent with mild CHF. Electronically signed by:  Richard Cisneros MD  2/17/2023 11:54 PM CST Workstation: [id number]    CT Cervical Spine Without Contrast    Result Date: 2/17/2023  CT CERVICAL SPINE WITHOUT IV CONTRAST CLINICAL STATEMENT:  fall TECHNIQUE: Noncontrast cervical spine CT with sagittal and coronal reconstructions. This exam was performed according to our departmental dose optimization program, and includes the following measures where applicable: automated exposure control, adjustment of the mAs and/or kVp according to patient size and/or exam, and an iterative reconstruction algorithm. COMPARISON: CT scan of the cervical spine without contrast November 9, 2022 FINDINGS: General: Cervical spine is visualized from the skull base through T1 . Straightening of the normal cervical lordosis is seen in the patient's head is tilted anteriorly. The alignment is similar to the previous exam. There is diffuse disc height narrowing throughout the cervical spine. No uncovering of the facets. On the left there is fusion of the C4-5 facets. No acute cervical spine fracture. Lung apices are clear. The airway is patent. Prevertebral soft tissues are unremarkable. Scattered vascular calcifications.. C1-2: Narrowing of the preodontoid space with osteophyte formation the remainder the cervical spine is difficult to assess on the axial images  secondary to positioning of the patient. There is extensive facet arthropathy and uncovertebral hypertrophy which is unchanged. IMPRESSION: 1.  No acute fracture. 2.  Multilevel degenerative disc disease of the cervical spine. The alignment is similar to the previous exam. Electronically signed by:  Mildred Cobos MD  2/17/2023 11:56 PM [facility] Workstation: [id number]

## 2023-02-19 NOTE — PLAN OF CARE
02/19/23 1146   Patient Assessment/Suction   Level of Consciousness (AVPU) alert   Respiratory Effort Normal;Unlabored   PRE-TX-O2   Device (Oxygen Therapy) nasal cannula   $ Is the patient on Low Flow Oxygen? Yes   Flow (L/min) 2   SpO2 96 %   Pulse Oximetry Type Intermittent   $ Pulse Oximetry - Multiple Charge Pulse Oximetry - Multiple   Pulse 77   Resp 18   Aerosol Therapy   $ Aerosol Therapy Charges PRN treatment not required   Respiratory Treatment Status (SVN) PRN treatment not required   Education   $ Education Bronchodilator;15 min   Respiratory Evaluation   $ Care Plan Tech Time 15 min

## 2023-02-19 NOTE — PROGRESS NOTES
[facility] Medicine  Progress Note    Patient Name: Gene Hartman  MRN: [id number]  Patient Class: IP- Inpatient   Admission Date: 2/17/2023  Length of Stay: 1 days  Attending Physician: Aman Begum MD  Primary Care Provider: Mariajose Thorne MD        Subjective:     Principal Problem:CHF (congestive heart failure)        HPI:  The patient is an 89-year-old male, who presents emergency room after falling backwards from walker.  Patient states that he would lost balance and fell backwards hitting his head.  He denies loss of consciousness .  He is significant past medical history to include heart failure and Parkinson's disease.  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    Plan to admit patient gently diurese.  Consult Physical therapy for conditioning and safety ambulation.  Consult  potential for living situation.      Overview/Hospital Course:  02/19/2023 care assumed. Patient admitted for fall with pain to right sided ribs. CT head without acute intracranial findings. Imaging without acute fractures noted. PT/OT consulted. Also noted to have elevated BNP (2349)) with imaging consistent with acute HF. IV diuresing initiated. Repeat BNP (1148). Will continue IV diuresing today with cardiology consult pending.     XR right hip  IMPRESSION:   Degenerative changes of the pelvis and hips. No acute osseous abnormality.     CT cervical spine  IMPRESSION:   1.  No acute fracture.   2.  Multilevel degenerative disc disease of the cervical spine. The alignment is similar to the previous exam.     CT Chest   IMPRESSION:   1.  No evidence of acute visceral, vascular, or skeletal injury.   2.  A small follicle 2.2 cm lesion partially visualized left kidney. Significance is uncertain.   3.  Bilateral pleural effusions with pulmonary edema consistent with mild CHF.       Interval History: No acute events reported per nursing.  Patient AAOx4 in NAD, lying in bed. Family at bedside, POC discussed. Denies SOB, palpitation and CP. Endorses intermittent rib cage pain, improving with current pain medication. Medically stable. Labs reviewed. Further plan as per clinical course    Of note, family tells me patient already has PT/OT that comes out to the house. Will needed to resume post discharge.      Review of Systems   Constitutional:  Negative for chills, diaphoresis and fever.   HENT:  Negative for congestion, postnasal drip, sinus pressure and sore throat.    Eyes:  Negative for visual disturbance.   Respiratory:  Negative for cough, chest tightness, shortness of breath and wheezing.    Cardiovascular:  Negative for chest pain, palpitations and leg swelling.   Gastrointestinal:  Negative for abdominal distention, abdominal pain, blood in stool, constipation, diarrhea, nausea and vomiting.   Endocrine: Negative.    Genitourinary:  Negative for dysuria.   Musculoskeletal: Negative.    Skin: Negative.    Allergic/Immunologic: Negative.    Neurological:  Negative for dizziness, weakness, numbness and headaches.   Hematological: Negative.    Psychiatric/Behavioral: Negative.     Objective:     Vital Signs (Most Recent):  Temp: 98.1 °F (36.7 °C) (02/19/23 1100)  Pulse: 72 (02/19/23 1100)  Resp: 20 (02/19/23 1100)  BP: (!) 106/58 (02/19/23 1100)  SpO2: (!) 93 % (02/19/23 1100)   Vital Signs (24h Range):  Temp:  [97.6 °F (36.4 °C)-98.1 °F (36.7 °C)] 98.1 °F (36.7 °C)  Pulse:  [70-80] 72  Resp:  [18-22] 20  SpO2:  [93 %] 93 %  BP: (104-165)/(58-90) 106/58     Weight: 62.1 kg (136 lb 14.5 oz)  Body mass index is 25.04 kg/m².    Intake/Output Summary (Last 24 hours) at 2/19/2023 1416  Last data filed at 2/19/2023 0701  Gross per 24 hour   Intake 240 ml   Output 250 ml   Net -10 ml      Physical Exam  Constitutional:       General: He is not in acute distress.     Appearance: Normal appearance. He is well-developed. He is not toxic-appearing or  diaphoretic.   HENT:      Head: Normocephalic and atraumatic.   Eyes:      General: Lids are normal.      Conjunctiva/sclera: Conjunctivae normal.      Pupils: Pupils are equal, round, and reactive to light.   Neck:      Thyroid: No thyroid mass or thyromegaly.      Vascular: Normal carotid pulses. No JVD.      Trachea: Trachea normal. No tracheal deviation.   Cardiovascular:      Rate and Rhythm: Normal rate and regular rhythm.      Pulses: Normal pulses.      Heart sounds: Normal heart sounds, S1 normal and S2 normal.   Pulmonary:      Effort: Pulmonary effort is normal.      Breath sounds: Normal breath sounds. No stridor.   Abdominal:      General: Bowel sounds are normal.      Palpations: Abdomen is soft.      Tenderness: There is no abdominal tenderness.   Musculoskeletal:         General: Normal range of motion.      Cervical back: Full passive range of motion without pain, normal range of motion and neck supple.   Skin:     General: Skin is warm and dry.      Nails: There is no clubbing.   Neurological:      Mental Status: He is alert and oriented to person, place, and time.      Cranial Nerves: No cranial nerve deficit.      Sensory: No sensory deficit.   Psychiatric:         Speech: Speech normal.         Behavior: Behavior normal. Behavior is cooperative.         Thought Content: Thought content normal.         Judgment: Judgment normal.       Significant Labs: All pertinent labs within the past 24 hours have been reviewed.  A1C:   Recent Labs   Lab 11/10/22  0322   HGBA1C 5.8     Bilirubin:   Recent Labs   Lab 02/10/23  1439 02/17/23 2321   BILITOT 0.4 0.4     BMP:   Recent Labs   Lab 02/19/23  0527   *      K 4.6      CO2 25   BUN 32*   CREATININE 2.3*   CALCIUM 8.4*   MG 2.1     CBC:   Recent Labs   Lab 02/17/23  2321 02/18/23  0834 02/19/23  0529   WBC 6.65 7.81 8.61   HGB 12.3* 11.2* 10.8*   HCT 37.9* 33.8* 33.6*    186 185     CMP:   Recent Labs   Lab 02/17/23  2321  02/18/23  0834 02/19/23  0527    143 142   K 4.4 4.1 4.6    109 109   CO2 24 26 25   * 121* 149*   BUN 28* 26* 32*   CREATININE 1.4 1.5* 2.3*   CALCIUM 8.6* 8.6* 8.4*   PROT 7.3  --   --    ALBUMIN 3.4*  --   --    BILITOT 0.4  --   --    ALKPHOS 92  --   --    AST 28  --   --    ALT 32  --   --    ANIONGAP 9 8 8     Cardiac Markers:   Recent Labs   Lab 02/19/23  1457   BNP 1,128*     Magnesium:   Recent Labs   Lab 02/19/23  0527   MG 2.1       Recent Labs   Lab 02/18/23  2335   TROPONINIHS 29.7*     TSH:   Recent Labs   Lab 02/10/23  1439   TSH 3.136       Urine Studies:   Recent Labs   Lab 02/18/23  0623   COLORU Colorless*   APPEARANCEUA Clear   PHUR 5.0   SPECGRAV 1.010   PROTEINUA Negative   GLUCUA Negative   KETONESU Negative   BILIRUBINUA Negative   OCCULTUA Negative   NITRITE Negative   UROBILINOGEN Negative   LEUKOCYTESUR Negative       Significant Imaging: I have reviewed all pertinent imaging results/findings within the past 24 hours.  X-Ray Hip 2 or 3 views Right (with Pelvis when performed)    Result Date: 2/18/2023  Reason: Pain status post fall. FINDINGS: AP pelvis and 2 views of the right hip show no fracture, dislocation, or destructive osseous lesion. There is mild bilateral hip joint space narrowing with osteophytosis. Pelvic enthesopathy noted. The bones appear osteopenic. No gross soft tissue abnormality. IMPRESSION: Degenerative changes of the pelvis and hips. No acute osseous abnormality. Electronically signed by:  Carmelo Chan DO  2/18/2023 6:17 AM [facility] Workstation: [id number]    CT Head Without Contrast    Result Date: 2/17/2023  CT HEAD WITHOUT IV CONTRAST CLINICAL STATEMENT:  fall TECHNIQUE: Axial CT images from skull base to vertex without IV contrast. This exam was performed according to our departmental dose optimization program, and includes the following measures where applicable: automated exposure control, adjustment of the mAs and/or kVp according to patient  size and/or exam, and an iterative reconstruction algorithm. COMPARISON: CT scan of the brain without contrast November 9, 2022 FINDINGS: There is no acute intracranial hemorrhage, mass, mass effect or abnormal extra-axial fluid collection. No evidence of an acute territorial infarct is identified. Age-related volume loss is again identified. There are scattered low density in the periventricular white matter. 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 No acute intracranial abnormality. There is no significant interval change. Calvaria:  The skull base and calvaria demonstrate no abnormality. Paranasal sinuses: Visualized portions of the orbits and paranasal sinuses are unremarkable. IMPRESSION: 1.  No hemorrhage or mass lesion. 2.  Age-related volume loss with nonspecific white matter changes. 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 No significant interval change. Electronically signed by:  Mildred Cobos MD  2/17/2023 11:53 PM [facility] Workstation: [id number]    CT Chest Without Contrast    Result Date: 2/17/2023  INDICATION: fall EXAMINATION: CT CHEST WITHOUT CONTRAST - CT CHEST WITHOUT IV CONTRAST TECHNIQUE: Helically acquired images were obtained of the chest. A radiation dose optimization technique was used for this scan. IV Contrast dosage and agent: None. COMPARISON: None. ____________________________________________ FINDINGS: LUNGS, PLEURA AND LARGE AIRWAYS: There is bilateral pulmonary edema with basilar atelectasis. There are moderate bilateral pleural effusions. There is no evidence of pneumothorax. SOFT TISSUES: There is no chest wall hematoma or soft tissue gas. HEART AND PERICARDIUM: Heart appears mildly prominent. There is no pericardial effusion. VESSELS: Aorta and pulmonary artery are normal in caliber There is no evidence of aneurysm. MEDIASTINUM AND IMANI: No evidence of mediastinal hematoma. No mass or adenopathy. No hiatal hernia. UPPER ABDOMEN: There appears be a partially  visualizes exophytic lesion in the midpole left kidney. This appears soft tissue density. Renal mass is not excluded. This measures 2.2 cm. Significance in this 89-year-old patient is doubtful. BONES: No obvious acute fracture. There is motion does limit evaluation of the ribs. Healed rib fractures are noted on the right. IMPRESSION: 1.  No evidence of acute visceral, vascular, or skeletal injury. 2.  A small follicle 2.2 cm lesion partially visualized left kidney. Significance is uncertain. 3.  Bilateral pleural effusions with pulmonary edema consistent with mild CHF. Electronically signed by:  Richard Cisneros MD  2/17/2023 11:54 PM CST Workstation: [id number]    CT Cervical Spine Without Contrast    Result Date: 2/17/2023  CT CERVICAL SPINE WITHOUT IV CONTRAST CLINICAL STATEMENT:  fall TECHNIQUE: Noncontrast cervical spine CT with sagittal and coronal reconstructions. This exam was performed according to our departmental dose optimization program, and includes the following measures where applicable: automated exposure control, adjustment of the mAs and/or kVp according to patient size and/or exam, and an iterative reconstruction algorithm. COMPARISON: CT scan of the cervical spine without contrast November 9, 2022 FINDINGS: General: Cervical spine is visualized from the skull base through T1 . Straightening of the normal cervical lordosis is seen in the patient's head is tilted anteriorly. The alignment is similar to the previous exam. There is diffuse disc height narrowing throughout the cervical spine. No uncovering of the facets. On the left there is fusion of the C4-5 facets. No acute cervical spine fracture. Lung apices are clear. The airway is patent. Prevertebral soft tissues are unremarkable. Scattered vascular calcifications.. C1-2: Narrowing of the preodontoid space with osteophyte formation the remainder the cervical spine is difficult to assess on the axial images secondary to positioning of the patient.  There is extensive facet arthropathy and uncovertebral hypertrophy which is unchanged. IMPRESSION: 1.  No acute fracture. 2.  Multilevel degenerative disc disease of the cervical spine. The alignment is similar to the previous exam. Electronically signed by:  Mildred Cobos MD  2/17/2023 11:56 PM [facility] Workstation: [id number]       Assessment/Plan:      * CHF (congestive heart failure)  Patient is identified as having Combined Systolic and Diastolic heart failure that is Acute on chronic. CHF is currently controlled. Latest ECHO performed and demonstrates- Results for orders placed during the hospital encounter of 09/04/22    Echo    Interpretation Summary  · The left ventricle is normal in size with concentric remodeling and mildly decreased systolic function.  · The estimated ejection fraction is 45%.  · Grade I left ventricular diastolic dysfunction.  · Normal right ventricular size with normal right ventricular systolic function.  · Mild aortic regurgitation.  · Mild mitral regurgitation.  · Mild to moderate tricuspid regurgitation.  · Normal central venous pressure (3 mmHg).  · The estimated PA systolic pressure is 38 mmHg.  . Continue Beta Blocker, ACE/ARB and Furosemide and monitor clinical status closely. Monitor on telemetry. Patient is off CHF pathway.  Monitor strict Is&Os and daily weights.  Place on fluid restriction of 2 L. Continue to stress to patient importance of self efficacy and  on diet for CHF. 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      HTN (hypertension), benign  Monitor and treat      VTE Risk  Mitigation (From admission, onward)           Ordered     enoxaparin injection 30 mg  Daily         02/18/23 0555     IP VTE HIGH RISK PATIENT  Once         02/18/23 0555     Place sequential compression device  Until discontinued         02/18/23 0555                    Discharge Planning   PARDEEP:      Code Status: Full Code   Is the patient medically ready for discharge?:     Reason for patient still in hospital (select all that apply): Patient trending condition, Treatment, Consult recommendations and PT / OT recommendations  Discharge Plan A: Home Health                  SWATI Walters  Department of Hospital Medicine   [facility]

## 2023-02-19 NOTE — CARE UPDATE
Asked to see patient chest pain  When questioned patient had abdominal pain radiating to the chest  He denies shortness of breath  EKG no acute changes  Troponins stable  Patient relieved with Benadryl to relieve anxiety

## 2023-02-19 NOTE — PLAN OF CARE
[facility]  Initial Discharge Assessment       Primary Care Provider: Mariajose Thorne MD    Admission Diagnosis: Fall [W19.XXXA]    Admission Date: 2/17/2023  Expected Discharge Date: TBD    Assessment completed with Maria C Hartman (Relative)   [phone] / patient currently has skilled nursing and physical therapy through [facility]. Maria C requests new orders for occupational therapy as she feels it would benefit patient. Family awaiting halley lift to be delivered, has been ordered already. No additional needs.    Discharge Barriers Identified: None    Payor: [employer] MANAGED MEDICARE / Plan: [employer] MEDICARE HMO / Product Type: Capitation /     Extended Emergency Contact Information  Primary Emergency Contact: Maria C Hartman  Mobile Phone: [phone]  Relation: Relative  Preferred language: English   needed? No  Secondary Emergency Contact: Gene Hartman Jr  Address: [address] [place] [address]  Mobile Phone: [phone]  Relation: Son  Preferred language: English   needed? No    Discharge Plan A: Home Health  Discharge Plan B: Home [facility] - [facility] [address]  [address] [facility] [address]  Phone: [phone] Fax: [phone]    [id number] [place], [place]  [address] AT [facility] &   [address]  [facility] [address]  Phone: [phone] Fax: [phone]    [facility] [address] [place], [place] - [address]  [address]  [place] [address]  Phone: [phone] Fax: [phone]      Initial Assessment (most recent)       Adult Discharge Assessment - 02/19/23 0932          Discharge Assessment    Assessment Type Discharge Planning Assessment     Confirmed/corrected address, phone number and insurance Yes     Confirmed Demographics Correct on Facesheet     Source of Information family    Maria C Hartman (Relative)   [phone]    Communicated PARDEEP with patient/caregiver Date not available/Unable to determine     Reason For Admission CHF (congestive heart failure)     People in Home child(ant), adult;spouse;other relative(s)     Facility Arrived From: home     Do you expect to return to your current living situation? Yes     Do you have help at home or someone to help you manage your care at home? Yes     Who are your caregiver(s) and their phone number(s)? Maria C Hartman (Relative)   [phone]     Prior to hospitilization cognitive status: Alert/Oriented     Current cognitive status: Alert/Oriented     Walking or Climbing Stairs ambulation difficulty, requires equipment     Dressing/Bathing bathing difficulty, requires equipment     Home Accessibility wheelchair accessible     Equipment Currently Used at Home walker, rolling;wheelchair;rollator;shower chair     Readmission within 30 days? No     Patient currently being followed by outpatient case management? No     Do you currently have service(s) that help you manage your care at home? Yes     [facility] [place] [facility]     Is the pt/caregiver preference to resume services with current agency Yes     Do you take prescription medications? Yes     Do you have prescription coverage? Yes     Do you have any problems affording any of your prescribed medications? No     Is the patient taking medications as prescribed? yes     Who is going to help you get home at discharge? Maria C Hartman (Relative)   [phone]     How do you get to doctors appointments? family or friend will provide     Are you on dialysis? No     Do you take coumadin? No     Discharge Plan A Home Health     Discharge Plan B Home Health     DME Needed Upon Discharge  none   family waiting on halley lift, has been ordered    Discharge Plan discussed with: Adult children     Discharge Barriers Identified None

## 2023-02-19 NOTE — HOSPITAL COURSE
02/19/2023 care assumed. Patient admitted for fall with pain to right sided ribs. CT head without acute intracranial findings. Imaging without acute fractures noted. PT/OT consulted. Also noted to have elevated BNP (2349)) with imaging consistent with acute HF. IV diuresing initiated. Repeat BNP (1148). Will continue IV diuresing today with cardiology consult pending.     XR right hip  IMPRESSION:   Degenerative changes of the pelvis and hips. No acute osseous abnormality.     CT cervical spine  IMPRESSION:   1.  No acute fracture.   2.  Multilevel degenerative disc disease of the cervical spine. The alignment is similar to the previous exam.     CT Chest   IMPRESSION:   1.  No evidence of acute visceral, vascular, or skeletal injury.   2.  A small follicle 2.2 cm lesion partially visualized left kidney. Significance is uncertain.   3.  Bilateral pleural effusions with pulmonary edema consistent with mild CHF.

## 2023-02-20 LAB
ANION GAP SERPL CALC-SCNC: 10 MMOL/L (ref 8–16)
ANION GAP SERPL CALC-SCNC: 6 MMOL/L (ref 8–16)
BASOPHILS # BLD AUTO: 0.01 K/UL (ref 0–0.2)
BASOPHILS NFR BLD: 0.1 % (ref 0–1.9)
BUN SERPL-MCNC: 37 MG/DL (ref 8–23)
BUN SERPL-MCNC: 38 MG/DL (ref 8–23)
CALCIUM SERPL-MCNC: 8.4 MG/DL (ref 8.7–10.5)
CALCIUM SERPL-MCNC: 8.4 MG/DL (ref 8.7–10.5)
CHLORIDE SERPL-SCNC: 107 MMOL/L (ref 95–110)
CHLORIDE SERPL-SCNC: 107 MMOL/L (ref 95–110)
CO2 SERPL-SCNC: 24 MMOL/L (ref 23–29)
CO2 SERPL-SCNC: 27 MMOL/L (ref 23–29)
CREAT SERPL-MCNC: 2.3 MG/DL (ref 0.5–1.4)
CREAT SERPL-MCNC: 2.4 MG/DL (ref 0.5–1.4)
DIFFERENTIAL METHOD: ABNORMAL
EOSINOPHIL # BLD AUTO: 0 K/UL (ref 0–0.5)
EOSINOPHIL NFR BLD: 0.2 % (ref 0–8)
ERYTHROCYTE [DISTWIDTH] IN BLOOD BY AUTOMATED COUNT: 16.1 % (ref 11.5–14.5)
EST. GFR  (NO RACE VARIABLE): 25.2 ML/MIN/1.73 M^2
EST. GFR  (NO RACE VARIABLE): 26.5 ML/MIN/1.73 M^2
GLUCOSE SERPL-MCNC: 109 MG/DL (ref 70–110)
GLUCOSE SERPL-MCNC: 133 MG/DL (ref 70–110)
HCT VFR BLD AUTO: 34.6 % (ref 40–54)
HGB BLD-MCNC: 11 G/DL (ref 14–18)
IMM GRANULOCYTES # BLD AUTO: 0.04 K/UL (ref 0–0.04)
IMM GRANULOCYTES NFR BLD AUTO: 0.4 % (ref 0–0.5)
LACTATE SERPL-SCNC: 1.6 MMOL/L (ref 0.5–1.9)
LYMPHOCYTES # BLD AUTO: 1.4 K/UL (ref 1–4.8)
LYMPHOCYTES NFR BLD: 15.1 % (ref 18–48)
MAGNESIUM SERPL-MCNC: 2.1 MG/DL (ref 1.6–2.6)
MCH RBC QN AUTO: 32.8 PG (ref 27–31)
MCHC RBC AUTO-ENTMCNC: 31.8 G/DL (ref 32–36)
MCV RBC AUTO: 103 FL (ref 82–98)
MONOCYTES # BLD AUTO: 0.9 K/UL (ref 0.3–1)
MONOCYTES NFR BLD: 9.3 % (ref 4–15)
NEUTROPHILS # BLD AUTO: 6.9 K/UL (ref 1.8–7.7)
NEUTROPHILS NFR BLD: 74.9 % (ref 38–73)
NRBC BLD-RTO: 0 /100 WBC
PLATELET # BLD AUTO: 190 K/UL (ref 150–450)
PMV BLD AUTO: 11.1 FL (ref 9.2–12.9)
POTASSIUM SERPL-SCNC: 4.1 MMOL/L (ref 3.5–5.1)
POTASSIUM SERPL-SCNC: 4.1 MMOL/L (ref 3.5–5.1)
RBC # BLD AUTO: 3.35 M/UL (ref 4.6–6.2)
SODIUM SERPL-SCNC: 140 MMOL/L (ref 136–145)
SODIUM SERPL-SCNC: 141 MMOL/L (ref 136–145)
TROPONIN I SERPL HS-MCNC: 27.7 PG/ML (ref 0–14.9)
VIT B12 SERPL-MCNC: 1019 PG/ML (ref 210–950)
WBC # BLD AUTO: 9.16 K/UL (ref 3.9–12.7)

## 2023-02-20 PROCEDURE — 25000003 PHARM REV CODE 250: Performed by: NURSE PRACTITIONER

## 2023-02-20 PROCEDURE — 36415 COLL VENOUS BLD VENIPUNCTURE: CPT | Performed by: INTERNAL MEDICINE

## 2023-02-20 PROCEDURE — 99900035 HC TECH TIME PER 15 MIN (STAT)

## 2023-02-20 PROCEDURE — 97530 THERAPEUTIC ACTIVITIES: CPT

## 2023-02-20 PROCEDURE — 94761 N-INVAS EAR/PLS OXIMETRY MLT: CPT

## 2023-02-20 PROCEDURE — 84484 ASSAY OF TROPONIN QUANT: CPT | Performed by: INTERNAL MEDICINE

## 2023-02-20 PROCEDURE — 80048 BASIC METABOLIC PNL TOTAL CA: CPT | Mod: 91 | Performed by: INTERNAL MEDICINE

## 2023-02-20 PROCEDURE — 63600175 PHARM REV CODE 636 W HCPCS: Performed by: NURSE PRACTITIONER

## 2023-02-20 PROCEDURE — 63600175 PHARM REV CODE 636 W HCPCS: Performed by: INTERNAL MEDICINE

## 2023-02-20 PROCEDURE — 94640 AIRWAY INHALATION TREATMENT: CPT

## 2023-02-20 PROCEDURE — 83605 ASSAY OF LACTIC ACID: CPT | Performed by: INTERNAL MEDICINE

## 2023-02-20 PROCEDURE — 25000242 PHARM REV CODE 250 ALT 637 W/ HCPCS: Performed by: INTERNAL MEDICINE

## 2023-02-20 PROCEDURE — 80048 BASIC METABOLIC PNL TOTAL CA: CPT | Performed by: INTERNAL MEDICINE

## 2023-02-20 PROCEDURE — P9047 ALBUMIN (HUMAN), 25%, 50ML: HCPCS | Mod: JG | Performed by: INTERNAL MEDICINE

## 2023-02-20 PROCEDURE — 25000003 PHARM REV CODE 250: Performed by: INTERNAL MEDICINE

## 2023-02-20 PROCEDURE — 63600175 PHARM REV CODE 636 W HCPCS: Mod: JG | Performed by: INTERNAL MEDICINE

## 2023-02-20 PROCEDURE — 83735 ASSAY OF MAGNESIUM: CPT | Performed by: INTERNAL MEDICINE

## 2023-02-20 PROCEDURE — 99900031 HC PATIENT EDUCATION (STAT)

## 2023-02-20 PROCEDURE — 85025 COMPLETE CBC W/AUTO DIFF WBC: CPT | Performed by: INTERNAL MEDICINE

## 2023-02-20 PROCEDURE — 21400001 HC TELEMETRY ROOM

## 2023-02-20 PROCEDURE — 82607 VITAMIN B-12: CPT | Performed by: INTERNAL MEDICINE

## 2023-02-20 RX ORDER — HYDRALAZINE HYDROCHLORIDE 25 MG/1
25 TABLET, FILM COATED ORAL EVERY 6 HOURS PRN
Status: DISCONTINUED | OUTPATIENT
Start: 2023-02-20 | End: 2023-02-24 | Stop reason: HOSPADM

## 2023-02-20 RX ORDER — LIDOCAINE 50 MG/G
1 PATCH TOPICAL
Status: DISCONTINUED | OUTPATIENT
Start: 2023-02-20 | End: 2023-02-24 | Stop reason: HOSPADM

## 2023-02-20 RX ORDER — AMLODIPINE BESYLATE 5 MG/1
5 TABLET ORAL DAILY
Status: DISCONTINUED | OUTPATIENT
Start: 2023-02-20 | End: 2023-02-24 | Stop reason: HOSPADM

## 2023-02-20 RX ORDER — ALBUMIN HUMAN 250 G/1000ML
12.5 SOLUTION INTRAVENOUS EVERY 12 HOURS
Status: DISCONTINUED | OUTPATIENT
Start: 2023-02-20 | End: 2023-02-21

## 2023-02-20 RX ORDER — HEPARIN SODIUM 5000 [USP'U]/ML
5000 INJECTION, SOLUTION INTRAVENOUS; SUBCUTANEOUS EVERY 8 HOURS
Status: DISCONTINUED | OUTPATIENT
Start: 2023-02-20 | End: 2023-02-21

## 2023-02-20 RX ORDER — FUROSEMIDE 10 MG/ML
20 INJECTION INTRAMUSCULAR; INTRAVENOUS
Status: DISCONTINUED | OUTPATIENT
Start: 2023-02-20 | End: 2023-02-20

## 2023-02-20 RX ORDER — AMLODIPINE BESYLATE 5 MG/1
5 TABLET ORAL DAILY
Status: DISCONTINUED | OUTPATIENT
Start: 2023-02-20 | End: 2023-02-20

## 2023-02-20 RX ADMIN — AMLODIPINE BESYLATE 5 MG: 5 TABLET ORAL at 09:02

## 2023-02-20 RX ADMIN — LEVOTHYROXINE SODIUM 75 MCG: 0.03 TABLET ORAL at 04:02

## 2023-02-20 RX ADMIN — ENOXAPARIN SODIUM 30 MG: 30 INJECTION SUBCUTANEOUS at 04:02

## 2023-02-20 RX ADMIN — AMIODARONE HYDROCHLORIDE 200 MG: 200 TABLET ORAL at 08:02

## 2023-02-20 RX ADMIN — CLOPIDOGREL BISULFATE 75 MG: 75 TABLET, FILM COATED ORAL at 08:02

## 2023-02-20 RX ADMIN — ACETAMINOPHEN 650 MG: 325 TABLET ORAL at 08:02

## 2023-02-20 RX ADMIN — FUROSEMIDE 40 MG: 10 INJECTION, SOLUTION INTRAMUSCULAR; INTRAVENOUS at 08:02

## 2023-02-20 RX ADMIN — ESCITALOPRAM OXALATE 10 MG: 10 TABLET ORAL at 08:02

## 2023-02-20 RX ADMIN — MIRTAZAPINE 15 MG: 15 TABLET, FILM COATED ORAL at 09:02

## 2023-02-20 RX ADMIN — DIPHENHYDRAMINE HYDROCHLORIDE 25 MG: 50 INJECTION, SOLUTION INTRAMUSCULAR; INTRAVENOUS at 09:02

## 2023-02-20 RX ADMIN — TRAMADOL HYDROCHLORIDE 50 MG: 50 TABLET, COATED ORAL at 03:02

## 2023-02-20 RX ADMIN — PANTOPRAZOLE SODIUM 40 MG: 40 TABLET, DELAYED RELEASE ORAL at 09:02

## 2023-02-20 RX ADMIN — ALBUTEROL SULFATE 2.5 MG: 2.5 SOLUTION RESPIRATORY (INHALATION) at 06:02

## 2023-02-20 RX ADMIN — LIDOCAINE 1 PATCH: 50 PATCH CUTANEOUS at 09:02

## 2023-02-20 RX ADMIN — ALBUMIN (HUMAN) 12.5 G: 12.5 SOLUTION INTRAVENOUS at 09:02

## 2023-02-20 RX ADMIN — LOSARTAN POTASSIUM 50 MG: 50 TABLET, FILM COATED ORAL at 08:02

## 2023-02-20 RX ADMIN — TRAMADOL HYDROCHLORIDE 50 MG: 50 TABLET, COATED ORAL at 04:02

## 2023-02-20 RX ADMIN — CETIRIZINE HYDROCHLORIDE 10 MG: 10 TABLET, FILM COATED ORAL at 08:02

## 2023-02-20 RX ADMIN — PANTOPRAZOLE SODIUM 40 MG: 40 TABLET, DELAYED RELEASE ORAL at 08:02

## 2023-02-20 RX ADMIN — ATORVASTATIN CALCIUM 40 MG: 40 TABLET, FILM COATED ORAL at 08:02

## 2023-02-20 RX ADMIN — HEPARIN SODIUM 5000 UNITS: 5000 INJECTION, SOLUTION INTRAVENOUS; SUBCUTANEOUS at 09:02

## 2023-02-20 NOTE — CONSULTS
[facility]   Cardiology Note    Consult Requested By: SOFIA  Reason for Consult: CHF    SUBJECTIVE:     History of Present Illness:The pt is 87y/o M with pmh CAD, afib, Parkinson's, multiple falls with h/o subdural hematoma, rib fracture, dementia, and DDD PM. The pt presented to ER after falling bach wards from walker. He states he lost balance.     Pt lying in bed with oxygen, reports SOB slightly improved but ongoing. C/o productive cough. Denies CP or swelling ot BLE.     Review of patient's allergies indicates:   Allergen Reactions    Iodinated contrast media Rash    Pontocaine [tetracaine hcl] Anaphylaxis     hypotension       Past Medical History:   Diagnosis Date    Abnormal echocardiogram 11/21/2019    Normal left ventricular systolic function with estimated ejection fraction of 60%.  Grade 2 moderate left ventricular diastolic dysfunction consistent with pseudonormalization.  Mild left atrial enlargement.  Mild aortic regurgitation.  Mild to moderate mitral regurgitation.  Mild tricuspid regurgitation.  Estimated PA systolic pressure is 38 mm of mercury.  Diagnosis is syncope.    Acute combined systolic (congestive) and diastolic (congestive) heart failure 11/10/2022    Anxiety     Arthritis     CAD (coronary artery disease) age 68    Carotid artery occlusion     Cerebrovascular small vessel disease     Colon polyps age 78    Coronary artery disease of native artery of native heart with stable angina pectoris 5/6/2020    GERD (gastroesophageal reflux disease)     H. pylori infection     HTN (hypertension), benign age 65    Hyperlipidemia LDL goal <70 age 65    Hypothyroidism     Multiple fractures of ribs of right side 11/27/2012    Myocardial infarction     Pernicious anemia 1/26/2018    Primary insomnia 10/9/2018    Prostate cancer age 67    Subdural hematoma 9/5/2022    Syncopal episodes 3/2013    Urge incontinence 5/8/2018     Past Surgical History:   Procedure Laterality Date    CARDIAC  PACEMAKER PLACEMENT  10/2015    [employer] Pacemaker    CARDIAC SURGERY      CATARACT EXTRACTION W/  INTRAOCULAR LENS IMPLANT Bilateral     COLONOSCOPY  ~2013    Dr. Edge    COLONOSCOPY N/A 06/08/2016    Procedure: COLONOSCOPY;  Surgeon: Shamar Barahona MD;  Location: [facility];  Service: Endoscopy;  Laterality: N/A; REPEAT IN 1 YEAR    CORONARY ANGIOPLASTY WITH STENT PLACEMENT  2003    x 2 RCA    PROSTATECTOMY  2002    UPPER GASTROINTESTINAL ENDOSCOPY  11/15/2016    Dr. Barahona     Family History   Problem Relation Age of Onset    Migraines Mother     Heart failure Father     Heart disease Father 56        MI    Heart failure Brother     Heart disease Brother     Diabetes Son     Hypertension Son     Heart disease Brother     Mental illness Brother     No Known Problems Son     Colon cancer Neg Hx     Colon polyps Neg Hx     Crohn's disease Neg Hx     Ulcerative colitis Neg Hx     Stomach cancer Neg Hx     Esophageal cancer Neg Hx      Social History     Tobacco Use    Smoking status: Never    Smokeless tobacco: Never   Substance Use Topics    Alcohol use: No    Drug use: No       Review of Systems:  Review of Systems   Constitutional:  Positive for malaise/fatigue. Negative for chills, diaphoresis and fever.   Respiratory:  Positive for cough and sputum production.    Cardiovascular:  Negative for chest pain, palpitations, orthopnea, claudication, leg swelling and PND.   Genitourinary:  Negative for dysuria, frequency, hematuria and urgency.   Musculoskeletal:  Positive for falls.        Pain in ribs   Neurological:  Positive for weakness. Negative for dizziness and headaches.     OBJECTIVE:     Vital Signs (Most Recent)  Temp: 98.5 °F (36.9 °C) (02/20/23 0755)  Pulse: 90 (02/20/23 0805)  Resp: 18 (02/20/23 0805)  BP: (!) 171/89 (02/20/23 0755)  SpO2: (!) 94 % (02/20/23 0805)    Vital Signs Range (Last 24H):  Temp:  [97.9 °F (36.6 °C)-98.6 °F (37 °C)]   Pulse:  [72-90]   Resp:  [18-22]   BP: (106-175)/(58-89)    SpO2:  [92 %-97 %]     I & O (Last 24H):    Intake/Output Summary (Last 24 hours) at 2/20/2023 0856  Last data filed at 2/20/2023 0841  Gross per 24 hour   Intake 240 ml   Output 150 ml   Net 90 ml       Current Diet:     Current Diet Order   Procedures    Diet Cardiac        Allergies:  Review of patient's allergies indicates:   Allergen Reactions    Iodinated contrast media Rash    Pontocaine [tetracaine hcl] Anaphylaxis     hypotension       Meds:  Scheduled Meds:   amiodarone  200 mg Oral Daily    atorvastatin  40 mg Oral Daily    cetirizine  10 mg Oral Daily    clopidogreL  75 mg Oral Daily    enoxaparin  30 mg Subcutaneous Daily    EScitalopram oxalate  10 mg Oral Daily    furosemide (LASIX) injection  40 mg Intravenous Q12H    levothyroxine  75 mcg Oral Before breakfast    losartan  50 mg Oral BID    mirtazapine  15 mg Oral QHS    pantoprazole  40 mg Oral BID     Continuous Infusions:  PRN Meds:acetaminophen, albuterol, dextrose 10%, dextrose 10%, diphenhydrAMINE, glucagon (human recombinant), glucose, glucose, melatonin, naloxone, nitroGLYCERIN, ondansetron, polyethylene glycol, senna-docusate 8.6-50 mg, simethicone, sodium chloride 0.9%, traMADoL    Oxygen/Ventilator Data (Last 24H):  (if applicable)   Oxygen Concentration (%):  [3] 3    Hemodynamic Parameters (Last 24H):   (if applicable)        Laboratory and Radiology Data:  Recent Results (from the past 24 hour(s))   Brain natriuretic peptide    Collection Time: 02/19/23  2:57 PM   Result Value Ref Range    BNP 1,128 (H) 0 - 99 pg/mL   Basic Metabolic Panel (BMP)    Collection Time: 02/20/23  4:34 AM   Result Value Ref Range    Sodium 140 136 - 145 mmol/L    Potassium 4.1 3.5 - 5.1 mmol/L    Chloride 107 95 - 110 mmol/L    CO2 27 23 - 29 mmol/L    Glucose 109 70 - 110 mg/dL    BUN 37 (H) 8 - 23 mg/dL    Creatinine 2.3 (H) 0.5 - 1.4 mg/dL    Calcium 8.4 (L) 8.7 - 10.5 mg/dL    Anion Gap 6 (L) 8 - 16 mmol/L    eGFR 26.5 (A) >60 mL/min/1.73 m^2    Magnesium    Collection Time: 02/20/23  4:34 AM   Result Value Ref Range    Magnesium 2.1 1.6 - 2.6 mg/dL   CBC with Automated Differential    Collection Time: 02/20/23  4:34 AM   Result Value Ref Range    WBC 9.16 3.90 - 12.70 K/uL    RBC 3.35 (L) 4.60 - 6.20 M/uL    Hemoglobin 11.0 (L) 14.0 - 18.0 g/dL    Hematocrit 34.6 (L) 40.0 - 54.0 %     (H) 82 - 98 fL    MCH 32.8 (H) 27.0 - 31.0 pg    MCHC 31.8 (L) 32.0 - 36.0 g/dL    RDW 16.1 (H) 11.5 - 14.5 %    Platelets 190 150 - 450 K/uL    MPV 11.1 9.2 - 12.9 fL    Immature Granulocytes 0.4 0.0 - 0.5 %    Gran # (ANC) 6.9 1.8 - 7.7 K/uL    Immature Grans (Abs) 0.04 0.00 - 0.04 K/uL    Lymph # 1.4 1.0 - 4.8 K/uL    Mono # 0.9 0.3 - 1.0 K/uL    Eos # 0.0 0.0 - 0.5 K/uL    Baso # 0.01 0.00 - 0.20 K/uL    nRBC 0 0 /100 WBC    Gran % 74.9 (H) 38.0 - 73.0 %    Lymph % 15.1 (L) 18.0 - 48.0 %    Mono % 9.3 4.0 - 15.0 %    Eosinophil % 0.2 0.0 - 8.0 %    Basophil % 0.1 0.0 - 1.9 %    Differential Method Automated      Imaging Results              X-Ray Hip 2 or 3 views Right (with Pelvis when performed) (Final result)  Result time 02/18/23 06:17:26      Final result by Carmelo Chan MD (02/18/23 06:17:26)                   Narrative:    Reason: Pain status post fall.    FINDINGS:    AP pelvis and 2 views of the right hip show no fracture, dislocation, or destructive osseous lesion. There is mild bilateral hip joint space narrowing with osteophytosis. Pelvic enthesopathy noted. The bones appear osteopenic. No gross soft tissue abnormality.    IMPRESSION:    Degenerative changes of the pelvis and hips. No acute osseous abnormality.    Electronically signed by:  Carmelo Chan DO  2/18/2023 6:17 AM [facility] Workstation: [id number]                                     CT Chest Without Contrast (Final result)  Result time 02/17/23 23:54:09      Final result by Richard Cisneros MD (02/17/23 23:54:09)                   Narrative:      INDICATION: fall    EXAMINATION: CT CHEST  WITHOUT CONTRAST - CT CHEST WITHOUT IV CONTRAST    TECHNIQUE:  Helically acquired images were obtained of the chest. A radiation dose optimization technique was used for this scan.  IV Contrast dosage and agent: None.    COMPARISON: None.  ____________________________________________    FINDINGS:    LUNGS, PLEURA AND LARGE AIRWAYS: There is bilateral pulmonary edema with basilar atelectasis. There are moderate bilateral pleural effusions. There is no evidence of pneumothorax.    SOFT TISSUES: There is no chest wall hematoma or soft tissue gas.    HEART AND PERICARDIUM: Heart appears mildly prominent. There is no pericardial effusion.    VESSELS: Aorta and pulmonary artery are normal in caliber There is no evidence of aneurysm.    MEDIASTINUM AND IMANI: No evidence of mediastinal hematoma. No mass or adenopathy. No hiatal hernia.    UPPER ABDOMEN: There appears be a partially visualizes exophytic lesion in the midpole left kidney. This appears soft tissue density. Renal mass is not excluded. This measures 2.2 cm. Significance in this 89-year-old patient is doubtful.    BONES: No obvious acute fracture.    There is motion does limit evaluation of the ribs. Healed rib fractures are noted on the right.        IMPRESSION:    1.  No evidence of acute visceral, vascular, or skeletal injury.  2.  A small follicle 2.2 cm lesion partially visualized left kidney. Significance is uncertain.  3.  Bilateral pleural effusions with pulmonary edema consistent with mild CHF.        Electronically signed by:  Richard Cisneros MD  2/17/2023 11:54 PM [facility] Workstation: [id number]                                     CT Head Without Contrast (Final result)  Result time 02/17/23 23:53:03      Final result by Mildred Cobos MD (02/17/23 23:53:03)                   Narrative:    CT HEAD WITHOUT IV CONTRAST    CLINICAL STATEMENT:  fall    TECHNIQUE: Axial CT images from skull base to vertex without IV contrast. This exam was performed according to our  departmental dose optimization program, and includes the following measures where applicable: automated exposure control, adjustment of the mAs and/or kVp according to patient size and/or exam, and an iterative reconstruction algorithm.    COMPARISON: CT scan of the brain without contrast November 9, 2022    FINDINGS:  There is no acute intracranial hemorrhage, mass, mass effect or abnormal extra-axial fluid collection. No evidence of an acute territorial infarct is identified. Age-related volume loss is again identified. There are scattered low density in the periventricular white matter. 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 No acute intracranial abnormality. There is no significant interval change.    Calvaria:  The skull base and calvaria demonstrate no abnormality.  Paranasal sinuses: Visualized portions of the orbits and paranasal sinuses are unremarkable.    IMPRESSION:  1.  No hemorrhage or mass lesion.  2.  Age-related volume loss with nonspecific white matter changes. 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 No significant interval change.    Electronically signed by:  Mildred Cobos MD  2/17/2023 11:53 PM CST Workstation: [id number]                                     CT Cervical Spine Without Contrast (Final result)  Result time 02/17/23 23:56:07      Final result by Mildred Cobos MD (02/17/23 23:56:07)                   Narrative:      CT CERVICAL SPINE WITHOUT IV CONTRAST    CLINICAL STATEMENT:  fall    TECHNIQUE: Noncontrast cervical spine CT with sagittal and coronal reconstructions.    This exam was performed according to our departmental dose optimization program, and includes the following measures where applicable: automated exposure control, adjustment of the mAs and/or kVp according to patient size and/or exam, and an iterative reconstruction algorithm.    COMPARISON: CT scan of the cervical spine without contrast November 9, 2022    FINDINGS:  General: Cervical spine  is visualized from the skull base through T1 . Straightening of the normal cervical lordosis is seen in the patient's head is tilted anteriorly. The alignment is similar to the previous exam. There is diffuse disc height narrowing throughout the cervical spine. No uncovering of the facets. On the left there is fusion of the C4-5 facets. No acute cervical spine fracture.    Lung apices are clear. The airway is patent. Prevertebral soft tissues are unremarkable. Scattered vascular calcifications..    C1-2: Narrowing of the preodontoid space with osteophyte formation the remainder the cervical spine is difficult to assess on the axial images secondary to positioning of the patient. There is extensive facet arthropathy and uncovertebral hypertrophy which is unchanged.    IMPRESSION:  1.  No acute fracture.  2.  Multilevel degenerative disc disease of the cervical spine. The alignment is similar to the previous exam.    Electronically signed by:  Mildred Cobos MD  2/17/2023 11:56 PM CST Workstation: [id number]                                    12-lead EKG interpretation:  (if applicable)      Current Cardiac Rhythm:   (if applicable)    Physical Exam:   Physical Exam  Constitutional:       Appearance: Normal appearance.   Cardiovascular:      Rate and Rhythm: Normal rate and regular rhythm.      Pulses: Normal pulses.      Heart sounds: Murmur heard.   Abdominal:      General: Abdomen is flat. Bowel sounds are normal.      Palpations: Abdomen is soft.   Musculoskeletal:         General: No swelling.   Skin:     General: Skin is warm and dry.   Neurological:      Mental Status: He is alert and oriented to person, place, and time. Mental status is at baseline.   Psychiatric:         Mood and Affect: Mood normal.         Behavior: Behavior normal.       ASSESSMENT/PLAN:   Assessment:     Xray hip--no acute fx  CT of spin no acute fx   CT-of chest --bilateral pleural effusions  BNP 1128  H/H 11.0/34.6  Trop 29.7  TSH  WNL  Creatinine 2.3 up from 1.5 2/18  Hypertensive this am 's   SR in 80's on tele   7/2022 echo--EF 45-50% Grade I DD, LVH, inf-apical hypokinesis, PA pressure 38 mmhg   Caotid u/s 9/2022--left 50-69% right no HDSS  No acute ST seg changes on EKG   Prolonged QT     HFrEF  Parkinson's   Repeated falls   pAF  Pacemaker  CAD  HTN  Dementia  KAREN/CKD     Plan:   Agree with gentle diuresis, lasix decreased to 20 mg IVP--pt has ongoing SOB  Creatinine baseline 1.5 up to 2.3--  Hold losartan--avoid nephrotoxic meds until creatinine at baseline   Add amlodipine  Monitor renal function /electrolytes  Strict I/O --u/o 150cc?   Consider nephrology consult if renal function worsens    Thank you for your consult  Will follow.

## 2023-02-20 NOTE — CARE UPDATE
02/20/23 0805   Patient Assessment/Suction   Level of Consciousness (AVPU) alert   Respiratory Effort Normal;Unlabored   Expansion/Accessory Muscles/Retractions no use of accessory muscles;no retractions;expansion symmetric   All Lung Fields Breath Sounds diminished   Rhythm/Pattern, Respiratory unlabored;pattern regular;depth regular;chest wiggle adequate   Cough Frequency no cough   PRE-TX-O2   Device (Oxygen Therapy) room air   SpO2 (!) 94 %   Pulse Oximetry Type Intermittent   $ Pulse Oximetry - Multiple Charge Pulse Oximetry - Multiple   Pulse 90   Resp 18   Aerosol Therapy   $ Aerosol Therapy Charges PRN treatment not required   Education   $ Education Bronchodilator;15 min   Respiratory Evaluation   $ Care Plan Tech Time 15 min

## 2023-02-20 NOTE — PLAN OF CARE
Problem: Physical Therapy  Goal: Physical Therapy Goal  Description: Goals to be met by: 3/18/2023    Patient will increase functional independence with mobility by performin. Supine to sit with MInimal Assistance  2. Sit to stand transfer with Minimal Assistance  3. Gait  x 50 feet with Minimal Assistance using Rolling Walker.     Outcome: Ongoing, Progressing

## 2023-02-20 NOTE — PLAN OF CARE
Resumption of home health orders, with addition of Occupational Therapy, sent to [facility] via [facility].        02/20/23 1121   Post-Acute Status   Post-Acute Authorization Home Health   Home Health Status Referrals Sent

## 2023-02-21 LAB
ANION GAP SERPL CALC-SCNC: 5 MMOL/L (ref 8–16)
BASOPHILS # BLD AUTO: 0.01 K/UL (ref 0–0.2)
BASOPHILS NFR BLD: 0.2 % (ref 0–1.9)
BUN SERPL-MCNC: 41 MG/DL (ref 8–23)
CALCIUM SERPL-MCNC: 8 MG/DL (ref 8.7–10.5)
CHLORIDE SERPL-SCNC: 110 MMOL/L (ref 95–110)
CO2 SERPL-SCNC: 26 MMOL/L (ref 23–29)
CREAT SERPL-MCNC: 2 MG/DL (ref 0.5–1.4)
DIFFERENTIAL METHOD: ABNORMAL
EOSINOPHIL # BLD AUTO: 0.1 K/UL (ref 0–0.5)
EOSINOPHIL NFR BLD: 1 % (ref 0–8)
ERYTHROCYTE [DISTWIDTH] IN BLOOD BY AUTOMATED COUNT: 15.8 % (ref 11.5–14.5)
EST. GFR  (NO RACE VARIABLE): 31.3 ML/MIN/1.73 M^2
GLUCOSE SERPL-MCNC: 98 MG/DL (ref 70–110)
HCT VFR BLD AUTO: 32.6 % (ref 40–54)
HGB BLD-MCNC: 10.3 G/DL (ref 14–18)
IMM GRANULOCYTES # BLD AUTO: 0.02 K/UL (ref 0–0.04)
IMM GRANULOCYTES NFR BLD AUTO: 0.3 % (ref 0–0.5)
LYMPHOCYTES # BLD AUTO: 1 K/UL (ref 1–4.8)
LYMPHOCYTES NFR BLD: 15.8 % (ref 18–48)
MAGNESIUM SERPL-MCNC: 2.2 MG/DL (ref 1.6–2.6)
MCH RBC QN AUTO: 33.4 PG (ref 27–31)
MCHC RBC AUTO-ENTMCNC: 31.6 G/DL (ref 32–36)
MCV RBC AUTO: 106 FL (ref 82–98)
MONOCYTES # BLD AUTO: 0.7 K/UL (ref 0.3–1)
MONOCYTES NFR BLD: 11.5 % (ref 4–15)
NEUTROPHILS # BLD AUTO: 4.5 K/UL (ref 1.8–7.7)
NEUTROPHILS NFR BLD: 71.2 % (ref 38–73)
NRBC BLD-RTO: 0 /100 WBC
PLATELET # BLD AUTO: 192 K/UL (ref 150–450)
PMV BLD AUTO: 11.6 FL (ref 9.2–12.9)
POTASSIUM SERPL-SCNC: 4.1 MMOL/L (ref 3.5–5.1)
RBC # BLD AUTO: 3.08 M/UL (ref 4.6–6.2)
SODIUM SERPL-SCNC: 141 MMOL/L (ref 136–145)
WBC # BLD AUTO: 6.28 K/UL (ref 3.9–12.7)

## 2023-02-21 PROCEDURE — 25000003 PHARM REV CODE 250: Performed by: INTERNAL MEDICINE

## 2023-02-21 PROCEDURE — 63600175 PHARM REV CODE 636 W HCPCS: Performed by: INTERNAL MEDICINE

## 2023-02-21 PROCEDURE — 94761 N-INVAS EAR/PLS OXIMETRY MLT: CPT

## 2023-02-21 PROCEDURE — 99223 1ST HOSP IP/OBS HIGH 75: CPT | Mod: ,,, | Performed by: INTERNAL MEDICINE

## 2023-02-21 PROCEDURE — 97535 SELF CARE MNGMENT TRAINING: CPT

## 2023-02-21 PROCEDURE — 99900031 HC PATIENT EDUCATION (STAT)

## 2023-02-21 PROCEDURE — 92610 EVALUATE SWALLOWING FUNCTION: CPT

## 2023-02-21 PROCEDURE — 36415 COLL VENOUS BLD VENIPUNCTURE: CPT | Performed by: INTERNAL MEDICINE

## 2023-02-21 PROCEDURE — 80048 BASIC METABOLIC PNL TOTAL CA: CPT | Performed by: INTERNAL MEDICINE

## 2023-02-21 PROCEDURE — 27000221 HC OXYGEN, UP TO 24 HOURS

## 2023-02-21 PROCEDURE — 99900035 HC TECH TIME PER 15 MIN (STAT)

## 2023-02-21 PROCEDURE — P9047 ALBUMIN (HUMAN), 25%, 50ML: HCPCS | Mod: JG | Performed by: INTERNAL MEDICINE

## 2023-02-21 PROCEDURE — 85025 COMPLETE CBC W/AUTO DIFF WBC: CPT | Performed by: INTERNAL MEDICINE

## 2023-02-21 PROCEDURE — 83735 ASSAY OF MAGNESIUM: CPT | Performed by: INTERNAL MEDICINE

## 2023-02-21 PROCEDURE — 99223 PR INITIAL HOSPITAL CARE,LEVL III: ICD-10-PCS | Mod: ,,, | Performed by: INTERNAL MEDICINE

## 2023-02-21 PROCEDURE — 63600175 PHARM REV CODE 636 W HCPCS: Mod: JG | Performed by: INTERNAL MEDICINE

## 2023-02-21 PROCEDURE — 21400001 HC TELEMETRY ROOM

## 2023-02-21 RX ORDER — DOBUTAMINE HYDROCHLORIDE 400 MG/100ML
2.5 INJECTION INTRAVENOUS CONTINUOUS
Status: DISCONTINUED | OUTPATIENT
Start: 2023-02-21 | End: 2023-02-22

## 2023-02-21 RX ORDER — HEPARIN SODIUM 5000 [USP'U]/ML
5000 INJECTION, SOLUTION INTRAVENOUS; SUBCUTANEOUS EVERY 12 HOURS
Status: DISCONTINUED | OUTPATIENT
Start: 2023-02-21 | End: 2023-02-24 | Stop reason: HOSPADM

## 2023-02-21 RX ADMIN — ATORVASTATIN CALCIUM 40 MG: 40 TABLET, FILM COATED ORAL at 09:02

## 2023-02-21 RX ADMIN — DOBUTAMINE HYDROCHLORIDE 2.5 MCG/KG/MIN: 400 INJECTION INTRAVENOUS at 10:02

## 2023-02-21 RX ADMIN — ACETAMINOPHEN 650 MG: 325 TABLET ORAL at 09:02

## 2023-02-21 RX ADMIN — MIRTAZAPINE 15 MG: 15 TABLET, FILM COATED ORAL at 09:02

## 2023-02-21 RX ADMIN — AMIODARONE HYDROCHLORIDE 200 MG: 200 TABLET ORAL at 09:02

## 2023-02-21 RX ADMIN — ALBUMIN (HUMAN) 12.5 G: 12.5 SOLUTION INTRAVENOUS at 10:02

## 2023-02-21 RX ADMIN — PANTOPRAZOLE SODIUM 40 MG: 40 TABLET, DELAYED RELEASE ORAL at 09:02

## 2023-02-21 RX ADMIN — LIDOCAINE 1 PATCH: 50 PATCH CUTANEOUS at 09:02

## 2023-02-21 RX ADMIN — DIPHENHYDRAMINE HYDROCHLORIDE 25 MG: 50 INJECTION, SOLUTION INTRAMUSCULAR; INTRAVENOUS at 09:02

## 2023-02-21 RX ADMIN — ESCITALOPRAM OXALATE 10 MG: 10 TABLET ORAL at 09:02

## 2023-02-21 RX ADMIN — HEPARIN SODIUM 5000 UNITS: 5000 INJECTION INTRAVENOUS; SUBCUTANEOUS at 09:02

## 2023-02-21 RX ADMIN — AMLODIPINE BESYLATE 5 MG: 5 TABLET ORAL at 09:02

## 2023-02-21 NOTE — PROGRESS NOTES
[facility]  Progress Note    Patient Name: Gene Hartman  MRN: [id number]  Patient Class: IP- Inpatient   Admission Date: 2/17/2023  Length of Stay: 2 days  Attending Physician: Aman Begum MD  Primary Care Provider: Mariajose Thorne MD        Subjective:     Principal Problem:CHF (congestive heart failure)        HPI:  The patient is an 89-year-old male, who presents emergency room after falling backwards from walker.  Patient states that he would lost balance and fell backwards hitting his head.  He denies loss of consciousness .  He is significant past medical history to include heart failure and Parkinson's disease.  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    Plan to admit patient gently diurese.  Consult Physical therapy for conditioning and safety ambulation.  Consult  potential for living situation.      Overview/Hospital Course:  02/20/2023.  Patient had some dinner, appears to be coughing after trying a nutritional supplement.  Complaining of right-sided pain.  CT abdomen pelvis done showed mildly displaced rib fractures.  CT chest done earlier in the admission w/o fx. There are pl effusions with complete collapse of lungs in view. D/w Dr Pope, likely conservative management, he will see tomorrow.      Physical Exam  Vitals and nursing note reviewed.   Constitutional:       Appearance: He is obese.   HENT:      Head: Normocephalic and atraumatic.   Eyes:      Pupils: Pupils are equal, round, and reactive to light.   Cardiovascular:      Rate and Rhythm: Normal rate and regular rhythm.   Pulmonary:      Comments: Moderate air movement  Pain with deep inspiration, pain on palpation of bilateral rib fractures.  Abdominal:      General: Bowel sounds are normal.      Palpations: Abdomen is soft.   Skin:     General: Skin is warm and dry.      Capillary Refill: Capillary refill takes less than 2  seconds.   Neurological:      General: No focal deficit present.      Mental Status: He is alert.       Assessment/Plan:      VTE Risk Mitigation (From admission, onward)           Ordered     enoxaparin injection 30 mg  Daily         02/18/23 0555     IP VTE HIGH RISK PATIENT  Once         02/18/23 0555     Place sequential compression device  Until discontinued         02/18/23 0555                      Shortness of breaths secondary to Acute decompensated systolic CHF and bilateral pleural effusions  EF 30%  Repeat Echo  Cardiology consult appreciated  Lasix per Cardiology  Hold Plavix, may need thoracentesis  Speech therapy evaluation, NPO until then  Consult Cardiothoracic surgery      Falls frequently  Frequent falls  Hold PT while evaluating rib fractures  Consider living situation      Dementia without behavioral disturbance  Pleasant dementia  Probably vascular    KAREN  hold losartan  Albumin  Avoid nephrotoxic agents  Check UA  Cardiology reduced Lasix dose      HTN (hypertension), benign  Monitor and treat    Heparin subQ          Aman Begum MD  Department of Hospital Medicine   [facility]

## 2023-02-21 NOTE — PROGRESS NOTES
[facility]  Progress Note    Patient Name: Gene Hartman  MRN: [id number]  Patient Class: IP- Inpatient   Admission Date: 2/17/2023  Length of Stay: 3 days  Attending Physician: mAan Begum MD  Primary Care Provider: Mariajose Thorne MD        Subjective:     Principal Problem:CHF (congestive heart failure)        HPI:  The patient is an 89-year-old male, who presents emergency room after falling backwards from walker.  Patient states that he would lost balance and fell backwards hitting his head.  He denies loss of consciousness .  He is significant past medical history to include heart failure and Parkinson's disease.  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    Plan to admit patient gently diurese.  Consult Physical therapy for conditioning and safety ambulation.  Consult  potential for living situation.      Overview/Hospital Course:  02/21/2023.  Cardiothoracic surgery does not recommend surgical repair of the rib fractures.  Further clinical symptoms of aspiration, speech therapy recommended NPO, planned for barium swallow.  Pain is relatively well controlled except when moving taking deep inspiration.  Working with incentive spirometer.    Physical Exam  Vitals and nursing note reviewed.   Constitutional:       Appearance: He is obese.   HENT:      Head: Normocephalic and atraumatic.   Eyes:      Pupils: Pupils are equal, round, and reactive to light.   Cardiovascular:      Rate and Rhythm: Normal rate and regular rhythm.   Pulmonary:      Comments: Moderate air movement  Pain with deep inspiration, pain on palpation of bilateral rib fractures.  Abdominal:      General: Bowel sounds are normal.      Palpations: Abdomen is soft.   Skin:     General: Skin is warm and dry.      Capillary Refill: Capillary refill takes less than 2 seconds.   Neurological:      General: No focal deficit present.       Mental Status: He is alert.       Assessment/Plan:      VTE Risk Mitigation (From admission, onward)           Ordered     heparin (porcine) injection 5,000 Units  Every 12 hours         02/21/23 1024     IP VTE HIGH RISK PATIENT  Once         02/18/23 0555     Place sequential compression device  Until discontinued         02/18/23 0555                      Shortness of breaths secondary to Acute decompensated systolic CHF and bilateral pleural effusions, mildly displaced right posterior rib fractures involving ribs 9-11  EF 30%  Repeat Echo  Cardiology consult appreciated  Lasix per Cardiology, Dobutrex  Pulmonology may do thoracentesis tomorrow  Hold Plavix, as may need thoracentesis  Speech therapy evaluation, NPO until barium swallow  Cardiothoracic surgery recommends nonoperative management      Frequent falls in a Parkinson's disease patient  Frequent falls  Hold PT while evaluating rib fractures  Consider living situation  Will check orthostatics when improved      Dementia without behavioral disturbance  Pleasant dementia  Probably vascular    KAREN  hold losartan  Albumin  Avoid nephrotoxic agents  Check UA  Cardiology reduced Lasix dose      HTN (hypertension), benign  Monitor and treat    Heparin subQ          Aman Begum MD  Department of Hospital Medicine   [facility]

## 2023-02-21 NOTE — PLAN OF CARE
Problem: SLP  Goal: SLP Goal  Description: 1. Pt will tolerate 3oz TL w/o overt s/s of aspiration.   2. Pt will tolerate PO trials of varying consistencies/textures for identification of LRD w/o overt s/s of aspiration.     Outcome: Ongoing, Not Progressing     B/s swallow evaluation initiated; ST recommends pt remain NPO at this time. Pt okay for ice chips sparingly. ST will follow.

## 2023-02-21 NOTE — CONSULTS
Date: February 21, 2023      Consult request received & appreciated.    The patient was interviewed.  His medical record and pertinent recent imaging studies including CT scan of the chest and CT scan of the abdomen/pelvis were reviewed.      Impression Mr. Hartman is an 89-year-old male hospitalized after losing his balance and falling backwards yesterday.  CT scan of his abdomen and pelvis revealed posterior right-sided 9th 10th 11th mildly displaced rib fractures which did not appear on CT scan of the chest from a few days ago.  He has bilateral pleural effusions and is coughing up phlegm.  He reports for the most part, the rib fractures do not bother him, however he does have pain with deep inspiration.  He has had a left ventricular ejection fraction of 45%.  He has a history of prior falls and associated rib fractures.  He is being evaluated by Cardiology and Pulmonary Medicine Services.      Recommend:  Given the patient is tolerating the rib fractures fairly well, I would not advocate for rib plating.  P.r.n. pain control should suffice.
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
## 2023-02-21 NOTE — CARE UPDATE
02/21/23 0824   Patient Assessment/Suction   Level of Consciousness (AVPU) alert   Respiratory Effort Normal;Unlabored   Expansion/Accessory Muscles/Retractions no use of accessory muscles;no retractions;expansion symmetric   All Lung Fields Breath Sounds clear   Rhythm/Pattern, Respiratory unlabored;pattern regular;depth regular;chest wiggle adequate;no shortness of breath reported   Cough Frequency infrequent   Cough Type good   PRE-TX-O2   Device (Oxygen Therapy) nasal cannula   $ Is the patient on Low Flow Oxygen? Yes   Flow (L/min) 2   SpO2 98 %   Pulse Oximetry Type Intermittent   $ Pulse Oximetry - Multiple Charge Pulse Oximetry - Multiple   Pulse 79   Resp 18   Aerosol Therapy   $ Aerosol Therapy Charges PRN treatment not required   Education   $ Education Bronchodilator;15 min   Respiratory Evaluation   $ Care Plan Tech Time 15 min

## 2023-02-21 NOTE — PROGRESS NOTES
[facility]    Cardiology Progress Note    Subjective:  Patient having right-sided rib pain for which he does have fractures.  He does have a small pleural effusion which I do not feel is large enough to tap at this time.  He is mildly short of breath      Objective:  Vital Signs (Most Recent)  Temp: 98.1 °F (36.7 °C) (02/21/23 0720)  Pulse: 79 (02/21/23 0824)  Resp: 18 (02/21/23 0824)  BP: (!) 141/67 (02/21/23 0720)  SpO2: 98 % (02/21/23 0824)    Vital Signs Range (Last 24H):  Temp:  [97.8 °F (36.6 °C)-98.4 °F (36.9 °C)]   Pulse:  [72-79]   Resp:  [17-19]   BP: ()/(54-67)   SpO2:  [93 %-98 %]     I & O (Last 24H):    Intake/Output Summary (Last 24 hours) at 2/21/2023 1018  Last data filed at 2/20/2023 1408  Gross per 24 hour   Intake 240 ml   Output --   Net 240 ml       Current Diet:     Current Diet Order   Procedures    Diet NPO        Allergies:  Review of patient's allergies indicates:   Allergen Reactions    Iodinated contrast media Rash    Pontocaine [tetracaine hcl] Anaphylaxis     hypotension       Meds:  Scheduled Meds:   albumin human 25%  12.5 g Intravenous Q12H    amiodarone  200 mg Oral Daily    amLODIPine  5 mg Oral Daily    atorvastatin  40 mg Oral Daily    EScitalopram oxalate  10 mg Oral Daily    heparin (porcine)  5,000 Units Subcutaneous Q8H    levothyroxine  75 mcg Oral Before breakfast    LIDOcaine  1 patch Transdermal Q24H    mirtazapine  15 mg Oral QHS    pantoprazole  40 mg Oral BID     Continuous Infusions:  PRN Meds:acetaminophen, albuterol, dextrose 10%, dextrose 10%, diphenhydrAMINE, glucagon (human recombinant), glucose, glucose, hydrALAZINE, melatonin, naloxone, nitroGLYCERIN, ondansetron, polyethylene glycol, senna-docusate 8.6-50 mg, simethicone, sodium chloride 0.9%, traMADoL    Lab Results :  Recent Results (from the past 24 hour(s))   Basic metabolic panel    Collection Time: 02/20/23  4:41 PM   Result Value Ref Range    Sodium 141 136 - 145 mmol/L    Potassium  4.1 3.5 - 5.1 mmol/L    Chloride 107 95 - 110 mmol/L    CO2 24 23 - 29 mmol/L    Glucose 133 (H) 70 - 110 mg/dL    BUN 38 (H) 8 - 23 mg/dL    Creatinine 2.4 (H) 0.5 - 1.4 mg/dL    Calcium 8.4 (L) 8.7 - 10.5 mg/dL    Anion Gap 10 8 - 16 mmol/L    eGFR 25.2 (A) >60 mL/min/1.73 m^2   Vitamin B12    Collection Time: 02/20/23  4:41 PM   Result Value Ref Range    Vitamin B-12 1019 (H) 210 - 950 pg/mL   TROPONIN I HIGH SENSITIVITY    Collection Time: 02/20/23  4:41 PM   Result Value Ref Range    Troponin I High Sensitivity 27.7 (H) 0.0 - 14.9 pg/mL   Lactic acid, plasma    Collection Time: 02/20/23  7:58 PM   Result Value Ref Range    Lactate (Lactic Acid) 1.6 0.5 - 1.9 mmol/L   Basic Metabolic Panel (BMP)    Collection Time: 02/21/23  4:09 AM   Result Value Ref Range    Sodium 141 136 - 145 mmol/L    Potassium 4.1 3.5 - 5.1 mmol/L    Chloride 110 95 - 110 mmol/L    CO2 26 23 - 29 mmol/L    Glucose 98 70 - 110 mg/dL    BUN 41 (H) 8 - 23 mg/dL    Creatinine 2.0 (H) 0.5 - 1.4 mg/dL    Calcium 8.0 (L) 8.7 - 10.5 mg/dL    Anion Gap 5 (L) 8 - 16 mmol/L    eGFR 31.3 (A) >60 mL/min/1.73 m^2   Magnesium    Collection Time: 02/21/23  4:09 AM   Result Value Ref Range    Magnesium 2.2 1.6 - 2.6 mg/dL   CBC with Automated Differential    Collection Time: 02/21/23  4:09 AM   Result Value Ref Range    WBC 6.28 3.90 - 12.70 K/uL    RBC 3.08 (L) 4.60 - 6.20 M/uL    Hemoglobin 10.3 (L) 14.0 - 18.0 g/dL    Hematocrit 32.6 (L) 40.0 - 54.0 %     (H) 82 - 98 fL    MCH 33.4 (H) 27.0 - 31.0 pg    MCHC 31.6 (L) 32.0 - 36.0 g/dL    RDW 15.8 (H) 11.5 - 14.5 %    Platelets 192 150 - 450 K/uL    MPV 11.6 9.2 - 12.9 fL    Immature Granulocytes 0.3 0.0 - 0.5 %    Gran # (ANC) 4.5 1.8 - 7.7 K/uL    Immature Grans (Abs) 0.02 0.00 - 0.04 K/uL    Lymph # 1.0 1.0 - 4.8 K/uL    Mono # 0.7 0.3 - 1.0 K/uL    Eos # 0.1 0.0 - 0.5 K/uL    Baso # 0.01 0.00 - 0.20 K/uL    nRBC 0 0 /100 WBC    Gran % 71.2 38.0 - 73.0 %    Lymph % 15.8 (L) 18.0 - 48.0 %     Mono % 11.5 4.0 - 15.0 %    Eosinophil % 1.0 0.0 - 8.0 %    Basophil % 0.2 0.0 - 1.9 %    Differential Method Automated        Diagnostic Results:  Imaging Results              X-Ray Hip 2 or 3 views Right (with Pelvis when performed) (Final result)  Result time 02/18/23 06:17:26      Final result by Carmelo Chan MD (02/18/23 06:17:26)                   Narrative:    Reason: Pain status post fall.    FINDINGS:    AP pelvis and 2 views of the right hip show no fracture, dislocation, or destructive osseous lesion. There is mild bilateral hip joint space narrowing with osteophytosis. Pelvic enthesopathy noted. The bones appear osteopenic. No gross soft tissue abnormality.    IMPRESSION:    Degenerative changes of the pelvis and hips. No acute osseous abnormality.    Electronically signed by:  Carmelo Chan DO  2/18/2023 6:17 AM CST Workstation: [id number]                                     CT Chest Without Contrast (Final result)  Result time 02/17/23 23:54:09      Final result by Richard Cisneros MD (02/17/23 23:54:09)                   Narrative:      INDICATION: fall    EXAMINATION: CT CHEST WITHOUT CONTRAST - CT CHEST WITHOUT IV CONTRAST    TECHNIQUE:  Helically acquired images were obtained of the chest. A radiation dose optimization technique was used for this scan.  IV Contrast dosage and agent: None.    COMPARISON: None.  ____________________________________________    FINDINGS:    LUNGS, PLEURA AND LARGE AIRWAYS: There is bilateral pulmonary edema with basilar atelectasis. There are moderate bilateral pleural effusions. There is no evidence of pneumothorax.    SOFT TISSUES: There is no chest wall hematoma or soft tissue gas.    HEART AND PERICARDIUM: Heart appears mildly prominent. There is no pericardial effusion.    VESSELS: Aorta and pulmonary artery are normal in caliber There is no evidence of aneurysm.    MEDIASTINUM AND IMANI: No evidence of mediastinal hematoma. No mass or adenopathy. No hiatal  hernia.    UPPER ABDOMEN: There appears be a partially visualizes exophytic lesion in the midpole left kidney. This appears soft tissue density. Renal mass is not excluded. This measures 2.2 cm. Significance in this 89-year-old patient is doubtful.    BONES: No obvious acute fracture.    There is motion does limit evaluation of the ribs. Healed rib fractures are noted on the right.        IMPRESSION:    1.  No evidence of acute visceral, vascular, or skeletal injury.  2.  A small follicle 2.2 cm lesion partially visualized left kidney. Significance is uncertain.  3.  Bilateral pleural effusions with pulmonary edema consistent with mild CHF.        Electronically signed by:  Richard Cisneros MD  2/17/2023 11:54 PM CST Workstation: [id number]                                     CT Head Without Contrast (Final result)  Result time 02/17/23 23:53:03      Final result by Mildred Cobos MD (02/17/23 23:53:03)                   Narrative:    CT HEAD WITHOUT IV CONTRAST    CLINICAL STATEMENT:  fall    TECHNIQUE: Axial CT images from skull base to vertex without IV contrast. This exam was performed according to our departmental dose optimization program, and includes the following measures where applicable: automated exposure control, adjustment of the mAs and/or kVp according to patient size and/or exam, and an iterative reconstruction algorithm.    COMPARISON: CT scan of the brain without contrast November 9, 2022    FINDINGS:  There is no acute intracranial hemorrhage, mass, mass effect or abnormal extra-axial fluid collection. No evidence of an acute territorial infarct is identified. Age-related volume loss is again identified. There are scattered low density in the periventricular white matter. 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 No acute intracranial abnormality. There is no significant interval change.    Calvaria:  The skull base and calvaria demonstrate no abnormality.  Paranasal sinuses: Visualized  portions of the orbits and paranasal sinuses are unremarkable.    IMPRESSION:  1.  No hemorrhage or mass lesion.  2.  Age-related volume loss with nonspecific white matter changes. 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 No significant interval change.    Electronically signed by:  Mildred Cobos MD  2/17/2023 11:53 PM CST Workstation: [id number]                                     CT Cervical Spine Without Contrast (Final result)  Result time 02/17/23 23:56:07      Final result by Mildred Cobos MD (02/17/23 23:56:07)                   Narrative:      CT CERVICAL SPINE WITHOUT IV CONTRAST    CLINICAL STATEMENT:  fall    TECHNIQUE: Noncontrast cervical spine CT with sagittal and coronal reconstructions.    This exam was performed according to our departmental dose optimization program, and includes the following measures where applicable: automated exposure control, adjustment of the mAs and/or kVp according to patient size and/or exam, and an iterative reconstruction algorithm.    COMPARISON: CT scan of the cervical spine without contrast November 9, 2022    FINDINGS:  General: Cervical spine is visualized from the skull base through T1 . Straightening of the normal cervical lordosis is seen in the patient's head is tilted anteriorly. The alignment is similar to the previous exam. There is diffuse disc height narrowing throughout the cervical spine. No uncovering of the facets. On the left there is fusion of the C4-5 facets. No acute cervical spine fracture.    Lung apices are clear. The airway is patent. Prevertebral soft tissues are unremarkable. Scattered vascular calcifications..    C1-2: Narrowing of the preodontoid space with osteophyte formation the remainder the cervical spine is difficult to assess on the axial images secondary to positioning of the patient. There is extensive facet arthropathy and uncovertebral hypertrophy which is unchanged.    IMPRESSION:  1.  No acute fracture.  2.   "Multilevel degenerative disc disease of the cervical spine. The alignment is similar to the previous exam.    Electronically signed by:  Mildred Cobos MD  2/17/2023 11:56 PM CST Workstation: [id number]                                    Recent Cardiac Rhythm   (if applicable)      Physical Exam:  Objective:  Vital signs: (most recent): Blood pressure (!) 141/67, pulse 79, temperature 98.1 °F (36.7 °C), temperature source Oral, resp. rate 18, height 5' 2" (1.575 m), weight 62.1 kg (136 lb 14.5 oz), SpO2 98 %.      Current Consults:  IP CONSULT TO HOSPITAL MEDICINE  IP CONSULT TO REGISTERED DIETITIAN/NUTRITIONIST  IP CONSULT TO SOCIAL WORK/CASE MANAGEMENT  IP CONSULT TO CARDIOLOGY  IP CONSULT TO CARDIOTHORACIC SURGERY  IP CONSULT TO PULMONOLOGY    Assessment/Plan:  Assessment:   HFrEF  Parkinson's   Repeated falls with rib fracture on right side  pAF  Pacemaker  CAD  HTN  Dementia  KAREN/CKD   Plan:    Will add Dobutrex to help diuresis and improve renal function, will do this for 24 hours  "

## 2023-02-21 NOTE — PT/OT/SLP EVAL
Speech Language Pathology Evaluation  Bedside Swallow    Patient Name:  Gene Hartman   MRN:  [id number]  Admitting Diagnosis: CHF (congestive heart failure)    Recommendations:                 General Recommendations:  Dysphagia therapy  Diet recommendations:  NPO, NPO, Other (Comment) (Ice chips sparingly)   Aspiration Precautions: Consider alternate means of nutrition/hydration, Frequent oral care, HOB to 90 degrees, Ice chips sparingly, and Strict aspiration precautions   General Precautions: Standard, aspiration, NPO, fall  Communication strategies:  none    History:     Past Medical History:   Diagnosis Date    Abnormal echocardiogram 11/21/2019    Normal left ventricular systolic function with estimated ejection fraction of 60%.  Grade 2 moderate left ventricular diastolic dysfunction consistent with pseudonormalization.  Mild left atrial enlargement.  Mild aortic regurgitation.  Mild to moderate mitral regurgitation.  Mild tricuspid regurgitation.  Estimated PA systolic pressure is 38 mm of mercury.  Diagnosis is syncope.    Acute combined systolic (congestive) and diastolic (congestive) heart failure 11/10/2022    Anxiety     Arthritis     CAD (coronary artery disease) age 68    Carotid artery occlusion     Cerebrovascular small vessel disease     Colon polyps age 78    Coronary artery disease of native artery of native heart with stable angina pectoris 5/6/2020    GERD (gastroesophageal reflux disease)     H. pylori infection     HTN (hypertension), benign age 65    Hyperlipidemia LDL goal <70 age 65    Hypothyroidism     Multiple fractures of ribs of right side 11/27/2012    Myocardial infarction     Pernicious anemia 1/26/2018    Primary insomnia 10/9/2018    Prostate cancer age 67    Subdural hematoma 9/5/2022    Syncopal episodes 3/2013    Urge incontinence 5/8/2018       Past Surgical History:   Procedure Laterality Date    CARDIAC PACEMAKER PLACEMENT  10/2015    [employer] Pacemaker    CARDIAC SURGERY       CATARACT EXTRACTION W/  INTRAOCULAR LENS IMPLANT Bilateral     COLONOSCOPY  ~2013    Dr. Edge    COLONOSCOPY N/A 06/08/2016    Procedure: COLONOSCOPY;  Surgeon: Shamar Barahona MD;  Location: [facility];  Service: Endoscopy;  Laterality: N/A; REPEAT IN 1 YEAR    CORONARY ANGIOPLASTY WITH STENT PLACEMENT  2003    x 2 RCA    PROSTATECTOMY  2002    UPPER GASTROINTESTINAL ENDOSCOPY  11/15/2016    Dr. Barahona       Social History: Patient lives with family.    Modified Barium Swallow: None located in pt's chart.    Chest X-Rays: CXR 2/20/2023     COMPARISON:  October 9, 2022 x-ray chest 1 view.     FINDINGS:  Support Devices: Left chest pacemaker device and loop recorder. EKG leads.     Heart: Mildly enlarged.  Mediastinum: Mediastinal contours are normal.  Lungs: Pulmonary vessels are normal in size. Hazy opacity overlying the lung bases consistent with atelectasis.  Pleura: Moderate bilateral pleural effusions. No pneumothorax is present.   Osseous Structures: Known rib fractures are not well appreciated on chest radiograph.       Prior diet: Per pt's daughter, he was on an unrestricted diet.     Subjective   Pt pleasant, cooperative, oriented to self, place, situation. His daughter in law present at b/s reported pt has demo a change in his ability and desire to consume PO. She has noticed increased coughing following bites/sips. His daughter in law also stated that an MBSS was ordered some time ago, and not completed b/c pt missed the appt d/t hospitalization in Dec.     Patient goals: Family would like pt to return to baseline LOF for swallowing to increase nutrition/hydration intake.      Pain/Comfort:  Pain Rating 1: 0/10    Respiratory Status: Nasal cannula, flow 2 L/min    Objective:     Oral Musculature Evaluation  Oral Musculature: general weakness  Dentition: upper and lower dentures  Secretion Management: adequate  Mucosal Quality: adequate  Mandibular Strength and Mobility: WFL  Oral Labial Strength  and Mobility: WFL  Lingual Strength and Mobility: Mild lingual tremor   Velar Elevation: WFL  Buccal Strength and Mobility: WFL  Volitional Cough: WFL - able to expectorate phlegm  Volitional Swallow: Delayed  Voice Prior to PO Intake: Clear    Bedside Swallow Eval:   Consistencies Assessed:  Thin liquids - pt with delayed cough response s/p each tsp sip x3  Puree - pt with delayed cough response s/p each bite x2      Oral Phase:   Unable to assess fully d/t pt c/o chest pain following trials presented.     Pharyngeal Phase:   coughing/choking - s/p all trials presented   delayed swallow initiation - across all trials presented    Compensatory Strategies  None    Treatment: B/s swallow evaluation completed. Education provided regarding pt's POC, safe swallow precautions, impact of Parkinson's disease on swallowing function, and possibility of pending MBSS for identification of pt's LRD.       Assessment:     Gene Hartman is a 89 y.o. male with a dx of CHF (congestive heart failure). He presents with dysphagia of a yet to be determined severity suspected 2/2 medical hx of Parkinson's disease.     Goals:   Multidisciplinary Problems       SLP Goals          Problem: SLP    Goal Priority Disciplines Outcome   SLP Goal     SLP Ongoing, Not Progressing   Description: 1. Pt will tolerate 3oz TL w/o overt s/s of aspiration.   2. Pt will tolerate PO trials of varying consistencies/textures for identification of LRD w/o overt s/s of aspiration.                          Plan:     Patient to be seen:  3 x/week   Plan of Care expires:     Plan of Care reviewed with:  patient, family   SLP Follow-Up:  Yes       Discharge recommendations:      Barriers to Discharge:  Level of Skilled Assistance Needed      Time Tracking:     SLP Treatment Date:   02/21/23  Speech Start Time:  1039  Speech Stop Time:  1102     Speech Total Time (min):  23 min    Billable Minutes: Eval Swallow and Oral Function 8min and Self Care/Home Management  Training 15min    02/21/2023

## 2023-02-22 ENCOUNTER — CLINICAL SUPPORT (OUTPATIENT)
Dept: CARDIOLOGY | Facility: HOSPITAL | Age: 88
DRG: 291 | End: 2023-02-22
Attending: INTERNAL MEDICINE
Payer: MEDICARE

## 2023-02-22 VITALS — WEIGHT: 132.06 LBS | BODY MASS INDEX: 24.3 KG/M2 | HEIGHT: 62 IN

## 2023-02-22 PROBLEM — J90 PLEURAL EFFUSION: Status: ACTIVE | Noted: 2023-02-22

## 2023-02-22 PROBLEM — R06.02 SHORTNESS OF BREATH: Status: ACTIVE | Noted: 2023-02-22

## 2023-02-22 LAB
ANION GAP SERPL CALC-SCNC: 9 MMOL/L (ref 8–16)
BASOPHILS # BLD AUTO: 0.01 K/UL (ref 0–0.2)
BASOPHILS NFR BLD: 0.2 % (ref 0–1.9)
BUN SERPL-MCNC: 36 MG/DL (ref 8–23)
CALCIUM SERPL-MCNC: 8.6 MG/DL (ref 8.7–10.5)
CHLORIDE SERPL-SCNC: 110 MMOL/L (ref 95–110)
CO2 SERPL-SCNC: 25 MMOL/L (ref 23–29)
CREAT SERPL-MCNC: 1.6 MG/DL (ref 0.5–1.4)
DIFFERENTIAL METHOD: ABNORMAL
EOSINOPHIL # BLD AUTO: 0 K/UL (ref 0–0.5)
EOSINOPHIL NFR BLD: 0.7 % (ref 0–8)
ERYTHROCYTE [DISTWIDTH] IN BLOOD BY AUTOMATED COUNT: 15.4 % (ref 11.5–14.5)
EST. GFR  (NO RACE VARIABLE): 40.9 ML/MIN/1.73 M^2
GLUCOSE SERPL-MCNC: 89 MG/DL (ref 70–110)
HCT VFR BLD AUTO: 34.6 % (ref 40–54)
HGB BLD-MCNC: 10.8 G/DL (ref 14–18)
IMM GRANULOCYTES # BLD AUTO: 0.02 K/UL (ref 0–0.04)
IMM GRANULOCYTES NFR BLD AUTO: 0.4 % (ref 0–0.5)
LYMPHOCYTES # BLD AUTO: 1.2 K/UL (ref 1–4.8)
LYMPHOCYTES NFR BLD: 21.7 % (ref 18–48)
MAGNESIUM SERPL-MCNC: 2.2 MG/DL (ref 1.6–2.6)
MCH RBC QN AUTO: 32.7 PG (ref 27–31)
MCHC RBC AUTO-ENTMCNC: 31.2 G/DL (ref 32–36)
MCV RBC AUTO: 105 FL (ref 82–98)
MONOCYTES # BLD AUTO: 0.8 K/UL (ref 0.3–1)
MONOCYTES NFR BLD: 14.4 % (ref 4–15)
NEUTROPHILS # BLD AUTO: 3.4 K/UL (ref 1.8–7.7)
NEUTROPHILS NFR BLD: 62.6 % (ref 38–73)
NRBC BLD-RTO: 0 /100 WBC
PLATELET # BLD AUTO: 228 K/UL (ref 150–450)
PMV BLD AUTO: 11.4 FL (ref 9.2–12.9)
POTASSIUM SERPL-SCNC: 3.8 MMOL/L (ref 3.5–5.1)
RBC # BLD AUTO: 3.3 M/UL (ref 4.6–6.2)
SODIUM SERPL-SCNC: 144 MMOL/L (ref 136–145)
WBC # BLD AUTO: 5.48 K/UL (ref 3.9–12.7)

## 2023-02-22 PROCEDURE — 25000003 PHARM REV CODE 250: Performed by: INTERNAL MEDICINE

## 2023-02-22 PROCEDURE — 85025 COMPLETE CBC W/AUTO DIFF WBC: CPT | Performed by: INTERNAL MEDICINE

## 2023-02-22 PROCEDURE — 92526 ORAL FUNCTION THERAPY: CPT

## 2023-02-22 PROCEDURE — 63600175 PHARM REV CODE 636 W HCPCS: Performed by: INTERNAL MEDICINE

## 2023-02-22 PROCEDURE — 21400001 HC TELEMETRY ROOM

## 2023-02-22 PROCEDURE — 36415 COLL VENOUS BLD VENIPUNCTURE: CPT | Performed by: INTERNAL MEDICINE

## 2023-02-22 PROCEDURE — 99233 SBSQ HOSP IP/OBS HIGH 50: CPT | Mod: ,,, | Performed by: INTERNAL MEDICINE

## 2023-02-22 PROCEDURE — 93306 TTE W/DOPPLER COMPLETE: CPT

## 2023-02-22 PROCEDURE — 99900031 HC PATIENT EDUCATION (STAT)

## 2023-02-22 PROCEDURE — 83735 ASSAY OF MAGNESIUM: CPT | Performed by: INTERNAL MEDICINE

## 2023-02-22 PROCEDURE — 99900035 HC TECH TIME PER 15 MIN (STAT)

## 2023-02-22 PROCEDURE — 94761 N-INVAS EAR/PLS OXIMETRY MLT: CPT

## 2023-02-22 PROCEDURE — 97535 SELF CARE MNGMENT TRAINING: CPT

## 2023-02-22 PROCEDURE — 99233 PR SUBSEQUENT HOSPITAL CARE,LEVL III: ICD-10-PCS | Mod: ,,, | Performed by: INTERNAL MEDICINE

## 2023-02-22 PROCEDURE — 80048 BASIC METABOLIC PNL TOTAL CA: CPT | Performed by: INTERNAL MEDICINE

## 2023-02-22 PROCEDURE — 27000221 HC OXYGEN, UP TO 24 HOURS

## 2023-02-22 RX ORDER — DOBUTAMINE HYDROCHLORIDE 400 MG/100ML
2.5 INJECTION INTRAVENOUS CONTINUOUS
Status: DISCONTINUED | OUTPATIENT
Start: 2023-02-22 | End: 2023-02-23

## 2023-02-22 RX ADMIN — ATORVASTATIN CALCIUM 40 MG: 40 TABLET, FILM COATED ORAL at 09:02

## 2023-02-22 RX ADMIN — AMIODARONE HYDROCHLORIDE 200 MG: 200 TABLET ORAL at 09:02

## 2023-02-22 RX ADMIN — LIDOCAINE 1 PATCH: 50 PATCH CUTANEOUS at 09:02

## 2023-02-22 RX ADMIN — HEPARIN SODIUM 5000 UNITS: 5000 INJECTION INTRAVENOUS; SUBCUTANEOUS at 09:02

## 2023-02-22 RX ADMIN — MIRTAZAPINE 15 MG: 15 TABLET, FILM COATED ORAL at 09:02

## 2023-02-22 RX ADMIN — ESCITALOPRAM OXALATE 10 MG: 10 TABLET ORAL at 09:02

## 2023-02-22 RX ADMIN — AMLODIPINE BESYLATE 5 MG: 5 TABLET ORAL at 09:02

## 2023-02-22 RX ADMIN — PANTOPRAZOLE SODIUM 40 MG: 40 TABLET, DELAYED RELEASE ORAL at 09:02

## 2023-02-22 RX ADMIN — PANTOPRAZOLE SODIUM 40 MG: 40 TABLET, DELAYED RELEASE ORAL at 10:02

## 2023-02-22 NOTE — PLAN OF CARE
Problem: SLP  Goal: SLP Goal  Description: 1. Pt will tolerate 3oz TL w/o overt s/s of aspiration.   2. Pt will tolerate PO trials of varying consistencies/textures for identification of LRD w/o overt s/s of aspiration. - Met 2/22/23  3. Pt will tolerate puree diet/NTL (NO STRAWS) w/o overt s/s of aspiration.     Outcome: Ongoing, Progressing     Pt's diet advanced from NPO to puree/NTL (NO STRAWS). ST will follow for ongoing dysphagia tx, and to monitor diet tolerance. ST recs GI consult to r/o esophageal dysphagia.
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
## 2023-02-22 NOTE — PROGRESS NOTES
[facility]  Adult Nutrition   Progress Note (Initial Assessment)     SUMMARY     Recommendations  RD recommends to advance pts diet to a Puree, Liquid Diet Level: Mildly thick/Nectar thick liquids - IDDSI Level 2 (NO STRAWS) now to meet his energy and protein needs.    Goals:   Pt to meet 100% of hs EEN/EPN.    Nutrition Goal Status: goal not met, progressing towards goal    Communication of RD Recs: other (comment)    Dietitian Rounds Brief  LOS: MD ordered pts diet tonight as around 0630 and PO has not been recorded yet. Will follow up with it.       Assessment per GI:  89-year-old gentleman with a history of CHF coronary disease antiplatelet therapy with recent fall rib fractures and pleural effusions.  His dysphagia difficulty appears more proximal and he is working with speech therapy currently with plan for modified swallow study.  Has a incidental finding in 2017 of a lower esophageal ring however since symptoms appear to be more proximal in nature would be hesitant to have patient undergo sedation and EGD for minimal distal esophageal symptoms.  Okay to advance diet per speech therapy recommendations     Thank you for your consult. I will sign off. Please contact us if you have any additional questions.     Delicia Buchanan MD  Gastroenterology  [facility]            Admitting Diagnosis: CHF (congestive heart failure)     Recommendations:      General Recommendations:  Dysphagia therapy, Modified barium swallow study to est baseline LOF for swallowing, GI consult to r/o esophageal phase dysphagia   Diet recommendations:  Puree, Liquid Diet Level: Mildly thick/Nectar thick liquids - IDDSI Level 2 (NO STRAWS)   Aspiration Precautions: Alternating small bites/sips, NO STRAWS, Assistance with meals and Assistance with thickening liquids, Feed only when awake/alert, Frequent oral care, HOB to 90 degrees, Meds crushed in puree, Monitor for s/s of aspiration, Standard aspiration  "precautions, and Wear oxygen during intake   General Precautions: Standard, aspiration, puree diet/NTL, fall  Communication strategies:  provide increased time to answer     Subjective      Pt pleasant, cooperative. Family present at b/s. Per pt's family, he has been unable to routinely take meds to tx Parkinson's. He was diagnosed with aspiration PNA during a previous encounter. However, results from his most recent CXR 2/20/2023, appear indicative of bilateral pleural effusion with bibasilar atelectasis.      Patient goals: To continue safe PO consumption.      Diet order:   Current Diet Order: NPO      Evaluation of Received Nutrient/Fluid Intake  Energy Calories Required: not meeting needs  Protein Required: not meeting needs  Fluid Required: meeting needs     % Intake of Estimated Energy Needs: 0%  % Meal Intake: NPO      Intake/Output Summary (Last 24 hours) at 2/22/2023 1631  Last data filed at 2/22/2023 1450  Gross per 24 hour   Intake 0 ml   Output 125 ml   Net -125 ml        Anthropometrics  Temp: 97.8 °F (36.6 °C)  Height Method: Stated  Height: 5' 2" (157.5 cm)  Height (inches): 62 in  Weight Method: Bed Scale  Weight:  (bed weight)  Weight (lb): 132.06 lb  Ideal Body Weight (IBW), Male: 118 lb  % Ideal Body Weight, Male (lb): 116.03 %  BMI (Calculated): 24.1  BMI Grade: 18.5-24.9 - normal       Estimated/Assessed Needs  Weight Used For Calorie Calculations: 60 kg (132 lb 4.4 oz)  Energy Calorie Requirements (kcal): 5349-6043 kcals/day (25-30 kcals/kg ABW)  Energy Need Method: Kcal/kg  Protein Requirements:  g/day (1.5-2 g/kg IBW)  Weight Used For Protein Calculations: 53 kg (116 lb 13.5 oz)     Estimated Fluid Requirement Method: RDA Method  RDA Method (mL): 1500       Reason for Assessment  Reason For Assessment: length of stay  Diagnosis: other (see comments) (CHF (congestive heart failure))  Relevant Medical History: HTN (hypertension), benign, Hyperlipidemia LDL goal <70, CAD (coronary artery " disease), Colon polyps, Multiple fractures of ribs of right side, Cerebrovascular small vessel disease, Syncopal episodes, Prostate cancer, GERD (gastroesophageal reflux disease), Myocardial infarction, Anxiety, Arthritis, Hypothyroidism, Carotid artery occlusion, H. pylori infection, Pernicious anemia, Urge incontinence, Primary insomnia, Coronary artery disease of native artery of native heart with stable angina pectoris, Abnormal echocardiogram    Nutrition/Diet History  Food Allergies: other (see comments) (Iodinated contrast media)  Factors Affecting Nutritional Intake: NPO    Nutrition Risk Screen  Nutrition Risk Screen: no indicators present     MST Score: 0  Have you recently lost weight without trying?: No  Weight loss score: 0  Have you been eating poorly because of a decreased appetite?: No  Appetite score: 0       Weight History:  Wt Readings from Last 5 Encounters:   02/22/23 59.9 kg (132 lb 0.9 oz)   02/22/23 59.9 kg (132 lb 0.9 oz)   11/11/22 66.1 kg (145 lb 12.8 oz)   11/03/22 67.6 kg (149 lb)   10/09/22 64.9 kg (143 lb)        Lab/Procedures/Meds: Pertinent Labs/Meds Reviewed    Medications:Pertinent Medications Reviewed  Scheduled Meds:   amiodarone  200 mg Oral Daily    amLODIPine  5 mg Oral Daily    atorvastatin  40 mg Oral Daily    EScitalopram oxalate  10 mg Oral Daily    heparin (porcine)  5,000 Units Subcutaneous Q12H    levothyroxine  75 mcg Oral Before breakfast    LIDOcaine  1 patch Transdermal Q24H    mirtazapine  15 mg Oral QHS    pantoprazole  40 mg Oral BID     Continuous Infusions:   DOBUTamine IV infusion (non-titrating) 2.5 mcg/kg/min (02/22/23 1300)     PRN Meds:.acetaminophen, albuterol, dextrose 10%, dextrose 10%, diphenhydrAMINE, glucagon (human recombinant), glucose, glucose, hydrALAZINE, melatonin, naloxone, nitroGLYCERIN, ondansetron, polyethylene glycol, senna-docusate 8.6-50 mg, simethicone, sodium chloride 0.9%, traMADoL    Labs: Pertinent Labs Reviewed  Clinical  Chemistry:  Recent Labs   Lab 02/17/23  2321 02/18/23  0834 02/22/23  0355      < > 144   K 4.4   < > 3.8      < > 110   CO2 24   < > 25   *   < > 89   BUN 28*   < > 36*   CREATININE 1.4   < > 1.6*   CALCIUM 8.6*   < > 8.6*   PROT 7.3  --   --    ALBUMIN 3.4*  --   --    BILITOT 0.4  --   --    ALKPHOS 92  --   --    AST 28  --   --    ALT 32  --   --    ANIONGAP 9   < > 9   MG  --    < > 2.2    < > = values in this interval not displayed.     CBC:   Recent Labs   Lab 02/22/23  0355   WBC 5.48   RBC 3.30*   HGB 10.8*   HCT 34.6*      *   MCH 32.7*   MCHC 31.2*     Cardiac Profile:  Recent Labs   Lab 02/17/23 2321 02/19/23  1457   BNP 2,349* 1,128*       Monitor and Evaluation  Food and Nutrient Intake: food and beverage intake, energy intake  Food and Nutrient Adminstration: diet order  Knowledge/Beliefs/Attitudes: food and nutrition knowledge/skill  Physical Activity and Function: nutrition-related ADLs and IADLs, factors affecting access to physical activity  Anthropometric Measurements: weight, weight change, body mass index  Biochemical Data, Medical Tests and Procedures: lipid profile, inflammatory profile, glucose/endocrine profile, gastrointestinal profile, electrolyte and renal panel  Nutrition-Focused Physical Findings: overall appearance     Nutrition Risk  Level of Risk/Frequency of Follow-up: moderate     Nutrition Follow-Up  RD Follow-up?: Yes      Feli Louis, KRISTAL 02/22/2023 4:31 PM

## 2023-02-22 NOTE — PROGRESS NOTES
[facility]   Cardiology Note    Consult Requested By: SOFIA  Reason for Consult: CHF    SUBJECTIVE:     History of Present Illness:The pt is 87y/o M with pmh CAD, afib, Parkinson's, multiple falls with h/o subdural hematoma, rib fracture, dementia, and DDD PM. The pt presented to ER after falling bach wards from walker. He states he lost balance.     The pt is lying in bed with no c/o pain. Denies CP but states ongoing SOB. VSS, u/o 50cc, but family states has had increased urine output.       Review of patient's allergies indicates:   Allergen Reactions    Iodinated contrast media Rash    Pontocaine [tetracaine hcl] Anaphylaxis     hypotension       Past Medical History:   Diagnosis Date    Abnormal echocardiogram 11/21/2019    Normal left ventricular systolic function with estimated ejection fraction of 60%.  Grade 2 moderate left ventricular diastolic dysfunction consistent with pseudonormalization.  Mild left atrial enlargement.  Mild aortic regurgitation.  Mild to moderate mitral regurgitation.  Mild tricuspid regurgitation.  Estimated PA systolic pressure is 38 mm of mercury.  Diagnosis is syncope.    Acute combined systolic (congestive) and diastolic (congestive) heart failure 11/10/2022    Anxiety     Arthritis     CAD (coronary artery disease) age 68    Carotid artery occlusion     Cerebrovascular small vessel disease     Colon polyps age 78    Coronary artery disease of native artery of native heart with stable angina pectoris 5/6/2020    GERD (gastroesophageal reflux disease)     H. pylori infection     HTN (hypertension), benign age 65    Hyperlipidemia LDL goal <70 age 65    Hypothyroidism     Multiple fractures of ribs of right side 11/27/2012    Myocardial infarction     Pernicious anemia 1/26/2018    Primary insomnia 10/9/2018    Prostate cancer age 67    Subdural hematoma 9/5/2022    Syncopal episodes 3/2013    Urge incontinence 5/8/2018     Past Surgical History:   Procedure Laterality  Date    CARDIAC PACEMAKER PLACEMENT  10/2015    [employer] Pacemaker    CARDIAC SURGERY      CATARACT EXTRACTION W/  INTRAOCULAR LENS IMPLANT Bilateral     COLONOSCOPY  ~2013    Dr. Edge    COLONOSCOPY N/A 06/08/2016    Procedure: COLONOSCOPY;  Surgeon: Shamar Barahona MD;  Location: [facility];  Service: Endoscopy;  Laterality: N/A; REPEAT IN 1 YEAR    CORONARY ANGIOPLASTY WITH STENT PLACEMENT  2003    x 2 RCA    PROSTATECTOMY  2002    UPPER GASTROINTESTINAL ENDOSCOPY  11/15/2016    Dr. Barahona     Family History   Problem Relation Age of Onset    Migraines Mother     Heart failure Father     Heart disease Father 56        MI    Heart failure Brother     Heart disease Brother     Diabetes Son     Hypertension Son     Heart disease Brother     Mental illness Brother     No Known Problems Son     Colon cancer Neg Hx     Colon polyps Neg Hx     Crohn's disease Neg Hx     Ulcerative colitis Neg Hx     Stomach cancer Neg Hx     Esophageal cancer Neg Hx      Social History     Tobacco Use    Smoking status: Never    Smokeless tobacco: Never   Substance Use Topics    Alcohol use: No    Drug use: No       Review of Systems:  Review of Systems   Constitutional:  Positive for malaise/fatigue. Negative for chills, diaphoresis and fever.   Respiratory:  Positive for cough, sputum production and shortness of breath.    Cardiovascular:  Negative for chest pain, palpitations, orthopnea, claudication, leg swelling and PND.   Genitourinary:  Negative for dysuria, frequency, hematuria and urgency.   Musculoskeletal:  Positive for falls.        Pain in ribs   Neurological:  Positive for weakness. Negative for dizziness and headaches.     OBJECTIVE:     Vital Signs (Most Recent)  Temp: 98.5 °F (36.9 °C) (02/22/23 0716)  Pulse: 85 (02/22/23 0716)  Resp: 20 (02/22/23 0716)  BP: (!) 166/72 (02/22/23 0716)  SpO2: (!) 92 % (02/22/23 0716)    Vital Signs Range (Last 24H):  Temp:  [98 °F (36.7 °C)-98.9 °F (37.2 °C)]   Pulse:  [79-88]    Resp:  [16-20]   BP: (154-168)/(63-91)   SpO2:  [91 %-98 %]     I & O (Last 24H):    Intake/Output Summary (Last 24 hours) at 2/22/2023 0817  Last data filed at 2/22/2023 0131  Gross per 24 hour   Intake 0 ml   Output 50 ml   Net -50 ml         Current Diet:     Current Diet Order   Procedures    Diet NPO        Allergies:  Review of patient's allergies indicates:   Allergen Reactions    Iodinated contrast media Rash    Pontocaine [tetracaine hcl] Anaphylaxis     hypotension       Meds:  Scheduled Meds:   amiodarone  200 mg Oral Daily    amLODIPine  5 mg Oral Daily    atorvastatin  40 mg Oral Daily    EScitalopram oxalate  10 mg Oral Daily    heparin (porcine)  5,000 Units Subcutaneous Q12H    levothyroxine  75 mcg Oral Before breakfast    LIDOcaine  1 patch Transdermal Q24H    mirtazapine  15 mg Oral QHS    pantoprazole  40 mg Oral BID     Continuous Infusions:   DOBUTamine IV infusion (non-titrating) 2.5 mcg/kg/min (02/21/23 1048)     PRN Meds:acetaminophen, albuterol, dextrose 10%, dextrose 10%, diphenhydrAMINE, glucagon (human recombinant), glucose, glucose, hydrALAZINE, melatonin, naloxone, nitroGLYCERIN, ondansetron, polyethylene glycol, senna-docusate 8.6-50 mg, simethicone, sodium chloride 0.9%, traMADoL    Oxygen/Ventilator Data (Last 24H):  (if applicable)        Hemodynamic Parameters (Last 24H):   (if applicable)        Laboratory and Radiology Data:  Recent Results (from the past 24 hour(s))   Basic Metabolic Panel (BMP)    Collection Time: 02/22/23  3:55 AM   Result Value Ref Range    Sodium 144 136 - 145 mmol/L    Potassium 3.8 3.5 - 5.1 mmol/L    Chloride 110 95 - 110 mmol/L    CO2 25 23 - 29 mmol/L    Glucose 89 70 - 110 mg/dL    BUN 36 (H) 8 - 23 mg/dL    Creatinine 1.6 (H) 0.5 - 1.4 mg/dL    Calcium 8.6 (L) 8.7 - 10.5 mg/dL    Anion Gap 9 8 - 16 mmol/L    eGFR 40.9 (A) >60 mL/min/1.73 m^2   Magnesium    Collection Time: 02/22/23  3:55 AM   Result Value Ref Range    Magnesium 2.2 1.6 - 2.6 mg/dL    CBC with Automated Differential    Collection Time: 02/22/23  3:55 AM   Result Value Ref Range    WBC 5.48 3.90 - 12.70 K/uL    RBC 3.30 (L) 4.60 - 6.20 M/uL    Hemoglobin 10.8 (L) 14.0 - 18.0 g/dL    Hematocrit 34.6 (L) 40.0 - 54.0 %     (H) 82 - 98 fL    MCH 32.7 (H) 27.0 - 31.0 pg    MCHC 31.2 (L) 32.0 - 36.0 g/dL    RDW 15.4 (H) 11.5 - 14.5 %    Platelets 228 150 - 450 K/uL    MPV 11.4 9.2 - 12.9 fL    Immature Granulocytes 0.4 0.0 - 0.5 %    Gran # (ANC) 3.4 1.8 - 7.7 K/uL    Immature Grans (Abs) 0.02 0.00 - 0.04 K/uL    Lymph # 1.2 1.0 - 4.8 K/uL    Mono # 0.8 0.3 - 1.0 K/uL    Eos # 0.0 0.0 - 0.5 K/uL    Baso # 0.01 0.00 - 0.20 K/uL    nRBC 0 0 /100 WBC    Gran % 62.6 38.0 - 73.0 %    Lymph % 21.7 18.0 - 48.0 %    Mono % 14.4 4.0 - 15.0 %    Eosinophil % 0.7 0.0 - 8.0 %    Basophil % 0.2 0.0 - 1.9 %    Differential Method Automated      Imaging Results              X-Ray Hip 2 or 3 views Right (with Pelvis when performed) (Final result)  Result time 02/18/23 06:17:26      Final result by Carmelo Chan MD (02/18/23 06:17:26)                   Narrative:    Reason: Pain status post fall.    FINDINGS:    AP pelvis and 2 views of the right hip show no fracture, dislocation, or destructive osseous lesion. There is mild bilateral hip joint space narrowing with osteophytosis. Pelvic enthesopathy noted. The bones appear osteopenic. No gross soft tissue abnormality.    IMPRESSION:    Degenerative changes of the pelvis and hips. No acute osseous abnormality.    Electronically signed by:  Carmelo Chan DO  2/18/2023 6:17 AM [facility] Workstation: [id number]                                     CT Chest Without Contrast (Final result)  Result time 02/17/23 23:54:09      Final result by Richard Cisneros MD (02/17/23 23:54:09)                   Narrative:      INDICATION: fall    EXAMINATION: CT CHEST WITHOUT CONTRAST - CT CHEST WITHOUT IV CONTRAST    TECHNIQUE:  Helically acquired images were obtained of the chest. A  radiation dose optimization technique was used for this scan.  IV Contrast dosage and agent: None.    COMPARISON: None.  ____________________________________________    FINDINGS:    LUNGS, PLEURA AND LARGE AIRWAYS: There is bilateral pulmonary edema with basilar atelectasis. There are moderate bilateral pleural effusions. There is no evidence of pneumothorax.    SOFT TISSUES: There is no chest wall hematoma or soft tissue gas.    HEART AND PERICARDIUM: Heart appears mildly prominent. There is no pericardial effusion.    VESSELS: Aorta and pulmonary artery are normal in caliber There is no evidence of aneurysm.    MEDIASTINUM AND IMANI: No evidence of mediastinal hematoma. No mass or adenopathy. No hiatal hernia.    UPPER ABDOMEN: There appears be a partially visualizes exophytic lesion in the midpole left kidney. This appears soft tissue density. Renal mass is not excluded. This measures 2.2 cm. Significance in this 89-year-old patient is doubtful.    BONES: No obvious acute fracture.    There is motion does limit evaluation of the ribs. Healed rib fractures are noted on the right.        IMPRESSION:    1.  No evidence of acute visceral, vascular, or skeletal injury.  2.  A small follicle 2.2 cm lesion partially visualized left kidney. Significance is uncertain.  3.  Bilateral pleural effusions with pulmonary edema consistent with mild CHF.        Electronically signed by:  Richard Cisneros MD  2/17/2023 11:54 PM [facility] Workstation: [id number]                                     CT Head Without Contrast (Final result)  Result time 02/17/23 23:53:03      Final result by Mildred Cobos MD (02/17/23 23:53:03)                   Narrative:    CT HEAD WITHOUT IV CONTRAST    CLINICAL STATEMENT:  fall    TECHNIQUE: Axial CT images from skull base to vertex without IV contrast. This exam was performed according to our departmental dose optimization program, and includes the following measures where applicable: automated exposure  control, adjustment of the mAs and/or kVp according to patient size and/or exam, and an iterative reconstruction algorithm.    COMPARISON: CT scan of the brain without contrast November 9, 2022    FINDINGS:  There is no acute intracranial hemorrhage, mass, mass effect or abnormal extra-axial fluid collection. No evidence of an acute territorial infarct is identified. Age-related volume loss is again identified. There are scattered low density in the periventricular white matter. 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 No acute intracranial abnormality. There is no significant interval change.    Calvaria:  The skull base and calvaria demonstrate no abnormality.  Paranasal sinuses: Visualized portions of the orbits and paranasal sinuses are unremarkable.    IMPRESSION:  1.  No hemorrhage or mass lesion.  2.  Age-related volume loss with nonspecific white matter changes. 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 No significant interval change.    Electronically signed by:  Mildred Cobos MD  2/17/2023 11:53 PM [facility] Workstation: [id number]                                     CT Cervical Spine Without Contrast (Final result)  Result time 02/17/23 23:56:07      Final result by Mildred Cobos MD (02/17/23 23:56:07)                   Narrative:      CT CERVICAL SPINE WITHOUT IV CONTRAST    CLINICAL STATEMENT:  fall    TECHNIQUE: Noncontrast cervical spine CT with sagittal and coronal reconstructions.    This exam was performed according to our departmental dose optimization program, and includes the following measures where applicable: automated exposure control, adjustment of the mAs and/or kVp according to patient size and/or exam, and an iterative reconstruction algorithm.    COMPARISON: CT scan of the cervical spine without contrast November 9, 2022    FINDINGS:  General: Cervical spine is visualized from the skull base through T1 . Straightening of the normal cervical lordosis is seen in the  patient's head is tilted anteriorly. The alignment is similar to the previous exam. There is diffuse disc height narrowing throughout the cervical spine. No uncovering of the facets. On the left there is fusion of the C4-5 facets. No acute cervical spine fracture.    Lung apices are clear. The airway is patent. Prevertebral soft tissues are unremarkable. Scattered vascular calcifications..    C1-2: Narrowing of the preodontoid space with osteophyte formation the remainder the cervical spine is difficult to assess on the axial images secondary to positioning of the patient. There is extensive facet arthropathy and uncovertebral hypertrophy which is unchanged.    IMPRESSION:  1.  No acute fracture.  2.  Multilevel degenerative disc disease of the cervical spine. The alignment is similar to the previous exam.    Electronically signed by:  Mildred Cobos MD  2/17/2023 11:56 PM [facility] Workstation: [id number]                                    12-lead EKG interpretation:  (if applicable)      Current Cardiac Rhythm:   (if applicable)    Physical Exam:   Physical Exam  Constitutional:       Appearance: Normal appearance.   Cardiovascular:      Rate and Rhythm: Normal rate and regular rhythm.      Pulses: Normal pulses.      Heart sounds: Murmur heard.   Abdominal:      General: Abdomen is flat. Bowel sounds are normal.      Palpations: Abdomen is soft.   Musculoskeletal:         General: No swelling.   Skin:     General: Skin is warm and dry.   Neurological:      Mental Status: He is alert and oriented to person, place, and time. Mental status is at baseline.   Psychiatric:         Mood and Affect: Mood normal.         Behavior: Behavior normal.       ASSESSMENT/PLAN:   Assessment:     Xray hip--no acute fx  CT of spin no acute fx   CT-of chest --bilateral pleural effusions  BNP 1128  Trop 29.7  TSH WNL  Rib fx--assess by CTS--conservative management recommended  2/21--u/s of chest-small bilateral pleural effusions    H/H  10.8/34.6  Creatinine down to 1.6  SR in 80's on tele   7/2022 echo--EF 45-50% Grade I DD, LVH, inf-apical hypokinesis, PA pressure 38 mmhg   Echo --EF 45% Grade I DD mild AI/MR mod TR, PA pressure 38 mmhg     HFrEF  Parkinson's   Repeated falls --rib fx   pAF  Pacemaker  CAD  HTN  Dementia  KAREN/CKD     Plan:   Plavix on hold for possible thoracentesis Friday-pulm following  Continue dobutamine for additional 24 hours-  Renal function improved  Continue to Monitor renal function and electrolytes  Strict I/O --  Repeat CXR in am

## 2023-02-22 NOTE — CONSULTS
[facility]  Gastroenterology  Consult Note    Patient Name: Gene Hartman  MRN: [id number]  Admission Date: 2/17/2023  Hospital Length of Stay: 4 days  Code Status: Full Code   Attending Provider: Aman Begum MD   Consulting Provider: Delicia Buchanan MD  Primary Care Physician: Mariajose Thorne MD  Principal Problem:CHF (congestive heart failure)    Inpatient consult to Gastroenterology  Consult performed by: Delicia Buchanan MD  Consult ordered by: Aman Begum MD      Subjective:     HPI:  89 year old gentleman with a history of CAD, CHF, hypertension, coronary disease, carotid artery disease, Parkinson's who lost his balance and fell suffering multiple rib fractures and subsequent pleural effusions.  Patient is having struggling with shortness of breath and full subsequent inhalation and was observed to have questionable oropharyngeal dysphagia and aspiration with eating.      Patient is known to have prior EGD in 2017 with Dr. Benjie Sheikh with small hiatal hernia and lower esophageal Schatzki's ring dilated at that time.  His family reports that his distal dysphagia is minimal symptoms are more proximal in nature.  Patient had speech therapy evaluation today allowing for modified barium swallow study and aspiration precautions and is allowed a pureed diet which she is tolerating currently    Past Medical History:   Diagnosis Date    Abnormal echocardiogram 11/21/2019    Normal left ventricular systolic function with estimated ejection fraction of 60%.  Grade 2 moderate left ventricular diastolic dysfunction consistent with pseudonormalization.  Mild left atrial enlargement.  Mild aortic regurgitation.  Mild to moderate mitral regurgitation.  Mild tricuspid regurgitation.  Estimated PA systolic pressure is 38 mm of mercury.  Diagnosis is syncope.    Acute combined systolic (congestive) and diastolic (congestive) heart failure 11/10/2022    Anxiety     Arthritis     CAD  (coronary artery disease) age 68    Carotid artery occlusion     Cerebrovascular small vessel disease     Colon polyps age 78    Coronary artery disease of native artery of native heart with stable angina pectoris 5/6/2020    GERD (gastroesophageal reflux disease)     H. pylori infection     HTN (hypertension), benign age 65    Hyperlipidemia LDL goal <70 age 65    Hypothyroidism     Multiple fractures of ribs of right side 11/27/2012    Myocardial infarction     Pernicious anemia 1/26/2018    Primary insomnia 10/9/2018    Prostate cancer age 67    Subdural hematoma 9/5/2022    Syncopal episodes 3/2013    Urge incontinence 5/8/2018       Past Surgical History:   Procedure Laterality Date    CARDIAC PACEMAKER PLACEMENT  10/2015    [facility] Pacemaker    CARDIAC SURGERY      CATARACT EXTRACTION W/  INTRAOCULAR LENS IMPLANT Bilateral     COLONOSCOPY  ~2013    Dr. Edge    COLONOSCOPY N/A 06/08/2016    Procedure: COLONOSCOPY;  Surgeon: Shamar Barahona MD;  Location: [facility];  Service: Endoscopy;  Laterality: N/A; REPEAT IN 1 YEAR    CORONARY ANGIOPLASTY WITH STENT PLACEMENT  2003    x 2 RCA    PROSTATECTOMY  2002    UPPER GASTROINTESTINAL ENDOSCOPY  11/15/2016    Dr. Barahona       Review of patient's allergies indicates:   Allergen Reactions    Iodinated contrast media Rash    Pontocaine [tetracaine hcl] Anaphylaxis     hypotension     Family History       Problem Relation (Age of Onset)    Diabetes Son    Heart disease Father (56), Brother, Brother    Heart failure Father, Brother    Hypertension Son    Mental illness Brother    Migraines Mother    No Known Problems Son          Tobacco Use    Smoking status: Never    Smokeless tobacco: Never   Substance and Sexual Activity    Alcohol use: No    Drug use: No    Sexual activity: Not Currently     Review of Systems  Objective:     Vital Signs (Most Recent):  Temp: 97.4 °F (36.3 °C) (02/22/23 1149)  Pulse: 83 (02/22/23 1149)  Resp: 18 (02/22/23 1149)  BP: (!)  168/84 (02/22/23 1149)  SpO2: 97 % (02/22/23 1149)   Vital Signs (24h Range):  Temp:  [97.4 °F (36.3 °C)-98.9 °F (37.2 °C)] 97.4 °F (36.3 °C)  Pulse:  [83-88] 83  Resp:  [18-20] 18  SpO2:  [91 %-97 %] 97 %  BP: (154-168)/(63-91) 168/84     Weight:  (bed weight) (02/22/23 0432)  Body mass index is 24.15 kg/m².      Intake/Output Summary (Last 24 hours) at 2/22/2023 1516  Last data filed at 2/22/2023 1450  Gross per 24 hour   Intake 0 ml   Output 125 ml   Net -125 ml       Lines/Drains/Airways       Peripheral Intravenous Line  Duration                  Peripheral IV - Single Lumen 02/17/23 2346 20 G Left Antecubital 4 days                    Physical Exam  Physical Exam:  General- Patient alert and oriented x3 in NAD  HEENT- PERRLA, EOMI, OP clear, MMM  Neck- No JVD, Lymphadenopathy, Thyromegaly  CV- Regular rate and rhythm, No Murmur/bernadine/rubs  Resp- Lungs CTA Bilaterally, No increased WOB  GI- Non tender/non-distended, BS normoactive x4 quads, no HSM  Extrem- No cyanosis, clubbing, edema. Pulses 2+ and symmetric  Neuro- Strength 5/5 flexors/extensors, DTRs 2+ and symmetric, Intact sensation to light touch grossly    Significant Labs:  CBC:   Recent Labs   Lab 02/21/23  0409 02/22/23  0355   WBC 6.28 5.48   HGB 10.3* 10.8*   HCT 32.6* 34.6*    228     CMP:   Recent Labs   Lab 02/22/23  0355   GLU 89   CALCIUM 8.6*      K 3.8   CO2 25      BUN 36*   CREATININE 1.6*     Coagulation: No results for input(s): PT, INR, APTT in the last 48 hours.    Significant Imaging:  CT: I have reviewed all results within the past 24 hours and my personal findings are:  ab  IMPRESSION:  1. Consecutive mildly displaced right posterior rib fractures involving ribs 9-11. Cannot exclude additional rib fractures on the field-of-view. Recommend dedicated CT chest without contrast for further evaluation.  2. Moderate bilateral pleural effusions with complete collapse of the bilateral lower lobes within the  field-of-view.  3. No acute abdominal pelvic findings  Assessment/Plan:     Active Diagnoses:    Diagnosis Date Noted POA    PRINCIPAL PROBLEM:  CHF (congestive heart failure) [I50.9] 02/18/2023 Yes    Shortness of breath [R06.02] 02/22/2023 Yes    Pleural effusion [J90] 02/22/2023 Yes    Falls frequently [R29.6] 11/10/2022 Not Applicable    Dementia without behavioral disturbance [F03.90] 12/04/2019 Yes    Fall [W19.XXXA] 12/02/2014 Yes    HTN (hypertension), benign [I10]  Yes      Problems Resolved During this Admission:       89-year-old gentleman with a history of CHF coronary disease antiplatelet therapy with recent fall rib fractures and pleural effusions.  His dysphagia difficulty appears more proximal and he is working with speech therapy currently with plan for modified swallow study.  Has a incidental finding in 2017 of a lower esophageal ring however since symptoms appear to be more proximal in nature would be hesitant to have patient undergo sedation and EGD for minimal distal esophageal symptoms.  Okay to advance diet per speech therapy recommendations    Thank you for your consult. I will sign off. Please contact us if you have any additional questions.    Delicia Buchanan MD  Gastroenterology  [facility]

## 2023-02-22 NOTE — PT/OT/SLP PROGRESS
Speech Language Pathology Treatment    Patient Name:  Gene Hartman   MRN:  [id number]  Admitting Diagnosis: CHF (congestive heart failure)    Recommendations:     General Recommendations:  Dysphagia therapy, Modified barium swallow study to est baseline LOF for swallowing, GI consult to r/o esophageal phase dysphagia   Diet recommendations:  Puree, Liquid Diet Level: Mildly thick/Nectar thick liquids - IDDSI Level 2 (NO STRAWS)   Aspiration Precautions: Alternating small bites/sips, NO STRAWS, Assistance with meals and Assistance with thickening liquids, Feed only when awake/alert, Frequent oral care, HOB to 90 degrees, Meds crushed in puree, Monitor for s/s of aspiration, Standard aspiration precautions, and Wear oxygen during intake   General Precautions: Standard, aspiration, puree diet/NTL, fall  Communication strategies:  provide increased time to answer    Subjective     Pt pleasant, cooperative. Family present at b/s. Per pt's family, he has been unable to routinely take meds to tx Parkinson's. He was diagnosed with aspiration PNA during a previous encounter. However, results from his most recent CXR 2/20/2023, appear indicative of bilateral pleural effusion with bibasilar atelectasis.     Patient goals: To continue safe PO consumption.     Pain/Comfort:  Pain Rating 1: 0/10    Respiratory Status: Nasal cannula, flow 2 L/min    Objective:     Has the patient been evaluated by SLP for swallowing?   Yes  Keep patient NPO?   No  Current Respiratory Status:    Nasal cannula, flow 2 L/min    Swallowing:   Pt assessed with PO trials of ice chips, 3oz TL via spoon, cup and straw, 3 oz puree and 3 oz NTL via cup and straw. Across TL via cup/straw - frequent delayed cough response noted s/p, with NTL via straw delayed cough response was also noted s/p trials. Towards conclusion of trials, pt administered NTL via cup edge with functional tolerance noted, adequate performance also noted for puree trials.      Education  provided regarding pt's POC, safe swallow precautions, thickener use, also overt s/s of aspiration.      Assessment:     Gene Hartman is a 89 y.o. male with a dx of CHF (congestive heart failure) and medical hx of Parkinson's Disease. He presents with mild to moderate oropharyngeal dysphagia, as well as suspected esophageal phase dysphagia; pt c/o infrequent episodes of globus sensation s/p meals prior to admission. ST will follow for ongoing dysphagia tx, to monitor diet tolerance, and for ongoing swallow assessment to identify pt's LRD.       Goals:   Multidisciplinary Problems       SLP Goals          Problem: SLP    Goal Priority Disciplines Outcome   SLP Goal     SLP Ongoing, Progressing   Description: 1. Pt will tolerate 3oz TL w/o overt s/s of aspiration.   2. Pt will tolerate PO trials of varying consistencies/textures for identification of LRD w/o overt s/s of aspiration. - Met 2/22/23  3. Pt will tolerate puree diet/NTL (NO STRAWS) w/o overt s/s of aspiration.   4. Pt will complete MBSS for identification of LRD/liquids.                        Plan:     Patient to be seen:  3 x/week   Plan of Care expires:     Plan of Care reviewed with:  patient, family   SLP Follow-Up:  Yes       Discharge recommendations:      Barriers to Discharge:  Level of Skilled Assistance Needed      Time Tracking:     SLP Treatment Date:   02/22/23  Speech Start Time:  1121  Speech Stop Time:  1152     Speech Total Time (min):  31 min    Billable Minutes: Treatment Swallowing Dysfunction 16min and Self Care/Home Management Training 15min    02/22/2023

## 2023-02-22 NOTE — PLAN OF CARE
02/22/23 1528   Patient Assessment/Suction   Level of Consciousness (AVPU) alert   Respiratory Effort Normal;Unlabored   SALOMON Breath Sounds clear   PRE-TX-O2   Device (Oxygen Therapy) nasal cannula   $ Is the patient on Low Flow Oxygen? Yes   Flow (L/min) 2   SpO2 95 %   Pulse Oximetry Type Intermittent   $ Pulse Oximetry - Multiple Charge Pulse Oximetry - Multiple   Pulse 75   Resp 18   Aerosol Therapy   $ Aerosol Therapy Charges PRN treatment not required   Respiratory Treatment Status (SVN) PRN treatment not required   Education   $ Education Bronchodilator;15 min   Respiratory Evaluation   $ Care Plan Tech Time 15 min

## 2023-02-23 LAB
ANION GAP SERPL CALC-SCNC: 13 MMOL/L (ref 8–16)
BASOPHILS # BLD AUTO: 0.01 K/UL (ref 0–0.2)
BASOPHILS NFR BLD: 0.2 % (ref 0–1.9)
BUN SERPL-MCNC: 31 MG/DL (ref 8–23)
CALCIUM SERPL-MCNC: 8.2 MG/DL (ref 8.7–10.5)
CHLORIDE SERPL-SCNC: 103 MMOL/L (ref 95–110)
CO2 SERPL-SCNC: 24 MMOL/L (ref 23–29)
CREAT SERPL-MCNC: 1.4 MG/DL (ref 0.5–1.4)
DIFFERENTIAL METHOD: ABNORMAL
EOSINOPHIL # BLD AUTO: 0 K/UL (ref 0–0.5)
EOSINOPHIL NFR BLD: 0.2 % (ref 0–8)
ERYTHROCYTE [DISTWIDTH] IN BLOOD BY AUTOMATED COUNT: 14.8 % (ref 11.5–14.5)
EST. GFR  (NO RACE VARIABLE): 48 ML/MIN/1.73 M^2
GLUCOSE SERPL-MCNC: 79 MG/DL (ref 70–110)
HCT VFR BLD AUTO: 36 % (ref 40–54)
HGB BLD-MCNC: 11.4 G/DL (ref 14–18)
IMM GRANULOCYTES # BLD AUTO: 0.02 K/UL (ref 0–0.04)
IMM GRANULOCYTES NFR BLD AUTO: 0.4 % (ref 0–0.5)
LYMPHOCYTES # BLD AUTO: 1.2 K/UL (ref 1–4.8)
LYMPHOCYTES NFR BLD: 20.5 % (ref 18–48)
MAGNESIUM SERPL-MCNC: 2.4 MG/DL (ref 1.6–2.6)
MCH RBC QN AUTO: 32.8 PG (ref 27–31)
MCHC RBC AUTO-ENTMCNC: 31.7 G/DL (ref 32–36)
MCV RBC AUTO: 103 FL (ref 82–98)
MONOCYTES # BLD AUTO: 0.7 K/UL (ref 0.3–1)
MONOCYTES NFR BLD: 12.7 % (ref 4–15)
NEUTROPHILS # BLD AUTO: 3.7 K/UL (ref 1.8–7.7)
NEUTROPHILS NFR BLD: 66 % (ref 38–73)
NRBC BLD-RTO: 0 /100 WBC
PLATELET # BLD AUTO: 239 K/UL (ref 150–450)
PMV BLD AUTO: 11.1 FL (ref 9.2–12.9)
POTASSIUM SERPL-SCNC: 3.5 MMOL/L (ref 3.5–5.1)
RBC # BLD AUTO: 3.48 M/UL (ref 4.6–6.2)
SODIUM SERPL-SCNC: 140 MMOL/L (ref 136–145)
WBC # BLD AUTO: 5.6 K/UL (ref 3.9–12.7)

## 2023-02-23 PROCEDURE — 97166 OT EVAL MOD COMPLEX 45 MIN: CPT

## 2023-02-23 PROCEDURE — 94761 N-INVAS EAR/PLS OXIMETRY MLT: CPT

## 2023-02-23 PROCEDURE — 21400001 HC TELEMETRY ROOM

## 2023-02-23 PROCEDURE — 99900031 HC PATIENT EDUCATION (STAT)

## 2023-02-23 PROCEDURE — 83735 ASSAY OF MAGNESIUM: CPT | Performed by: INTERNAL MEDICINE

## 2023-02-23 PROCEDURE — 94618 PULMONARY STRESS TESTING: CPT

## 2023-02-23 PROCEDURE — 25000003 PHARM REV CODE 250: Performed by: INTERNAL MEDICINE

## 2023-02-23 PROCEDURE — 99233 PR SUBSEQUENT HOSPITAL CARE,LEVL III: ICD-10-PCS | Mod: ,,, | Performed by: INTERNAL MEDICINE

## 2023-02-23 PROCEDURE — 99900035 HC TECH TIME PER 15 MIN (STAT)

## 2023-02-23 PROCEDURE — 85025 COMPLETE CBC W/AUTO DIFF WBC: CPT | Performed by: INTERNAL MEDICINE

## 2023-02-23 PROCEDURE — 36415 COLL VENOUS BLD VENIPUNCTURE: CPT | Performed by: INTERNAL MEDICINE

## 2023-02-23 PROCEDURE — 97164 PT RE-EVAL EST PLAN CARE: CPT

## 2023-02-23 PROCEDURE — 99233 SBSQ HOSP IP/OBS HIGH 50: CPT | Mod: ,,, | Performed by: INTERNAL MEDICINE

## 2023-02-23 PROCEDURE — 63600175 PHARM REV CODE 636 W HCPCS: Performed by: INTERNAL MEDICINE

## 2023-02-23 PROCEDURE — 80048 BASIC METABOLIC PNL TOTAL CA: CPT | Performed by: INTERNAL MEDICINE

## 2023-02-23 PROCEDURE — 97535 SELF CARE MNGMENT TRAINING: CPT

## 2023-02-23 PROCEDURE — 27000221 HC OXYGEN, UP TO 24 HOURS

## 2023-02-23 PROCEDURE — 97530 THERAPEUTIC ACTIVITIES: CPT

## 2023-02-23 PROCEDURE — 92526 ORAL FUNCTION THERAPY: CPT

## 2023-02-23 RX ORDER — POTASSIUM CHLORIDE 20 MEQ/1
20 TABLET, EXTENDED RELEASE ORAL ONCE
Status: COMPLETED | OUTPATIENT
Start: 2023-02-23 | End: 2023-02-23

## 2023-02-23 RX ADMIN — HYDRALAZINE HYDROCHLORIDE 25 MG: 25 TABLET, FILM COATED ORAL at 04:02

## 2023-02-23 RX ADMIN — ESCITALOPRAM OXALATE 10 MG: 10 TABLET ORAL at 08:02

## 2023-02-23 RX ADMIN — AMIODARONE HYDROCHLORIDE 200 MG: 200 TABLET ORAL at 08:02

## 2023-02-23 RX ADMIN — POTASSIUM CHLORIDE 20 MEQ: 1500 TABLET, EXTENDED RELEASE ORAL at 08:02

## 2023-02-23 RX ADMIN — DIPHENHYDRAMINE HYDROCHLORIDE 25 MG: 50 INJECTION, SOLUTION INTRAMUSCULAR; INTRAVENOUS at 08:02

## 2023-02-23 RX ADMIN — AMLODIPINE BESYLATE 5 MG: 5 TABLET ORAL at 08:02

## 2023-02-23 RX ADMIN — SENNOSIDES AND DOCUSATE SODIUM 1 TABLET: 50; 8.6 TABLET ORAL at 08:02

## 2023-02-23 RX ADMIN — LEVOTHYROXINE SODIUM 75 MCG: 0.03 TABLET ORAL at 04:02

## 2023-02-23 RX ADMIN — PANTOPRAZOLE SODIUM 40 MG: 40 TABLET, DELAYED RELEASE ORAL at 08:02

## 2023-02-23 RX ADMIN — LIDOCAINE 1 PATCH: 50 PATCH CUTANEOUS at 09:02

## 2023-02-23 RX ADMIN — HEPARIN SODIUM 5000 UNITS: 5000 INJECTION INTRAVENOUS; SUBCUTANEOUS at 08:02

## 2023-02-23 RX ADMIN — Medication 6 MG: at 08:02

## 2023-02-23 RX ADMIN — ATORVASTATIN CALCIUM 40 MG: 40 TABLET, FILM COATED ORAL at 08:02

## 2023-02-23 RX ADMIN — MIRTAZAPINE 15 MG: 15 TABLET, FILM COATED ORAL at 08:02

## 2023-02-23 RX ADMIN — POLYETHYLENE GLYCOL 3350 17 G: 17 POWDER, FOR SOLUTION ORAL at 08:02

## 2023-02-23 NOTE — PT/OT/SLP EVAL
Occupational Therapy   Evaluation    Name: Gene Hartman  MRN: [id number]  Admitting Diagnosis: CHF (congestive heart failure)  Recent Surgery: * No surgery found *      Recommendations:     Discharge Recommendations: home health OT  Discharge Equipment Recommendations:  lift device  Barriers to discharge:  None    Assessment:     Gene Hartman is a 89 y.o. male with a medical diagnosis of CHF (congestive heart failure).  Pt agreeable to OT evaluation this AM. Performance deficits affecting function: weakness, impaired endurance, impaired self care skills, impaired balance, gait instability, impaired functional mobility, decreased upper extremity function, decreased lower extremity function, decreased safety awareness, pain, impaired cardiopulmonary response to activity.  [place] [facility] reported increased dizziness/lightheadedness and feelings of passing out. OT quickly transferred pt back to bed. Pt with resolution of symptoms once back supine. RN notified. Pt impulsive and requires cues to wait for OT instructions for safety.     Rehab Prognosis: Fair; patient would benefit from acute skilled OT services to address these deficits and reach maximum level of function.       Plan:     Patient to be seen 5 x/week to address the above listed problems via self-care/home management, therapeutic activities, therapeutic exercises  Plan of Care Expires: 03/23/23  Plan of Care Reviewed with: patient, family    Subjective     Chief Complaint: right rib pain  Patient/Family Comments/goals: to go home    Occupational Profile:  Living Environment: Pt lives with wife, son, and DIL (who are pt's and pt's wife primary caregivers). Pt has a walk-in tub and a raised toilet.   Previous level of function: Assist x 1 with dressing; assist x 1 with tub and toilet transfers; Independent/Setup assistance with bathing, feeding, toileting, and simple grooming tasks.  Roles and Routines: ; father  Equipment Used at Home:  walker, rolling, bedside commode, wheelchair, rollator (walk-in tub with seat)  Assistance upon Discharge: yes, from son and DIL    Pain/Comfort:  Pain Rating 1:  (not rated)  Location - Side 1: Right  Location 1: rib(s)  Pain Addressed 1: Reposition, Distraction, Cessation of Activity    Patients cultural, spiritual, Jain conflicts given the current situation:      Objective:     Communicated with: nursing prior to session.  Patient found HOB elevated with bed alarm, telemetry, peripheral IV, oxygen upon OT entry to room.    General Precautions: Standard, fall  Orthopedic Precautions: N/A  Braces: N/A  Respiratory Status: Nasal cannula, flow 3 L/min    Occupational Performance:    Bed Mobility:    Patient completed Supine to Sit with moderate assistance  Patient completed Sit to Supine with moderate assistance    Functional Mobility/Transfers:  Patient completed Sit <> Stand Transfer with moderate assistance  with  rolling walker   Patient completed Toilet Transfer Stand Pivot technique with maximal assistance with  initially RW, but pt with increased difficulty with t/f, so switched to stand pivot with HHA    Activities of Daily Living:  Grooming: setup assistance bed level       Cognitive/Visual Perceptual:  Cognitive/Psychosocial Skills:     -       Follows Commands/attention:Inattentive and Follows one-step commands  -       Communication: clear/fluent  -       Safety awareness/insight to disability: impaired   -       Mood/Affect/Coping skills/emotional control: Cooperative    Physical Exam:  Balance:    -       SBA seated balance; Max A standing balance  Upper Extremity Range of Motion:     -       Right Upper Extremity: WFL  -       Left Upper Extremity: WFL  Upper Extremity Strength:    -       Right Upper Extremity: WFL  -       Left Upper Extremity: WFL   Strength:    -       Right Upper Extremity: fair   -       Left Upper Extremity: fair   Gross motor coordination:   WFL    AMPA 6  Click ADL:  AMPAC Total Score: 16    Treatment & Education:  Pt educated on role of OT/POC, importance of OOB/EOB activity, use of call bell, and safety during ADLs, transfers, and functional mobility.    Patient left HOB elevated with all lines intact, call button in reach, bed alarm on, nursing notified, and family present    GOALS:   Multidisciplinary Problems       Occupational Therapy Goals          Problem: Occupational Therapy    Goal Priority Disciplines Outcome Interventions   Occupational Therapy Goal     OT, PT/OT     Description: Goals to be met by: 3/23/23     Patient will increase functional independence with ADLs by performing:    UE Dressing with Minimal Assistance.  LE Dressing with Minimum Assistance and Assistive Devices as needed.  Grooming while seated with Supervision.  Toileting from bedside commode with Minimal Assistance for hygiene and clothing management.   Toilet transfer to bedside commode with Minimal Assistance.                         History:     Past Medical History:   Diagnosis Date    Abnormal echocardiogram 11/21/2019    Normal left ventricular systolic function with estimated ejection fraction of 60%.  Grade 2 moderate left ventricular diastolic dysfunction consistent with pseudonormalization.  Mild left atrial enlargement.  Mild aortic regurgitation.  Mild to moderate mitral regurgitation.  Mild tricuspid regurgitation.  Estimated PA systolic pressure is 38 mm of mercury.  Diagnosis is syncope.    Acute combined systolic (congestive) and diastolic (congestive) heart failure 11/10/2022    Anxiety     Arthritis     CAD (coronary artery disease) age 68    Carotid artery occlusion     Cerebrovascular small vessel disease     Colon polyps age 78    Coronary artery disease of native artery of native heart with stable angina pectoris 5/6/2020    GERD (gastroesophageal reflux disease)     H. pylori infection     HTN (hypertension), benign age 65    Hyperlipidemia LDL goal <70 age 65     Hypothyroidism     Multiple fractures of ribs of right side 11/27/2012    Myocardial infarction     Pernicious anemia 1/26/2018    Primary insomnia 10/9/2018    Prostate cancer age 67    Subdural hematoma 9/5/2022    Syncopal episodes 3/2013    Urge incontinence 5/8/2018         Past Surgical History:   Procedure Laterality Date    CARDIAC PACEMAKER PLACEMENT  10/2015    [employer] Pacemaker    CARDIAC SURGERY      CATARACT EXTRACTION W/  INTRAOCULAR LENS IMPLANT Bilateral     COLONOSCOPY  ~2013    Dr. Edge    COLONOSCOPY N/A 06/08/2016    Procedure: COLONOSCOPY;  Surgeon: Shamar Barahona MD;  Location: [facility];  Service: Endoscopy;  Laterality: N/A; REPEAT IN 1 YEAR    CORONARY ANGIOPLASTY WITH STENT PLACEMENT  2003    x 2 RCA    PROSTATECTOMY  2002    UPPER GASTROINTESTINAL ENDOSCOPY  11/15/2016    Dr. Barahona       Time Tracking:     OT Date of Treatment: 02/23/23  OT Start Time: 0900  OT Stop Time: 0925  OT Total Time (min): 25 min    Billable Minutes:Evaluation 10  Self Care/Home Management 15    2/23/2023

## 2023-02-23 NOTE — CONSULTS
"[facility]  Neurology  Consult Note    Patient Name: Gene Hartman  MRN: [id number]  Admission Date: 2/17/2023  Hospital Length of Stay: 5 days  Code Status: Full Code   Attending Provider: Aman Begum MD   Consulting Provider: Roxy Chung DNP  Primary Care Physician: Mariajose Thorne MD  Principal Problem:CHF (congestive heart failure)    Inpatient consult to Neurology  Consult performed by: Roxy Chung DNP  Consult ordered by: Aman Begum MD      Subjective:     Chief Complaint:  s/p fall      HPI: as per EMR: The patient is an 89-year-old male, who presents emergency room after falling backwards from walker.  Patient states that he would lost balance and fell backwards hitting his head.  He denies loss of consciousness .  He is significant past medical history to include heart failure and Parkinson's disease.  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     Plan to admit patient gently diurese.  Consult Physical therapy for conditioning and safety ambulation.  Consult  potential for living situation.    NEUROLOGY CONSULT NOTE: Patient seen and examined this morning. He is alert and oriented x3, daughter at bedside. Patient's daughter assist with history and states patient is followed outpatient for Parkinson's disease by Dr. Edwin Kaur. She states patient has tried multiple Parkinson's medication in the past but states either they did not work or the patient experienced significant side effects. She states she does not remember all of the medication he has taken in the past but states he is currently taking Ativan nightly to assist with sleep. When asked if he has ever taken Sinemet daughters states " think so but it caused his BP to drop and then he passed out".     Past Medical History:   Diagnosis Date    Abnormal echocardiogram 11/21/2019    Normal left ventricular systolic function with estimated ejection " fraction of 60%.  Grade 2 moderate left ventricular diastolic dysfunction consistent with pseudonormalization.  Mild left atrial enlargement.  Mild aortic regurgitation.  Mild to moderate mitral regurgitation.  Mild tricuspid regurgitation.  Estimated PA systolic pressure is 38 mm of mercury.  Diagnosis is syncope.    Acute combined systolic (congestive) and diastolic (congestive) heart failure 11/10/2022    Anxiety     Arthritis     CAD (coronary artery disease) age 68    Carotid artery occlusion     Cerebrovascular small vessel disease     Colon polyps age 78    Coronary artery disease of native artery of native heart with stable angina pectoris 5/6/2020    GERD (gastroesophageal reflux disease)     H. pylori infection     HTN (hypertension), benign age 65    Hyperlipidemia LDL goal <70 age 65    Hypothyroidism     Multiple fractures of ribs of right side 11/27/2012    Myocardial infarction     Pernicious anemia 1/26/2018    Primary insomnia 10/9/2018    Prostate cancer age 67    Subdural hematoma 9/5/2022    Syncopal episodes 3/2013    Urge incontinence 5/8/2018       Past Surgical History:   Procedure Laterality Date    CARDIAC PACEMAKER PLACEMENT  10/2015    [employer] Pacemaker    CARDIAC SURGERY      CATARACT EXTRACTION W/  INTRAOCULAR LENS IMPLANT Bilateral     COLONOSCOPY  ~2013    Dr. Edge    COLONOSCOPY N/A 06/08/2016    Procedure: COLONOSCOPY;  Surgeon: Shamar Barahona MD;  Location: [facility];  Service: Endoscopy;  Laterality: N/A; REPEAT IN 1 YEAR    CORONARY ANGIOPLASTY WITH STENT PLACEMENT  2003    x 2 RCA    PROSTATECTOMY  2002    UPPER GASTROINTESTINAL ENDOSCOPY  11/15/2016    Dr. Barahona       Review of patient's allergies indicates:   Allergen Reactions    Iodinated contrast media Rash    Pontocaine [tetracaine hcl] Anaphylaxis     hypotension       Current Neurological Medications: atorvastatin    No current facility-administered medications on file prior to encounter.     Current  Outpatient Medications on File Prior to Encounter   Medication Sig    amiodarone (PACERONE) 200 MG Tab Take 1 tablet (200 mg total) by mouth once daily.    atorvastatin (LIPITOR) 40 MG tablet Take 1 tablet (40 mg total) by mouth once daily.    cetirizine (ZYRTEC) 10 MG tablet Take 1 tablet (10 mg total) by mouth once daily.    clopidogrel (PLAVIX) 75 mg tablet Take 1 tablet (75 mg total) by mouth once daily.    cyanocobalamin, vitamin B-12, (VITAMIN B-12) 5,000 mcg Subl Place 1 tablet under the tongue once daily.    cycloSPORINE (RESTASIS) 0.05 % ophthalmic emulsion Apply 1 drop to eye 2 (two) times daily.    fluticasone propionate (FLONASE) 50 mcg/actuation nasal spray USE 1 SPRAY IN EACH NOSTRIL TWICE DAILY (Patient taking differently: 1 spray by Each Nostril route 2 (two) times a day.)    furosemide (LASIX) 20 MG tablet Take 1 tablet (20 mg total) by mouth once daily.    levothyroxine (SYNTHROID) 75 MCG tablet Take 75 mcg by mouth before breakfast.    LORazepam (ATIVAN) 0.5 MG tablet Take 0.5 mg by mouth daily as needed.    losartan (COZAAR) 50 MG tablet Take 1 tablet (50 mg total) by mouth 2 (two) times daily.    melatonin 5 mg Subl Take 2 tablets by mouth nightly as needed.    metoprolol succinate (TOPROL-XL) 50 MG 24 hr tablet Take 1 tablet by mouth once daily.    mirtazapine (REMERON) 15 MG tablet Take 15 mg by mouth every evening.    pantoprazole (PROTONIX) 40 MG tablet Take 40 mg by mouth 2 (two) times daily.    albuterol (PROVENTIL) 2.5 mg /3 mL (0.083 %) nebulizer solution Inhale 2.5 mg into the lungs.    EScitalopram oxalate (LEXAPRO) 10 MG tablet Take 10 mg by mouth once daily.    HYDROcodone-acetaminophen (NORCO) 5-325 mg per tablet Take 1 tablet by mouth every 12 (twelve) hours as needed for Pain.    nitroGLYCERIN (NITROSTAT) 0.4 MG SL tablet Place 0.4 mg under the tongue every 5 (five) minutes as needed.      Family History       Problem Relation (Age of Onset)    Diabetes Son    Heart disease Father  (56), Brother, Brother    Heart failure Father, Brother    Hypertension Son    Mental illness Brother    Migraines Mother    No Known Problems Son          Tobacco Use    Smoking status: Never    Smokeless tobacco: Never   Substance and Sexual Activity    Alcohol use: No    Drug use: No    Sexual activity: Not Currently     Review of Systems   Constitutional:  Positive for activity change and fatigue.   HENT: Negative.     Eyes: Negative.    Respiratory: Negative.     Cardiovascular: Negative.    Gastrointestinal:  Positive for constipation.   Musculoskeletal:  Positive for arthralgias and gait problem.   Neurological:  Positive for tremors and weakness. Negative for dizziness, seizures, speech difficulty, numbness and headaches.   Objective:     Vital Signs (Most Recent):  Temp: 98.6 °F (37 °C) (02/23/23 0712)  Pulse: 96 (02/23/23 0712)  Resp: 18 (02/23/23 0712)  BP: (!) 175/81 (02/23/23 0712)  SpO2: 98 % (02/23/23 0712)   Vital Signs (24h Range):  Temp:  [97.4 °F (36.3 °C)-98.7 °F (37.1 °C)] 98.6 °F (37 °C)  Pulse:  [75-96] 96  Resp:  [18-19] 18  SpO2:  [92 %-98 %] 98 %  BP: (155-189)/(69-96) 175/81     Weight: 62.1 kg (136 lb 14.5 oz) (bed weight)  Body mass index is 25.04 kg/m².    Physical Exam  Vitals and nursing note reviewed.   HENT:      Head: Normocephalic.      Nose: Nose normal.      Mouth/Throat:      Mouth: Mucous membranes are moist.      Pharynx: Oropharynx is clear.   Eyes:      Extraocular Movements: Extraocular movements intact.      Conjunctiva/sclera: Conjunctivae normal.      Pupils: Pupils are equal, round, and reactive to light.   Cardiovascular:      Rate and Rhythm: Normal rate.   Pulmonary:      Effort: Pulmonary effort is normal.   Musculoskeletal:         General: Tenderness present.      Cervical back: Normal range of motion.   Skin:     General: Skin is warm and dry.   Neurological:      Mental Status: He is alert and oriented to person, place, and time.      Motor: Motor strength is  normal. Weakness present.      Coordination: Finger-Nose-Finger Test abnormal.   Psychiatric:         Speech: Speech normal.       NEUROLOGICAL EXAMINATION:     MENTAL STATUS   Oriented to person, place, and time.   Follows 1 step commands.   Attention: normal.   Speech: speech is normal   Level of consciousness: alert    CRANIAL NERVES     CN III, IV, VI   Pupils are equal, round, and reactive to light.    MOTOR EXAM   Muscle bulk: normal  Overall muscle tone: increased  Right arm tone: cogwheel rigidity  Left arm tone: cogwheel rigidity    Strength   Strength 5/5 throughout.     SENSORY EXAM   Light touch normal.     GAIT AND COORDINATION     Gait  Gait: (not testing)     Coordination   Finger to nose coordination: abnormal    Tremor   Resting tremor: present    Significant Labs: Hemoglobin A1c: No results for input(s): HGBA1C in the last 720 hours.  BMP:   Recent Labs   Lab 02/22/23  0355 02/23/23  0405   GLU 89 79    140   K 3.8 3.5    103   CO2 25 24   BUN 36* 31*   CREATININE 1.6* 1.4   CALCIUM 8.6* 8.2*   MG 2.2 2.4     CBC:   Recent Labs   Lab 02/22/23  0355 02/23/23  0405   WBC 5.48 5.60   HGB 10.8* 11.4*   HCT 34.6* 36.0*    239     CMP:   Recent Labs   Lab 02/22/23  0355 02/23/23  0405   GLU 89 79    140   K 3.8 3.5    103   CO2 25 24   BUN 36* 31*   CREATININE 1.6* 1.4   CALCIUM 8.6* 8.2*   MG 2.2 2.4   ANIONGAP 9 13     All pertinent lab results from the past 24 hours have been reviewed.    Significant Imaging: I have reviewed all pertinent imaging results/findings within the past 24 hours.  CT Head Without Contrast  Order: [id number]  Status: Final result     Visible to patient: Yes (seen)     Next appt: None     0 Result Notes  Details    Reading Physician Reading Date Result Priority   Mildred Cobos MD  [phone] 2/17/2023      Narrative & Impression  CT HEAD WITHOUT IV CONTRAST     CLINICAL STATEMENT:  fall     TECHNIQUE: Axial CT images from skull base to vertex  "without IV contrast. This exam was performed according to our departmental dose optimization program, and includes the following measures where applicable: automated exposure control, adjustment of the mAs and/or kVp according to patient size and/or exam, and an iterative reconstruction algorithm.     COMPARISON: CT scan of the brain without contrast November 9, 2022     FINDINGS:  There is no acute intracranial hemorrhage, mass, mass effect or abnormal extra-axial fluid collection. No evidence of an acute territorial infarct is identified. Age-related volume loss is again identified. There are scattered low density in the periventricular white matter. 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 No acute intracranial abnormality. There is no significant interval change.     Calvaria:  The skull base and calvaria demonstrate no abnormality.  Paranasal sinuses: Visualized portions of the orbits and paranasal sinuses are unremarkable.     IMPRESSION:  1.  No hemorrhage or mass lesion.  2.  Age-related volume loss with nonspecific white matter changes. 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 No significant interval change     Assessment and Plan:    Refractory Parkinson's Disease     CTH: negative for acute changes, chronic changes noted above     Per daughter's report patient has been tried on several medications. She "thinks" one of those medications was Sinemet but is unsure. Due to reported side effect of hypotension and syncope, I do not recommend starting a new medication until full medication review of previous Parkinson's medication has been completed. Patient's daughter states he has an upcoming appointment, therefore this can be completed outpatient.      Patient to follow up with [facility] at [phone] within 2 weeks from discharge.  Stroke education was provided including stroke risk factors modification and any acute neurological changes including weakness, confusion, " visual changes to come straight to the ER.  Seizure educaation was provided including no driving, no swimming by self, no operation of heavy machinery or climbing on ladders.  All side effects of new medications were discussed with patient and/or next of kin and all questions were answered.       Active Diagnoses:    Diagnosis Date Noted POA    PRINCIPAL PROBLEM:  CHF (congestive heart failure) [I50.9] 02/18/2023 Yes    Shortness of breath [R06.02] 02/22/2023 Yes    Pleural effusion [J90] 02/22/2023 Yes    Falls frequently [R29.6] 11/10/2022 Not Applicable    Dementia without behavioral disturbance [F03.90] 12/04/2019 Yes    Fall [W19.XXXA] 12/02/2014 Yes    HTN (hypertension), benign [I10]  Yes      Problems Resolved During this Admission:       VTE Risk Mitigation (From admission, onward)           Ordered     heparin (porcine) injection 5,000 Units  Every 12 hours         02/21/23 1024     IP VTE HIGH RISK PATIENT  Once         02/18/23 0555     Place sequential compression device  Until discontinued         02/18/23 0555                    Thank you for your consult. I will follow-up with patient. Please contact us if you have any additional questions.    Roxy Chung, ROSEMARIE  Neurology  [facility]

## 2023-02-23 NOTE — PROGRESS NOTES
[facility]  Adult Nutrition   Consult Note    SUMMARY     Recommendations  Recommendation/Intervention: 1. Continue current diet per SLP recommendations. 2. RD added ONS, Ensure Plus High Protein (to provide 1050 kcal/day and 60 g/day protein)  Goals: 1. Patient to meet at least 75% of estimated energy and protein needs.  Nutrition Goal Status: new, progressing towards goal  Communication of RD Recs: reviewed with RN    Dietitian Rounds Brief  Follow up. PO intake poor/fair. This is typical intake per patient and family. Decreased appetite. RD encouraged small frequent meals. Offered ONS, patient accepted. Pt family states patient takes premire protein at home throughout the day and with meds. Encouraged continuation of supplements after discharge to prevent malnutrition and weight loss.     Reason for Assessment  Reason For Assessment: RD follow-up  Diagnosis: other (see comments) (CHF (congestive heart failure))  Relevant Medical History: HTN (hypertension), benign, Hyperlipidemia LDL goal <70, CAD (coronary artery disease), Colon polyps, Multiple fractures of ribs of right side, Cerebrovascular small vessel disease, Syncopal episodes, Prostate cancer, GERD (gastroesophageal reflux disease), Myocardial infarction, Anxiety, Arthritis, Hypothyroidism, Carotid artery occlusion, H. pylori infection, Pernicious anemia, Urge incontinence, Primary insomnia, Coronary artery disease of native artery of native heart with stable angina pectoris, Abnormal echocardiogram    Nutrition Risk Screen  Nutrition Risk Screen: no indicators present     MST Score: 0  Have you recently lost weight without trying?: No  Weight loss score: 0  Have you been eating poorly because of a decreased appetite?: No  Appetite score: 0       Nutrition/Diet History  Spiritual, Cultural Beliefs, Spiritism Practices, Values that Affect Care: no  Food Allergies: other (see comments) (Iodinated contrast media)  Factors Affecting Nutritional  "Intake: decreased appetite    Anthropometrics  Temp: 98 °F (36.7 °C)  Height Method: Stated  Height: 5' 2" (157.5 cm)  Height (inches): 62 in  Weight Method: Bed Scale  Weight: 62.1 kg (136 lb 14.5 oz) (bed weight)  Weight (lb): 136.91 lb  Ideal Body Weight (IBW), Male: 118 lb  % Ideal Body Weight, Male (lb): 116.03 %  BMI (Calculated): 25  BMI Grade: 18.5-24.9 - normal       Weight History:  Wt Readings from Last 10 Encounters:   02/23/23 62.1 kg (136 lb 14.5 oz)   02/22/23 59.9 kg (132 lb 0.9 oz)   11/11/22 66.1 kg (145 lb 12.8 oz)   11/03/22 67.6 kg (149 lb)   10/09/22 64.9 kg (143 lb)   10/07/22 64.7 kg (142 lb 10.2 oz)   09/07/22 69.3 kg (152 lb 12.5 oz)   09/06/22 68.9 kg (152 lb)   07/26/22 68.5 kg (151 lb 0.2 oz)   07/25/22 68.5 kg (151 lb)       Lab/Procedures/Meds: Pertinent Labs Reviewed    Clinical Chemistry:  Recent Labs   Lab 02/17/23 2321 02/18/23  0834 02/23/23  0405      < > 140   K 4.4   < > 3.5      < > 103   CO2 24   < > 24   *   < > 79   BUN 28*   < > 31*   CREATININE 1.4   < > 1.4   CALCIUM 8.6*   < > 8.2*   PROT 7.3  --   --    ALBUMIN 3.4*  --   --    BILITOT 0.4  --   --    ALKPHOS 92  --   --    AST 28  --   --    ALT 32  --   --    ANIONGAP 9   < > 13   MG  --    < > 2.4    < > = values in this interval not displayed.       CBC:   Recent Labs   Lab 02/23/23  0405   WBC 5.60   RBC 3.48*   HGB 11.4*   HCT 36.0*      *   MCH 32.8*   MCHC 31.7*       Cardiac Profile:  Recent Labs   Lab 02/17/23 2321 02/19/23  1457   BNP 2,349* 1,128*       Vitamins:  Recent Labs   Lab 02/20/23  1641   [id number] 1019*         Medications: Pertinent Medications reviewed    Scheduled Meds:   amiodarone  200 mg Oral Daily    amLODIPine  5 mg Oral Daily    atorvastatin  40 mg Oral Daily    EScitalopram oxalate  10 mg Oral Daily    heparin (porcine)  5,000 Units Subcutaneous Q12H    levothyroxine  75 mcg Oral Before breakfast    LIDOcaine  1 patch Transdermal Q24H    mirtazapine  " 15 mg Oral QHS    pantoprazole  40 mg Oral BID       PRN Meds:.acetaminophen, albuterol, dextrose 10%, dextrose 10%, diphenhydrAMINE, glucagon (human recombinant), glucose, glucose, hydrALAZINE, melatonin, naloxone, nitroGLYCERIN, ondansetron, polyethylene glycol, senna-docusate 8.6-50 mg, simethicone, sodium chloride 0.9%, traMADoL    Estimated/Assessed Needs  Weight Used For Calorie Calculations: 60 kg (132 lb 4.4 oz)  Energy Calorie Requirements (kcal): 4397-0928 kcals/day (25-30 kcals/kg ABW)  Energy Need Method: Kcal/kg  Protein Requirements:  g/day (1.5-2 g/kg IBW)  Weight Used For Protein Calculations: 53 kg (116 lb 13.5 oz)     Estimated Fluid Requirement Method: RDA Method  RDA Method (mL): 1500       Nutrition Prescription Ordered  Current Diet Order: Dysphagia IDSI level 4; nectar thick    Evaluation of Received Nutrient/Fluid Intake  Energy Calories Required: not meeting needs  Protein Required: not meeting needs  Fluid Required: meeting needs  Tolerance: tolerating   No intake or output data in the 24 hours ending 02/23/23 1635   % Intake of Estimated Energy Needs: 25 - 50 %  % Meal Intake: 25 - 50 %    Nutrition Risk  Level of Risk/Frequency of Follow-up: moderate - high     Monitor and Evaluation  Food and Nutrient Intake: energy intake, food and beverage intake  Food and Nutrient Adminstration: diet order  Knowledge/Beliefs/Attitudes: food and nutrition knowledge/skill  Physical Activity and Function: nutrition-related ADLs and IADLs, factors affecting access to physical activity  Anthropometric Measurements: weight, weight change, body mass index  Biochemical Data, Medical Tests and Procedures: electrolyte and renal panel, inflammatory profile, gastrointestinal profile, lipid profile, glucose/endocrine profile  Nutrition-Focused Physical Findings: overall appearance     Nutrition Follow-Up  RD Follow-up?: Yes    Margarita Canales RD 02/23/2023 4:37 PM

## 2023-02-23 NOTE — PLAN OF CARE
Problem: Oral Intake Inadequate  Goal: Improved Oral Intake  Outcome: Ongoing, Progressing  Intervention: Promote and Optimize Oral Intake  Flowsheets (Taken 2/23/2023 1632)  Oral Nutrition Promotion: calorie-dense liquids provided

## 2023-02-23 NOTE — CARE UPDATE
02/23/23 1012   Home Oxygen Qualification   $ Home O2 Qualification Pulmonary Stress Test/6 min walk;Tech time 15 minutes   Room Air SpO2 At Rest 94 %   Room Air SpO2 During Ambulation (!) 86 %   SpO2 During Ambulation on O2 96 %   Heart Rate on O2 97 bpm   Ambulation O2 LPM 3 LPM   SpO2 Post Ambulation 98 %   Post Ambulation Heart Rate 98 bpm   Post Ambulation O2 LPM 3 LPM   Home O2 Eval Comments PT QUALIFIES FOR 3LPM HOME O2

## 2023-02-23 NOTE — PROGRESS NOTES
[facility]  Progress Note    Patient Name: Gene Hartman  MRN: [id number]  Patient Class: IP- Inpatient   Admission Date: 2/17/2023  Length of Stay: 4 days  Attending Physician: Aman Begum MD  Primary Care Provider: Mariajose Thorne MD        Subjective:     Principal Problem:CHF (congestive heart failure)        HPI:  The patient is an 89-year-old male, who presents emergency room after falling backwards from walker.  Patient states that he would lost balance and fell backwards hitting his head.  He denies loss of consciousness .  He is significant past medical history to include heart failure and Parkinson's disease.  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    Plan to admit patient gently diurese.  Consult Physical therapy for conditioning and safety ambulation.  Consult  potential for living situation.      Overview/Hospital Course:  02/22/2023 pain is improved today able to move around without discomfort.        Physical Exam  Vitals and nursing note reviewed.   Constitutional:       Appearance:  No apparent distress  HENT:      Head: Normocephalic and atraumatic.   Eyes:      Pupils: Pupils are equal, round, and reactive to light.   Cardiovascular:      Rate and Rhythm: Normal rate and regular rhythm.   Pulmonary:      Comments: Moderate air movement  Pain with deep inspiration, pain on palpation of bilateral rib fractures.  Abdominal:      General: Bowel sounds are normal.      Palpations: Abdomen is soft.   Skin:     General: Skin is warm and dry.      Capillary Refill: Capillary refill takes less than 2 seconds.   Neurological:      General: No focal deficit present.      Mental Status: He is alert.       Assessment/Plan:      VTE Risk Mitigation (From admission, onward)           Ordered     heparin (porcine) injection 5,000 Units  Every 12 hours         02/21/23 1024     IP VTE HIGH RISK PATIENT   Once         02/18/23 0555     Place sequential compression device  Until discontinued         02/18/23 0555                      Shortness of breaths secondary to Acute decompensated systolic CHF and bilateral pleural effusions, mildly displaced right posterior rib fractures involving ribs 9-11  Continue excellent telemetry nursing care  EF 30%  Cardiology consult appreciated  Lasix per Cardiology, Dobutrex  Pulmonology may do thoracentesis Friday after 5 day Plavix washout  Speech therapy cleared for pureed diet  Cardiothoracic surgery recommends nonoperative management      Frequent falls in a Parkinson's disease patient  Frequent falls  Restart PT evaluation  Consider living situation  Will check orthostatics  Neurology consult-reportedly has failed all other Parkinson's meds and may be wheelchair-bound      Dementia without behavioral disturbance  Pleasant dementia  Probably vascular    KAREN  hold losartan  Albumin  Avoid nephrotoxic agents        HTN (hypertension), benign  Monitor and treat    Heparin subQ          Aman Begum MD  Department of Hospital Medicine   [facility], [facility]

## 2023-02-23 NOTE — PT/OT/SLP PROGRESS
Speech Language Pathology Treatment    Patient Name:  Gene Hartman   MRN:  [id number]  Admitting Diagnosis: CHF (congestive heart failure)    Recommendations:                 General Recommendations:  Dysphagia therapy and Modified barium swallow study  Diet recommendations:  Puree, Liquid Diet Level: Mildly thick/Nectar thick liquids - IDDSI Level 2 (NO STRAWS)   Aspiration Precautions:  Alternating small bites/sips, NO STRAWS, Assistance with meals and Assistance with thickening liquids, Feed only when awake/alert, Frequent oral care, HOB to 90 degrees, Meds crushed in puree, Monitor for s/s of aspiration, Standard aspiration precautions, and Wear oxygen during intake     General Precautions: Standard, aspiration  Communication strategies:  provide increased time to answer    Subjective     Pt awake laying in bed w/ wife and daughter at bedside. Pt required encouragement to participate in ST, as he was not willing to eat. Family encouragement lead to pt participation.  Patient goals: to get out of the hospital     Pain/Comfort:       Respiratory Status: Nasal cannula, flow 3 L/min    Objective:     Has the patient been evaluated by SLP for swallowing?      Keep patient NPO?     Current Respiratory Status:        Pt observed during SLP regulated sips thin and NT liquids via cup edge and straw and tsp bites pureed lunch tray. Pt w/ adequate oral clearance and timely swallow initiation during pureed trials. Delayed swallow initiation noted during thin liquid trials via cup edge; absent pharyngeal swallow during straw sips thin liquids. Suspected silent aspiration during thin liquids via straw, as no laryngeal elevation/excursion palpated and pt reporting that he had swallowed (opening mouth to prove liquid was no longer in oral cavity). Pt cued to cough and swallow multiple times. Throat clearing noted w/ NTL via cup edge. Family was educated re findings during swallow tx, aspiration risks, overt s/s aspiration,  possible silent aspiration, and recs for MBSS. Pt's family reporting pt was scheduled for outpt MBSS; however, pt was hospitalized prior to completing MBSS. Pt's family would like to have MBSS completed.    Assessment:     Gene Hartman is a 89 y.o. male with an SLP diagnosis of s/s oropharyngeal Dysphagia of unknown severity.  He presents with delayed pharyngeal swallow initiation w/ liquids and overt s/s aspiration. MBSS has been recommended by previous SLP and medical professionals, per family. Family would like MBSS completed during current admit. Continue rec MBSS and current diet. ST to f/u re diet tolerance and MBSS.    Goals:   Multidisciplinary Problems       SLP Goals          Problem: SLP    Goal Priority Disciplines Outcome   SLP Goal     SLP Ongoing, Progressing   Description: 1. Pt will tolerate 3oz TL w/o overt s/s of aspiration.   2. Pt will tolerate PO trials of varying consistencies/textures for identification of LRD w/o overt s/s of aspiration. - Met 2/22/23  3. Pt will tolerate puree diet/NTL (NO STRAWS) w/o overt s/s of aspiration.   4. Pt will complete MBSS for identification of LRD/liquids.                        Plan:     Patient to be seen:  4 x/week   Plan of Care expires:     Plan of Care reviewed with:  patient, family, other (see comments) (nursing, MD)   SLP Follow-Up:  Yes       Discharge recommendations:      Barriers to Discharge:   MBSS    Time Tracking:     SLP Treatment Date:   02/23/23  Speech Start Time:  1214  Speech Stop Time:  1234     Speech Total Time (min):  20 min    Billable Minutes: Treatment Swallowing Dysfunction 20 min    02/23/2023
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
## 2023-02-23 NOTE — PLAN OF CARE
02/23/23 0944   Discharge Reassessment   Assessment Type Discharge Planning Reassessment   Did the patient's condition or plan change since previous assessment? No   Discharge Plan discussed with: Adult children   Communicated PARDEEP with patient/caregiver Yes   Discharge Plan A Home with family;Home Health   Discharge Plan B Home Health   Discharge Barriers Identified None   Why the patient remains in the hospital Requires continued medical care   Post-Acute Status   Post-Acute Authorization Home Health   Home Health Status Pending medical clearance/testing   Discharge Delays None known at this time     Case Management reviewing charts. Current discharge plan is home with resumption of home health with [facility]. Family working on getting halley-lift for home.     Per chart review, Pulmonology planning for possible thoracentesis on tomorrow. No other discharge needs noted at this time. CM to follow

## 2023-02-23 NOTE — PROGRESS NOTES
[facility]   Cardiology Note    Consult Requested By: SOFIA  Reason for Consult: CHF    SUBJECTIVE:     History of Present Illness:The pt is 87y/o M with pmh CAD, afib, Parkinson's, multiple falls with h/o subdural hematoma, rib fracture, dementia, and DDD PM. The pt presented to ER after falling bach wards from walker. He states he lost balance.     2/22 - The pt is lying in bed with no c/o pain. Denies CP but states ongoing SOB. VSS, u/o 50cc, but family states has had increased urine output.     2/23- No events overnight. Patient states shortness of breath is much improved. Denies chest pain or edema. VSS. Labs reviewed, Cr 1.4. CXR showed improving pleural effusions. Pulmonology following, plan for possible thoracentesis Friday. Tele reviewed.     Review of patient's allergies indicates:   Allergen Reactions    Iodinated contrast media Rash    Pontocaine [tetracaine hcl] Anaphylaxis     hypotension       Past Medical History:   Diagnosis Date    Abnormal echocardiogram 11/21/2019    Normal left ventricular systolic function with estimated ejection fraction of 60%.  Grade 2 moderate left ventricular diastolic dysfunction consistent with pseudonormalization.  Mild left atrial enlargement.  Mild aortic regurgitation.  Mild to moderate mitral regurgitation.  Mild tricuspid regurgitation.  Estimated PA systolic pressure is 38 mm of mercury.  Diagnosis is syncope.    Acute combined systolic (congestive) and diastolic (congestive) heart failure 11/10/2022    Anxiety     Arthritis     CAD (coronary artery disease) age 68    Carotid artery occlusion     Cerebrovascular small vessel disease     Colon polyps age 78    Coronary artery disease of native artery of native heart with stable angina pectoris 5/6/2020    GERD (gastroesophageal reflux disease)     H. pylori infection     HTN (hypertension), benign age 65    Hyperlipidemia LDL goal <70 age 65    Hypothyroidism     Multiple fractures of ribs of right side  11/27/2012    Myocardial infarction     Pernicious anemia 1/26/2018    Primary insomnia 10/9/2018    Prostate cancer age 67    Subdural hematoma 9/5/2022    Syncopal episodes 3/2013    Urge incontinence 5/8/2018     Past Surgical History:   Procedure Laterality Date    CARDIAC PACEMAKER PLACEMENT  10/2015    [employer] Pacemaker    CARDIAC SURGERY      CATARACT EXTRACTION W/  INTRAOCULAR LENS IMPLANT Bilateral     COLONOSCOPY  ~2013    Dr. Edge    COLONOSCOPY N/A 06/08/2016    Procedure: COLONOSCOPY;  Surgeon: Shamar Barahona MD;  Location: [facility];  Service: Endoscopy;  Laterality: N/A; REPEAT IN 1 YEAR    CORONARY ANGIOPLASTY WITH STENT PLACEMENT  2003    x 2 RCA    PROSTATECTOMY  2002    UPPER GASTROINTESTINAL ENDOSCOPY  11/15/2016    Dr. Barahona     Family History   Problem Relation Age of Onset    Migraines Mother     Heart failure Father     Heart disease Father 56        MI    Heart failure Brother     Heart disease Brother     Diabetes Son     Hypertension Son     Heart disease Brother     Mental illness Brother     No Known Problems Son     Colon cancer Neg Hx     Colon polyps Neg Hx     Crohn's disease Neg Hx     Ulcerative colitis Neg Hx     Stomach cancer Neg Hx     Esophageal cancer Neg Hx      Social History     Tobacco Use    Smoking status: Never    Smokeless tobacco: Never   Substance Use Topics    Alcohol use: No    Drug use: No       Review of Systems:  Review of Systems   Constitutional:  Positive for malaise/fatigue. Negative for chills, diaphoresis and fever.   Respiratory:  Positive for cough, sputum production and shortness of breath.    Cardiovascular:  Negative for chest pain, palpitations, orthopnea, claudication, leg swelling and PND.   Genitourinary:  Negative for dysuria, frequency, hematuria and urgency.   Musculoskeletal:  Positive for falls.        Pain in ribs   Neurological:  Positive for weakness. Negative for dizziness and headaches.     OBJECTIVE:     Vital Signs (Most  Recent)  Temp: 98.6 °F (37 °C) (02/23/23 0712)  Pulse: 96 (02/23/23 0712)  Resp: 18 (02/23/23 0712)  BP: (!) 175/81 (02/23/23 0712)  SpO2: 98 % (02/23/23 0712)    Vital Signs Range (Last 24H):  Temp:  [97.4 °F (36.3 °C)-98.7 °F (37.1 °C)]   Pulse:  [75-96]   Resp:  [18-19]   BP: (155-189)/(69-96)   SpO2:  [92 %-98 %]     I & O (Last 24H):    Intake/Output Summary (Last 24 hours) at 2/23/2023 0911  Last data filed at 2/22/2023 1450  Gross per 24 hour   Intake 0 ml   Output 75 ml   Net -75 ml         Current Diet:     Current Diet Order   Procedures    Diet Dysphagia Pureed (IDDSI Level 4) Nectar Thick     Order Specific Question:   Fluid consistency:     Answer:   Nectar Thick        Allergies:  Review of patient's allergies indicates:   Allergen Reactions    Iodinated contrast media Rash    Pontocaine [tetracaine hcl] Anaphylaxis     hypotension       Meds:  Scheduled Meds:   amiodarone  200 mg Oral Daily    amLODIPine  5 mg Oral Daily    atorvastatin  40 mg Oral Daily    EScitalopram oxalate  10 mg Oral Daily    heparin (porcine)  5,000 Units Subcutaneous Q12H    levothyroxine  75 mcg Oral Before breakfast    LIDOcaine  1 patch Transdermal Q24H    mirtazapine  15 mg Oral QHS    pantoprazole  40 mg Oral BID     Continuous Infusions:   DOBUTamine IV infusion (non-titrating) 2.5 mcg/kg/min (02/22/23 1300)     PRN Meds:acetaminophen, albuterol, dextrose 10%, dextrose 10%, diphenhydrAMINE, glucagon (human recombinant), glucose, glucose, hydrALAZINE, melatonin, naloxone, nitroGLYCERIN, ondansetron, polyethylene glycol, senna-docusate 8.6-50 mg, simethicone, sodium chloride 0.9%, traMADoL    Oxygen/Ventilator Data (Last 24H):  (if applicable)        Hemodynamic Parameters (Last 24H):   (if applicable)        Laboratory and Radiology Data:  Recent Results (from the past 24 hour(s))   Echo    Collection Time: 02/22/23 12:39 PM   Result Value Ref Range    BSA 1.62 m2    TDI SEPTAL 0.05 m/s    LV LATERAL E/E' RATIO 12.50 m/s     LV SEPTAL E/E' RATIO 10.00 m/s    IVC diameter 1.44 cm    Left Ventricular Outflow Tract Mean Velocity 0.52 cm/s    Left Ventricular Outflow Tract Mean Gradient 1.00 mmHg    Pulmonary Valve Mean Velocity 1.04 m/s    AORTIC VALVE CUSP SEPERATION 1.10 cm    TDI LATERAL 0.04 m/s    PV PEAK VELOCITY 1.51 cm/s    LVIDd 5.29 3.5 - 6.0 cm    IVS 0.89 0.6 - 1.1 cm    Posterior Wall 0.89 0.6 - 1.1 cm    Ao root annulus 3.30 cm    LVIDs 8.44 (A) 2.1 - 4.0 cm    FS -60 28 - 44 %    Ascending aorta 2.90 cm    LV mass 171.46 g    LA size 3.60 cm    RVDD 1.95 cm    TAPSE 1.83 cm    RV S' 0.01 cm/s    Left Ventricle Relative Wall Thickness 0.34 cm    AV regurgitation pressure 1/2 time 295 ms    AV mean gradient 11 mmHg    AV valve area 1.42 cm2    AV Velocity Ratio 0.35     AV index (prosthetic) 0.37     MV mean gradient 2 mmHg    MV valve area by continuity eq 2.12 cm2    E/A ratio 0.42     Mean e' 0.05 m/s    E wave deceleration time 203.00 msec    IVRT 92.00 msec    LVOT diameter 2.20 cm    LVOT area 3.8 cm2    LVOT peak talha 0.78 m/s    LVOT peak VTI 15.10 cm    Ao peak talha 2.26 m/s    Ao VTI 40.3 cm    LVOT stroke volume 57.37 cm3    AV peak gradient 20 mmHg    MV peak gradient 6 mmHg    E/E' ratio 11.11 m/s    MV Peak E Talha 0.50 m/s    AR Max Talha 2.26 m/s    TR Max Talha 2.35 m/s    MV VTI 27.1 cm    MV Peak A Talha 1.18 m/s    LV Systolic Volume 84.40 mL    LV Systolic Volume Index 52.8 mL/m2    LV Diastolic Volume 135.00 mL    LV Diastolic Volume Index 84.38 mL/m2    LV Mass Index 107 g/m2    Triscuspid Valve Regurgitation Peak Gradient 22 mmHg    LA Volume Index (Mod) 29.5 mL/m2    LA volume (mod) 47.20 cm3   Basic Metabolic Panel (BMP)    Collection Time: 02/23/23  4:05 AM   Result Value Ref Range    Sodium 140 136 - 145 mmol/L    Potassium 3.5 3.5 - 5.1 mmol/L    Chloride 103 95 - 110 mmol/L    CO2 24 23 - 29 mmol/L    Glucose 79 70 - 110 mg/dL    BUN 31 (H) 8 - 23 mg/dL    Creatinine 1.4 0.5 - 1.4 mg/dL    Calcium 8.2  (L) 8.7 - 10.5 mg/dL    Anion Gap 13 8 - 16 mmol/L    eGFR 48.0 (A) >60 mL/min/1.73 m^2   Magnesium    Collection Time: 02/23/23  4:05 AM   Result Value Ref Range    Magnesium 2.4 1.6 - 2.6 mg/dL   CBC with Automated Differential    Collection Time: 02/23/23  4:05 AM   Result Value Ref Range    WBC 5.60 3.90 - 12.70 K/uL    RBC 3.48 (L) 4.60 - 6.20 M/uL    Hemoglobin 11.4 (L) 14.0 - 18.0 g/dL    Hematocrit 36.0 (L) 40.0 - 54.0 %     (H) 82 - 98 fL    MCH 32.8 (H) 27.0 - 31.0 pg    MCHC 31.7 (L) 32.0 - 36.0 g/dL    RDW 14.8 (H) 11.5 - 14.5 %    Platelets 239 150 - 450 K/uL    MPV 11.1 9.2 - 12.9 fL    Immature Granulocytes 0.4 0.0 - 0.5 %    Gran # (ANC) 3.7 1.8 - 7.7 K/uL    Immature Grans (Abs) 0.02 0.00 - 0.04 K/uL    Lymph # 1.2 1.0 - 4.8 K/uL    Mono # 0.7 0.3 - 1.0 K/uL    Eos # 0.0 0.0 - 0.5 K/uL    Baso # 0.01 0.00 - 0.20 K/uL    nRBC 0 0 /100 WBC    Gran % 66.0 38.0 - 73.0 %    Lymph % 20.5 18.0 - 48.0 %    Mono % 12.7 4.0 - 15.0 %    Eosinophil % 0.2 0.0 - 8.0 %    Basophil % 0.2 0.0 - 1.9 %    Differential Method Automated      Imaging Results              X-Ray Hip 2 or 3 views Right (with Pelvis when performed) (Final result)  Result time 02/18/23 06:17:26      Final result by Carmelo Chan MD (02/18/23 06:17:26)                   Narrative:    Reason: Pain status post fall.    FINDINGS:    AP pelvis and 2 views of the right hip show no fracture, dislocation, or destructive osseous lesion. There is mild bilateral hip joint space narrowing with osteophytosis. Pelvic enthesopathy noted. The bones appear osteopenic. No gross soft tissue abnormality.    IMPRESSION:    Degenerative changes of the pelvis and hips. No acute osseous abnormality.    Electronically signed by:  Carmelo Chan DO  2/18/2023 6:17 AM [facility] Workstation: [id number]                                     CT Chest Without Contrast (Final result)  Result time 02/17/23 23:54:09      Final result by Richard Cisneros MD (02/17/23 23:54:09)                    Narrative:      INDICATION: fall    EXAMINATION: CT CHEST WITHOUT CONTRAST - CT CHEST WITHOUT IV CONTRAST    TECHNIQUE:  Helically acquired images were obtained of the chest. A radiation dose optimization technique was used for this scan.  IV Contrast dosage and agent: None.    COMPARISON: None.  ____________________________________________    FINDINGS:    LUNGS, PLEURA AND LARGE AIRWAYS: There is bilateral pulmonary edema with basilar atelectasis. There are moderate bilateral pleural effusions. There is no evidence of pneumothorax.    SOFT TISSUES: There is no chest wall hematoma or soft tissue gas.    HEART AND PERICARDIUM: Heart appears mildly prominent. There is no pericardial effusion.    VESSELS: Aorta and pulmonary artery are normal in caliber There is no evidence of aneurysm.    MEDIASTINUM AND IMANI: No evidence of mediastinal hematoma. No mass or adenopathy. No hiatal hernia.    UPPER ABDOMEN: There appears be a partially visualizes exophytic lesion in the midpole left kidney. This appears soft tissue density. Renal mass is not excluded. This measures 2.2 cm. Significance in this 89-year-old patient is doubtful.    BONES: No obvious acute fracture.    There is motion does limit evaluation of the ribs. Healed rib fractures are noted on the right.        IMPRESSION:    1.  No evidence of acute visceral, vascular, or skeletal injury.  2.  A small follicle 2.2 cm lesion partially visualized left kidney. Significance is uncertain.  3.  Bilateral pleural effusions with pulmonary edema consistent with mild CHF.        Electronically signed by:  Richard Cisneros MD  2/17/2023 11:54 PM [facility] Workstation: [id number]                                     CT Head Without Contrast (Final result)  Result time 02/17/23 23:53:03      Final result by Mildred Cobos MD (02/17/23 23:53:03)                   Narrative:    CT HEAD WITHOUT IV CONTRAST    CLINICAL STATEMENT:  fall    TECHNIQUE: Axial CT images from skull  base to vertex without IV contrast. This exam was performed according to our departmental dose optimization program, and includes the following measures where applicable: automated exposure control, adjustment of the mAs and/or kVp according to patient size and/or exam, and an iterative reconstruction algorithm.    COMPARISON: CT scan of the brain without contrast November 9, 2022    FINDINGS:  There is no acute intracranial hemorrhage, mass, mass effect or abnormal extra-axial fluid collection. No evidence of an acute territorial infarct is identified. Age-related volume loss is again identified. There are scattered low density in the periventricular white matter. 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 No acute intracranial abnormality. There is no significant interval change.    Calvaria:  The skull base and calvaria demonstrate no abnormality.  Paranasal sinuses: Visualized portions of the orbits and paranasal sinuses are unremarkable.    IMPRESSION:  1.  No hemorrhage or mass lesion.  2.  Age-related volume loss with nonspecific white matter changes. 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 No significant interval change.    Electronically signed by:  Mildred Cobos MD  2/17/2023 11:53 PM [facility] Workstation: [id number]                                     CT Cervical Spine Without Contrast (Final result)  Result time 02/17/23 23:56:07      Final result by Mildred Cobos MD (02/17/23 23:56:07)                   Narrative:      CT CERVICAL SPINE WITHOUT IV CONTRAST    CLINICAL STATEMENT:  fall    TECHNIQUE: Noncontrast cervical spine CT with sagittal and coronal reconstructions.    This exam was performed according to our departmental dose optimization program, and includes the following measures where applicable: automated exposure control, adjustment of the mAs and/or kVp according to patient size and/or exam, and an iterative reconstruction algorithm.    COMPARISON: CT scan of the cervical  spine without contrast November 9, 2022    FINDINGS:  General: Cervical spine is visualized from the skull base through T1 . Straightening of the normal cervical lordosis is seen in the patient's head is tilted anteriorly. The alignment is similar to the previous exam. There is diffuse disc height narrowing throughout the cervical spine. No uncovering of the facets. On the left there is fusion of the C4-5 facets. No acute cervical spine fracture.    Lung apices are clear. The airway is patent. Prevertebral soft tissues are unremarkable. Scattered vascular calcifications..    C1-2: Narrowing of the preodontoid space with osteophyte formation the remainder the cervical spine is difficult to assess on the axial images secondary to positioning of the patient. There is extensive facet arthropathy and uncovertebral hypertrophy which is unchanged.    IMPRESSION:  1.  No acute fracture.  2.  Multilevel degenerative disc disease of the cervical spine. The alignment is similar to the previous exam.    Electronically signed by:  Mildred Cobos MD  2/17/2023 11:56 PM [facility] Workstation: [id number]                                    12-lead EKG interpretation:  (if applicable)      Current Cardiac Rhythm:   (if applicable)    Physical Exam:   Physical Exam  Constitutional:       Appearance: Normal appearance.   Cardiovascular:      Rate and Rhythm: Normal rate and regular rhythm.      Pulses: Normal pulses.      Heart sounds: Murmur heard.   Abdominal:      General: Abdomen is flat. Bowel sounds are normal.      Palpations: Abdomen is soft.   Musculoskeletal:         General: No swelling.   Skin:     General: Skin is warm and dry.   Neurological:      Mental Status: He is alert and oriented to person, place, and time. Mental status is at baseline.   Psychiatric:         Mood and Affect: Mood normal.         Behavior: Behavior normal.       ASSESSMENT/PLAN:   Assessment:     Xray hip--no acute fx  CT of spin no acute fx   CT-of chest  --bilateral pleural effusions  BNP 1128  Trop 29.7  TSH WNL  Rib fx--assess by CTS--conservative management recommended  2/21--u/s of chest-small bilateral pleural effusions    H/H 10.8/34.6  Creatinine down to 1.6  SR in 80's on tele   7/2022 echo--EF 45-50% Grade I DD, LVH, inf-apical hypokinesis, PA pressure 38 mmhg   Echo --EF 45% Grade I DD mild AI/MR mod TR, PA pressure 38 mmhg     HFrEF  Parkinson's   Repeated falls --rib fx   pAF  Pacemaker  CAD  HTN  Dementia  KAREN/CKD     Plan:   Plavix on hold for possible thoracentesis Friday-pulm following. CXR showed improving pleural effusions. If thoracentesis is cancelled he can restart plavix.   D/c dobutamine.   Discussed home lasix dose with family. They were giving him 20 mg lasix PRN for LE edema. Discussed taking lasix 20 mg 2-3 times per week and they can increase the frequency if the shortness of breath or LE edema worsens.   Renal function improved, Cr 1.4.   Continue to Monitor renal function and electrolytes.  Strict I/O  Lasix 20 mg PO 2-3 times per week.   Patient can be discharged from a cardiac standpoint.

## 2023-02-23 NOTE — PLAN OF CARE
02/23/23 1014   Patient Assessment/Suction   Level of Consciousness (AVPU) alert   Respiratory Effort Normal;Unlabored   All Lung Fields Breath Sounds clear   PRE-TX-O2   Device (Oxygen Therapy) nasal cannula   $ Is the patient on Low Flow Oxygen? Yes   Flow (L/min) 3   SpO2 98 %   Pulse Oximetry Type Intermittent   $ Pulse Oximetry - Multiple Charge Pulse Oximetry - Multiple   Pulse 97   Resp 18   Aerosol Therapy   $ Aerosol Therapy Charges PRN treatment not required   Respiratory Treatment Status (SVN) PRN treatment not required   Education   $ Education Bronchodilator;15 min   Respiratory Evaluation   $ Care Plan Tech Time 15 min

## 2023-02-23 NOTE — PLAN OF CARE
02/23/23 1119   Post-Acute Status   Post-Acute Authorization E   E Status Referrals Sent     Home oxygen eval complete and patient requires oxygen at discharge. Home oxygen orders sent to [facility] for review. Cm to follow     1510 - Per Giselle with [facility] - patient approved for home oxygen; delivery and teaching complete.

## 2023-02-24 VITALS
DIASTOLIC BLOOD PRESSURE: 63 MMHG | WEIGHT: 134.94 LBS | OXYGEN SATURATION: 95 % | HEIGHT: 62 IN | SYSTOLIC BLOOD PRESSURE: 133 MMHG | RESPIRATION RATE: 18 BRPM | TEMPERATURE: 98 F | BODY MASS INDEX: 24.83 KG/M2 | HEART RATE: 75 BPM

## 2023-02-24 LAB
ANION GAP SERPL CALC-SCNC: 7 MMOL/L (ref 8–16)
AORTIC ROOT ANNULUS: 3.3 CM
AORTIC VALVE CUSP SEPERATION: 1.1 CM
ASCENDING AORTA: 2.9 CM
AV INDEX (PROSTH): 0.37
AV MEAN GRADIENT: 11 MMHG
AV PEAK GRADIENT: 20 MMHG
AV REGURGITATION PRESSURE HALF TIME: 295 MS
AV VALVE AREA: 1.42 CM2
AV VELOCITY RATIO: 0.35
BASOPHILS # BLD AUTO: 0.01 K/UL (ref 0–0.2)
BASOPHILS NFR BLD: 0.2 % (ref 0–1.9)
BSA FOR ECHO PROCEDURE: 1.62 M2
BUN SERPL-MCNC: 38 MG/DL (ref 8–23)
CALCIUM SERPL-MCNC: 8.5 MG/DL (ref 8.7–10.5)
CHLORIDE SERPL-SCNC: 111 MMOL/L (ref 95–110)
CO2 SERPL-SCNC: 28 MMOL/L (ref 23–29)
CREAT SERPL-MCNC: 1.7 MG/DL (ref 0.5–1.4)
CV ECHO LV RWT: 0.34 CM
DIFFERENTIAL METHOD: ABNORMAL
DOP CALC AO PEAK VEL: 2.26 M/S
DOP CALC AO VTI: 40.3 CM
DOP CALC LVOT AREA: 3.8 CM2
DOP CALC LVOT DIAMETER: 2.2 CM
DOP CALC LVOT PEAK VEL: 0.78 M/S
DOP CALC LVOT STROKE VOLUME: 57.37 CM3
DOP CALC MV VTI: 27.1 CM
DOP CALCLVOT PEAK VEL VTI: 15.1 CM
E WAVE DECELERATION TIME: 203 MSEC
E/A RATIO: 0.42
E/E' RATIO: 11.11 M/S
ECHO LV POSTERIOR WALL: 0.89 CM (ref 0.6–1.1)
EJECTION FRACTION: 35 %
EOSINOPHIL # BLD AUTO: 0 K/UL (ref 0–0.5)
EOSINOPHIL NFR BLD: 0.2 % (ref 0–8)
ERYTHROCYTE [DISTWIDTH] IN BLOOD BY AUTOMATED COUNT: 14.8 % (ref 11.5–14.5)
EST. GFR  (NO RACE VARIABLE): 38.1 ML/MIN/1.73 M^2
FRACTIONAL SHORTENING: -60 % (ref 28–44)
GLUCOSE SERPL-MCNC: 114 MG/DL (ref 70–110)
HCT VFR BLD AUTO: 32.8 % (ref 40–54)
HGB BLD-MCNC: 10.8 G/DL (ref 14–18)
IMM GRANULOCYTES # BLD AUTO: 0.02 K/UL (ref 0–0.04)
IMM GRANULOCYTES NFR BLD AUTO: 0.3 % (ref 0–0.5)
INTERVENTRICULAR SEPTUM: 0.89 CM (ref 0.6–1.1)
IVC DIAMETER: 1.44 CM
IVRT: 92 MSEC
LEFT ATRIUM SIZE: 3.6 CM
LEFT ATRIUM VOLUME INDEX MOD: 29.5 ML/M2
LEFT ATRIUM VOLUME MOD: 47.2 CM3
LEFT INTERNAL DIMENSION IN SYSTOLE: 8.44 CM (ref 2.1–4)
LEFT VENTRICLE DIASTOLIC VOLUME INDEX: 84.38 ML/M2
LEFT VENTRICLE DIASTOLIC VOLUME: 135 ML
LEFT VENTRICLE MASS INDEX: 107 G/M2
LEFT VENTRICLE SYSTOLIC VOLUME INDEX: 52.8 ML/M2
LEFT VENTRICLE SYSTOLIC VOLUME: 84.4 ML
LEFT VENTRICULAR INTERNAL DIMENSION IN DIASTOLE: 5.29 CM (ref 3.5–6)
LEFT VENTRICULAR MASS: 171.46 G
LV LATERAL E/E' RATIO: 12.5 M/S
LV SEPTAL E/E' RATIO: 10 M/S
LVOT MG: 1 MMHG
LVOT MV: 0.52 CM/S
LYMPHOCYTES # BLD AUTO: 1 K/UL (ref 1–4.8)
LYMPHOCYTES NFR BLD: 17.2 % (ref 18–48)
MAGNESIUM SERPL-MCNC: 2.3 MG/DL (ref 1.6–2.6)
MCH RBC QN AUTO: 33.8 PG (ref 27–31)
MCHC RBC AUTO-ENTMCNC: 32.9 G/DL (ref 32–36)
MCV RBC AUTO: 103 FL (ref 82–98)
MONOCYTES # BLD AUTO: 0.8 K/UL (ref 0.3–1)
MONOCYTES NFR BLD: 13.2 % (ref 4–15)
MV MEAN GRADIENT: 2 MMHG
MV PEAK A VEL: 1.18 M/S
MV PEAK E VEL: 0.5 M/S
MV PEAK GRADIENT: 6 MMHG
MV VALVE AREA BY CONTINUITY EQUATION: 2.12 CM2
NEUTROPHILS # BLD AUTO: 4.1 K/UL (ref 1.8–7.7)
NEUTROPHILS NFR BLD: 68.9 % (ref 38–73)
NRBC BLD-RTO: 0 /100 WBC
PISA AR MAX VEL: 2.26 M/S
PISA TR MAX VEL: 2.35 M/S
PLATELET # BLD AUTO: 236 K/UL (ref 150–450)
PMV BLD AUTO: 11.1 FL (ref 9.2–12.9)
POTASSIUM SERPL-SCNC: 4.4 MMOL/L (ref 3.5–5.1)
PV MV: 1.04 M/S
PV PEAK VELOCITY: 1.51 CM/S
RA PRESSURE: 3 MMHG
RBC # BLD AUTO: 3.2 M/UL (ref 4.6–6.2)
RIGHT VENTRICULAR END-DIASTOLIC DIMENSION: 1.95 CM
RV TISSUE DOPPLER FREE WALL SYSTOLIC VELOCITY 1 (APICAL 4 CHAMBER VIEW): 0.01 CM/S
SODIUM SERPL-SCNC: 146 MMOL/L (ref 136–145)
TDI LATERAL: 0.04 M/S
TDI SEPTAL: 0.05 M/S
TDI: 0.05 M/S
TR MAX PG: 22 MMHG
TRICUSPID ANNULAR PLANE SYSTOLIC EXCURSION: 1.83 CM
TV REST PULMONARY ARTERY PRESSURE: 25 MMHG
WBC # BLD AUTO: 5.93 K/UL (ref 3.9–12.7)

## 2023-02-24 PROCEDURE — 85025 COMPLETE CBC W/AUTO DIFF WBC: CPT | Performed by: INTERNAL MEDICINE

## 2023-02-24 PROCEDURE — 63600175 PHARM REV CODE 636 W HCPCS: Performed by: INTERNAL MEDICINE

## 2023-02-24 PROCEDURE — 97110 THERAPEUTIC EXERCISES: CPT

## 2023-02-24 PROCEDURE — 25000003 PHARM REV CODE 250: Performed by: INTERNAL MEDICINE

## 2023-02-24 PROCEDURE — 94760 N-INVAS EAR/PLS OXIMETRY 1: CPT

## 2023-02-24 PROCEDURE — 92611 MOTION FLUOROSCOPY/SWALLOW: CPT

## 2023-02-24 PROCEDURE — 36415 COLL VENOUS BLD VENIPUNCTURE: CPT | Performed by: INTERNAL MEDICINE

## 2023-02-24 PROCEDURE — 27000221 HC OXYGEN, UP TO 24 HOURS

## 2023-02-24 PROCEDURE — 83735 ASSAY OF MAGNESIUM: CPT | Performed by: INTERNAL MEDICINE

## 2023-02-24 PROCEDURE — 99900035 HC TECH TIME PER 15 MIN (STAT)

## 2023-02-24 PROCEDURE — 80048 BASIC METABOLIC PNL TOTAL CA: CPT | Performed by: INTERNAL MEDICINE

## 2023-02-24 PROCEDURE — 99232 SBSQ HOSP IP/OBS MODERATE 35: CPT | Mod: ,,, | Performed by: INTERNAL MEDICINE

## 2023-02-24 PROCEDURE — 99232 PR SUBSEQUENT HOSPITAL CARE,LEVL II: ICD-10-PCS | Mod: ,,, | Performed by: INTERNAL MEDICINE

## 2023-02-24 RX ORDER — FUROSEMIDE 20 MG/1
20 TABLET ORAL DAILY PRN
Qty: 30 TABLET | Refills: 0 | Status: SHIPPED | OUTPATIENT
Start: 2023-02-24 | End: 2023-03-26

## 2023-02-24 RX ORDER — METOPROLOL SUCCINATE 25 MG/1
25 TABLET, EXTENDED RELEASE ORAL DAILY
Qty: 30 TABLET | Refills: 11 | Status: SHIPPED | OUTPATIENT
Start: 2023-02-24 | End: 2024-02-24

## 2023-02-24 RX ORDER — DEXTROSE MONOHYDRATE 50 MG/ML
INJECTION, SOLUTION INTRAVENOUS CONTINUOUS
Status: ACTIVE | OUTPATIENT
Start: 2023-02-24 | End: 2023-02-24

## 2023-02-24 RX ORDER — FUROSEMIDE 20 MG/1
20 TABLET ORAL 2 TIMES DAILY
Qty: 60 TABLET | Refills: 11 | Status: SHIPPED | OUTPATIENT
Start: 2023-02-24 | End: 2023-02-24 | Stop reason: HOSPADM

## 2023-02-24 RX ORDER — LIDOCAINE 50 MG/G
1 PATCH TOPICAL DAILY
Qty: 14 PATCH | Refills: 0 | Status: SHIPPED | OUTPATIENT
Start: 2023-02-24

## 2023-02-24 RX ORDER — LOSARTAN POTASSIUM 25 MG/1
25 TABLET ORAL DAILY
Qty: 30 TABLET | Refills: 0 | Status: SHIPPED | OUTPATIENT
Start: 2023-02-24 | End: 2023-03-26

## 2023-02-24 RX ADMIN — THERA TABS 1 TABLET: TAB at 09:02

## 2023-02-24 RX ADMIN — ESCITALOPRAM OXALATE 10 MG: 10 TABLET ORAL at 09:02

## 2023-02-24 RX ADMIN — AMLODIPINE BESYLATE 5 MG: 5 TABLET ORAL at 09:02

## 2023-02-24 RX ADMIN — DEXTROSE: 5 SOLUTION INTRAVENOUS at 09:02

## 2023-02-24 RX ADMIN — AMIODARONE HYDROCHLORIDE 200 MG: 200 TABLET ORAL at 09:02

## 2023-02-24 RX ADMIN — PANTOPRAZOLE SODIUM 40 MG: 40 TABLET, DELAYED RELEASE ORAL at 09:02

## 2023-02-24 RX ADMIN — LEVOTHYROXINE SODIUM 75 MCG: 0.03 TABLET ORAL at 05:02

## 2023-02-24 RX ADMIN — ATORVASTATIN CALCIUM 40 MG: 40 TABLET, FILM COATED ORAL at 09:02

## 2023-02-24 RX ADMIN — HEPARIN SODIUM 5000 UNITS: 5000 INJECTION INTRAVENOUS; SUBCUTANEOUS at 09:02

## 2023-02-24 NOTE — PLAN OF CARE
02/24/23 1626   Final Note   Assessment Type Final Discharge Note   Anticipated Discharge Disposition Home-Health   What phone number can be called within the next 1-3 days to see how you are doing after discharge? [phone]   Hospital Resources/Appts/Education Provided Provided patient/caregiver with written discharge plan information;Post-Acute resouces added to AVS;Appointments scheduled and added to AVS   Post-Acute Status   Post-Acute Authorization HME;Home Health   HME Status Set-up Complete/Auth obtained   Home Health Status Set-up Complete/Auth obtained   Discharge Delays None known at this time     Discharge orders and chart reviewed. No other discharge needs noted at this time. Pt is clear for discharge from case management. Pt is discharging to home with resumption of [facility].    Patient approved for home oxygen by [facility]- delivery and teaching done.     [facility] ordered for hospital follow up

## 2023-02-24 NOTE — PLAN OF CARE
Problem: SLP  Goal: SLP Goal  Description: 1. Pt will tolerate 3oz TL w/o overt s/s of aspiration.   2. Pt will tolerate PO trials of varying consistencies/textures for identification of LRD w/o overt s/s of aspiration. - Met 2/22/23  3. Pt will tolerate puree diet/NTL (NO STRAWS) w/o overt s/s of aspiration.   4. Pt will complete MBSS for identification of LRD/liquids.   2/24/2023 1540 by Diamante Mar CCC-SLP/A  Outcome: Ongoing, Progressing  2/24/2023 1458 by Diamante Mar CCC-SLP/A  Outcome: Ongoing, Progressing   4. MET

## 2023-02-24 NOTE — PLAN OF CARE
Problem: Adult Inpatient Plan of Care  Goal: Plan of Care Review  2/24/2023 1619 by Sulaiman Heller RN  Outcome: Met  2/24/2023 1558 by Sulaiman Heller RN  Outcome: Ongoing, Progressing  Goal: Patient-Specific Goal (Individualized)  2/24/2023 1619 by Sulaiman Heller RN  Outcome: Met  2/24/2023 1558 by Sulaiman Heller RN  Outcome: Ongoing, Progressing  Goal: Absence of Hospital-Acquired Illness or Injury  2/24/2023 1619 by Sulaiman Heller RN  Outcome: Met  2/24/2023 1558 by Sulaiman Heller RN  Outcome: Ongoing, Progressing  Goal: Optimal Comfort and Wellbeing  2/24/2023 1619 by Sulaiman Heller RN  Outcome: Met  2/24/2023 1558 by Sulaiman Heller RN  Outcome: Ongoing, Progressing  Goal: Readiness for Transition of Care  2/24/2023 1619 by Sulaiman Heller RN  Outcome: Met  2/24/2023 1558 by Sulaiman Heller RN  Outcome: Ongoing, Progressing     Problem: Skin Injury Risk Increased  Goal: Skin Health and Integrity  2/24/2023 1619 by Sulaiman Heller RN  Outcome: Met  2/24/2023 1558 by Sulaiman Heller RN  Outcome: Ongoing, Progressing     Problem: Oral Intake Inadequate  Goal: Improved Oral Intake  2/24/2023 1619 by Sulaiman Heller RN  Outcome: Met  2/24/2023 1558 by Sulaiman Heller RN  Outcome: Ongoing, Progressing     Problem: Fall Injury Risk  Goal: Absence of Fall and Fall-Related Injury  2/24/2023 1619 by Sulaiman Heller RN  Outcome: Met  2/24/2023 1558 by Sulaiman Heller RN  Outcome: Ongoing, Progressing

## 2023-02-24 NOTE — PROGRESS NOTES
[facility] Medicine  Progress Note    Patient Name: Gene Hartman  MRN: [id number]  Patient Class: IP- Inpatient   Admission Date: 2/17/2023  Length of Stay: 5 days  Attending Physician: Aman Begum MD  Primary Care Provider: Mariajose Thorne MD        Subjective:     Principal Problem:CHF (congestive heart failure)        HPI:  The patient is an 89-year-old male, who presents emergency room after falling backwards from walker.  Patient states that he would lost balance and fell backwards hitting his head.  He denies loss of consciousness .  He is significant past medical history to include heart failure and Parkinson's disease.  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    Plan to admit patient gently diurese.  Consult Physical therapy for conditioning and safety ambulation.  Consult  potential for living situation.      Overview/Hospital Course:  02/23/2023 pain is improved today able to move around without discomfort.  Plan for home with home health.        Physical Exam  Vitals and nursing note reviewed.   Constitutional:       Appearance:  No apparent distress  HENT:      Head: Normocephalic and atraumatic.   Eyes:      Pupils: Pupils are equal, round, and reactive to light.   Cardiovascular:      Rate and Rhythm: Normal rate and regular rhythm.   Pulmonary:      Comments: Moderate air movement  Pain with deep inspiration, pain on palpation of bilateral rib fractures.  Abdominal:      General: Bowel sounds are normal.      Palpations: Abdomen is soft.   Skin:     General: Skin is warm and dry.      Capillary Refill: Capillary refill takes less than 2 seconds.   Neurological:      General: No focal deficit present.      Mental Status: He is alert.       Assessment/Plan:      VTE Risk Mitigation (From admission, onward)           Ordered     heparin (porcine) injection 5,000 Units  Every 12 hours         02/21/23  1024     IP VTE HIGH RISK PATIENT  Once         02/18/23 0555     Place sequential compression device  Until discontinued         02/18/23 0555                      Shortness of breaths secondary to Acute decompensated systolic CHF and bilateral pleural effusions, mildly displaced right posterior rib fractures involving ribs 9-11, aspiration  Continue excellent telemetry nursing care  EF 30%  Cardiology consult appreciated  Lasix per Cardiology, Dobutrex  ay after 5 day Plavix washout  Speech therapy cleared for pureed diet  Cardiothoracic surgery recommends nonoperative management  Dysphagia diet, MBS in AM      Frequent falls in a Parkinson's disease patient  Frequent falls  PT recommends   Neurology consult-reportedly has failed all other Parkinson's meds and may be wheelchair-bound      Dementia without behavioral disturbance  Pleasant dementia  Probably vascular    KAREN  hold losartan  Albumin  Avoid nephrotoxic agents      HTN (hypertension), benign  Monitor and treat    Heparin subQ          Aman Begum MD  Department of Hospital Medicine   [facility]

## 2023-02-24 NOTE — PLAN OF CARE
Problem: Adult Inpatient Plan of Care  Goal: Plan of Care Review  Outcome: Ongoing, Progressing  Goal: Patient-Specific Goal (Individualized)  Outcome: Ongoing, Progressing  Goal: Absence of Hospital-Acquired Illness or Injury  Outcome: Ongoing, Progressing  Goal: Optimal Comfort and Wellbeing  Outcome: Ongoing, Progressing  Goal: Readiness for Transition of Care  Outcome: Ongoing, Progressing     Problem: Skin Injury Risk Increased  Goal: Skin Health and Integrity  Outcome: Ongoing, Progressing     Problem: Fall Injury Risk  Goal: Absence of Fall and Fall-Related Injury  Outcome: Ongoing, Progressing

## 2023-02-24 NOTE — PT/OT/SLP PROGRESS
Physical Therapy Treatment    Patient Name:  Gene Hartman   MRN:  [id number]    Recommendations:     Discharge Recommendations: home health PT (with 24/7 supervision from family)  Discharge Equipment Recommendations: lift device  Barriers to discharge: None    Assessment:     Gene Hartman is a 89 y.o. male admitted with a medical diagnosis of CHF (congestive heart failure).  He presents with the following impairments/functional limitations: weakness, impaired endurance, impaired self care skills, impaired functional mobility, impaired balance, decreased upper extremity function, decreased lower extremity function, decreased safety awareness, pain, impaired cardiopulmonary response to activity. Patient sitting up in chair and is agreeable to participation with PT treatment. 3 trials of sit to stand transfer performed with patient requiring MaxA x2 for first 2 trials and ModA x2 for third trial with feet blocked, VC for sequencing, and retropulsion with VC for upright posture and patient with fear of falling. He then performed LE therex in chair. He agrees to remain sitting up in chair with all needs met and daughter present.     Rehab Prognosis: Fair; patient would benefit from acute skilled PT services to address these deficits and reach maximum level of function.    Recent Surgery: * No surgery found *      Plan:     During this hospitalization, patient to be seen 6 x/week to address the identified rehab impairments via therapeutic activities, therapeutic exercises, neuromuscular re-education, gait training and progress toward the following goals:    Plan of Care Expires:  03/23/23    Subjective     Chief Complaint: ready to go home   Patient/Family Comments/goals: home after swallow study   Pain/Comfort:  Pain Rating 1:  (not rated)  Location - Side 1: Right  Location 1: rib(s)  Pain Addressed 1: Reposition, Distraction      Objective:     Communicated with LEAH Mustafa prior to session.  Patient found up in chair  with oxygen, peripheral IV, telemetry (no chair alarm) upon PT entry to room.     General Precautions: Standard, fall  Orthopedic Precautions: N/A  Braces: N/A  Respiratory Status: Nasal cannula, flow 3 L/min     Functional Mobility:  Transfers:     Sit to Stand:  maximal assistance and of 2 persons (2 trials) and moderate assistance and of 2 persons (1 trial) with rolling walker      AM-PAC 6 CLICK MOBILITY          Treatment & Education:  Patient was educated on the importance of OOB activity and functional mobility to negate negative effects of prolonged bed rest during hospitalization, safe transfers and ambulation, and D/C planning     Patient performed 10 reps of bilateral LE therapeutic exercise including long arc quads and seated marches     Patient left up in chair with all lines intact, call button in reach, and daughter present..    GOALS:   Multidisciplinary Problems       Physical Therapy Goals          Problem: Physical Therapy    Goal Priority Disciplines Outcome Goal Variances Interventions   Physical Therapy Goal     PT, PT/OT Ongoing, Progressing     Description: Goals to be met by: 3/18/2023    Patient will increase functional independence with mobility by performin. Supine to sit with Minimal Assistance  2. Sit to stand transfer with Minimal Assistance  3. Gait  x 50 feet with Minimal Assistance using Rolling Walker.                          Time Tracking:     PT Received On: 23  PT Start Time: 1118     PT Stop Time: 1130  PT Total Time (min): 12 min     Billable Minutes: Therapeutic Exercise 12    Treatment Type: Treatment  PT/PTA: PT     PTA Visit Number: 0     2023

## 2023-02-24 NOTE — PROGRESS NOTES
Modified Barium Swallowing Study     Past Medical History:   Diagnosis Date    Abnormal echocardiogram 11/21/2019    Normal left ventricular systolic function with estimated ejection fraction of 60%.  Grade 2 moderate left ventricular diastolic dysfunction consistent with pseudonormalization.  Mild left atrial enlargement.  Mild aortic regurgitation.  Mild to moderate mitral regurgitation.  Mild tricuspid regurgitation.  Estimated PA systolic pressure is 38 mm of mercury.  Diagnosis is syncope.    Acute combined systolic (congestive) and diastolic (congestive) heart failure 11/10/2022    Anxiety     Arthritis     CAD (coronary artery disease) age 68    Carotid artery occlusion     Cerebrovascular small vessel disease     Colon polyps age 78    Coronary artery disease of native artery of native heart with stable angina pectoris 5/6/2020    GERD (gastroesophageal reflux disease)     H. pylori infection     HTN (hypertension), benign age 65    Hyperlipidemia LDL goal <70 age 65    Hypothyroidism     Multiple fractures of ribs of right side 11/27/2012    Myocardial infarction     Pernicious anemia 1/26/2018    Primary insomnia 10/9/2018    Prostate cancer age 67    Subdural hematoma 9/5/2022    Syncopal episodes 3/2013    Urge incontinence 5/8/2018     Past Surgical History:   Procedure Laterality Date    CARDIAC PACEMAKER PLACEMENT  10/2015    [facility] [facility] Pacemaker    CARDIAC SURGERY      CATARACT EXTRACTION W/  INTRAOCULAR LENS IMPLANT Bilateral     COLONOSCOPY  ~2013    Dr. Edge    COLONOSCOPY N/A 06/08/2016    Procedure: COLONOSCOPY;  Surgeon: Shamar Barahona MD;  Location: [facility];  Service: Endoscopy;  Laterality: N/A; REPEAT IN 1 YEAR    CORONARY ANGIOPLASTY WITH STENT PLACEMENT  2003    x 2 RCA    PROSTATECTOMY  2002    UPPER GASTROINTESTINAL ENDOSCOPY  11/15/2016    Dr. Barahona     Pt seen today for MBSS following BSS indicating esophageal dysphagia and oropharyngeal dysphagia.  Pt with untreated  Parkinson's, and reported globus sensation. Per family today, pt very much preferred the pureed textures yesterday, and the nectar thick milk, and ate a significantly larger amount than usual.  He has hx 20# weight loss in the last few months.      Pt was found to have WFL oropharyngeal stages except on straw sips of thin liquid when taking a pill, where SILENT aspiration was seen.  Pt did not swallow the pill, but had to spit it out.  Esophageal stage showed dysmotility, with stasis, retrograde flow and delays.      Pt was judged to be appropriate for dental/dysphagia soft, mechanical soft or pureed textures and thin liquids.  Pills must be crushed and presented in puree ONLY.  Remain up for 1 hour after meals to allow esophagus to empty to reduce risk for aspiration on reflux. Monitor for s/s aspiration on thin liquids, as he was found to have silent aspiration.  Pt may benefit from GI consult re esophageal dysmotility.  Will follow for education for pt & family.         02/24/23 1521   Speech Time Calculation   Speech Start Time 1335   Speech Stop Time 1405   Speech Total (min) 30 min   General Information   SLP Treatment Date 02/24/23   Chest X-ray results Improved aeration of the lung bases, indicating improving atelectasis or infiltrate and small pleural effusions.   Current Diet   Current Diet Textures Puree   Current Liquid Consistencies Nectar Thick   Oral Musculature Evaluation   Voice Prior to PO Intake clear        MBS Eval: Thin Liquid Trial   Mode of Presentation, Thin Liquid cup;straw   Volume of Thin Liquid Presented 5ml, 10ml, cup sip, straw sip   Oral Prep/Phase, Thin Liquid slow oral transit time   Pharyngeal Phase, Thin Liquid WNL   Rosenbeck's 8-Point Penetration-Aspiration Scale, Thin Liquids (1) Material does not enter airway.;(8) Material enters the airway, passes below the vocal folds, and no effort is made to eject.   Penetration/Aspiration, Thin Liquid best 1: 5 ml, 10 ml, cup sip, single  "straw sip.  Worst 8: SiLENT aspiration on straw sip with whole pill   Esophageal Phase, Thin delayed emptying;dysmotility;esophageal stasis;retrograde flow   Additional Comments WFL oropharyngeal stages except on thin liquids with whole pill.  SILENT aspiration on pill with thin liquids        MBS Eval: Nectar Thick Liquid Trial   Mode of Presentation, Nectar cup   Volume of Nectar Presented 5ml, 10ml   Oral Prep/Phase, Nectar slow oral transit time   Pharyngeal Phase, [place] WFL   Rosenbeck's 8-Point Penetration-Aspiration Scale, Nectar Thick Liquids (1) Material does not enter airway.   Diagnostic Statement WFL oropharyngeal stages        MBS Eval: Pureed Trial   Mode of Presentation, Puree spoon   Volume of Puree Presented 5ml pudding   Oral Prep/Phase, Puree WFL   Rosenbeck's 8-Point Penetration-Aspiration Scale, Pureed (1) Material does not enter airway.   Diagnostic Statement WFL oropharyngeal stages        MBS Eval: Soft Solid Trial   Mode of Presentation, Semisolid spoon   Volume of Semisolid Food Presented 2 1/2" pcs peach in thin barium   Oral Prep/Phase, Semisolid WFL   Pharyngeal Phase, Semisolid WFL   Rosenbeck's 8-Point Penetration-Aspiration Scale, Semisolid (1) Material does not enter airway.   Diagnostic Statement WFL oropharyngeal stages        MBS Eval: Solid Food Texture Trial   Mode of Presentation, Solid spoon   Volume of Solid Food Presented 1/4 dwayne cracker with pudding barium   Oral Prep/Phase, Solid WFL   Pharyngeal Phase, Solid WFL   Rosenbeck's 8-Point Penetration-Aspiration Scale, Solid (1) Material does not enter airway.   Diagnostic Statement WFL oropharyngeal stages   Recommendations   Solid Diet Level   (puree per pt preference; [facility] soft or dental soft if pt chooses)   Liquid Diet Level Thin   Additional Recommendations NO STRAWS   Plan   Plan Dysphagia Therapy   SLP Follow-up   SLP Follow-up? Yes   SLP - Next Visit Date 02/26/23   Treatment/Billable Minutes   Treatment Swallowing " Dysfunction 10   Motion Fluoro Swallow, Cine/Vid 20   Total Time 30

## 2023-02-24 NOTE — DISCHARGE INSTRUCTIONS
Monitor blood pressure, heart rate and weight daily and notify PMD or cardiologist if >3lb weight gain in 24hrs or 5lb weight gain in 7 days or if Systolic blood pressure <100 or >160 or Heart Rate <70.  Slow to change position from sitting to standing. Return to hospital if symptoms worsen.  Wear compression stockings at least greater than 20 -30 mmHg strength. [place] fluid intake. Use incentive spirometer regularly. PUREED DIET. NO STRAWS, MEDS WITH APPLESAUCE

## 2023-02-24 NOTE — PROGRESS NOTES
[facility]    Cardiology Progress Note    Subjective:  Feeling well lying in bed flat.  He denies any shortness of breath or chest pains.  Blood pressure is very labile.  He is swallowing study today.  Patient wants to go home      Objective:  Vital Signs (Most Recent)  Temp: 98.6 °F (37 °C) (02/24/23 0441)  Pulse: 83 (02/24/23 0441)  Resp: 18 (02/24/23 0441)  BP: (!) 164/101 (02/24/23 0441)  SpO2: 99 % (02/24/23 0441)    Vital Signs Range (Last 24H):  Temp:  [97.9 °F (36.6 °C)-98.6 °F (37 °C)]   Pulse:  [78-97]   Resp:  [17-18]   BP: (139-164)/()   SpO2:  [95 %-99 %]     I & O (Last 24H):    Intake/Output Summary (Last 24 hours) at 2/24/2023 0821  Last data filed at 2/23/2023 1230  Gross per 24 hour   Intake 120 ml   Output --   Net 120 ml       Current Diet:     Current Diet Order   Procedures    Diet NPO        Allergies:  Review of patient's allergies indicates:   Allergen Reactions    Iodinated contrast media Rash    Pontocaine [tetracaine hcl] Anaphylaxis     hypotension       Meds:  Scheduled Meds:   amiodarone  200 mg Oral Daily    amLODIPine  5 mg Oral Daily    atorvastatin  40 mg Oral Daily    EScitalopram oxalate  10 mg Oral Daily    heparin (porcine)  5,000 Units Subcutaneous Q12H    levothyroxine  75 mcg Oral Before breakfast    LIDOcaine  1 patch Transdermal Q24H    mirtazapine  15 mg Oral QHS    multivitamin  1 tablet Oral Daily    pantoprazole  40 mg Oral BID     Continuous Infusions:   dextrose 5 % (D5W)       PRN Meds:acetaminophen, albuterol, dextrose 10%, dextrose 10%, diphenhydrAMINE, glucagon (human recombinant), glucose, glucose, hydrALAZINE, melatonin, naloxone, nitroGLYCERIN, ondansetron, polyethylene glycol, senna-docusate 8.6-50 mg, simethicone, sodium chloride 0.9%, traMADoL    Lab Results :  Recent Results (from the past 24 hour(s))   Basic Metabolic Panel (BMP)    Collection Time: 02/24/23  4:43 AM   Result Value Ref Range    Sodium 146 (H) 136 - 145 mmol/L     Potassium 4.4 3.5 - 5.1 mmol/L    Chloride 111 (H) 95 - 110 mmol/L    CO2 28 23 - 29 mmol/L    Glucose 114 (H) 70 - 110 mg/dL    BUN 38 (H) 8 - 23 mg/dL    Creatinine 1.7 (H) 0.5 - 1.4 mg/dL    Calcium 8.5 (L) 8.7 - 10.5 mg/dL    Anion Gap 7 (L) 8 - 16 mmol/L    eGFR 38.1 (A) >60 mL/min/1.73 m^2   Magnesium    Collection Time: 02/24/23  4:43 AM   Result Value Ref Range    Magnesium 2.3 1.6 - 2.6 mg/dL   CBC with Automated Differential    Collection Time: 02/24/23  4:43 AM   Result Value Ref Range    WBC 5.93 3.90 - 12.70 K/uL    RBC 3.20 (L) 4.60 - 6.20 M/uL    Hemoglobin 10.8 (L) 14.0 - 18.0 g/dL    Hematocrit 32.8 (L) 40.0 - 54.0 %     (H) 82 - 98 fL    MCH 33.8 (H) 27.0 - 31.0 pg    MCHC 32.9 32.0 - 36.0 g/dL    RDW 14.8 (H) 11.5 - 14.5 %    Platelets 236 150 - 450 K/uL    MPV 11.1 9.2 - 12.9 fL    Immature Granulocytes 0.3 0.0 - 0.5 %    Gran # (ANC) 4.1 1.8 - 7.7 K/uL    Immature Grans (Abs) 0.02 0.00 - 0.04 K/uL    Lymph # 1.0 1.0 - 4.8 K/uL    Mono # 0.8 0.3 - 1.0 K/uL    Eos # 0.0 0.0 - 0.5 K/uL    Baso # 0.01 0.00 - 0.20 K/uL    nRBC 0 0 /100 WBC    Gran % 68.9 38.0 - 73.0 %    Lymph % 17.2 (L) 18.0 - 48.0 %    Mono % 13.2 4.0 - 15.0 %    Eosinophil % 0.2 0.0 - 8.0 %    Basophil % 0.2 0.0 - 1.9 %    Differential Method Automated        Diagnostic Results:  Imaging Results              X-Ray Hip 2 or 3 views Right (with Pelvis when performed) (Final result)  Result time 02/18/23 06:17:26      Final result by Carmelo Chan MD (02/18/23 06:17:26)                   Narrative:    Reason: Pain status post fall.    FINDINGS:    AP pelvis and 2 views of the right hip show no fracture, dislocation, or destructive osseous lesion. There is mild bilateral hip joint space narrowing with osteophytosis. Pelvic enthesopathy noted. The bones appear osteopenic. No gross soft tissue abnormality.    IMPRESSION:    Degenerative changes of the pelvis and hips. No acute osseous abnormality.    Electronically signed by:   Carmelo Chan   2/18/2023 6:17 AM CST Workstation: [id number]                                     CT Chest Without Contrast (Final result)  Result time 02/17/23 23:54:09      Final result by Richard Cisneros MD (02/17/23 23:54:09)                   Narrative:      INDICATION: fall    EXAMINATION: CT CHEST WITHOUT CONTRAST - CT CHEST WITHOUT IV CONTRAST    TECHNIQUE:  Helically acquired images were obtained of the chest. A radiation dose optimization technique was used for this scan.  IV Contrast dosage and agent: None.    COMPARISON: None.  ____________________________________________    FINDINGS:    LUNGS, PLEURA AND LARGE AIRWAYS: There is bilateral pulmonary edema with basilar atelectasis. There are moderate bilateral pleural effusions. There is no evidence of pneumothorax.    SOFT TISSUES: There is no chest wall hematoma or soft tissue gas.    HEART AND PERICARDIUM: Heart appears mildly prominent. There is no pericardial effusion.    VESSELS: Aorta and pulmonary artery are normal in caliber There is no evidence of aneurysm.    MEDIASTINUM AND IMANI: No evidence of mediastinal hematoma. No mass or adenopathy. No hiatal hernia.    UPPER ABDOMEN: There appears be a partially visualizes exophytic lesion in the midpole left kidney. This appears soft tissue density. Renal mass is not excluded. This measures 2.2 cm. Significance in this 89-year-old patient is doubtful.    BONES: No obvious acute fracture.    There is motion does limit evaluation of the ribs. Healed rib fractures are noted on the right.        IMPRESSION:    1.  No evidence of acute visceral, vascular, or skeletal injury.  2.  A small follicle 2.2 cm lesion partially visualized left kidney. Significance is uncertain.  3.  Bilateral pleural effusions with pulmonary edema consistent with mild CHF.        Electronically signed by:  Richard Cisneros MD  2/17/2023 11:54 PM CST Workstation: [id number]                                     CT Head Without Contrast  (Final result)  Result time 02/17/23 23:53:03      Final result by Mildred Cobos MD (02/17/23 23:53:03)                   Narrative:    CT HEAD WITHOUT IV CONTRAST    CLINICAL STATEMENT:  fall    TECHNIQUE: Axial CT images from skull base to vertex without IV contrast. This exam was performed according to our departmental dose optimization program, and includes the following measures where applicable: automated exposure control, adjustment of the mAs and/or kVp according to patient size and/or exam, and an iterative reconstruction algorithm.    COMPARISON: CT scan of the brain without contrast November 9, 2022    FINDINGS:  There is no acute intracranial hemorrhage, mass, mass effect or abnormal extra-axial fluid collection. No evidence of an acute territorial infarct is identified. Age-related volume loss is again identified. There are scattered low density in the periventricular white matter. 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 No acute intracranial abnormality. There is no significant interval change.    Calvaria:  The skull base and calvaria demonstrate no abnormality.  Paranasal sinuses: Visualized portions of the orbits and paranasal sinuses are unremarkable.    IMPRESSION:  1.  No hemorrhage or mass lesion.  2.  Age-related volume loss with nonspecific white matter changes. 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 No significant interval change.    Electronically signed by:  Mildred Cobos MD  2/17/2023 11:53 PM CST Workstation: [id number]                                     CT Cervical Spine Without Contrast (Final result)  Result time 02/17/23 23:56:07      Final result by Mildred Cobos MD (02/17/23 23:56:07)                   Narrative:      CT CERVICAL SPINE WITHOUT IV CONTRAST    CLINICAL STATEMENT:  fall    TECHNIQUE: Noncontrast cervical spine CT with sagittal and coronal reconstructions.    This exam was performed according to our departmental dose optimization program, and  "includes the following measures where applicable: automated exposure control, adjustment of the mAs and/or kVp according to patient size and/or exam, and an iterative reconstruction algorithm.    COMPARISON: CT scan of the cervical spine without contrast November 9, 2022    FINDINGS:  General: Cervical spine is visualized from the skull base through T1 . Straightening of the normal cervical lordosis is seen in the patient's head is tilted anteriorly. The alignment is similar to the previous exam. There is diffuse disc height narrowing throughout the cervical spine. No uncovering of the facets. On the left there is fusion of the C4-5 facets. No acute cervical spine fracture.    Lung apices are clear. The airway is patent. Prevertebral soft tissues are unremarkable. Scattered vascular calcifications..    C1-2: Narrowing of the preodontoid space with osteophyte formation the remainder the cervical spine is difficult to assess on the axial images secondary to positioning of the patient. There is extensive facet arthropathy and uncovertebral hypertrophy which is unchanged.    IMPRESSION:  1.  No acute fracture.  2.  Multilevel degenerative disc disease of the cervical spine. The alignment is similar to the previous exam.    Electronically signed by:  Mildred Cobos MD  2/17/2023 11:56 PM CST Workstation: [id number]                                    Recent Cardiac Rhythm   (if applicable)      Physical Exam:  Objective:  General Appearance:  In no acute distress.    Vital signs: (most recent): Blood pressure (!) 164/101, pulse 83, temperature 98.6 °F (37 °C), temperature source Oral, resp. rate 18, height 5' 2" (1.575 m), weight 61.2 kg (134 lb 14.7 oz), SpO2 99 %.    Lungs:  Normal effort and normal respiratory rate.  There are rales.    Heart: Normal rate.  Regular rhythm.  S1 normal and S2 normal.  Positive for murmur.    Abdomen: Abdomen is soft.  Bowel sounds are normal.   There is no abdominal tenderness.   "     Current Consults:  IP CONSULT TO HOSPITAL MEDICINE  IP CONSULT TO REGISTERED DIETITIAN/NUTRITIONIST  IP CONSULT TO SOCIAL WORK/CASE MANAGEMENT  IP CONSULT TO CARDIOLOGY  IP CONSULT TO CARDIOTHORACIC SURGERY  IP CONSULT TO PULMONOLOGY  IP CONSULT TO GASTROENTEROLOGY  IP CONSULT TO NEUROLOGY    Assessment/Plan:  Assessment:   HFrEF  Parkinson's   Repeated falls --rib fx   pAF  Pacemaker  CAD  HTN  Dementia  KAREN/CKD   Plan:  OK for DC from cardiac viewpoint
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
## 2023-02-24 NOTE — PT/OT/SLP PROGRESS
Occupational Therapy   Treatment    Name: Gene Hartman  MRN: [id number]  Admitting Diagnosis:  CHF (congestive heart failure)       Recommendations:     Discharge Recommendations: home health OT  Discharge Equipment Recommendations:  lift device  Barriers to discharge:  None    Assessment:     Gene Hartman is a 89 y.o. male with a medical diagnosis of CHF (congestive heart failure).  Pt agreeable to OT therapy session this AM. Performance deficits affecting function are weakness, impaired endurance, impaired functional mobility, impaired self care skills, gait instability, impaired balance, decreased coordination, decreased upper extremity function, decreased lower extremity function, decreased safety awareness, pain, impaired cardiopulmonary response to activity.     Rehab Prognosis:  Fair; patient would benefit from acute skilled OT services to address these deficits and reach maximum level of function.       Plan:     Patient to be seen 6 x/week to address the above listed problems via self-care/home management, therapeutic exercises, therapeutic activities  Plan of Care Expires: 03/23/23  Plan of Care Reviewed with: patient, daughter    Subjective     Pain/Comfort:  Pain Rating 1:  (not rated)  Location - Side 1: Right  Location 1: rib(s)  Pain Addressed 1: Reposition, Distraction, Cessation of Activity    Objective:     Communicated with: nursing prior to session.  Patient found HOB elevated with oxygen, telemetry, bed alarm, peripheral IV upon OT entry to room.    General Precautions: Standard, fall    Orthopedic Precautions:N/A  Braces: N/A  Respiratory Status: Nasal cannula, flow 3 L/min     Occupational Performance:     Bed Mobility:    Patient completed Supine to Sit with moderate assistance     Functional Mobility/Transfers:  Patient completed Sit <> Stand Transfer with maximal assistance and of 2 persons  with  hand-held assist   Patient completed Bed <> Chair Transfer using Stand Pivot technique with  maximal assistance and of 2 persons with no assistive device  Pt required max cues to correct posture during transfer (pt with posterior lean throughout session)    Activities of Daily Living:  Grooming: setup assistance seated in chair to wash face      Therapeutic Exercise:  UE ROM exercises performed seated in chair 2 x 10 reps in all joints/planes to increase strength and endurance needed for ADLs. Pt tolerated well.    Treatment & Education:  Pt educated on role of OT/POC, importance of OOB/EOB activity, use of call bell, and safety during ADLs, transfers, and functional mobility.    Patient left up in chair with all lines intact, call button in reach, and daughter  present    GOALS:   Multidisciplinary Problems       Occupational Therapy Goals          Problem: Occupational Therapy    Goal Priority Disciplines Outcome Interventions   Occupational Therapy Goal     OT, PT/OT     Description: Goals to be met by: 3/23/23     Patient will increase functional independence with ADLs by performing:    UE Dressing with Minimal Assistance.  LE Dressing with Minimum Assistance and Assistive Devices as needed.  Grooming while seated with Supervision.  Toileting from bedside commode with Minimal Assistance for hygiene and clothing management.   Toilet transfer to bedside commode with Minimal Assistance.                         Time Tracking:     OT Date of Treatment: 02/24/23  OT Start Time: 1007  OT Stop Time: 1022  OT Total Time (min): 15 min    Billable Minutes:Therapeutic Exercise 15    OT/ANI: OT          2/24/2023

## 2023-02-24 NOTE — PLAN OF CARE
Problem: SLP  Goal: SLP Goal  Description: 1. Pt will tolerate 3oz TL w/o overt s/s of aspiration.   2. Pt will tolerate PO trials of varying consistencies/textures for identification of LRD w/o overt s/s of aspiration. - Met 2/22/23  3. Pt will tolerate puree diet/NTL (NO STRAWS) w/o overt s/s of aspiration.   4. Pt will complete MBSS for identification of LRD/liquids.   Outcome: Ongoing, Progressing

## 2023-02-24 NOTE — PLAN OF CARE
02/24/23 0815   Patient Assessment/Suction   Level of Consciousness (AVPU) alert   Respiratory Effort Normal   PRE-TX-O2   Device (Oxygen Therapy) nasal cannula with humidification   $ Is the patient on Low Flow Oxygen? Yes   Flow (L/min) 3   SpO2 98 %   Pulse Oximetry Type Intermittent   $ Pulse Oximetry - Single Charge Pulse Oximetry - Single   Aerosol Therapy   $ Aerosol Therapy Charges PRN treatment not required

## 2023-02-24 NOTE — PLAN OF CARE
Problem: Adult Inpatient Plan of Care  Goal: Plan of Care Review  Outcome: Ongoing, Progressing  Goal: Patient-Specific Goal (Individualized)  Outcome: Ongoing, Progressing  Goal: Absence of Hospital-Acquired Illness or Injury  Outcome: Ongoing, Progressing  Goal: Optimal Comfort and Wellbeing  Outcome: Ongoing, Progressing  Goal: Readiness for Transition of Care  Outcome: Ongoing, Progressing     Problem: Skin Injury Risk Increased  Goal: Skin Health and Integrity  Outcome: Ongoing, Progressing     Problem: Fall Injury Risk  Goal: Absence of Fall and Fall-Related Injury  Outcome: Ongoing, Progressing     Problem: Oral Intake Inadequate  Goal: Improved Oral Intake  Outcome: Ongoing, Progressing

## 2023-02-25 NOTE — DISCHARGE SUMMARY
[facility] Medicine  Discharge Summary      Patient Name: Gene Hartman  MRN: [id number]  Admission Date: 2/17/2023  Hospital Length of Stay: 6 days  Discharge Date and Time: 2/24/2023  6:25 PM  Attending Physician: No att. providers found   Discharging Provider: Aman Begum MD  Primary Care Provider: Mariajose Thorne MD        HPI: The patient is an 89-year-old male, who presents emergency room after falling backwards from walker.  Patient states that he would lost balance and fell backwards hitting his head.  He denies loss of consciousness .  He is significant past medical history to include heart failure and Parkinson's disease.  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     Plan to admit patient gently diurese.  Consult Physical therapy for conditioning and safety ambulation.  Consult  potential for living situation.    * No surgery found *      Hospital Course:  Patient has a history of severe Parkinson's disease refractory to several Parkinson's medications (his neurologist feels the patient will be wheelchair-bound for most of his life, according to family).  He presents to the ER after falling backwards from a walker, he was also complaining of shortness of breath and was found to be in acute decompensated systolic CHF with associated bilateral pleural effusions, KAREN.  Hospitalization complicated by dementia without behavioral disturbance, hypertension, pleuritic pain with deep inspiration and movement.  Initial evaluation did not demonstrate any fractures.  He was treated with Dobutrex by Cardiology.  CT head CT C-spine without acute changes.  CT chest did not show any rib fractures however a CT abdomen pelvis showed displaced right posterior rib fractures involving ribs 9 through 11, also showed severe bilateral hip joint arthritis.  Cardiothoracic surgery recommended conservative management.  There was some  concern for aspiration, speech therapy recommended.  Diet, medications with applesauce, no straws.  However MBS did not show any penetration with any consistency of food.  Speech therapy told me that patient just preferred.  And so this diet will be continued.  All of his acute medical issues improved to the point where patient was deemed safe for outpatient management and follow-up. Patient felt well on day of discharge wanted go home.  I answered all questions.  Return precautions given. Recommended follow to providers as documented.  Recommended stronger strength of compression stockings.  Home O2 was arranged.  Frequent incentive spirometry advised.  Daily weights counseled.  Patient and daughter are in agreement with aforementioned plan.    Physical Exam  Vitals and nursing note reviewed.   Constitutional:       Appearance:  No apparent distress  HENT:      Head: Normocephalic and atraumatic.   Eyes:      Pupils: Pupils are equal, round, and reactive to light.   Cardiovascular:      Rate and Rhythm: Normal rate and regular rhythm.   Pulmonary:      Comments: Moderate air movement  Pain with deep inspiration, pain on palpation of bilateral rib fractures.  Abdominal:      General: Bowel sounds are normal.      Palpations: Abdomen is soft.   Skin:     General: Skin is warm and dry.      Capillary Refill: Capillary refill takes less than 2 seconds.   Neurological:      General: No focal deficit present. Hyperactive reflexes.      Mental Status: He is alert.   Consults:   Consults (From admission, onward)          Status Ordering Provider     Inpatient consult to Neurology  Once        Provider:  Jayna Ceballos MD    Completed MARLENA MYERS     Inpatient consult to Gastroenterology  Once        Provider:  Delicia Buchanan MD    Completed MARLENA MYERS     Inpatient consult to Pulmonology  Once        Provider:  Giselle Wells MD    Completed MARLENA MYERS     Inpatient consult to  Cardiothoracic Surgery  Once        Provider:  Carlos Pope MD    Completed MARLENA MYERS     Inpatient consult to Cardiology  Once        Provider:  Hiram Betancourt MD    Completed MARLENA MYERS     Inpatient consult to Registered Dietitian/Nutritionist  Once        Provider:  (Not yet assigned)    VIPUL Scott     Case Management/  Once        Provider:  (Not yet assigned)    Completed VIPUL IVAN            Final Active Diagnoses:    Diagnosis Date Noted POA    PRINCIPAL PROBLEM:  CHF (congestive heart failure) [I50.9] 02/18/2023 Yes    Shortness of breath [R06.02] 02/22/2023 Yes    Pleural effusion [J90] 02/22/2023 Yes    Falls frequently [R29.6] 11/10/2022 Not Applicable    Dementia without behavioral disturbance [F03.90] 12/04/2019 Yes    Fall [W19.XXXA] 12/02/2014 Yes    HTN (hypertension), benign [I10]  Yes      Problems Resolved During this Admission:      Discharged Condition: fair    Disposition: Home-Health Care c    Follow Up:   Contact information for follow-up providers       Hiram Betancourt MD Follow up in 1 week(s).    Specialties: Cardiology, Interventional Cardiology  Contact information:  [address]  [place] [address]  [phone]               Mariajose Thorne MD Follow up in 1 week(s).    Specialties: Hospitalist, Internal Medicine  Why: for post-hospitalizaton follow up  Contact information:  [address]  [address]  [place] [address]  [phone]               Delicia Buchanan MD Follow up in 2 day(s).    Specialty: Gastroenterology  Contact information:  [address]   [place] [address]  [phone]               [facility] Follow up.    Why: [facility] ordered for NP visit to your home for PCP and hospital follow up                     Contact information for after-discharge care       Durable Medical Equipment       [facility] .    Service: Durable Medical Equipment  Contact  "information:  [address]  [facility] [address]  [phone]                                 Patient Instructions:      OXYGEN FOR HOME USE     Order Specific Question Answer Comments   Liter Flow 3    Duration With activity    Qualifying Test Performed at: Activity    Oxygen saturation at rest 94    Oxygen saturation with activity 86    Oxygen saturation with activity on oxygen 97    Portable mode: pulse dose acceptable    Mode: Portable concentrator    Route nasal cannula    Device: home concentrator with portable concentrator    Length of need (in months): 99 mos    Patient condition with qualifying saturation CHF    Height: 5' 2" (1.575 m)    Weight: 62.1 kg (136 lb 14.5 oz) bed weight   Alternative treatment measures have been tried or considered and deemed clinically ineffective. Yes      Ambulatory referral/consult to Home Health   Standing Status: Future   Referral Priority: Routine Referral Type: Home Health   Referral Reason: Specialty Services Required   Requested Specialty: Home Health Services   Number of Visits Requested: 1     Ambulatory referral/consult to [facility]   Standing Status: Future   Referral Priority: Routine Referral Type: Consultation   Referral Reason: Specialty Services Required   Number of Visits Requested: 1     Diet Dysphagia Pureed   Order Comments: NO STRAWS, MEDS WITH APPLESAUCE     Activity as tolerated     Medications:  Reconciled Home Medications:      Medication List        START taking these medications      LIDOcaine 5 %  Commonly known as: LIDODERM  Place 1 patch onto the skin once daily. Remove & Discard patch within 12 hours            CHANGE how you take these medications      fluticasone propionate 50 mcg/actuation nasal spray  Commonly known as: FLONASE  USE 1 SPRAY IN EACH NOSTRIL TWICE DAILY  What changed: when to take this     furosemide 20 MG tablet  Commonly known as: LASIX  Take 1 tablet (20 mg total) by mouth daily as needed (for " >3lb weight gain in 24 hours or >5lb weight gain in 7 days).  What changed:   when to take this  reasons to take this     losartan 25 MG tablet  Commonly known as: COZAAR  Take 1 tablet (25 mg total) by mouth once daily.  What changed:   medication strength  how much to take  when to take this     metoprolol succinate 25 MG 24 hr tablet  Commonly known as: TOPROL-XL  Take 1 tablet (25 mg total) by mouth once daily.  What changed:   medication strength  how much to take  when to take this            CONTINUE taking these medications      albuterol 2.5 mg /3 mL (0.083 %) nebulizer solution  Commonly known as: PROVENTIL  Inhale 2.5 mg into the lungs.     amiodarone 200 MG Tab  Commonly known as: PACERONE  Take 1 tablet (200 mg total) by mouth once daily.     atorvastatin 40 MG tablet  Commonly known as: LIPITOR  Take 1 tablet (40 mg total) by mouth once daily.     cetirizine 10 MG tablet  Commonly known as: ZYRTEC  Take 1 tablet (10 mg total) by mouth once daily.     clopidogreL 75 mg tablet  Commonly known as: PLAVIX  Take 1 tablet (75 mg total) by mouth once daily.     cyanocobalamin (vitamin B-12) 5,000 mcg Subl  Commonly known as: VITAMIN B-12  Place 1 tablet under the tongue once daily.     cycloSPORINE 0.05 % ophthalmic emulsion  Commonly known as: RESTASIS  Apply 1 drop to eye 2 (two) times daily.     EScitalopram oxalate 10 MG tablet  Commonly known as: LEXAPRO  Take 10 mg by mouth once daily.     HYDROcodone-acetaminophen 5-325 mg per tablet  Commonly known as: NORCO  Take 1 tablet by mouth every 12 (twelve) hours as needed for Pain.     levothyroxine 75 MCG tablet  Commonly known as: SYNTHROID  Take 75 mcg by mouth before breakfast.     melatonin 5 mg Subl  Take 2 tablets by mouth nightly as needed.     mirtazapine 15 MG tablet  Commonly known as: REMERON  Take 15 mg by mouth every evening.     nitroGLYCERIN 0.4 MG SL tablet  Commonly known as: NITROSTAT  Place 0.4 mg under the tongue every 5 (five) minutes  as needed.     pantoprazole 40 MG tablet  Commonly known as: PROTONIX  Take 40 mg by mouth 2 (two) times daily.            STOP taking these medications      LORazepam 0.5 MG tablet  Commonly known as: ATIVAN                Pending Diagnostic Studies:       None          Indwelling Lines/Drains at time of discharge:   Lines/Drains/Airways       None                   Time spent on the discharge of patient: 40 minutes         Aman Begum MD  Department of Hospital Medicine  [facility]

## 2023-02-27 ENCOUNTER — PES CALL (OUTPATIENT)
Dept: ADMINISTRATIVE | Facility: CLINIC | Age: 88
End: 2023-02-27
Payer: MEDICARE

## 2023-03-02 ENCOUNTER — OFFICE VISIT (OUTPATIENT)
Dept: HOME HEALTH SERVICES | Facility: CLINIC | Age: 88
End: 2023-03-02
Payer: MEDICARE

## 2023-03-02 DIAGNOSIS — I50.9 CONGESTIVE HEART FAILURE, UNSPECIFIED HF CHRONICITY, UNSPECIFIED HEART FAILURE TYPE: ICD-10-CM

## 2023-03-02 PROCEDURE — 1101F PT FALLS ASSESS-DOCD LE1/YR: CPT | Mod: CPTII,S$GLB,, | Performed by: NURSE PRACTITIONER

## 2023-03-02 PROCEDURE — 1160F PR REVIEW ALL MEDS BY PRESCRIBER/CLIN PHARMACIST DOCUMENTED: ICD-10-PCS | Mod: CPTII,S$GLB,, | Performed by: NURSE PRACTITIONER

## 2023-03-02 PROCEDURE — 1159F PR MEDICATION LIST DOCUMENTED IN MEDICAL RECORD: ICD-10-PCS | Mod: CPTII,S$GLB,, | Performed by: NURSE PRACTITIONER

## 2023-03-02 PROCEDURE — 3288F FALL RISK ASSESSMENT DOCD: CPT | Mod: CPTII,S$GLB,, | Performed by: NURSE PRACTITIONER

## 2023-03-02 PROCEDURE — 1125F AMNT PAIN NOTED PAIN PRSNT: CPT | Mod: CPTII,S$GLB,, | Performed by: NURSE PRACTITIONER

## 2023-03-02 PROCEDURE — 99344 HOME/RES VST NEW MOD MDM 60: CPT | Mod: S$GLB,,, | Performed by: NURSE PRACTITIONER

## 2023-03-02 PROCEDURE — 3288F PR FALLS RISK ASSESSMENT DOCUMENTED: ICD-10-PCS | Mod: CPTII,S$GLB,, | Performed by: NURSE PRACTITIONER

## 2023-03-02 PROCEDURE — 1125F PR PAIN SEVERITY QUANTIFIED, PAIN PRESENT: ICD-10-PCS | Mod: CPTII,S$GLB,, | Performed by: NURSE PRACTITIONER

## 2023-03-02 PROCEDURE — 1160F RVW MEDS BY RX/DR IN RCRD: CPT | Mod: CPTII,S$GLB,, | Performed by: NURSE PRACTITIONER

## 2023-03-02 PROCEDURE — 99344 PR HOME VISIT,NEW PATIENT,LEVEL IV: ICD-10-PCS | Mod: S$GLB,,, | Performed by: NURSE PRACTITIONER

## 2023-03-02 PROCEDURE — 1101F PR PT FALLS ASSESS DOC 0-1 FALLS W/OUT INJ PAST YR: ICD-10-PCS | Mod: CPTII,S$GLB,, | Performed by: NURSE PRACTITIONER

## 2023-03-02 PROCEDURE — 1159F MED LIST DOCD IN RCRD: CPT | Mod: CPTII,S$GLB,, | Performed by: NURSE PRACTITIONER

## 2023-03-02 NOTE — PT/OT/SLP DISCHARGE
Occupational Therapy Discharge Summary    Gene Hartman  MRN: [id number]   Principal Problem: CHF (congestive heart failure)      Patient Discharged from acute Occupational Therapy on 2/24/23.  Please refer to prior OT note dated 2/24/23 for functional status.    Assessment:      Patient appropriate for care in another setting.    Objective:     GOALS:   Multidisciplinary Problems       Occupational Therapy Goals          Problem: Occupational Therapy    Goal Priority Disciplines Outcome Interventions   Occupational Therapy Goal     OT, PT/OT     Description: Goals to be met by: 3/23/23     Patient will increase functional independence with ADLs by performing:    UE Dressing with Minimal Assistance.  LE Dressing with Minimum Assistance and Assistive Devices as needed.  Grooming while seated with Supervision.  Toileting from bedside commode with Minimal Assistance for hygiene and clothing management.   Toilet transfer to bedside commode with Minimal Assistance.                         Reasons for Discontinuation of Therapy Services  Transfer to alternate level of care.      Plan:     Patient Discharged to: Home with Home Health Service    3/2/2023

## 2023-03-05 VITALS
HEART RATE: 74 BPM | DIASTOLIC BLOOD PRESSURE: 60 MMHG | RESPIRATION RATE: 16 BRPM | BODY MASS INDEX: 23.78 KG/M2 | SYSTOLIC BLOOD PRESSURE: 110 MMHG | OXYGEN SATURATION: 98 % | WEIGHT: 130 LBS

## 2023-03-05 NOTE — PROGRESS NOTES
[facility]  Transition of Care Home Visit    Visit Date: 3/2/23  Encounter Provider: Kolton Zamora   PCP:  Mariajose Thorne MD  PRESENTING HISTORY   Patient ID: Gene Hartman is a 89 y.o. male.  Consult Requested By:  Dr. Aman Begum  Reason for Consult:  Hospital Follow Up.    Gene is being seen at home due to being seen at home due to physical debility that presents a taxing effort to leave the home, to mitigate high risk of hospital readmission and/or due to the limited availability of reliable or safe options for transportation to the point of access to the provider. Prior to treatment on this visit the chart was reviewed and patient verbal consent was obtained.      Chief Complaint: Transitional Care  History of Present Illness: Mr. Gene Hartman is a 89 y.o. male who was recently admitted to the hospital.    Admission Date: 2/17/2023  Hospital Length of Stay: 6 days  Discharge Date and Time: 2/24/2023  6:25 PM  Attending Physician: No att. providers found   Discharging Provider: Aman Begum MD  Primary Care Provider: Mariajose Thorne MD     HPI: The patient is an 89-year-old male, who presents emergency room after falling backwards from walker.  Patient states that he would lost balance and fell backwards hitting his head.  He denies loss of consciousness .  He is significant past medical history to include heart failure and Parkinson's disease.  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     Plan to admit patient gently diurese.  Consult Physical therapy for conditioning and safety ambulation.  Consult  potential for living situation.     * No surgery found *      Hospital Course:  Patient has a history of severe Parkinson's disease refractory to several Parkinson's medications (his neurologist feels the patient will be wheelchair-bound for most of his life, according to family).  He presents to the ER after  falling backwards from a walker, he was also complaining of shortness of breath and was found to be in acute decompensated systolic CHF with associated bilateral pleural effusions, KAREN.  Hospitalization complicated by dementia without behavioral disturbance, hypertension, pleuritic pain with deep inspiration and movement.  Initial evaluation did not demonstrate any fractures.  He was treated with Dobutrex by Cardiology.  CT head CT C-spine without acute changes.  CT chest did not show any rib fractures however a CT abdomen pelvis showed displaced right posterior rib fractures involving ribs 9 through 11, also showed severe bilateral hip joint arthritis.  Cardiothoracic surgery recommended conservative management.  There was some concern for aspiration, speech therapy recommended.  Diet, medications with applesauce, no straws.  However MBS did not show any penetration with any consistency of food.  Speech therapy told me that patient just preferred.  And so this diet will be continued.  All of his acute medical issues improved to the point where patient was deemed safe for outpatient management and follow-up. Patient felt well on day of discharge wanted go home.  I answered all questions.  Return precautions given. Recommended follow to providers as documented.  Recommended stronger strength of compression stockings.  Home O2 was arranged.  Frequent incentive spirometry advised.  Daily weights counseled.  Patient and daughter are in agreement with aforementioned plan.   ___________________________________________________________________    Today: With this visit today patient is found lying in his recliner in his living room. Patient is AAOx3, able to verify his name and . Most of his other history was received from his daughter and his medical record. Pt lives with daughter and her family and his wife. Daughter and granddaughter perform all ADLs, meals and transportation.   Pt is eating soft/pureed diet, his  appetite is poor. Daughter denies any bowel/bladder issues. Pt must have assistance and his walker to ambulate. He is mainly chair/bed bound. Pt has stage 1 pressure sore to his sacral area. They are applying barrier ointment, stressed importance of off loading.   Pt is using his IS 1 -2 times/day since his recent fx rib dx. Pt using home 02 at 2-3L, sats >94%.   Pt had a recent fall Wed while trying to get out of his recliner by himself. Daughter denies any injuries or hitting head/LOC.         VSS. Denies fever, chest pain, new onset shortness of breath, nausea, vomiting, diarrhea.  All hospital discharge orders reviewed and being followed, all medications reconciled and reviewed, patient and family verbalized understanding. No other needs identified at this time.      Review of Systems   Constitutional:  Positive for activity change and appetite change. Negative for chills and fever.   Respiratory:  Negative for cough, chest tightness and shortness of breath.    Cardiovascular:  Negative for chest pain, palpitations and leg swelling.   Gastrointestinal:  Negative for abdominal distention, abdominal pain, constipation, diarrhea, nausea and vomiting.   Genitourinary:  Negative for decreased urine volume and difficulty urinating.   Musculoskeletal:  Positive for gait problem. Negative for myalgias.   Skin:  Positive for color change. Negative for rash and wound.   Allergic/Immunologic: Negative for immunocompromised state.   Neurological:  Positive for weakness. Negative for dizziness, syncope and headaches.   Hematological:  Bruises/bleeds easily.   Psychiatric/Behavioral:  Positive for confusion. Negative for sleep disturbance. The patient is not nervous/anxious.      Assessments:  Environmental: clean  Functional Status: max assist  Safety: high fall risk  Nutritional: adequate food, poor appetite  Home Health/DME/Supplies: walker, 02    PAST HISTORY:     Past Medical History:   Diagnosis Date    Abnormal  echocardiogram 11/21/2019    Normal left ventricular systolic function with estimated ejection fraction of 60%.  Grade 2 moderate left ventricular diastolic dysfunction consistent with pseudonormalization.  Mild left atrial enlargement.  Mild aortic regurgitation.  Mild to moderate mitral regurgitation.  Mild tricuspid regurgitation.  Estimated PA systolic pressure is 38 mm of mercury.  Diagnosis is syncope.    Acute combined systolic (congestive) and diastolic (congestive) heart failure 11/10/2022    Anxiety     Arthritis     CAD (coronary artery disease) age 68    Carotid artery occlusion     Cerebrovascular small vessel disease     Colon polyps age 78    Coronary artery disease of native artery of native heart with stable angina pectoris 5/6/2020    GERD (gastroesophageal reflux disease)     H. pylori infection     HTN (hypertension), benign age 65    Hyperlipidemia LDL goal <70 age 65    Hypothyroidism     Multiple fractures of ribs of right side 11/27/2012    Myocardial infarction     Pernicious anemia 1/26/2018    Primary insomnia 10/9/2018    Prostate cancer age 67    Subdural hematoma 9/5/2022    Syncopal episodes 3/2013    Urge incontinence 5/8/2018       Past Surgical History:   Procedure Laterality Date    CARDIAC PACEMAKER PLACEMENT  10/2015    [employer] Pacemaker    CARDIAC SURGERY      CATARACT EXTRACTION W/  INTRAOCULAR LENS IMPLANT Bilateral     COLONOSCOPY  ~2013    Dr. Edge    COLONOSCOPY N/A 06/08/2016    Procedure: COLONOSCOPY;  Surgeon: Shamar Barahona MD;  Location: [facility];  Service: Endoscopy;  Laterality: N/A; REPEAT IN 1 YEAR    CORONARY ANGIOPLASTY WITH STENT PLACEMENT  2003    x 2 RCA    PROSTATECTOMY  2002    UPPER GASTROINTESTINAL ENDOSCOPY  11/15/2016    Dr. Barahona       Family History   Problem Relation Age of Onset    Migraines Mother     Heart failure Father     Heart disease Father 56        MI    Heart failure Brother     Heart disease Brother     Diabetes Son      Hypertension Son     Heart disease Brother     Mental illness Brother     No Known Problems Son     Colon cancer Neg Hx     Colon polyps Neg Hx     Crohn's disease Neg Hx     Ulcerative colitis Neg Hx     Stomach cancer Neg Hx     Esophageal cancer Neg Hx        Social History     Socioeconomic History    Marital status:      Spouse name: Evie Hartman    Number of children: 2   Occupational History    Occupation: [employer]   Tobacco Use    Smoking status: Never    Smokeless tobacco: Never   Substance and Sexual Activity    Alcohol use: No    Drug use: No    Sexual activity: Not Currently   Social History Narrative    The patient does exercise regularly (walking).    Rates diet as good.    He is satisfied with weight.             Social Determinants of Health     Financial Resource Strain: Low Risk     Difficulty of Paying Living Expenses: Not very hard   Food Insecurity: No Food Insecurity    Worried About Running Out of Food in the Last Year: Never true    Ran Out of Food in the Last Year: Never true   Transportation Needs: No Transportation Needs    Lack of Transportation (Medical): No    Lack of Transportation (Non-Medical): No   Physical Activity: Inactive    Days of Exercise per Week: 0 days    Minutes of Exercise per Session: 0 min   Stress: No Stress Concern Present    Feeling of Stress : Not at all   Social Connections: Moderately Isolated    Frequency of Communication with Friends and Family: More than three times a week    Frequency of Social Gatherings with Friends and Family: More than three times a week    Attends Catholic Services: Never    Active Member of Clubs or Organizations: No    Attends Club or Organization Meetings: Never    Marital Status:    Housing Stability: Low Risk     Unable to Pay for Housing in the Last Year: No    Number of Places Lived in the Last Year: 1    Unstable Housing in the Last Year: No       MEDICATIONS & ALLERGIES:     Current Outpatient Medications on  File Prior to Visit   Medication Sig Dispense Refill    albuterol (PROVENTIL) 2.5 mg /3 mL (0.083 %) nebulizer solution Inhale 2.5 mg into the lungs.      amiodarone (PACERONE) 200 MG Tab Take 1 tablet (200 mg total) by mouth once daily.      atorvastatin (LIPITOR) 40 MG tablet Take 1 tablet (40 mg total) by mouth once daily. 90 tablet 1    cetirizine (ZYRTEC) 10 MG tablet Take 1 tablet (10 mg total) by mouth once daily. 90 tablet 3    clopidogrel (PLAVIX) 75 mg tablet Take 1 tablet (75 mg total) by mouth once daily. 90 tablet 3    EScitalopram oxalate (LEXAPRO) 10 MG tablet Take 10 mg by mouth once daily.      furosemide (LASIX) 20 MG tablet Take 1 tablet (20 mg total) by mouth daily as needed (for >3lb weight gain in 24 hours or >5lb weight gain in 7 days). 30 tablet 0    levothyroxine (SYNTHROID) 75 MCG tablet Take 75 mcg by mouth before breakfast.      losartan (COZAAR) 25 MG tablet Take 1 tablet (25 mg total) by mouth once daily. 30 tablet 0    metoprolol succinate (TOPROL-XL) 25 MG 24 hr tablet Take 1 tablet (25 mg total) by mouth once daily. 30 tablet 11    pantoprazole (PROTONIX) 40 MG tablet Take 40 mg by mouth 2 (two) times daily.      [DISCONTINUED] cyanocobalamin, vitamin B-12, (VITAMIN B-12) 5,000 mcg Subl Place 1 tablet under the tongue once daily. 30 tablet 11    [DISCONTINUED] fluticasone propionate (FLONASE) 50 mcg/actuation nasal spray USE 1 SPRAY IN EACH NOSTRIL TWICE DAILY (Patient taking differently: 1 spray by Each Nostril route 2 (two) times a day.) 48 g 3    cycloSPORINE (RESTASIS) 0.05 % ophthalmic emulsion Apply 1 drop to eye 2 (two) times daily.      HYDROcodone-acetaminophen (NORCO) 5-325 mg per tablet Take 1 tablet by mouth every 12 (twelve) hours as needed for Pain. (Patient not taking: Reported on 3/5/2023) 12 tablet 0    LIDOcaine (LIDODERM) 5 % Place 1 patch onto the skin once daily. Remove & Discard patch within 12 hours 14 patch 0    mirtazapine (REMERON) 15 MG tablet Take 15 mg by  mouth every evening.      nitroGLYCERIN (NITROSTAT) 0.4 MG SL tablet Place 0.4 mg under the tongue every 5 (five) minutes as needed.      [DISCONTINUED] melatonin 5 mg Subl Take 2 tablets by mouth nightly as needed.       No current facility-administered medications on file prior to visit.        Review of patient's allergies indicates:   Allergen Reactions    Iodinated contrast media Rash    Pontocaine [tetracaine hcl] Anaphylaxis     hypotension       OBJECTIVE:     Vital Signs:  Vitals:    03/02/23 1200   BP: 110/60   Pulse: 74   Resp: 16     Body mass index is 23.78 kg/m².     Physical Exam:  Physical Exam  Vitals reviewed.   Constitutional:       General: He is awake. He is not in acute distress.     Appearance: Normal appearance. He is not ill-appearing or toxic-appearing.   HENT:      Head: Atraumatic.   Cardiovascular:      Rate and Rhythm: Normal rate and regular rhythm.      Heart sounds: Normal heart sounds.   Pulmonary:      Effort: Pulmonary effort is normal.      Breath sounds: Normal breath sounds and air entry. No decreased breath sounds or rales.   Abdominal:      General: Abdomen is flat. Bowel sounds are normal. There is no distension.      Palpations: Abdomen is soft.      Tenderness: There is no abdominal tenderness.   Musculoskeletal:      Right lower leg: No edema.      Left lower leg: No edema.   Skin:     General: Skin is warm.      Capillary Refill: Capillary refill takes less than 2 seconds.      Findings: Erythema present.   Neurological:      General: No focal deficit present.      Mental Status: He is alert and oriented to person, place, and time.      Cranial Nerves: Cranial nerves 2-12 are intact.      Sensory: Sensation is intact.   Psychiatric:         Attention and Perception: Attention normal.         Mood and Affect: Mood normal.         Speech: Speech normal.         Behavior: Behavior normal. Behavior is cooperative.         Cognition and Memory: Cognition is impaired. Memory  is impaired.           Laboratory  Lab Results   Component Value Date    WBC 5.93 02/24/2023    HGB 10.8 (L) 02/24/2023    HCT 32.8 (L) 02/24/2023     (H) 02/24/2023     02/24/2023     Lab Results   Component Value Date    INR 1.1 10/09/2022    INR 1.3 10/05/2022    INR 1.0 09/04/2022     Lab Results   Component Value Date    HGBA1C 5.8 11/10/2022     No results for input(s): POCTGLUCOSE in the last 72 hours.    TRANSITION OF CARE:     [facility] On Call Contact Note:  2/27/23  Family and/or Caretaker present at visit?  Yes.  Diagnostic tests reviewed/disposition: No diagnosic tests pending after this hospitalization.  Disease/illness education: I have discussed discharge plans, follow-up instructions, medication compliance, future appointments, provider contact information, indicators to seek medical emergency treatment, and advisement to call with additional questions or concerns. Patient and caregiver verbalize understanding.   Home health/community services discussion/referrals: Patient has home health established at [facility] .   Establishment or re-establishment of referral orders for community resources: No other necessary community resources.   Discussion with other health care providers: No discussion with other health care providers necessary.     Transition of Care Visit:  I have reviewed and updated the history and problem list.  I have reconciled the medication list.  I have discussed the hospitalization and current medical issues, prognosis and plans with the patient/family.  I  spent more than 50% of time discussing the care with the patient/family.  Total Face-to-Face Encounter: 60 minutes.    Medications Reconciliation:   I have reconciled the patient's home medications and discharge medications with the patient/family. I have updated all changes.  Refer to After-Visit Medication List.    ASSESSMENT & PLAN:     Problem List Items Addressed This Visit          Cardiac/Vascular    CHF  (congestive heart failure)    -Pt compliant with all medications, daughter giving pt Lasix 20mg qd/qod. No new onset SOB, no BLE, L-CTA  -Cards f/u 3/14, PCP f/u 3/7, GI f/u 3/8.     Were controlled substances prescribed?  No  Instructions for the patient:    Scheduled Follow-up :  No future appointments.  Home NP will f/u with pt prn. Information given to daughter if questions, concerns or status changes to contact me.     After Visit Medication List :     Medication List            Accurate as of March 2, 2023 11:59 PM. If you have any questions, ask your nurse or doctor.                CONTINUE taking these medications      albuterol 2.5 mg /3 mL (0.083 %) nebulizer solution  Commonly known as: PROVENTIL     amiodarone 200 MG Tab  Commonly known as: PACERONE  Take 1 tablet (200 mg total) by mouth once daily.     atorvastatin 40 MG tablet  Commonly known as: LIPITOR  Take 1 tablet (40 mg total) by mouth once daily.     cetirizine 10 MG tablet  Commonly known as: ZYRTEC  Take 1 tablet (10 mg total) by mouth once daily.     clopidogreL 75 mg tablet  Commonly known as: PLAVIX  Take 1 tablet (75 mg total) by mouth once daily.     cycloSPORINE 0.05 % ophthalmic emulsion  Commonly known as: RESTASIS     EScitalopram oxalate 10 MG tablet  Commonly known as: LEXAPRO     furosemide 20 MG tablet  Commonly known as: LASIX  Take 1 tablet (20 mg total) by mouth daily as needed (for >3lb weight gain in 24 hours or >5lb weight gain in 7 days).     HYDROcodone-acetaminophen 5-325 mg per tablet  Commonly known as: NORCO  Take 1 tablet by mouth every 12 (twelve) hours as needed for Pain.     levothyroxine 75 MCG tablet  Commonly known as: SYNTHROID     LIDOcaine 5 %  Commonly known as: LIDODERM  Place 1 patch onto the skin once daily. Remove & Discard patch within 12 hours     losartan 25 MG tablet  Commonly known as: COZAAR  Take 1 tablet (25 mg total) by mouth once daily.     metoprolol succinate 25 MG 24 hr tablet  Commonly  known as: TOPROL-XL  Take 1 tablet (25 mg total) by mouth once daily.     mirtazapine 15 MG tablet  Commonly known as: REMERON     nitroGLYCERIN 0.4 MG SL tablet  Commonly known as: NITROSTAT     pantoprazole 40 MG tablet  Commonly known as: PROTONIX              Signature: Kolton Zamora NP

## 2023-03-05 NOTE — PATIENT INSTRUCTIONS
Instructions:  - [facility] Nurse Practitioner to schedule home follow-up visit with patient as needed.  - Continue all medications, treatments and therapies as ordered.   - Follow all instructions, recommendations as discussed.  - Maintain Safety Precautions at all times.  - Attend all medical appointments as scheduled.  - For worsening symptoms: call Primary Care Physician or Nurse Practitioner.  - For emergencies, call 911 or immediately report to the nearest emergency room.

## 2023-03-06 NOTE — PLAN OF CARE
PORTER with [facility]  arranged for 2/27/2023.     03/06/23 1422   Final Note   Assessment Type Final Discharge Note   Anticipated Discharge Disposition Home-Health

## 2023-03-20 ENCOUNTER — DOCUMENT SCAN (OUTPATIENT)
Dept: HOME HEALTH SERVICES | Facility: HOSPITAL | Age: 88
End: 2023-03-20
Payer: MEDICARE

## 2024-04-04 NOTE — TELEPHONE ENCOUNTER
I never ordered the flomax must be a pharmacy mistake     I would start with the oxybutynin and let me know if we need to go up    (flomax can be used without a prostate as it can be used in woman- however that is just an fyi)   Attending to bill